# Patient Record
Sex: FEMALE | Race: WHITE | NOT HISPANIC OR LATINO | Employment: OTHER | URBAN - METROPOLITAN AREA
[De-identification: names, ages, dates, MRNs, and addresses within clinical notes are randomized per-mention and may not be internally consistent; named-entity substitution may affect disease eponyms.]

---

## 2017-06-09 ENCOUNTER — OFFICE VISIT (OUTPATIENT)
Dept: FAMILY MEDICINE | Facility: CLINIC | Age: 62
End: 2017-06-09
Payer: COMMERCIAL

## 2017-06-09 VITALS
DIASTOLIC BLOOD PRESSURE: 73 MMHG | HEART RATE: 98 BPM | BODY MASS INDEX: 35.01 KG/M2 | HEIGHT: 61 IN | TEMPERATURE: 98.7 F | WEIGHT: 185.4 LBS | SYSTOLIC BLOOD PRESSURE: 115 MMHG

## 2017-06-09 DIAGNOSIS — I10 BENIGN ESSENTIAL HYPERTENSION: ICD-10-CM

## 2017-06-09 DIAGNOSIS — E78.5 HYPERLIPIDEMIA LDL GOAL <100: ICD-10-CM

## 2017-06-09 DIAGNOSIS — C91.11 CHRONIC LYMPHOID LEUKEMIA IN REMISSION (H): ICD-10-CM

## 2017-06-09 DIAGNOSIS — J98.01 COUGH DUE TO BRONCHOSPASM: ICD-10-CM

## 2017-06-09 DIAGNOSIS — M76.61 ACHILLES TENDINITIS OF RIGHT LOWER EXTREMITY: Primary | ICD-10-CM

## 2017-06-09 DIAGNOSIS — E21.2 OTHER HYPERPARATHYROIDISM (H): ICD-10-CM

## 2017-06-09 PROCEDURE — 99214 OFFICE O/P EST MOD 30 MIN: CPT | Performed by: FAMILY MEDICINE

## 2017-06-09 RX ORDER — PRAVASTATIN SODIUM 40 MG
40 TABLET ORAL DAILY
Qty: 90 TABLET | Refills: 3 | Status: SHIPPED | OUTPATIENT
Start: 2017-06-09 | End: 2018-04-27

## 2017-06-09 RX ORDER — TERAZOSIN 5 MG/1
5 CAPSULE ORAL DAILY
Qty: 90 CAPSULE | Refills: 3 | Status: SHIPPED | OUTPATIENT
Start: 2017-06-09 | End: 2018-04-27

## 2017-06-09 RX ORDER — LISINOPRIL AND HYDROCHLOROTHIAZIDE 20; 25 MG/1; MG/1
1 TABLET ORAL EVERY MORNING
Qty: 90 TABLET | Refills: 3 | Status: SHIPPED | OUTPATIENT
Start: 2017-06-09 | End: 2018-04-27

## 2017-06-09 NOTE — PROGRESS NOTES
"  SUBJECTIVE:                                                    Mirta Cardenas is a 62 year old female who presents to clinic today for the following health issues:      Heel pain      Duration: Jan 2017 but noticed a lump on Easter 2017, worsening in last few weeks. Going on vacation soon    Description  Location: Right heel    Intensity:  5/10 with increased activity, hard pressure, riding as passenger in a car    Accompanying signs and symptoms: swelling and redness.    History  Previous similar problem: no   Previous evaluation:  none    Precipitating or alleviating factors:  Trauma or overuse: no, but has possibly had planters fasciitis in both feet in the past  Aggravating factors include: Increased activity, impact,     Therapies tried and outcome: ice, ibu, and flexion of feet     Stopped Urocit due to cost    Cough:  Ongoing cough when gets URIs so diagnosed with RAD in the past  -uses dulera only when the URI comes on, but did need it a few times this year so needing refills.      Hypertension Follow-up      Outpatient blood pressures are being checked at home.  Results are very good.    Low Salt Diet: no added salt       Problem list and histories reviewed & adjusted, as indicated.  Additional history: as documented      Reviewed and updated as needed this visit by clinical staff       Reviewed and updated as needed this visit by Provider  Allergies  Meds  Problems         ROS:  C: NEGATIVE for fever, chills, change in weight  E/M: NEGATIVE for ear, mouth and throat problems  R: NEGATIVE for only mild cough or SOB  CV: NEGATIVE for chest pain, palpitations or peripheral edema    OBJECTIVE:                                                    /73  Pulse 98  Temp 98.7  F (37.1  C) (Tympanic)  Ht 5' 1.25\" (1.556 m)  Wt 185 lb 6.4 oz (84.1 kg)  BMI 34.75 kg/m2  Body mass index is 34.75 kg/(m^2).  GENERAL: healthy, alert and no distress  MS: right Achilles is thickened and tender just above the " calcaneous, no bursitis.  No heel or plantar fascia pain.    Diagnostic Test Results:  none      ASSESSMENT/PLAN:                                                        Mirta was seen today for right heel pain.    Diagnoses and all orders for this visit:    Achilles tendinitis of right lower extremity: discussed diagnosis and conservative treatment  -ice, nsaids, stretching  -next step Physical therapy referral    Benign essential hypertension: well controlled  -     lisinopril-hydrochlorothiazide (PRINZIDE/ZESTORETIC) 20-25 MG per tablet; Take 1 tablet by mouth every morning  -     terazosin (HYTRIN) 5 MG capsule; Take 1 capsule (5 mg) by mouth daily    Hyperlipidemia LDL goal <100: stable  -     pravastatin (PRAVACHOL) 40 MG tablet; Take 1 tablet (40 mg) by mouth daily    Cough due to bronchospasm: RAD with URI  -so use dulera if needed, (last year did not need it but this school year needed it 4-5 times)  -     mometasone-formoterol (DULERA) 200-5 MCG/ACT oral inhaler; Inhale 1 puff into the lungs 2 times daily Only using at the start of URI.    Chronic lymphoid leukemia in remission (H): labs have been normal    Other hyperparathyroidism (H): elevated PTH: normal calcium  -plan recheck in Dec        Patient Instructions   Achilles Tendinitis  Treatment  Symptoms may persist for several months. The early phase of treatment emphasizes control of inflammation and pain using ice, rest, and non-steroidal anti-inflammatory drugs. Control of biomechanical factors by correcting malalignment with appropriate shoes, using appropriate training techniques, and losing weight is important to reduce further inflammation of the tendon. A slow, gentle warm-up before exercise and icing after exercise will help patients who want to continue athletic training. If conservative measures fail, physical modalities such as ultrasound therapy and flexibility training may be added to the treatment protocol. Stretching may stimulate a  healing response, and one study11 showed that calf muscle training is associated with a faster recovery time. Referral for possible surgical intervention should be made if no improvement occurs after six months of nonoperative treatment        Neal Mosquera MD  [facility]

## 2017-06-09 NOTE — PATIENT INSTRUCTIONS
Achilles Tendinitis  Treatment  Symptoms may persist for several months. The early phase of treatment emphasizes control of inflammation and pain using ice, rest, and non-steroidal anti-inflammatory drugs. Control of biomechanical factors by correcting malalignment with appropriate shoes, using appropriate training techniques, and losing weight is important to reduce further inflammation of the tendon. A slow, gentle warm-up before exercise and icing after exercise will help patients who want to continue athletic training. If conservative measures fail, physical modalities such as ultrasound therapy and flexibility training may be added to the treatment protocol. Stretching may stimulate a healing response, and one study11 showed that calf muscle training is associated with a faster recovery time. Referral for possible surgical intervention should be made if no improvement occurs after six months of nonoperative treatment

## 2017-06-09 NOTE — MR AVS SNAPSHOT
After Visit Summary   6/9/2017    Mirta Cardenas    MRN: [id number]           Patient Information     Date Of Birth          1955        Visit Information        Provider Department      6/9/2017 8:40 AM Neal Mosquera MD [facility]        Today's Diagnoses     Achilles tendinitis of right lower extremity    -  1    Benign essential hypertension        Hyperlipidemia LDL goal <100        Cough due to bronchospasm        Chronic lymphocytic leukemia (H)          Care Instructions    Achilles Tendinitis  Treatment  Symptoms may persist for several months. The early phase of treatment emphasizes control of inflammation and pain using ice, rest, and non-steroidal anti-inflammatory drugs. Control of biomechanical factors by correcting malalignment with appropriate shoes, using appropriate training techniques, and losing weight is important to reduce further inflammation of the tendon. A slow, gentle warm-up before exercise and icing after exercise will help patients who want to continue athletic training. If conservative measures fail, physical modalities such as ultrasound therapy and flexibility training may be added to the treatment protocol. Stretching may stimulate a healing response, and one study11 showed that calf muscle training is associated with a faster recovery time. Referral for possible surgical intervention should be made if no improvement occurs after six months of nonoperative treatment            Follow-ups after your visit        Who to contact     If you have questions or need follow up information about today's clinic visit or your schedule please contact [facility] directly at [phone].  Normal or non-critical lab and imaging results will be communicated to you by MyChart, letter or phone within 4 business days after the clinic has received the results. If you do not hear from us within 7 days, please contact the clinic through [employer] or  "phone. If you have a critical or abnormal lab result, we will notify you by phone as soon as possible.  Submit refill requests through [employer] or call your pharmacy and they will forward the refill request to us. Please allow 3 business days for your refill to be completed.          Additional Information About Your Visit        [employer] Information     [employer] gives you secure access to your electronic health record. If you see a primary care provider, you can also send messages to your care team and make appointments. If you have questions, please call your primary care clinic.  If you do not have a primary care provider, please call [phone] and they will assist you.        Care EveryWhere ID     This is your Care EveryWhere ID. This could be used by other organizations to access your [place] medical records  [id number]        Your Vitals Were     Pulse Temperature Height BMI (Body Mass Index)          98 98.7  F (37.1  C) (Tympanic) 5' 1.25\" (1.556 m) 34.75 kg/m2         Blood Pressure from Last 3 Encounters:   06/09/17 115/73   12/02/16 120/82   09/20/16 108/70    Weight from Last 3 Encounters:   06/09/17 185 lb 6.4 oz (84.1 kg)   12/02/16 182 lb 9.6 oz (82.8 kg)   09/20/16 187 lb (84.8 kg)              Today, you had the following     No orders found for display         Today's Medication Changes          These changes are accurate as of: 6/9/17  9:09 AM.  If you have any questions, ask your nurse or doctor.               These medicines have changed or have updated prescriptions.        Dose/Directions    mometasone-formoterol 200-5 MCG/ACT oral inhaler   Commonly known as:  DULERA   This may have changed:  additional instructions   Used for:  Cough due to bronchospasm, Chronic lymphocytic leukemia (H)   Changed by:  Neal Mosquera MD        Dose:  1 puff   Inhale 1 puff into the lungs 2 times daily Only using at the start of URI.   Quantity:  1 Inhaler   Refills:  2         Stop taking these " medicines if you haven't already. Please contact your care team if you have questions.     neomycin-polymyxin-hydrocortisone 3.5-86426-7 otic suspension   Commonly known as:  CORTISPORIN   Stopped by:  Neal Mosquera MD                Where to get your medicines      These medications were sent to [facility] [place] [id number] [place] [address]  [address] [place] [address]     Phone:  [phone]     lisinopril-hydrochlorothiazide 20-25 MG per tablet    mometasone-formoterol 200-5 MCG/ACT oral inhaler    pravastatin 40 MG tablet    terazosin 5 MG capsule                Primary Care Provider Office Phone # Fax #    Neal Mosquera [phone]       [facility] [address] [facility] [address]        Thank you!     Thank you for choosing [facility]  for your care. Our goal is always to provide you with excellent care. Hearing back from our patients is one way we can continue to improve our services. Please take a few minutes to complete the written survey that you may receive in the mail after your visit with us. Thank you!             Your Updated Medication List - Protect others around you: Learn how to safely use, store and throw away your medicines at www.disposemymeds.org.          This list is accurate as of: 6/9/17  9:09 AM.  Always use your most recent med list.                   Brand Name Dispense Instructions for use    aspirin 81 MG tablet      1 TABLET DAILY       lisinopril-hydrochlorothiazide 20-25 MG per tablet    PRINZIDE/ZESTORETIC    90 tablet    Take 1 tablet by mouth every morning       mometasone-formoterol 200-5 MCG/ACT oral inhaler    DULERA    1 Inhaler    Inhale 1 puff into the lungs 2 times daily Only using at the start of URI.       potassium citrate 10 MEQ (1080 MG) CR tablet    UROCIT-K    300 tablet    Take 1 tablet (10 mEq) by mouth 3 times daily after meals       pravastatin 40 MG tablet     PRAVACHOL    90 tablet    Take 1 tablet (40 mg) by mouth daily       priLOSEC 20 MG CR capsule   Generic drug:  omeprazole      Take 1 capsule by mouth every morning.       terazosin 5 MG capsule    HYTRIN    90 capsule    Take 1 capsule (5 mg) by mouth daily

## 2017-06-09 NOTE — NURSING NOTE
"Initial /73  Pulse 98  Temp 98.7  F (37.1  C) (Tympanic)  Ht 5' 1.25\" (1.556 m)  Wt 185 lb 6.4 oz (84.1 kg)  BMI 34.75 kg/m2 Estimated body mass index is 34.75 kg/(m^2) as calculated from the following:    Height as of this encounter: 5' 1.25\" (1.556 m).    Weight as of this encounter: 185 lb 6.4 oz (84.1 kg). .      "

## 2017-12-15 ENCOUNTER — OFFICE VISIT (OUTPATIENT)
Dept: FAMILY MEDICINE | Facility: CLINIC | Age: 62
End: 2017-12-15
Payer: COMMERCIAL

## 2017-12-15 VITALS
DIASTOLIC BLOOD PRESSURE: 70 MMHG | TEMPERATURE: 96.9 F | HEIGHT: 61 IN | SYSTOLIC BLOOD PRESSURE: 107 MMHG | OXYGEN SATURATION: 99 % | HEART RATE: 72 BPM | WEIGHT: 185.6 LBS | BODY MASS INDEX: 35.04 KG/M2

## 2017-12-15 DIAGNOSIS — Z12.31 ENCOUNTER FOR SCREENING MAMMOGRAM FOR BREAST CANCER: ICD-10-CM

## 2017-12-15 DIAGNOSIS — Z71.89 ADVANCED DIRECTIVES, COUNSELING/DISCUSSION: ICD-10-CM

## 2017-12-15 DIAGNOSIS — L30.0 NUMMULAR DERMATITIS: Primary | ICD-10-CM

## 2017-12-15 PROCEDURE — 99213 OFFICE O/P EST LOW 20 MIN: CPT | Performed by: FAMILY MEDICINE

## 2017-12-15 RX ORDER — BETAMETHASONE DIPROPIONATE 0.5 MG/G
CREAM TOPICAL
Qty: 15 G | Refills: 1 | Status: SHIPPED | OUTPATIENT
Start: 2017-12-15 | End: 2018-08-09

## 2017-12-15 NOTE — PROGRESS NOTES
"  SUBJECTIVE:   Mirta Cardensa is a 62 year old female who presents to clinic today for the following health issues:      Rash  Onset: 2-3 months    Description:   Location: both legs, back of neck  Character: round, raised, flakey, red  Itching (Pruritis): YES    Progression of Symptoms:  worsening    Accompanying Signs & Symptoms:  Fever: no   Body aches or joint pain: no   Sore throat symptoms: no   Recent cold symptoms: no     History:   Previous similar rash: no     Precipitating factors:   Exposure to similar rash: no   New exposures: None   Recent travel: no     Alleviating factors:  none    Therapies Tried and outcome: OTC cortisone cream with some relief of the itching, but new patches keep coming.    Started on the right anterior leg now two on right leg and now two on the left anterior shin and on back of neck.          Problem list and histories reviewed & adjusted, as indicated.  Additional history: as documented    BP Readings from Last 3 Encounters:   12/15/17 107/70   06/09/17 115/73   12/02/16 120/82    Wt Readings from Last 3 Encounters:   12/15/17 185 lb 9.6 oz (84.2 kg)   06/09/17 185 lb 6.4 oz (84.1 kg)   12/02/16 182 lb 9.6 oz (82.8 kg)                      Reviewed and updated as needed this visit by clinical staffTobacco  Allergies  Meds  Med Hx  Surg Hx  Fam Hx  Soc Hx      Reviewed and updated as needed this visit by Provider         ROS:  C: NEGATIVE for fever, chills, change in weight  E/M: NEGATIVE for ear, mouth and throat problems  CV: NEGATIVE for chest pain, palpitations or peripheral edema  ENDOCRINE: NEGATIVE for temperature intolerance, skin/hair changes    OBJECTIVE:     /70  Pulse 72  Temp 96.9  F (36.1  C) (Tympanic)  Ht 5' 1.25\" (1.556 m)  Wt 185 lb 9.6 oz (84.2 kg)  SpO2 99%  BMI 34.78 kg/m2  Body mass index is 34.78 kg/(m^2).  GENERAL: healthy, alert and no distress  SKIN: on back of neck 1.5cm, on left anterior shin 2cm and 1cm and on right anterior shin " too salmon/red macules dry scaly.    Diagnostic Test Results:  none     ASSESSMENT/PLAN:       Mirta was seen today for derm problem and health maintenance.    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  Do not apply to face.    Encounter for screening mammogram for breast cancer  -     *MA Screening Digital Bilateral; Future    Advanced directives, counseling/discussion        Patient Instructions       Nummular dermatitis:   -     augmented betamethasone dipropionate (DIPROLENE-AF) 0.05 % cream; Apply sparingly to affected area  twice daily for 14 days then once daily for the next 14 days then stop.  Do not apply to face.  -If the rash does not improve and go away at the end of 4 weeks then schedule with dermatology    Certainly ok to try melatonin 3mg up to 6mg 30 min before bedtime        Neal Mosquera MD  [facility]

## 2017-12-15 NOTE — NURSING NOTE
"Chief Complaint   Patient presents with     Derm Problem     dry, scaly, itchy, red patches; x 2-3 months, both legs and back of neck       Initial /70  Pulse 72  Temp 96.9  F (36.1  C) (Tympanic)  Ht 5' 1.25\" (1.556 m)  Wt 185 lb 9.6 oz (84.2 kg)  SpO2 99%  BMI 34.78 kg/m2 Estimated body mass index is 34.78 kg/(m^2) as calculated from the following:    Height as of this encounter: 5' 1.25\" (1.556 m).    Weight as of this encounter: 185 lb 9.6 oz (84.2 kg).  Medication Reconciliation: complete  "

## 2017-12-15 NOTE — MR AVS SNAPSHOT
After Visit Summary   12/15/2017    Mirta Cardenas    MRN: [id number]           Patient Information     Date Of Birth          1955        Visit Information        Provider Department      12/15/2017 8:40 AM Neal Mosquera MD [facility]        Today's Diagnoses     Nummular dermatitis    -  1    Encounter for screening mammogram for breast cancer        Advanced directives, counseling/discussion          Care Instructions        Nummular dermatitis:   -     augmented betamethasone dipropionate (DIPROLENE-AF) 0.05 % cream; Apply sparingly to affected area  twice daily for 14 days then once daily for the next 14 days then stop.  Do not apply to face.  -If the rash does not improve and go away at the end of 4 weeks then schedule with dermatology    Certainly ok to try melatonin 3mg up to 6mg 30 min before bedtime            Thank you for choosing [facility].  You may be receiving a survey in the mail from [employer] [facility] regarding your visit today.  Please take a few minutes to complete and return the survey to let us know how we are doing.      If you have questions or concerns, please contact us via [employer] or you can contact your care team at [phone].    Our Clinic hours are:  Monday 6:40 am  to 7:00 pm  Tuesday -Friday 6:40 am to 5:00 pm    The [place] outpatient lab hours are:  Monday - Friday 6:10 am to 4:45 pm  Saturdays 7:00 am to 11:00 am  Appointments are required, call [phone]    If you have clinical questions after hours or would like to schedule an appointment,  call the clinic at [phone].          Follow-ups after your visit        Additional Services     DERMATOLOGY REFERRAL       Your provider has referred you to: [facility]: [facility] [phone]   [email or web address]    Please be aware that coverage of these services is subject to the terms and limitations of your health insurance plan.  Call  member services at your health plan with any benefit or coverage questions.      Please bring the following with you to your appointment:    (1) Any X-Rays, CTs or MRIs which have been performed.  Contact the facility where they were done to arrange for  prior to your scheduled appointment.    (2) List of current medications  (3) This referral request   (4) Any documents/labs given to you for this referral                  Future tests that were ordered for you today     Open Future Orders        Priority Expected Expires Ordered    *MA Screening Digital Bilateral Routine  12/15/2018 12/15/2017            Who to contact     If you have questions or need follow up information about today's clinic visit or your schedule please contact [facility] directly at [phone].  Normal or non-critical lab and imaging results will be communicated to you by [employer], letter or phone within 4 business days after the clinic has received the results. If you do not hear from us within 7 days, please contact the clinic through [employer] or phone. If you have a critical or abnormal lab result, we will notify you by phone as soon as possible.  Submit refill requests through [employer] or call your pharmacy and they will forward the refill request to us. Please allow 3 business days for your refill to be completed.          Additional Information About Your Visit        [employer] Information     [employer] gives you secure access to your electronic health record. If you see a primary care provider, you can also send messages to your care team and make appointments. If you have questions, please call your primary care clinic.  If you do not have a primary care provider, please call [phone] and they will assist you.        Care EveryWhere ID     This is your Care EveryWhere ID. This could be used by other organizations to access your [place] medical records  [id number]        Your Vitals Were     Pulse Temperature  "Height Pulse Oximetry BMI (Body Mass Index)       72 96.9  F (36.1  C) (Tympanic) 5' 1.25\" (1.556 m) 99% 34.78 kg/m2        Blood Pressure from Last 3 Encounters:   12/15/17 107/70   06/09/17 115/73   12/02/16 120/82    Weight from Last 3 Encounters:   12/15/17 185 lb 9.6 oz (84.2 kg)   06/09/17 185 lb 6.4 oz (84.1 kg)   12/02/16 182 lb 9.6 oz (82.8 kg)              We Performed the Following     DERMATOLOGY REFERRAL          Today's Medication Changes          These changes are accurate as of: 12/15/17  9:39 AM.  If you have any questions, ask your nurse or doctor.               Start taking these medicines.        Dose/Directions    augmented betamethasone dipropionate 0.05 % cream   Commonly known as:  DIPROLENE-AF   Used for:  Nummular dermatitis   Started by:  Neal Mosquera MD        Apply sparingly to affected area  twice daily for 14 days then once daily for the next 14 days then stop.  Do not apply to face.   Quantity:  15 g   Refills:  1            Where to get your medicines      These medications were sent to [facility] [place] [address]     Phone:  [phone]     augmented betamethasone dipropionate 0.05 % cream                Primary Care Provider Office Phone # Fax #    Neal Mosquera [phone] [address] [facility] [address]        Equal Access to Services     GEORGINA LIZARRAGA AH: Hadii aad ku hadasho Soomaali, waaxda luqadaha, qaybta kaalmada adeegyaorlando, gerhard idijacqueline ureña. So [facility] [phone].    ATENCIÓN: Si habla español, tiene a cage disposición servicios gratuitos de asistencia lingüística. Llame al [phone].    We comply with applicable federal civil rights laws and [place] laws. We do not discriminate on the basis of race, color, national origin, age, disability, sex, sexual orientation, or gender identity.            Thank you!     Thank you for " choosing [facility]  for your care. Our goal is always to provide you with excellent care. Hearing back from our patients is one way we can continue to improve our services. Please take a few minutes to complete the written survey that you may receive in the mail after your visit with us. Thank you!             Your Updated Medication List - Protect others around you: Learn how to safely use, store and throw away your medicines at www.disposemymeds.org.          This list is accurate as of: 12/15/17  9:39 AM.  Always use your most recent med list.                   Brand Name Dispense Instructions for use Diagnosis    aspirin 81 MG tablet      1 TABLET DAILY        augmented betamethasone dipropionate 0.05 % cream    DIPROLENE-AF    15 g    Apply sparingly to affected area  twice daily for 14 days then once daily for the next 14 days then stop.  Do not apply to face.    Nummular dermatitis       lisinopril-hydrochlorothiazide 20-25 MG per tablet    PRINZIDE/ZESTORETIC    90 tablet    Take 1 tablet by mouth every morning    Benign essential hypertension       mometasone-formoterol 200-5 MCG/ACT oral inhaler    DULERA    1 Inhaler    Inhale 1 puff into the lungs 2 times daily Only using at the start of URI.    Cough due to bronchospasm       pravastatin 40 MG tablet    PRAVACHOL    90 tablet    Take 1 tablet (40 mg) by mouth daily    Hyperlipidemia LDL goal <100       priLOSEC 20 MG CR capsule   Generic drug:  omeprazole      Take 1 capsule by mouth every morning.        terazosin 5 MG capsule    HYTRIN    90 capsule    Take 1 capsule (5 mg) by mouth daily    Benign essential hypertension

## 2017-12-15 NOTE — PATIENT INSTRUCTIONS
Nummular dermatitis:   -     augmented betamethasone dipropionate (DIPROLENE-AF) 0.05 % cream; Apply sparingly to affected area  twice daily for 14 days then once daily for the next 14 days then stop.  Do not apply to face.  -If the rash does not improve and go away at the end of 4 weeks then schedule with dermatology    Certainly ok to try melatonin 3mg up to 6mg 30 min before bedtime            Thank you for choosing [facility].  You may be receiving a survey in the mail from Juaquin Murray regarding your visit today.  Please take a few minutes to complete and return the survey to let us know how we are doing.      If you have questions or concerns, please contact us via [employer] or you can contact your care team at [phone].    Our Clinic hours are:  Monday 6:40 am  to 7:00 pm  Tuesday -Friday 6:40 am to 5:00 pm    The [place] outpatient lab hours are:  Monday - Friday 6:10 am to 4:45 pm  Saturdays 7:00 am to 11:00 am  Appointments are required, call [phone]    If you have clinical questions after hours or would like to schedule an appointment,  call the clinic at [phone].

## 2017-12-15 NOTE — ASSESSMENT & PLAN NOTE
Advance Care Planning 12/15/2017: ACP Review of Chart / Resources Provided:  Reviewed chart for advance care plan.  Mirta Cardenas has been provided information and resources to begin or update their advance care plan.  Added by Melodie Allen

## 2017-12-16 ASSESSMENT — ASTHMA QUESTIONNAIRES: ACT_TOTALSCORE: 25

## 2018-01-30 ENCOUNTER — TELEPHONE (OUTPATIENT)
Dept: DERMATOLOGY | Facility: CLINIC | Age: 63
End: 2018-01-30

## 2018-01-30 NOTE — TELEPHONE ENCOUNTER
Pt was seen by Dr. Miranda in mid-December for spots and flaky, itchy skin and was given an RX cream to try and if after 4 weeks things were not getting better then she was given a referral to dermatology. Pt states that the spots are coming back now and she has a new spot on her head which concerns her. Pt states that she was not able to get an appointment to March and would like to know if she should be seen sooner. Please advise.    Thelma Weiss  Clinic Station

## 2018-01-31 NOTE — TELEPHONE ENCOUNTER
Left message for pt to call back clinic.  Spot held for her on 2/7 at 2:30 pm. Please schedule her if this time works for her.  Lynnette CHOWDHURY RN BSN PHN  Specialty Clinics

## 2018-02-07 ENCOUNTER — OFFICE VISIT (OUTPATIENT)
Dept: DERMATOLOGY | Facility: CLINIC | Age: 63
End: 2018-02-07
Payer: COMMERCIAL

## 2018-02-07 VITALS — DIASTOLIC BLOOD PRESSURE: 65 MMHG | SYSTOLIC BLOOD PRESSURE: 117 MMHG | HEIGHT: 61 IN | HEART RATE: 97 BPM

## 2018-02-07 DIAGNOSIS — L30.0 NUMMULAR DERMATITIS: Primary | ICD-10-CM

## 2018-02-07 PROCEDURE — 99243 OFF/OP CNSLTJ NEW/EST LOW 30: CPT | Performed by: DERMATOLOGY

## 2018-02-07 RX ORDER — FLUOCINONIDE 0.5 MG/G
CREAM TOPICAL
Qty: 120 G | Refills: 1 | Status: SHIPPED | OUTPATIENT
Start: 2018-02-07 | End: 2018-08-09

## 2018-02-07 RX ORDER — FLUOCINONIDE TOPICAL SOLUTION USP, 0.05% 0.5 MG/ML
SOLUTION TOPICAL
Qty: 60 ML | Refills: 0 | Status: SHIPPED | OUTPATIENT
Start: 2018-02-07 | End: 2018-08-09

## 2018-02-07 NOTE — NURSING NOTE
"Initial /65  Pulse 97  Ht 1.549 m (5' 1\") Estimated body mass index is 34.78 kg/(m^2) as calculated from the following:    Height as of 12/15/17: 1.556 m (5' 1.25\").    Weight as of 12/15/17: 84.2 kg (185 lb 9.6 oz). .      "

## 2018-02-07 NOTE — LETTER
2018         RE: Mirta Cardenas  [id number] [facility] [address]        Dear Colleague,    Thank you for referring your patient, Mirta Cardenas, to the [facility]. Please see a copy of my visit note below.    Mirta Cardenas , a 63 year old year old female patient, I was asked to see by Dr. Mosquera for scaly spots on face, back and leg.  Patient states this has been present for months.  Patient reports the following symptoms:  scale .  Patient reports the following previous treatments topical steroid.  Patient reports the following modifying factors none.  Associated symptoms: none.  Patient has no other skin complaints today.  Remainder of the HPI, Meds, PMH, Allergies, FH, and SH was reviewed in chart.      Past Medical History:   Diagnosis Date     Asthma      Blood transfusion      Hypertension      Malignant neoplasm (H)     CLL       Past Surgical History:   Procedure Laterality Date     C/SECTION, CLASSICAL  ,,    , Classical     HYSTERECTOMY, PAP NO LONGER INDICATED       RELEASE CARPAL TUNNEL  2012    Procedure:RELEASE CARPAL TUNNEL; Right Carpal Tunnel Release & Right Ring A1 Pulley Release; Surgeon:SERGEY HEATON; Location:[facility]     RELEASE TRIGGER FINGER  2012    Procedure:RELEASE TRIGGER FINGER; Surgeon:SERGEY HEATON; Location:[facility]     RELEASE TRIGGER FINGER  10/12/2012    Procedure: RELEASE TRIGGER FINGER;  Right Thumb A1 Pulley Release;  Surgeon: Sergey Heaton MD;  Location: [facility] OR     SALPINGO OOPHORECTOMY,R/L/ULICES  2008    right        Family History   Problem Relation Age of Onset     Hypertension Brother      HEART DISEASE Brother 65     bypass     CANCER Sister      multiple myloma     Obesity Son      Obesity Son      Hypertension Son        Social History     Social History     Marital status:      Spouse name: N/A     Number of children: N/A     Years of education: N/A     Occupational History     Not  "on file.     Social History Main Topics     Smoking status: Never Smoker     Smokeless tobacco: Never Used     Alcohol use Yes      Comment: THREE- FOUR TIMES PER YEAR, SOCIALLY     Drug use: No     Sexual activity: Yes     Partners: Male     Birth control/ protection: Surgical     Other Topics Concern     Parent/Sibling W/ Cabg, Mi Or Angioplasty Before 65f 55m? No     Social History Narrative       Outpatient Encounter Prescriptions as of 2/7/2018   Medication Sig Dispense Refill     augmented betamethasone dipropionate (DIPROLENE-AF) 0.05 % cream Apply sparingly to affected area  twice daily for 14 days then once daily for the next 14 days then stop.  Do not apply to face. 15 g 1     lisinopril-hydrochlorothiazide (PRINZIDE/ZESTORETIC) 20-25 MG per tablet Take 1 tablet by mouth every morning 90 tablet 3     pravastatin (PRAVACHOL) 40 MG tablet Take 1 tablet (40 mg) by mouth daily 90 tablet 3     terazosin (HYTRIN) 5 MG capsule Take 1 capsule (5 mg) by mouth daily 90 capsule 3     mometasone-formoterol (DULERA) 200-5 MCG/ACT oral inhaler Inhale 1 puff into the lungs 2 times daily Only using at the start of URI. 1 Inhaler 2     omeprazole (PRILOSEC) 20 MG capsule Take 1 capsule by mouth every morning.       ASPIRIN 81 MG OR TABS 1 TABLET DAILY       No facility-administered encounter medications on file as of 2/7/2018.              Review Of Systems  Skin: As above  Eyes: negative  Ears/Nose/Throat: negative  Respiratory: No shortness of breath, dyspnea on exertion, cough, or hemoptysis  Cardiovascular: negative  Gastrointestinal: negative  Genitourinary: negative  Musculoskeletal: negative  Neurologic: negative  Psychiatric: negative  Hematologic/Lymphatic/Immunologic: negative  Endocrine: negative      O:   NAD, WDWN, Alert & Oriented, Mood & Affect wnl, Vitals stable   Here today alone   /65  Pulse 97  Ht 1.549 m (5' 1\")   General appearance tere ii   Vitals stable   Alert, oriented and in no acute " distress     Nummular eczematous plaques on legs, trunk       The remainder of expanded problem focused exam was unremarkable; the following areas were examined:  scalp/hair, conjunctiva/lids, face, neck, lips/teeth, chest, digits/nails, RUE, LUE.      Eyes: Conjunctivae/lids:Normal     ENT: Lips, buccal mucosa, tongue: normal    MSK:Normal    Cardiovascular: peripheral edema none    Pulm: Breathing Normal    Neuro/Psych: Orientation:Normal; Mood/Affect:Normal      A/P:  1. Nummular dermatitis  Stop dial soap  Lidex daily  Emollient use discussed with patient   Zyrtec daily  Skin care regimen reviewed with patient: Eliminate harsh soaps, i.e. Dial, zest, irs[place] spring; Mild soaps such as Cetaphil or Dove sensitive skin, avoid hot or cold showers, aggressive use of emollients including vanicream, cetaphil or cerave discussed with patient.    Return to clinic 2 months      Again, thank you for allowing me to participate in the care of your patient.        Sincerely,        Andrew Marroquin MD

## 2018-02-07 NOTE — PROGRESS NOTES
Mirta Cardenas , a 63 year old year old female patient, I was asked to see by Dr. Mosquera for scaly spots on face, back and leg.  Patient states this has been present for months.  Patient reports the following symptoms:  scale .  Patient reports the following previous treatments topical steroid.  Patient reports the following modifying factors none.  Associated symptoms: none.  Patient has no other skin complaints today.  Remainder of the HPI, Meds, PMH, Allergies, FH, and SH was reviewed in chart.      Past Medical History:   Diagnosis Date     Asthma      Blood transfusion      Hypertension      Malignant neoplasm (H)     CLL       Past Surgical History:   Procedure Laterality Date     C/SECTION, CLASSICAL  ,,    , Classical     HYSTERECTOMY, PAP NO LONGER INDICATED       RELEASE CARPAL TUNNEL  2012    Procedure:RELEASE CARPAL TUNNEL; Right Carpal Tunnel Release & Right Ring A1 Pulley Release; Surgeon:SERGEY HEATON; Location:[facility]     RELEASE TRIGGER FINGER  2012    Procedure:RELEASE TRIGGER FINGER; Surgeon:SERGEY HEATON; Location:[facility]     RELEASE TRIGGER FINGER  10/12/2012    Procedure: RELEASE TRIGGER FINGER;  Right Thumb A1 Pulley Release;  Surgeon: Sergey Heaton MD;  Location: [facility]     SALPINGO OOPHORECTOMY,R/L/ULICES  2008    right        Family History   Problem Relation Age of Onset     Hypertension Brother      HEART DISEASE Brother 65     bypass     CANCER Sister      multiple myloma     Obesity Son      Obesity Son      Hypertension Son        Social History     Social History     Marital status:      Spouse name: N/A     Number of children: N/A     Years of education: N/A     Occupational History     Not on file.     Social History Main Topics     Smoking status: Never Smoker     Smokeless tobacco: Never Used     Alcohol use Yes      Comment: THREE- FOUR TIMES PER YEAR, SOCIALLY     Drug use: No     Sexual activity: Yes     Partners: Male      "Birth control/ protection: Surgical     Other Topics Concern     Parent/Sibling W/ Cabg, Mi Or Angioplasty Before 65f 55m? No     Social History Narrative       Outpatient Encounter Prescriptions as of 2/7/2018   Medication Sig Dispense Refill     augmented betamethasone dipropionate (DIPROLENE-AF) 0.05 % cream Apply sparingly to affected area  twice daily for 14 days then once daily for the next 14 days then stop.  Do not apply to face. 15 g 1     lisinopril-hydrochlorothiazide (PRINZIDE/ZESTORETIC) 20-25 MG per tablet Take 1 tablet by mouth every morning 90 tablet 3     pravastatin (PRAVACHOL) 40 MG tablet Take 1 tablet (40 mg) by mouth daily 90 tablet 3     terazosin (HYTRIN) 5 MG capsule Take 1 capsule (5 mg) by mouth daily 90 capsule 3     mometasone-formoterol (DULERA) 200-5 MCG/ACT oral inhaler Inhale 1 puff into the lungs 2 times daily Only using at the start of URI. 1 Inhaler 2     omeprazole (PRILOSEC) 20 MG capsule Take 1 capsule by mouth every morning.       ASPIRIN 81 MG OR TABS 1 TABLET DAILY       No facility-administered encounter medications on file as of 2/7/2018.              Review Of Systems  Skin: As above  Eyes: negative  Ears/Nose/Throat: negative  Respiratory: No shortness of breath, dyspnea on exertion, cough, or hemoptysis  Cardiovascular: negative  Gastrointestinal: negative  Genitourinary: negative  Musculoskeletal: negative  Neurologic: negative  Psychiatric: negative  Hematologic/Lymphatic/Immunologic: negative  Endocrine: negative      O:   NAD, WDWN, Alert & Oriented, Mood & Affect wnl, Vitals stable   Here today alone   /65  Pulse 97  Ht 1.549 m (5' 1\")   General appearance tere ii   Vitals stable   Alert, oriented and in no acute distress     Nummular eczematous plaques on legs, trunk       The remainder of expanded problem focused exam was unremarkable; the following areas were examined:  scalp/hair, conjunctiva/lids, face, neck, lips/teeth, chest, digits/nails, RUE, " HAZEL.      Eyes: Conjunctivae/lids:Normal     ENT: Lips, buccal mucosa, tongue: normal    MSK:Normal    Cardiovascular: peripheral edema none    Pulm: Breathing Normal    Neuro/Psych: Orientation:Normal; Mood/Affect:Normal      A/P:  1. Nummular dermatitis  Stop dial soap  Lidex daily  Emollient use discussed with patient   Zyrtec daily  Skin care regimen reviewed with patient: Eliminate harsh soaps, i.e. Dial, zest, irsih spring; Mild soaps such as Cetaphil or Dove sensitive skin, avoid hot or cold showers, aggressive use of emollients including vanicream, cetaphil or cerave discussed with patient.    Return to clinic 2 months

## 2018-02-07 NOTE — PATIENT INSTRUCTIONS
Zyrtec at bedtime    Cream for legs and neck    Solution for the scalp    Return to clinic in 9 months    Proper skin care from Dr. Marroquin- [facility]     Eliminate harsh soaps, i.e. Dial, Zest, [place];   Use mild soaps such as Cetaphil or Dove Sensitive Skin   Avoid hot or cold showers   After showering, lightly dry off.    Aggressive use of a moisturizer (including Vanicream, Cetaphil, Aquaphor or Cerave)   We recommend using a tub that needs to be scooped out, not a pump. This has more of an oil base. It will hold moisture in your skin much better than a water base moisturizer. The ones recommended are non- pore clogging.       If you have any questions call [phone] and follow the prompts to Dr. Marroquin's office.

## 2018-02-07 NOTE — MR AVS SNAPSHOT
After Visit Summary   2/7/2018    Mirta Cardenas    MRN: [id number]           Patient Information     Date Of Birth          1955        Visit Information        Provider Department      2/7/2018 2:30 PM Andrew Marroquin MD [facility]        Today's Diagnoses     Nummular dermatitis    -  1      Care Instructions    Zyrtec at bedtime    Cream for legs and neck    Solution for the scalp    Return to clinic in 9 months    Proper skin care from Dr. Marroquin- [facility]     Eliminate harsh soaps, i.e. Dial, Zest, [place] Spring;   Use mild soaps such as Cetaphil or Dove Sensitive Skin   Avoid hot or cold showers   After showering, lightly dry off.    Aggressive use of a moisturizer (including Vanicream, Cetaphil, Aquaphor or Cerave)   We recommend using a tub that needs to be scooped out, not a pump. This has more of an oil base. It will hold moisture in your skin much better than a water base moisturizer. The ones recommended are non- pore clogging.       If you have any questions call [phone] and follow the prompts to Dr. Marroquin's office.             Follow-ups after your visit        Who to contact     If you have questions or need follow up information about today's clinic visit or your schedule please contact [facility] directly at [phone].  Normal or non-critical lab and imaging results will be communicated to you by MyChart, letter or phone within 4 business days after the clinic has received the results. If you do not hear from us within 7 days, please contact the clinic through [employer] or phone. If you have a critical or abnormal lab result, we will notify you by phone as soon as possible.  Submit refill requests through [employer] or call your pharmacy and they will forward the refill request to us. Please allow 3 business days for your refill to be completed.          Additional Information About Your Visit        MyChart Information      "[employer] gives you secure access to your electronic health record. If you see a primary care provider, you can also send messages to your care team and make appointments. If you have questions, please call your primary care clinic.  If you do not have a primary care provider, please call [phone] and they will assist you.        Care EveryWhere ID     This is your Care EveryWhere ID. This could be used by other organizations to access your [facility] records  [id number]        Your Vitals Were     Pulse Height                97 1.549 m (5' 1\")           Blood Pressure from Last 3 Encounters:   02/07/18 117/65   12/15/17 107/70   06/09/17 115/73    Weight from Last 3 Encounters:   12/15/17 84.2 kg (185 lb 9.6 oz)   06/09/17 84.1 kg (185 lb 6.4 oz)   12/02/16 82.8 kg (182 lb 9.6 oz)              Today, you had the following     No orders found for display         Today's Medication Changes          These changes are accurate as of 2/7/18  2:30 PM.  If you have any questions, ask your nurse or doctor.               Start taking these medicines.        Dose/Directions    * fluocinonide 0.05 % cream   Commonly known as:  LIDEX   Used for:  Nummular dermatitis   Started by:  Andrew Marroquin MD        Apply sparingly to affected area twice daily as needed.  Do not apply to face.   Quantity:  120 g   Refills:  1       * fluocinonide 0.05 % solution   Commonly known as:  LIDEX   Used for:  Nummular dermatitis   Started by:  Andrew Marroquin MD        Apply sparingly to affected area twice daily as needed.  Do not apply to face.   Quantity:  60 mL   Refills:  0       * Notice:  This list has 2 medication(s) that are the same as other medications prescribed for you. Read the directions carefully, and ask your doctor or other care provider to review them with you.         Where to get your medicines      These medications were sent to [facility], [place] - [address]  [address] [place] [address]     Phone:  [phone]     fluocinonide 0.05 % cream    fluocinonide 0.05 % solution                Primary Care Provider Office Phone # Fax #    Neal Mosquera [phone] [address] [facility] [address]        Equal Access to Services     JOSÉ MANUEL LIZARRAGA : Hadii aad ku hadasho Soomaali, waaxda luqadaha, qaybta kaalmada adeegyada, waxay idiin hayaan adeeg kharash la'ilann ah. So [facility] [phone].    ATENCIÓN: Si habla español, tiene a cage disposición servicios gratuitos de asistencia lingüística. Llame al [phone].    We comply with applicable federal civil rights laws and [place] laws. We do not discriminate on the basis of race, color, national origin, age, disability, sex, sexual orientation, or gender identity.            Thank you!     Thank you for choosing [facility]  for your care. Our goal is always to provide you with excellent care. Hearing back from our patients is one way we can continue to improve our services. Please take a few minutes to complete the written survey that you may receive in the mail after your visit with us. Thank you!             Your Updated Medication List - Protect others around you: Learn how to safely use, store and throw away your medicines at www.disposemymeds.org.          This list is accurate as of 2/7/18  2:30 PM.  Always use your most recent med list.                   Brand Name Dispense Instructions for use Diagnosis    aspirin 81 MG tablet      1 TABLET DAILY        augmented betamethasone dipropionate 0.05 % cream    DIPROLENE-AF    15 g    Apply sparingly to affected area  twice daily for 14 days then once daily for the next 14 days then stop.  Do not apply to face.    Nummular dermatitis       * fluocinonide 0.05 % cream    LIDEX    120 g    Apply sparingly to affected area twice daily as needed.  Do not apply to face.    Nummular dermatitis       * fluocinonide 0.05 %  solution    LIDEX    60 mL    Apply sparingly to affected area twice daily as needed.  Do not apply to face.    Nummular dermatitis       lisinopril-hydrochlorothiazide 20-25 MG per tablet    PRINZIDE/ZESTORETIC    90 tablet    Take 1 tablet by mouth every morning    Benign essential hypertension       mometasone-formoterol 200-5 MCG/ACT oral inhaler    DULERA    1 Inhaler    Inhale 1 puff into the lungs 2 times daily Only using at the start of URI.    Cough due to bronchospasm       pravastatin 40 MG tablet    PRAVACHOL    90 tablet    Take 1 tablet (40 mg) by mouth daily    Hyperlipidemia LDL goal <100       priLOSEC 20 MG CR capsule   Generic drug:  omeprazole      Take 1 capsule by mouth every morning.        terazosin 5 MG capsule    HYTRIN    90 capsule    Take 1 capsule (5 mg) by mouth daily    Benign essential hypertension       * Notice:  This list has 2 medication(s) that are the same as other medications prescribed for you. Read the directions carefully, and ask your doctor or other care provider to review them with you.

## 2018-02-22 ENCOUNTER — OFFICE VISIT (OUTPATIENT)
Dept: PODIATRY | Facility: CLINIC | Age: 63
End: 2018-02-22
Payer: COMMERCIAL

## 2018-02-22 VITALS — BODY MASS INDEX: 34.93 KG/M2 | WEIGHT: 185 LBS | HEART RATE: 86 BPM | HEIGHT: 61 IN

## 2018-02-22 DIAGNOSIS — M72.2 PLANTAR FASCIITIS, BILATERAL: Primary | ICD-10-CM

## 2018-02-22 PROCEDURE — 99203 OFFICE O/P NEW LOW 30 MIN: CPT | Performed by: PODIATRIST

## 2018-02-22 NOTE — PROGRESS NOTES
PATIENT HISTORY:  Mirta Cardenas is a 63 year old female who presents to clinic for a painful right and left foot .  The patient describes the pain as sharp stabbing.  The patient relates the pain level is moderate.  The patient relates pain is located on the bottom of both heels.  The patient relates the pain has been present for the past several weeks.  The patient relates pain with ambulation.  The patient has tried different shoes with little relief.       REVIEW OF SYSTEMS:  Constitutional, HEENT, cardiovascular, pulmonary, GI, , musculoskeletal, neuro, skin, endocrine and psych systems are negative, except as otherwise noted.     PAST MEDICAL HISTORY:   Past Medical History:   Diagnosis Date     Asthma      Blood transfusion      Hypertension      Malignant neoplasm (H)     CLL        PAST SURGICAL HISTORY:   Past Surgical History:   Procedure Laterality Date     C/SECTION, CLASSICAL  ,,    , Classical     HYSTERECTOMY, PAP NO LONGER INDICATED       RELEASE CARPAL TUNNEL  2012    Procedure:RELEASE CARPAL TUNNEL; Right Carpal Tunnel Release & Right Ring A1 Pulley Release; Surgeon:SERGEY HEATON; Location:[facility]     RELEASE TRIGGER FINGER  2012    Procedure:RELEASE TRIGGER FINGER; Surgeon:SERGEY HEATON; Location:[facility]     RELEASE TRIGGER FINGER  10/12/2012    Procedure: RELEASE TRIGGER FINGER;  Right Thumb A1 Pulley Release;  Surgeon: Sergey Heaton MD;  Location: [facility]     SALPINGO OOPHORECTOMY,R/L/ULICES  2008    right        MEDICATIONS:   Current Outpatient Prescriptions:      order for DME, Equipment being ordered: Dynaflex insert, Disp: 2 Units, Rfl: 0     fluocinonide (LIDEX) 0.05 % cream, Apply sparingly to affected area twice daily as needed.  Do not apply to face., Disp: 120 g, Rfl: 1     fluocinonide (LIDEX) 0.05 % solution, Apply sparingly to affected area twice daily as needed.  Do not apply to face., Disp: 60 mL, Rfl: 0     augmented betamethasone  "dipropionate (DIPROLENE-AF) 0.05 % cream, Apply sparingly to affected area  twice daily for 14 days then once daily for the next 14 days then stop.  Do not apply to face., Disp: 15 g, Rfl: 1     lisinopril-hydrochlorothiazide (PRINZIDE/ZESTORETIC) 20-25 MG per tablet, Take 1 tablet by mouth every morning, Disp: 90 tablet, Rfl: 3     pravastatin (PRAVACHOL) 40 MG tablet, Take 1 tablet (40 mg) by mouth daily, Disp: 90 tablet, Rfl: 3     terazosin (HYTRIN) 5 MG capsule, Take 1 capsule (5 mg) by mouth daily, Disp: 90 capsule, Rfl: 3     mometasone-formoterol (DULERA) 200-5 MCG/ACT oral inhaler, Inhale 1 puff into the lungs 2 times daily Only using at the start of URI., Disp: 1 Inhaler, Rfl: 2     omeprazole (PRILOSEC) 20 MG capsule, Take 1 capsule by mouth every morning., Disp: , Rfl:      ASPIRIN 81 MG OR TABS, 1 TABLET DAILY, Disp: , Rfl:      ALLERGIES:    Allergies   Allergen Reactions     Allopurinol Itching     Amoxicillin      hives     Codeine Itching     Periactin      Sleepy.     Tenormin [Atenolol] Fatigue        SOCIAL HISTORY:   Social History     Social History     Marital status:      Spouse name: N/A     Number of children: N/A     Years of education: N/A     Occupational History     Not on file.     Social History Main Topics     Smoking status: Never Smoker     Smokeless tobacco: Never Used     Alcohol use Yes      Comment: THREE- FOUR TIMES PER YEAR, SOCIALLY     Drug use: No     Sexual activity: Yes     Partners: Male     Birth control/ protection: Surgical     Other Topics Concern     Parent/Sibling W/ Cabg, Mi Or Angioplasty Before 65f 55m? No     Social History Narrative        FAMILY HISTORY:   Family History   Problem Relation Age of Onset     Hypertension Brother      HEART DISEASE Brother 65     bypass     CANCER Sister      multiple myloma     Obesity Son      Obesity Son      Hypertension Son         EXAM:Vitals: Pulse 86  Ht 5' 1\" (1.549 m)  Wt 185 lb (83.9 kg)  BMI 34.96 " kg/m2  BMI= Body mass index is 34.96 kg/(m^2).    Weight management plan: Patient was referred to their PCP to discuss a diet and exercise plan.    General appearance: Patient is alert and fully cooperative with history & exam.  No sign of distress is noted during the visit.     Psychiatric: Affect is pleasant & appropriate.  Patient appears motivated to improve health.     Respiratory: Breathing is regular & unlabored while sitting.     HEENT: Hearing is intact to spoken word.  Speech is clear.  No gross evidence of visual impairment that would impact ambulation.     Dermatologic: Skin is intact to both lower extremities without significant lesions, rash or abrasion.  No paronychia or evidence of soft tissue infection is noted.     Vascular: DP & PT pulses are intact & regular bilaterally.  No significant edema or varicosities noted.  CFT and skin temperature is normal to both lower extremities.     Neurologic: Lower extremity sensation is intact to light touch.  No evidence of weakness or contracture in the lower extremities.  No evidence of neuropathy.     Musculoskeletal: Patient is ambulatory without assistive device or brace.  No gross ankle deformity noted.  No foot or ankle joint effusion is noted.  Noted pain on palpation under the right and left heel at the insertion point of the plantar fascia.  No surrounding erythema noted. Noted tight gastroc complex bilaterally.       ASSESSMENT / PLAN:     ICD-10-CM    1. Plantar fasciitis, bilateral M72.2 order for DME       I have explained to Mirta  about the conditions.  We discussed the nature of the condition as well as the treatment plan and expected length of recovery.  At this time, the patient was instructed on icing, stretching, tissue massage and support.  The patient was fitted with a pair of Dynaflex inserts that will aid in offloading the tension forces to the soft tissues and prevent further inflammation.  The patient was prescribed physical therapy  for deep fascial release to both plantar fascia.  The patient will return in four weeks for reevaluation if the symptoms do not resolve.          Disclaimer: This note consists of symbols derived from keyboarding, dictation and/or voice recognition software. As a result, there may be errors in the script that have gone undetected. Please consider this when interpreting information found in this chart.       FABI Webster D.P.M., F.EMIL.C.F.A.S.  '

## 2018-02-22 NOTE — LETTER
2018         RE: Mirta Cardenas  [id number] [facility] [address]        Dear Colleague,    Thank you for referring your patient, Mirta Cardenas, to the [facility]. Please see a copy of my visit note below.    PATIENT HISTORY:  Mirta Cardenas is a 63 year old female who presents to clinic for a painful right and left foot .  The patient describes the pain as sharp stabbing.  The patient relates the pain level is moderate.  The patient relates pain is located on the bottom of both heels.  The patient relates the pain has been present for the past several weeks.  The patient relates pain with ambulation.  The patient has tried different shoes with little relief.       REVIEW OF SYSTEMS:  Constitutional, HEENT, cardiovascular, pulmonary, GI, , musculoskeletal, neuro, skin, endocrine and psych systems are negative, except as otherwise noted.     PAST MEDICAL HISTORY:   Past Medical History:   Diagnosis Date     Asthma      Blood transfusion      Hypertension      Malignant neoplasm (H)     CLL        PAST SURGICAL HISTORY:   Past Surgical History:   Procedure Laterality Date     C/SECTION, CLASSICAL  ,,    , Classical     HYSTERECTOMY, PAP NO LONGER INDICATED       RELEASE CARPAL TUNNEL  2012    Procedure:RELEASE CARPAL TUNNEL; Right Carpal Tunnel Release & Right Ring A1 Pulley Release; Surgeon:SERGEY HEATON; Location:[facility]     RELEASE TRIGGER FINGER  2012    Procedure:RELEASE TRIGGER FINGER; Surgeon:SERGEY HEATON; Location:[facility]     RELEASE TRIGGER FINGER  10/12/2012    Procedure: RELEASE TRIGGER FINGER;  Right Thumb A1 Pulley Release;  Surgeon: Sergey Heaton MD;  Location: [facility]     SALPINGO OOPHORECTOMY,R/L/ULICES  2008    right        MEDICATIONS:   Current Outpatient Prescriptions:      order for DME, Equipment being ordered: Dynaflex insert, Disp: 2 Units, Rfl: 0     fluocinonide (LIDEX) 0.05 % cream,  Apply sparingly to affected area twice daily as needed.  Do not apply to face., Disp: 120 g, Rfl: 1     fluocinonide (LIDEX) 0.05 % solution, Apply sparingly to affected area twice daily as needed.  Do not apply to face., Disp: 60 mL, Rfl: 0     augmented betamethasone dipropionate (DIPROLENE-AF) 0.05 % cream, Apply sparingly to affected area  twice daily for 14 days then once daily for the next 14 days then stop.  Do not apply to face., Disp: 15 g, Rfl: 1     lisinopril-hydrochlorothiazide (PRINZIDE/ZESTORETIC) 20-25 MG per tablet, Take 1 tablet by mouth every morning, Disp: 90 tablet, Rfl: 3     pravastatin (PRAVACHOL) 40 MG tablet, Take 1 tablet (40 mg) by mouth daily, Disp: 90 tablet, Rfl: 3     terazosin (HYTRIN) 5 MG capsule, Take 1 capsule (5 mg) by mouth daily, Disp: 90 capsule, Rfl: 3     mometasone-formoterol (DULERA) 200-5 MCG/ACT oral inhaler, Inhale 1 puff into the lungs 2 times daily Only using at the start of URI., Disp: 1 Inhaler, Rfl: 2     omeprazole (PRILOSEC) 20 MG capsule, Take 1 capsule by mouth every morning., Disp: , Rfl:      ASPIRIN 81 MG OR TABS, 1 TABLET DAILY, Disp: , Rfl:      ALLERGIES:    Allergies   Allergen Reactions     Allopurinol Itching     Amoxicillin      hives     Codeine Itching     Periactin      Sleepy.     Tenormin [Atenolol] Fatigue        SOCIAL HISTORY:   Social History     Social History     Marital status:      Spouse name: N/A     Number of children: N/A     Years of education: N/A     Occupational History     Not on file.     Social History Main Topics     Smoking status: Never Smoker     Smokeless tobacco: Never Used     Alcohol use Yes      Comment: THREE- FOUR TIMES PER YEAR, SOCIALLY     Drug use: No     Sexual activity: Yes     Partners: Male     Birth control/ protection: Surgical     Other Topics Concern     Parent/Sibling W/ Cabg, Mi Or Angioplasty Before 65f 55m? No     Social History Narrative        FAMILY HISTORY:   Family History   Problem  "Relation Age of Onset     Hypertension Brother      HEART DISEASE Brother 65     bypass     CANCER Sister      multiple myloma     Obesity Son      Obesity Son      Hypertension Son         EXAM:Vitals: Pulse 86  Ht 5' 1\" (1.549 m)  Wt 185 lb (83.9 kg)  BMI 34.96 kg/m2  BMI= Body mass index is 34.96 kg/(m^2).    Weight management plan: Patient was referred to their PCP to discuss a diet and exercise plan.    General appearance: Patient is alert and fully cooperative with history & exam.  No sign of distress is noted during the visit.     Psychiatric: Affect is pleasant & appropriate.  Patient appears motivated to improve health.     Respiratory: Breathing is regular & unlabored while sitting.     HEENT: Hearing is intact to spoken word.  Speech is clear.  No gross evidence of visual impairment that would impact ambulation.     Dermatologic: Skin is intact to both lower extremities without significant lesions, rash or abrasion.  No paronychia or evidence of soft tissue infection is noted.     Vascular: DP & PT pulses are intact & regular bilaterally.  No significant edema or varicosities noted.  CFT and skin temperature is normal to both lower extremities.     Neurologic: Lower extremity sensation is intact to light touch.  No evidence of weakness or contracture in the lower extremities.  No evidence of neuropathy.     Musculoskeletal: Patient is ambulatory without assistive device or brace.  No gross ankle deformity noted.  No foot or ankle joint effusion is noted.  Noted pain on palpation under the right and left heel at the insertion point of the plantar fascia.  No surrounding erythema noted. Noted tight gastroc complex bilaterally.       ASSESSMENT / PLAN:     ICD-10-CM    1. Plantar fasciitis, bilateral M72.2 order for DME       I have explained to Mirta  about the conditions.  We discussed the nature of the condition as well as the treatment plan and expected length of recovery.  At this time, the patient " was instructed on icing, stretching, tissue massage and support.  The patient was fitted with a pair of Dynaflex inserts that will aid in offloading the tension forces to the soft tissues and prevent further inflammation.  The patient was prescribed physical therapy for deep fascial release to both plantar fascia.  The patient will return in four weeks for reevaluation if the symptoms do not resolve.          Disclaimer: This note consists of symbols derived from keyboarding, dictation and/or voice recognition software. As a result, there may be errors in the script that have gone undetected. Please consider this when interpreting information found in this chart.       FABI Webster D.P.M., F.EMIL.C.F.A.S.  '      Again, thank you for allowing me to participate in the care of your patient.        Sincerely,        Raul Webster DPM

## 2018-02-22 NOTE — MR AVS SNAPSHOT
After Visit Summary   2/22/2018    Mirta Cardenas    MRN: [id number]           Patient Information     Date Of Birth          1955        Visit Information        Provider Department      2/22/2018 4:00 PM Raul Webster DPM [facility]        Today's Diagnoses     Plantar fasciitis, bilateral    -  1      Care Instructions    Initial musculoskeletal treatment recommendation:    1.  Stretch the calf muscles as instructed once an hour.  2.  Ice the injured area in the evening; 20 min on/off.  3.  Take antiinflammatory medication as directed.  4.  Massage the soft tissues around the injured area in the morning to loosen the tissue  5.  Wear supportive foot wear and/or arch supports (rigid not cushion).      If no improvement in symptoms within four to six weeks, return to clinic for reevaluation.            Follow-ups after your visit        Additional Services     PHYSICAL THERAPY REFERRAL       *This order will print in the [facility] Central Scheduling Office*    [facility] provides Physical Therapy evaluation and treatment and many specialty services across the [facility] system.  If requesting a specialty program, please choose from the list below.    Call one number to schedule at any [facility] location   [phone].    Treatment: Evaluation & Treatment  Special Instructions/Modalities: [place] fascial release  Special Programs: None    Please be aware that coverage of these services is subject to the terms and limitations of your health insurance plan.  Call member services at your health plan with any benefit or coverage questions.      **Note to Provider** To refer patients to therapy outside of the location list, change the order class to External Referral in the order composer.                  Your next 10 appointments already scheduled     Apr 11, 2018  1:00 PM CDT   Return  "Visit with [facility]quin MD   [facility]    [address]  [facility] [address] [phone]              Who to contact     If you have questions or need follow up information about today's clinic visit or your schedule please contact [facility] directly at [phone].  Normal or non-critical lab and imaging results will be communicated to you by [employer], letter or phone within 4 business days after the clinic has received the results. If you do not hear from us within 7 days, please contact the clinic through [employer] or phone. If you have a critical or abnormal lab result, we will notify you by phone as soon as possible.  Submit refill requests through [facility] or call your pharmacy and they will forward the refill request to us. Please allow 3 business days for your refill to be completed.          Additional Information About Your Visit        [employer] Information     [facility] gives you secure access to your electronic health record. If you see a primary care provider, you can also send messages to your care team and make appointments. If you have questions, please call your primary care clinic.  If you do not have a primary care provider, please call [phone] and they will assist you.        Care EveryWhere ID     This is your Care EveryWhere ID. This could be used by other organizations to access your [place] medical records  [id number]        Your Vitals Were     Pulse Height BMI (Body Mass Index)             86 5' 1\" (1.549 m) 34.96 kg/m2          Blood Pressure from Last 3 Encounters:   02/07/18 117/65   12/15/17 107/70   06/09/17 115/73    Weight from Last 3 Encounters:   02/22/18 185 lb (83.9 kg)   12/15/17 185 lb 9.6 oz (84.2 kg)   06/09/17 185 lb 6.4 oz (84.1 kg)              We Performed the Following     PHYSICAL THERAPY REFERRAL          Today's Medication Changes          These changes are accurate " as of 2/22/18  4:15 PM.  If you have any questions, ask your nurse or doctor.               Start taking these medicines.        Dose/Directions    order for DME   Used for:  Plantar fasciitis, bilateral   Started by:  Raul Webster DPM        Equipment being ordered: Dynaflex insert   Quantity:  2 Units   Refills:  0            Where to get your medicines      Some of these will need a paper prescription and others can be bought over the counter.  Ask your nurse if you have questions.     Bring a paper prescription for each of these medications     order for DME                Primary Care Provider Office Phone # Fax #    Neal Mosquera [phone] [address] [facility] [address]        Equal Access to Services     JOSÉ MANUEL LIZARRAGA : Hadii aad ku hadasho Sohussein, waaxda luqadaha, qaybta kaalmada adeegyada, gerhard salazar . So [facility] [phone].    ATENCIÓN: Si habla español, tiene a cage disposición servicios gratuitos de asistencia lingüística. Llame al [phone].    We comply with applicable federal civil rights laws and [place] laws. We do not discriminate on the basis of race, color, national origin, age, disability, sex, sexual orientation, or gender identity.            Thank you!     Thank you for choosing [facility]  for your care. Our goal is always to provide you with excellent care. Hearing back from our patients is one way we can continue to improve our services. Please take a few minutes to complete the written survey that you may receive in the mail after your visit with us. Thank you!             Your Updated Medication List - Protect others around you: Learn how to safely use, store and throw away your medicines at www.disposemymeds.org.          This list is accurate as of 2/22/18  4:15 PM.  Always use your most recent med list.                   Brand Name Dispense Instructions for use Diagnosis     aspirin 81 MG tablet      1 TABLET DAILY        augmented betamethasone dipropionate 0.05 % cream    DIPROLENE-AF    15 g    Apply sparingly to affected area  twice daily for 14 days then once daily for the next 14 days then stop.  Do not apply to face.    Nummular dermatitis       * fluocinonide 0.05 % cream    LIDEX    120 g    Apply sparingly to affected area twice daily as needed.  Do not apply to face.    Nummular dermatitis       * fluocinonide 0.05 % solution    LIDEX    60 mL    Apply sparingly to affected area twice daily as needed.  Do not apply to face.    Nummular dermatitis       lisinopril-hydrochlorothiazide 20-25 MG per tablet    PRINZIDE/ZESTORETIC    90 tablet    Take 1 tablet by mouth every morning    Benign essential hypertension       mometasone-formoterol 200-5 MCG/ACT oral inhaler    DULERA    1 Inhaler    Inhale 1 puff into the lungs 2 times daily Only using at the start of URI.    Cough due to bronchospasm       order for DME     2 Units    Equipment being ordered: Dynaflex insert    Plantar fasciitis, bilateral       pravastatin 40 MG tablet    PRAVACHOL    90 tablet    Take 1 tablet (40 mg) by mouth daily    Hyperlipidemia LDL goal <100       priLOSEC 20 MG CR capsule   Generic drug:  omeprazole      Take 1 capsule by mouth every morning.        terazosin 5 MG capsule    HYTRIN    90 capsule    Take 1 capsule (5 mg) by mouth daily    Benign essential hypertension       * Notice:  This list has 2 medication(s) that are the same as other medications prescribed for you. Read the directions carefully, and ask your doctor or other care provider to review them with you.

## 2018-02-22 NOTE — NURSING NOTE
"Chief Complaint   Patient presents with     Consult     Bilateral foot pain, outter edge of feet and bottoms of feet hurtful.       Initial Pulse 86  Ht 5' 1\" (1.549 m)  Wt 185 lb (83.9 kg)  BMI 34.96 kg/m2 Estimated body mass index is 34.96 kg/(m^2) as calculated from the following:    Height as of this encounter: 5' 1\" (1.549 m).    Weight as of this encounter: 185 lb (83.9 kg).  Medication Reconciliation: complete     Marisol Trejo MA        "

## 2018-03-26 ENCOUNTER — TRANSFERRED RECORDS (OUTPATIENT)
Dept: HEALTH INFORMATION MANAGEMENT | Facility: CLINIC | Age: 63
End: 2018-03-26

## 2018-04-09 ENCOUNTER — TRANSFERRED RECORDS (OUTPATIENT)
Dept: HEALTH INFORMATION MANAGEMENT | Facility: CLINIC | Age: 63
End: 2018-04-09

## 2018-04-11 ENCOUNTER — OFFICE VISIT (OUTPATIENT)
Dept: DERMATOLOGY | Facility: CLINIC | Age: 63
End: 2018-04-11
Payer: COMMERCIAL

## 2018-04-11 VITALS — DIASTOLIC BLOOD PRESSURE: 76 MMHG | HEART RATE: 87 BPM | OXYGEN SATURATION: 99 % | SYSTOLIC BLOOD PRESSURE: 113 MMHG

## 2018-04-11 DIAGNOSIS — L30.0 NUMMULAR DERMATITIS: ICD-10-CM

## 2018-04-11 DIAGNOSIS — L30.4 INTERTRIGO: Primary | ICD-10-CM

## 2018-04-11 PROCEDURE — 99213 OFFICE O/P EST LOW 20 MIN: CPT | Performed by: PHYSICIAN ASSISTANT

## 2018-04-11 RX ORDER — NYSTATIN 100000 [USP'U]/G
POWDER TOPICAL
Qty: 60 G | Refills: 1 | Status: SHIPPED | OUTPATIENT
Start: 2018-04-11 | End: 2018-08-09

## 2018-04-11 NOTE — LETTER
2018         RE: Mirta Cardenas  [address] [facility] [address]        Dear Colleague,    Thank you for referring your patient, Mirta Cardenas, to the [facility]. Please see a copy of my visit note below.    Mirta Cardenas is a 63 year old year old female patient here today for nummular dermatitis recheck.  Patient reports that she has been moisturizing once daily after showers.  She reports that she first put steroids on active areas and then puts moisturizer on no active areas. Patient reports that she will also get a rash on folds of skin in groin, she uses antifungal cream which does help. Patient has no other skin complaints today.  Remainder of the HPI, Meds, PMH, Allergies, FH, and SH was reviewed in chart.    Past Medical History:   Diagnosis Date     Asthma      Blood transfusion      Hypertension      Malignant neoplasm (H)     CLL       Past Surgical History:   Procedure Laterality Date     C/SECTION, CLASSICAL  ,,    , Classical     HYSTERECTOMY, PAP NO LONGER INDICATED       RELEASE CARPAL TUNNEL  2012    Procedure:RELEASE CARPAL TUNNEL; Right Carpal Tunnel Release & Right Ring A1 Pulley Release; Surgeon:SERGEY HEATON; Location:[place]     RELEASE TRIGGER FINGER  2012    Procedure:RELEASE TRIGGER FINGER; Surgeon:SERGEY HEATON; Location:[place]     RELEASE TRIGGER FINGER  10/12/2012    Procedure: RELEASE TRIGGER FINGER;  Right Thumb A1 Pulley Release;  Surgeon: Sergey Heaton MD;  Location: [place] OR     SALPINGO OOPHORECTOMY,R/L/ULICES  2008    right        Family History   Problem Relation Age of Onset     Hypertension Brother      HEART DISEASE Brother 65     bypass     CANCER Sister      multiple myloma     Obesity Son      Obesity Son      Hypertension Son        Social History     Social History     Marital status:      Spouse name: N/A     Number of children: N/A     Years of education: N/A     Occupational  History     Not on file.     Social History Main Topics     Smoking status: Never Smoker     Smokeless tobacco: Never Used     Alcohol use Yes      Comment: THREE- FOUR TIMES PER YEAR, SOCIALLY     Drug use: No     Sexual activity: Yes     Partners: Male     Birth control/ protection: Surgical     Other Topics Concern     Parent/Sibling W/ Cabg, Mi Or Angioplasty Before 65f 55m? No     Social History Narrative       Outpatient Encounter Prescriptions as of 4/11/2018   Medication Sig Dispense Refill     diclofenac (VOLTAREN) 1 % GEL topical gel   0     nystatin (MYCOSTATIN) 341073 UNIT/GM POWD Apply daily to groin as needed. 60 g 1     order for DME Equipment being ordered: Dynaflex insert 2 Units 0     fluocinonide (LIDEX) 0.05 % cream Apply sparingly to affected area twice daily as needed.  Do not apply to face. 120 g 1     fluocinonide (LIDEX) 0.05 % solution Apply sparingly to affected area twice daily as needed.  Do not apply to face. 60 mL 0     augmented betamethasone dipropionate (DIPROLENE-AF) 0.05 % cream Apply sparingly to affected area  twice daily for 14 days then once daily for the next 14 days then stop.  Do not apply to face. 15 g 1     lisinopril-hydrochlorothiazide (PRINZIDE/ZESTORETIC) 20-25 MG per tablet Take 1 tablet by mouth every morning 90 tablet 3     pravastatin (PRAVACHOL) 40 MG tablet Take 1 tablet (40 mg) by mouth daily 90 tablet 3     terazosin (HYTRIN) 5 MG capsule Take 1 capsule (5 mg) by mouth daily 90 capsule 3     mometasone-formoterol (DULERA) 200-5 MCG/ACT oral inhaler Inhale 1 puff into the lungs 2 times daily Only using at the start of URI. 1 Inhaler 2     omeprazole (PRILOSEC) 20 MG capsule Take 1 capsule by mouth every morning.       ASPIRIN 81 MG OR TABS 1 TABLET DAILY       No facility-administered encounter medications on file as of 4/11/2018.              Review Of Systems  Skin: As above  Eyes: negative  Ears/Nose/Throat: negative  Respiratory: No shortness of breath,  dyspnea on exertion, cough, or hemoptysis  Cardiovascular: negative  Gastrointestinal: negative  Genitourinary: negative  Musculoskeletal: negative  Neurologic: negative  Psychiatric: negative  Hematologic/Lymphatic/Immunologic: negative  Endocrine: negative      O:   NAD, WDWN, Alert & Oriented, Mood & Affect wnl, Vitals stable   Here today alone   /76  Pulse 87  SpO2 99%   General appearance normal   Vitals stable   Alert, oriented and in no acute distress     Nummular pink eczematous plaques on bilateral legs   No visible rash in groin today    Eyes: Conjunctivae/lids:Normal     ENT: Lips: normal    MSK:Normal    Pulm: Breathing Normal    Neuro/Psych: Orientation:Normal; Mood/Affect:Normal  A/P:  1. Nummular Dermatitis   Moisturize twice daily with cetaphil on entire body.   Use lidex cream in affected areas.   Continue zyrtec for itching.   Follow-up if not fully resolved in next month.   2. Intertrigo  Discussed can was with Hibiclens once or twice a week.   Use nystatin powder in folds of skin.   Recheck in flaring.     Again, thank you for allowing me to participate in the care of your patient.        Sincerely,        Radha Dugan PA-C

## 2018-04-11 NOTE — NURSING NOTE
"Chief Complaint   Patient presents with     Derm Problem     recheck rash       Initial /76  Pulse 87  SpO2 99% Estimated body mass index is 34.96 kg/(m^2) as calculated from the following:    Height as of 2/22/18: 1.549 m (5' 1\").    Weight as of 2/22/18: 83.9 kg (185 lb).  BP completed using cuff size: ariel Coats LPN    "

## 2018-04-11 NOTE — MR AVS SNAPSHOT
After Visit Summary   4/11/2018    Mirta Cardenas    MRN: [id number]           Patient Information     Date Of Birth          1955        Visit Information        Provider Department      4/11/2018 4:00 PM Radha Matias PA-C [facility]        Today's Diagnoses     Intertrigo    -  1    Nummular dermatitis           Follow-ups after your visit        Your next 10 appointments already scheduled     May 09, 2018  3:40 PM CDT   Return Visit with Radha Matias PA-C   [facility] ([facility]    [address] [facility] [address]   [phone]              Who to contact     If you have questions or need follow up information about today's clinic visit or your schedule please contact [facility] directly at [phone].  Normal or non-critical lab and imaging results will be communicated to you by MyChart, letter or phone within 4 business days after the clinic has received the results. If you do not hear from us within 7 days, please contact the clinic through [employer] or phone. If you have a critical or abnormal lab result, we will notify you by phone as soon as possible.  Submit refill requests through Embedded Chat or call your pharmacy and they will forward the refill request to us. Please allow 3 business days for your refill to be completed.          Additional Information About Your Visit        MyChart Information     Embedded Chat gives you secure access to your electronic health record. If you see a primary care provider, you can also send messages to your care team and make appointments. If you have questions, please call your primary care clinic.  If you do not have a primary care provider, please call [phone] and they will assist you.        Care EveryWhere ID     This is your Care EveryWhere ID. This could be used by other organizations to access your [place] medical records  [id number]        Your Vitals  Were     Pulse Pulse Oximetry                87 99%           Blood Pressure from Last 3 Encounters:   04/11/18 113/76   02/07/18 117/65   12/15/17 107/70    Weight from Last 3 Encounters:   02/22/18 83.9 kg (185 lb)   12/15/17 84.2 kg (185 lb 9.6 oz)   06/09/17 84.1 kg (185 lb 6.4 oz)              Today, you had the following     No orders found for display         Today's Medication Changes          These changes are accurate as of 4/11/18 11:59 PM.  If you have any questions, ask your nurse or doctor.               Start taking these medicines.        Dose/Directions    nystatin 745515 UNIT/GM Powd   Commonly known as:  MYCOSTATIN   Used for:  Intertrigo   Started by:  Radha Matias PA-C        Apply daily to groin as needed.   Quantity:  60 g   Refills:  1            Where to get your medicines      These medications were sent to [facility] [place] [id number]     Phone:  [phone]     nystatin 850991 UNIT/GM Powd                Primary Care Provider Office Phone # Fax #    Neal Mosquera [phone] [address] [facility] [address]        Equal Access to Services     JOSÉ MANUEL LIZARRAGA AH: Hadflower riggins hadasho Soomaali, waaxda luqadaha, qaybta kaalmada adeegyada, waxsuzy monahan haysarath ureña. So [facility] [phone].    ATENCIÓN: Si habla español, tiene a cage disposición servicios gratuitos de asistencia lingüística. Llame al [phone].    We comply with applicable federal civil rights laws and [place] laws. We do not discriminate on the basis of race, color, national origin, age, disability, sex, sexual orientation, or gender identity.            Thank you!     Thank you for choosing [facility]  for your care. Our goal is always to provide you with excellent care. Hearing back from our patients is one way we can continue to improve our services. Please take a few  minutes to complete the written survey that you may receive in the mail after your visit with us. Thank you!             Your Updated Medication List - Protect others around you: Learn how to safely use, store and throw away your medicines at www.disposemymeds.org.          This list is accurate as of 4/11/18 11:59 PM.  Always use your most recent med list.                   Brand Name Dispense Instructions for use Diagnosis    aspirin 81 MG tablet      1 TABLET DAILY        augmented betamethasone dipropionate 0.05 % cream    DIPROLENE-AF    15 g    Apply sparingly to affected area  twice daily for 14 days then once daily for the next 14 days then stop.  Do not apply to face.    Nummular dermatitis       diclofenac 1 % Gel topical gel    VOLTAREN          * fluocinonide 0.05 % cream    LIDEX    120 g    Apply sparingly to affected area twice daily as needed.  Do not apply to face.    Nummular dermatitis       * fluocinonide 0.05 % solution    LIDEX    60 mL    Apply sparingly to affected area twice daily as needed.  Do not apply to face.    Nummular dermatitis       lisinopril-hydrochlorothiazide 20-25 MG per tablet    PRINZIDE/ZESTORETIC    90 tablet    Take 1 tablet by mouth every morning    Benign essential hypertension       mometasone-formoterol 200-5 MCG/ACT oral inhaler    DULERA    1 Inhaler    Inhale 1 puff into the lungs 2 times daily Only using at the start of URI.    Cough due to bronchospasm       nystatin 531986 UNIT/GM Powd    MYCOSTATIN    60 g    Apply daily to groin as needed.    Intertrigo       order for DME     2 Units    Equipment being ordered: Dynaflex insert    Plantar fasciitis, bilateral       pravastatin 40 MG tablet    PRAVACHOL    90 tablet    Take 1 tablet (40 mg) by mouth daily    Hyperlipidemia LDL goal <100       priLOSEC 20 MG CR capsule   Generic drug:  omeprazole      Take 1 capsule by mouth every morning.        terazosin 5 MG capsule    HYTRIN    90 capsule    Take 1 capsule  (5 mg) by mouth daily    Benign essential hypertension       * Notice:  This list has 2 medication(s) that are the same as other medications prescribed for you. Read the directions carefully, and ask your doctor or other care provider to review them with you.

## 2018-04-12 NOTE — PROGRESS NOTES
Mirta Cardenas is a 63 year old year old female patient here today for nummular dermatitis recheck.  Patient reports that she has been moisturizing once daily after showers.  She reports that she first put steroids on active areas and then puts moisturizer on no active areas. Patient reports that she will also get a rash on folds of skin in groin, she uses antifungal cream which does help. Patient has no other skin complaints today.  Remainder of the HPI, Meds, PMH, Allergies, FH, and SH was reviewed in chart.    Past Medical History:   Diagnosis Date     Asthma      Blood transfusion      Hypertension      Malignant neoplasm (H)     CLL       Past Surgical History:   Procedure Laterality Date     C/SECTION, CLASSICAL  ,,    , Classical     HYSTERECTOMY, PAP NO LONGER INDICATED       RELEASE CARPAL TUNNEL  2012    Procedure:RELEASE CARPAL TUNNEL; Right Carpal Tunnel Release & Right Ring A1 Pulley Release; Surgeon:SERGEY HEATON; Location:[place]     RELEASE TRIGGER FINGER  2012    Procedure:RELEASE TRIGGER FINGER; Surgeon:SERGEY HEATON; Location:[place]     RELEASE TRIGGER FINGER  10/12/2012    Procedure: RELEASE TRIGGER FINGER;  Right Thumb A1 Pulley Release;  Surgeon: Sergey Heaton MD;  Location: [place]     SALPINGO OOPHORECTOMY,R/L/ULICES  2008    right        Family History   Problem Relation Age of Onset     Hypertension Brother      HEART DISEASE Brother 65     bypass     CANCER Sister      multiple myloma     Obesity Son      Obesity Son      Hypertension Son        Social History     Social History     Marital status:      Spouse name: N/A     Number of children: N/A     Years of education: N/A     Occupational History     Not on file.     Social History Main Topics     Smoking status: Never Smoker     Smokeless tobacco: Never Used     Alcohol use Yes      Comment: THREE- FOUR TIMES PER YEAR, SOCIALLY     Drug use: No     Sexual activity: Yes     Partners:  Male     Birth control/ protection: Surgical     Other Topics Concern     Parent/Sibling W/ Cabg, Mi Or Angioplasty Before 65f 55m? No     Social History Narrative       Outpatient Encounter Prescriptions as of 4/11/2018   Medication Sig Dispense Refill     diclofenac (VOLTAREN) 1 % GEL topical gel   0     nystatin (MYCOSTATIN) 144208 UNIT/GM POWD Apply daily to groin as needed. 60 g 1     order for DME Equipment being ordered: Dynaflex insert 2 Units 0     fluocinonide (LIDEX) 0.05 % cream Apply sparingly to affected area twice daily as needed.  Do not apply to face. 120 g 1     fluocinonide (LIDEX) 0.05 % solution Apply sparingly to affected area twice daily as needed.  Do not apply to face. 60 mL 0     augmented betamethasone dipropionate (DIPROLENE-AF) 0.05 % cream Apply sparingly to affected area  twice daily for 14 days then once daily for the next 14 days then stop.  Do not apply to face. 15 g 1     lisinopril-hydrochlorothiazide (PRINZIDE/ZESTORETIC) 20-25 MG per tablet Take 1 tablet by mouth every morning 90 tablet 3     pravastatin (PRAVACHOL) 40 MG tablet Take 1 tablet (40 mg) by mouth daily 90 tablet 3     terazosin (HYTRIN) 5 MG capsule Take 1 capsule (5 mg) by mouth daily 90 capsule 3     mometasone-formoterol (DULERA) 200-5 MCG/ACT oral inhaler Inhale 1 puff into the lungs 2 times daily Only using at the start of URI. 1 Inhaler 2     omeprazole (PRILOSEC) 20 MG capsule Take 1 capsule by mouth every morning.       ASPIRIN 81 MG OR TABS 1 TABLET DAILY       No facility-administered encounter medications on file as of 4/11/2018.              Review Of Systems  Skin: As above  Eyes: negative  Ears/Nose/Throat: negative  Respiratory: No shortness of breath, dyspnea on exertion, cough, or hemoptysis  Cardiovascular: negative  Gastrointestinal: negative  Genitourinary: negative  Musculoskeletal: negative  Neurologic: negative  Psychiatric: negative  Hematologic/Lymphatic/Immunologic: negative  Endocrine:  negative      O:   NAD, WDWN, Alert & Oriented, Mood & Affect wnl, Vitals stable   Here today alone   /76  Pulse 87  SpO2 99%   General appearance normal   Vitals stable   Alert, oriented and in no acute distress     Nummular pink eczematous plaques on bilateral legs   No visible rash in groin today    Eyes: Conjunctivae/lids:Normal     ENT: Lips: normal    MSK:Normal    Pulm: Breathing Normal    Neuro/Psych: Orientation:Normal; Mood/Affect:Normal  A/P:  1. Nummular Dermatitis   Moisturize twice daily with cetaphil on entire body.   Use lidex cream in affected areas.   Continue zyrtec for itching.   Follow-up if not fully resolved in next month.   2. Intertrigo  Discussed can was with Hibiclens once or twice a week.   Use nystatin powder in folds of skin.   Recheck in flaring.

## 2018-04-17 ENCOUNTER — TELEPHONE (OUTPATIENT)
Dept: FAMILY MEDICINE | Facility: CLINIC | Age: 63
End: 2018-04-17

## 2018-04-17 NOTE — TELEPHONE ENCOUNTER
Spoke with patient's , requested patient to call back to clinic. She is due for mammogram. Melodie Allen CMA

## 2018-04-18 ENCOUNTER — TELEPHONE (OUTPATIENT)
Dept: FAMILY MEDICINE | Facility: CLINIC | Age: 63
End: 2018-04-18

## 2018-04-18 DIAGNOSIS — Z51.81 ENCOUNTER FOR THERAPEUTIC DRUG MONITORING: Primary | ICD-10-CM

## 2018-04-18 NOTE — TELEPHONE ENCOUNTER
Spoke with pt and she states she has been taking 400 mg of Ibupofen once daily since February to treat pain in foot.  With new podiatrist, she has been using Voltaren 1% gel and has taken Ibuprofen intermittently.    Pt would like to have labs drawn due to increased use of Ibuprofen as well as an appointment to discuss alternative means to manage this pain.  Pt scheduled 4/27 and would like labs drawn prior to appointment.  Please advise on these lab orders.    Pt also scheduled with [facility] May 7th with Dr. Rodriguez.  She states she may be scheduled for surgery at that time.  Will keep us updated.    Rocio KOENIG RN

## 2018-04-18 NOTE — TELEPHONE ENCOUNTER
Reason for Call:  Other blood work     Detailed comments: pt calling to have blood work done. She injured her foot and she took some ibuprofen. She said she probably shouldn't have done that but wants to make sure her kidneys are ok. She didn't want to give me the whole story and wanted to talk to the nurse and ask more questions. She does have a history of cancer.     Phone Number Patient can be reached at: Home number on file [phone] (home)    Best Time: any    Can we leave a detailed message on this number? YES    Call taken on 4/18/2018 at 3:21 PM by Lacie Pate

## 2018-04-23 ENCOUNTER — DOCUMENTATION ONLY (OUTPATIENT)
Dept: LAB | Facility: CLINIC | Age: 63
End: 2018-04-23

## 2018-04-23 DIAGNOSIS — N17.9 ACUTE RENAL FAILURE (H): ICD-10-CM

## 2018-04-23 DIAGNOSIS — Z51.81 ENCOUNTER FOR THERAPEUTIC DRUG MONITORING: ICD-10-CM

## 2018-04-23 DIAGNOSIS — D64.9 ANEMIA: Primary | ICD-10-CM

## 2018-04-23 DIAGNOSIS — E78.5 HYPERLIPIDEMIA LDL GOAL <100: ICD-10-CM

## 2018-04-23 NOTE — PROGRESS NOTES
Dr Mosquera/ provider covering, please see note below,   She also has HIV screen and Hep C screen on her snap shot, do you want to add those?   Future CMP is already ordered.   And just Hgb ? Or do CBC?     Maggie Colon RNC

## 2018-04-23 NOTE — PROGRESS NOTES
HIV and hep C screening should be discussed with the patient first, if the patient agreed to have screening done these can be ordered.   I think the patient needs CBC, not just Hemoglobin due to history of CLL.     BEBETO Liu CNP, covering for Dr. Mosquera.

## 2018-04-24 DIAGNOSIS — D64.9 ANEMIA: ICD-10-CM

## 2018-04-24 DIAGNOSIS — Z51.81 ENCOUNTER FOR THERAPEUTIC DRUG MONITORING: Primary | ICD-10-CM

## 2018-04-24 DIAGNOSIS — E78.5 HYPERLIPIDEMIA LDL GOAL <100: ICD-10-CM

## 2018-04-24 PROCEDURE — 80053 COMPREHEN METABOLIC PANEL: CPT | Performed by: FAMILY MEDICINE

## 2018-04-24 PROCEDURE — 36415 COLL VENOUS BLD VENIPUNCTURE: CPT | Performed by: FAMILY MEDICINE

## 2018-04-24 PROCEDURE — 80061 LIPID PANEL: CPT | Performed by: FAMILY MEDICINE

## 2018-04-24 PROCEDURE — 85025 COMPLETE CBC W/AUTO DIFF WBC: CPT | Performed by: FAMILY MEDICINE

## 2018-04-25 LAB
ALBUMIN SERPL-MCNC: 4.1 G/DL (ref 3.4–5)
ALP SERPL-CCNC: 116 U/L (ref 40–150)
ALT SERPL W P-5'-P-CCNC: 23 U/L (ref 0–50)
ANION GAP SERPL CALCULATED.3IONS-SCNC: 8 MMOL/L (ref 3–14)
ANISOCYTOSIS BLD QL SMEAR: SLIGHT
AST SERPL W P-5'-P-CCNC: 15 U/L (ref 0–45)
BASOPHILS # BLD AUTO: 0.1 10E9/L (ref 0–0.2)
BASOPHILS NFR BLD AUTO: 0.3 %
BILIRUB SERPL-MCNC: 0.3 MG/DL (ref 0.2–1.3)
BUN SERPL-MCNC: 26 MG/DL (ref 7–30)
CALCIUM SERPL-MCNC: 9.2 MG/DL (ref 8.5–10.1)
CHLORIDE SERPL-SCNC: 107 MMOL/L (ref 94–109)
CHOLEST SERPL-MCNC: 148 MG/DL
CO2 SERPL-SCNC: 26 MMOL/L (ref 20–32)
CREAT SERPL-MCNC: 1 MG/DL (ref 0.52–1.04)
DIFFERENTIAL METHOD BLD: ABNORMAL
EOSINOPHIL # BLD AUTO: 0.2 10E9/L (ref 0–0.7)
EOSINOPHIL NFR BLD AUTO: 1.4 %
ERYTHROCYTE [DISTWIDTH] IN BLOOD BY AUTOMATED COUNT: 13.2 % (ref 10–15)
GFR SERPL CREATININE-BSD FRML MDRD: 56 ML/MIN/1.7M2
GLUCOSE SERPL-MCNC: 83 MG/DL (ref 70–99)
HCT VFR BLD AUTO: 35.3 % (ref 35–47)
HDLC SERPL-MCNC: 48 MG/DL
HGB BLD-MCNC: 11.6 G/DL (ref 11.7–15.7)
IMM GRANULOCYTES # BLD: 0 10E9/L (ref 0–0.4)
IMM GRANULOCYTES NFR BLD: 0.2 %
LDLC SERPL CALC-MCNC: 74 MG/DL
LYMPHOCYTES # BLD AUTO: 11.3 10E9/L (ref 0.8–5.3)
LYMPHOCYTES NFR BLD AUTO: 64.1 %
MCH RBC QN AUTO: 27.5 PG (ref 26.5–33)
MCHC RBC AUTO-ENTMCNC: 32.9 G/DL (ref 31.5–36.5)
MCV RBC AUTO: 84 FL (ref 78–100)
MONOCYTES # BLD AUTO: 0.7 10E9/L (ref 0–1.3)
MONOCYTES NFR BLD AUTO: 4 %
NEUTROPHILS # BLD AUTO: 5.3 10E9/L (ref 1.6–8.3)
NEUTROPHILS NFR BLD AUTO: 30 %
NONHDLC SERPL-MCNC: 100 MG/DL
PLATELET # BLD AUTO: 238 10E9/L (ref 150–450)
POTASSIUM SERPL-SCNC: 4.4 MMOL/L (ref 3.4–5.3)
PROT SERPL-MCNC: 7.9 G/DL (ref 6.8–8.8)
RBC # BLD AUTO: 4.22 10E12/L (ref 3.8–5.2)
SODIUM SERPL-SCNC: 141 MMOL/L (ref 133–144)
TRIGL SERPL-MCNC: 130 MG/DL
WBC # BLD AUTO: 17.7 10E9/L (ref 4–11)

## 2018-04-27 ENCOUNTER — OFFICE VISIT (OUTPATIENT)
Dept: FAMILY MEDICINE | Facility: CLINIC | Age: 63
End: 2018-04-27
Payer: COMMERCIAL

## 2018-04-27 VITALS — DIASTOLIC BLOOD PRESSURE: 75 MMHG | HEART RATE: 83 BPM | SYSTOLIC BLOOD PRESSURE: 119 MMHG

## 2018-04-27 DIAGNOSIS — C91.11 CHRONIC LYMPHOID LEUKEMIA IN REMISSION (H): ICD-10-CM

## 2018-04-27 DIAGNOSIS — I10 BENIGN ESSENTIAL HYPERTENSION: ICD-10-CM

## 2018-04-27 DIAGNOSIS — E78.5 HYPERLIPIDEMIA LDL GOAL <100: ICD-10-CM

## 2018-04-27 DIAGNOSIS — E21.2 OTHER HYPERPARATHYROIDISM (H): ICD-10-CM

## 2018-04-27 DIAGNOSIS — N18.30 CKD (CHRONIC KIDNEY DISEASE) STAGE 3, GFR 30-59 ML/MIN (H): Primary | ICD-10-CM

## 2018-04-27 PROCEDURE — 99214 OFFICE O/P EST MOD 30 MIN: CPT | Performed by: FAMILY MEDICINE

## 2018-04-27 RX ORDER — PRAVASTATIN SODIUM 40 MG
40 TABLET ORAL DAILY
Qty: 90 TABLET | Refills: 3 | Status: SHIPPED | OUTPATIENT
Start: 2018-04-27 | End: 2019-05-15

## 2018-04-27 RX ORDER — TERAZOSIN 5 MG/1
5 CAPSULE ORAL DAILY
Qty: 90 CAPSULE | Refills: 3 | Status: SHIPPED | OUTPATIENT
Start: 2018-04-27 | End: 2019-04-26

## 2018-04-27 RX ORDER — LISINOPRIL AND HYDROCHLOROTHIAZIDE 20; 25 MG/1; MG/1
1 TABLET ORAL EVERY MORNING
Qty: 90 TABLET | Refills: 3 | Status: SHIPPED | OUTPATIENT
Start: 2018-04-27 | End: 2019-04-26

## 2018-04-27 NOTE — PROGRESS NOTES
SUBJECTIVE:   Mirta Cardenas is a 63 year old female who presents to clinic today for the following health issues:      Discuss lab results from 4/24.    Did injury the tendons in the foot and in a boot and may need surgery.  Has been taking IBP 400mg morning then 2 tylenol later in the day at 4pm then off the feet and ice.  Following with Dr. Roca, prescribed voltaran.    Hyperlipidemia Follow-Up      Rate your low fat/cholesterol diet?: good    Taking statin?  Yes, no muscle aches from statin    Other lipid medications/supplements?:  none    Hypertension Follow-up      Outpatient blood pressures are being checked at store.  Results are in goal 120/80s.    Low Salt Diet: no added salt    Chronic Kidney Disease Follow-up      Current NSAID use?  Yes:   ibuprofen (Motrin)  Frequency: 400mg once a day    Chronic Lymphoid Leukemia: follows with Hematology, currently quiescent.    Problem list and histories reviewed & adjusted, as indicated.  Additional history: as documented    BP Readings from Last 3 Encounters:   04/27/18 119/75   04/11/18 113/76   02/07/18 117/65    Wt Readings from Last 3 Encounters:   02/22/18 185 lb (83.9 kg)   12/15/17 185 lb 9.6 oz (84.2 kg)   06/09/17 185 lb 6.4 oz (84.1 kg)                    Reviewed and updated as needed this visit by clinical staff  Allergies       Reviewed and updated as needed this visit by Provider         ROS:  CONSTITUTIONAL: NEGATIVE for fever, chills, change in weight  ENT/MOUTH: NEGATIVE for ear, mouth and throat problems  RESP: NEGATIVE for significant cough or SOB  CV: NEGATIVE for chest pain, palpitations or peripheral edema    OBJECTIVE:     /75  Pulse 83  There is no height or weight on file to calculate BMI.  GENERAL: healthy, alert and no distress  RESP: lungs clear to auscultation - no rales, rhonchi or wheezes  CV: regular rate and rhythm, normal S1 S2, no S3 or S4, no murmur, click or rub, no peripheral edema and peripheral pulses strong  MS:  Left foot in walking boot    Diagnostic Test Results:  Results for orders placed or performed in visit on 04/24/18   Comprehensive metabolic panel (BMP + Alb, Alk Phos, ALT, AST, Total. Bili, TP)   Result Value Ref Range    Sodium 141 133 - 144 mmol/L    Potassium 4.4 3.4 - 5.3 mmol/L    Chloride 107 94 - 109 mmol/L    Carbon Dioxide 26 20 - 32 mmol/L    Anion Gap 8 3 - 14 mmol/L    Glucose 83 70 - 99 mg/dL    Urea Nitrogen 26 7 - 30 mg/dL    Creatinine 1.00 0.52 - 1.04 mg/dL    GFR Estimate 56 (L) >60 mL/min/1.7m2    GFR Estimate If Black 68 >60 mL/min/1.7m2    Calcium 9.2 8.5 - 10.1 mg/dL    Bilirubin Total 0.3 0.2 - 1.3 mg/dL    Albumin 4.1 3.4 - 5.0 g/dL    Protein Total 7.9 6.8 - 8.8 g/dL    Alkaline Phosphatase 116 40 - 150 U/L    ALT 23 0 - 50 U/L    AST 15 0 - 45 U/L   Lipid panel reflex to direct LDL Fasting   Result Value Ref Range    Cholesterol 148 <200 mg/dL    Triglycerides 130 <150 mg/dL    HDL Cholesterol 48 (L) >49 mg/dL    LDL Cholesterol Calculated 74 <100 mg/dL    Non HDL Cholesterol 100 <130 mg/dL   CBC with platelets and differential   Result Value Ref Range    WBC 17.7 (H) 4.0 - 11.0 10e9/L    RBC Count 4.22 3.8 - 5.2 10e12/L    Hemoglobin 11.6 (L) 11.7 - 15.7 g/dL    Hematocrit 35.3 35.0 - 47.0 %    MCV 84 78 - 100 fl    MCH 27.5 26.5 - 33.0 pg    MCHC 32.9 31.5 - 36.5 g/dL    RDW 13.2 10.0 - 15.0 %    Platelet Count 238 150 - 450 10e9/L    Diff Method Automated Method     % Neutrophils 30.0 %    % Lymphocytes 64.1 %    % Monocytes 4.0 %    % Eosinophils 1.4 %    % Basophils 0.3 %    % Immature Granulocytes 0.2 %    Absolute Neutrophil 5.3 1.6 - 8.3 10e9/L    Absolute Lymphocytes 11.3 (H) 0.8 - 5.3 10e9/L    Absolute Monocytes 0.7 0.0 - 1.3 10e9/L    Absolute Eosinophils 0.2 0.0 - 0.7 10e9/L    Absolute Basophils 0.1 0.0 - 0.2 10e9/L    Abs Immature Granulocytes 0.0 0 - 0.4 10e9/L    Anisocytosis Slight        ASSESSMENT/PLAN:       Mirta was seen today for results.    Diagnoses and all  orders for this visit:    CKD (chronic kidney disease) stage 3, GFR 30-59 ml/min: stable  -ok to continue IBP 400mg up to BID if needed for pain  -continue voltaren    Benign essential hypertension: well controlled, refill  -     lisinopril-hydrochlorothiazide (PRINZIDE/ZESTORETIC) 20-25 MG per tablet; Take 1 tablet by mouth every morning  -     terazosin (HYTRIN) 5 MG capsule; Take 1 capsule (5 mg) by mouth daily    Hyperlipidemia LDL goal <100: well controlled, refill  -     pravastatin (PRAVACHOL) 40 MG tablet; Take 1 tablet (40 mg) by mouth daily    Chronic lymphoid leukemia in remission (H): in remission/quiescent following with Hematology needing monitoring every 6 months.    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 To be seen yearly. Needs PTH yearly.        Patient Instructions   Ok to take Ibuprofen 400mg (2 of the 200mg pills).  or  Aleve/Naproxen 220mg one pill in the morning and one 12 hours later.     Can take tylenol in addition to those if needed.    We'll check kidney function tests for the pre-op.    Thank you for choosing [facility].  You may be receiving a survey in the mail from [employer] regarding your visit today.  Please take a few minutes to complete and return the survey to let us know how we are doing.      If you have questions or concerns, please contact us via [employer] or you can contact your care team at [phone].    Our Clinic hours are:  Monday 6:40 am  to 7:00 pm  Tuesday -Friday 6:40 am to 5:00 pm    The [place] outpatient lab hours are:  Monday - Friday 6:10 am to 4:45 pm  Saturdays 7:00 am to 11:00 am  Appointments are required, call [phone]    If you have clinical questions after hours or would like to schedule an appointment,  call the clinic at [phone].        Neal Mosquera MD  [facility]

## 2018-04-27 NOTE — NURSING NOTE
"Chief Complaint   Patient presents with     Results     results from labs on 4/24       Initial /75  Pulse 83 Estimated body mass index is 34.96 kg/(m^2) as calculated from the following:    Height as of 2/22/18: 5' 1\" (1.549 m).    Weight as of 2/22/18: 185 lb (83.9 kg).  Medication Reconciliation: complete  "

## 2018-04-27 NOTE — MR AVS SNAPSHOT
After Visit Summary   4/27/2018    Mirta Cardenas    MRN: [id number]           Patient Information     Date Of Birth          1955        Visit Information        Provider Department      4/27/2018 6:40 AM Neal Mosquera MD [facility]        Care Instructions    Ok to take Ibuprofen 400mg (2 of the 200mg pills).  or  Aleve/Naproxen 220mg one pill in the morning and one 12 hours later.     Can take tylenol in addition to those if needed.    We'll check kidney function tests for the pre-op.    Thank you for choosing [facility].  You may be receiving a survey in the mail from [employer] regarding your visit today.  Please take a few minutes to complete and return the survey to let us know how we are doing.      If you have questions or concerns, please contact us via [employer] or you can contact your care team at [phone].    Our Clinic hours are:  Monday 6:40 am  to 7:00 pm  Tuesday -Friday 6:40 am to 5:00 pm    The [place] outpatient lab hours are:  Monday - Friday 6:10 am to 4:45 pm  Saturdays 7:00 am to 11:00 am  Appointments are required, call [phone]    If you have clinical questions after hours or would like to schedule an appointment,  call the clinic at [phone].            Follow-ups after your visit        Your next 10 appointments already scheduled     May 09, 2018  3:40 PM CDT   Return Visit with Radha Matias PA-C   [facility]    [address] [address] [phone]              Who to contact     If you have questions or need follow up information about today's clinic visit or your schedule please contact [facility] directly at [phone].  Normal or non-critical lab and imaging results will be communicated to you by MyChart, letter or phone within 4 business days after the clinic has received the results. If you do not hear from us within 7 days, please  contact the clinic through [employer]fgame or phone. If you have a critical or abnormal lab result, we will notify you by phone as soon as possible.  Submit refill requests through [employer] or call your pharmacy and they will forward the refill request to us. Please allow 3 business days for your refill to be completed.          Additional Information About Your Visit        Vintedhart Information     [employer]game gives you secure access to your electronic health record. If you see a primary care provider, you can also send messages to your care team and make appointments. If you have questions, please call your primary care clinic.  If you do not have a primary care provider, please call [phone] and they will assist you.        Care EveryWhere ID     This is your Care EveryWhere ID. This could be used by other organizations to access your [place] medical records  [id number]        Your Vitals Were     Pulse                   83            Blood Pressure from Last 3 Encounters:   04/27/18 119/75   04/11/18 113/76   02/07/18 117/65    Weight from Last 3 Encounters:   02/22/18 185 lb (83.9 kg)   12/15/17 185 lb 9.6 oz (84.2 kg)   06/09/17 185 lb 6.4 oz (84.1 kg)              Today, you had the following     No orders found for display       Primary Care Provider Office Phone # Fax #    Neal Mosquera [phone] [address] [address]        Equal Access to Services     [facility]: Hadii aad ku hadasho Soomaali, waaxda luqadaha, qaybta kaalmada adeegyada, gerhard salazar . So [facility] [phone].    ATENCIÓN: Si habla español, tiene a cage disposición servicios gratuitos de asistencia lingüística. Aislinname al [phone].    We comply with applicable federal civil rights laws and [place] laws. We do not discriminate on the basis of race, color, national origin, age, disability, sex, sexual orientation, or gender identity.            Thank you!     Thank you for  choosing [facility]  for your care. Our goal is always to provide you with excellent care. Hearing back from our patients is one way we can continue to improve our services. Please take a few minutes to complete the written survey that you may receive in the mail after your visit with us. Thank you!             Your Updated Medication List - Protect others around you: Learn how to safely use, store and throw away your medicines at www.disposemymeds.org.          This list is accurate as of 4/27/18  7:12 AM.  Always use your most recent med list.                   Brand Name Dispense Instructions for use Diagnosis    aspirin 81 MG tablet      1 TABLET DAILY        augmented betamethasone dipropionate 0.05 % cream    DIPROLENE-AF    15 g    Apply sparingly to affected area  twice daily for 14 days then once daily for the next 14 days then stop.  Do not apply to face.    Nummular dermatitis       diclofenac 1 % Gel topical gel    VOLTAREN          * fluocinonide 0.05 % cream    LIDEX    120 g    Apply sparingly to affected area twice daily as needed.  Do not apply to face.    Nummular dermatitis       * fluocinonide 0.05 % solution    LIDEX    60 mL    Apply sparingly to affected area twice daily as needed.  Do not apply to face.    Nummular dermatitis       lisinopril-hydrochlorothiazide 20-25 MG per tablet    PRINZIDE/ZESTORETIC    90 tablet    Take 1 tablet by mouth every morning    Benign essential hypertension       mometasone-formoterol 200-5 MCG/ACT oral inhaler    DULERA    1 Inhaler    Inhale 1 puff into the lungs 2 times daily Only using at the start of URI.    Cough due to bronchospasm       nystatin 583814 UNIT/GM Powd    MYCOSTATIN    60 g    Apply daily to groin as needed.    Intertrigo       order for DME     2 Units    Equipment being ordered: Dynaflex insert    Plantar fasciitis, bilateral       pravastatin 40 MG tablet    PRAVACHOL    90 tablet    Take 1 tablet (40 mg) by mouth daily     Hyperlipidemia LDL goal <100       priLOSEC 20 MG CR capsule   Generic drug:  omeprazole      Take 1 capsule by mouth every morning.        terazosin 5 MG capsule    HYTRIN    90 capsule    Take 1 capsule (5 mg) by mouth daily    Benign essential hypertension       * Notice:  This list has 2 medication(s) that are the same as other medications prescribed for you. Read the directions carefully, and ask your doctor or other care provider to review them with you.

## 2018-04-27 NOTE — PATIENT INSTRUCTIONS
Ok to take Ibuprofen 400mg (2 of the 200mg pills).  or  Aleve/Naproxen 220mg one pill in the morning and one 12 hours later.     Can take tylenol in addition to those if needed.    We'll check kidney function tests for the pre-op.    Thank you for choosing [facility].  You may be receiving a survey in the mail from Juaquin Murray regarding your visit today.  Please take a few minutes to complete and return the survey to let us know how we are doing.      If you have questions or concerns, please contact us via [employer] or you can contact your care team at [phone].    Our Clinic hours are:  Monday 6:40 am  to 7:00 pm  Tuesday -Friday 6:40 am to 5:00 pm    The [place] outpatient lab hours are:  Monday - Friday 6:10 am to 4:45 pm  Saturdays 7:00 am to 11:00 am  Appointments are required, call [phone]    If you have clinical questions after hours or would like to schedule an appointment,  call the clinic at [phone].

## 2018-05-07 ENCOUNTER — TRANSFERRED RECORDS (OUTPATIENT)
Dept: HEALTH INFORMATION MANAGEMENT | Facility: CLINIC | Age: 63
End: 2018-05-07

## 2018-05-09 ENCOUNTER — OFFICE VISIT (OUTPATIENT)
Dept: DERMATOLOGY | Facility: CLINIC | Age: 63
End: 2018-05-09
Payer: COMMERCIAL

## 2018-05-09 VITALS — SYSTOLIC BLOOD PRESSURE: 116 MMHG | HEART RATE: 89 BPM | DIASTOLIC BLOOD PRESSURE: 67 MMHG | OXYGEN SATURATION: 98 %

## 2018-05-09 DIAGNOSIS — L30.0 NUMMULAR ECZEMA: Primary | ICD-10-CM

## 2018-05-09 PROCEDURE — 99213 OFFICE O/P EST LOW 20 MIN: CPT | Performed by: PHYSICIAN ASSISTANT

## 2018-05-09 RX ORDER — TACROLIMUS 1 MG/G
OINTMENT TOPICAL
Qty: 30 G | Refills: 3 | Status: SHIPPED | OUTPATIENT
Start: 2018-05-09 | End: 2019-05-15

## 2018-05-09 NOTE — PATIENT INSTRUCTIONS
Use Hibiclens wash to groin.     Apply protopic twice daily as needed.     Continue cetaphil moisturizers.     Recheck in two months.

## 2018-05-09 NOTE — LETTER
2018         RE: Mirta Cardenas  [address] [facility] [address]        Dear Colleague,    Thank you for referring your patient, Mirta Cardenas, to the [facility]. Please see a copy of my visit note below.    Mirta Cardenas is a 63 year old year old female patient here today recheck nummular dermatitis on legs. Patient reports that she has noticed areas lighten but no resolve. She also notes a new area behind ear. She reports that she is using cetaphil once daily and lidex twice daily. She has noticed some lightening of skin around the rash.  Patient has no other skin complaints today.  Remainder of the HPI, Meds, PMH, Allergies, FH, and SH was reviewed in chart.    Past Medical History:   Diagnosis Date     Asthma      Blood transfusion      Hypertension      Malignant neoplasm (H)     CLL       Past Surgical History:   Procedure Laterality Date     C/SECTION, CLASSICAL  ,,    , Classical     HYSTERECTOMY, PAP NO LONGER INDICATED       RELEASE CARPAL TUNNEL  2012    Procedure:RELEASE CARPAL TUNNEL; Right Carpal Tunnel Release & Right Ring A1 Pulley Release; Surgeon:SERGEY HEATON; Location:[facility] OR     RELEASE TRIGGER FINGER  2012    Procedure:RELEASE TRIGGER FINGER; Surgeon:SERGEY HEATON; Location:[facility] OR     RELEASE TRIGGER FINGER  10/12/2012    Procedure: RELEASE TRIGGER FINGER;  Right Thumb A1 Pulley Release;  Surgeon: Seregy Heaton MD;  Location: [facility] OR     SALPINGO OOPHORECTOMY,R/L/ULICES  2008    right        Family History   Problem Relation Age of Onset     Hypertension Brother      HEART DISEASE Brother 65     bypass     CANCER Sister      multiple myloma     Obesity Son      Obesity Son      Hypertension Son        Social History     Social History     Marital status:      Spouse name: N/A     Number of children: N/A     Years of education: N/A     Occupational History     Not on file.     Social History Main Topics      Smoking status: Never Smoker     Smokeless tobacco: Never Used     Alcohol use Yes      Comment: THREE- FOUR TIMES PER YEAR, SOCIALLY     Drug use: No     Sexual activity: Yes     Partners: Male     Birth control/ protection: Surgical     Other Topics Concern     Parent/Sibling W/ Cabg, Mi Or Angioplasty Before 65f 55m? No     Social History Narrative       Outpatient Encounter Prescriptions as of 5/9/2018   Medication Sig Dispense Refill     ASPIRIN 81 MG OR TABS 1 TABLET DAILY       augmented betamethasone dipropionate (DIPROLENE-AF) 0.05 % cream Apply sparingly to affected area  twice daily for 14 days then once daily for the next 14 days then stop.  Do not apply to face. 15 g 1     diclofenac (VOLTAREN) 1 % GEL topical gel   0     fluocinonide (LIDEX) 0.05 % cream Apply sparingly to affected area twice daily as needed.  Do not apply to face. 120 g 1     fluocinonide (LIDEX) 0.05 % solution Apply sparingly to affected area twice daily as needed.  Do not apply to face. 60 mL 0     lisinopril-hydrochlorothiazide (PRINZIDE/ZESTORETIC) 20-25 MG per tablet Take 1 tablet by mouth every morning 90 tablet 3     nystatin (MYCOSTATIN) 030970 UNIT/GM POWD Apply daily to groin as needed. 60 g 1     omeprazole (PRILOSEC) 20 MG capsule Take 1 capsule by mouth every morning.       order for DME Equipment being ordered: Dynaflex insert 2 Units 0     pravastatin (PRAVACHOL) 40 MG tablet Take 1 tablet (40 mg) by mouth daily 90 tablet 3     tacrolimus (PROTOPIC) 0.1 % ointment Apply twice daily affected areas on body. 30 g 3     terazosin (HYTRIN) 5 MG capsule Take 1 capsule (5 mg) by mouth daily 90 capsule 3     mometasone-formoterol (DULERA) 200-5 MCG/ACT oral inhaler Inhale 1 puff into the lungs 2 times daily Only using at the start of URI. (Patient not taking: Reported on 5/9/2018) 1 Inhaler 2     No facility-administered encounter medications on file as of 5/9/2018.              Review Of Systems  Skin: As above  Eyes:  negative  Ears/Nose/Throat: negative  Respiratory: No shortness of breath, dyspnea on exertion, cough, or hemoptysis  Cardiovascular: negative  Gastrointestinal: negative  Genitourinary: negative  Musculoskeletal: negative  Neurologic: negative  Psychiatric: negative  Hematologic/Lymphatic/Immunologic: negative  Endocrine: negative      O:   NAD, WDWN, Alert & Oriented, Mood & Affect wnl, Vitals stable   Here today alone   /67  Pulse 89  SpO2 98%   General appearance normal   Vitals stable   Alert, oriented and in no acute distress      Thin pink scaly papules on legs and right posterior auricular scalp, hypopigmentation around papules     Eyes: Conjunctivae/lids:Normal     ENT: Lips: normal    MSK:Normal    Pulm: Breathing Normal    Neuro/Psych: Orientation:Normal; Mood/Affect:Normal  A/P:  1. Favor Nummular dermatitis   Discussed biopsy, which patient did not want to do at this time.   Avoid topical steroids, hypopigmentation is concerning for steroid atrophy.   Apply protopic twice daily as needed.   Continue moisturizer, twice daily.   Return in two months, if not resolved with biopsy.     Again, thank you for allowing me to participate in the care of your patient.        Sincerely,        Rdaha Dugan PA-C

## 2018-05-09 NOTE — MR AVS SNAPSHOT
After Visit Summary   5/9/2018    Mirta Cardenas    MRN: [id number]           Patient Information     Date Of Birth          1955        Visit Information        Provider Department      5/9/2018 3:40 PM Radha Matias PA-C [facility]        Today's Diagnoses     Nummular eczema    -  1      Care Instructions    Use Hibiclens wash to groin.     Apply protopic twice daily as needed.     Continue cetaphil moisturizers.     Recheck in two months.           Follow-ups after your visit        Your next 10 appointments already scheduled     May 18, 2018  3:30 PM CDT   Ortho Eval with Jodi Phillips, PT   [facility] ([facility])    [address] [place] [address] [phone]              Who to contact     If you have questions or need follow up information about today's clinic visit or your schedule please contact [facility] directly at [phone].  Normal or non-critical lab and imaging results will be communicated to you by FIMBexhart, letter or phone within 4 business days after the clinic has received the results. If you do not hear from us within 7 days, please contact the clinic through [employer] or phone. If you have a critical or abnormal lab result, we will notify you by phone as soon as possible.  Submit refill requests through [employer] or call your pharmacy and they will forward the refill request to us. Please allow 3 business days for your refill to be completed.          Additional Information About Your Visit        MyChart Information     [employer] gives you secure access to your electronic health record. If you see a primary care provider, you can also send messages to your care team and make appointments. If you have questions, please call your primary care clinic.  If you do not have a primary care provider, please call [phone] and they will assist you.        Care EveryWhere ID     This  is your Care EveryWhere ID. This could be used by other organizations to access your [place] medical records  [id number]        Your Vitals Were     Pulse Pulse Oximetry                89 98%           Blood Pressure from Last 3 Encounters:   05/09/18 116/67   04/27/18 119/75   04/11/18 113/76    Weight from Last 3 Encounters:   02/22/18 83.9 kg (185 lb)   12/15/17 84.2 kg (185 lb 9.6 oz)   06/09/17 84.1 kg (185 lb 6.4 oz)              Today, you had the following     No orders found for display         Today's Medication Changes          These changes are accurate as of 5/9/18  3:57 PM.  If you have any questions, ask your nurse or doctor.               Start taking these medicines.        Dose/Directions    tacrolimus 0.1 % ointment   Commonly known as:  PROTOPIC   Used for:  Nummular eczema   Started by:  Radha Matias PA-C        Apply twice daily affected areas on body.   Quantity:  30 g   Refills:  3            Where to get your medicines      These medications were sent to [facility] [place] [address]     Phone:  [phone]     tacrolimus 0.1 % ointment                Primary Care Provider Office Phone # Fax #    Neal Mosquera [phone] [address] [facility] [address]        Equal Access to Services     JOSÉ MANUEL LIZARRAGA AH: Hadii tamiko riggins hadsammo Sohussein, waaxda luqadaha, qaybta kaalmada adealaina, gerhard ureña. So [facility] [phone].    ATENCIÓN: Si habla español, tiene a cage disposición servicios gratuitos de asistencia lingüística. Llame al [phone].    We comply with applicable federal civil rights laws and [place] laws. We do not discriminate on the basis of race, color, national origin, age, disability, sex, sexual orientation, or gender identity.            Thank you!     Thank you for choosing [facility]  for your care. Our goal  is always to provide you with excellent care. Hearing back from our patients is one way we can continue to improve our services. Please take a few minutes to complete the written survey that you may receive in the mail after your visit with us. Thank you!             Your Updated Medication List - Protect others around you: Learn how to safely use, store and throw away your medicines at www.disposemymeds.org.          This list is accurate as of 5/9/18  3:57 PM.  Always use your most recent med list.                   Brand Name Dispense Instructions for use Diagnosis    aspirin 81 MG tablet      1 TABLET DAILY        augmented betamethasone dipropionate 0.05 % cream    DIPROLENE-AF    15 g    Apply sparingly to affected area  twice daily for 14 days then once daily for the next 14 days then stop.  Do not apply to face.    Nummular dermatitis       diclofenac 1 % Gel topical gel    VOLTAREN          * fluocinonide 0.05 % cream    LIDEX    120 g    Apply sparingly to affected area twice daily as needed.  Do not apply to face.    Nummular dermatitis       * fluocinonide 0.05 % solution    LIDEX    60 mL    Apply sparingly to affected area twice daily as needed.  Do not apply to face.    Nummular dermatitis       lisinopril-hydrochlorothiazide 20-25 MG per tablet    PRINZIDE/ZESTORETIC    90 tablet    Take 1 tablet by mouth every morning    Benign essential hypertension       mometasone-formoterol 200-5 MCG/ACT oral inhaler    DULERA    1 Inhaler    Inhale 1 puff into the lungs 2 times daily Only using at the start of URI.    Cough due to bronchospasm       nystatin [id number] UNIT/GM Powd    MYCOSTATIN    60 g    Apply daily to groin as needed.    Intertrigo       order for DME     2 Units    Equipment being ordered: Dynaflex insert    Plantar fasciitis, bilateral       pravastatin 40 MG tablet    PRAVACHOL    90 tablet    Take 1 tablet (40 mg) by mouth daily    Hyperlipidemia LDL goal <100       priLOSEC 20 MG CR capsule    Generic drug:  omeprazole      Take 1 capsule by mouth every morning.        tacrolimus 0.1 % ointment    PROTOPIC    30 g    Apply twice daily affected areas on body.    Nummular eczema       terazosin 5 MG capsule    HYTRIN    90 capsule    Take 1 capsule (5 mg) by mouth daily    Benign essential hypertension       * Notice:  This list has 2 medication(s) that are the same as other medications prescribed for you. Read the directions carefully, and ask your doctor or other care provider to review them with you.

## 2018-05-09 NOTE — PROGRESS NOTES
Mirta Cardenas is a 63 year old year old female patient here today recheck nummular dermatitis on legs. Patient reports that she has noticed areas lighten but no resolve. She also notes a new area behind ear. She reports that she is using cetaphil once daily and lidex twice daily. She has noticed some lightening of skin around the rash.  Patient has no other skin complaints today.  Remainder of the HPI, Meds, PMH, Allergies, FH, and SH was reviewed in chart.    Past Medical History:   Diagnosis Date     Asthma      Blood transfusion      Hypertension      Malignant neoplasm (H)     CLL       Past Surgical History:   Procedure Laterality Date     C/SECTION, CLASSICAL  ,,    , Classical     HYSTERECTOMY, PAP NO LONGER INDICATED       RELEASE CARPAL TUNNEL  2012    Procedure:RELEASE CARPAL TUNNEL; Right Carpal Tunnel Release & Right Ring A1 Pulley Release; Surgeon:SERGEY HEATON; Location:[facility]     RELEASE TRIGGER FINGER  2012    Procedure:RELEASE TRIGGER FINGER; Surgeon:SERGEY HEATON; Location:[facility]     RELEASE TRIGGER FINGER  10/12/2012    Procedure: RELEASE TRIGGER FINGER;  Right Thumb A1 Pulley Release;  Surgeon: Sergey Heaton MD;  Location: [facility]     SALPINGO OOPHORECTOMY,R/L/ULICES  2008    right        Family History   Problem Relation Age of Onset     Hypertension Brother      HEART DISEASE Brother 65     bypass     CANCER Sister      multiple myloma     Obesity Son      Obesity Son      Hypertension Son        Social History     Social History     Marital status:      Spouse name: N/A     Number of children: N/A     Years of education: N/A     Occupational History     Not on file.     Social History Main Topics     Smoking status: Never Smoker     Smokeless tobacco: Never Used     Alcohol use Yes      Comment: THREE- FOUR TIMES PER YEAR, SOCIALLY     Drug use: No     Sexual activity: Yes     Partners: Male     Birth control/ protection: Surgical      Other Topics Concern     Parent/Sibling W/ Cabg, Mi Or Angioplasty Before 65f 55m? No     Social History Narrative       Outpatient Encounter Prescriptions as of 5/9/2018   Medication Sig Dispense Refill     ASPIRIN 81 MG OR TABS 1 TABLET DAILY       augmented betamethasone dipropionate (DIPROLENE-AF) 0.05 % cream Apply sparingly to affected area  twice daily for 14 days then once daily for the next 14 days then stop.  Do not apply to face. 15 g 1     diclofenac (VOLTAREN) 1 % GEL topical gel   0     fluocinonide (LIDEX) 0.05 % cream Apply sparingly to affected area twice daily as needed.  Do not apply to face. 120 g 1     fluocinonide (LIDEX) 0.05 % solution Apply sparingly to affected area twice daily as needed.  Do not apply to face. 60 mL 0     lisinopril-hydrochlorothiazide (PRINZIDE/ZESTORETIC) 20-25 MG per tablet Take 1 tablet by mouth every morning 90 tablet 3     nystatin (MYCOSTATIN) 551784 UNIT/GM POWD Apply daily to groin as needed. 60 g 1     omeprazole (PRILOSEC) 20 MG capsule Take 1 capsule by mouth every morning.       order for DME Equipment being ordered: Dynaflex insert 2 Units 0     pravastatin (PRAVACHOL) 40 MG tablet Take 1 tablet (40 mg) by mouth daily 90 tablet 3     tacrolimus (PROTOPIC) 0.1 % ointment Apply twice daily affected areas on body. 30 g 3     terazosin (HYTRIN) 5 MG capsule Take 1 capsule (5 mg) by mouth daily 90 capsule 3     mometasone-formoterol (DULERA) 200-5 MCG/ACT oral inhaler Inhale 1 puff into the lungs 2 times daily Only using at the start of URI. (Patient not taking: Reported on 5/9/2018) 1 Inhaler 2     No facility-administered encounter medications on file as of 5/9/2018.              Review Of Systems  Skin: As above  Eyes: negative  Ears/Nose/Throat: negative  Respiratory: No shortness of breath, dyspnea on exertion, cough, or hemoptysis  Cardiovascular: negative  Gastrointestinal: negative  Genitourinary: negative  Musculoskeletal: negative  Neurologic:  negative  Psychiatric: negative  Hematologic/Lymphatic/Immunologic: negative  Endocrine: negative      O:   NAD, WDWN, Alert & Oriented, Mood & Affect wnl, Vitals stable   Here today alone   /67  Pulse 89  SpO2 98%   General appearance normal   Vitals stable   Alert, oriented and in no acute distress      Thin pink scaly papules on legs and right posterior auricular scalp, hypopigmentation around papules     Eyes: Conjunctivae/lids:Normal     ENT: Lips: normal    MSK:Normal    Pulm: Breathing Normal    Neuro/Psych: Orientation:Normal; Mood/Affect:Normal  A/P:  1. Favor Nummular dermatitis   Discussed biopsy, which patient did not want to do at this time.   Avoid topical steroids, hypopigmentation is concerning for steroid atrophy.   Apply protopic twice daily as needed.   Continue moisturizer, twice daily.   Return in two months, if not resolved with biopsy.

## 2018-05-18 ENCOUNTER — HOSPITAL ENCOUNTER (OUTPATIENT)
Dept: PHYSICAL THERAPY | Facility: CLINIC | Age: 63
Setting detail: THERAPIES SERIES
End: 2018-05-18
Attending: PODIATRIST
Payer: COMMERCIAL

## 2018-05-18 PROCEDURE — 40000718 ZZHC STATISTIC PT DEPARTMENT ORTHO VISIT: Performed by: PHYSICAL THERAPIST

## 2018-05-18 PROCEDURE — 97161 PT EVAL LOW COMPLEX 20 MIN: CPT | Mod: GP | Performed by: PHYSICAL THERAPIST

## 2018-05-18 PROCEDURE — 97110 THERAPEUTIC EXERCISES: CPT | Mod: GP | Performed by: PHYSICAL THERAPIST

## 2018-05-18 PROCEDURE — 97140 MANUAL THERAPY 1/> REGIONS: CPT | Mod: GP | Performed by: PHYSICAL THERAPIST

## 2018-05-18 NOTE — PROGRESS NOTES
05/18/18 1500   General Information   Type of Visit Initial OP Ortho PT Evaluation   Start of Care Date 05/18/18   Referring Physician Chanelle   Orders Evaluate and Treat   Date of Order 05/07/18   Insurance Type [employer]   Medical Diagnosis Os Vesalinum, muscle group analysis, gait training   Surgical/Medical history reviewed Yes   Precautions/Limitations no known precautions/limitations   Body Part(s)   Body Part(s) Ankle/Foot   Presentation and Etiology   Pertinent history of current problem (include personal factors and/or comorbidities that impact the POC) pt states she had plantar fasciitis after biking without shoes on for extended periods of time in the early 2000s. pt again has a foot injury after doing an eccentrics class, had pain with the feet exercises afterwards in both feet. the pain is localized to the pinky toe. pt states it hurt all the time to do the gastroc stretching that Dr. Webster gave her. pt states her foot continued to get worse with using the metal foot plate. pt states she wore a boot for 4 weeks, it made it better on the left, then the right one increased pain. June 28th f/u with Dr. Roca, pt may have to have surgery if it does not get better.   Impairments A. Pain;H. Impaired gait   Functional Limitations perform activities of daily living;perform desired leisure / sports activities;perform required work activities   Symptom Location B feet   How/Where did it occur During recreation/sports   Onset date of current episode/exacerbation 01/25/18   Chronicity Recurrent   Pain quality C. Aching;E. Shooting   Frequency of pain/symptoms C. With activity   Pain/symptoms exacerbated by B. Walking;G. Certain positions   Pain/symptoms eased by C. Rest;I. OTC medication(s)   Current Level of Function   Patient role/employment history A. Employed   Fall Risk Screen   Fall screen completed by PT   Have you fallen 2 or more times in the past year? No   Have you fallen and had an injury in the past  year? No   Is patient a fall risk? No   Ankle/Foot Objective Findings   Side (if bilateral, select both right and left) Right;Left   Gait/Locomotion wide JOO, increased lateral shifting at the trunk   Ankle/Foot Special Tests Comments decreased inversion ROM L by 50% compared to R talocrural joint.   Accessory Motion/Joint Mobility decreased posterior tib fib mobilization B, increased plantar fascia tightness on the R.   Right DF (Knee Ext) AROM 10*   Right PF AROM 48*    Right DF/Inversion Strength 5/5   Right DF/Eversion Strength 5/5   Right PF/Inversion Strength 5/5   Right PF Strength 3+/5   Left DF (Knee Ext) AROM 10*   Left PF AROM 53*   Left DF/Inversion Strength 5/5   Left DF/Eversion Strength 4-/5   Left PF/Inversion Strength 5/5   Left PF Strength 3/5   Planned Therapy Interventions   Planned Therapy Interventions IADL retraining;balance training;joint mobilization;manual therapy;motor coordination training;neuromuscular re-education;ROM;strengthening;stretching;transfer training;bed mobility training   Clinical Impression   Criteria for Skilled Therapeutic Interventions Met yes, treatment indicated   PT Diagnosis Decreased flexibility and strength secondary to B foot pain   Influenced by the following impairments decreased flexibility and strength   Functional limitations due to impairments decreased participation with gait and mobility   Clinical Presentation Stable/Uncomplicated   Clinical Presentation Rationale pt presents with stable condition and is motivated to participation   Clinical Decision Making (Complexity) Low complexity   Therapy Frequency 1 time/week   Predicted Duration of Therapy Intervention (days/wks) 6 weeks   Risk & Benefits of therapy have been explained Yes   Patient, Family & other staff in agreement with plan of care Yes   Education Assessment   Preferred Learning Style Pictures/video   Barriers to Learning No barriers   ORTHO GOALS   PT Ortho Eval Goals 1;2;3   Ortho Goal 1    Goal Identifier HEP   Goal Description Pt will demonstrate proper form and duration of exercises in order to encourage self management of symptoms.   Target Date 06/29/18   Ortho Goal 2   Goal Identifier Strength   Goal Description pt will demonstrate increased plantarflexion strength of atleast 4/5 B   Target Date 06/29/18   Ortho Goal 3   Goal Identifier Flexibilty   Goal Description pt will demonstrate increase L inversion flexibilty in order to participate with efficiency of gait.   Target Date 06/29/18   Total Evaluation Time   Total Evaluation Time 25

## 2018-05-29 ENCOUNTER — HOSPITAL ENCOUNTER (OUTPATIENT)
Dept: PHYSICAL THERAPY | Facility: CLINIC | Age: 63
Setting detail: THERAPIES SERIES
End: 2018-05-29
Attending: PODIATRIST
Payer: COMMERCIAL

## 2018-05-29 PROCEDURE — 40000718 ZZHC STATISTIC PT DEPARTMENT ORTHO VISIT: Performed by: PHYSICAL THERAPIST

## 2018-05-29 PROCEDURE — 97110 THERAPEUTIC EXERCISES: CPT | Mod: GP | Performed by: PHYSICAL THERAPIST

## 2018-05-29 PROCEDURE — 97140 MANUAL THERAPY 1/> REGIONS: CPT | Mod: GP | Performed by: PHYSICAL THERAPIST

## 2018-06-04 ENCOUNTER — HOSPITAL ENCOUNTER (OUTPATIENT)
Dept: PHYSICAL THERAPY | Facility: CLINIC | Age: 63
Setting detail: THERAPIES SERIES
End: 2018-06-04
Attending: PODIATRIST
Payer: COMMERCIAL

## 2018-06-04 PROCEDURE — 97110 THERAPEUTIC EXERCISES: CPT | Mod: GP | Performed by: PHYSICAL THERAPIST

## 2018-06-04 PROCEDURE — 40000718 ZZHC STATISTIC PT DEPARTMENT ORTHO VISIT: Performed by: PHYSICAL THERAPIST

## 2018-06-04 PROCEDURE — 97140 MANUAL THERAPY 1/> REGIONS: CPT | Mod: GP | Performed by: PHYSICAL THERAPIST

## 2018-06-13 ENCOUNTER — HOSPITAL ENCOUNTER (OUTPATIENT)
Dept: PHYSICAL THERAPY | Facility: CLINIC | Age: 63
Setting detail: THERAPIES SERIES
End: 2018-06-13
Attending: PODIATRIST
Payer: COMMERCIAL

## 2018-06-13 PROCEDURE — 40000718 ZZHC STATISTIC PT DEPARTMENT ORTHO VISIT: Performed by: PHYSICAL THERAPIST

## 2018-06-13 PROCEDURE — 97110 THERAPEUTIC EXERCISES: CPT | Mod: GP | Performed by: PHYSICAL THERAPIST

## 2018-06-20 ENCOUNTER — HOSPITAL ENCOUNTER (OUTPATIENT)
Dept: PHYSICAL THERAPY | Facility: CLINIC | Age: 63
Setting detail: THERAPIES SERIES
End: 2018-06-20
Attending: PODIATRIST
Payer: COMMERCIAL

## 2018-06-20 PROCEDURE — 97110 THERAPEUTIC EXERCISES: CPT | Mod: GP | Performed by: PHYSICAL THERAPIST

## 2018-06-20 PROCEDURE — 40000718 ZZHC STATISTIC PT DEPARTMENT ORTHO VISIT: Performed by: PHYSICAL THERAPIST

## 2018-06-28 ENCOUNTER — TRANSFERRED RECORDS (OUTPATIENT)
Dept: HEALTH INFORMATION MANAGEMENT | Facility: CLINIC | Age: 63
End: 2018-06-28

## 2018-07-03 ENCOUNTER — HOSPITAL ENCOUNTER (OUTPATIENT)
Dept: PHYSICAL THERAPY | Facility: CLINIC | Age: 63
Setting detail: THERAPIES SERIES
End: 2018-07-03
Attending: PODIATRIST
Payer: COMMERCIAL

## 2018-07-03 PROCEDURE — 97110 THERAPEUTIC EXERCISES: CPT | Mod: GP | Performed by: PHYSICAL THERAPIST

## 2018-07-03 PROCEDURE — 40000718 ZZHC STATISTIC PT DEPARTMENT ORTHO VISIT: Performed by: PHYSICAL THERAPIST

## 2018-07-03 NOTE — PROGRESS NOTES
Outpatient Physical Therapy Discharge Note     Patient: Mirta Cardenas  : 1955    Beginning/End Dates of Reporting Period:  2018 to 7/3/2018    Referring Provider: Chanelle Romero Diagnosis: Decreased ROM and strength secondary to B ankle pain     Client Self Report: pt states overall improved, not nearly as much pain as before. small amount remains after strawberry picking.    Goals:  Goal Identifier HEP   Goal Description Pt will demonstrate proper form and duration of exercises in order to encourage self management of symptoms.   Target Date 18   Date Met  18   Progress:     Goal Identifier Strength   Goal Description pt will demonstrate increased plantarflexion strength of atleast 4/5 B   Target Date 18   Date Met  18   Progress:     Goal Identifier Flexibilty   Goal Description pt will demonstrate increase L inversion flexibilty in order to participate with efficiency of gait.   Target Date 18   Date Met  18   Progress:       Progress Toward Goals:   Progress this reporting period: pt states her pain has improved, needs to work on balance. Is motivated to continue HEP independently.           Plan:  Discharge from therapy.    Discharge:    Reason for Discharge: Patient has met all goals.    Equipment Issued: none    Discharge Plan: Patient to continue home program.

## 2018-07-11 ENCOUNTER — TELEPHONE (OUTPATIENT)
Dept: DERMATOLOGY | Facility: CLINIC | Age: 63
End: 2018-07-11

## 2018-07-11 NOTE — TELEPHONE ENCOUNTER
Called patient and updated her on recommendations below.   Has appointment made to be seen in clinic.     Xuan MCFARLAND RN   [facility]

## 2018-07-11 NOTE — TELEPHONE ENCOUNTER
"Reason for call:  Patient reporting a symptom    Symptom or request: pt states she was in about 6 wks ago.  Was supposed to be reseen in 4-6wks - had an appt for today but had to change it.  Rescheduled to 8-10-18.  Wondering if she should continue putting ointment on her ecezema?  Thought there may be some concerns about how long she uses it.  States \"a couple have gotten better but still there and some new spots\"  Using twice a day.    Duration (how long have symptoms been present):     Have you been treated for this before? Yes    Additional comments:     Phone Number patient can be reached at:  Home number on file [phone] (home)    Best Time:  any    Can we leave a detailed message on this number:  YES    Call taken on 7/11/2018 at 3:19 PM by Kim Carpenter    "

## 2018-07-11 NOTE — TELEPHONE ENCOUNTER
If she is using protopic there are no concerns about consistent use if areas are affected.   I would try a thicker moisturizer if possible can use Eucerin twice daily.   We can have her return for biopsy if still having problems just to confirm that this is eczema.

## 2018-08-09 ENCOUNTER — OFFICE VISIT (OUTPATIENT)
Dept: FAMILY MEDICINE | Facility: CLINIC | Age: 63
End: 2018-08-09
Payer: COMMERCIAL

## 2018-08-09 VITALS
HEART RATE: 80 BPM | HEIGHT: 61 IN | WEIGHT: 187 LBS | BODY MASS INDEX: 35.3 KG/M2 | TEMPERATURE: 97.8 F | SYSTOLIC BLOOD PRESSURE: 92 MMHG | DIASTOLIC BLOOD PRESSURE: 58 MMHG | OXYGEN SATURATION: 97 %

## 2018-08-09 DIAGNOSIS — I10 BENIGN ESSENTIAL HYPERTENSION: ICD-10-CM

## 2018-08-09 DIAGNOSIS — N18.30 CKD (CHRONIC KIDNEY DISEASE) STAGE 3, GFR 30-59 ML/MIN (H): ICD-10-CM

## 2018-08-09 DIAGNOSIS — E21.2 OTHER HYPERPARATHYROIDISM (H): ICD-10-CM

## 2018-08-09 DIAGNOSIS — C91.11 CHRONIC LYMPHOID LEUKEMIA IN REMISSION (H): ICD-10-CM

## 2018-08-09 DIAGNOSIS — Z12.31 ENCOUNTER FOR SCREENING MAMMOGRAM FOR BREAST CANCER: ICD-10-CM

## 2018-08-09 DIAGNOSIS — Z00.00 WELL WOMAN EXAM (NO GYNECOLOGICAL EXAM): Primary | ICD-10-CM

## 2018-08-09 DIAGNOSIS — E66.01 MORBID OBESITY (H): ICD-10-CM

## 2018-08-09 PROCEDURE — 99396 PREV VISIT EST AGE 40-64: CPT | Performed by: FAMILY MEDICINE

## 2018-08-09 NOTE — PROGRESS NOTES
SUBJECTIVE:   CC: Mirta Cardenas is an 63 year old woman who presents for preventive health visit.     Healthy Habits:    Do you get at least three servings of calcium containing foods daily (dairy, green leafy vegetables, etc.)? yes    Amount of exercise or daily activities, outside of work: none    Problems taking medications regularly No    Medication side effects: No    Have you had an eye exam in the past two years? yes    Do you see a dentist twice per year? yes    Do you have sleep apnea, excessive snoring or daytime drowsiness?yes, snoring      PROBLEMS TO ADD ON...  none    Today's PHQ-2 Score:   PHQ-2 ( 1999 [employer]) 8/9/2018 12/15/2017   Q1: Little interest or pleasure in doing things 0 0   Q2: Feeling down, depressed or hopeless 0 0   PHQ-2 Score 0 0       Abuse: Current or Past(Physical, Sexual or Emotional)- No  Do you feel safe in your environment - Yes    Social History   Substance Use Topics     Smoking status: Never Smoker     Smokeless tobacco: Never Used     Alcohol use Yes      Comment: THREE- FOUR TIMES PER YEAR, SOCIALLY     If you drink alcohol do you typically have >3 drinks per day or >7 drinks per week? No                     Reviewed orders with patient.  Reviewed health maintenance and updated orders accordingly - Yes  BP Readings from Last 3 Encounters:   08/09/18 92/58   05/09/18 116/67   04/27/18 119/75    Wt Readings from Last 3 Encounters:   08/09/18 187 lb (84.8 kg)   02/22/18 185 lb (83.9 kg)   12/15/17 185 lb 9.6 oz (84.2 kg)              Patient over age 50, mutual decision to screen reflected in health maintenance.    Pertinent mammograms are reviewed under the imaging tab.  History of abnormal Pap smear: Status post benign hysterectomy. Health Maintenance and Surgical History updated.     Reviewed and updated as needed this visit by clinical staff  Tobacco  Allergies  Meds  Med Hx  Surg Hx  Fam Hx  Soc Hx        Reviewed and updated as needed this visit by  "Provider            ROS:  CONSTITUTIONAL: NEGATIVE for fever, chills, change in weight  INTEGUMENTARY/SKIN: NEGATIVE for worrisome rashes, moles or lesions  EYES: NEGATIVE for vision changes or irritation  ENT: NEGATIVE for ear, mouth and throat problems  RESP: NEGATIVE for significant cough or SOB  BREAST: NEGATIVE for masses, tenderness or discharge  CV: NEGATIVE for chest pain, palpitations or peripheral edema  GI: NEGATIVE for nausea, abdominal pain, heartburn, or change in bowel habits  : NEGATIVE for unusual urinary or vaginal symptoms. No vaginal bleeding.  MUSCULOSKELETAL: NEGATIVE for significant arthralgias or myalgia  NEURO: NEGATIVE for weakness, dizziness or paresthesias  PSYCHIATRIC: NEGATIVE for changes in mood or affect     OBJECTIVE:   BP 92/58  Pulse 80  Temp 97.8  F (36.6  C) (Tympanic)  Ht 5' 1\" (1.549 m)  Wt 187 lb (84.8 kg)  SpO2 97%  BMI 35.33 kg/m2  EXAM:  GENERAL APPEARANCE: healthy, alert and no distress  EYES: Eyes grossly normal to inspection, PERRL and conjunctivae and sclerae normal  HENT: ear canals and TM's normal, nose and mouth without ulcers or lesions, oropharynx clear and oral mucous membranes moist  NECK: no adenopathy, no asymmetry, masses, or scars and thyroid normal to palpation  RESP: lungs clear to auscultation - no rales, rhonchi or wheezes  BREAST: normal without masses, tenderness or nipple discharge and no palpable axillary masses or adenopathy  CV: regular rate and rhythm, normal S1 S2, no S3 or S4, no murmur, click or rub, no peripheral edema and peripheral pulses strong  ABDOMEN: soft, nontender, no hepatosplenomegaly, no masses and bowel sounds normal  MS: no musculoskeletal defects are noted and gait is age appropriate without ataxia  SKIN: no suspicious lesions or rashes  NEURO: Normal strength and tone, sensory exam grossly normal, mentation intact and speech normal  PSYCH: mentation appears normal and affect normal/bright    Diagnostic Test " "Results:  none     ASSESSMENT/PLAN:   Mirta was seen today for physical and health maintenance.    Diagnoses and all orders for this visit:    Well woman exam (no gynecological exam)    Encounter for screening mammogram for breast cancer  -     *MA Screening Digital Bilateral    Benign essential hypertension: stable even on the low side today, but no orthostasis.  -     Albumin Random Urine Quantitative with Creat Ratio    Morbid obesity (H) BMI 35+ with comorbidity HTN    Chronic lymphoid leukemia in remission (H): stable followed by cardiology    Other hyperparathyroidism (H): stable    CKD (chronic kidney disease) stage 3, GFR 30-59 ml/min  -     Albumin Random Urine Quantitative with Creat Ratio        COUNSELING:   Reviewed preventive health counseling, as reflected in patient instructions       Regular exercise       Healthy diet/nutrition       Vision screening       Hearing screening       Osteoporosis Prevention/Bone Health       Colon cancer screening       Consider Hep C screening for patients born between 1945 and 1965       HIV screeninx in teen years, 1x in adult years, and at intervals if high risk    BP Readings from Last 1 Encounters:   18 92/58     Estimated body mass index is 35.33 kg/(m^2) as calculated from the following:    Height as of this encounter: 5' 1\" (1.549 m).    Weight as of this encounter: 187 lb (84.8 kg).      Weight management plan: Discussed healthy diet and exercise guidelines and patient will follow up in 12 months in clinic to re-evaluate.     reports that she has never smoked. She has never used smokeless tobacco.      Counseling Resources:  ATP IV Guidelines  Pooled Cohorts Equation Calculator  Breast Cancer Risk Calculator  FRAX Risk Assessment  ICSI Preventive Guidelines  Dietary Guidelines for Americans,   USDA's MyPlate  ASA Prophylaxis  Lung CA Screening    Neal Mosquera MD  [facility]  "

## 2018-08-09 NOTE — PATIENT INSTRUCTIONS
Down to diagnostic imaging for mammo    Next labs in April.    If BP is still lower top number under 110, we could consider stopping the terazosin.        Thank you for choosing [facility].  You may be receiving a survey in the mail from Juaquin Murray regarding your visit today.  Please take a few minutes to complete and return the survey to let us know how we are doing.      If you have questions or concerns, please contact us via [employer] or you can contact your care team at [phone].    Our Clinic hours are:  Monday 6:40 am  to 7:00 pm  Tuesday -Friday 6:40 am to 5:00 pm    The [place] outpatient lab hours are:  Monday - Friday 6:10 am to 4:45 pm  Saturdays 7:00 am to 11:00 am  Appointments are required, call [phone]    If you have clinical questions after hours or would like to schedule an appointment,  call the clinic at [phone].    Preventive Health Recommendations  Female Ages 50 - 64    Yearly exam: See your health care provider every year in order to  o Review health changes.   o Discuss preventive care.    o Review your medicines if your doctor has prescribed any.      Get a Pap test every three years (unless you have an abnormal result and your provider advises testing more often).    If you get Pap tests with HPV test, you only need to test every 5 years, unless you have an abnormal result.     You do not need a Pap test if your uterus was removed (hysterectomy) and you have not had cancer.    You should be tested each year for STDs (sexually transmitted diseases) if you're at risk.     Have a mammogram every 1 to 2 years.    Have a colonoscopy at age 50, or have a yearly FIT test (stool test). These exams screen for colon cancer.      Have a cholesterol test every 5 years, or more often if advised.    Have a diabetes test (fasting glucose) every three years. If you are at risk for diabetes, you should have this test more often.     If you are at risk for osteoporosis (brittle bone  disease), think about having a bone density scan (DEXA).    Shots: Get a flu shot each year. Get a tetanus shot every 10 years.    Nutrition:     Eat at least 5 servings of fruits and vegetables each day.    Eat whole-grain bread, whole-wheat pasta and brown rice instead of white grains and rice.    Get adequate Calcium and Vitamin D.     Lifestyle    Exercise at least 150 minutes a week (30 minutes a day, 5 days a week). This will help you control your weight and prevent disease.    Limit alcohol to one drink per day.    No smoking.     Wear sunscreen to prevent skin cancer.     See your dentist every six months for an exam and cleaning.    See your eye doctor every 1 to 2 years.

## 2018-08-09 NOTE — LETTER
September 14, 2018      Mirta Cardenas  [address] [facility] [address]        Dear ,    We are writing to inform you of your test results.    Normal mammogram.   Continue regular yearly to every two year mammograms.     Resulted Orders   *MA Screening Digital Bilateral    Narrative    MA SCREENING DIGITAL BILATERAL 9/13/2018 3:41 PM    HISTORY:  Screening.  No new breast complaints.    COMPARISON:  2/17/2015, 10/9/2012, 9/9/2010, 6/30/2008    TECHNIQUE:  Digital mammography with CAD is performed.    BREAST DENSITY: Scattered fibroglandular densities.    COMMENTS: No findings of suspicion for malignancy.       Impression    IMPRESSION: BI-RADS CATEGORY: 1 -  NEGATIVE.    RECOMMENDED FOLLOW-UP: Annual Mammography.    VIVIAN MARTINEZ MD       If you have any questions or concerns, please call the clinic at the number listed above.       Sincerely,        Neal Mosquera MD

## 2018-08-09 NOTE — MR AVS SNAPSHOT
After Visit Summary   8/9/2018    Mirta Cardenas    MRN: [id number]           Patient Information     Date Of Birth          1955        Visit Information        Provider Department      8/9/2018 8:20 AM Neal Mosquera MD [facility]        Today's Diagnoses     Well woman exam (no gynecological exam)    -  1    Encounter for screening mammogram for breast cancer        Benign essential hypertension        Morbid obesity (H)        Chronic lymphoid leukemia in remission (H)        Other hyperparathyroidism (H)        CKD (chronic kidney disease) stage 3, GFR 30-59 ml/min          Care Instructions    Down to diagnostic imaging for mammo    Next labs in April.    If BP is still lower top number under 110, we could consider stopping the terazosin.        [facility] you [facility].  You may be receiving a survey in the mail from [employer] regarding your visit today.  Please take a few minutes to complete and return the survey to let us know how we are doing.      If you have questions or concerns, please contact us via [employer] or you can contact your care team at [phone].    Our Clinic hours are:  Monday 6:40 am  to 7:00 pm  Tuesday -Friday 6:40 am to 5:00 pm    The [place] outpatient lab hours are:  Monday - Friday 6:10 am to 4:45 pm  Saturdays 7:00 am to 11:00 am  Appointments are required, call [phone]    If you have clinical questions after hours or would like to schedule an appointment,  call the clinic at [phone].    Preventive Health Recommendations  Female Ages 50 - 64    Yearly exam: See your health care provider every year in order to  o Review health changes.   o Discuss preventive care.    o Review your medicines if your doctor has prescribed any.      Get a Pap test every three years (unless you have an abnormal result and your provider advises testing more often).    If you get Pap tests with HPV test, you only need to test every 5  years, unless you have an abnormal result.     You do not need a Pap test if your uterus was removed (hysterectomy) and you have not had cancer.    You should be tested each year for STDs (sexually transmitted diseases) if you're at risk.     Have a mammogram every 1 to 2 years.    Have a colonoscopy at age 50, or have a yearly FIT test (stool test). These exams screen for colon cancer.      Have a cholesterol test every 5 years, or more often if advised.    Have a diabetes test (fasting glucose) every three years. If you are at risk for diabetes, you should have this test more often.     If you are at risk for osteoporosis (brittle bone disease), think about having a bone density scan (DEXA).    Shots: Get a flu shot each year. Get a tetanus shot every 10 years.    Nutrition:     Eat at least 5 servings of fruits and vegetables each day.    Eat whole-grain bread, whole-wheat pasta and brown rice instead of white grains and rice.    Get adequate Calcium and Vitamin D.     Lifestyle    Exercise at least 150 minutes a week (30 minutes a day, 5 days a week). This will help you control your weight and prevent disease.    Limit alcohol to one drink per day.    No smoking.     Wear sunscreen to prevent skin cancer.     See your dentist every six months for an exam and cleaning.    See your eye doctor every 1 to 2 years.            Follow-ups after your visit        Your next 10 appointments already scheduled     Aug 10, 2018 10:20 AM CDT   Return Visit with Radha Matias PA-C   [facility]    [address] [address] [phone]              Who to contact     If you have questions or need follow up information about today's clinic visit or your schedule please contact [facility] directly at [phone].  Normal or non-critical lab and imaging results will be communicated to you by MyChart, letter or phone within 4 business days after  "the clinic has received the results. If you do not hear from us within 7 days, please contact the clinic through [employer] or phone. If you have a critical or abnormal lab result, we will notify you by phone as soon as possible.  Submit refill requests through [employer] or call your pharmacy and they will forward the refill request to us. Please allow 3 business days for your refill to be completed.          Additional Information About Your Visit        Identification [employer] Information     [employer] gives you secure access to your electronic health record. If you see a primary care provider, you can also send messages to your care team and make appointments. If you have questions, please call your primary care clinic.  If you do not have a primary care provider, please call [phone] and they will assist you.        Care EveryWhere ID     This is your Care EveryWhere ID. This could be used by other organizations to access your [facility] medical records  [id number]        Your Vitals Were     Pulse Temperature Height Pulse Oximetry BMI (Body Mass Index)       80 97.8  F (36.6  C) (Tympanic) 5' 1\" (1.549 m) 97% 35.33 kg/m2        Blood Pressure from Last 3 Encounters:   08/09/18 92/58   05/09/18 116/67   04/27/18 119/75    Weight from Last 3 Encounters:   08/09/18 187 lb (84.8 kg)   02/22/18 185 lb (83.9 kg)   12/15/17 185 lb 9.6 oz (84.2 kg)              We Performed the Following     *MA Screening Digital Bilateral     Albumin Random Urine Quantitative with Creat Ratio        Primary Care Provider Office Phone # Fax #    Neal Mosquera [phone] [address] [facility] [address]        Equal Access to Services     [facility] : Hadii aad ku hadasho Soomaali, waaxda luqadaha, qaybta kaalmada adeegyada, gerhard salazar . So [facility] [phone].    ATENCIÓN: Si habla español, tiene a cage disposición servicios gratuitos de asistencia lingüística. Llame al [phone].    We " comply with applicable federal civil rights laws and [place] laws. We do not discriminate on the basis of race, color, national origin, age, disability, sex, sexual orientation, or gender identity.            Thank you!     Thank you for choosing [facility]  for your care. Our goal is always to provide you with excellent care. Hearing back from our patients is one way we can continue to improve our services. Please take a few minutes to complete the written survey that you may receive in the mail after your visit with us. Thank you!             Your Updated Medication List - Protect others around you: Learn how to safely use, store and throw away your medicines at www.disposemymeds.org.          This list is accurate as of 8/9/18  8:49 AM.  Always use your most recent med list.                   Brand Name Dispense Instructions for use Diagnosis    aspirin 81 MG tablet      1 TABLET DAILY        lisinopril-hydrochlorothiazide 20-25 MG per tablet    PRINZIDE/ZESTORETIC    90 tablet    Take 1 tablet by mouth every morning    Benign essential hypertension       mometasone-formoterol 200-5 MCG/ACT oral inhaler    DULERA    1 Inhaler    Inhale 1 puff into the lungs 2 times daily Only using at the start of URI.    Cough due to bronchospasm       order for DME     2 Units    Equipment being ordered: Dynaflex insert    Plantar fasciitis, bilateral       pravastatin 40 MG tablet    PRAVACHOL    90 tablet    Take 1 tablet (40 mg) by mouth daily    Hyperlipidemia LDL goal <100       priLOSEC 20 MG CR capsule   Generic drug:  omeprazole      Take 1 capsule by mouth every morning.        tacrolimus 0.1 % ointment    PROTOPIC    30 g    Apply twice daily affected areas on body.    Nummular eczema       terazosin 5 MG capsule    HYTRIN    90 capsule    Take 1 capsule (5 mg) by mouth daily    Benign essential hypertension

## 2018-08-10 ENCOUNTER — OFFICE VISIT (OUTPATIENT)
Dept: DERMATOLOGY | Facility: CLINIC | Age: 63
End: 2018-08-10
Payer: COMMERCIAL

## 2018-08-10 VITALS — SYSTOLIC BLOOD PRESSURE: 124 MMHG | DIASTOLIC BLOOD PRESSURE: 70 MMHG | HEART RATE: 79 BPM | OXYGEN SATURATION: 98 %

## 2018-08-10 DIAGNOSIS — L30.9 DERMATITIS: Primary | ICD-10-CM

## 2018-08-10 PROCEDURE — 88312 SPECIAL STAINS GROUP 1: CPT | Mod: TC | Performed by: PHYSICIAN ASSISTANT

## 2018-08-10 PROCEDURE — 88305 TISSUE EXAM BY PATHOLOGIST: CPT | Mod: TC | Performed by: PHYSICIAN ASSISTANT

## 2018-08-10 PROCEDURE — 99213 OFFICE O/P EST LOW 20 MIN: CPT | Mod: 25 | Performed by: PHYSICIAN ASSISTANT

## 2018-08-10 PROCEDURE — 11100 HC BIOPSY SKIN/SUBQ/MUC MEM, SINGLE LESION: CPT | Performed by: PHYSICIAN ASSISTANT

## 2018-08-10 ASSESSMENT — ASTHMA QUESTIONNAIRES: ACT_TOTALSCORE: 25

## 2018-08-10 NOTE — LETTER
8/10/2018         RE: Mirta Cardenas  [address] [facility] [address]        Dear Colleague,    Thank you for referring your patient, Mirta Cardenas, to the [facility]. Please see a copy of my visit note below.    Mirta Cardenas is a 63 year old year old female patient here today for recheck dermatitis. Patient reports some areas resolved but others continue to show up on torso, extremities and face. She reports that it is itchy. She is using protopic. Patient is using cetaphil moisturizer. Patient has no other skin complaints today.  Remainder of the HPI, Meds, PMH, Allergies, FH, and SH was reviewed in chart.  Past Medical History:   Diagnosis Date     Asthma      Blood transfusion      Hypertension      Malignant neoplasm (H)     CLL       Past Surgical History:   Procedure Laterality Date     C/SECTION, CLASSICAL  ,,    , Classical     HYSTERECTOMY, PAP NO LONGER INDICATED       RELEASE CARPAL TUNNEL  2012    Procedure:RELEASE CARPAL TUNNEL; Right Carpal Tunnel Release & Right Ring A1 Pulley Release; Surgeon:SERGEY HEATON; Location:[place] OR     RELEASE TRIGGER FINGER  2012    Procedure:RELEASE TRIGGER FINGER; Surgeon:SERGEY HEATON; Location:[facility] [place]     RELEASE TRIGGER FINGER  10/12/2012    Procedure: RELEASE TRIGGER FINGER;  Right Thumb A1 Pulley Release;  Surgeon: Sergey Heaton MD;  Location: [facility] OR     SALPINGO OOPHORECTOMY,R/L/ULICES  2008    right        Family History   Problem Relation Age of Onset     Hypertension Brother      HEART DISEASE Brother 65     bypass     Cancer Sister      multiple myloma     Obesity Son      Obesity Son      Hypertension Son      Melanoma No family hx of        Social History     Social History     Marital status:      Spouse name: N/A     Number of children: N/A     Years of education: N/A     Occupational History     Not on file.     Social History Main Topics     Smoking status: Never  Smoker     Smokeless tobacco: Never Used     Alcohol use Yes      Comment: THREE- FOUR TIMES PER YEAR, SOCIALLY     Drug use: No     Sexual activity: Yes     Partners: Male     Birth control/ protection: Surgical     Other Topics Concern     Parent/Sibling W/ Cabg, Mi Or Angioplasty Before 65f 55m? No     Social History Narrative       Outpatient Encounter Prescriptions as of 8/10/2018   Medication Sig Dispense Refill     ASPIRIN 81 MG OR TABS 1 TABLET DAILY       lisinopril-hydrochlorothiazide (PRINZIDE/ZESTORETIC) 20-25 MG per tablet Take 1 tablet by mouth every morning 90 tablet 3     omeprazole (PRILOSEC) 20 MG capsule Take 1 capsule by mouth every morning.       order for DME Equipment being ordered: Dynaflex insert 2 Units 0     pravastatin (PRAVACHOL) 40 MG tablet Take 1 tablet (40 mg) by mouth daily 90 tablet 3     tacrolimus (PROTOPIC) 0.1 % ointment Apply twice daily affected areas on body. 30 g 3     terazosin (HYTRIN) 5 MG capsule Take 1 capsule (5 mg) by mouth daily 90 capsule 3     mometasone-formoterol (DULERA) 200-5 MCG/ACT oral inhaler Inhale 1 puff into the lungs 2 times daily Only using at the start of URI. (Patient not taking: Reported on 5/9/2018) 1 Inhaler 2     No facility-administered encounter medications on file as of 8/10/2018.              Review Of Systems  Skin: As above  Eyes: negative  Ears/Nose/Throat: negative  Respiratory: No shortness of breath, dyspnea on exertion, cough, or hemoptysis  Cardiovascular: negative  Gastrointestinal: negative  Genitourinary: negative  Musculoskeletal: negative  Neurologic: negative  Psychiatric: negative  Hematologic/Lymphatic/Immunologic: negative  Endocrine: negative      O:   NAD, WDWN, Alert & Oriented, Mood & Affect wnl, Vitals stable   Here today alone   /70  Pulse 79  SpO2 98%   General appearance normal   Vitals stable   Alert, oriented and in no acute distress      Pink scaly thin nummular plaques on extremities, torso, and right  temporal scalp       Eyes: Conjunctivae/lids:Normal     ENT: Lips: normal    MSK:Normal    Pulm: Breathing Normal    Neuro/Psych: Orientation:Normal; Mood/Affect:Normal  A/P:  1. R/O nummular eczema vs psoriasis vs contact dermatitis other   Biopsied left lateral calf   TANGENTIAL BIOPSY SENT OUT:  After consent, anesthesia with LEC and prep, tangential excision performed and specimen sent out for permanent section histology.  No complications and routine wound care. Patient told to call our office in 1-2 weeks for result.      Take allegra or claritin for itching since zyrtec is too sedating.   Continue protopic as needed.   Skin care regimen reviewed with patient: Eliminate harsh soaps, i.e. Dial, zest, irsih spring; Mild soaps such as Cetaphil or Dove sensitive skin, avoid hot or cold showers, aggressive use of emollients including vanicream, cetaphil or cerave discussed with patient.     Return to clinic pending biopsy results.   Consider NB-UVB therapy.       Again, thank you for allowing me to participate in the care of your patient.        Sincerely,        Radha Dugan PA-C

## 2018-08-10 NOTE — MR AVS SNAPSHOT
After Visit Summary   8/10/2018    Mirta Cardenas    MRN: [id number]           Patient Information     Date Of Birth          1955        Visit Information        Provider Department      8/10/2018 10:20 AM Radha Matias PA-C [facility]        Care Instructions          Wound Care Instructions     FOR SUPERFICIAL WOUNDS     [facility] [phone]    [facility] [phone]                       AFTER 24 HOURS YOU SHOULD REMOVE THE BANDAGE AND BEGIN DAILY DRESSING CHANGES AS FOLLOWS:     1) Remove Dressing.     2) Clean and dry the area with tap water using a Q-tip or sterile gauze pad.     3) Apply Vaseline, Aquaphor, Polysporin ointment or Bacitracin ointment over entire wound.  Do NOT use Neosporin ointment.     4) Cover the wound with a band-aid, or a sterile non-stick gauze pad and micropore paper tape      REPEAT THESE INSTRUCTIONS AT LEAST ONCE A DAY UNTIL THE WOUND HAS COMPLETELY HEALED.    It is an old wives tale that a wound heals better when it is exposed to air and allowed to dry out. The wound will heal faster with a better cosmetic result if it is kept moist with ointment and covered with a bandage.    **Do not let the wound dry out.**      Supplies Needed:      *Cotton tipped applicators (Q-tips)    *Polysporin Ointment or Bacitracin Ointment (NOT NEOSPORIN)    *Band-aids or non-stick gauze pads and micropore paper tape.      PATIENT INFORMATION:    During the healing process you will notice a number of changes. All wounds develop a small halo of redness surrounding the wound.  This means healing is occurring. Severe itching with extensive redness usually indicates sensitivity to the ointment or bandage tape used to dress the wound.  You should call our office if this develops.      Swelling  and/or discoloration around your surgical site is common, particularly when performed around the eye.    All wounds normally drain.  The  larger the wound the more drainage there will be.  After 7-10 days, you will notice the wound beginning to shrink and new skin will begin to grow.  The wound is healed when you can see skin has formed over the entire area.  A healed wound has a healthy, shiny look to the surface and is red to dark pink in color to normalize.  Wounds may take approximately 4-6 weeks to heal.  Larger wounds may take 6-8 weeks.  After the wound is healed you may discontinue dressing changes.    You may experience a sensation of tightness as your wound heals. This is normal and will gradually subside.    Your healed wound may be sensitive to temperature changes. This sensitivity improves with time, but if you re having a lot of discomfort, try to avoid temperature extremes.    Patients frequently experience itching after their wound appears to have healed because of the continue healing under the skin.  Plain Vaseline will help relieve the itching.        POSSIBLE COMPLICATIONS    BLEEDIN. Leave the bandage in place.  2. Use tightly rolled up gauze or a cloth to apply direct pressure over the bandage for 30  minutes.  3. Reapply pressure for an additional 30 minutes if necessary  4. Use additional gauze and tape to maintain pressure once the bleeding has stopped.            Follow-ups after your visit        Your next 10 appointments already scheduled     Aug 22, 2018  9:45 AM CDT   MA SCREENING DIGITAL BILATERAL with NBMA[facility]   [facility]    [address] [place] [id number]   [phone]           Do not use any powder, lotion or deodorant under your arms or on your breast. If you do, we will ask you to remove it before your exam.  Wear comfortable, two-piece clothing.  If you have any allergies, tell your care team.  Bring any previous mammograms from other facilities or have them mailed to the breast center.              Future tests that were ordered for you today      Open Future Orders        Priority Expected Expires Ordered    CBC with platelets differential Routine 11/8/2018 2/7/2019 8/9/2018            Who to contact     If you have questions or need follow up information about today's clinic visit or your schedule please contact [facility] directly at [phone].  Normal or non-critical lab and imaging results will be communicated to you by MyChart, letter or phone within 4 business days after the clinic has received the results. If you do not hear from us within 7 days, please contact the clinic through MyChart or phone. If you have a critical or abnormal lab result, we will notify you by phone as soon as possible.  Submit refill requests through [employer] or call your pharmacy and they will forward the refill request to us. Please allow 3 business days for your refill to be completed.          Additional Information About Your Visit        MyChart Information     [employer] gives you secure access to your electronic health record. If you see a primary care provider, you can also send messages to your care team and make appointments. If you have questions, please call your primary care clinic.  If you do not have a primary care provider, please call [phone] and they will assist you.        Care EveryWhere ID     This is your Care EveryWhere ID. This could be used by other organizations to access your [place] medical records  [id number]        Your Vitals Were     Pulse Pulse Oximetry                79 98%           Blood Pressure from Last 3 Encounters:   08/10/18 124/70   08/09/18 92/58   05/09/18 116/67    Weight from Last 3 Encounters:   08/09/18 84.8 kg (187 lb)   02/22/18 83.9 kg (185 lb)   12/15/17 84.2 kg (185 lb 9.6 oz)              Today, you had the following     No orders found for display       Primary Care Provider Office Phone # Fax #    Neal Mosquera [phone] [address] [address]         Equal Access to Services     [facility] : Hadii tamiko riggins joseroseann Chapin, waaxda luqadaha, qaybta kaalmagerhard jackson. So [facility] [phone].    ATENCIÓN: Si habla español, tiene a cage disposición servicios gratuitos de asistencia lingüística. Aislinname al [phone].    We comply with applicable federal civil rights laws and [place] laws. We do not discriminate on the basis of race, color, national origin, age, disability, sex, sexual orientation, or gender identity.            Thank you!     Thank you for choosing [facility]  for your care. Our goal is always to provide you with excellent care. Hearing back from our patients is one way we can continue to improve our services. Please take a few minutes to complete the written survey that you may receive in the mail after your visit with us. Thank you!             Your Updated Medication List - Protect others around you: Learn how to safely use, store and throw away your medicines at www.disposemymeds.org.          This list is accurate as of 8/10/18 10:59 AM.  Always use your most recent med list.                   Brand Name Dispense Instructions for use Diagnosis    aspirin 81 MG tablet      1 TABLET DAILY        lisinopril-hydrochlorothiazide 20-25 MG per tablet    PRINZIDE/ZESTORETIC    90 tablet    Take 1 tablet by mouth every morning    Benign essential hypertension       mometasone-formoterol 200-5 MCG/ACT oral inhaler    DULERA    1 Inhaler    Inhale 1 puff into the lungs 2 times daily Only using at the start of URI.    Cough due to bronchospasm       order for DME     2 Units    Equipment being ordered: Dynaflex insert    Plantar fasciitis, bilateral       pravastatin 40 MG tablet    PRAVACHOL    90 tablet    Take 1 tablet (40 mg) by mouth daily    Hyperlipidemia LDL goal <100       priLOSEC 20 MG CR capsule   Generic drug:  omeprazole      Take 1 capsule by mouth every morning.        tacrolimus 0.1  % ointment    PROTOPIC    30 g    Apply twice daily affected areas on body.    Nummular eczema       terazosin 5 MG capsule    HYTRIN    90 capsule    Take 1 capsule (5 mg) by mouth daily    Benign essential hypertension

## 2018-08-10 NOTE — PATIENT INSTRUCTIONS
Wound Care Instructions     FOR SUPERFICIAL WOUNDS     [facility] [phone]    [facility] [phone]                       AFTER 24 HOURS YOU SHOULD REMOVE THE BANDAGE AND BEGIN DAILY DRESSING CHANGES AS FOLLOWS:     1) Remove Dressing.     2) Clean and dry the area with tap water using a Q-tip or sterile gauze pad.     3) Apply Vaseline, Aquaphor, Polysporin ointment or Bacitracin ointment over entire wound.  Do NOT use Neosporin ointment.     4) Cover the wound with a band-aid, or a sterile non-stick gauze pad and micropore paper tape      REPEAT THESE INSTRUCTIONS AT LEAST ONCE A DAY UNTIL THE WOUND HAS COMPLETELY HEALED.    It is an old wives tale that a wound heals better when it is exposed to air and allowed to dry out. The wound will heal faster with a better cosmetic result if it is kept moist with ointment and covered with a bandage.    **Do not let the wound dry out.**      Supplies Needed:      *Cotton tipped applicators (Q-tips)    *Polysporin Ointment or Bacitracin Ointment (NOT NEOSPORIN)    *Band-aids or non-stick gauze pads and micropore paper tape.      PATIENT INFORMATION:    During the healing process you will notice a number of changes. All wounds develop a small halo of redness surrounding the wound.  This means healing is occurring. Severe itching with extensive redness usually indicates sensitivity to the ointment or bandage tape used to dress the wound.  You should call our office if this develops.      Swelling  and/or discoloration around your surgical site is common, particularly when performed around the eye.    All wounds normally drain.  The larger the wound the more drainage there will be.  After 7-10 days, you will notice the wound beginning to shrink and new skin will begin to grow.  The wound is healed when you can see skin has formed over the entire area.  A healed wound has a healthy, shiny look to the surface and is red to dark pink in color  to normalize.  Wounds may take approximately 4-6 weeks to heal.  Larger wounds may take 6-8 weeks.  After the wound is healed you may discontinue dressing changes.    You may experience a sensation of tightness as your wound heals. This is normal and will gradually subside.    Your healed wound may be sensitive to temperature changes. This sensitivity improves with time, but if you re having a lot of discomfort, try to avoid temperature extremes.    Patients frequently experience itching after their wound appears to have healed because of the continue healing under the skin.  Plain Vaseline will help relieve the itching.        POSSIBLE COMPLICATIONS    BLEEDIN. Leave the bandage in place.  2. Use tightly rolled up gauze or a cloth to apply direct pressure over the bandage for 30  minutes.  3. Reapply pressure for an additional 30 minutes if necessary  4. Use additional gauze and tape to maintain pressure once the bleeding has stopped.

## 2018-08-10 NOTE — NURSING NOTE
"Initial /70  Pulse 79  SpO2 98% Estimated body mass index is 35.33 kg/(m^2) as calculated from the following:    Height as of 8/9/18: 1.549 m (5' 1\").    Weight as of 8/9/18: 84.8 kg (187 lb). .    Smiley Cortez LPN    "

## 2018-08-13 NOTE — PROGRESS NOTES
Mirta Cardenas is a 63 year old year old female patient here today for recheck dermatitis. Patient reports some areas resolved but others continue to show up on torso, extremities and face. She reports that it is itchy. She is using protopic. Patient is using cetaphil moisturizer. Patient has no other skin complaints today.  Remainder of the HPI, Meds, PMH, Allergies, FH, and SH was reviewed in chart.  Past Medical History:   Diagnosis Date     Asthma      Blood transfusion      Hypertension      Malignant neoplasm (H)     CLL       Past Surgical History:   Procedure Laterality Date     C/SECTION, CLASSICAL  ,,    , Classical     HYSTERECTOMY, PAP NO LONGER INDICATED       RELEASE CARPAL TUNNEL  2012    Procedure:RELEASE CARPAL TUNNEL; Right Carpal Tunnel Release & Right Ring A1 Pulley Release; Surgeon:SERGEY HEATON; Location:[facility]     RELEASE TRIGGER FINGER  2012    Procedure:RELEASE TRIGGER FINGER; Surgeon:SERGEY HEATON; Location:[facility]     RELEASE TRIGGER FINGER  10/12/2012    Procedure: RELEASE TRIGGER FINGER;  Right Thumb A1 Pulley Release;  Surgeon: Sergey Heaton MD;  Location: [facility]     SALPINGO OOPHORECTOMY,R/L/ULICES  2008    right        Family History   Problem Relation Age of Onset     Hypertension Brother      HEART DISEASE Brother 65     bypass     Cancer Sister      multiple myloma     Obesity Son      Obesity Son      Hypertension Son      Melanoma No family hx of        Social History     Social History     Marital status:      Spouse name: N/A     Number of children: N/A     Years of education: N/A     Occupational History     Not on file.     Social History Main Topics     Smoking status: Never Smoker     Smokeless tobacco: Never Used     Alcohol use Yes      Comment: THREE- FOUR TIMES PER YEAR, SOCIALLY     Drug use: No     Sexual activity: Yes     Partners: Male     Birth control/ protection: Surgical     Other Topics Concern      Parent/Sibling W/ Cabg, Mi Or Angioplasty Before 65f 55m? No     Social History Narrative       Outpatient Encounter Prescriptions as of 8/10/2018   Medication Sig Dispense Refill     ASPIRIN 81 MG OR TABS 1 TABLET DAILY       lisinopril-hydrochlorothiazide (PRINZIDE/ZESTORETIC) 20-25 MG per tablet Take 1 tablet by mouth every morning 90 tablet 3     omeprazole (PRILOSEC) 20 MG capsule Take 1 capsule by mouth every morning.       order for DME Equipment being ordered: Dynaflex insert 2 Units 0     pravastatin (PRAVACHOL) 40 MG tablet Take 1 tablet (40 mg) by mouth daily 90 tablet 3     tacrolimus (PROTOPIC) 0.1 % ointment Apply twice daily affected areas on body. 30 g 3     terazosin (HYTRIN) 5 MG capsule Take 1 capsule (5 mg) by mouth daily 90 capsule 3     mometasone-formoterol (DULERA) 200-5 MCG/ACT oral inhaler Inhale 1 puff into the lungs 2 times daily Only using at the start of URI. (Patient not taking: Reported on 5/9/2018) 1 Inhaler 2     No facility-administered encounter medications on file as of 8/10/2018.              Review Of Systems  Skin: As above  Eyes: negative  Ears/Nose/Throat: negative  Respiratory: No shortness of breath, dyspnea on exertion, cough, or hemoptysis  Cardiovascular: negative  Gastrointestinal: negative  Genitourinary: negative  Musculoskeletal: negative  Neurologic: negative  Psychiatric: negative  Hematologic/Lymphatic/Immunologic: negative  Endocrine: negative      O:   NAD, WDWN, Alert & Oriented, Mood & Affect wnl, Vitals stable   Here today alone   /70  Pulse 79  SpO2 98%   General appearance normal   Vitals stable   Alert, oriented and in no acute distress      Pink scaly thin nummular plaques on extremities, torso, and right temporal scalp       Eyes: Conjunctivae/lids:Normal     ENT: Lips: normal    MSK:Normal    Pulm: Breathing Normal    Neuro/Psych: Orientation:Normal; Mood/Affect:Normal  A/P:  1. R/O nummular eczema vs psoriasis vs contact dermatitis other    Biopsied left lateral calf   TANGENTIAL BIOPSY SENT OUT:  After consent, anesthesia with LEC and prep, tangential excision performed and specimen sent out for permanent section histology.  No complications and routine wound care. Patient told to call our office in 1-2 weeks for result.      Take allegra or claritin for itching since zyrtec is too sedating.   Continue protopic as needed.   Skin care regimen reviewed with patient: Eliminate harsh soaps, i.e. Dial, zest, irsih spring; Mild soaps such as Cetaphil or Dove sensitive skin, avoid hot or cold showers, aggressive use of emollients including vanicream, cetaphil or cerave discussed with patient.     Return to clinic pending biopsy results.   Consider NB-UVB therapy.

## 2018-08-17 LAB — COPATH REPORT: NORMAL

## 2018-08-27 ENCOUNTER — ALLIED HEALTH/NURSE VISIT (OUTPATIENT)
Dept: FAMILY MEDICINE | Facility: CLINIC | Age: 63
End: 2018-08-27
Payer: COMMERCIAL

## 2018-08-27 ENCOUNTER — TELEPHONE (OUTPATIENT)
Dept: FAMILY MEDICINE | Facility: CLINIC | Age: 63
End: 2018-08-27

## 2018-08-27 VITALS — HEART RATE: 80 BPM | SYSTOLIC BLOOD PRESSURE: 118 MMHG | DIASTOLIC BLOOD PRESSURE: 60 MMHG

## 2018-08-27 DIAGNOSIS — I10 BENIGN ESSENTIAL HYPERTENSION: Primary | ICD-10-CM

## 2018-08-27 PROCEDURE — 99207 ZZC NO CHARGE NURSE ONLY: CPT

## 2018-08-27 NOTE — NURSING NOTE
Mirta Cardenas is a 63 year old year old patient who comes in today for a Blood Pressure check because of ongoing blood pressure monitoring.  Vital Signs as repeated by RN   Patient is taking medication as prescribed  Patient is tolerating medications well.  Patient is not monitoring Blood Pressure at home.    Current complaints: none    Disposition:  States her BP was low at her last clinic visit with her PCP so she was advised to have it checked a few times to see if she needed to reduce or stop one of her BP meds. She feels fine at her current doses.

## 2018-08-27 NOTE — MR AVS SNAPSHOT
After Visit Summary   8/27/2018    Mirta Cardenas    MRN: [id number]           Patient Information     Date Of Birth          1955        Visit Information        Provider Department      8/27/2018 9:00 AM [place] RN [facility]        Today's Diagnoses     Benign essential hypertension    -  1       Follow-ups after your visit        Your next 10 appointments already scheduled     Aug 28, 2018  9:00 AM CDT   Return Visit with [facility]   [facility] ([facility]    [address] [address] [phone]              Who to contact     If you have questions or need follow up information about today's clinic visit or your schedule [facility] at [phone].  Normal or non-critical lab and imaging results will be communicated to you by MyChart, letter or phone within 4 business days after the clinic has received the results. If you do not hear from us within 7 days, please contact the clinic through MyChart or phone. If you have a critical or abnormal lab result, we will notify you by phone as soon as possible.  Submit refill requests through Casenet or call your pharmacy and they will forward the refill request to us. Please allow 3 business days for your refill to be completed.          Additional Information About Your Visit        MyChart Information     Casenet gives you secure access to your electronic health record. If you see a primary care provider, you can also send messages to your care team and make appointments. If you have questions, please call your primary care clinic.  If you do not have a primary care provider, please call [phone] and they will assist you.        Care EveryWhere ID     This is your Care EveryWhere ID. This could be used by other organizations to access your [place] medical records  [id number]        Your Vitals Were     Pulse                    80            Blood Pressure from Last 3 Encounters:   08/27/18 118/60   08/10/18 124/70   08/09/18 92/58    Weight from Last 3 Encounters:   08/09/18 187 lb (84.8 kg)   02/22/18 185 lb (83.9 kg)   12/15/17 185 lb 9.6 oz (84.2 kg)              Today, you had the following     No orders found for display       Primary Care Provider Office Phone # Fax #    Neal Mosquera [phone] [address] [address]        Equal Access to Services     [facility] : Hadii tamiko riggins hadmeryl Sohussein, waaxda lustarr, qaybta kaalmada elaine, gerhard salazar . So [facility] [phone].    ATENCIÓN: Si habla español, tiene a cage disposición servicios gratuitos de asistencia lingüística. [facility] [phone].    We comply with applicable federal civil rights laws and [place] laws. We do not discriminate on the basis of race, color, national origin, age, disability, sex, sexual orientation, or gender identity.            Thank you!     Thank you for choosing [facility]  for your care. Our goal is always to provide you with excellent care. Hearing back from our patients is one way we can continue to improve our services. Please take a few minutes to complete the written survey that you may receive in the mail after your visit with us. Thank you!             Your Updated Medication List - Protect others around you: Learn how to safely use, store and throw away your medicines at www.disposemymeds.org.          This list is accurate as of 8/27/18  9:14 AM.  Always use your most recent med list.                   Brand Name Dispense Instructions for use Diagnosis    aspirin 81 MG tablet      1 TABLET DAILY        lisinopril-hydrochlorothiazide 20-25 MG per tablet    PRINZIDE/ZESTORETIC    90 tablet    Take 1 tablet by mouth every morning    Benign essential hypertension       mometasone-formoterol 200-5 MCG/ACT oral inhaler    DULERA    1 Inhaler     Inhale 1 puff into the lungs 2 times daily Only using at the start of URI.    Cough due to bronchospasm       order for DME     2 Units    Equipment being ordered: Dynaflex insert    Plantar fasciitis, bilateral       pravastatin 40 MG tablet    PRAVACHOL    90 tablet    Take 1 tablet (40 mg) by mouth daily    Hyperlipidemia LDL goal <100       priLOSEC 20 MG CR capsule   Generic drug:  omeprazole      Take 1 capsule by mouth every morning.        tacrolimus 0.1 % ointment    PROTOPIC    30 g    Apply twice daily affected areas on body.    Nummular eczema       terazosin 5 MG capsule    HYTRIN    90 capsule    Take 1 capsule (5 mg) by mouth daily    Benign essential hypertension

## 2018-08-28 ENCOUNTER — OFFICE VISIT (OUTPATIENT)
Dept: DERMATOLOGY | Facility: CLINIC | Age: 63
End: 2018-08-28
Payer: COMMERCIAL

## 2018-08-28 DIAGNOSIS — L40.9 PSORIASIS: Primary | ICD-10-CM

## 2018-08-28 PROCEDURE — 96910 PHOTCHMTX TAR&UVB/PTRLTM&UVB: CPT

## 2018-08-28 NOTE — MR AVS SNAPSHOT
After Visit Summary   8/28/2018    Mirta Cardenas    MRN: [id number]           Patient Information     Date Of Birth          1955        Visit Information        Provider Department      8/28/2018 9:00 AM [place] [facility]         Follow-ups after your visit        Your next 10 appointments already scheduled     Aug 30, 2018  4:30 PM CDT   Return Visit with [place] [facility]   [facility]    [address] [address]   [phone]            Sep 04, 2018  4:15 PM CDT   [facility] Visit with [place] [facility]   [facility]    [address] [address]   [phone]            Sep 06, 2018  4:15 PM CDT   [facility] Visit with [place] [facility]   [facility]    [address] [address]   [phone]              Who to contact     If you have questions or need follow up information about today's clinic visit or your schedule please contact [facility] directly at [phone].  Normal or non-critical lab and imaging results will be communicated to you by MyChart, letter or phone within 4 business days after the clinic has received the results. If you do not hear from us within 7 days, please contact the clinic through SinoHubhart or phone. If you have a critical or abnormal lab result, we will notify you by phone as soon as possible.  Submit refill requests through [email or web address] or call your pharmacy and they will forward the refill request to us. Please allow 3 business days for your refill to be completed.          Additional Information About Your Visit        SinoHubhart Information     [email or web address] gives you secure access to your electronic health record. If you see a primary care provider, you can also send messages to your care team and make appointments. If you have questions, please call your  primary care clinic.  If you do not have a primary care provider, please call [phone] and they will assist you.        Care EveryWhere ID     This is your Care EveryWhere ID. This could be used by other organizations to access your [place] medical records  [id number]         Blood Pressure from Last 3 Encounters:   08/27/18 118/60   08/10/18 124/70   08/09/18 92/58    Weight from Last 3 Encounters:   08/09/18 84.8 kg (187 lb)   02/22/18 83.9 kg (185 lb)   12/15/17 84.2 kg (185 lb 9.6 oz)              Today, you had the following     No orders found for display       Primary Care Provider Office Phone # Fax #    Neal Mosquera [phone] [address] [address]        Equal Access to Services     JOSÉ MANUEL LIZARRAGA : Hadii tamiko riggins hadasho Sohussein, waaxda luqadaha, qaybta kaalmada adeegyada, gerhard monahan haysarath salazar . So [facility] [phone].    ATENCIÓN: Si habla español, tiene a cage disposición servicios gratuitos de asistencia lingüística. Llame al [phone].    We comply with applicable federal civil rights laws and [place] laws. We do not discriminate on the basis of race, color, national origin, age, disability, sex, sexual orientation, or gender identity.            Thank you!     Thank you for choosing [facility]  for your care. Our goal is always to provide you with excellent care. Hearing back from our patients is one way we can continue to improve our services. Please take a few minutes to complete the written survey that you may receive in the mail after your visit with us. Thank you!             Your Updated Medication List - Protect others around you: Learn how to safely use, store and throw away your medicines at www.disposemymeds.org.          This list is accurate as of 8/28/18  9:43 AM.  Always use your most recent med list.                   Brand Name Dispense Instructions for use Diagnosis    aspirin 81 MG tablet      1  TABLET DAILY        lisinopril-hydrochlorothiazide 20-25 MG per tablet    PRINZIDE/ZESTORETIC    90 tablet    Take 1 tablet by mouth every morning    Benign essential hypertension       mometasone-formoterol 200-5 MCG/ACT oral inhaler    DULERA    1 Inhaler    Inhale 1 puff into the lungs 2 times daily Only using at the start of URI.    Cough due to bronchospasm       order for DME     2 Units    Equipment being ordered: Dynaflex insert    Plantar fasciitis, bilateral       pravastatin 40 MG tablet    PRAVACHOL    90 tablet    Take 1 tablet (40 mg) by mouth daily    Hyperlipidemia LDL goal <100       priLOSEC 20 MG CR capsule   Generic drug:  omeprazole      Take 1 capsule by mouth every morning.        tacrolimus 0.1 % ointment    PROTOPIC    30 g    Apply twice daily affected areas on body.    Nummular eczema       terazosin 5 MG capsule    HYTRIN    90 capsule    Take 1 capsule (5 mg) by mouth daily    Benign essential hypertension

## 2018-08-28 NOTE — PROGRESS NOTES
Patient is here today to start NB-UVB treatment for psoriasis. Patient continue to have flares with topical treatments.   NB-UVB treatment for psoriasis   Treatment # 1  No issues   photoproection on eyes, lips, nipples  250mj 38 seconds today  Mineral oil applied  Goal 3 times weekly      No complications      NARROWBAND ULTRAVIOLET B (NBUVB) CONSENT FORM  Narrowband ultraviolet B (NB UVB) is a type of phototherapy (light treatment) used to treat various skin conditions, including psoriasis, atopic dermatitis (eczema), and itching. This treatment exposes your skin to ultraviolet (UV) light for varying lengths of time. Possible benefits include improvement of existing lesions and reduction of new lesions. NB UVB will not lead to a permanent cure, but can effectively control or improve your condition, sometimes over extended periods of time.   Each patient will vary in the number of treatments required per week and the time it will take to reach significant improvement or clearance. Most patients initially require 3 treatments per week. Typically, treatments start with only a few seconds of UV light exposure and gradually increase as determined by your provider. It may take 15 to 25 treatments or longer to improve your condition. Not all patients will respond quickly or clear completely. However, in many cases, NBUVB has resulted in near-total clearing or remission.   The possible risks and side effects of NB UVB are:       Sunburn or blistering. This may occur at any time during therapy. Certain medications may also cause you to sunburn. Please inform us of any medications you are taking, especially new medications during your treatment.      Theoretical increased risk of skin cancer. However, this has not been demonstrated in many studies with psoriasis patients and UVB treatment.       Dryness and itching.       Skin aging, including an increase in freckling, wrinkles, and dark spots.       Eye damage and  cataracts. This is preventable with required protective goggles worn during treatments.       Increased frequency of cold sores (herpes labialis). If you have a history of cold sores, apply sunscreen on your lips to reduce the risk of an outbreak.       Increase in genital cancer in men with long-term UVB exposure (>300 treatments). This risk is decreased with use of a shield on the genital area.       Worsening of other medical conditions, such as lupus erythematosus or other sun-sensitive conditions.     I have fully read and understand the above information. I have discussed the nature of the proposed treatment, as well as treatment alternatives, with my provider. I understand that no one completely knows the long-term effects of NBUVB. I authorize my provider to prescribe NBUVB. This authorization also extends to my provider s associates to carry out treatment. I understand that I am free to withdraw my consent and stop treatment at any time.

## 2018-08-29 ENCOUNTER — ALLIED HEALTH/NURSE VISIT (OUTPATIENT)
Dept: FAMILY MEDICINE | Facility: CLINIC | Age: 63
End: 2018-08-29
Payer: COMMERCIAL

## 2018-08-29 DIAGNOSIS — I10 BENIGN ESSENTIAL HYPERTENSION: Primary | ICD-10-CM

## 2018-08-29 PROCEDURE — 99207 ZZC NO CHARGE NURSE ONLY: CPT

## 2018-08-29 NOTE — TELEPHONE ENCOUNTER
Patient comes in as walk in and she is given the message from Dr. Mosquera to remain on same medications. Dahiana SOTO RN

## 2018-08-29 NOTE — MR AVS SNAPSHOT
After Visit Summary   8/29/2018    Mirta Cardenas    MRN: [id number]           Patient Information     Date Of Birth          1955        Visit Information        Provider Department      8/29/2018 9:15 AM ALEXANDRO POWELL/MARCELL MUELLER [facility]        Today's Diagnoses     Benign essential hypertension    -  1       Follow-ups after your visit        Your next 10 appointments already scheduled     Aug 30, 2018  4:30 PM CDT   [place] [facility] [place] [facility]   [facility]    [address] [address]   [phone]            Sep 04, 2018  4:15 PM CDT   [place] [facility] [place] [facility]   [facility]    [address] [address]   [phone]            Sep 06, 2018  4:15 PM CDT   [place] Visit [facility] [place] [facility]   [facility]    [address] [address]   [phone]              Who to contact     If you have questions or need follow up information about today's clinic visit or your schedule please contact [facility] directly at [phone].  Normal or non-critical lab and imaging results will be communicated to you by [employer], letter or phone within 4 business days after the clinic has received the results. If you do not hear from us within 7 days, please contact the clinic through [employer] or phone. If you have a critical or abnormal lab result, we will notify you by phone as soon as possible.  Submit refill requests through CatchSquare or call your pharmacy and they will forward the refill request to us. Please allow 3 business days for your refill to be completed.          Additional Information About Your Visit        [employer] Information     [employer] gives you secure access to your electronic health record. If you see a primary care provider, you can also send messages to your care team  and make appointments. If you have questions, please call your primary care clinic.  If you do not have a primary care provider, please call [phone] and they will assist you.        Care EveryWhere ID     This is your Care EveryWhere ID. This could be used by other organizations to access your [place] medical records  [id number]         Blood Pressure from Last 3 Encounters:   08/27/18 118/60   08/10/18 124/70   08/09/18 92/58    Weight from Last 3 Encounters:   08/09/18 187 lb (84.8 kg)   02/22/18 185 lb (83.9 kg)   12/15/17 185 lb 9.6 oz (84.2 kg)              Today, you had the following     No orders found for display       Primary Care Provider Office Phone # Fax #    Neal Mosquera [phone] [address] [address]        Equal Access to Services     JOSÉ MANUEL LIZARRAGA : Hadii aad ku hadasho Soomaali, waaxda luqadaha, qaybta kaalmada adeegyada, waxay rudiin hayilann aida salazar . So [facility] [phone].    ATENCIÓN: Si habla español, tiene a cage disposición servicios gratuitos de asistencia lingüística. [facility]ron al [phone].    We comply with applicable federal civil rights laws and [place] laws. We do not discriminate on the basis of race, color, national origin, age, disability, sex, sexual orientation, or gender identity.            Thank you!     Thank you for choosing [facility]  for your care. Our goal is always to provide you with excellent care. Hearing back from our patients is one way we can continue to improve our services. Please take a few minutes to complete the written survey that you may receive in the mail after your visit with us. Thank you!             Your Updated Medication List - Protect others around you: Learn how to safely use, store and throw away your medicines at www.disposemymeds.org.          This list is accurate as of 8/29/18  9:23 AM.  Always use your most recent med list.                   Brand Name Dispense  Instructions for use Diagnosis    aspirin 81 MG tablet      1 TABLET DAILY        lisinopril-hydrochlorothiazide 20-25 MG per tablet    PRINZIDE/ZESTORETIC    90 tablet    Take 1 tablet by mouth every morning    Benign essential hypertension       mometasone-formoterol 200-5 MCG/ACT oral inhaler    DULERA    1 Inhaler    Inhale 1 puff into the lungs 2 times daily Only using at the start of URI.    Cough due to bronchospasm       order for DME     2 Units    Equipment being ordered: Dynaflex insert    Plantar fasciitis, bilateral       pravastatin 40 MG tablet    PRAVACHOL    90 tablet    Take 1 tablet (40 mg) by mouth daily    Hyperlipidemia LDL goal <100       priLOSEC 20 MG CR capsule   Generic drug:  omeprazole      Take 1 capsule by mouth every morning.        tacrolimus 0.1 % ointment    PROTOPIC    30 g    Apply twice daily affected areas on body.    Nummular eczema       terazosin 5 MG capsule    HYTRIN    90 capsule    Take 1 capsule (5 mg) by mouth daily    Benign essential hypertension

## 2018-08-30 ENCOUNTER — OFFICE VISIT (OUTPATIENT)
Dept: DERMATOLOGY | Facility: CLINIC | Age: 63
End: 2018-08-30
Payer: COMMERCIAL

## 2018-08-30 DIAGNOSIS — L40.9 PSORIASIS: Primary | ICD-10-CM

## 2018-08-30 PROCEDURE — 96910 PHOTCHMTX TAR&UVB/PTRLTM&UVB: CPT | Performed by: PHYSICIAN ASSISTANT

## 2018-08-30 NOTE — PROGRESS NOTES
Here today for NBUVB for psoriasis  Treatment # 2  No issues   photoproection on eyes, lips, nipples  275 mj 43 seconds today  Mineral oil applied  Goal 3 times weekly

## 2018-08-30 NOTE — LETTER
8/30/2018         RE: Mirta Cardenas  [address] [address]        Dear Colleague,    Thank you for referring your patient, Mirta Cardenas, to the [facility]. Please see a copy of my visit note below.    Here today for NBUVB for psoriasis  Treatment # 2  No issues   photoproection on eyes, lips, nipples  275 mj 43 seconds today  Mineral oil applied  Goal 3 times weekly    Again, thank you for allowing me to participate in the care of your patient.        Sincerely,        Hailee Gale PA-C

## 2018-08-30 NOTE — MR AVS SNAPSHOT
After Visit Summary   8/30/2018    Mirta Cardenas    MRN: [id number]           Patient Information     Date Of Birth          1955        Visit Information        Provider Department      8/30/2018 4:30 PM Hailee Jay PA-C [facility]        Today's Diagnoses     Psoriasis    -  1       Follow-ups after your visit        Your next 10 appointments already scheduled     Sep 04, 2018  4:15 PM CDT   [place] with [place] [facility]   [facility] ([facility]    [address]  [facility] [facility] [place] [address]   [phone]            Sep 06, 2018  4:15 PM CDT   [place] Visit with [place]   [facility] ([facility]    [address]  [place] [address]   [phone]            Sep 10, 2018  4:15 PM CDT   [place]   [facility] ([facility]    [address]  [place] [address]   [phone]            Sep 13, 2018  4:15 PM CDT   [place]   [facility] ([facility]    [address]  [place] [address]   [phone]            Sep 17, 2018  4:15 PM CDT   [place]   [facility] ([facility]    [address]  [facility] [place] [address]   [phone]            Sep 20, 2018  4:15 PM CDT   [place] with [place]   [facility]    [address]  [facility] [place] [address]   [phone]            Sep 24, 2018  4:15 PM CDT   [place]   [facility] ([facility]    [address]  [place] [address]   [phone]            Sep 27, 2018  4:15 PM CDT   [place] Visit [place]   [facility] ([facility]    [address]  [place] [address]    [phone]              Who to contact     If you have questions or need follow up information about today's clinic visit or your schedule please contact [facility] directly at [phone].  Normal or non-critical lab and imaging results will be communicated to you by MyChart, letter or phone within 4 business days after the clinic has received the results. If you do not hear from us within 7 days, please contact the clinic through MyChart or phone. If you have a critical or abnormal lab result, we will notify you by phone as soon as possible.  Submit refill requests through OceanT[employer]er or call your pharmacy and they will forward the refill request to us. Please allow 3 business days for your refill to be completed.          Additional Information About Your Visit        ShineonharTakepin Information     OceanT[employer]er gives you secure access to your electronic health record. If you see a primary care provider, you can also send messages to your care team and make appointments. If you have questions, please call your primary care clinic.  If you do not have a primary care provider, please call [phone] and they will assist you.        Care EveryWhere ID     This is your Care EveryWhere ID. This could be used by other organizations to access your [place] medical records  [id number]         Blood Pressure from Last 3 Encounters:   08/27/18 118/60   08/10/18 124/70   08/09/18 92/58    Weight from Last 3 Encounters:   08/09/18 84.8 kg (187 lb)   02/22/18 83.9 kg (185 lb)   12/15/17 84.2 kg (185 lb 9.6 oz)              We Performed the Following     CHARGE - PHOTOCHEMOTHERAPY WITH UV-B        Primary Care Provider Office Phone # Fax #    Neal Mosquera [phone] [address] [facility] [address]        Equal Access to Services     JOSÉ MANUEL LIZARRAGA : hawk Barnard, gerhard gómez. So [facility]  [phone].    ATENCIÓN: Si eamon oliva, tiene a cage disposición servicios gratuitos de asistencia lingüística. Bayron jackson [phone].    We comply with applicable federal civil rights laws and [place] laws. We do not discriminate on the basis of race, color, national origin, age, disability, sex, sexual orientation, or gender identity.            Thank you!     Thank you for choosing [facility]  for your care. Our goal is always to provide you with excellent care. Hearing back from our patients is one way we can continue to improve our services. Please take a few minutes to complete the written survey that you may receive in the mail after your visit with us. Thank you!             Your Updated Medication List - Protect others around you: Learn how to safely use, store and throw away your medicines at www.disposemymeds.org.          This list is accurate as of 8/30/18  5:03 PM.  Always use your most recent med list.                   Brand Name Dispense Instructions for use Diagnosis    aspirin 81 MG tablet      1 TABLET DAILY        lisinopril-hydrochlorothiazide 20-25 MG per tablet    PRINZIDE/ZESTORETIC    90 tablet    Take 1 tablet by mouth every morning    Benign essential hypertension       mometasone-formoterol 200-5 MCG/ACT oral inhaler    DULERA    1 Inhaler    Inhale 1 puff into the lungs 2 times daily Only using at the start of URI.    Cough due to bronchospasm       order for DME     2 Units    Equipment being ordered: Dynaflex insert    Plantar fasciitis, bilateral       pravastatin 40 MG tablet    PRAVACHOL    90 tablet    Take 1 tablet (40 mg) by mouth daily    Hyperlipidemia LDL goal <100       priLOSEC 20 MG CR capsule   Generic drug:  omeprazole      Take 1 capsule by mouth every morning.        tacrolimus 0.1 % ointment    PROTOPIC    30 g    Apply twice daily affected areas on body.    Nummular eczema       terazosin 5 MG capsule    HYTRIN    90 capsule    Take 1 capsule (5  mg) by mouth daily    Benign essential hypertension

## 2018-09-04 ENCOUNTER — OFFICE VISIT (OUTPATIENT)
Dept: DERMATOLOGY | Facility: CLINIC | Age: 63
End: 2018-09-04
Payer: COMMERCIAL

## 2018-09-04 DIAGNOSIS — L40.9 PSORIASIS: Primary | ICD-10-CM

## 2018-09-04 PROCEDURE — 96910 PHOTCHMTX TAR&UVB/PTRLTM&UVB: CPT | Performed by: PHYSICIAN ASSISTANT

## 2018-09-04 NOTE — LETTER
9/4/2018         RE: Mirta Cardenas  [address] [facility] [address]        Dear Colleague,    Thank you for referring your patient, Mirta Cardenas, to the [facility]. Please see a copy of my visit note below.    Here today for NBUVB for psoriasis  Treatment # 3  No issues   photoproection on eyes, lips, nipples  303 mj 45 seconds today  Mineral oil applied  Goal 3 times weekly    Again, thank you for allowing me to participate in the care of your patient.        Sincerely,        Radha Dugan PA-C

## 2018-09-04 NOTE — MR AVS SNAPSHOT
"              After Visit Summary   9/4/2018    Mirta Cardenas    MRN: [id number]           Patient Information     Date Of Birth          1955        Visit Information        Provider Department      9/4/2018 4:15 PM Radha Matias PA-C [facility]        Today's Diagnoses     Psoriasis    -  1       Follow-ups after your visit        Your next 10 appointments already scheduled     Sep 10, 2018  4:15 PM CDT   Return Visit with [facility] PROC ROOM   [facility] ([facility])    [address] [address]   [phone]            Sep 13, 2018  3:30 PM CDT   [place] SCREENING DIGITAL BILATERAL with [facility] [facility] ([facility])    [address] [address]   [phone]           Do not use any powder, lotion or deodorant under your arms or on your breast. If you do, we will ask you to remove it before your exam.  Wear comfortable, two-piece clothing.  If you have any allergies, tell your care team.  Bring any previous mammograms from other facilities or have them mailed to the breast center. Three-dimensional (3D) mammograms are available at [place] locations in [facility], [facility], [facility], [place], [place], and [place]. [facility] locations include [place] and [facility] in [place]. Benefits of 3D mammograms include: - Improved rate of cancer detection - Decreases your chance of having to go back for more tests, which means fewer: - \"False-positive\" results (This means that there is an abnormal area but it isn't cancer.) - Invasive testing procedures, such as a biopsy or surgery - Can provide clearer images of the breast if you have dense breast tissue. 3D mammography is an optional exam that anyone can have with a 2D mammogram. It doesn't replace or take the place of a 2D mammogram. 2D mammograms remain an effective screening test for all women.  Not " all insurance companies cover the cost of a 3D mammogram. Check with your insurance.            Sep 13, 2018  4:15 PM CDT   [place] Visit with [place]   [facility]    [address]  [place] [address]   [phone]            Sep 17, 2018  4:15 PM CDT   [place] Visit with [place]   [facility])    [address]  [place] [address]   [phone]            Sep 20, 2018  4:15 PM CDT   [place] Visit with [place]   [facility] ([facility])    [address]  [place] [address]   [phone]            Sep 24, 2018  4:15 PM CDT   [place] Visit with [place]   [facility] ([facility])    [address] [facility] [address]   [phone]            Sep 27, 2018  4:15 PM CDT   [place] Visit with [place]   [facility] ([facility])    [address] [facility] [address]   [phone]              Who to contact     If you have questions or need follow up information about today's clinic visit or your schedule please contact [facility] directly at [phone].  Normal or non-critical lab and imaging results will be communicated to you by MyChart, letter or phone within 4 business days after the clinic has received the results. If you do not hear from us within 7 days, please contact the clinic through [employer] Services[employer] or phone. If you have a critical or abnormal lab result, we will notify you by phone as soon as possible.  Submit refill requests through PumpUp or call your pharmacy and they will forward the refill request to us. Please allow 3 business days for your refill to be completed.          Additional Information About Your Visit        MyChart Information     PumpUp gives you secure access to your electronic health record. If you see a primary  care provider, you can also send messages to your care team and make appointments. If you have questions, please call your primary care clinic.  If you do not have a primary care provider, please call [phone] and they will assist you.        Care EveryWhere ID     This is your Care EveryWhere ID. This could be used by other organizations to access your [place] medical records  [id number]         Blood Pressure from Last 3 Encounters:   08/27/18 118/60   08/10/18 124/70   08/09/18 92/58    Weight from Last 3 Encounters:   08/09/18 84.8 kg (187 lb)   02/22/18 83.9 kg (185 lb)   12/15/17 84.2 kg (185 lb 9.6 oz)              We Performed the Following     PHOTOCHEMOTHERAPY WITH UV-B        Primary Care Provider Office Phone # Fax #    Neal Mosquera [phone] [address] [facility] [address]        Equal Access to Services     JOSÉ MANUEL LIZARRAGA : Hadii aad ku hadasho Soomaali, waaxda luqadaha, qaybta kaalmada adeegyada, waxay idiin hayilann aida salazar . So [facility] [phone].    ATENCIÓN: Si habla español, tiene a cage disposición servicios gratuitos de asistencia lingüística. Llame al [phone].    We comply with applicable federal civil rights laws and [place] laws. We do not discriminate on the basis of race, color, national origin, age, disability, sex, sexual orientation, or gender identity.            Thank you!     Thank you for choosing [facility]  for your care. Our goal is always to provide you with excellent care. Hearing back from our patients is one way we can continue to improve our services. Please take a few minutes to complete the written survey that you may receive in the mail after your visit with us. Thank you!             Your Updated Medication List - Protect others around you: Learn how to safely use, store and throw away your medicines at www.disposemymeds.org.          This list is accurate as of 9/4/18 11:59 PM.  Always use your most  recent med list.                   Brand Name Dispense Instructions for use Diagnosis    aspirin 81 MG tablet      1 TABLET DAILY        lisinopril-hydrochlorothiazide 20-25 MG per tablet    PRINZIDE/ZESTORETIC    90 tablet    Take 1 tablet by mouth every morning    Benign essential hypertension       mometasone-formoterol 200-5 MCG/ACT oral inhaler    DULERA    1 Inhaler    Inhale 1 puff into the lungs 2 times daily Only using at the start of URI.    Cough due to bronchospasm       order for DME     2 Units    Equipment being ordered: Dynaflex insert    Plantar fasciitis, bilateral       pravastatin 40 MG tablet    PRAVACHOL    90 tablet    Take 1 tablet (40 mg) by mouth daily    Hyperlipidemia LDL goal <100       priLOSEC 20 MG CR capsule   Generic drug:  omeprazole      Take 1 capsule by mouth every morning.        tacrolimus 0.1 % ointment    PROTOPIC    30 g    Apply twice daily affected areas on body.    Nummular eczema       terazosin 5 MG capsule    HYTRIN    90 capsule    Take 1 capsule (5 mg) by mouth daily    Benign essential hypertension

## 2018-09-06 ENCOUNTER — OFFICE VISIT (OUTPATIENT)
Dept: DERMATOLOGY | Facility: CLINIC | Age: 63
End: 2018-09-06
Payer: COMMERCIAL

## 2018-09-06 DIAGNOSIS — L40.9 PSORIASIS: Primary | ICD-10-CM

## 2018-09-06 PROCEDURE — 96910 PHOTCHMTX TAR&UVB/PTRLTM&UVB: CPT | Performed by: PHYSICIAN ASSISTANT

## 2018-09-06 NOTE — LETTER
9/6/2018         RE: Mirta Cardenas  [address] [facility] [address]        Dear Colleague,    Thank you for referring your patient, Mirta Cardenas, to the [facility]. Please see a copy of my visit note below.    Here today for NBUVB for psoriasis  Treatment # 3  No issues   photoproection on eyes, lips, nipples  333 mj 49 seconds today  Mineral oil applied  Goal 3 times weekly    Again, thank you for allowing me to participate in the care of your patient.        Sincerely,        Radha Dugan PA-C

## 2018-09-06 NOTE — PROGRESS NOTES
Here today for NBUVB for psoriasis  Treatment # 3  No issues   photoproection on eyes, lips, nipples  333 mj 49 seconds today  Mineral oil applied  Goal 3 times weekly

## 2018-09-07 NOTE — PROGRESS NOTES
Here today for NBUVB for psoriasis  Treatment # 3  No issues   photoproection on eyes, lips, nipples  303 mj 45 seconds today  Mineral oil applied  Goal 3 times weekly

## 2018-09-10 ENCOUNTER — OFFICE VISIT (OUTPATIENT)
Dept: DERMATOLOGY | Facility: CLINIC | Age: 63
End: 2018-09-10
Payer: COMMERCIAL

## 2018-09-10 DIAGNOSIS — L40.9 PSORIASIS: Primary | ICD-10-CM

## 2018-09-10 PROCEDURE — 96910 PHOTCHMTX TAR&UVB/PTRLTM&UVB: CPT | Performed by: PHYSICIAN ASSISTANT

## 2018-09-10 NOTE — PROGRESS NOTES
Here today for NBUVB for psoriasis  Treatment # 5  No issues   photoproection on eyes, lips, nipples  366 mj 52 seconds today  Mineral oil applied  Goal 3 times weekly

## 2018-09-10 NOTE — LETTER
9/10/2018         RE: Mirta Cardenas  [address] [facility] [address]        Dear Colleague,    Thank you for referring your patient, Mirta Cardenas, to the [facility]. Please see a copy of my visit note below.    Here today for NBUVB for psoriasis  Treatment # 5  No issues   photoproection on eyes, lips, nipples  366 mj 52 seconds today  Mineral oil applied  Goal 3 times weekly    Again, thank you for allowing me to participate in the care of your patient.        Sincerely,        Hailee Gale PA-C

## 2018-09-10 NOTE — MR AVS SNAPSHOT
"              After Visit Summary   9/10/2018    Mirta Cardenas    MRN: [id number]           Patient Information     Date Of Birth          1955        Visit Information        Provider Department      9/10/2018 4:15 PM Hailee Jay PA-C [facility]        Today's Diagnoses     Psoriasis    -  1       Follow-ups after your visit        Your next 10 appointments already scheduled     Sep 13, 2018  3:30 PM CDT   MA SCREENING DIGITAL BILATERAL with [address] [facility] ([facility])    [address] [facility] [address] [phone]           Do not use any powder, lotion or deodorant under your arms or on your breast. If you do, we will ask you to remove it before your exam.  Wear comfortable, two-piece clothing.  If you have any allergies, tell your care team.  Bring any previous mammograms from other facilities or have them mailed to the breast center. Three-dimensional (3D) mammograms are available at [place] locations in [facility], [facility], [facility], and [place]. [facility] locations include [place] and [facility] in [place]. Benefits of 3D mammograms include: - Improved rate of cancer detection - Decreases your chance of having to go back for more tests, which means fewer: - \"False-positive\" results (This means that there is an abnormal area but it isn't cancer.) - Invasive testing procedures, such as a biopsy or surgery - Can provide clearer images of the breast if you have dense breast tissue. 3D mammography is an optional exam that anyone can have with a 2D mammogram. It doesn't replace or take the place of a 2D mammogram. 2D mammograms remain an effective screening test for all women.  Not all insurance companies cover the cost of a 3D mammogram. Check with your insurance.            Sep 13, 2018  4:15 PM CDT   Return Visit with [place] [facility] PROC ROOM   [facility] ([facility]    [address]  [facility] [address]   [phone]            Sep 17, 2018  4:15 PM CDT   [place] Visit with [place]   [facility] ([facility])    [address] [facility] [address]   [phone]            Sep 20, 2018  4:15 PM CDT   [place] Visit with [place]   [facility] ([facility])    [address]  [facility] [address]   [phone]            Sep 24, 2018  4:15 PM CDT   [place] Visit with [place]   [facility] ([facility])    [address]  [facility] [address]   [phone]            Sep 27, 2018  4:15 PM CDT   [place] Visit with [place]   [facility] ([facility])    [address]  [facility] [address]   [phone]              Who to contact     If you have questions or need follow up information about today's clinic visit or your schedule please contact [facility] directly at [phone].  Normal or non-critical lab and imaging results will be communicated to you by Fuel [email or web address])hart, letter or phone within 4 business days after the clinic has received the results. If you do not hear from us within 7 days, please contact the clinic through Fuel ([email or web address])hart or phone. If you have a critical or abnormal lab result, we will notify you by phone as soon as possible.  Submit refill requests through CIHI or call your pharmacy and they will forward the refill request to us. Please allow 3 business days for your refill to be completed.          Additional Information About Your Visit        Fuel [email or web address])harNextly Information     CIHI gives you secure access to your electronic health record. If you see a primary care provider, you can also send messages to your care team and make appointments. If you have questions, please call your primary care clinic.  If you do not have a primary care provider, please  call [phone] and they will assist you.        Care EveryWhere ID     This is your Care EveryWhere ID. This could be used by other organizations to access your [place] medical records  [id number]         Blood Pressure from Last 3 Encounters:   08/27/18 118/60   08/10/18 124/70   08/09/18 92/58    Weight from Last 3 Encounters:   08/09/18 84.8 kg (187 lb)   02/22/18 83.9 kg (185 lb)   12/15/17 84.2 kg (185 lb 9.6 oz)              We Performed the Following     CHARGE - PHOTOCHEMOTHERAPY WITH UV-B        Primary Care Provider Office Phone # Fax #    Neal Mosquera [phone] [address] [facility] [address]        Equal Access to Services     JOSÉ MANUEL LIZARRAGA : Hadii tamiko garciao Sohussein, waaxda luqadaha, qaybta kaalmada elaine, gerhard salazar . So [facility] [phone].    ATENCIÓN: Si habla español, tiene a cage disposición servicios gratuitos de asistencia lingüística. Bayron al [phone].    We comply with applicable federal civil rights laws and [place] laws. We do not discriminate on the basis of race, color, national origin, age, disability, sex, sexual orientation, or gender identity.            Thank you!     Thank you for choosing [facility]  for your care. Our goal is always to provide you with excellent care. Hearing back from our patients is one way we can continue to improve our services. Please take a few minutes to complete the written survey that you may receive in the mail after your visit with us. Thank you!             Your Updated Medication List - Protect others around you: Learn how to safely use, store and throw away your medicines at www.disposemymeds.org.          This list is accurate as of 9/10/18  4:19 PM.  Always use your most recent med list.                   Brand Name Dispense Instructions for use Diagnosis    aspirin 81 MG tablet      1 TABLET DAILY        lisinopril-hydrochlorothiazide 20-25 MG per  tablet    PRINZIDE/ZESTORETIC    90 tablet    Take 1 tablet by mouth every morning    Benign essential hypertension       mometasone-formoterol 200-5 MCG/ACT oral inhaler    DULERA    1 Inhaler    Inhale 1 puff into the lungs 2 times daily Only using at the start of URI.    Cough due to bronchospasm       order for DME     2 Units    Equipment being ordered: Dynaflex insert    Plantar fasciitis, bilateral       pravastatin 40 MG tablet    PRAVACHOL    90 tablet    Take 1 tablet (40 mg) by mouth daily    Hyperlipidemia LDL goal <100       priLOSEC 20 MG CR capsule   Generic drug:  omeprazole      Take 1 capsule by mouth every morning.        tacrolimus 0.1 % ointment    PROTOPIC    30 g    Apply twice daily affected areas on body.    Nummular eczema       terazosin 5 MG capsule    HYTRIN    90 capsule    Take 1 capsule (5 mg) by mouth daily    Benign essential hypertension

## 2018-09-13 ENCOUNTER — HOSPITAL ENCOUNTER (OUTPATIENT)
Dept: MAMMOGRAPHY | Facility: CLINIC | Age: 63
Discharge: HOME | End: 2018-09-13
Attending: FAMILY MEDICINE | Admitting: FAMILY MEDICINE
Payer: COMMERCIAL

## 2018-09-13 ENCOUNTER — OFFICE VISIT (OUTPATIENT)
Dept: DERMATOLOGY | Facility: CLINIC | Age: 63
End: 2018-09-13
Payer: COMMERCIAL

## 2018-09-13 DIAGNOSIS — L40.9 PSORIASIS: Primary | ICD-10-CM

## 2018-09-13 PROCEDURE — 77067 SCR MAMMO BI INCL CAD: CPT

## 2018-09-13 PROCEDURE — 96910 PHOTCHMTX TAR&UVB/PTRLTM&UVB: CPT | Performed by: PHYSICIAN ASSISTANT

## 2018-09-13 NOTE — LETTER
9/13/2018         RE: Mirta Cardenas  [address] [facility] [address]        Dear Colleague,    Thank you for referring your patient, Mirta Cardenas, to the [facility]. Please see a copy of my visit note below.    Here today for NBUVB for psoriasis  Treatment # 6  No issues   photoproection on eyes, lips, nipples  403 mj  1 minute 1 seconds today  Mineral oil applied  Goal 3 times weekly    Again, thank you for allowing me to participate in the care of your patient.        Sincerely,        Radha Dugan PA-C

## 2018-09-13 NOTE — MR AVS SNAPSHOT
After Visit Summary   9/13/2018    Mirta Cardenas    MRN: [id number]           Patient Information     Date Of Birth          1955        Visit Information        Provider Department      9/13/2018 4:15 PM Radha Matias PA-C [facility]        Today's Diagnoses     Psoriasis    -  1       Follow-ups after your visit        Your next 10 appointments already scheduled     Sep 17, 2018  4:15 PM CDT   [place] Visit with [place]   [facility]    [address] [address]   [phone]            Sep 20, 2018  4:15 PM CDT   [place] with [place]   [facility]    [address] [address]   [phone]            Sep 24, 2018  4:15 PM CDT   [place]   [facility] ([facility]    [address] [address]   [phone]            Sep 27, 2018  4:15 PM CDT   [place] Visit with [place]   [facility]    [address] [address]   [phone]              Who to contact     If you have questions or need follow up information about today's clinic visit or your schedule please contact [facility] directly at [phone].  Normal or non-critical lab and imaging results will be communicated to you by MyChart, letter or phone within 4 business days after the clinic has received the results. If you do not hear from us within 7 days, please contact the clinic through MyChart or phone. If you have a critical or abnormal lab result, we will notify you by phone as soon as possible.  Submit refill requests through GamePress or call your pharmacy and they will forward the refill request to us. Please allow 3 business days for your refill to be completed.          Additional Information About  Your Visit        MyChart Information     Minubo gives you secure access to your electronic health record. If you see a primary care provider, you can also send messages to your care team and make appointments. If you have questions, please call your primary care clinic.  If you do not have a primary care provider, please call [phone] and they will assist you.        Care EveryWhere ID     This is your Care EveryWhere ID. This could be used by other organizations to access your [place] medical records  [id number]         Blood Pressure from Last 3 Encounters:   08/27/18 118/60   08/10/18 124/70   08/09/18 92/58    Weight from Last 3 Encounters:   08/09/18 84.8 kg (187 lb)   02/22/18 83.9 kg (185 lb)   12/15/17 84.2 kg (185 lb 9.6 oz)              We Performed the Following     PHOTOCHEMOTHERAPY WITH UV-B        Primary Care Provider Office Phone # Fax #    Neal Elvi Mosquera [phone] [address] [facility] [address]        Equal Access to Services     JOSÉ MANUEL LIZARRAGA : Hadii tamiko ku hadasho Sohussein, waaxda luqadaha, qaybta kaalmada elaine, gerhard salazar . So [facility] [phone].    ATENCIÓN: Si habla español, tiene a cage disposición servicios gratuitos de asistencia lingüística. [facility] [phone].    We comply with applicable federal civil rights laws and [place] laws. We do not discriminate on the basis of race, color, national origin, age, disability, sex, sexual orientation, or gender identity.            Thank you!     Thank you for choosing [facility]  for your care. Our goal is always to provide you with excellent care. Hearing back from our patients is one way we can continue to improve our services. Please take a few minutes to complete the written survey that you may receive in the mail after your visit with us. Thank you!             Your Updated Medication List - Protect others around you: Learn how to safely use, store  and throw away your medicines at www.disposemymeds.org.          This list is accurate as of 9/13/18 11:59 PM.  Always use your most recent med list.                   Brand Name Dispense Instructions for use Diagnosis    aspirin 81 MG tablet      1 TABLET DAILY        lisinopril-hydrochlorothiazide 20-25 MG per tablet    PRINZIDE/ZESTORETIC    90 tablet    Take 1 tablet by mouth every morning    Benign essential hypertension       mometasone-formoterol 200-5 MCG/ACT oral inhaler    DULERA    1 Inhaler    Inhale 1 puff into the lungs 2 times daily Only using at the start of URI.    Cough due to bronchospasm       order for DME     2 Units    Equipment being ordered: Dynaflex insert    Plantar fasciitis, bilateral       pravastatin 40 MG tablet    PRAVACHOL    90 tablet    Take 1 tablet (40 mg) by mouth daily    Hyperlipidemia LDL goal <100       priLOSEC 20 MG CR capsule   Generic drug:  omeprazole      Take 1 capsule by mouth every morning.        tacrolimus 0.1 % ointment    PROTOPIC    30 g    Apply twice daily affected areas on body.    Nummular eczema       terazosin 5 MG capsule    HYTRIN    90 capsule    Take 1 capsule (5 mg) by mouth daily    Benign essential hypertension

## 2018-09-14 NOTE — PROGRESS NOTES
Here today for NBUVB for psoriasis  Treatment # 6  No issues   photoproection on eyes, lips, nipples  403 mj 1 minute 1 seconds today  Mineral oil applied  Goal 3 times weekly

## 2018-09-17 ENCOUNTER — OFFICE VISIT (OUTPATIENT)
Dept: DERMATOLOGY | Facility: CLINIC | Age: 63
End: 2018-09-17
Payer: COMMERCIAL

## 2018-09-17 DIAGNOSIS — L40.9 PSORIASIS: Primary | ICD-10-CM

## 2018-09-17 PROCEDURE — 96910 PHOTCHMTX TAR&UVB/PTRLTM&UVB: CPT | Performed by: PHYSICIAN ASSISTANT

## 2018-09-17 NOTE — PROGRESS NOTES
Here today for NBUVB for psoriasis  Treatment # 7  No issues   photoproection on eyes, lips, nipples  443 mj 1 minute 5 seconds today  Mineral oil applied  Goal 3 times weekly

## 2018-09-17 NOTE — LETTER
9/17/2018         RE: Mirta Cardenas  [address] [facility] [address]        Dear Colleague,    Thank you for referring your patient, Mirta Cardenas, to the [facility]. Please see a copy of my visit note below.    Here today for NBUVB for psoriasis  Treatment # 7  No issues   photoproection on eyes, lips, nipples  443 mj 1 minute 5 seconds today  Mineral oil applied  Goal 3 times weekly    Again, thank you for allowing me to participate in the care of your patient.        Sincerely,        Hailee Gale PA-C

## 2018-09-17 NOTE — MR AVS SNAPSHOT
After Visit Summary   9/17/2018    Mirta Cardenas    MRN: [id number]           Patient Information     Date Of Birth          1955        Visit Information        Provider Department      9/17/2018 4:15 PM Hailee Jay PA-C [facility]        Today's Diagnoses     Psoriasis    -  1       Follow-ups after your visit        Your next 10 appointments already scheduled     Sep 20, 2018  4:15 PM CDT   [place] with [place]   [facility]    [address] [address]   [phone]            Sep 24, 2018  4:15 PM CDT   [place] with [place]   [facility]    [address] [address]   [phone]            Sep 27, 2018  4:15 PM CDT   [place]   [facility]    [address] [address]   [phone]              Who to contact     If you have questions or need follow up information about today's clinic visit or your schedule please contact [facility] directly at [phone].  Normal or non-critical lab and imaging results will be communicated to you by Pivotsharehart, letter or phone within 4 business days after the clinic has received the results. If you do not hear from us within 7 days, please contact the clinic through Pivotsharehart or phone. If you have a critical or abnormal lab result, we will notify you by phone as soon as possible.  Submit refill requests through UTStar[employer] or call your pharmacy and they will forward the refill request to us. Please allow 3 business days for your refill to be completed.          Additional Information About Your Visit        [employer] Information     [employer] gives you secure access to your electronic health record. If you see a primary care provider, you can also send messages to your care team and make  appointments. If you have questions, please call your primary care clinic.  If you do not have a primary care provider, please call [phone] and they will assist you.        Care EveryWhere ID     This is your Care EveryWhere ID. This could be used by other organizations to access your [place] medical records  [id number]         Blood Pressure from Last 3 Encounters:   08/27/18 118/60   08/10/18 124/70   08/09/18 92/58    Weight from Last 3 Encounters:   08/09/18 84.8 kg (187 lb)   02/22/18 83.9 kg (185 lb)   12/15/17 84.2 kg (185 lb 9.6 oz)              We Performed the Following     CHARGE - PHOTOCHEMOTHERAPY WITH UV-B        Primary Care Provider Office Phone # Fax #    Neal Mosquera [phone] [address] [facility] [address]        Equal Access to Services     JOSÉ MANUEL LIZARRAGA : Hadii aad ku hadasho Somadisonali, waaxda luqadaha, qaybta kaalmada adeegyada, waxay taniya salazar . So [facility] [phone].    ATENCIÓN: Si habla español, tiene a cage disposición servicios gratuitos de asistencia lingüística. Llstephan al [phone].    We comply with applicable federal civil rights laws and [place] laws. We do not discriminate on the basis of race, color, national origin, age, disability, sex, sexual orientation, or gender identity.            Thank you!     Thank you for choosing [facility]  for your care. Our goal is always to provide you with excellent care. Hearing back from our patients is one way we can continue to improve our services. Please take a few minutes to complete the written survey that you may receive in the mail after your visit with us. Thank you!             Your Updated Medication List - Protect others around you: Learn how to safely use, store and throw away your medicines at www.disposemymeds.org.          This list is accurate as of 9/17/18  4:39 PM.  Always use your most recent med list.                   Brand Name Dispense  Instructions for use Diagnosis    aspirin 81 MG tablet      1 TABLET DAILY        lisinopril-hydrochlorothiazide 20-25 MG per tablet    PRINZIDE/ZESTORETIC    90 tablet    Take 1 tablet by mouth every morning    Benign essential hypertension       mometasone-formoterol 200-5 MCG/ACT oral inhaler    DULERA    1 Inhaler    Inhale 1 puff into the lungs 2 times daily Only using at the start of URI.    Cough due to bronchospasm       order for DME     2 Units    Equipment being ordered: Dynaflex insert    Plantar fasciitis, bilateral       pravastatin 40 MG tablet    PRAVACHOL    90 tablet    Take 1 tablet (40 mg) by mouth daily    Hyperlipidemia LDL goal <100       priLOSEC 20 MG CR capsule   Generic drug:  omeprazole      Take 1 capsule by mouth every morning.        tacrolimus 0.1 % ointment    PROTOPIC    30 g    Apply twice daily affected areas on body.    Nummular eczema       terazosin 5 MG capsule    HYTRIN    90 capsule    Take 1 capsule (5 mg) by mouth daily    Benign essential hypertension

## 2018-09-20 ENCOUNTER — OFFICE VISIT (OUTPATIENT)
Dept: DERMATOLOGY | Facility: CLINIC | Age: 63
End: 2018-09-20
Payer: COMMERCIAL

## 2018-09-20 DIAGNOSIS — L40.9 PSORIASIS: Primary | ICD-10-CM

## 2018-09-20 PROCEDURE — 96910 PHOTCHMTX TAR&UVB/PTRLTM&UVB: CPT | Performed by: PHYSICIAN ASSISTANT

## 2018-09-20 NOTE — MR AVS SNAPSHOT
After Visit Summary   9/20/2018    Mirta Cardenas    MRN: [id number]           Patient Information     Date Of Birth          1955        Visit Information        Provider Department      9/20/2018 4:15 PM Hailee Jay PA-C [facility]        Today's Diagnoses     Psoriasis    -  1       Follow-ups after your visit        Your next 10 appointments already scheduled     Sep 20, 2018  4:15 PM CDT   Return Visit with Hailee Jay PA-C   [facility] ([facility]    [address]  [place] [address]   [phone]            Sep 24, 2018  4:15 PM CDT   [place] Visit with [place]   [facility] ([facility])    [address]  [place] [address]   [phone]            Sep 27, 2018  4:15 PM CDT   [place] with [place]   [facility] ([facility]    [address]  [place] [address]   [phone]            Oct 02, 2018  3:45 PM CDT   [place] with [place]   [facility] ([facility]    [address]  [place] [address]   [phone]            Oct 03, 2018  3:30 PM CDT   [place] Visit with [place]   [facility] ([facility]    [address]  [place] [address]   [phone]            Oct 09, 2018  3:45 PM CDT   [place] with [place]   [facility] ([facility]    [address]  [place] [address]   [phone]            Oct 16, 2018  3:45 PM CDT   [place] Visit with [place]   [facility] ([facility]    [address]  [place] [address]   [phone]            Oct 17, 2018  3:30 PM CDT   [place] Visit with [place]   [facility] ([facility]    [address] [facility] [place] [address]    [phone]            Oct 23, 2018  3:45 PM CDT   [place] Visit with [place]   [facility] ([facility]    [address]  [place] [facility] [address] [phone]            Oct 24, 2018  3:30 PM CDT   [place] Visit with [place]   [facility] ([facility])    [address]  [place] [facility] [address] [phone]              Who to contact     If you have questions or need follow up information about today's clinic visit or your schedule please contact [facility] directly at [phone].  Normal or non-critical lab and imaging results will be communicated to you by Affinimark Technologieshart, letter or phone within 4 business days after the clinic has received the results. If you do not hear from us within 7 days, please contact the clinic through [employer] or phone. If you have a critical or abnormal lab result, we will notify you by phone as soon as possible.  Submit refill requests through Triporati or call your pharmacy and they will forward the refill request to us. Please allow 3 business days for your refill to be completed.          Additional Information About Your Visit        MyChart Information     [employer] gives you secure access to your electronic health record. If you see a primary care provider, you can also send messages to your care team and make appointments. If you have questions, please call your primary care clinic.  If you do not have a primary care provider, please call [phone] and they will assist you.        Care EveryWhere ID     This is your Care EveryWhere ID. This could be used by other organizations to access your [place] medical records  [id number]         Blood Pressure from Last 3 Encounters:   08/27/18 118/60   08/10/18 124/70   08/09/18 92/58    Weight from Last 3 Encounters:   08/09/18 84.8 kg (187 lb)   02/22/18 83.9 kg (185 lb)   12/15/17 84.2 kg (185 lb 9.6 oz)              We Performed the  Following     CHARGE - PHOTOCHEMOTHERAPY WITH UV-B        Primary Care Provider Office Phone # Fax #    Neal Mosquera [phone] [address] [facility] [address]        Equal Access to Services     JOSÉ MANUEL LIZARRAGA : Hadflower riggins joseo Somadisonali, waaxda luqadaha, qaybta kaalmada adeegyada, gerhard rudiin hayaaemiliano chanelsylvia hughesbrian ureña. So [facility] [phone].    ATENCIÓN: Si habla español, tiene a cage disposición servicios gratuitos de asistencia lingüística. Llame al [phone].    We comply with applicable federal civil rights laws and [place] laws. We do not discriminate on the basis of race, color, national origin, age, disability, sex, sexual orientation, or gender identity.            Thank you!     Thank you for choosing [facility]  for your care. Our goal is always to provide you with excellent care. Hearing back from our patients is one way we can continue to improve our services. Please take a few minutes to complete the written survey that you may receive in the mail after your visit with us. Thank you!             Your Updated Medication List - Protect others around you: Learn how to safely use, store and throw away your medicines at www.disposemymeds.org.          This list is accurate as of 9/20/18 10:18 AM.  Always use your most recent med list.                   Brand Name Dispense Instructions for use Diagnosis    aspirin 81 MG tablet      1 TABLET DAILY        lisinopril-hydrochlorothiazide 20-25 MG per tablet    PRINZIDE/ZESTORETIC    90 tablet    Take 1 tablet by mouth every morning    Benign essential hypertension       mometasone-formoterol 200-5 MCG/ACT oral inhaler    DULERA    1 Inhaler    Inhale 1 puff into the lungs 2 times daily Only using at the start of URI.    Cough due to bronchospasm       order for DME     2 Units    Equipment being ordered: Dynaflex insert    Plantar fasciitis, bilateral       pravastatin 40 MG tablet    PRAVACHOL    90  tablet    Take 1 tablet (40 mg) by mouth daily    Hyperlipidemia LDL goal <100       priLOSEC 20 MG CR capsule   Generic drug:  omeprazole      Take 1 capsule by mouth every morning.        tacrolimus 0.1 % ointment    PROTOPIC    30 g    Apply twice daily affected areas on body.    Nummular eczema       terazosin 5 MG capsule    HYTRIN    90 capsule    Take 1 capsule (5 mg) by mouth daily    Benign essential hypertension

## 2018-09-20 NOTE — LETTER
9/20/2018         RE: Mirta Cardenas  [address] [facility] [address]        Dear Colleague,    Thank you for referring your patient, Mirta Cardenas, to the [facility]. Please see a copy of my visit note below.    Here today for NBUVB for psoriasis  Treatment # 8  No issues   photoproection on eyes, lips, nipples  487 mj 1 minute 10 seconds today  Mineral oil applied  Goal 3 times weekly    Again, thank you for allowing me to participate in the care of your patient.        Sincerely,        Hailee Gale PA-C

## 2018-09-20 NOTE — PROGRESS NOTES
Here today for NBUVB for psoriasis  Treatment # 8  No issues   photoproection on eyes, lips, nipples  487 mj 1 minute 10 seconds today  Mineral oil applied  Goal 3 times weekly

## 2018-09-24 ENCOUNTER — OFFICE VISIT (OUTPATIENT)
Dept: DERMATOLOGY | Facility: CLINIC | Age: 63
End: 2018-09-24
Payer: COMMERCIAL

## 2018-09-24 DIAGNOSIS — L40.9 PSORIASIS: Primary | ICD-10-CM

## 2018-09-24 PROCEDURE — 96910 PHOTCHMTX TAR&UVB/PTRLTM&UVB: CPT | Performed by: PHYSICIAN ASSISTANT

## 2018-09-24 NOTE — MR AVS SNAPSHOT
After Visit Summary   9/24/2018    Mirta Cardenas    MRN: [id number]           Patient Information     Date Of Birth          1955        Visit Information        Provider Department      9/24/2018 4:15 PM Hailee Jay PA-C [facility]        Today's Diagnoses     Psoriasis    -  1       Follow-ups after your visit        Your next 10 appointments already scheduled     Sep 27, 2018  4:15 PM CDT   [place] Visit with [place]   [facility] ([facility])    [address]  [facility] [place] [address]   [phone]            Oct 02, 2018  3:45 PM CDT   [place] Visit with [place]   [facility] ([facility])    [address]  [facility] [place] [address]   [phone]            Oct 03, 2018  3:30 PM CDT   [place] Visit with [place]   [facility] ([facility])    [address]  [place] [address]   [phone]            Oct 09, 2018  3:45 PM CDT   [place]   [facility] ([facility])    [address]  [facility] [place] [address]   [phone]            Oct 16, 2018  3:45 PM CDT   [place] Visit with [place]   [facility] ([facility])    [address]  [facility] [place] [address]   [phone]            Oct 17, 2018  3:30 PM CDT   [place] Visit with [place]   [facility] ([facility])    [address]  [facility] [place] [address]   [phone]            Oct 23, 2018  3:45 PM CDT   [place] Visit [place]   [facility] ([facility])    [address]  [facility] [place] [address]   [phone]            Oct 24, 2018  3:30 PM CDT   [place] Visit [place]   [facility] ([facility])    [address] [facility] [address]    [phone]            Oct 25, 2018  4:15 PM CDT   [place] Visit with [place]   [facility]    [address]  [facility] [address] [phone]            Oct 30, 2018  3:45 PM CDT   [place] Visit with [place]   [facility] ([facility])    [address]  [facility] [address] [phone]              Who to contact     If you have questions or need follow up information about today's clinic visit or your schedule please contact [facility] directly at [phone].  Normal or non-critical lab and imaging results will be communicated to you by Autot[employer], letter or phone within 4 business days after the clinic has received the results. If you do not hear from us within 7 days, please contact the clinic through [employer] or phone. If you have a critical or abnormal lab result, we will notify you by phone as soon as possible.  Submit refill requests through [employer] or call your pharmacy and they will forward the refill request to us. Please allow 3 business days for your refill to be completed.          Additional Information About Your Visit        MyChart Information     [employer] gives you secure access to your electronic health record. If you see a primary care provider, you can also send messages to your care team and make appointments. If you have questions, please call your primary care clinic.  If you do not have a primary care provider, please call [phone] and they will assist you.        Care EveryWhere ID     This is your Care EveryWhere ID. This could be used by other organizations to access your [place] medical records  [id number]         Blood Pressure from Last 3 Encounters:   08/27/18 118/60   08/10/18 124/70   08/09/18 92/58    Weight from Last 3 Encounters:   08/09/18 84.8 kg (187 lb)   02/22/18 83.9 kg (185 lb)   12/15/17 84.2 kg (185 lb 9.6 oz)              We Performed the  Following     CHARGE - PHOTOCHEMOTHERAPY WITH UV-B        Primary Care Provider Office Phone # Fax #    Neal Mosquera [phone] [address] [facility] [address]        Equal Access to Services     JOSÉ MANUEL LIZARRAGA : Hadflower riggins joseo Somadisonali, waaxda luqadaha, qaybta kaalmada adeegyada, gerhard rudiin hayaaemiliano chanelsylvia hughesbrian ureña. So [facility] [phone].    ATENCIÓN: Si habla español, tiene a cage disposición servicios gratuitos de asistencia lingüística. Llame al [phone].    We comply with applicable federal civil rights laws and [place] laws. We do not discriminate on the basis of race, color, national origin, age, disability, sex, sexual orientation, or gender identity.            Thank you!     Thank you for choosing [facility]  for your care. Our goal is always to provide you with excellent care. Hearing back from our patients is one way we can continue to improve our services. Please take a few minutes to complete the written survey that you may receive in the mail after your visit with us. Thank you!             Your Updated Medication List - Protect others around you: Learn how to safely use, store and throw away your medicines at www.disposemymeds.org.          This list is accurate as of 9/24/18  4:22 PM.  Always use your most recent med list.                   Brand Name Dispense Instructions for use Diagnosis    aspirin 81 MG tablet      1 TABLET DAILY        lisinopril-hydrochlorothiazide 20-25 MG per tablet    PRINZIDE/ZESTORETIC    90 tablet    Take 1 tablet by mouth every morning    Benign essential hypertension       mometasone-formoterol 200-5 MCG/ACT oral inhaler    DULERA    1 Inhaler    Inhale 1 puff into the lungs 2 times daily Only using at the start of URI.    Cough due to bronchospasm       order for DME     2 Units    Equipment being ordered: Dynaflex insert    Plantar fasciitis, bilateral       pravastatin 40 MG tablet    PRAVACHOL    90  tablet    Take 1 tablet (40 mg) by mouth daily    Hyperlipidemia LDL goal <100       priLOSEC 20 MG CR capsule   Generic drug:  omeprazole      Take 1 capsule by mouth every morning.        tacrolimus 0.1 % ointment    PROTOPIC    30 g    Apply twice daily affected areas on body.    Nummular eczema       terazosin 5 MG capsule    HYTRIN    90 capsule    Take 1 capsule (5 mg) by mouth daily    Benign essential hypertension

## 2018-09-24 NOTE — PROGRESS NOTES
Here today for NBUVB for psoriasis  Treatment # 9  No issues   photoproection on eyes, lips, nipples  536 mj 1 minute 15 seconds today  Mineral oil applied  Goal 3 times weekly

## 2018-09-24 NOTE — LETTER
9/24/2018         RE: Mirta Cardenas  [address] [facility] [address]        Dear Colleague,    Thank you for referring your patient, Mirta Cardenas, to the [facility]. Please see a copy of my visit note below.    Here today for NBUVB for psoriasis  Treatment # 9  No issues   photoproection on eyes, lips, nipples  536 mj 1 minute 15 seconds today  Mineral oil applied  Goal 3 times weekly    Again, thank you for allowing me to participate in the care of your patient.        Sincerely,        Hailee Gale PA-C

## 2018-09-27 ENCOUNTER — OFFICE VISIT (OUTPATIENT)
Dept: DERMATOLOGY | Facility: CLINIC | Age: 63
End: 2018-09-27
Payer: COMMERCIAL

## 2018-09-27 DIAGNOSIS — L40.9 PSORIASIS: Primary | ICD-10-CM

## 2018-09-27 PROCEDURE — 96910 PHOTCHMTX TAR&UVB/PTRLTM&UVB: CPT

## 2018-09-27 NOTE — MR AVS SNAPSHOT
After Visit Summary   9/27/2018    Mirta Cardenas    MRN: [id number]           Patient Information     Date Of Birth          1955        Visit Information        Provider Department      9/27/2018 4:15 PM [place] [facility]        Today's Diagnoses     Psoriasis    -  1       Follow-ups after your visit        Your next 10 appointments already scheduled     Oct 02, 2018  3:45 PM CDT   [place] Visit with [place]   [facility]    [address]  [place] [address]   [phone]            Oct 03, 2018  3:30 PM CDT   [place] Visit with [place]   [facility]    [address]  [facility] [place] [address]   [phone]            Oct 09, 2018  3:45 PM CDT   [place] Visit with [place]   [facility])    [address]  [place] [address]   [phone]            Oct 16, 2018  3:45 PM CDT   [place] Visit with [place]   [facility]    [address]  [facility] [place] [address]   [phone]            Oct 17, 2018  3:30 PM CDT   [place] Visit with [place]   [facility]    [address]  [facility] [place] [address]   [phone]            Oct 23, 2018  3:45 PM CDT   [place] Visit with [place]   [facility]    [address]  [facility] [place] [address]   [phone]            Oct 24, 2018  3:30 PM CDT   [place] Visit with [place]   [facility]    [address]  [facility] [place] [address]   [phone]            Oct 25, 2018  4:15 PM CDT   [place] Visit with [place]   [facility]    [address]  [place] [address]   [phone]             Oct 30, 2018  3:45 PM CDT   Return Visit with [facility]OM   [facility] ([facility])    [address] [facility] [address] [phone]              Who to contact     If you have questions or need follow up information about today's clinic visit or your schedule please contact [facility] directly at [phone].  Normal or non-critical lab and imaging results will be communicated to you by Histogenhart, letter or phone within 4 business days after the clinic has received the results. If you do not hear from us within 7 days, please contact the clinic through [employer] or phone. If you have a critical or abnormal lab result, we will notify you by phone as soon as possible.  Submit refill requests through [employer] or call your pharmacy and they will forward the refill request to us. Please allow 3 business days for your refill to be completed.          Additional Information About Your Visit        HistogenharLimundo Information     [employer] gives you secure access to your electronic health record. If you see a primary care provider, you can also send messages to your care team and make appointments. If you have questions, please call your primary care clinic.  If you do not have a primary care provider, please call [phone] and they will assist you.        Care EveryWhere ID     This is your Care EveryWhere ID. This could be used by other organizations to access your [facility] medical records  [id number]         Blood Pressure from Last 3 Encounters:   08/27/18 118/60   08/10/18 124/70   08/09/18 92/58    Weight from Last 3 Encounters:   08/09/18 84.8 kg (187 lb)   02/22/18 83.9 kg (185 lb)   12/15/17 84.2 kg (185 lb 9.6 oz)              We Performed the Following     PHOTOCHEMOTHERAPY WITH UV-B        Primary Care Provider Office Phone # Fax #    Neal Mosquera [phone]       [address] [facility] [address]        Equal Access to  Services     [facility]: Hadii tamiko riggins joseroseann Jesúsali, waaxda luqadaha, qaybta kaalmada elaine, gerhard salazar . So [facility] [phone].    ATENCIÓN: Si habla pj, tiene a cage disposición servicios gratuitos de asistencia lingüística. Llame al [phone].    We comply with applicable federal civil rights laws and [place] laws. We do not discriminate on the basis of race, color, national origin, age, disability, sex, sexual orientation, or gender identity.            Thank you!     Thank you for choosing [facility]  for your care. Our goal is always to provide you with excellent care. Hearing back from our patients is one way we can continue to improve our services. Please take a few minutes to complete the written survey that you may receive in the mail after your visit with us. Thank you!             Your Updated Medication List - Protect others around you: Learn how to safely use, store and throw away your medicines at www.disposemymeds.org.          This list is accurate as of 9/27/18 11:59 PM.  Always use your most recent med list.                   Brand Name Dispense Instructions for use Diagnosis    aspirin 81 MG tablet      1 TABLET DAILY        lisinopril-hydrochlorothiazide 20-25 MG per tablet    PRINZIDE/ZESTORETIC    90 tablet    Take 1 tablet by mouth every morning    Benign essential hypertension       mometasone-formoterol 200-5 MCG/ACT oral inhaler    DULERA    1 Inhaler    Inhale 1 puff into the lungs 2 times daily Only using at the start of URI.    Cough due to bronchospasm       order for DME     2 Units    Equipment being ordered: Dynaflex insert    Plantar fasciitis, bilateral       pravastatin 40 MG tablet    PRAVACHOL    90 tablet    Take 1 tablet (40 mg) by mouth daily    Hyperlipidemia LDL goal <100       priLOSEC 20 MG CR capsule   Generic drug:  omeprazole      Take 1 capsule by mouth every morning.        tacrolimus 0.1 % ointment     PROTOPIC    30 g    Apply twice daily affected areas on body.    Nummular eczema       terazosin 5 MG capsule    HYTRIN    90 capsule    Take 1 capsule (5 mg) by mouth daily    Benign essential hypertension

## 2018-10-01 NOTE — PROGRESS NOTES
Here today for NBUVB for psoriasis  Treatment # 10  No issues   photoproection on eyes, lips, nipples  590 mj 1 minute 23 seconds today  Mineral oil applied  Goal 3 times weekly

## 2018-10-02 ENCOUNTER — OFFICE VISIT (OUTPATIENT)
Dept: DERMATOLOGY | Facility: CLINIC | Age: 63
End: 2018-10-02
Payer: COMMERCIAL

## 2018-10-02 DIAGNOSIS — L40.9 PSORIASIS: Primary | ICD-10-CM

## 2018-10-02 PROCEDURE — 96910 PHOTCHMTX TAR&UVB/PTRLTM&UVB: CPT | Performed by: PHYSICIAN ASSISTANT

## 2018-10-02 NOTE — MR AVS SNAPSHOT
After Visit Summary   10/2/2018    Mirta Cardenas    MRN: [id number]           Patient Information     Date Of Birth          1955        Visit Information        Provider Department      10/2/2018 3:45 PM Radha Matias PA-C [facility]        Today's Diagnoses     Psoriasis    -  1       Follow-ups after your visit        Your next 10 appointments already scheduled     Oct 09, 2018  3:45 PM CDT   [place] Visit with [place]   [facility] ([facility])    [address]  [place] [address]   [phone]            Oct 16, 2018  3:45 PM CDT   [place] Visit with [place]   [facility] ([facility])    [address]  [place] [address]   [phone]            Oct 23, 2018  3:45 PM CDT   [place] Visit with [place]   [facility] ([facility])    [address]  [place] [address]   [phone]            Oct 24, 2018  3:30 PM CDT   [place] Visit with [place]   [facility] ([facility])    [address]  [place] [address]   [phone]            Oct 29, 2018  3:45 PM CDT   [place] Visit with [place]   [facility] ([facility])    [address]  [place] [address]   [phone]            Nov 01, 2018  3:45 PM CDT   [place] Visit with [place]   [facility] ([facility])    [address] [facility] [place] [address]   [phone]              Who to contact     If you have questions or need follow up information about today's clinic visit or your schedule please contact [facility] directly at [phone].  Normal or non-critical lab and imaging results will be communicated to you by MyChart, letter or phone within 4 business days after the clinic has received the results. If you do not  hear from us within 7 days, please contact the clinic through [employer] or phone. If you have a critical or abnormal lab result, we will notify you by phone as soon as possible.  Submit refill requests through [employer] or call your pharmacy and they will forward the refill request to us. Please allow 3 business days for your refill to be completed.          Additional Information About Your Visit        [employer] Information     [employer] gives you secure access to your electronic health record. If you see a primary care provider, you can also send messages to your care team and make appointments. If you have questions, please call your primary care clinic.  If you do not have a primary care provider, please call [phone] and they will assist you.        Care EveryWhere ID     This is your Care EveryWhere ID. This could be used by other organizations to access your [place] medical records  [id number]         Blood Pressure from Last 3 Encounters:   08/27/18 118/60   08/10/18 124/70   08/09/18 92/58    Weight from Last 3 Encounters:   08/09/18 84.8 kg (187 lb)   02/22/18 83.9 kg (185 lb)   12/15/17 84.2 kg (185 lb 9.6 oz)              We Performed the Following     PHOTOCHEMOTHERAPY WITH UV-B        Primary Care Provider Office Phone # Fax #    Neal Mosquera [phone] [address] [facility] [address]        Equal Access to Services     [employer]OR[employer]A [employer] : Hadii tamiko riggins hadasho Somadisonali, waaxda luqadaha, qaybta kaalmada adeottoda, gerhard salazar . So [facility] [phone].    ATENCIÓN: Si habla español, tiene a cage disposición servicios gratuitos de asistencia lingüística. Llame al [phone].    We comply with applicable federal civil rights laws and [place] laws. We do not discriminate on the basis of race, color, national origin, age, disability, sex, sexual orientation, or gender identity.            Thank you!     Thank you for choosing [facility]  [place]  for your care. Our goal is always to provide you with excellent care. Hearing back from our patients is one way we can continue to improve our services. Please take a few minutes to complete the written survey that you may receive in the mail after your visit with us. Thank you!             Your Updated Medication List - Protect others around you: Learn how to safely use, store and throw away your medicines at www.disposemymeds.org.          This list is accurate as of 10/2/18 11:59 PM.  Always use your most recent med list.                   Brand Name Dispense Instructions for use Diagnosis    aspirin 81 MG tablet      1 TABLET DAILY        lisinopril-hydrochlorothiazide 20-25 MG per tablet    PRINZIDE/ZESTORETIC    90 tablet    Take 1 tablet by mouth every morning    Benign essential hypertension       mometasone-formoterol 200-5 MCG/ACT oral inhaler    DULERA    1 Inhaler    Inhale 1 puff into the lungs 2 times daily Only using at the start of URI.    Cough due to bronchospasm       order for DME     2 Units    Equipment being ordered: Dynaflex insert    Plantar fasciitis, bilateral       pravastatin 40 MG tablet    PRAVACHOL    90 tablet    Take 1 tablet (40 mg) by mouth daily    Hyperlipidemia LDL goal <100       priLOSEC 20 MG CR capsule   Generic drug:  omeprazole      Take 1 capsule by mouth every morning.        tacrolimus 0.1 % ointment    PROTOPIC    30 g    Apply twice daily affected areas on body.    Nummular eczema       terazosin 5 MG capsule    HYTRIN    90 capsule    Take 1 capsule (5 mg) by mouth daily    Benign essential hypertension

## 2018-10-02 NOTE — LETTER
10/2/2018         RE: Mirta Cardenas  [address] [facility] [address]        Dear Colleague,    Thank you for referring your patient, Mirta Cardenas, to the [facility]. Please see a copy of my visit note below.    Here today for NBUVB for psoriasis  Treatment # 11  No issues   photoproection on eyes, lips, nipples  649 mj 1 minute 33 seconds today  Mineral oil applied  Goal 3 times weekly    Again, thank you for allowing me to participate in the care of your patient.        Sincerely,        Radha Dugan PA-C

## 2018-10-03 ENCOUNTER — OFFICE VISIT (OUTPATIENT)
Dept: DERMATOLOGY | Facility: CLINIC | Age: 63
End: 2018-10-03
Payer: COMMERCIAL

## 2018-10-03 DIAGNOSIS — L40.9 PSORIASIS: Primary | ICD-10-CM

## 2018-10-03 PROCEDURE — 96910 PHOTCHMTX TAR&UVB/PTRLTM&UVB: CPT | Performed by: PHYSICIAN ASSISTANT

## 2018-10-03 NOTE — MR AVS SNAPSHOT
After Visit Summary   10/3/2018    Mirta Cardenas    MRN: [id number]           Patient Information     Date Of Birth          1955        Visit Information        Provider Department      10/3/2018 3:30 PM Radha Matias PA-C [facility]        Today's Diagnoses     Psoriasis    -  1       Follow-ups after your visit        Your next 10 appointments already scheduled     Oct 09, 2018  3:45 PM CDT   [place] Visit with [place]   [facility]    [address]  [place] [address]   [phone]            Oct 16, 2018  3:45 PM CDT   [place] Visit with [place]   [facility]    [address]  [place] [address]   [phone]            Oct 23, 2018  3:45 PM CDT   [place] with [place]   [facility]    [address]  [place] [address]   [phone]            Oct 24, 2018  3:30 PM CDT   [place] Visit with [place]   [facility]    [address]  [place] [address]   [phone]            Oct 29, 2018  3:45 PM CDT   [place] with [place]   [facility]    [address]  [place] [address]   [phone]            Nov 01, 2018  3:45 PM CDT   [place] Visit with [place]   [facility]    [address] [place] [address]   [phone]              Who to contact     If you have questions or need follow up information about today's clinic visit or your schedule please contact [facility] directly at [phone].  Normal or non-critical lab and imaging results will be communicated to you by MyChart, letter or phone within 4 business days after the clinic has received the results. If you do not  hear from us within 7 days, please contact the clinic through [employer] or phone. If you have a critical or abnormal lab result, we will notify you by phone as soon as possible.  Submit refill requests through [employer] or call your pharmacy and they will forward the refill request to us. Please allow 3 business days for your refill to be completed.          Additional Information About Your Visit        [employer] Information     [employer] gives you secure access to your electronic health record. If you see a primary care provider, you can also send messages to your care team and make appointments. If you have questions, please call your primary care clinic.  If you do not have a primary care provider, please call [phone] and they will assist you.        Care EveryWhere ID     This is your Care EveryWhere ID. This could be used by other organizations to access your [place] medical records  [id number]         Blood Pressure from Last 3 Encounters:   08/27/18 118/60   08/10/18 124/70   08/09/18 92/58    Weight from Last 3 Encounters:   08/09/18 84.8 kg (187 lb)   02/22/18 83.9 kg (185 lb)   12/15/17 84.2 kg (185 lb 9.6 oz)              We Performed the Following     PHOTOCHEMOTHERAPY WITH UV-B        Primary Care Provider Office Phone # Fax #    Neal Mosquera [phone] [address] [facility] [address]        Equal Access to Services     [facility]A [facility] : Hadii tamiko riggins hadasho Somadisonali, waaxda luqadaha, qaybta kaalmada adeottoda, gerhard salazar . So [facility] [phone].    ATENCIÓN: Si habla español, tiene a cage disposición servicios gratuitos de asistencia lingüística. Llame al [phone].    We comply with applicable federal civil rights laws and [place] laws. We do not discriminate on the basis of race, color, national origin, age, disability, sex, sexual orientation, or gender identity.            Thank you!     Thank you for choosing [facility]  [place]  for your care. Our goal is always to provide you with excellent care. Hearing back from our patients is one way we can continue to improve our services. Please take a few minutes to complete the written survey that you may receive in the mail after your visit with us. Thank you!             Your Updated Medication List - Protect others around you: Learn how to safely use, store and throw away your medicines at www.disposemymeds.org.          This list is accurate as of 10/3/18 10:01 PM.  Always use your most recent med list.                   Brand Name Dispense Instructions for use Diagnosis    aspirin 81 MG tablet      1 TABLET DAILY        lisinopril-hydrochlorothiazide 20-25 MG per tablet    PRINZIDE/ZESTORETIC    90 tablet    Take 1 tablet by mouth every morning    Benign essential hypertension       mometasone-formoterol 200-5 MCG/ACT oral inhaler    DULERA    1 Inhaler    Inhale 1 puff into the lungs 2 times daily Only using at the start of URI.    Cough due to bronchospasm       order for DME     2 Units    Equipment being ordered: Dynaflex insert    Plantar fasciitis, bilateral       pravastatin 40 MG tablet    PRAVACHOL    90 tablet    Take 1 tablet (40 mg) by mouth daily    Hyperlipidemia LDL goal <100       priLOSEC 20 MG CR capsule   Generic drug:  omeprazole      Take 1 capsule by mouth every morning.        tacrolimus 0.1 % ointment    PROTOPIC    30 g    Apply twice daily affected areas on body.    Nummular eczema       terazosin 5 MG capsule    HYTRIN    90 capsule    Take 1 capsule (5 mg) by mouth daily    Benign essential hypertension

## 2018-10-03 NOTE — LETTER
10/3/2018         RE: Mirta Cardenas  [address] [facility] [address]        Dear Colleague,    Thank you for referring your patient, Mirta Cardenas, to the [facility]. Please see a copy of my visit note below.    Here today for NBUVB for psoriasis  Treatment # 12  No issues   photoproection on eyes, lips, nipples  714 mj 1 minute 55 seconds today  Mineral oil applied  Goal 3 times weekly    Again, thank you for allowing me to participate in the care of your patient.        Sincerely,        Radha Dugan PA-C

## 2018-10-04 NOTE — PROGRESS NOTES
Here today for NBUVB for psoriasis  Treatment # 11  No issues   photoproection on eyes, lips, nipples  649 mj 1 minute 33 seconds today  Mineral oil applied  Goal 3 times weekly

## 2018-10-04 NOTE — PROGRESS NOTES
Here today for NBUVB for psoriasis  Treatment # 12  No issues   photoproection on eyes, lips, nipples  714 mj 1 minute 55 seconds today  Mineral oil applied  Goal 3 times weekly

## 2018-10-09 ENCOUNTER — OFFICE VISIT (OUTPATIENT)
Dept: DERMATOLOGY | Facility: CLINIC | Age: 63
End: 2018-10-09
Payer: COMMERCIAL

## 2018-10-09 DIAGNOSIS — L40.9 PSORIASIS: Primary | ICD-10-CM

## 2018-10-09 DIAGNOSIS — J98.01 COUGH DUE TO BRONCHOSPASM: ICD-10-CM

## 2018-10-09 PROCEDURE — 96910 PHOTCHMTX TAR&UVB/PTRLTM&UVB: CPT

## 2018-10-09 NOTE — PROGRESS NOTES
Here today for NBUVB for psoriasis  Treatment # 13  No issues   photoproection on eyes, lips, nipples  785 mj 1 minute 52 seconds today  Mineral oil applied  Goal 3 times weekly

## 2018-10-09 NOTE — MR AVS SNAPSHOT
After Visit Summary   10/9/2018    Mirta Cardenas    MRN: [id number]           Patient Information     Date Of Birth          1955        Visit Information        Provider Department      10/9/2018 3:45 PM [place] [facility]        Today's Diagnoses     Psoriasis    -  1       Follow-ups after your visit        Your next 10 appointments already scheduled     Oct 15, 2018  3:45 PM CDT   [place] Visit with [place]   [facility] ([facility]    [address]  [place] [address]   [phone]            Oct 16, 2018  3:45 PM CDT   [place] Visit with [place]   [facility] ([facility])    [address]  [place] [address]   [phone]            Oct 23, 2018  3:45 PM CDT   [place] Visit with [place]   [facility] ([facility])    [address] [facility] [place] [address]   [phone]            Oct 24, 2018  3:30 PM CDT   [place] Visit with [place]   [facility] ([facility])    [address]  [place] [address]   [phone]            Oct 29, 2018  3:45 PM CDT   [place] Visit with [place]   [facility] ([facility])    [address]  [place] [address]   [phone]            Nov 01, 2018  3:45 PM CDT   [place] Visit with [place]   [facility] ([facility])    [address] [facility] [place] [address]   [phone]              Who to contact     If you have questions or need follow up information about today's clinic visit or your schedule please contact [facility] directly at [phone].  Normal or non-critical lab and imaging results will be communicated to you by MyChart, letter or phone within 4 business days after the clinic has received the results. If you do not hear from us  within 7 days, please contact the clinic through [employer] or phone. If you have a critical or abnormal lab result, we will notify you by phone as soon as possible.  Submit refill requests through [employer] or call your pharmacy and they will forward the refill request to us. Please allow 3 business days for your refill to be completed.          Additional Information About Your Visit        [employer] Information     [employer] gives you secure access to your electronic health record. If you see a primary care provider, you can also send messages to your care team and make appointments. If you have questions, please call your primary care clinic.  If you do not have a primary care provider, please call [phone] and they will assist you.        Care EveryWhere ID     This is your Care EveryWhere ID. This could be used by other organizations to access your [place] medical records  [id number]         Blood Pressure from Last 3 Encounters:   08/27/18 118/60   08/10/18 124/70   08/09/18 92/58    Weight from Last 3 Encounters:   08/09/18 84.8 kg (187 lb)   02/22/18 83.9 kg (185 lb)   12/15/17 84.2 kg (185 lb 9.6 oz)              We Performed the Following     PHOTOCHEMOTHERAPY WITH UV-B          Where to get your medicines      These medications were sent to [facility] [address] [address]     Phone:  [phone]     mometasone-formoterol 200-5 MCG/ACT oral inhaler          Primary Care Provider Office Phone # Fax #    Neal Mosquera [phone] [address] [facility] [address]        Equal Access to Services     JOSÉ MANUEL LIZARRAGA : Hadii tamiko riggins hadashroseann Sohussein, waaxda luqadaha, qaybta kaalmagerhard jackson. So [facility] [phone].    ATENCIÓN: Si habla español, tiene a cage disposición servicios gratuitos de asistencia lingüística. Llame al [phone].    We comply with  applicable federal civil rights laws and [place] laws. We do not discriminate on the basis of race, color, national origin, age, disability, sex, sexual orientation, or gender identity.            Thank you!     Thank you for choosing [facility]  for your care. Our goal is always to provide you with excellent care. Hearing back from our patients is one way we can continue to improve our services. Please take a few minutes to complete the written survey that you may receive in the mail after your visit with us. Thank you!             Your Updated Medication List - Protect others around you: Learn how to safely use, store and throw away your medicines at www.disposemymeds.org.          This list is accurate as of 10/9/18  3:53 PM.  Always use your most recent med list.                   Brand Name Dispense Instructions for use Diagnosis    aspirin 81 MG tablet      1 TABLET DAILY        lisinopril-hydrochlorothiazide 20-25 MG per tablet    PRINZIDE/ZESTORETIC    90 tablet    Take 1 tablet by mouth every morning    Benign essential hypertension       mometasone-formoterol 200-5 MCG/ACT oral inhaler    DULERA    1 Inhaler    Inhale 1 puff into the lungs 2 times daily Only using at the start of URI.    Cough due to bronchospasm       order for DME     2 Units    Equipment being ordered: Dynaflex insert    Plantar fasciitis, bilateral       pravastatin 40 MG tablet    PRAVACHOL    90 tablet    Take 1 tablet (40 mg) by mouth daily    Hyperlipidemia LDL goal <100       priLOSEC 20 MG CR capsule   Generic drug:  omeprazole      Take 1 capsule by mouth every morning.        tacrolimus 0.1 % ointment    PROTOPIC    30 g    Apply twice daily affected areas on body.    Nummular eczema       terazosin 5 MG capsule    HYTRIN    90 capsule    Take 1 capsule (5 mg) by mouth daily    Benign essential hypertension

## 2018-10-09 NOTE — TELEPHONE ENCOUNTER
"Requested Prescriptions   Pending Prescriptions Disp Refills     mometasone-formoterol (DULERA) 200-5 MCG/ACT oral inhaler  Last Written Prescription Date:  6/9/2017  Last Fill Quantity: 1 inhaler,  # refills: 2   Last office visit: 8/9/2018 with prescribing provider:  Eveline   Future Office Visit:   Next 5 appointments (look out 90 days)     Oct 09, 2018  3:45 PM CDT   [place] Visit with [place]   [facility]    [address]  [place] [address]   [phone]            Oct 16, 2018  3:45 PM CDT   [place] Visit with [place]   [facility] ([facility]    [address]  [place] [address]   [phone]            Oct 23, 2018  3:45 PM CDT   [place] Visit with [place]   [facility] ([facility])    [address] [facility] [address]   [phone]            Oct 24, 2018  3:30 PM CDT   [place] Visit with [place]   [facility] ([facility]    [address] [address]   [phone]            Oct 29, 2018  3:45 PM CDT   [place] Visit with [place]   [facility] ([facility])    [address] [address]   [phone]                  1 Inhaler 2     Sig: Inhale 1 puff into the lungs 2 times daily Only using at the start of URI.    Inhaled Steroids Protocol Passed    10/9/2018  1:37 PM       Passed - Patient is age 12 or older       Passed - Asthma control assessment score within normal limits in last 6 months    Please review ACT score.          Passed - Recent (6 mo) or future (30 days) visit within the authorizing provider's specialty    Patient had office visit in the last 6 months or has a visit in the next 30 days with authorizing provider or within the authorizing provider's specialty.  See \"Patient Info\" tab in inbasket, or \"Choose Columns\" in " Meds & Orders section of the refill encounter.

## 2018-10-15 ENCOUNTER — OFFICE VISIT (OUTPATIENT)
Dept: DERMATOLOGY | Facility: CLINIC | Age: 63
End: 2018-10-15
Payer: COMMERCIAL

## 2018-10-15 DIAGNOSIS — L40.9 PSORIASIS: Primary | ICD-10-CM

## 2018-10-15 PROCEDURE — 96910 PHOTCHMTX TAR&UVB/PTRLTM&UVB: CPT | Performed by: PHYSICIAN ASSISTANT

## 2018-10-15 NOTE — MR AVS SNAPSHOT
After Visit Summary   10/15/2018    Mirta Cardenas    MRN: [id number]           Patient Information     Date Of Birth          1955        Visit Information        Provider Department      10/15/2018 3:45 PM Hailee Jay PA-C [facility]        Today's Diagnoses     Psoriasis    -  1       Follow-ups after your visit        Your next 10 appointments already scheduled     Oct 16, 2018  3:45 PM CDT   [place] Visit with [place]   [facility] ([facility]    [address]  [place] [address]   [phone]            Oct 23, 2018  3:45 PM CDT   [place] Visit with [place]   [facility] ([facility])    [address]  [place] [address]   [phone]            Oct 24, 2018  3:30 PM CDT   [place] Visit with [place]   [facility] ([facility])    [address]  [place] [address]   [phone]            Oct 29, 2018  3:45 PM CDT   [place] Visit with [place]   [facility]    [address]  [place] [address]   [phone]            Nov 01, 2018  3:45 PM CDT   [place] Visit with [place]   [facility])    [address] [place] [address]   [phone]              Who to contact     If you have questions or need follow up information about today's clinic visit or your schedule please contact [facility] directly at [phone].  Normal or non-critical lab and imaging results will be communicated to you by MyChart, letter or phone within 4 business days after the clinic has received the results. If you do not hear from us within 7 days, please contact the clinic through MyChart or phone. If you have a critical or abnormal lab result, we will notify you by phone as soon as possible.  Submit refill  requests through [employer] or call your pharmacy and they will forward the refill request to us. Please allow 3 business days for your refill to be completed.          Additional Information About Your Visit        [employer] Information     [employer] gives you secure access to your electronic health record. If you see a primary care provider, you can also send messages to your care team and make appointments. If you have questions, please call your primary care clinic.  If you do not have a primary care provider, please call [phone] and they will assist you.        Care EveryWhere ID     This is your Care EveryWhere ID. This could be used by other organizations to access your [place] medical records  [id number]         Blood Pressure from Last 3 Encounters:   08/27/18 118/60   08/10/18 124/70   08/09/18 92/58    Weight from Last 3 Encounters:   08/09/18 84.8 kg (187 lb)   02/22/18 83.9 kg (185 lb)   12/15/17 84.2 kg (185 lb 9.6 oz)              We Performed the Following     CHARGE - PHOTOCHEMOTHERAPY WITH UV-B        Primary Care Provider Office Phone # Fax #    Neal Mosquera [phone] [address] [facility] [address]        Equal Access to Services     JOSÉ MANUEL LIZARRAGA : Hadii tamiko riggins hadasho Somadisonali, waaxda luqadaha, qaybta kaalmada adeegyada, gerhard ureña. So [facility] [phone].    ATENCIÓN: Si habla español, tiene a cage disposición servicios gratuitos de asistencia lingüística. Llame al [phone].    We comply with applicable federal civil rights laws and [place] laws. We do not discriminate on the basis of race, color, national origin, age, disability, sex, sexual orientation, or gender identity.            Thank you!     Thank you for choosing [facility]  for your care. Our goal is always to provide you with excellent care. Hearing back from our patients is one way we can continue to improve our services. Please take a few minutes  to complete the written survey that you may receive in the mail after your visit with us. Thank you!             Your Updated Medication List - Protect others around you: Learn how to safely use, store and throw away your medicines at www.disposemymeds.org.          This list is accurate as of 10/15/18  4:57 PM.  Always use your most recent med list.                   Brand Name Dispense Instructions for use Diagnosis    aspirin 81 MG tablet      1 TABLET DAILY        lisinopril-hydrochlorothiazide 20-25 MG per tablet    PRINZIDE/ZESTORETIC    90 tablet    Take 1 tablet by mouth every morning    Benign essential hypertension       mometasone-formoterol 200-5 MCG/ACT oral inhaler    DULERA    1 Inhaler    Inhale 1 puff into the lungs 2 times daily Only using at the start of URI.    Cough due to bronchospasm       order for DME     2 Units    Equipment being ordered: Dynaflex insert    Plantar fasciitis, bilateral       pravastatin 40 MG tablet    PRAVACHOL    90 tablet    Take 1 tablet (40 mg) by mouth daily    Hyperlipidemia LDL goal <100       priLOSEC 20 MG CR capsule   Generic drug:  omeprazole      Take 1 capsule by mouth every morning.        tacrolimus 0.1 % ointment    PROTOPIC    30 g    Apply twice daily affected areas on body.    Nummular eczema       terazosin 5 MG capsule    HYTRIN    90 capsule    Take 1 capsule (5 mg) by mouth daily    Benign essential hypertension

## 2018-10-15 NOTE — LETTER
10/15/2018         RE: Mirta Cardenas  [address] [facility] [address]        Dear Colleague,    Thank you for referring your patient, Mirta Cardenas, to the [facility]. Please see a copy of my visit note below.    Here today for NBUVB for psoriasis  Treatment # 10  No issues   photoproection on eyes, lips, nipples  864 mj 1 minute 57 seconds today  Mineral oil applied  Goal 3 times weekly    Again, thank you for allowing me to participate in the care of your patient.        Sincerely,        Hailee Gale PA-C

## 2018-10-15 NOTE — PROGRESS NOTES
Here today for NBUVB for psoriasis  Treatment # 10  No issues   photoproection on eyes, lips, nipples  864 mj 1 minute 57 seconds today  Mineral oil applied  Goal 3 times weekly

## 2018-10-16 ENCOUNTER — OFFICE VISIT (OUTPATIENT)
Dept: DERMATOLOGY | Facility: CLINIC | Age: 63
End: 2018-10-16
Payer: COMMERCIAL

## 2018-10-16 DIAGNOSIS — L40.9 PSORIASIS: Primary | ICD-10-CM

## 2018-10-16 PROCEDURE — 96910 PHOTCHMTX TAR&UVB/PTRLTM&UVB: CPT | Performed by: PHYSICIAN ASSISTANT

## 2018-10-16 NOTE — MR AVS SNAPSHOT
After Visit Summary   10/16/2018    Mirta Cardenas    MRN: [id number]           Patient Information     Date Of Birth          1955        Visit Information        Provider Department      10/16/2018 3:45 PM Radha Matias PA-C [facility]        Today's Diagnoses     Psoriasis    -  1       Follow-ups after your visit        Your next 10 appointments already scheduled     Oct 23, 2018  3:45 PM CDT   [place] Visit with [place]   [facility] ([facility])    [address] [address]   [phone]            Oct 24, 2018  3:30 PM CDT   [place] Visit with [place]   [facility] ([facility])    [address] [address]   [phone]            Oct 29, 2018  3:45 PM CDT   [place]   [facility] ([facility])    [address] [address]   [phone]            Nov 01, 2018  3:45 PM CDT   [place] Visit with [place]   [facility] ([facility])    [address] [address]   [phone]              Who to contact     If you have questions or need follow up information about today's clinic visit or your schedule please contact [facility] directly at [phone].  Normal or non-critical lab and imaging results will be communicated to you by MyChart, letter or phone within 4 business days after the clinic has received the results. If you do not hear from us within 7 days, please contact the clinic through MyChart or phone. If you have a critical or abnormal lab result, we will notify you by phone as soon as possible.  Submit refill requests through Pins or call your pharmacy and they will forward the refill request to us. Please allow 3 business days for your refill to be completed.          Additional Information About  Your Visit        MyChart Information     Dealised gives you secure access to your electronic health record. If you see a primary care provider, you can also send messages to your care team and make appointments. If you have questions, please call your primary care clinic.  If you do not have a primary care provider, please call [phone] and they will assist you.        Care EveryWhere ID     This is your Care EveryWhere ID. This could be used by other organizations to access your [place] medical records  [id number]         Blood Pressure from Last 3 Encounters:   08/27/18 118/60   08/10/18 124/70   08/09/18 92/58    Weight from Last 3 Encounters:   08/09/18 84.8 kg (187 lb)   02/22/18 83.9 kg (185 lb)   12/15/17 84.2 kg (185 lb 9.6 oz)              We Performed the Following     PHOTOCHEMOTHERAPY WITH UV-B        Primary Care Provider Office Phone # Fax #    Neal Elvi Mosquera [phone] [address] [facility] [address]        Equal Access to Services     JOSÉ MANUEL LIZARRAGA : Hadii tamiko ku hadasho Sohussein, waaxda luqadaha, qaybta kaalmada elaine, gerhard salazar . So [facility] [phone].    ATENCIÓN: Si habla español, tiene a cage disposición servicios gratuitos de asistencia lingüística. [facility] [phone].    We comply with applicable federal civil rights laws and [place] laws. We do not discriminate on the basis of race, color, national origin, age, disability, sex, sexual orientation, or gender identity.            Thank you!     Thank you for choosing [facility]  for your care. Our goal is always to provide you with excellent care. Hearing back from our patients is one way we can continue to improve our services. Please take a few minutes to complete the written survey that you may receive in the mail after your visit with us. Thank you!             Your Updated Medication List - Protect others around you: Learn how to safely use, store  and throw away your medicines at www.disposemymeds.org.          This list is accurate as of 10/16/18 11:59 PM.  Always use your most recent med list.                   Brand Name Dispense Instructions for use Diagnosis    aspirin 81 MG tablet      1 TABLET DAILY        lisinopril-hydrochlorothiazide 20-25 MG per tablet    PRINZIDE/ZESTORETIC    90 tablet    Take 1 tablet by mouth every morning    Benign essential hypertension       mometasone-formoterol 200-5 MCG/ACT oral inhaler    DULERA    1 Inhaler    Inhale 1 puff into the lungs 2 times daily Only using at the start of URI.    Cough due to bronchospasm       order for DME     2 Units    Equipment being ordered: Dynaflex insert    Plantar fasciitis, bilateral       pravastatin 40 MG tablet    PRAVACHOL    90 tablet    Take 1 tablet (40 mg) by mouth daily    Hyperlipidemia LDL goal <100       priLOSEC 20 MG CR capsule   Generic drug:  omeprazole      Take 1 capsule by mouth every morning.        tacrolimus 0.1 % ointment    PROTOPIC    30 g    Apply twice daily affected areas on body.    Nummular eczema       terazosin 5 MG capsule    HYTRIN    90 capsule    Take 1 capsule (5 mg) by mouth daily    Benign essential hypertension

## 2018-10-16 NOTE — LETTER
10/16/2018         RE: Mirta Cardenas  [address] [facility] [address]        Dear Colleague,    Thank you for referring your patient, Mirta Cardenas, to the [facility]. Please see a copy of my visit note below.    Here today for NBUVB for psoriasis  Treatment # 11  No issues   photoproection on eyes, lips, nipples  942 mj 2 minute 15 seconds today  Mineral oil applied  Goal 3 times weekly    Again, thank you for allowing me to participate in the care of your patient.        Sincerely,        Radha Dugan PA-C

## 2018-10-18 NOTE — PROGRESS NOTES
Here today for NBUVB for psoriasis  Treatment # 11  No issues   photoproection on eyes, lips, nipples  942 mj 2 minute 15 seconds today  Mineral oil applied  Goal 3 times weekly

## 2018-10-23 ENCOUNTER — OFFICE VISIT (OUTPATIENT)
Dept: DERMATOLOGY | Facility: CLINIC | Age: 63
End: 2018-10-23
Payer: COMMERCIAL

## 2018-10-23 DIAGNOSIS — L40.9 PSORIASIS: Primary | ICD-10-CM

## 2018-10-23 PROCEDURE — 96910 PHOTCHMTX TAR&UVB/PTRLTM&UVB: CPT

## 2018-10-23 NOTE — MR AVS SNAPSHOT
After Visit Summary   10/23/2018    Mirta Cardenas    MRN: [id number]           Patient Information     Date Of Birth          1955        Visit Information        Provider Department      10/23/2018 3:45 PM [place] [facility]        Today's Diagnoses     Psoriasis    -  1       Follow-ups after your visit        Your next 10 appointments already scheduled     Oct 29, 2018  3:45 PM CDT   [place] Visit with [place]   [facility] ([facility])    [address] [address] [phone]            Nov 01, 2018  3:45 PM CDT   [place] Visit with [place]   [facility] ([facility])    [address] [address] [phone]              Who to contact     If you have questions or need follow up information about today's clinic visit or your schedule please contact [facility] directly at [phone].  Normal or non-critical lab and imaging results will be communicated to you by Q Designhart, letter or phone within 4 business days after the clinic has received the results. If you do not hear from us within 7 days, please contact the clinic through Twitmusict or phone. If you have a critical or abnormal lab result, we will notify you by phone as soon as possible.  Submit refill requests through BoosterMedia or call your pharmacy and they will forward the refill request to us. Please allow 3 business days for your refill to be completed.          Additional Information About Your Visit        Q Designhart Information     BoosterMedia gives you secure access to your electronic health record. If you see a primary care provider, you can also send messages to your care team and make appointments. If you have questions, please call your primary care clinic.  If you do not have a primary care provider, please call [phone] and they will assist you.        Care EveryWhere ID      This is your Care EveryWhere ID. This could be used by other organizations to access your [place] medical records  [id number]         Blood Pressure from Last 3 Encounters:   08/27/18 118/60   08/10/18 124/70   08/09/18 92/58    Weight from Last 3 Encounters:   08/09/18 84.8 kg (187 lb)   02/22/18 83.9 kg (185 lb)   12/15/17 84.2 kg (185 lb 9.6 oz)              We Performed the Following     PHOTOCHEMOTHERAPY WITH UV-B        Primary Care Provider Office Phone # Fax #    Neal Mosquera [phone] [address] [facility] [address]        Equal Access to Services     JOSÉ MANUEL LIZARRAGA : Hadii tamiko garciao Sohussein, waaxda luqadaha, qaybta kaalmada adeegyada, gerhard salazar . So [facility] [phone].    ATENCIÓN: Si habla español, tiene a cage disposición servicios gratuitos de asistencia lingüística. [facility] [phone].    We comply with applicable federal civil rights laws and [place] laws. We do not discriminate on the basis of race, color, national origin, age, disability, sex, sexual orientation, or gender identity.            Thank you!     Thank you for choosing [facility]  for your care. Our goal is always to provide you with excellent care. Hearing back from our patients is one way we can continue to improve our services. Please take a few minutes to complete the written survey that you may receive in the mail after your visit with us. Thank you!             Your Updated Medication List - Protect others around you: Learn how to safely use, store and throw away your medicines at www.disposemymeds.org.          This list is accurate as of 10/23/18 11:59 PM.  Always use your most recent med list.                   Brand Name Dispense Instructions for use Diagnosis    aspirin 81 MG tablet      1 TABLET DAILY        lisinopril-hydrochlorothiazide 20-25 MG per tablet    PRINZIDE/ZESTORETIC    90 tablet    Take 1 tablet by mouth every morning     Benign essential hypertension       mometasone-formoterol 200-5 MCG/ACT oral inhaler    DULERA    1 Inhaler    Inhale 1 puff into the lungs 2 times daily Only using at the start of URI.    Cough due to bronchospasm       order for DME     2 Units    Equipment being ordered: Dynaflex insert    Plantar fasciitis, bilateral       pravastatin 40 MG tablet    PRAVACHOL    90 tablet    Take 1 tablet (40 mg) by mouth daily    Hyperlipidemia LDL goal <100       priLOSEC 20 MG CR capsule   Generic drug:  omeprazole      Take 1 capsule by mouth every morning.        tacrolimus 0.1 % ointment    PROTOPIC    30 g    Apply twice daily affected areas on body.    Nummular eczema       terazosin 5 MG capsule    HYTRIN    90 capsule    Take 1 capsule (5 mg) by mouth daily    Benign essential hypertension

## 2018-10-24 ENCOUNTER — OFFICE VISIT (OUTPATIENT)
Dept: DERMATOLOGY | Facility: CLINIC | Age: 63
End: 2018-10-24
Payer: COMMERCIAL

## 2018-10-24 DIAGNOSIS — L40.9 PSORIASIS: Primary | ICD-10-CM

## 2018-10-24 PROCEDURE — 96910 PHOTCHMTX TAR&UVB/PTRLTM&UVB: CPT | Performed by: PHYSICIAN ASSISTANT

## 2018-10-24 NOTE — LETTER
10/24/2018         RE: Mirta Cardenas  [address] [facility] [address]        Dear Colleague,    Thank you for referring your patient, Mirta Cardenas, to the [facility]. Please see a copy of my visit note below.    Here today for NBUVB for psoriasis  Treatment # 12  No issues   photoproection on eyes, lips, nipples  1088 mj 2 minute  42 seconds today  Mineral oil applied  Goal 3 times weekly    Again, thank you for allowing me to participate in the care of your patient.        Sincerely,        Radha Dugan PA-C

## 2018-10-24 NOTE — MR AVS SNAPSHOT
After Visit Summary   10/24/2018    Mirta Cardenas    MRN: [id number]           Patient Information     Date Of Birth          1955        Visit Information        Provider Department      10/24/2018 3:30 PM Radha Matias PA-C [facility]        Today's Diagnoses     Psoriasis    -  1       Follow-ups after your visit        Your next 10 appointments already scheduled     Oct 29, 2018  3:45 PM CDT   [place] Visit with [place]   [facility] ([facility])    [address] [address] [phone]            Nov 01, 2018  3:45 PM CDT   [place] Visit with [place]   [facility] ([facility])    [address] [address] [phone]              Who to contact     If you have questions or need follow up information about today's clinic visit or your schedule please contact [facility] directly at [phone].  Normal or non-critical lab and imaging results will be communicated to you by Excep Appshart, letter or phone within 4 business days after the clinic has received the results. If you do not hear from us within 7 days, please contact the clinic through IP Fabricst or phone. If you have a critical or abnormal lab result, we will notify you by phone as soon as possible.  Submit refill requests through MicroQuant or call your pharmacy and they will forward the refill request to us. Please allow 3 business days for your refill to be completed.          Additional Information About Your Visit        Excep Appshart Information     MicroQuant gives you secure access to your electronic health record. If you see a primary care provider, you can also send messages to your care team and make appointments. If you have questions, please call your primary care clinic.  If you do not have a primary care provider, please call [phone] and they will assist you.        Care  EveryWhere ID     This is your Care EveryWhere ID. This could be used by other organizations to access your [place] medical records  [id number]         Blood Pressure from Last 3 Encounters:   08/27/18 118/60   08/10/18 124/70   08/09/18 92/58    Weight from Last 3 Encounters:   08/09/18 84.8 kg (187 lb)   02/22/18 83.9 kg (185 lb)   12/15/17 84.2 kg (185 lb 9.6 oz)              We Performed the Following     PHOTOCHEMOTHERAPY WITH UV-B        Primary Care Provider Office Phone # Fax #    Neal Mosquera [phone] [address] [facility] [address]        Equal Access to Services     JOSÉ MANUEL LIZARRAGA : Chris garciao Chapin, waaxda luqadaha, qaybta kaalmada adeegyada, gerhard ureña. So [facility] [phone].    ATENCIÓN: Si habla español, tiene a cage disposición servicios gratuitos de asistencia lingüística. [facility] [phone].    We comply with applicable federal civil rights laws and [place] laws. We do not discriminate on the basis of race, color, national origin, age, disability, sex, sexual orientation, or gender identity.            Thank you!     Thank you for choosing [facility]  for your care. Our goal is always to provide you with excellent care. Hearing back from our patients is one way we can continue to improve our services. Please take a few minutes to complete the written survey that you may receive in the mail after your visit with us. Thank you!             Your Updated Medication List - Protect others around you: Learn how to safely use, store and throw away your medicines at www.disposemymeds.org.          This list is accurate as of 10/24/18 11:59 PM.  Always use your most recent med list.                   Brand Name Dispense Instructions for use Diagnosis    aspirin 81 MG tablet      1 TABLET DAILY        lisinopril-hydrochlorothiazide 20-25 MG per tablet    PRINZIDE/ZESTORETIC    90 tablet    Take 1 tablet by mouth  every morning    Benign essential hypertension       mometasone-formoterol 200-5 MCG/ACT oral inhaler    DULERA    1 Inhaler    Inhale 1 puff into the lungs 2 times daily Only using at the start of URI.    Cough due to bronchospasm       order for DME     2 Units    Equipment being ordered: Dynaflex insert    Plantar fasciitis, bilateral       pravastatin 40 MG tablet    PRAVACHOL    90 tablet    Take 1 tablet (40 mg) by mouth daily    Hyperlipidemia LDL goal <100       priLOSEC 20 MG CR capsule   Generic drug:  omeprazole      Take 1 capsule by mouth every morning.        tacrolimus 0.1 % ointment    PROTOPIC    30 g    Apply twice daily affected areas on body.    Nummular eczema       terazosin 5 MG capsule    HYTRIN    90 capsule    Take 1 capsule (5 mg) by mouth daily    Benign essential hypertension

## 2018-10-25 NOTE — PROGRESS NOTES
Here today for NBUVB for psoriasis  Treatment # 12  No issues   photoproection on eyes, lips, nipples  1088 mj 2 minute 42 seconds today  Mineral oil applied  Goal 3 times weekly

## 2018-10-25 NOTE — PROGRESS NOTES
Here today for NBUVB for psoriasis  Treatment # 12  No issues   photoproection on eyes, lips, nipples  1017 mj 2 minute 30 seconds today  Mineral oil applied  Goal 3 times weekly

## 2018-10-29 ENCOUNTER — OFFICE VISIT (OUTPATIENT)
Dept: DERMATOLOGY | Facility: CLINIC | Age: 63
End: 2018-10-29
Payer: COMMERCIAL

## 2018-10-29 ENCOUNTER — TELEPHONE (OUTPATIENT)
Dept: DERMATOLOGY | Facility: CLINIC | Age: 63
End: 2018-10-29

## 2018-10-29 DIAGNOSIS — B02.9 HERPES ZOSTER WITHOUT COMPLICATION: Primary | ICD-10-CM

## 2018-10-29 DIAGNOSIS — L40.9 PSORIASIS: Primary | ICD-10-CM

## 2018-10-29 PROCEDURE — 96910 PHOTCHMTX TAR&UVB/PTRLTM&UVB: CPT | Performed by: PHYSICIAN ASSISTANT

## 2018-10-29 RX ORDER — VALACYCLOVIR HYDROCHLORIDE 1 G/1
1000 TABLET, FILM COATED ORAL 3 TIMES DAILY
Qty: 21 TABLET | Refills: 0 | Status: SHIPPED | OUTPATIENT
Start: 2018-10-29 | End: 2018-12-14

## 2018-10-29 NOTE — LETTER
10/29/2018         RE: Mirta Cardenas  [address] [facility] [address]        Dear Colleague,    Thank you for referring your patient, Mirta Cardenas, to the [facility]. Please see a copy of my visit note below.    Here today for NBUVB for psoriasis  Treatment # 14  No issues   photoproection on eyes, lips, nipples  1153 mj 2 minute  52 seconds today  Mineral oil applied  Goal 3 times weekly    Again, thank you for allowing me to participate in the care of your patient.        Sincerely,        Hailee Gale PA-C

## 2018-10-29 NOTE — TELEPHONE ENCOUNTER
"Spoke to patient who stated: \"I started to get symptoms last week, but I didn't put 2 and 2 together.. I have been having some pain, itching and little bumps on my left upper thigh, but no blisters yet. I have not had Shingles before, but it seems to be heading toward my left upper thigh -near the crotch, so I am not sure if it is Shingles?..\"    \"Should I keep the Light box appt today at 3:45?..\"  \"My PCP is not in today and if it is Shingles, would Hailee be able to prescribe an antiviral for me?\"    Uses [facility] pharmacy.     Please advise. Gudelia Huizar RN    "

## 2018-10-29 NOTE — MR AVS SNAPSHOT
After Visit Summary   10/29/2018    Mirta Cardenas    MRN: [id number]           Patient Information     Date Of Birth          1955        Visit Information        Provider Department      10/29/2018 3:45 PM Hailee Jay PA-C [facility]        Today's Diagnoses     Psoriasis    -  1       Follow-ups after your visit        Your next 10 appointments already scheduled     Oct 29, 2018  3:45 PM CDT   Return Visit with Hailee Jay PA-C   [facility] ([facility])    [address] [facility] [address] [phone]            Nov 01, 2018  3:45 PM CDT   [facility]   [facility] ([facility])    [address] [facility] [address] [phone]              Who to contact     If you have questions or need follow up information about today's clinic visit or your schedule please contact [facility] directly at [phone].  Normal or non-critical lab and imaging results will be communicated to you by Innovative Med Conceptshart, letter or phone within 4 business days after the clinic has received the results. If you do not hear from us within 7 days, please contact the clinic through Authentiumt or phone. If you have a critical or abnormal lab result, we will notify you by phone as soon as possible.  Submit refill requests through Shenandoah Studios or call your pharmacy and they will forward the refill request to us. Please allow 3 business days for your refill to be completed.          Additional Information About Your Visit        Innovative Med Conceptshart Information     Shenandoah Studios gives you secure access to your electronic health record. If you see a primary care provider, you can also send messages to your care team and make appointments. If you have questions, please call your primary care clinic.  If you do not have a primary care provider, please call [phone] and they will assist you.        Care  EveryWhere ID     This is your Care EveryWhere ID. This could be used by other organizations to access your [place] medical records  [id number]         Blood Pressure from Last 3 Encounters:   08/27/18 118/60   08/10/18 124/70   08/09/18 92/58    Weight from Last 3 Encounters:   08/09/18 84.8 kg (187 lb)   02/22/18 83.9 kg (185 lb)   12/15/17 84.2 kg (185 lb 9.6 oz)              We Performed the Following     CHARGE - PHOTOCHEMOTHERAPY WITH UV-B          Today's Medication Changes          These changes are accurate as of 10/29/18  3:37 PM.  If you have any questions, ask your nurse or doctor.               Start taking these medicines.        Dose/Directions    valACYclovir 1000 mg tablet   Commonly known as:  VALTREX   Used for:  Herpes zoster without complication   Started by:  Radha Matias PA-C        Dose:  1000 mg   Take 1 tablet (1,000 mg) by mouth 3 times daily   Quantity:  21 tablet   Refills:  0            Where to get your medicines      These medications were sent to [facility] [place] [address]     Phone:  [phone]     valACYclovir 1000 mg tablet                Primary Care Provider Office Phone # Fax #    Neal Mosquera [phone] [address] [facility] [address] [facility] [address]        Equal Access to Services     JOSÉ MANUEL LIZARRAGA AH: Hadflower garciao Sohussein, waaxda luqadaha, qaybta kaalmada elaine, gerhard salazar . So [facility] [phone].    ATENCIÓN: Si habla español, tiene a cage disposición servicios gratuitos de asistencia lingüística. Aislinname al [phone].    We comply with applicable federal civil rights laws and [place] laws. We do not discriminate on the basis of race, color, national origin, age, disability, sex, sexual orientation, or gender identity.            Thank you!     Thank you for choosing [facility]  for your care. Our goal  is always to provide you with excellent care. Hearing back from our patients is one way we can continue to improve our services. Please take a few minutes to complete the written survey that you may receive in the mail after your visit with us. Thank you!             Your Updated Medication List - Protect others around you: Learn how to safely use, store and throw away your medicines at www.disposemymeds.org.          This list is accurate as of 10/29/18  3:37 PM.  Always use your most recent med list.                   Brand Name Dispense Instructions for use Diagnosis    aspirin 81 MG tablet      1 TABLET DAILY        lisinopril-hydrochlorothiazide 20-25 MG per tablet    PRINZIDE/ZESTORETIC    90 tablet    Take 1 tablet by mouth every morning    Benign essential hypertension       mometasone-formoterol 200-5 MCG/ACT oral inhaler    DULERA    1 Inhaler    Inhale 1 puff into the lungs 2 times daily Only using at the start of URI.    Cough due to bronchospasm       order for DME     2 Units    Equipment being ordered: Dynaflex insert    Plantar fasciitis, bilateral       pravastatin 40 MG tablet    PRAVACHOL    90 tablet    Take 1 tablet (40 mg) by mouth daily    Hyperlipidemia LDL goal <100       priLOSEC 20 MG CR capsule   Generic drug:  omeprazole      Take 1 capsule by mouth every morning.        tacrolimus 0.1 % ointment    PROTOPIC    30 g    Apply twice daily affected areas on body.    Nummular eczema       terazosin 5 MG capsule    HYTRIN    90 capsule    Take 1 capsule (5 mg) by mouth daily    Benign essential hypertension       valACYclovir 1000 mg tablet    VALTREX    21 tablet    Take 1 tablet (1,000 mg) by mouth 3 times daily    Herpes zoster without complication

## 2018-10-29 NOTE — PROGRESS NOTES
Here today for NBUVB for psoriasis  Treatment # 14  No issues   photoproection on eyes, lips, nipples  1153 mj 2 minute 52 seconds today  Mineral oil applied  Goal 3 times weekly

## 2018-10-29 NOTE — TELEPHONE ENCOUNTER
No issue with her doing light today    I'm happy to call her in an antiviral - have her start taking this right away

## 2018-10-29 NOTE — TELEPHONE ENCOUNTER
Reason for Call:  Other     Detailed comments: Pt is scheduled for light box treatment today (3:45). She is wondering if she should keep this appt as she thinks she may have shingles. She had had burning pain and itching on lt thigh since the beginning of last week - thought it may be from the light box treatment (10/23 and 10/24). Last night started to form little bumps in the area. She did try to get in with her PCP, but not available today - Please advise    Phone Number Patient can be reached at: Cell number on file:    Telephone Information:   Mobile [phone]       Best Time: Any    Can we leave a detailed message on this number? No     Call taken on 10/29/2018 at 9:18 AM by Denise Behrendt

## 2018-11-13 ENCOUNTER — OFFICE VISIT (OUTPATIENT)
Dept: DERMATOLOGY | Facility: CLINIC | Age: 63
End: 2018-11-13
Payer: COMMERCIAL

## 2018-11-13 DIAGNOSIS — L40.9 PSORIASIS: Primary | ICD-10-CM

## 2018-11-13 PROCEDURE — 96910 PHOTCHMTX TAR&UVB/PTRLTM&UVB: CPT | Performed by: PHYSICIAN ASSISTANT

## 2018-11-13 NOTE — LETTER
11/13/2018         RE: Mirta Cardenas  [address] [facility] [address]        Dear Colleague,    Thank you for referring your patient, Mirta Cardenas, to the [facility]. Please see a copy of my visit note below.    Here today for NBUVB for psoriasis  Treatment # 15  No issues   photoproection on eyes, lips, nipples  1159 mj 2 minute 48 seconds today  Mineral oil applied  Goal 3 times weekly    Again, thank you for allowing me to participate in the care of your patient.        Sincerely,        Radha Dugan PA-C

## 2018-11-13 NOTE — MR AVS SNAPSHOT
After Visit Summary   11/13/2018    Mirta Cardenas    MRN: [id number]           Patient Information     Date Of Birth          1955        Visit Information        Provider Department      11/13/2018 3:30 PM Radha Matias PA-C [facility]        Today's Diagnoses     Psoriasis    -  1       Follow-ups after your visit        Your next 10 appointments already scheduled     Nov 19, 2018  3:30 PM CST   [place] [facility] [place] [facility] [facility]    [address] [facility] [address] [address]   [phone]            Nov 20, 2018  3:45 PM CST   [place] [facility] with [place]   [facility]    [address] [address]   [phone]            Nov 26, 2018  4:00 PM CST   [place]   [facility]    [address] [facility] [address] [address]   [phone]            Nov 28, 2018  3:45 PM CST   [place] with [place]   [facility]    [address] [address]   [phone]              Who to contact     If you have questions or need follow up information about today's clinic visit or your schedule please contact [facility] directly at [phone].  Normal or non-critical lab and imaging results will be communicated to you by MyChart, letter or phone within 4 business days after the clinic has received the results. If you do not hear from us within 7 days, please contact the clinic through MyChart or phone. If you have a critical or abnormal lab result, we will notify you by phone as soon as possible.  Submit refill requests through [employer] or call your pharmacy and they will forward the refill request to us. Please allow 3 business days for your refill to be completed.          Additional Information About  Your Visit        MyChart Information     [employer] gives you secure access to your electronic health record. If you see a primary care provider, you can also send messages to your care team and make appointments. If you have questions, please call your primary care clinic.  If you do not have a primary care provider, please call [phone] and they will assist you.        Care EveryWhere ID     This is your Care EveryWhere ID. This could be used by other organizations to access your [place] medical records  [id number]         Blood Pressure from Last 3 Encounters:   08/27/18 118/60   08/10/18 124/70   08/09/18 92/58    Weight from Last 3 Encounters:   08/09/18 84.8 kg (187 lb)   02/22/18 83.9 kg (185 lb)   12/15/17 84.2 kg (185 lb 9.6 oz)              We Performed the Following     PHOTOCHEMOTHERAPY WITH UV-B        Primary Care Provider Office Phone # Fax #    Neal Elvi Mosquera [phone] [address] [facility] [address] [facility] [address]        Equal Access to Services     JOSÉ MANUEL LIZARRAGA : Hadii tamiko ku hadasho Sohussein, waaxda luqadaha, qaybta kaalmada elaine, gerhard salazar . So [facility] [phone].    ATENCIÓN: Si habla español, tiene a cage disposición servicios gratuitos de asistencia lingüística. [facility] [phone].    We comply with applicable federal civil rights laws and [place] laws. We do not discriminate on the basis of race, color, national origin, age, disability, sex, sexual orientation, or gender identity.            Thank you!     Thank you for choosing [facility]  for your care. Our goal is always to provide you with excellent care. Hearing back from our patients is one way we can continue to improve our services. Please take a few minutes to complete the written survey that you may receive in the mail after your visit with us. Thank you!             Your Updated Medication List - Protect others around you: Learn how to safely use, store  and throw away your medicines at www.disposemymeds.org.          This list is accurate as of 11/13/18  3:46 PM.  Always use your most recent med list.                   Brand Name Dispense Instructions for use Diagnosis    aspirin 81 MG tablet      1 TABLET DAILY        lisinopril-hydrochlorothiazide 20-25 MG per tablet    PRINZIDE/ZESTORETIC    90 tablet    Take 1 tablet by mouth every morning    Benign essential hypertension       mometasone-formoterol 200-5 MCG/ACT oral inhaler    DULERA    1 Inhaler    Inhale 1 puff into the lungs 2 times daily Only using at the start of URI.    Cough due to bronchospasm       order for DME     2 Units    Equipment being ordered: Dynaflex insert    Plantar fasciitis, bilateral       pravastatin 40 MG tablet    PRAVACHOL    90 tablet    Take 1 tablet (40 mg) by mouth daily    Hyperlipidemia LDL goal <100       priLOSEC 20 MG CR capsule   Generic drug:  omeprazole      Take 1 capsule by mouth every morning.        tacrolimus 0.1 % ointment    PROTOPIC    30 g    Apply twice daily affected areas on body.    Nummular eczema       terazosin 5 MG capsule    HYTRIN    90 capsule    Take 1 capsule (5 mg) by mouth daily    Benign essential hypertension       valACYclovir 1000 mg tablet    VALTREX    21 tablet    Take 1 tablet (1,000 mg) by mouth 3 times daily    Herpes zoster without complication

## 2018-11-19 ENCOUNTER — OFFICE VISIT (OUTPATIENT)
Dept: DERMATOLOGY | Facility: CLINIC | Age: 63
End: 2018-11-19
Payer: COMMERCIAL

## 2018-11-19 DIAGNOSIS — L40.9 PSORIASIS: Primary | ICD-10-CM

## 2018-11-19 PROCEDURE — 96910 PHOTCHMTX TAR&UVB/PTRLTM&UVB: CPT | Performed by: PHYSICIAN ASSISTANT

## 2018-11-19 NOTE — MR AVS SNAPSHOT
After Visit Summary   11/19/2018    Mirta Cardenas    MRN: [id number]           Patient Information     Date Of Birth          1955        Visit Information        Provider Department      11/19/2018 3:30 PM Hailee Jay PA-C [facility]        Today's Diagnoses     Psoriasis    -  1       Follow-ups after your visit        Your next 10 appointments already scheduled     Nov 20, 2018  3:45 PM CST   [place] with [place]   [facility]    [address] [facility] [address]   [phone]            Nov 26, 2018  4:00 PM CST   [place] with [place]   [facility]    [address] [facility] [address]   [phone]            Nov 28, 2018  3:45 PM CST   [place] with [place]   [facility]    [address] [facility] [address]   [phone]            Dec 19, 2018  3:40 PM CST   Return Visit with Radha Matias PA-C   [facility]    [address] [facility] [address]   [phone]              Who to contact     If you have questions or need follow up information about today's clinic visit or your schedule please contact [facility] directly at [phone].  Normal or non-critical lab and imaging results will be communicated to you by MyChart, letter or phone within 4 business days after the clinic has received the results. If you do not hear from us within 7 days, please contact the clinic through MyChart or phone. If you have a critical or abnormal lab result, we will notify you by phone as soon as possible.  Submit refill requests through [employer] or call your pharmacy and they will forward the refill request to us. Please allow 3 business days for your refill to be completed.          Additional Information  About Your Visit        [employer] Information     [facility] gives you secure access to your electronic health record. If you see a primary care provider, you can also send messages to your care team and make appointments. If you have questions, please call your primary care clinic.  If you do not have a primary care provider, please call [phone] and they will assist you.        Care EveryWhere ID     This is your Care EveryWhere ID. This could be used by other organizations to access your [place] medical records  [id number]         Blood Pressure from Last 3 Encounters:   08/27/18 118/60   08/10/18 124/70   08/09/18 92/58    Weight from Last 3 Encounters:   08/09/18 84.8 kg (187 lb)   02/22/18 83.9 kg (185 lb)   12/15/17 84.2 kg (185 lb 9.6 oz)              We Performed the Following     CHARGE - PHOTOCHEMOTHERAPY WITH UV-B        Primary Care Provider Office Phone # Fax #    Neal Mosquera [phone] [address] [facility] [address]        Equal Access to Services     GEORGINA LIZARRAGA : Hadii tamiko ku hadasho Sohussein, waaxda luqadaha, qaybta kaalmada elaine, gerhard salazar . So [facility] [phone].    ATENCIÓN: Si habla español, tiene a cage disposición servicios gratuitos de asistencia lingüística. Bayron al [phone].    We comply with applicable federal civil rights laws and [place] laws. We do not discriminate on the basis of race, color, national origin, age, disability, sex, sexual orientation, or gender identity.            Thank you!     Thank you for choosing [facility]  for your care. Our goal is always to provide you with excellent care. Hearing back from our patients is one way we can continue to improve our services. Please take a few minutes to complete the written survey that you may receive in the mail after your visit with us. Thank you!             Your Updated Medication List - Protect others around you: Learn how to  safely use, store and throw away your medicines at www.disposemymeds.org.          This list is accurate as of 11/19/18  6:00 PM.  Always use your most recent med list.                   Brand Name Dispense Instructions for use Diagnosis    aspirin 81 MG tablet      1 TABLET DAILY        lisinopril-hydrochlorothiazide 20-25 MG per tablet    PRINZIDE/ZESTORETIC    90 tablet    Take 1 tablet by mouth every morning    Benign essential hypertension       mometasone-formoterol 200-5 MCG/ACT oral inhaler    DULERA    1 Inhaler    Inhale 1 puff into the lungs 2 times daily Only using at the start of URI.    Cough due to bronchospasm       order for DME     2 Units    Equipment being ordered: Dynaflex insert    Plantar fasciitis, bilateral       pravastatin 40 MG tablet    PRAVACHOL    90 tablet    Take 1 tablet (40 mg) by mouth daily    Hyperlipidemia LDL goal <100       priLOSEC 20 MG CR capsule   Generic drug:  omeprazole      Take 1 capsule by mouth every morning.        tacrolimus 0.1 % ointment    PROTOPIC    30 g    Apply twice daily affected areas on body.    Nummular eczema       terazosin 5 MG capsule    HYTRIN    90 capsule    Take 1 capsule (5 mg) by mouth daily    Benign essential hypertension       valACYclovir 1000 mg tablet    VALTREX    21 tablet    Take 1 tablet (1,000 mg) by mouth 3 times daily    Herpes zoster without complication

## 2018-11-20 ENCOUNTER — OFFICE VISIT (OUTPATIENT)
Dept: DERMATOLOGY | Facility: CLINIC | Age: 63
End: 2018-11-20
Payer: COMMERCIAL

## 2018-11-20 DIAGNOSIS — L40.9 PSORIASIS: Primary | ICD-10-CM

## 2018-11-20 PROCEDURE — 96910 PHOTCHMTX TAR&UVB/PTRLTM&UVB: CPT | Performed by: PHYSICIAN ASSISTANT

## 2018-11-20 NOTE — MR AVS SNAPSHOT
After Visit Summary   11/20/2018    Mirta Cardenas    MRN: [id number]           Patient Information     Date Of Birth          1955        Visit Information        Provider Department      11/20/2018 3:45 PM Radha Matias PA-C [facility]        Today's Diagnoses     Psoriasis    -  1       Follow-ups after your visit        Your next 10 appointments already scheduled     Nov 26, 2018  4:00 PM CST   [place] with [place]   [facility]    [address]  [facility] [place] [address]   [phone]            Nov 28, 2018  3:45 PM CST   [place] Visit with [place]   [facility]    [address]  [facility] [place] [address]   [phone]            Dec 03, 2018  3:45 PM CST   [place] Visit with [place]   [facility]    [address] [facility] [place] [address]   [phone]            Dec 06, 2018  3:45 PM CST   [place] Visit with [place]   [facility]    [address]  [facility] [place] [address]   [phone]            Dec 10, 2018  3:45 PM CST   [place] Visit with [place]   [facility]    [address]  [facility] [place] [address]   [phone]            Dec 13, 2018  3:45 PM CST   [place] Visit with [place]   [facility]    [address]  [facility] [place] [address]   [phone]            Dec 17, 2018  3:45 PM CST   [place] Visit with [place]   [facility]    [address] [facility] [place] [address]   [phone]            Dec 19, 2018  3:40 PM CST   Return Visit with Radha Matias PA-C   [facility]    [address] [facility]  [address]   [phone]            Dec 19, 2018  4:00 PM CST   Return Visit with [facility]   [facility]    [address] [facility] [address]   [phone]              Who to contact     If you have questions or need follow up information about today's clinic visit or your schedule please contact [facility] directly at [phone].  Normal or non-critical lab and imaging results will be communicated to you by MyChart, letter or phone within 4 business days after the clinic has received the results. If you do not hear from us within 7 days, please contact the clinic through Mingly[employer] or phone. If you have a critical or abnormal lab result, we will notify you by phone as soon as possible.  Submit refill requests through [employer] or call your pharmacy and they will forward the refill request to us. Please allow 3 business days for your refill to be completed.          Additional Information About Your Visit        Minglyhart Information     [employer] gives you secure access to your electronic health record. If you see a primary care provider, you can also send messages to your care team and make appointments. If you have questions, please call your primary care clinic.  If you do not have a primary care provider, please call [phone] and they will assist you.        Care EveryWhere ID     This is your Care EveryWhere ID. This could be used by other organizations to access your [place] medical records  [id number]         Blood Pressure from Last 3 Encounters:   08/27/18 118/60   08/10/18 124/70   08/09/18 92/58    Weight from Last 3 Encounters:   08/09/18 84.8 kg (187 lb)   02/22/18 83.9 kg (185 lb)   12/15/17 84.2 kg (185 lb 9.6 oz)              We Performed the Following     PHOTOCHEMOTHERAPY WITH UV-B        Primary Care Provider Office Phone # Fax #    Neal Mosquera [phone]       [address] [facility]  [address]        Equal Access to Services     [facility]: Hadii tamiko riggins oriana Samson, wamichaelda luqadaha, qaybta kakennethorlando chanelsylviabandar, gerhard arroyoagustínbrian ureña. So [facility] [phone].    ATENCIÓN: Si habla español, tiene a cage disposición servicios gratuitos de asistencia lingüística. Aislinname al [phone].    We comply with applicable federal civil rights laws and [place] laws. We do not discriminate on the basis of race, color, national origin, age, disability, sex, sexual orientation, or gender identity.            Thank you!     Thank you for choosing [facility]  for your care. Our goal is always to provide you with excellent care. Hearing back from our patients is one way we can continue to improve our services. Please take a few minutes to complete the written survey that you may receive in the mail after your visit with us. Thank you!             Your Updated Medication List - Protect others around you: Learn how to safely use, store and throw away your medicines at www.disposemymeds.org.          This list is accurate as of 11/20/18 11:59 PM.  Always use your most recent med list.                   Brand Name Dispense Instructions for use Diagnosis    aspirin 81 MG tablet      1 TABLET DAILY        lisinopril-hydrochlorothiazide 20-25 MG per tablet    PRINZIDE/ZESTORETIC    90 tablet    Take 1 tablet by mouth every morning    Benign essential hypertension       mometasone-formoterol 200-5 MCG/ACT oral inhaler    DULERA    1 Inhaler    Inhale 1 puff into the lungs 2 times daily Only using at the start of URI.    Cough due to bronchospasm       order for DME     2 Units    Equipment being ordered: Dynaflex insert    Plantar fasciitis, bilateral       pravastatin 40 MG tablet    PRAVACHOL    90 tablet    Take 1 tablet (40 mg) by mouth daily    Hyperlipidemia LDL goal <100       priLOSEC 20 MG CR capsule   Generic drug:  omeprazole      Take 1 capsule by mouth every morning.         tacrolimus 0.1 % ointment    PROTOPIC    30 g    Apply twice daily affected areas on body.    Nummular eczema       terazosin 5 MG capsule    HYTRIN    90 capsule    Take 1 capsule (5 mg) by mouth daily    Benign essential hypertension       valACYclovir 1000 mg tablet    VALTREX    21 tablet    Take 1 tablet (1,000 mg) by mouth 3 times daily    Herpes zoster without complication

## 2018-11-20 NOTE — LETTER
11/20/2018         RE: Mirta Cardenas  [address] [facility] [address]        Dear Colleague,    Thank you for referring your patient, Mirta Cardenas, to the [facility]. Please see a copy of my visit note below.    Here today for NBUVB for psoriasis  Treatment # 17  No issues   photoproection on eyes, lips, nipples  1248 mj 3 minute 4 seconds today  Mineral oil applied  Goal 3 times weekly    Again, thank you for allowing me to participate in the care of your patient.        Sincerely,        Radha Dugan PA-C

## 2018-11-21 NOTE — PROGRESS NOTES
Here today for NBUVB for psoriasis  Treatment # 17  No issues   photoproection on eyes, lips, nipples  1248 mj 3 minute 4 seconds today  Mineral oil applied  Goal 3 times weekly

## 2018-11-26 ENCOUNTER — OFFICE VISIT (OUTPATIENT)
Dept: DERMATOLOGY | Facility: CLINIC | Age: 63
End: 2018-11-26
Payer: COMMERCIAL

## 2018-11-26 DIAGNOSIS — L40.9 PSORIASIS: Primary | ICD-10-CM

## 2018-11-26 PROCEDURE — 96910 PHOTCHMTX TAR&UVB/PTRLTM&UVB: CPT | Performed by: PHYSICIAN ASSISTANT

## 2018-11-26 NOTE — LETTER
11/26/2018         RE: Mirta Cardenas  [address] [facility] [address]        Dear Colleague,    Thank you for referring your patient, Mirta Cardenas, to the [facility]. Please see a copy of my visit note below.    Here today for NBUVB for psoriasis  Treatment # 18  No issues   photoproection on eyes, lips, nipples  1273 mj 2 minute 57 seconds today  Mineral oil applied  Goal 3 times weekly    Again, thank you for allowing me to participate in the care of your patient.        Sincerely,        Hailee Gale PA-C

## 2018-11-26 NOTE — MR AVS SNAPSHOT
After Visit Summary   11/26/2018    Mirta Cardenas    MRN: [id number]           Patient Information     Date Of Birth          1955        Visit Information        Provider Department      11/26/2018 4:00 PM Hailee Jay PA-C [facility]        Today's Diagnoses     Psoriasis    -  1       Follow-ups after your visit        Your next 10 appointments already scheduled     Nov 28, 2018  3:45 PM CST   [place] Visit with [place]   [facility] ([facility])    [address]  [facility] [place] [address]   [phone]            Dec 03, 2018  3:45 PM CST   [place] Visit with [place]   [facility] ([facility])    [address]  [facility] [place] [address]   [phone]            Dec 06, 2018  3:45 PM CST   [place] Visit with [place]   [facility] ([facility])    [address] [facility] [place] [address]   [phone]            Dec 10, 2018  3:45 PM CST   [place] Visit with [place]   [facility] ([facility])    [address]  [facility] [place] [address]   [phone]            Dec 13, 2018  3:45 PM CST   [place] Visit with [place]   [facility] ([facility])    [address]  [facility] [place] [address]   [phone]            Dec 17, 2018  3:45 PM CST   [place] Visit with [place]   [facility] ([facility])    [address]  [facility] [place] [address]   [phone]            Dec 19, 2018  3:40 PM CST   Return Visit with Radha Matias PA-C   [facility] ([facility])    [address]  [facility] [place] [address]   [phone]            Dec 19, 2018  4:00 PM CST   [place] Visit with [place]   [facility] ([facility])    [address] [facility] [address]    [phone]              Who to contact     If you have questions or need follow up information about today's clinic visit or your schedule please contact [facility] directly at [phone].  Normal or non-critical lab and imaging results will be communicated to you by MyChart, letter or phone within 4 business days after the clinic has received the results. If you do not hear from us within 7 days, please contact the clinic through MyChart or phone. If you have a critical or abnormal lab result, we will notify you by phone as soon as possible.  Submit refill requests through [employer] or call your pharmacy and they will forward the refill request to us. Please allow 3 business days for your refill to be completed.          Additional Information About Your Visit        [employer] Information     [employer] gives you secure access to your electronic health record. If you see a primary care provider, you can also send messages to your care team and make appointments. If you have questions, please call your primary care clinic.  If you do not have a primary care provider, please call [phone] and they will assist you.        Care EveryWhere ID     This is your Care EveryWhere ID. This could be used by other organizations to access your [place] medical records  [id number]         Blood Pressure from Last 3 Encounters:   08/27/18 118/60   08/10/18 124/70   08/09/18 92/58    Weight from Last 3 Encounters:   08/09/18 84.8 kg (187 lb)   02/22/18 83.9 kg (185 lb)   12/15/17 84.2 kg (185 lb 9.6 oz)              We Performed the Following     CHARGE - PHOTOCHEMOTHERAPY WITH UV-B        Primary Care Provider Office Phone # Fax #    Neal Mosquera [phone] [address] [facility] [address]        Equal Access to Services     JOSÉ MANUEL LIZARRAGA : hawk Barnard, gerhard gómez. So [facility]  [phone].    ATENCIÓN: Si eamon oliva, tiene a cage disposición servicios gratuitos de asistencia lingüística. Bayron jackson [phone].    We comply with applicable federal civil rights laws and [place] laws. We do not discriminate on the basis of race, color, national origin, age, disability, sex, sexual orientation, or gender identity.            Thank you!     Thank you for choosing [facility]  for your care. Our goal is always to provide you with excellent care. Hearing back from our patients is one way we can continue to improve our services. Please take a few minutes to complete the written survey that you may receive in the mail after your visit with us. Thank you!             Your Updated Medication List - Protect others around you: Learn how to safely use, store and throw away your medicines at www.disposemymeds.org.          This list is accurate as of 11/26/18  4:54 PM.  Always use your most recent med list.                   Brand Name Dispense Instructions for use Diagnosis    aspirin 81 MG tablet    ASA     1 TABLET DAILY        lisinopril-hydrochlorothiazide 20-25 MG per tablet    PRINZIDE/ZESTORETIC    90 tablet    Take 1 tablet by mouth every morning    Benign essential hypertension       mometasone-formoterol 200-5 MCG/ACT inhaler    DULERA    1 Inhaler    Inhale 1 puff into the lungs 2 times daily Only using at the start of URI.    Cough due to bronchospasm       order for DME     2 Units    Equipment being ordered: Dynaflex insert    Plantar fasciitis, bilateral       pravastatin 40 MG tablet    PRAVACHOL    90 tablet    Take 1 tablet (40 mg) by mouth daily    Hyperlipidemia LDL goal <100       priLOSEC 20 MG DR capsule   Generic drug:  omeprazole      Take 1 capsule by mouth every morning.        tacrolimus 0.1 % ointment    PROTOPIC    30 g    Apply twice daily affected areas on body.    Nummular eczema       terazosin 5 MG capsule    HYTRIN    90 capsule    Take 1 capsule (5  mg) by mouth daily    Benign essential hypertension       valACYclovir 1000 mg tablet    VALTREX    21 tablet    Take 1 tablet (1,000 mg) by mouth 3 times daily    Herpes zoster without complication

## 2018-11-26 NOTE — PROGRESS NOTES
Here today for NBUVB for psoriasis  Treatment # 18  No issues   photoproection on eyes, lips, nipples  1273 mj 2 minute 57 seconds today  Mineral oil applied  Goal 3 times weekly

## 2018-11-28 ENCOUNTER — APPOINTMENT (OUTPATIENT)
Dept: DERMATOLOGY | Facility: CLINIC | Age: 63
End: 2018-11-28
Payer: COMMERCIAL

## 2018-12-03 ENCOUNTER — OFFICE VISIT (OUTPATIENT)
Dept: DERMATOLOGY | Facility: CLINIC | Age: 63
End: 2018-12-03
Payer: COMMERCIAL

## 2018-12-03 DIAGNOSIS — L40.9 PSORIASIS: Primary | ICD-10-CM

## 2018-12-03 PROCEDURE — 96910 PHOTCHMTX TAR&UVB/PTRLTM&UVB: CPT | Performed by: PHYSICIAN ASSISTANT

## 2018-12-03 NOTE — PROGRESS NOTES
Here today for NBUVB for psoriasis  Treatment # 19  No issues   photoproection on eyes, lips, nipples  1298 mj 3 minute 11 seconds today  Mineral oil applied  Goal 3 times weekly

## 2018-12-03 NOTE — MR AVS SNAPSHOT
After Visit Summary   12/3/2018    Mirta Cardenas    MRN: [id number]           Patient Information     Date Of Birth          1955        Visit Information        Provider Department      12/3/2018 3:45 PM Hailee Jay PA-C [facility]        Today's Diagnoses     Psoriasis    -  1       Follow-ups after your visit        Your next 10 appointments already scheduled     Dec 06, 2018  3:45 PM CST   [place] Visit with [place]   [facility] ([facility]    [address]  [place] [address]   [phone]            Dec 10, 2018  3:45 PM CST   [place] Visit with [place]   [facility] ([facility])    [address]  [place] [address]   [phone]            Dec 13, 2018  3:45 PM CST   [place] Visit with [place]   [facility] ([facility])    [address] [facility] [facility] [facility] [place] [address]   [phone]            Dec 17, 2018  3:45 PM CST   [place] Visit with [place]   [facility] ([facility])    [address] [facility] [place] [address]   [phone]            Dec 19, 2018  3:40 PM CST   Return Visit with Radha Matias PA-C   [facility] ([facility])    [address] [facility] [address]   [phone]            Dec 19, 2018  4:00 PM CST   [place] Visit with [place]   [facility] ([facility])    [address] [facility] [address] [facility] [address]   [phone]              Who to contact     If you have questions or need follow up information about today's clinic visit or your schedule please contact [facility] directly at [phone].  Normal or non-critical lab and imaging results will be communicated to you by MyChart, letter or phone within 4 business days after the clinic has received the results. If you do  not hear from us within 7 days, please contact the clinic through [employer] or phone. If you have a critical or abnormal lab result, we will notify you by phone as soon as possible.  Submit refill requests through [employer] or call your pharmacy and they will forward the refill request to us. Please allow 3 business days for your refill to be completed.          Additional Information About Your Visit        [employer] Information     [employer] gives you secure access to your electronic health record. If you see a primary care provider, you can also send messages to your care team and make appointments. If you have questions, please call your primary care clinic.  If you do not have a primary care provider, please call [phone] and they will assist you.        Care EveryWhere ID     This is your Care EveryWhere ID. This could be used by other organizations to access your [place] medical records  [id number]         Blood Pressure from Last 3 Encounters:   08/27/18 118/60   08/10/18 124/70   08/09/18 92/58    Weight from Last 3 Encounters:   08/09/18 84.8 kg (187 lb)   02/22/18 83.9 kg (185 lb)   12/15/17 84.2 kg (185 lb 9.6 oz)              We Performed the Following     CHARGE - PHOTOCHEMOTHERAPY WITH UV-B        Primary Care Provider Office Phone # Fax #    Neal Mosquera [phone] [address] [facility] [address]        Equal Access to Services     [facility] : Hadii aad ku hadasho Somadisonali, waaxda luqadaha, qaybta kaalmada adeegyada, gerhard salazar . So [facility] [phone].    ATENCIÓN: Si habla español, tiene a cage disposición servicios gratuitos de asistencia lingüística. Bayron al [phone].    We comply with applicable federal civil rights laws and [place] laws. We do not discriminate on the basis of race, color, national origin, age, disability, sex, sexual orientation, or gender identity.            Thank you!     Thank you for choosing [place]  [facility]  for your care. Our goal is always to provide you with excellent care. Hearing back from our patients is one way we can continue to improve our services. Please take a few minutes to complete the written survey that you may receive in the mail after your visit with us. Thank you!             Your Updated Medication List - Protect others around you: Learn how to safely use, store and throw away your medicines at www.disposemymeds.org.          This list is accurate as of 12/3/18  5:00 PM.  Always use your most recent med list.                   Brand Name Dispense Instructions for use Diagnosis    aspirin 81 MG tablet    ASA     1 TABLET DAILY        lisinopril-hydrochlorothiazide 20-25 MG tablet    PRINZIDE/ZESTORETIC    90 tablet    Take 1 tablet by mouth every morning    Benign essential hypertension       mometasone-formoterol 200-5 MCG/ACT inhaler    DULERA    1 Inhaler    Inhale 1 puff into the lungs 2 times daily Only using at the start of URI.    Cough due to bronchospasm       order for DME     2 Units    Equipment being ordered: Dynaflex insert    Plantar fasciitis, bilateral       pravastatin 40 MG tablet    PRAVACHOL    90 tablet    Take 1 tablet (40 mg) by mouth daily    Hyperlipidemia LDL goal <100       priLOSEC 20 MG DR capsule   Generic drug:  omeprazole      Take 1 capsule by mouth every morning.        tacrolimus 0.1 % external ointment    PROTOPIC    30 g    Apply twice daily affected areas on body.    Nummular eczema       terazosin 5 MG capsule    HYTRIN    90 capsule    Take 1 capsule (5 mg) by mouth daily    Benign essential hypertension       valACYclovir 1000 mg tablet    VALTREX    21 tablet    Take 1 tablet (1,000 mg) by mouth 3 times daily    Herpes zoster without complication

## 2018-12-06 ENCOUNTER — OFFICE VISIT (OUTPATIENT)
Dept: DERMATOLOGY | Facility: CLINIC | Age: 63
End: 2018-12-06
Payer: COMMERCIAL

## 2018-12-06 ENCOUNTER — TELEPHONE (OUTPATIENT)
Dept: FAMILY MEDICINE | Facility: CLINIC | Age: 63
End: 2018-12-06

## 2018-12-06 DIAGNOSIS — L40.9 PSORIASIS: Primary | ICD-10-CM

## 2018-12-06 PROCEDURE — 96910 PHOTCHMTX TAR&UVB/PTRLTM&UVB: CPT | Performed by: PHYSICIAN ASSISTANT

## 2018-12-06 NOTE — TELEPHONE ENCOUNTER
"S- Cough has never went away.     B- works in a school, around sick people all the time    A- Patient states she was taking her inhaler for 2 weeks and stopped last week. \"Coughing up stuff\", pale colored. Denies fever. Patient denies SOB. 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 She states they do have viral infections and are not being treated. She states she would like to wait a week to see if this clears up on her own.     R- helped her to make an appointment. She was advised to restart her inhaler.   Daylin ECHEVARRIA RN        "

## 2018-12-06 NOTE — MR AVS SNAPSHOT
After Visit Summary   12/6/2018    Mirta Cardenas    MRN: [id number]           Patient Information     Date Of Birth          1955        Visit Information        Provider Department      12/6/2018 3:45 PM Hailee Jay PA-C [facility]        Today's Diagnoses     Psoriasis    -  1       Follow-ups after your visit        Your next 10 appointments already scheduled     Dec 10, 2018  3:45 PM CST   [place] Visit with [place]   [facility] ([facility])    [address]  [facility] [address]   [phone]            Dec 13, 2018  3:45 PM CST   [place] Visit with [place]   [facility] ([facility])    [address] [address]   [phone]            Dec 14, 2018  6:40 AM CST   SHORT with Neal Mosquera MD   [facility] ([facility])    [address] [address]   [phone]            Dec 17, 2018  3:45 PM CST   [place] Visit with [place]   [facility] ([facility])    [address] [address]   [phone]            Dec 19, 2018  3:40 PM CST   Return Visit with Radha Matias PA-C   [facility] ([facility])    [address] [address]   [phone]            Dec 19, 2018  4:00 PM CST   [place] Visit with [place]   [facility] ([facility])    [address] [address]   [phone]              Who to contact     If you have questions or need follow up information about today's clinic visit or your schedule please contact [facility] directly at [phone].  Normal or non-critical lab and imaging results will be communicated to you by MyChart, letter or phone within 4 business days after the clinic has received the results. If you do  not hear from us within 7 days, please contact the clinic through [employer] or phone. If you have a critical or abnormal lab result, we will notify you by phone as soon as possible.  Submit refill requests through [employer] or call your pharmacy and they will forward the refill request to us. Please allow 3 business days for your refill to be completed.          Additional Information About Your Visit        [employer] Information     [employer] gives you secure access to your electronic health record. If you see a primary care provider, you can also send messages to your care team and make appointments. If you have questions, please call your primary care clinic.  If you do not have a primary care provider, please call [phone] and they will assist you.        Care EveryWhere ID     This is your Care EveryWhere ID. This could be used by other organizations to access your [place] medical records  [id number]         Blood Pressure from Last 3 Encounters:   08/27/18 118/60   08/10/18 124/70   08/09/18 92/58    Weight from Last 3 Encounters:   08/09/18 84.8 kg (187 lb)   02/22/18 83.9 kg (185 lb)   12/15/17 84.2 kg (185 lb 9.6 oz)              We Performed the Following     CHARGE - PHOTOCHEMOTHERAPY WITH UV-B        Primary Care Provider Office Phone # Fax #    Neal Mosquera [phone] [address] [facility] [address]        Equal Access to Services     [facility] : Hadii aad ku hadasho Somadisonali, waaxda luqadaha, qaybta kaalmada adeegyada, gerhard salazar . So [facility] [phone].    ATENCIÓN: Si habla español, tiene a cage disposición servicios gratuitos de asistencia lingüística. Bayron al [phone].    We comply with applicable federal civil rights laws and [place] laws. We do not discriminate on the basis of race, color, national origin, age, disability, sex, sexual orientation, or gender identity.            Thank you!     Thank you for choosing [place]  [facility]  for your care. Our goal is always to provide you with excellent care. Hearing back from our patients is one way we can continue to improve our services. Please take a few minutes to complete the written survey that you may receive in the mail after your visit with us. Thank you!             Your Updated Medication List - Protect others around you: Learn how to safely use, store and throw away your medicines at www.disposemymeds.org.          This list is accurate as of 12/6/18  5:58 PM.  Always use your most recent med list.                   Brand Name Dispense Instructions for use Diagnosis    aspirin 81 MG tablet    ASA     1 TABLET DAILY        lisinopril-hydrochlorothiazide 20-25 MG tablet    PRINZIDE/ZESTORETIC    90 tablet    Take 1 tablet by mouth every morning    Benign essential hypertension       mometasone-formoterol 200-5 MCG/ACT inhaler    DULERA    1 Inhaler    Inhale 1 puff into the lungs 2 times daily Only using at the start of URI.    Cough due to bronchospasm       order for DME     2 Units    Equipment being ordered: Dynaflex insert    Plantar fasciitis, bilateral       pravastatin 40 MG tablet    PRAVACHOL    90 tablet    Take 1 tablet (40 mg) by mouth daily    Hyperlipidemia LDL goal <100       priLOSEC 20 MG DR capsule   Generic drug:  omeprazole      Take 1 capsule by mouth every morning.        tacrolimus 0.1 % external ointment    PROTOPIC    30 g    Apply twice daily affected areas on body.    Nummular eczema       terazosin 5 MG capsule    HYTRIN    90 capsule    Take 1 capsule (5 mg) by mouth daily    Benign essential hypertension       valACYclovir 1000 mg tablet    VALTREX    21 tablet    Take 1 tablet (1,000 mg) by mouth 3 times daily    Herpes zoster without complication

## 2018-12-06 NOTE — LETTER
12/6/2018         RE: Mirta Cardenas  [address] [facility] [address]        Dear Colleague,    Thank you for referring your patient, Mirta Cardenas, to the [facility]. Please see a copy of my visit note below.    Here today for NBUVB for psoriasis  Treatment # 20  No issues   photoproection on eyes, lips, nipples  1345 mj 3 minute 15 seconds today  Mineral oil applied  Goal 3 times weekly    Again, thank you for allowing me to participate in the care of your patient.        Sincerely,        Hailee Gale PA-C

## 2018-12-06 NOTE — PROGRESS NOTES
Here today for NBUVB for psoriasis  Treatment # 20  No issues   photoproection on eyes, lips, nipples  1345 mj 3 minute 15 seconds today  Mineral oil applied  Goal 3 times weekly

## 2018-12-06 NOTE — TELEPHONE ENCOUNTER
Reason for Call:  Other cough    Detailed comments: Patient states she has had a cough for 4 weeks.  She was using her Dulera inhaler but stopped it on day 14.  Still has a bad cough, can she go back to the Dulera?    Phone Number Patient can be reached at: Cell number on file:    Telephone Information:   Mobile [phone]       Best Time: any    Can we leave a detailed message on this number? YES    Call taken on 12/6/2018 at 4:17 PM by Meil Rogers

## 2018-12-10 ENCOUNTER — OFFICE VISIT (OUTPATIENT)
Dept: DERMATOLOGY | Facility: CLINIC | Age: 63
End: 2018-12-10
Payer: COMMERCIAL

## 2018-12-10 DIAGNOSIS — L40.9 PSORIASIS: Primary | ICD-10-CM

## 2018-12-10 PROCEDURE — 96910 PHOTCHMTX TAR&UVB/PTRLTM&UVB: CPT | Performed by: PHYSICIAN ASSISTANT

## 2018-12-10 NOTE — PROGRESS NOTES
Here today for NBUVB for psoriasis  Treatment # 21  No issues   photoproection on eyes, lips, nipples  1350 mj 3 minute 21 seconds today  Mineral oil applied  Goal 3 times weekly

## 2018-12-10 NOTE — LETTER
12/10/2018         RE: Mirta Cardenas  [id number] [facility] [address]        Dear Colleague,    Thank you for referring your patient, Mirta Cardenas, to the [facility]. Please see a copy of my visit note below.    Here today for NBUVB for psoriasis  Treatment # 21  No issues   photoproection on eyes, lips, nipples  1350 mj 3 minute 21 seconds today  Mineral oil applied  Goal 3 times weekly    Again, thank you for allowing me to participate in the care of your patient.        Sincerely,        Hailee Gale PA-C

## 2018-12-13 ENCOUNTER — OFFICE VISIT (OUTPATIENT)
Dept: DERMATOLOGY | Facility: CLINIC | Age: 63
End: 2018-12-13
Payer: COMMERCIAL

## 2018-12-13 DIAGNOSIS — L40.9 PSORIASIS: Primary | ICD-10-CM

## 2018-12-13 PROCEDURE — 96910 PHOTCHMTX TAR&UVB/PTRLTM&UVB: CPT | Performed by: PHYSICIAN ASSISTANT

## 2018-12-13 NOTE — PROGRESS NOTES
Here today for NBUVB for psoriasis  Treatment # 22  No issues   photoproection on eyes, lips, nipples  1350 mj 3 minute 21 seconds today  Mineral oil applied  Goal 3 times weekly

## 2018-12-13 NOTE — LETTER
12/13/2018         RE: Mirta Cardenas  [address] [facility] [address]        Dear Colleague,    Thank you for referring your patient, Mirta Cardenas, to the [facility]. Please see a copy of my visit note below.    Here today for NBUVB for psoriasis  Treatment # 22  No issues   photoproection on eyes, lips, nipples  1350 mj 3 minute 21 seconds today  Mineral oil applied  Goal 3 times weekly    Again, thank you for allowing me to participate in the care of your patient.        Sincerely,        Hailee Gale PA-C

## 2018-12-14 ENCOUNTER — OFFICE VISIT (OUTPATIENT)
Dept: FAMILY MEDICINE | Facility: CLINIC | Age: 63
End: 2018-12-14
Payer: COMMERCIAL

## 2018-12-14 VITALS
HEART RATE: 92 BPM | DIASTOLIC BLOOD PRESSURE: 76 MMHG | WEIGHT: 183.8 LBS | SYSTOLIC BLOOD PRESSURE: 122 MMHG | BODY MASS INDEX: 34.7 KG/M2 | RESPIRATION RATE: 16 BRPM | TEMPERATURE: 97.8 F | HEIGHT: 61 IN | OXYGEN SATURATION: 97 %

## 2018-12-14 DIAGNOSIS — J20.9 ACUTE BRONCHITIS, UNSPECIFIED ORGANISM: Primary | ICD-10-CM

## 2018-12-14 DIAGNOSIS — J01.20 ACUTE NON-RECURRENT ETHMOIDAL SINUSITIS: ICD-10-CM

## 2018-12-14 PROCEDURE — 99213 OFFICE O/P EST LOW 20 MIN: CPT | Performed by: FAMILY MEDICINE

## 2018-12-14 RX ORDER — DOXYCYCLINE 100 MG/1
100 CAPSULE ORAL 2 TIMES DAILY
Qty: 20 CAPSULE | Refills: 0 | Status: SHIPPED | OUTPATIENT
Start: 2018-12-14 | End: 2019-05-15

## 2018-12-14 ASSESSMENT — MIFFLIN-ST. JEOR: SCORE: 1336.09

## 2018-12-14 NOTE — PATIENT INSTRUCTIONS
Do get the ShingRix shingles vaccine after the shingles has fully improved.    Plan doxycycline to treat sinus infection and bronchitis.  Call if not improving in few days or if wheezing or cough gets worse for the albuterol inhaler.    Common Cold/Viral Upper Respiratory Infection:   Last about 5 days and cough can last 2-3 weeks.  Come back to clinic or go to ER if difficulty breathing, persistent fevers over 101, or not able to stay hydrated.  Treating the Common Cold  Starting zinc lozenges (zinc acetate or zinc gluconate) within the first 24 hours of symptom onset reduces the severity and duration of cold.  Early use of Echinacea purpurea shortens duration and decreases severity of cold symptoms.  Decongestants With or Without Antihistamines: Pseudoephedrine(at the pharmacy counter) or phenylephrine decrease nasal swelling to improve air intake and antihistamine (like Benadryl, doxyllamine, or chloripramine) help decrease sneezing and cough, but can make you sleepy so these are commonly in the nighttime cold/flu medications.  Ibuprofen 200mg-400mg every 4 hours can help with body aches, fevers, cough, and sneezing.  Nasal saline rinse or NediPot for congestion, as often as needed.  Increase fluid intake, hot tea with honey can help with a sore throat or cough.  Cold Prevention:  Frequent hand washing can reduce the spread of respiratory viruses in all ages and can reduce transmission from children to other household members  The prophylactic use of vitamin C does not reduce the amount of colds a person gets, but decreases how long the cold lasts by about a day.          Thank you for choosing [facility].  You may be receiving a survey in the mail from [employer] regarding your visit today.  Please take a few minutes to complete and return the survey to let us know how we are doing.      If you have questions or concerns, please contact us via [employer] or you can contact your care team at  [phone].    Our Clinic hours are:  Monday 6:40 am  to 7:00 pm  Tuesday -Friday 6:40 am to 5:00 pm    The [place] outpatient lab hours are:  Monday - Friday 6:10 am to 4:45 pm  Saturdays 7:00 am to 11:00 am  Appointments are required, call [phone]    If you have clinical questions after hours or would like to schedule an appointment,  call the clinic at [phone].

## 2018-12-14 NOTE — PROGRESS NOTES
"  SUBJECTIVE:   Mirta Cardenas is a 63 year old female who presents to clinic today for the following health issues:      ENT Symptoms             Symptoms: cc Present Absent Comment   Fever/Chills   x    Fatigue  x     Muscle Aches   x    Eye Irritation   x    Sneezing   x    Nasal Lior/Drg  x     Sinus Pressure/Pain   x    Loss of smell   x    Dental pain   x    Sore Throat   x    Swollen Glands   x    Ear Pain/Fullness   x    Cough  x  productive   Wheeze  x  occ   Chest Pain   x    Shortness of breath   x    Rash   x    Other  x  Stomach upset     Symptom duration:  ongoing since November 15   Symptom severity:  moderate   Treatments tried:  OTC multi nighttime cold medication, advil cold and sinus, dulera   Contacts:  work     For three week ongoing sinus congestion and postnasal drip.  Taking daily decongestant during the day and night time cough severe with tylenol, doxylamine.  Did start the Dulera over Thanksgiving for the last 4 weeks. Mucinex did not give relief.      Problem list and histories reviewed & adjusted, as indicated.  Additional history: as documented    BP Readings from Last 3 Encounters:   12/14/18 122/76   08/27/18 118/60   08/10/18 124/70    Wt Readings from Last 3 Encounters:   12/14/18 83.4 kg (183 lb 12.8 oz)   08/09/18 84.8 kg (187 lb)   02/22/18 83.9 kg (185 lb)                    Reviewed and updated as needed this visit by clinical staff  Allergies  Meds       Reviewed and updated as needed this visit by Provider         ROS:  Constitutional, HEENT, cardiovascular, pulmonary, gi and gu systems are negative, except as otherwise noted.    OBJECTIVE:     /76   Pulse 92   Temp 97.8  F (36.6  C) (Tympanic)   Resp 16   Ht 1.549 m (5' 1\")   Wt 83.4 kg (183 lb 12.8 oz)   SpO2 97%   BMI 34.73 kg/m    Body mass index is 34.73 kg/m .  GENERAL: healthy, alert and no distress  HENT: ear canals and TM's normal, nose and mouth without ulcers or lesions  NECK: no adenopathy, no " asymmetry, masses, or scars and thyroid normal to palpation  RESP: lungs clear to auscultation - no rales, rhonchi or wheezes  CV: regular rate and rhythm, normal S1 S2, no S3 or S4, no murmur, click or rub, no peripheral edema and peripheral pulses strong  MS: no gross musculoskeletal defects noted, no edema    Diagnostic Test Results:  none     ASSESSMENT/PLAN:       Mirta was seen today for cough.    Diagnoses and all orders for this visit:    Acute bronchitis, unspecified organism  -     doxycycline hyclate (VIBRAMYCIN) 100 MG capsule; Take 1 capsule (100 mg) by mouth 2 times daily for 10 days    Acute non-recurrent ethmoidal sinusitis: plan to treat this to decrease the post-nasal drip/sinusitis  -discussed risks, benefits, and side effects of this new medication. Patient understands and is in agreement.  -     doxycycline hyclate (VIBRAMYCIN) 100 MG capsule; Take 1 capsule (100 mg) by mouth 2 times daily for 10 days        Patient Instructions   Do get the ShingRix shingles vaccine after the shingles has fully improved.    Plan doxycycline to treat sinus infection and bronchitis.  Call if not improving in few days or if wheezing or cough gets worse for the albuterol inhaler.    Common Cold/Viral Upper Respiratory Infection:   Last about 5 days and cough can last 2-3 weeks.  Come back to clinic or go to ER if difficulty breathing, persistent fevers over 101, or not able to stay hydrated.  Treating the Common Cold  Starting zinc lozenges (zinc acetate or zinc gluconate) within the first 24 hours of symptom onset reduces the severity and duration of cold.  Early use of Echinacea purpurea shortens duration and decreases severity of cold symptoms.  Decongestants With or Without Antihistamines: Pseudoephedrine(at the pharmacy counter) or phenylephrine decrease nasal swelling to improve air intake and antihistamine (like Benadryl, doxyllamine, or chloripramine) help decrease sneezing and cough, but can make you  sleepy so these are commonly in the nighttime cold/flu medications.  Ibuprofen 200mg-400mg every 4 hours can help with body aches, fevers, cough, and sneezing.  Nasal saline rinse or NediPot for congestion, as often as needed.  Increase fluid intake, hot tea with honey can help with a sore throat or cough.  Cold Prevention:  Frequent hand washing can reduce the spread of respiratory viruses in all ages and can reduce transmission from children to other household members  The prophylactic use of vitamin C does not reduce the amount of colds a person gets, but decreases how long the cold lasts by about a day.          Thank you for choosing [facility]  You may be receiving a survey in the mail from [employer] regarding your visit today.  Please take a few minutes to complete and return the survey to let us know how we are doing.      If you have questions or concerns, please contact us via [employer] or you can contact your care team at [phone].    Our Clinic hours are:  Monday 6:40 am  to 7:00 pm  Tuesday -Friday 6:40 am to 5:00 pm    The [place] outpatient lab hours are:  Monday - Friday 6:10 am to 4:45 pm  Saturdays 7:00 am to 11:00 am  Appointments are required, call [phone]    If you have clinical questions after hours or would like to schedule an appointment,  call the clinic at [phone].      Neal Mosquera MD  [facility]

## 2018-12-19 ENCOUNTER — OFFICE VISIT (OUTPATIENT)
Dept: DERMATOLOGY | Facility: CLINIC | Age: 63
End: 2018-12-19
Payer: COMMERCIAL

## 2018-12-19 VITALS — DIASTOLIC BLOOD PRESSURE: 80 MMHG | TEMPERATURE: 97.6 F | HEART RATE: 70 BPM | SYSTOLIC BLOOD PRESSURE: 133 MMHG

## 2018-12-19 DIAGNOSIS — L40.9 PSORIASIS: Primary | ICD-10-CM

## 2018-12-19 PROCEDURE — 96910 PHOTCHMTX TAR&UVB/PTRLTM&UVB: CPT | Performed by: PHYSICIAN ASSISTANT

## 2018-12-19 PROCEDURE — 99212 OFFICE O/P EST SF 10 MIN: CPT | Mod: 25 | Performed by: PHYSICIAN ASSISTANT

## 2018-12-19 NOTE — NURSING NOTE
Chief Complaint   Patient presents with     RECHECK       Vitals:    12/19/18 1544   BP: 133/80   BP Location: Left arm   Patient Position: Sitting   Cuff Size: Adult Regular   Pulse: 70   Temp: 97.6  F (36.4  C)   TempSrc: Tympanic     Wt Readings from Last 1 Encounters:   12/14/18 83.4 kg (183 lb 12.8 oz)       Xuan MCFARLAND RN   Specialty Clinics

## 2018-12-19 NOTE — LETTER
2018         RE: Mirta Cardenas  [id number] [facility] [address]        Dear Colleague,    Thank you for referring your patient, Mirta Cardenas, to the [facility]. Please see a copy of my visit note below.    Mirta Cardenas is a 63 year old year old female patient here today for recheck psoriasis.  Patient reports that skin is clear to NB-UVB treatments. She reports that she will occasionally use protopic.  Patient has no other skin complaints today.  Remainder of the HPI, Meds, PMH, Allergies, FH, and SH was reviewed in chart.    Pertinent Hx:  Psorasis  Past Medical History:   Diagnosis Date     Asthma      Blood transfusion      Hypertension      Malignant neoplasm (H)     CLL       Past Surgical History:   Procedure Laterality Date     C/SECTION, CLASSICAL  ,,    , Classical     HYSTERECTOMY, PAP NO LONGER INDICATED       RELEASE CARPAL TUNNEL  2012    Procedure:RELEASE CARPAL TUNNEL; Right Carpal Tunnel Release & Right Ring A1 Pulley Release; Surgeon:SERGEY HEATON; Location:[place]     RELEASE TRIGGER FINGER  2012    Procedure:RELEASE TRIGGER FINGER; Surgeon:SERGEY HEATON; Location:[place]     RELEASE TRIGGER FINGER  10/12/2012    Procedure: RELEASE TRIGGER FINGER;  Right Thumb A1 Pulley Release;  Surgeon: Sergey Heaton MD;  Location: [place] OR     SALPINGO OOPHORECTOMY,R/L/ULICES  2008    right        Family History   Problem Relation Age of Onset     Hypertension Brother      Heart Disease Brother 65        bypass     Cancer Sister         multiple myloma     Obesity Son      Obesity Son      Hypertension Son      Melanoma No family hx of        Social History     Socioeconomic History     Marital status:      Spouse name: Not on file     Number of children: Not on file     Years of education: Not on file     Highest education level: Not on file   Social Needs     Financial resource strain: Not on file     Food  insecurity - worry: Not on file     Food insecurity - inability: Not on file     Transportation needs - medical: Not on file     Transportation needs - non-medical: Not on file   Occupational History     Not on file   Tobacco Use     Smoking status: Never Smoker     Smokeless tobacco: Never Used   Substance and Sexual Activity     Alcohol use: Yes     Comment: THREE- FOUR TIMES PER YEAR, SOCIALLY     Drug use: No     Sexual activity: Yes     Partners: Male     Birth control/protection: Surgical   Other Topics Concern     Parent/sibling w/ CABG, MI or angioplasty before 65F 55M? No   Social History Narrative     Not on file       Outpatient Encounter Medications as of 12/19/2018   Medication Sig Dispense Refill     ASPIRIN 81 MG OR TABS 1 TABLET DAILY       doxycycline hyclate (VIBRAMYCIN) 100 MG capsule Take 1 capsule (100 mg) by mouth 2 times daily for 10 days 20 capsule 0     lisinopril-hydrochlorothiazide (PRINZIDE/ZESTORETIC) 20-25 MG per tablet Take 1 tablet by mouth every morning 90 tablet 3     mometasone-formoterol (DULERA) 200-5 MCG/ACT oral inhaler Inhale 1 puff into the lungs 2 times daily Only using at the start of URI. 1 Inhaler 2     omeprazole (PRILOSEC) 20 MG capsule Take 1 capsule by mouth every morning.       order for DME Equipment being ordered: Dynaflex insert 2 Units 0     pravastatin (PRAVACHOL) 40 MG tablet Take 1 tablet (40 mg) by mouth daily 90 tablet 3     tacrolimus (PROTOPIC) 0.1 % ointment Apply twice daily affected areas on body. 30 g 3     terazosin (HYTRIN) 5 MG capsule Take 1 capsule (5 mg) by mouth daily 90 capsule 3     No facility-administered encounter medications on file as of 12/19/2018.              Review Of Systems  Skin: As above  Eyes: negative  Ears/Nose/Throat: negative  Respiratory: No shortness of breath, dyspnea on exertion, cough, or hemoptysis  Cardiovascular: negative  Gastrointestinal: negative  Genitourinary: negative  Musculoskeletal: negative  Neurologic:  negative  Psychiatric: negative  Hematologic/Lymphatic/Immunologic: negative  Endocrine: negative      O:   NAD, WDWN, Alert & Oriented, Mood & Affect wnl, Vitals stable   Here today alone   /80 (BP Location: Left arm, Patient Position: Sitting, Cuff Size: Adult Regular)   Pulse 70   Temp 97.6  F (36.4  C) (Tympanic)    General appearance normal   Vitals stable   Alert, oriented and in no acute distress     Skin appears clear today      Eyes: Conjunctivae/lids:Normal     ENT: Lips: normal    MSK:Normal    Pulm: Breathing Normal    Neuro/Psych: Orientation:Normal; Mood/Affect:Normal  A/P:  1. Psoriasis   Patient is clear on NB-UVB, she reports that her skin is doing well. She would like to try to stop since next year she has a deductible to meet before getting covered. Will return if flaring if topicals are not controlling.  Since patient is on doxycycline would drop mJ dosage to 600 mJ today.   Here today for NBUVB for psoriasis  Treatment # 23  No issues   photoproection on eyes, lips, nipples  600 mj 1 minute 21 seconds today  Mineral oil applied  Goal 3 times weekly  Skin care regimen reviewed with patient: Eliminate harsh soaps, i.e. Dial, zest, irsih spring; Mild soaps such as Cetaphil or Dove sensitive skin, avoid hot or cold showers, aggressive use of emollients including vanicream, cetaphil or cerave discussed with patient.  Return to clinic as needed.    Again, thank you for allowing me to participate in the care of your patient.        Sincerely,        Radha Dugan PA-C

## 2018-12-20 NOTE — PROGRESS NOTES
Mirta Cardenas is a 63 year old year old female patient here today for recheck psoriasis.  Patient reports that skin is clear to NB-UVB treatments. She reports that she will occasionally use protopic.  Patient has no other skin complaints today.  Remainder of the HPI, Meds, PMH, Allergies, FH, and SH was reviewed in chart.    Pertinent Hx:  Psorasis  Past Medical History:   Diagnosis Date     Asthma      Blood transfusion      Hypertension      Malignant neoplasm (H)     CLL       Past Surgical History:   Procedure Laterality Date     C/SECTION, CLASSICAL  ,,    , Classical     HYSTERECTOMY, PAP NO LONGER INDICATED       RELEASE CARPAL TUNNEL  2012    Procedure:RELEASE CARPAL TUNNEL; Right Carpal Tunnel Release & Right Ring A1 Pulley Release; Surgeon:SERGEY HEATON; Location:[place]     RELEASE TRIGGER FINGER  2012    Procedure:RELEASE TRIGGER FINGER; Surgeon:SERGEY HEATON; Location:[place]     RELEASE TRIGGER FINGER  10/12/2012    Procedure: RELEASE TRIGGER FINGER;  Right Thumb A1 Pulley Release;  Surgeon: Sergey Heaton MD;  Location: [place]     SALPINGO OOPHORECTOMY,R/L/ULICES  2008    right        Family History   Problem Relation Age of Onset     Hypertension Brother      Heart Disease Brother 65        bypass     Cancer Sister         multiple myloma     Obesity Son      Obesity Son      Hypertension Son      Melanoma No family hx of        Social History     Socioeconomic History     Marital status:      Spouse name: Not on file     Number of children: Not on file     Years of education: Not on file     Highest education level: Not on file   Social Needs     Financial resource strain: Not on file     Food insecurity - worry: Not on file     Food insecurity - inability: Not on file     Transportation needs - medical: Not on file     Transportation needs - non-medical: Not on file   Occupational History     Not on file   Tobacco Use     Smoking status: Never  Smoker     Smokeless tobacco: Never Used   Substance and Sexual Activity     Alcohol use: Yes     Comment: THREE- FOUR TIMES PER YEAR, SOCIALLY     Drug use: No     Sexual activity: Yes     Partners: Male     Birth control/protection: Surgical   Other Topics Concern     Parent/sibling w/ CABG, MI or angioplasty before 65F 55M? No   Social History Narrative     Not on file       Outpatient Encounter Medications as of 12/19/2018   Medication Sig Dispense Refill     ASPIRIN 81 MG OR TABS 1 TABLET DAILY       doxycycline hyclate (VIBRAMYCIN) 100 MG capsule Take 1 capsule (100 mg) by mouth 2 times daily for 10 days 20 capsule 0     lisinopril-hydrochlorothiazide (PRINZIDE/ZESTORETIC) 20-25 MG per tablet Take 1 tablet by mouth every morning 90 tablet 3     mometasone-formoterol (DULERA) 200-5 MCG/ACT oral inhaler Inhale 1 puff into the lungs 2 times daily Only using at the start of URI. 1 Inhaler 2     omeprazole (PRILOSEC) 20 MG capsule Take 1 capsule by mouth every morning.       order for DME Equipment being ordered: Dynaflex insert 2 Units 0     pravastatin (PRAVACHOL) 40 MG tablet Take 1 tablet (40 mg) by mouth daily 90 tablet 3     tacrolimus (PROTOPIC) 0.1 % ointment Apply twice daily affected areas on body. 30 g 3     terazosin (HYTRIN) 5 MG capsule Take 1 capsule (5 mg) by mouth daily 90 capsule 3     No facility-administered encounter medications on file as of 12/19/2018.              Review Of Systems  Skin: As above  Eyes: negative  Ears/Nose/Throat: negative  Respiratory: No shortness of breath, dyspnea on exertion, cough, or hemoptysis  Cardiovascular: negative  Gastrointestinal: negative  Genitourinary: negative  Musculoskeletal: negative  Neurologic: negative  Psychiatric: negative  Hematologic/Lymphatic/Immunologic: negative  Endocrine: negative      O:   NAD, WDWN, Alert & Oriented, Mood & Affect wnl, Vitals stable   Here today alone   /80 (BP Location: Left arm, Patient Position: Sitting, Cuff  Size: Adult Regular)   Pulse 70   Temp 97.6  F (36.4  C) (Tympanic)    General appearance normal   Vitals stable   Alert, oriented and in no acute distress     Skin appears clear today      Eyes: Conjunctivae/lids:Normal     ENT: Lips: normal    MSK:Normal    Pulm: Breathing Normal    Neuro/Psych: Orientation:Normal; Mood/Affect:Normal  A/P:  1. Psoriasis   Patient is clear on NB-UVB, she reports that her skin is doing well. She would like to try to stop since next year she has a deductible to meet before getting covered. Will return if flaring if topicals are not controlling.  Since patient is on doxycycline would drop mJ dosage to 600 mJ today.   Here today for NBUVB for psoriasis  Treatment # 23  No issues   photoproection on eyes, lips, nipples  600 mj 1 minute 21 seconds today  Mineral oil applied  Goal 3 times weekly  Skin care regimen reviewed with patient: Eliminate harsh soaps, i.e. Dial, zest, irsih spring; Mild soaps such as Cetaphil or Dove sensitive skin, avoid hot or cold showers, aggressive use of emollients including vanicream, cetaphil or cerave discussed with patient.  Return to clinic as needed.

## 2019-04-25 DIAGNOSIS — I10 BENIGN ESSENTIAL HYPERTENSION: ICD-10-CM

## 2019-04-25 DIAGNOSIS — D63.8 ANEMIA IN OTHER CHRONIC DISEASES CLASSIFIED ELSEWHERE: Primary | ICD-10-CM

## 2019-04-25 DIAGNOSIS — E78.5 HYPERLIPIDEMIA LDL GOAL <100: ICD-10-CM

## 2019-04-25 NOTE — LETTER
[facility]  [address] [facility] [address]  Phone: [phone]       April 26, 2019         Mirta Cardenas  [address] [facility] [address]            Dear Mirta:    We are concerned about your health care.  We recently provided you with medication refills.  Many medications require routine follow-up with your doctor and routine labs.     At this time, you are due for fasting labs. Please contact the clinic.     Your prescription(s) have been refilled for 30 days so you may have time for the above noted follow-up. Please call to schedule soon so we can assure you have an appointment before your next refills are needed.    Thank you,      Neal Mosquera MD / julio cesar

## 2019-04-26 RX ORDER — TERAZOSIN 5 MG/1
5 CAPSULE ORAL DAILY
Qty: 30 CAPSULE | Refills: 0 | Status: SHIPPED | OUTPATIENT
Start: 2019-04-26 | End: 2019-05-15

## 2019-04-26 RX ORDER — LISINOPRIL AND HYDROCHLOROTHIAZIDE 20; 25 MG/1; MG/1
1 TABLET ORAL EVERY MORNING
Qty: 30 TABLET | Refills: 0 | Status: SHIPPED | OUTPATIENT
Start: 2019-04-26 | End: 2019-05-15

## 2019-04-26 NOTE — TELEPHONE ENCOUNTER
"Requested Prescriptions   Pending Prescriptions Disp Refills     lisinopril-hydrochlorothiazide (PRINZIDE/ZESTORETIC) 20-25 MG tablet 90 tablet 3     Sig: Take 1 tablet by mouth every morning       Diuretics (Including Combos) Protocol Failed - 4/25/2019  6:17 PM        Failed - Normal serum creatinine on file in past 12 months     Recent Labs   Lab Test 04/24/18  1100   CR 1.00              Failed - Normal serum potassium on file in past 12 months     Recent Labs   Lab Test 04/24/18  1100   POTASSIUM 4.4                    Failed - Normal serum sodium on file in past 12 months     Recent Labs   Lab Test 04/24/18  1100                 Passed - Blood pressure under 140/90 in past 12 months     BP Readings from Last 3 Encounters:   12/19/18 133/80   12/14/18 122/76   08/27/18 118/60                 Passed - Recent (12 mo) or future (30 days) visit within the authorizing provider's specialty     Patient had office visit in the last 12 months or has a visit in the next 30 days with authorizing provider or within the authorizing provider's specialty.  See \"Patient Info\" tab in inbasket, or \"Choose Columns\" in Meds & Orders section of the refill encounter.              Passed - Medication is active on med list        Passed - Patient is age 18 or older        Passed - No active pregancy on record        Passed - No positive pregnancy test in past 12 months   Last Written Prescription Date:  4/27/18  Last Fill Quantity: 90,  # refills: 3   Last office visit: 12/14/2018 with prescribing provider:  Eveline   Future Office Visit:         terazosin (HYTRIN) 5 MG capsule 90 capsule 3     Sig: Take 1 capsule (5 mg) by mouth daily       Alpha Blockers Passed - 4/25/2019  6:17 PM        Passed - Blood pressure under 140/90 in past 12 months     BP Readings from Last 3 Encounters:   12/19/18 133/80   12/14/18 122/76   08/27/18 118/60                 Passed - Recent (12 mo) or future (30 days) visit within the authorizing " "provider's specialty     Patient had office visit in the last 12 months or has a visit in the next 30 days with authorizing provider or within the authorizing provider's specialty.  See \"Patient Info\" tab in inbasket, or \"Choose Columns\" in Meds & Orders section of the refill encounter.              Passed - Patient does not have Tadalafil, Vardenafil, or Sildenafil on their medication list        Passed - Medication is active on med list        Passed - Patient is 18 years of age or older        Passed - No active pregnancy on record        Passed - No positive pregnancy test in past 12 months        Last Written Prescription Date:  4/27/18  Last Fill Quantity: 90,  # refills: 3   Last office visit: 12/14/2018 with prescribing provider:  Eveline   Future Office Visit:      "

## 2019-04-26 NOTE — TELEPHONE ENCOUNTER
Due for fasting labs-ordered per protocol. 30 day gavin fill given. Reminder letter sent.      Rachana LECHUGA RN

## 2019-05-11 DIAGNOSIS — D63.8 ANEMIA IN OTHER CHRONIC DISEASES CLASSIFIED ELSEWHERE: ICD-10-CM

## 2019-05-11 DIAGNOSIS — C91.11 CHRONIC LYMPHOID LEUKEMIA IN REMISSION (H): ICD-10-CM

## 2019-05-11 DIAGNOSIS — E78.5 HYPERLIPIDEMIA LDL GOAL <100: ICD-10-CM

## 2019-05-11 DIAGNOSIS — I10 BENIGN ESSENTIAL HYPERTENSION: ICD-10-CM

## 2019-05-11 LAB
ANION GAP SERPL CALCULATED.3IONS-SCNC: 4 MMOL/L (ref 3–14)
BUN SERPL-MCNC: 22 MG/DL (ref 7–30)
CALCIUM SERPL-MCNC: 8.8 MG/DL (ref 8.5–10.1)
CHLORIDE SERPL-SCNC: 108 MMOL/L (ref 94–109)
CHOLEST SERPL-MCNC: 152 MG/DL
CO2 SERPL-SCNC: 26 MMOL/L (ref 20–32)
CREAT SERPL-MCNC: 0.94 MG/DL (ref 0.52–1.04)
CREAT UR-MCNC: 88 MG/DL
GFR SERPL CREATININE-BSD FRML MDRD: 65 ML/MIN/{1.73_M2}
GLUCOSE SERPL-MCNC: 98 MG/DL (ref 70–99)
HDLC SERPL-MCNC: 47 MG/DL
LDLC SERPL CALC-MCNC: 72 MG/DL
MICROALBUMIN UR-MCNC: 7 MG/L
MICROALBUMIN/CREAT UR: 8.16 MG/G CR (ref 0–25)
NONHDLC SERPL-MCNC: 105 MG/DL
POTASSIUM SERPL-SCNC: 4.2 MMOL/L (ref 3.4–5.3)
SODIUM SERPL-SCNC: 138 MMOL/L (ref 133–144)
TRIGL SERPL-MCNC: 167 MG/DL

## 2019-05-11 PROCEDURE — 80048 BASIC METABOLIC PNL TOTAL CA: CPT | Performed by: FAMILY MEDICINE

## 2019-05-11 PROCEDURE — 85025 COMPLETE CBC W/AUTO DIFF WBC: CPT | Performed by: FAMILY MEDICINE

## 2019-05-11 PROCEDURE — 82043 UR ALBUMIN QUANTITATIVE: CPT | Performed by: FAMILY MEDICINE

## 2019-05-11 PROCEDURE — 36415 COLL VENOUS BLD VENIPUNCTURE: CPT | Performed by: FAMILY MEDICINE

## 2019-05-11 PROCEDURE — 80061 LIPID PANEL: CPT | Performed by: FAMILY MEDICINE

## 2019-05-14 LAB
BASOPHILS # BLD AUTO: 0.2 10E9/L (ref 0–0.2)
BASOPHILS NFR BLD AUTO: 1 %
DIFFERENTIAL METHOD BLD: ABNORMAL
EOSINOPHIL # BLD AUTO: 0.5 10E9/L (ref 0–0.7)
EOSINOPHIL NFR BLD AUTO: 3 %
ERYTHROCYTE [DISTWIDTH] IN BLOOD BY AUTOMATED COUNT: 13.2 % (ref 10–15)
HCT VFR BLD AUTO: 36.8 % (ref 35–47)
HGB BLD-MCNC: 11.9 G/DL (ref 11.7–15.7)
LYMPHOCYTES # BLD AUTO: 10.5 10E9/L (ref 0.8–5.3)
LYMPHOCYTES NFR BLD AUTO: 68 %
MCH RBC QN AUTO: 27.3 PG (ref 26.5–33)
MCHC RBC AUTO-ENTMCNC: 32.3 G/DL (ref 31.5–36.5)
MCV RBC AUTO: 84 FL (ref 78–100)
MONOCYTES # BLD AUTO: 0.6 10E9/L (ref 0–1.3)
MONOCYTES NFR BLD AUTO: 4 %
NEUTROPHILS # BLD AUTO: 3.7 10E9/L (ref 1.6–8.3)
NEUTROPHILS NFR BLD AUTO: 24 %
PLATELET # BLD AUTO: 192 10E9/L (ref 150–450)
RBC # BLD AUTO: 4.36 10E12/L (ref 3.8–5.2)
WBC # BLD AUTO: 15.5 10E9/L (ref 4–11)

## 2019-05-15 ENCOUNTER — OFFICE VISIT (OUTPATIENT)
Dept: FAMILY MEDICINE | Facility: CLINIC | Age: 64
End: 2019-05-15
Payer: COMMERCIAL

## 2019-05-15 VITALS
BODY MASS INDEX: 35.61 KG/M2 | SYSTOLIC BLOOD PRESSURE: 126 MMHG | DIASTOLIC BLOOD PRESSURE: 68 MMHG | HEIGHT: 61 IN | HEART RATE: 79 BPM | RESPIRATION RATE: 20 BRPM | OXYGEN SATURATION: 97 % | WEIGHT: 188.6 LBS | TEMPERATURE: 97.9 F

## 2019-05-15 DIAGNOSIS — E66.01 MORBID OBESITY (H): ICD-10-CM

## 2019-05-15 DIAGNOSIS — E78.5 HYPERLIPIDEMIA LDL GOAL <100: ICD-10-CM

## 2019-05-15 DIAGNOSIS — C91.11 CHRONIC LYMPHOID LEUKEMIA IN REMISSION (H): Primary | ICD-10-CM

## 2019-05-15 DIAGNOSIS — J98.01 COUGH DUE TO BRONCHOSPASM: ICD-10-CM

## 2019-05-15 DIAGNOSIS — I10 BENIGN ESSENTIAL HYPERTENSION: ICD-10-CM

## 2019-05-15 PROCEDURE — 99214 OFFICE O/P EST MOD 30 MIN: CPT | Performed by: FAMILY MEDICINE

## 2019-05-15 RX ORDER — TERAZOSIN 5 MG/1
5 CAPSULE ORAL DAILY
Qty: 90 CAPSULE | Refills: 3 | Status: SHIPPED | OUTPATIENT
Start: 2019-05-15 | End: 2020-05-22

## 2019-05-15 RX ORDER — LISINOPRIL AND HYDROCHLOROTHIAZIDE 20; 25 MG/1; MG/1
1 TABLET ORAL EVERY MORNING
Qty: 90 TABLET | Refills: 3 | Status: SHIPPED | OUTPATIENT
Start: 2019-05-15 | End: 2020-05-22

## 2019-05-15 RX ORDER — PRAVASTATIN SODIUM 40 MG
40 TABLET ORAL DAILY
Qty: 90 TABLET | Refills: 3 | Status: SHIPPED | OUTPATIENT
Start: 2019-05-15 | End: 2020-05-22

## 2019-05-15 ASSESSMENT — MIFFLIN-ST. JEOR: SCORE: 1352.86

## 2019-05-15 NOTE — PROGRESS NOTES
SUBJECTIVE:   Mirta Cardenas is a 64 year old female who presents to clinic today for the following   health issues:    Hyperlipidemia Follow-Up      Rate your low fat/cholesterol diet?: fair    Taking statin?  Yes, no muscle aches from statin    Other lipid medications/supplements?:  none    Hypertension Follow-up      Outpatient blood pressures are not being checked.    Low Salt Diet: low salt    Asthma Follow-Up  Much better, had a cold/cough all winter last year.  Was ACT completed today?    Yes    ACT Total Scores 8/9/2018   ACT TOTAL SCORE -   ASTHMA ER VISITS -   ASTHMA HOSPITALIZATIONS -   ACT TOTAL SCORE (Goal Greater than or Equal to 20) 25   In the past 12 months, how many times did you visit the emergency room for your asthma without being admitted to the hospital? 0   In the past 12 months, how many times were you hospitalized overnight because of your asthma? 0       Recent asthma triggers that patient is dealing with: upper respiratory infections and allergies        Amount of exercise or physical activity: None. 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    Problems taking medications regularly: No    Medication side effects: none    Diet: regular (no restrictions)      Additional history: as documented    Reviewed  and updated as needed this visit by clinical staff  Tobacco  Allergies  Meds  Med Hx  Surg Hx  Fam Hx  Soc Hx        Reviewed and updated as needed this visit by Provider         BP Readings from Last 3 Encounters:   05/15/19 126/68   12/19/18 133/80   12/14/18 122/76    Wt Readings from Last 3 Encounters:   05/15/19 85.5 kg (188 lb 9.6 oz)   12/14/18 83.4 kg (183 lb 12.8 oz)   08/09/18 84.8 kg (187 lb)                    ROS:  CONSTITUTIONAL: NEGATIVE for fever, chills, change in weight  ENT/MOUTH: NEGATIVE for ear, mouth and throat problems  RESP: NEGATIVE for significant cough or SOB  CV: NEGATIVE for chest pain, palpitations or peripheral edema  PSYCHIATRIC: NEGATIVE for changes  "in mood or affect    OBJECTIVE:     /68   Pulse 79   Temp 97.9  F (36.6  C) (Tympanic)   Resp 20   Ht 1.549 m (5' 1\")   Wt 85.5 kg (188 lb 9.6 oz)   SpO2 97%   BMI 35.64 kg/m    Body mass index is 35.64 kg/m .  GENERAL: healthy, alert and no distress  NECK: no adenopathy, no asymmetry, masses, or scars and thyroid normal to palpation  RESP: lungs clear to auscultation - no rales, rhonchi or wheezes  CV: regular rate and rhythm, normal S1 S2, no S3 or S4, no murmur, click or rub, no peripheral edema and peripheral pulses strong  MS: no gross musculoskeletal defects noted, no edema    Diagnostic Test Results:  Results for orders placed or performed in visit on 05/11/19   Albumin Random Urine Quantitative with Creat Ratio   Result Value Ref Range    Creatinine Urine 88 mg/dL    Albumin Urine mg/L 7 mg/L    Albumin Urine mg/g Cr 8.16 0 - 25 mg/g Cr   Lipid panel reflex to direct LDL Fasting   Result Value Ref Range    Cholesterol 152 <200 mg/dL    Triglycerides 167 (H) <150 mg/dL    HDL Cholesterol 47 (L) >49 mg/dL    LDL Cholesterol Calculated 72 <100 mg/dL    Non HDL Cholesterol 105 <130 mg/dL   **Basic metabolic panel FUTURE anytime   Result Value Ref Range    Sodium 138 133 - 144 mmol/L    Potassium 4.2 3.4 - 5.3 mmol/L    Chloride 108 94 - 109 mmol/L    Carbon Dioxide 26 20 - 32 mmol/L    Anion Gap 4 3 - 14 mmol/L    Glucose 98 70 - 99 mg/dL    Urea Nitrogen 22 7 - 30 mg/dL    Creatinine 0.94 0.52 - 1.04 mg/dL    GFR Estimate 65 >60 mL/min/[1.73_m2]    GFR Estimate If Black 75 >60 mL/min/[1.73_m2]    Calcium 8.8 8.5 - 10.1 mg/dL   CBC with platelets differential   Result Value Ref Range    WBC 15.5 (H) 4.0 - 11.0 10e9/L    RBC Count 4.36 3.8 - 5.2 10e12/L    Hemoglobin 11.9 11.7 - 15.7 g/dL    Hematocrit 36.8 35.0 - 47.0 %    MCV 84 78 - 100 fl    MCH 27.3 26.5 - 33.0 pg    MCHC 32.3 31.5 - 36.5 g/dL    RDW 13.2 10.0 - 15.0 %    Platelet Count 192 150 - 450 10e9/L    Diff Method C/W history of chronic " lymphocytic leukemia.     % Neutrophils 24.0 %    % Lymphocytes 68.0 %    % Monocytes 4.0 %    % Eosinophils 3.0 %    % Basophils 1.0 %    Absolute Neutrophil 3.7 1.6 - 8.3 10e9/L    Absolute Lymphocytes 10.5 (H) 0.8 - 5.3 10e9/L    Absolute Monocytes 0.6 0.0 - 1.3 10e9/L    Absolute Eosinophils 0.5 0.0 - 0.7 10e9/L    Absolute Basophils 0.2 0.0 - 0.2 10e9/L       ASSESSMENT/PLAN:         Mirta was seen today for recheck medication.    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    Benign essential hypertension: stable, refill  -     lisinopril-hydrochlorothiazide (PRINZIDE/ZESTORETIC) 20-25 MG tablet; Take 1 tablet by mouth every morning  -     terazosin (HYTRIN) 5 MG capsule; Take 1 capsule (5 mg) by mouth daily    Cough due to bronchospasm: well controlled, now, uses this in the winter if URI.  -     mometasone-formoterol (DULERA) 200-5 MCG/ACT inhaler; Inhale 1 puff into the lungs 2 times daily Only using at the start of URI.    Hyperlipidemia LDL goal <100  -     pravastatin (PRAVACHOL) 40 MG tablet; Take 1 tablet (40 mg) by mouth daily    Morbid obesity (H): dicussed.        Patient Instructions   Continue current medications.    [facility] in [place].  24 week healthy lifestyle clinic    Medications:   Qsymia: combines phentermine and topamax  Jani: blocks fat absorption    ShingRix shingles vaccine, check with insurance if pharmacy or clinic is covered best.      Neal Mosquera MD  [facility] [place]

## 2019-05-15 NOTE — PATIENT INSTRUCTIONS
Continue current medications.    [facility] in [place].  24 week healthy lifestyle clinic    Medications:   Qsymia: combines phentermine and topamax  Jani: blocks fat absorption    ShingRix shingles vaccine, check with insurance if pharmacy or clinic is covered best.

## 2019-05-16 ASSESSMENT — ASTHMA QUESTIONNAIRES: ACT_TOTALSCORE: 25

## 2019-11-02 ENCOUNTER — HEALTH MAINTENANCE LETTER (OUTPATIENT)
Age: 64
End: 2019-11-02

## 2020-02-24 ENCOUNTER — OFFICE VISIT (OUTPATIENT)
Dept: FAMILY MEDICINE | Facility: CLINIC | Age: 65
End: 2020-02-24
Payer: COMMERCIAL

## 2020-02-24 VITALS
BODY MASS INDEX: 35.44 KG/M2 | OXYGEN SATURATION: 98 % | DIASTOLIC BLOOD PRESSURE: 70 MMHG | TEMPERATURE: 97.8 F | SYSTOLIC BLOOD PRESSURE: 132 MMHG | HEART RATE: 105 BPM | WEIGHT: 187.7 LBS | HEIGHT: 61 IN

## 2020-02-24 DIAGNOSIS — N30.01 ACUTE CYSTITIS WITH HEMATURIA: Primary | ICD-10-CM

## 2020-02-24 DIAGNOSIS — R82.90 NONSPECIFIC FINDING ON EXAMINATION OF URINE: ICD-10-CM

## 2020-02-24 DIAGNOSIS — R30.0 DYSURIA: ICD-10-CM

## 2020-02-24 LAB
ALBUMIN UR-MCNC: NEGATIVE MG/DL
APPEARANCE UR: CLEAR
BILIRUB UR QL STRIP: NEGATIVE
COLOR UR AUTO: YELLOW
GLUCOSE UR STRIP-MCNC: NEGATIVE MG/DL
HGB UR QL STRIP: ABNORMAL
KETONES UR STRIP-MCNC: NEGATIVE MG/DL
LEUKOCYTE ESTERASE UR QL STRIP: ABNORMAL
NITRATE UR QL: NEGATIVE
PH UR STRIP: 5.5 PH (ref 5–7)
RBC #/AREA URNS AUTO: ABNORMAL /HPF
SOURCE: ABNORMAL
SP GR UR STRIP: 1.01 (ref 1–1.03)
UROBILINOGEN UR STRIP-ACNC: 0.2 EU/DL (ref 0.2–1)
WBC #/AREA URNS AUTO: ABNORMAL /HPF
WBC CLUMPS #/AREA URNS HPF: PRESENT /HPF

## 2020-02-24 PROCEDURE — 87086 URINE CULTURE/COLONY COUNT: CPT | Performed by: INTERNAL MEDICINE

## 2020-02-24 PROCEDURE — 99213 OFFICE O/P EST LOW 20 MIN: CPT | Performed by: INTERNAL MEDICINE

## 2020-02-24 PROCEDURE — 81001 URINALYSIS AUTO W/SCOPE: CPT | Performed by: INTERNAL MEDICINE

## 2020-02-24 RX ORDER — SULFAMETHOXAZOLE/TRIMETHOPRIM 800-160 MG
1 TABLET ORAL 2 TIMES DAILY
Qty: 6 TABLET | Refills: 0 | Status: SHIPPED | OUTPATIENT
Start: 2020-02-24 | End: 2020-02-27

## 2020-02-24 ASSESSMENT — MIFFLIN-ST. JEOR: SCORE: 1343.78

## 2020-02-24 NOTE — PROGRESS NOTES
"Subjective     Mirta Cardenas is a 65 year old female who presents to clinic today for the following health issues:  Chief Complaint   Patient presents with     UTI     x 1 day, urgency, frequeny and a tinge of pink on toilet tissue today.  Did have some slight symptoms last week but resolved.        HPI   URINARY TRACT SYMPTOMS  Onset: x 1 day, did have some symptoms last week but quickly resolved.      Description:   Painful urination (Dysuria): YES- uncomfortable and felt \"not normal\"- feels like a pressure, not burning though like she has with prior UTI           Frequency: YES, went 4 times right away in the a.m. with some urgency and then a couple more times before work, then only went twice during 3 hours of work  Blood in urine (Hematuria): blood tinged toilet tissue this morning, very faintly pink a couple of times  Delay in urine (Hesitency): no    Intensity: mild to moderate    Progression of Symptoms:  waxing and waning    Accompanying Signs & Symptoms:  Fever/chills: no  Flank pain: not currently, but had a kidney stone \"episode\" last month w/ flank pain and lower abdominal pain, though this was not as intense as prior kidney stone episodes   Nausea and vomiting: no   Any vaginal symptoms: none  Abdominal/Pelvic Pain: no     History:   History of frequent UTI's: no   History of kidney stones: YES, many episodes in the 1990s and was on potassium citrate to reduce frequency until 3 years ago (when price increased and insurance changed) - possible episode around 1/18/20  Sexually Active: no   Possibility of pregnancy: No    Precipitating factors:   none    Therapies Tried and outcome: drinking a lot of water.         Patient Active Problem List   Diagnosis     Calculus of kidney     Benign essential hypertension     Esophageal reflux     Chronic lymphoid leukemia in remission (H)     Mild intermittent asthma     Hypercalcuria     Other hyperparathyroidism (H)     Female stress incontinence     Thyroid " nodule     Other specified aftercare following surgery     Anemia     Acute renal failure (H)     Fatty liver     High level of uric acid in blood     HYPERLIPIDEMIA LDL GOAL <100     Health Care Home     CKD (chronic kidney disease) stage 3, GFR 30-59 ml/min (H)     Advanced directives, counseling/discussion     History of colonoscopy     Sebaceous cyst     Morbid obesity (H)     Past Surgical History:   Procedure Laterality Date     C/SECTION, CLASSICAL  ,,    , Classical     HYSTERECTOMY, PAP NO LONGER INDICATED       RELEASE CARPAL TUNNEL  2012    Procedure:RELEASE CARPAL TUNNEL; Right Carpal Tunnel Release & Right Ring A1 Pulley Release; Surgeon:SERGEY HEATON; Location:[facility]     RELEASE TRIGGER FINGER  2012    Procedure:RELEASE TRIGGER FINGER; Surgeon:SERGEY HEATON; Location:[facility]     RELEASE TRIGGER FINGER  10/12/2012    Procedure: RELEASE TRIGGER FINGER;  Right Thumb A1 Pulley Release;  Surgeon: Sergey Heaton MD;  Location: [facility] OR     SALPINGO OOPHORECTOMY,R/L/ULICES  2008    right       Social History     Tobacco Use     Smoking status: Never Smoker     Smokeless tobacco: Never Used   Substance Use Topics     Alcohol use: Yes     Comment: THREE- FOUR TIMES PER YEAR, SOCIALLY     Family History   Problem Relation Age of Onset     Hypertension Brother      Heart Disease Brother 65        bypass     Cancer Sister         multiple myloma     Obesity Son      Obesity Son      Hypertension Son      Melanoma No family hx of          Current Outpatient Medications   Medication Sig Dispense Refill     ASPIRIN 81 MG OR TABS 1 TABLET DAILY       lisinopril-hydrochlorothiazide (PRINZIDE/ZESTORETIC) 20-25 MG tablet Take 1 tablet by mouth every morning 90 tablet 3     omeprazole (PRILOSEC) 20 MG capsule Take 1 capsule by mouth every morning.       pravastatin (PRAVACHOL) 40 MG tablet Take 1 tablet (40 mg) by mouth daily 90 tablet 3     sulfamethoxazole-trimethoprim (BACTRIM  "DS) 800-160 MG tablet Take 1 tablet by mouth 2 times daily for 3 days 6 tablet 0     terazosin (HYTRIN) 5 MG capsule Take 1 capsule (5 mg) by mouth daily 90 capsule 3     mometasone-formoterol (DULERA) 200-5 MCG/ACT inhaler Inhale 1 puff into the lungs 2 times daily Only using at the start of URI. (Patient not taking: Reported on 2/24/2020) 1 Inhaler 2     Allergies   Allergen Reactions     Allopurinol Itching     Amoxicillin      hives     Codeine Itching     Periactin      Sleepy.     Tenormin [Atenolol] Fatigue       Reviewed and updated as needed this visit by Provider         Review of Systems   ROS COMP: Constitutional and gu systems are negative, except as otherwise noted.      Objective    /70 (BP Location: Right arm, Patient Position: Sitting, Cuff Size: Adult Regular)   Pulse 105   Temp 97.8  F (36.6  C) (Tympanic)   Ht 1.549 m (5' 1\")   Wt 85.1 kg (187 lb 11.2 oz)   SpO2 98%   BMI 35.47 kg/m    Body mass index is 35.47 kg/m .  Physical Exam     GENERAL: healthy, alert and no distress  RESP: lungs clear to auscultation - no rales, rhonchi or wheezes  CV: regular rate and rhythm, normal S1 S2, no S3 or S4, no murmur, click or rub  ABDOMEN: soft, non-tender, no hepatosplenomegaly, no masses and bowel sounds normal  BACK: no CVA tenderness     Diagnostic Test Results:  Labs reviewed in Epic  Results for orders placed or performed in visit on 02/24/20 (from the past 24 hour(s))   *UA reflex to Microscopic and Culture ([place] and [facility] [place] (except [place] and [place])   Result Value Ref Range    Color Urine Yellow     Appearance Urine Clear     Glucose Urine Negative NEG^Negative mg/dL    Bilirubin Urine Negative NEG^Negative    Ketones Urine Negative NEG^Negative mg/dL    Specific Gravity Urine 1.010 1.003 - 1.035    Blood Urine Trace (A) NEG^Negative    pH Urine 5.5 5.0 - 7.0 pH    Protein Albumin Urine Negative NEG^Negative mg/dL    Urobilinogen Urine 0.2 0.2 - 1.0 EU/dL    Nitrite " Urine Negative NEG^Negative    Leukocyte Esterase Urine Small (A) NEG^Negative    Source Midstream Urine    Urine Microscopic   Result Value Ref Range    WBC Urine 10-25 (A) OTO5^0 - 5 /HPF    RBC Urine O - 2 OTO2^O - 2 /HPF    WBC Clumps Present (A) NEG^Negative /HPF   Urine Culture Aerobic Bacterial   Result Value Ref Range    Specimen Description Midstream Urine     Special Requests Specimen received in preservative     Culture Micro PENDING            Assessment & Plan     1. Acute cystitis with hematuria    Mirta presents with 1 day of urinary symptoms and UA suggestive of UTI.  Will start Bactrim and f/u on culture.     - sulfamethoxazole-trimethoprim (BACTRIM DS) 800-160 MG tablet; Take 1 tablet by mouth 2 times daily for 3 days  Dispense: 6 tablet; Refill: 0  - *UA reflex to Microscopic and Culture ([place] and [facility] (except [place] and [place])  - Urine Microscopic  - Urine Culture Aerobic Bacterial       Return in about 1 week (around 3/2/2020), or if symptoms worsen or fail to improve.    Chencho Koenig MD  [facility]

## 2020-02-25 LAB
BACTERIA SPEC CULT: NORMAL
Lab: NORMAL
SPECIMEN SOURCE: NORMAL

## 2020-03-24 ENCOUNTER — TELEPHONE (OUTPATIENT)
Dept: FAMILY MEDICINE | Facility: CLINIC | Age: 65
End: 2020-03-24

## 2020-03-24 DIAGNOSIS — J98.01 COUGH DUE TO BRONCHOSPASM: Primary | ICD-10-CM

## 2020-03-24 DIAGNOSIS — J01.20 ACUTE NON-RECURRENT ETHMOIDAL SINUSITIS: ICD-10-CM

## 2020-03-25 RX ORDER — FLUTICASONE PROPIONATE AND SALMETEROL XINAFOATE 45; 21 UG/1; UG/1
2 AEROSOL, METERED RESPIRATORY (INHALATION) 2 TIMES DAILY
Qty: 12 G | Refills: 3 | Status: SHIPPED | OUTPATIENT
Start: 2020-03-25 | End: 2020-08-19

## 2020-05-18 DIAGNOSIS — E78.5 HYPERLIPIDEMIA LDL GOAL <100: ICD-10-CM

## 2020-05-18 DIAGNOSIS — I10 BENIGN ESSENTIAL HYPERTENSION: ICD-10-CM

## 2020-05-19 NOTE — TELEPHONE ENCOUNTER
Routing refill request to provider for review/approval because:  Labs not current:  Creatinine, potassium, sodium, LDL.  Last office visit: 2/24/2020   Future Office Visit:  None.     YON ArchuletaN, RN

## 2020-05-22 RX ORDER — TERAZOSIN 5 MG/1
CAPSULE ORAL
Qty: 90 CAPSULE | Refills: 0 | Status: SHIPPED | OUTPATIENT
Start: 2020-05-22 | End: 2020-08-19

## 2020-05-22 RX ORDER — PRAVASTATIN SODIUM 40 MG
TABLET ORAL
Qty: 90 TABLET | Refills: 0 | Status: SHIPPED | OUTPATIENT
Start: 2020-05-22 | End: 2020-08-19

## 2020-05-22 RX ORDER — LISINOPRIL AND HYDROCHLOROTHIAZIDE 20; 25 MG/1; MG/1
TABLET ORAL
Qty: 90 TABLET | Refills: 0 | Status: SHIPPED | OUTPATIENT
Start: 2020-05-22 | End: 2020-08-19

## 2020-05-22 NOTE — TELEPHONE ENCOUNTER
Covering for PCP (out of clinic today):  Okay to defer labs and visit for now, 90 days refill of meds sent.    Thanks,  Chencho Koenig MD

## 2020-05-22 NOTE — TELEPHONE ENCOUNTER
Patient is calling asking if she needs a OV and Labs or if she can just do labs? She will be out after next wednesday 5/27/2020.    Mirta Lopez on 5/22/2020 at 10:21 AM

## 2020-08-18 ENCOUNTER — TELEPHONE (OUTPATIENT)
Dept: DERMATOLOGY | Facility: CLINIC | Age: 65
End: 2020-08-18

## 2020-08-19 ENCOUNTER — VIRTUAL VISIT (OUTPATIENT)
Dept: FAMILY MEDICINE | Facility: CLINIC | Age: 65
End: 2020-08-19
Payer: COMMERCIAL

## 2020-08-19 DIAGNOSIS — I10 BENIGN ESSENTIAL HYPERTENSION: Primary | ICD-10-CM

## 2020-08-19 DIAGNOSIS — N18.30 CKD (CHRONIC KIDNEY DISEASE) STAGE 3, GFR 30-59 ML/MIN (H): ICD-10-CM

## 2020-08-19 DIAGNOSIS — C91.11 CHRONIC LYMPHOID LEUKEMIA IN REMISSION (H): ICD-10-CM

## 2020-08-19 DIAGNOSIS — J98.01 COUGH DUE TO BRONCHOSPASM: ICD-10-CM

## 2020-08-19 DIAGNOSIS — E78.5 HYPERLIPIDEMIA LDL GOAL <100: ICD-10-CM

## 2020-08-19 PROCEDURE — 99214 OFFICE O/P EST MOD 30 MIN: CPT | Mod: TEL | Performed by: FAMILY MEDICINE

## 2020-08-19 RX ORDER — TERAZOSIN 5 MG/1
5 CAPSULE ORAL DAILY
Qty: 90 CAPSULE | Refills: 4 | Status: SHIPPED | OUTPATIENT
Start: 2020-08-19 | End: 2021-03-05

## 2020-08-19 RX ORDER — PRAVASTATIN SODIUM 40 MG
40 TABLET ORAL DAILY
Qty: 90 TABLET | Refills: 4 | Status: SHIPPED | OUTPATIENT
Start: 2020-08-19 | End: 2021-05-14

## 2020-08-19 RX ORDER — FLUTICASONE PROPIONATE AND SALMETEROL XINAFOATE 45; 21 UG/1; UG/1
2 AEROSOL, METERED RESPIRATORY (INHALATION) 2 TIMES DAILY
Qty: 12 G | Refills: 3 | Status: SHIPPED | OUTPATIENT
Start: 2020-08-19 | End: 2021-05-14

## 2020-08-19 RX ORDER — LISINOPRIL AND HYDROCHLOROTHIAZIDE 20; 25 MG/1; MG/1
1 TABLET ORAL DAILY
Qty: 90 TABLET | Refills: 4 | Status: SHIPPED | OUTPATIENT
Start: 2020-08-19 | End: 2021-03-05 | Stop reason: SINTOL

## 2020-08-19 NOTE — PATIENT INSTRUCTIONS
I sent medication refills for the year.    Please schedule fasting lab only appointment to check blood counts, electrolytes and kidney functions and cholesterol labs.

## 2020-08-19 NOTE — PROGRESS NOTES
"Mirta Cardenas is a 65 year old female who is being evaluated via a billable video visit.    Switched to phone appt.    The patient has been notified of following:     \"This video visit will be conducted via a call between you and your physician/provider. We have found that certain health care needs can be provided without the need for an in-person physical exam.  This service lets us provide the care you need with a video conversation.  If a prescription is necessary we can send it directly to your pharmacy.  If lab work is needed we can place an order for that and you can then stop by our lab to have the test done at a later time.    Video visits are billed at different rates depending on your insurance coverage.  Please reach out to your insurance provider with any questions.    If during the course of the call the physician/provider feels a video visit is not appropriate, you will not be charged for this service.\"    Patient has given verbal consent for Video visit? Yes  How would you like to obtain your AVS? Mail a copy  If you are dropped from the video visit, the video invite should be resent to: Text to cell phone: [phone]  Will anyone else be joining your video visit? No  Subjective     Mirta Cardenas is a 65 year old female who presents today via video visit for the following health issues:    HPI    Hyperlipidemia Follow-Up      Are you regularly taking any medication or supplement to lower your cholesterol?   Yes- pravastatin    Are you having muscle aches or other side effects that you think could be caused by your cholesterol lowering medication?  No    Hypertension Follow-up      Do you check your blood pressure regularly outside of the clinic? No     Are you following a low salt diet? No    Are your blood pressures ever more than 140 on the top number (systolic) OR more   than 90 on the bottom number (diastolic), for example 140/90? No  No headaches, no vision changes, no chest pressure. No " orthostasis    Asthma Follow-Up    Was ACT completed today?  No      Do you have a cough?  YES    Are you experiencing any wheezing in your chest?  No    Do you have any shortness of breath?  No     How often are you using a short-acting (rescue) inhaler or nebulizer, such as Albuterol?  Never    How many days per week do you miss taking your asthma controller medication?  0    Please describe any recent triggers for your asthma: upper respiratory infections, pollens and oak dust.    Have you had any Emergency Room Visits, Urgent Care Visits, or Hospital Admissions since your last office visit?  No      How many servings of fruits and vegetables do you eat daily?  2-3    On average, how many sweetened beverages do you drink each day (Examples: soda, juice, sweet tea, etc.  Do NOT count diet or artificially sweetened beverages)?   0-1    How many days per week do you exercise enough to make your heart beat faster? 3 or less    How many minutes a day do you exercise enough to make your heart beat faster? 10 - 19    How many days per week do you miss taking your medication? 0    - sending an ACT to patient.          Review of Systems   Constitutional, HEENT, cardiovascular, pulmonary, gi and gu systems are negative, except as otherwise noted.      Objective           Vitals:  No vitals were obtained today due to virtual visit.    Physical Exam     GENERAL: Healthy, alert and no distress  EYES: Eyes grossly normal to inspection.  No discharge or erythema, or obvious scleral/conjunctival abnormalities.  RESP: No audible wheeze, cough, or visible cyanosis.    PSYCH: Mentation normal, affect normal/bright, judgement and insight intact, normal speech             Assessment & Plan     Mirta was seen today for recheck medication.    Diagnoses and all orders for this visit:    Benign essential hypertension: stable, refill and check labs  -     lisinopril-hydrochlorothiazide (ZESTORETIC) 20-25 MG tablet; Take 1 tablet by mouth  "daily  -     terazosin (HYTRIN) 5 MG capsule; Take 1 capsule (5 mg) by mouth daily  -     Basic metabolic panel  (Ca, Cl, CO2, Creat, Gluc, K, Na, BUN); Future    Hyperlipidemia LDL goal <100: stable, recheck and refill  -     pravastatin (PRAVACHOL) 40 MG tablet; Take 1 tablet (40 mg) by mouth daily  -     Lipid panel reflex to direct LDL Fasting; Future    CKD (chronic kidney disease) stage 3, GFR 30-59 ml/min (H): recheck labs  -     Albumin Random Urine Quantitative with Creat Ratio; Future    Chronic lymphoid leukemia in remission (H): has been very stable, plan to recheck yearly.  -     CBC with platelets and differential; Future         BMI:   Estimated body mass index is 35.47 kg/m  as calculated from the following:    Height as of 2/24/20: 1.549 m (5' 1\").    Weight as of 2/24/20: 85.1 kg (187 lb 11.2 oz).   Weight management plan: Discussed healthy diet and exercise guidelines        Patient Instructions   I sent medication refills for the year.    Please schedule fasting lab only appointment to check blood counts, electrolytes and kidney functions and cholesterol labs.        Return in about 1 year (around 8/19/2021) for In-Clinic Visit sooner if needed..    Neal Mosquera MD  [facility]        Total of 11 minutes was spent on phone visit with patient. Options for treatment and/or follow-up care were reviewed with the patient. Patient was engaged and actively involved in the decision making process. She verbalized understanding of the options discussed and was satisfied with the final plan.    "

## 2020-08-20 ENCOUNTER — OFFICE VISIT (OUTPATIENT)
Dept: DERMATOLOGY | Facility: CLINIC | Age: 65
End: 2020-08-20
Payer: COMMERCIAL

## 2020-08-20 VITALS
DIASTOLIC BLOOD PRESSURE: 77 MMHG | RESPIRATION RATE: 16 BRPM | HEART RATE: 88 BPM | SYSTOLIC BLOOD PRESSURE: 122 MMHG | OXYGEN SATURATION: 98 %

## 2020-08-20 DIAGNOSIS — L40.8 INVERSE PSORIASIS: Primary | ICD-10-CM

## 2020-08-20 PROCEDURE — 99213 OFFICE O/P EST LOW 20 MIN: CPT | Performed by: PHYSICIAN ASSISTANT

## 2020-08-20 RX ORDER — FLUCONAZOLE 150 MG/1
TABLET ORAL
Qty: 2 TABLET | Refills: 0 | Status: SHIPPED | OUTPATIENT
Start: 2020-08-20 | End: 2020-10-26

## 2020-08-20 RX ORDER — TRIAMCINOLONE ACETONIDE 1 MG/G
OINTMENT TOPICAL
Qty: 30 G | Refills: 2 | Status: SHIPPED | OUTPATIENT
Start: 2020-08-20 | End: 2020-10-26

## 2020-08-20 NOTE — NURSING NOTE
"Initial /77 (BP Location: Right arm, Patient Position: Sitting, Cuff Size: Adult Large)   Pulse 88   Resp 16   SpO2 98%  Estimated body mass index is 35.47 kg/m  as calculated from the following:    Height as of 2/24/20: 1.549 m (5' 1\").    Weight as of 2/24/20: 85.1 kg (187 lb 11.2 oz). .    Jodi Nielsen, [facility]    "

## 2020-08-20 NOTE — LETTER
2020         RE: Mirta Cardenas  [id number] [facility] [id number]        Dear Colleague,    Thank you for referring your patient, Mirta Cardenas, to the [facility]. Please see a copy of my visit note below.    Mirta Cardenas is a 65 year old year old female patient here today for rash on lower abdominal fold and by groin. She notes that it is itchy. Comes and goes but now worse these past 2-3 weeks. She uses cortisone which does help. She denies any odor.  Patient has no other skin complaints today.  Remainder of the HPI, Meds, PMH, Allergies, FH, and SH was reviewed in chart.    Pertinent Hx:  Psoriasis  Past Medical History:   Diagnosis Date     Asthma      Blood transfusion      Hypertension      Malignant neoplasm (H)     CLL       Past Surgical History:   Procedure Laterality Date     C/SECTION, CLASSICAL  ,,    , Classical     HYSTERECTOMY, PAP NO LONGER INDICATED       RELEASE CARPAL TUNNEL  2012    Procedure:RELEASE CARPAL TUNNEL; Right Carpal Tunnel Release & Right Ring A1 Pulley Release; Surgeon:SERGEY HEATON; Location:[place] [facility]     RELEASE TRIGGER FINGER  2012    Procedure:RELEASE TRIGGER FINGER; Surgeon:SERGEY HEATON; Location:[place]     RELEASE TRIGGER FINGER  10/12/2012    Procedure: RELEASE TRIGGER FINGER;  Right Thumb A1 Pulley Release;  Surgeon: Sergey Heaton MD;  Location: [place] OR     SALPINGO OOPHORECTOMY,R/L/ULICES  2008    right        Family History   Problem Relation Age of Onset     Hypertension Brother      Heart Disease Brother 65        bypass     Cancer Sister         multiple myloma     Obesity Son      Obesity Son      Hypertension Son      Melanoma No family hx of        Social History     Socioeconomic History     Marital status:      Spouse name: Not on file     Number of children: Not on file     Years of education: Not on file     Highest education level: Not on file   Occupational History      Not on file   Social Needs     Financial resource strain: Not on file     Food insecurity     Worry: Not on file     Inability: Not on file     Transportation needs     Medical: Not on file     Non-medical: Not on file   Tobacco Use     Smoking status: Never Smoker     Smokeless tobacco: Never Used   Substance and Sexual Activity     Alcohol use: Yes     Comment: THREE- FOUR TIMES PER YEAR, SOCIALLY     Drug use: No     Sexual activity: Not Currently     Partners: Male     Birth control/protection: Surgical   Lifestyle     Physical activity     Days per week: Not on file     Minutes per session: Not on file     Stress: Not on file   Relationships     Social connections     Talks on phone: Not on file     Gets together: Not on file     Attends Religion service: Not on file     Active member of club or organization: Not on file     Attends meetings of clubs or organizations: Not on file     Relationship status: Not on file     Intimate partner violence     Fear of current or ex partner: Not on file     Emotionally abused: Not on file     Physically abused: Not on file     Forced sexual activity: Not on file   Other Topics Concern     Parent/sibling w/ CABG, MI or angioplasty before 65F 55M? No   Social History Narrative     Not on file       Outpatient Encounter Medications as of 8/20/2020   Medication Sig Dispense Refill     ASPIRIN 81 MG OR TABS 1 TABLET DAILY       fluconazole (DIFLUCAN) 150 MG tablet Take one pill repeat in one week. 2 tablet 0     fluticasone-salmeterol (ADVAIR-HFA) 45-21 MCG/ACT inhaler Inhale 2 puffs into the lungs 2 times daily 12 g 3     lisinopril-hydrochlorothiazide (ZESTORETIC) 20-25 MG tablet Take 1 tablet by mouth daily 90 tablet 4     omeprazole (PRILOSEC) 20 MG capsule Take 1 capsule by mouth every morning.       pravastatin (PRAVACHOL) 40 MG tablet Take 1 tablet (40 mg) by mouth daily 90 tablet 4     terazosin (HYTRIN) 5 MG capsule Take 1 capsule (5 mg) by mouth daily 90 capsule 4      triamcinolone (KENALOG) 0.1 % external ointment Apply sparingly twice daily to affected area on fold of skin for next 1-2 weeks. 30 g 2     No facility-administered encounter medications on file as of 8/20/2020.              Review Of Systems  Skin: As above  Eyes: negative  Ears/Nose/Throat: negative  Respiratory: No shortness of breath, dyspnea on exertion, cough, or hemoptysis  Cardiovascular: negative  Gastrointestinal: negative  Genitourinary: negative  Musculoskeletal: negative  Neurologic: negative  Psychiatric: negative  Hematologic/Lymphatic/Immunologic: negative  Endocrine: negative      O:   NAD, WDWN, Alert & Oriented, Mood & Affect wnl, Vitals stable   Here today alone   /77 (BP Location: Right arm, Patient Position: Sitting, Cuff Size: Adult Large)   Pulse 88   Resp 16   SpO2 98%    General appearance normal   Vitals stable   Alert, oriented and in no acute distress     Pink well demarcated plaques on groin and lower abdomen       Eyes: Conjunctivae/lids:Normal     ENT: Lips: normal    MSK:Normal    Cardiovascular: peripheral edema none    Pulm: Breathing Normal    Neuro/Psych: Orientation:Alert and Orientedx3 ; Mood/Affect:normal   A/P:  1. Favor inverse psoriasis   Will cover for any yeast with fluconazole take one tablet repeat in one week.   Apply triamcinolone sparingly twice daily for 1-2 weeks   Use vaseline or aquaphor  Skin care regimen reviewed with patient: Eliminate harsh soaps, i.e. Dial, zest, irsih spring; Mild soaps such as Cetaphil or Dove sensitive skin, avoid hot or cold showers, aggressive use of emollients including vanicream, cetaphil or cerave discussed with patient.    Risks of non-melanoma skin cancer discussed with patient   Return to clinic if not improving.       Again, thank you for allowing me to participate in the care of your patient.        Sincerely,        Radha Dugan PA-C

## 2020-08-20 NOTE — PATIENT INSTRUCTIONS
Use triamcinolone ointment sparingly twice daily for next 1-2 weeks.   Take fluconazole and repeat in one week.  Use aquaphor or vaseline daily to fold.   Please update me on progress in next two weeks.

## 2020-08-24 NOTE — PROGRESS NOTES
Mirta Cardenas is a 65 year old year old female patient here today for rash on lower abdominal fold and by groin. She notes that it is itchy. Comes and goes but now worse these past 2-3 weeks. She uses cortisone which does help. She denies any odor.  Patient has no other skin complaints today.  Remainder of the HPI, Meds, PMH, Allergies, FH, and SH was reviewed in chart.    Pertinent Hx:  Psoriasis  Past Medical History:   Diagnosis Date     Asthma      Blood transfusion      Hypertension      Malignant neoplasm (H)     CLL       Past Surgical History:   Procedure Laterality Date     C/SECTION, CLASSICAL  ,,    , Classical     HYSTERECTOMY, PAP NO LONGER INDICATED       RELEASE CARPAL TUNNEL  2012    Procedure:RELEASE CARPAL TUNNEL; Right Carpal Tunnel Release & Right Ring A1 Pulley Release; Surgeon:SERGEY HEATON; Location:[place]     RELEASE TRIGGER FINGER  2012    Procedure:RELEASE TRIGGER FINGER; Surgeon:SERGEY HEATON; Location[place]     RELEASE TRIGGER FINGER  10/12/2012    Procedure: RELEASE TRIGGER FINGER;  Right Thumb A1 Pulley Release;  Surgeon: Sergey Heaton MD;  Location: [facility]     SALPINGO OOPHORECTOMY,R/L/ULICES  2008    right        Family History   Problem Relation Age of Onset     Hypertension Brother      Heart Disease Brother 65        bypass     Cancer Sister         multiple myloma     Obesity Son      Obesity Son      Hypertension Son      Melanoma No family hx of        Social History     Socioeconomic History     Marital status:      Spouse name: Not on file     Number of children: Not on file     Years of education: Not on file     Highest education level: Not on file   Occupational History     Not on file   Social Needs     Financial resource strain: Not on file     Food insecurity     Worry: Not on file     Inability: Not on file     Transportation needs     Medical: Not on file     Non-medical: Not on file   Tobacco Use     Smoking  status: Never Smoker     Smokeless tobacco: Never Used   Substance and Sexual Activity     Alcohol use: Yes     Comment: THREE- FOUR TIMES PER YEAR, SOCIALLY     Drug use: No     Sexual activity: Not Currently     Partners: Male     Birth control/protection: Surgical   Lifestyle     Physical activity     Days per week: Not on file     Minutes per session: Not on file     Stress: Not on file   Relationships     Social connections     Talks on phone: Not on file     Gets together: Not on file     Attends Synagogue service: Not on file     Active member of club or organization: Not on file     Attends meetings of clubs or organizations: Not on file     Relationship status: Not on file     Intimate partner violence     Fear of current or ex partner: Not on file     Emotionally abused: Not on file     Physically abused: Not on file     Forced sexual activity: Not on file   Other Topics Concern     Parent/sibling w/ CABG, MI or angioplasty before 65F 55M? No   Social History Narrative     Not on file       Outpatient Encounter Medications as of 8/20/2020   Medication Sig Dispense Refill     ASPIRIN 81 MG OR TABS 1 TABLET DAILY       fluconazole (DIFLUCAN) 150 MG tablet Take one pill repeat in one week. 2 tablet 0     fluticasone-salmeterol (ADVAIR-HFA) 45-21 MCG/ACT inhaler Inhale 2 puffs into the lungs 2 times daily 12 g 3     lisinopril-hydrochlorothiazide (ZESTORETIC) 20-25 MG tablet Take 1 tablet by mouth daily 90 tablet 4     omeprazole (PRILOSEC) 20 MG capsule Take 1 capsule by mouth every morning.       pravastatin (PRAVACHOL) 40 MG tablet Take 1 tablet (40 mg) by mouth daily 90 tablet 4     terazosin (HYTRIN) 5 MG capsule Take 1 capsule (5 mg) by mouth daily 90 capsule 4     triamcinolone (KENALOG) 0.1 % external ointment Apply sparingly twice daily to affected area on fold of skin for next 1-2 weeks. 30 g 2     No facility-administered encounter medications on file as of 8/20/2020.              Review Of  Systems  Skin: As above  Eyes: negative  Ears/Nose/Throat: negative  Respiratory: No shortness of breath, dyspnea on exertion, cough, or hemoptysis  Cardiovascular: negative  Gastrointestinal: negative  Genitourinary: negative  Musculoskeletal: negative  Neurologic: negative  Psychiatric: negative  Hematologic/Lymphatic/Immunologic: negative  Endocrine: negative      O:   NAD, WDWN, Alert & Oriented, Mood & Affect wnl, Vitals stable   Here today alone   /77 (BP Location: Right arm, Patient Position: Sitting, Cuff Size: Adult Large)   Pulse 88   Resp 16   SpO2 98%    General appearance normal   Vitals stable   Alert, oriented and in no acute distress     Pink well demarcated plaques on groin and lower abdomen       Eyes: Conjunctivae/lids:Normal     ENT: Lips: normal    MSK:Normal    Cardiovascular: peripheral edema none    Pulm: Breathing Normal    Neuro/Psych: Orientation:Alert and Orientedx3 ; Mood/Affect:normal   A/P:  1. Favor inverse psoriasis   Will cover for any yeast with fluconazole take one tablet repeat in one week.   Apply triamcinolone sparingly twice daily for 1-2 weeks   Use vaseline or aquaphor  Skin care regimen reviewed with patient: Eliminate harsh soaps, i.e. Dial, zest, irsih spring; Mild soaps such as Cetaphil or Dove sensitive skin, avoid hot or cold showers, aggressive use of emollients including vanicream, cetaphil or cerave discussed with patient.    Risks of non-melanoma skin cancer discussed with patient   Return to clinic if not improving.

## 2020-08-27 DIAGNOSIS — C91.11 CHRONIC LYMPHOID LEUKEMIA IN REMISSION (H): ICD-10-CM

## 2020-08-27 DIAGNOSIS — E78.5 HYPERLIPIDEMIA LDL GOAL <100: ICD-10-CM

## 2020-08-27 DIAGNOSIS — I10 BENIGN ESSENTIAL HYPERTENSION: ICD-10-CM

## 2020-08-27 DIAGNOSIS — N18.30 CKD (CHRONIC KIDNEY DISEASE) STAGE 3, GFR 30-59 ML/MIN (H): ICD-10-CM

## 2020-08-27 LAB
ANION GAP SERPL CALCULATED.3IONS-SCNC: 7 MMOL/L (ref 3–14)
BASOPHILS # BLD AUTO: 0 10E9/L (ref 0–0.2)
BASOPHILS NFR BLD AUTO: 0 %
BUN SERPL-MCNC: 21 MG/DL (ref 7–30)
CALCIUM SERPL-MCNC: 9.2 MG/DL (ref 8.5–10.1)
CHLORIDE SERPL-SCNC: 106 MMOL/L (ref 94–109)
CHOLEST SERPL-MCNC: 161 MG/DL
CO2 SERPL-SCNC: 26 MMOL/L (ref 20–32)
CREAT SERPL-MCNC: 0.92 MG/DL (ref 0.52–1.04)
CREAT UR-MCNC: 128 MG/DL
DIFFERENTIAL METHOD BLD: ABNORMAL
EOSINOPHIL # BLD AUTO: 0.5 10E9/L (ref 0–0.7)
EOSINOPHIL NFR BLD AUTO: 4 %
ERYTHROCYTE [DISTWIDTH] IN BLOOD BY AUTOMATED COUNT: 13.2 % (ref 10–15)
GFR SERPL CREATININE-BSD FRML MDRD: 66 ML/MIN/{1.73_M2}
GLUCOSE SERPL-MCNC: 105 MG/DL (ref 70–99)
HCT VFR BLD AUTO: 36.1 % (ref 35–47)
HDLC SERPL-MCNC: 47 MG/DL
HGB BLD-MCNC: 12 G/DL (ref 11.7–15.7)
LDLC SERPL CALC-MCNC: 82 MG/DL
LYMPHOCYTES # BLD AUTO: 9.3 10E9/L (ref 0.8–5.3)
LYMPHOCYTES NFR BLD AUTO: 69 %
MCH RBC QN AUTO: 28 PG (ref 26.5–33)
MCHC RBC AUTO-ENTMCNC: 33.2 G/DL (ref 31.5–36.5)
MCV RBC AUTO: 84 FL (ref 78–100)
MICROALBUMIN UR-MCNC: 12 MG/L
MICROALBUMIN/CREAT UR: 9.45 MG/G CR (ref 0–25)
MONOCYTES # BLD AUTO: 0.8 10E9/L (ref 0–1.3)
MONOCYTES NFR BLD AUTO: 6 %
NEUTROPHILS # BLD AUTO: 2.8 10E9/L (ref 1.6–8.3)
NEUTROPHILS NFR BLD AUTO: 21 %
NONHDLC SERPL-MCNC: 114 MG/DL
PLATELET # BLD AUTO: 189 10E9/L (ref 150–450)
PLATELET # BLD EST: ABNORMAL 10*3/UL
POTASSIUM SERPL-SCNC: 4.2 MMOL/L (ref 3.4–5.3)
RBC # BLD AUTO: 4.29 10E12/L (ref 3.8–5.2)
RBC MORPH BLD: NORMAL
SODIUM SERPL-SCNC: 139 MMOL/L (ref 133–144)
TRIGL SERPL-MCNC: 161 MG/DL
WBC # BLD AUTO: 13.5 10E9/L (ref 4–11)

## 2020-08-27 PROCEDURE — 36415 COLL VENOUS BLD VENIPUNCTURE: CPT | Performed by: FAMILY MEDICINE

## 2020-08-27 PROCEDURE — 80048 BASIC METABOLIC PNL TOTAL CA: CPT | Performed by: FAMILY MEDICINE

## 2020-08-27 PROCEDURE — 85025 COMPLETE CBC W/AUTO DIFF WBC: CPT | Performed by: FAMILY MEDICINE

## 2020-08-27 PROCEDURE — 80061 LIPID PANEL: CPT | Performed by: FAMILY MEDICINE

## 2020-08-27 PROCEDURE — 82043 UR ALBUMIN QUANTITATIVE: CPT | Performed by: FAMILY MEDICINE

## 2020-08-28 PROBLEM — R73.01 IMPAIRED FASTING GLUCOSE: Status: ACTIVE | Noted: 2020-08-28

## 2020-10-06 ENCOUNTER — MYC MEDICAL ADVICE (OUTPATIENT)
Dept: FAMILY MEDICINE | Facility: CLINIC | Age: 65
End: 2020-10-06

## 2020-10-12 ENCOUNTER — APPOINTMENT (OUTPATIENT)
Dept: CT IMAGING | Facility: CLINIC | Age: 65
DRG: 177 | End: 2020-10-12
Attending: EMERGENCY MEDICINE
Payer: COMMERCIAL

## 2020-10-12 ENCOUNTER — HOSPITAL ENCOUNTER (INPATIENT)
Facility: CLINIC | Age: 65
LOS: 1 days | Discharge: ANOTHER HOSPITAL | DRG: 177 | End: 2020-10-13
Attending: EMERGENCY MEDICINE | Admitting: FAMILY MEDICINE
Payer: COMMERCIAL

## 2020-10-12 ENCOUNTER — NURSE TRIAGE (OUTPATIENT)
Dept: NURSING | Facility: CLINIC | Age: 65
End: 2020-10-12

## 2020-10-12 DIAGNOSIS — R93.89 ABNORMAL CT SCAN: ICD-10-CM

## 2020-10-12 DIAGNOSIS — J12.82 PNEUMONIA DUE TO 2019 NOVEL CORONAVIRUS: ICD-10-CM

## 2020-10-12 DIAGNOSIS — U07.1 PNEUMONIA DUE TO 2019 NOVEL CORONAVIRUS: ICD-10-CM

## 2020-10-12 DIAGNOSIS — S06.0X9A CONCUSSION WITH LOSS OF CONSCIOUSNESS, INITIAL ENCOUNTER: ICD-10-CM

## 2020-10-12 DIAGNOSIS — D73.9 SPLEEN DISORDER: ICD-10-CM
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
## 2020-10-12 LAB
ALBUMIN SERPL-MCNC: 3.6 G/DL (ref 3.4–5)
ALP SERPL-CCNC: 124 U/L (ref 40–150)
ALT SERPL W P-5'-P-CCNC: 40 U/L (ref 0–50)
ANION GAP SERPL CALCULATED.3IONS-SCNC: 11 MMOL/L (ref 3–14)
AST SERPL W P-5'-P-CCNC: 35 U/L (ref 0–45)
BASE DEFICIT BLDV-SCNC: 5.9 MMOL/L
BASOPHILS # BLD AUTO: 0 10E9/L (ref 0–0.2)
BASOPHILS NFR BLD AUTO: 0 %
BILIRUB SERPL-MCNC: 0.3 MG/DL (ref 0.2–1.3)
BUN SERPL-MCNC: 55 MG/DL (ref 7–30)
CALCIUM SERPL-MCNC: 9 MG/DL (ref 8.5–10.1)
CHLORIDE SERPL-SCNC: 106 MMOL/L (ref 94–109)
CO2 SERPL-SCNC: 20 MMOL/L (ref 20–32)
CREAT SERPL-MCNC: 1.54 MG/DL (ref 0.52–1.04)
CRP SERPL-MCNC: 57 MG/L (ref 0–8)
D DIMER PPP FEU-MCNC: 0.8 UG/ML FEU (ref 0–0.5)
DIFFERENTIAL METHOD BLD: ABNORMAL
EOSINOPHIL # BLD AUTO: 0 10E9/L (ref 0–0.7)
EOSINOPHIL NFR BLD AUTO: 0 %
ERYTHROCYTE [DISTWIDTH] IN BLOOD BY AUTOMATED COUNT: 13.4 % (ref 10–15)
FERRITIN SERPL-MCNC: 525 NG/ML (ref 8–252)
GFR SERPL CREATININE-BSD FRML MDRD: 35 ML/MIN/{1.73_M2}
GLUCOSE SERPL-MCNC: 118 MG/DL (ref 70–99)
HCO3 BLDV-SCNC: 20 MMOL/L (ref 21–28)
HCT VFR BLD AUTO: 33.3 % (ref 35–47)
HGB BLD-MCNC: 11.3 G/DL (ref 11.7–15.7)
LACTATE BLD-SCNC: 0.8 MMOL/L (ref 0.7–2)
LYMPHOCYTES # BLD AUTO: 3 10E9/L (ref 0.8–5.3)
LYMPHOCYTES NFR BLD AUTO: 42 %
MAGNESIUM SERPL-MCNC: 1.9 MG/DL (ref 1.6–2.3)
MCH RBC QN AUTO: 27.8 PG (ref 26.5–33)
MCHC RBC AUTO-ENTMCNC: 33.9 G/DL (ref 31.5–36.5)
MCV RBC AUTO: 82 FL (ref 78–100)
MONOCYTES # BLD AUTO: 0.5 10E9/L (ref 0–1.3)
MONOCYTES NFR BLD AUTO: 7 %
NEUTROPHILS # BLD AUTO: 3.7 10E9/L (ref 1.6–8.3)
NEUTROPHILS NFR BLD AUTO: 51 %
O2/TOTAL GAS SETTING VFR VENT: ABNORMAL %
PCO2 BLDV: 39 MM HG (ref 40–50)
PH BLDV: 7.31 PH (ref 7.32–7.43)
PLATELET # BLD AUTO: 172 10E9/L (ref 150–450)
PLATELET # BLD EST: ABNORMAL 10*3/UL
PO2 BLDV: 18 MM HG (ref 25–47)
POTASSIUM SERPL-SCNC: 4 MMOL/L (ref 3.4–5.3)
PROT SERPL-MCNC: 7.8 G/DL (ref 6.8–8.8)
RBC # BLD AUTO: 4.07 10E12/L (ref 3.8–5.2)
RBC MORPH BLD: NORMAL
SODIUM SERPL-SCNC: 137 MMOL/L (ref 133–144)
TROPONIN I SERPL-MCNC: <0.015 UG/L (ref 0–0.04)
WBC # BLD AUTO: 7.2 10E9/L (ref 4–11)

## 2020-10-12 PROCEDURE — 93005 ELECTROCARDIOGRAM TRACING: CPT | Performed by: EMERGENCY MEDICINE

## 2020-10-12 PROCEDURE — 250N000009 HC RX 250: Performed by: EMERGENCY MEDICINE

## 2020-10-12 PROCEDURE — 83735 ASSAY OF MAGNESIUM: CPT | Performed by: EMERGENCY MEDICINE

## 2020-10-12 PROCEDURE — 84484 ASSAY OF TROPONIN QUANT: CPT | Performed by: EMERGENCY MEDICINE

## 2020-10-12 PROCEDURE — 99291 CRITICAL CARE FIRST HOUR: CPT | Mod: 25 | Performed by: EMERGENCY MEDICINE

## 2020-10-12 PROCEDURE — 96361 HYDRATE IV INFUSION ADD-ON: CPT | Performed by: EMERGENCY MEDICINE

## 2020-10-12 PROCEDURE — 85025 COMPLETE CBC W/AUTO DIFF WBC: CPT | Performed by: EMERGENCY MEDICINE

## 2020-10-12 PROCEDURE — 82803 BLOOD GASES ANY COMBINATION: CPT | Performed by: EMERGENCY MEDICINE

## 2020-10-12 PROCEDURE — 83605 ASSAY OF LACTIC ACID: CPT | Performed by: EMERGENCY MEDICINE

## 2020-10-12 PROCEDURE — 250N000011 HC RX IP 250 OP 636: Performed by: EMERGENCY MEDICINE

## 2020-10-12 PROCEDURE — 80053 COMPREHEN METABOLIC PANEL: CPT | Performed by: EMERGENCY MEDICINE

## 2020-10-12 PROCEDURE — 71275 CT ANGIOGRAPHY CHEST: CPT

## 2020-10-12 PROCEDURE — 85379 FIBRIN DEGRADATION QUANT: CPT | Performed by: EMERGENCY MEDICINE

## 2020-10-12 PROCEDURE — 93010 ELECTROCARDIOGRAM REPORT: CPT | Performed by: EMERGENCY MEDICINE

## 2020-10-12 PROCEDURE — 86140 C-REACTIVE PROTEIN: CPT | Performed by: EMERGENCY MEDICINE

## 2020-10-12 PROCEDURE — 258N000003 HC RX IP 258 OP 636: Performed by: EMERGENCY MEDICINE

## 2020-10-12 PROCEDURE — 84145 PROCALCITONIN (PCT): CPT | Performed by: EMERGENCY MEDICINE

## 2020-10-12 PROCEDURE — 96360 HYDRATION IV INFUSION INIT: CPT | Mod: 59 | Performed by: EMERGENCY MEDICINE

## 2020-10-12 PROCEDURE — 82728 ASSAY OF FERRITIN: CPT | Performed by: EMERGENCY MEDICINE

## 2020-10-12 RX ORDER — ACETAMINOPHEN 325 MG/1
650 TABLET ORAL EVERY 4 HOURS PRN
Status: DISCONTINUED | OUTPATIENT
Start: 2020-10-12 | End: 2020-10-13 | Stop reason: HOSPADM

## 2020-10-12 RX ORDER — IOPAMIDOL 755 MG/ML
79 INJECTION, SOLUTION INTRAVASCULAR ONCE
Status: COMPLETED | OUTPATIENT
Start: 2020-10-12 | End: 2020-10-12

## 2020-10-12 RX ADMIN — SODIUM CHLORIDE 100 ML: 9 INJECTION, SOLUTION INTRAVENOUS at 23:22

## 2020-10-12 RX ADMIN — SODIUM CHLORIDE 500 ML: 9 INJECTION, SOLUTION INTRAVENOUS at 22:50

## 2020-10-12 RX ADMIN — IOPAMIDOL 79 ML: 755 INJECTION, SOLUTION INTRAVENOUS at 23:22

## 2020-10-12 ASSESSMENT — ENCOUNTER SYMPTOMS
GASTROINTESTINAL NEGATIVE: 1
COUGH: 1
SHORTNESS OF BREATH: 1
CARDIOVASCULAR NEGATIVE: 1
MUSCULOSKELETAL NEGATIVE: 1
FEVER: 1
PSYCHIATRIC NEGATIVE: 1
ENDOCRINE NEGATIVE: 1
EYES NEGATIVE: 1
HEMATOLOGIC/LYMPHATIC NEGATIVE: 1
ALLERGIC/IMMUNOLOGIC NEGATIVE: 1

## 2020-10-12 ASSESSMENT — MIFFLIN-ST. JEOR: SCORE: 1331.53

## 2020-10-12 NOTE — PROGRESS NOTES
"Mirta Cardenas is a 65 year old female who is being evaluated via a billable telephone visit.      The patient has been notified of following:     \"This telephone visit will be conducted via a call between you and your physician/provider. We have found that certain health care needs can be provided without the need for a physical exam.  This service lets us provide the care you need with a short phone conversation.  If a prescription is necessary we can send it directly to your pharmacy.  If lab work is needed we can place an order for that and you can then stop by our lab to have the test done at a later time.    Telephone visits are billed at different rates depending on your insurance coverage. During this emergency period, for some insurers they may be billed the same as an in-person visit.  Please reach out to your insurance provider with any questions.    If during the course of the call the physician/provider feels a telephone visit is not appropriate, you will not be charged for this service.\"    Patient has given verbal consent for Telephone visit?      What phone number would you like to be contacted at?     How would you like to obtain your AVS?   Subjective     Mirta Cardenas is a 65 year old female who presents via phone visit today for the following health issues:    HPI        Dizziness     Symptoms .     States .     Has tried  . States not helpful.      \"Leia Pink RN 10/12/20 8:49 AM Note      calling.  She passed out in bathtub.     Covid x 1 week.  She has been feeling crappy.  Moaning in shower and passed out.     Chipped tooth and lump on head.      He says she is able to ambulate to kitchen on her own.     She is not dizzy.  Not dry mouth.     Will push fluids today.  Protien drinks a few today for strength since she has not had solids in days.  Someone will drop off at house since they are quarantined.     Go to ED if dizzy, confused, sob, vomiting and diarrhea are " "getting the best of her and she cannot recover on her own intake or weak, confused, worsening.  Also can call back to triage.   asks for medication needed but there is none for this covid.     He agrees with plan.     Leia Pink RN  [facility] Nurse Advisor\"                       Review of Systems          Objective          Vitals:  No vitals were obtained today due to virtual visit.      PSYCH: Alert and oriented times 3; coherent speech, normal   rate and volume, able to articulate logical thoughts, able   to abstract reason, no tangential thoughts, no hallucinations   or delusions  Her affect is   RESP: No cough, no audible wheezing, able to talk in full sentences  Remainder of exam unable to be completed due to telephone visits            Assessment/Plan:      Phone call duration:  minutes                This encounter was opened in error. Please disregard.  "

## 2020-10-12 NOTE — TELEPHONE ENCOUNTER
calling.  She passed out in bathtub.    Covid x 1 week.  She has been feeling crappy.  Moaning in shower and passed out.    Chipped tooth and lump on head.     He says she is able to ambulate to kitchen on her own.    She is not dizzy.  Not dry mouth.    Will push fluids today.  Protien drinks a few today for strength since she has not had solids in days.  Someone will drop off at house since they are quarantined.    Go to ED if dizzy, confused, sob, vomiting and diarrhea are getting the best of her and she cannot recover on her own intake or weak, confused, worsening.  Also can call back to triage.   asks for medication needed but there is none for this covid.    He agrees with plan.    Leia Pink RN  [facility] Nurse Advisor        Additional Information    Negative: Severe difficulty breathing (e.g., struggling for each breath, speaks in single words)    Negative: [1] Difficulty breathing or swallowing AND [2] started suddenly after medicine, an allergic food or bee sting    Negative: Shock suspected (e.g., cold/pale/clammy skin, too weak to stand, low BP, rapid pulse)    Negative: Difficult to awaken or acting confused (e.g., disoriented, slurred speech)    Negative: [1] Weakness (i.e., paralysis, loss of muscle strength) of the face, arm or leg on one side of the body AND [2] sudden onset AND [3] present now    Negative: [1] Numbness (i.e., loss of sensation) of the face, arm or leg on one side of the body AND [2] sudden onset AND [3] present now    Negative: [1] Loss of speech or garbled speech AND [2] sudden onset AND [3] present now    Negative: Overdose (accidental or intentional) of medications    Negative: [1] Fainted > 15 minutes ago AND [2] still feels too weak or dizzy to stand    Negative: Heart beating < 50 beats per minute OR > 140 beats per minute    Negative: Sounds like a life-threatening emergency to the triager    Negative: Chest pain    Negative: Rectal bleeding,  "bloody stool, or tarry-black stool    Negative: [1] Vomiting AND [2] contains red blood or black (\"coffee ground\") material    Negative: Vomiting is main symptom    Negative: Diarrhea is main symptom    Negative: Headache is main symptom    Negative: Patient states that he/she is having an anxiety/panic attack    Negative: Dizziness from low blood sugar (i.e., < 60 mg/dl or 3.5 mmol/l)    Negative: Dizziness is described as a spinning sensation (i.e., vertigo)    Negative: Heat exhaustion suspected (i.e., dehydration from heat exposure)    Negative: Difficulty breathing    Negative: SEVERE dizziness (e.g., unable to stand, requires support to walk, feels like passing out now)    Negative: Extra heart beats OR irregular heart beating  (i.e., \"palpitations\")    Negative: [1] Drinking very little AND [2] dehydration suspected (e.g., no urine > 12 hours, very dry mouth, very lightheaded)    Negative: Patient sounds very sick or weak to the triager    Negative: [1] Dizziness caused by heat exposure, sudden standing, or poor fluid intake AND [2] no improvement after 2 hours of rest and fluids    Negative: [1] Fever > 103 F (39.4 C) AND [2] not able to get the fever down using Fever Care Advice    Negative: [1] Fever > 101 F (38.3 C) AND [2] age > 60    Negative: [1] Fever > 100.0 F (37.8 C) AND [2] bedridden (e.g., nursing home patient, CVA, chronic illness, recovering from surgery)    Negative: [1] Fever > 100.0 F (37.8 C) AND [2] diabetes mellitus or weak immune system (e.g., HIV positive, cancer chemo, splenectomy, organ transplant, chronic steroids)    [1] MODERATE dizziness (e.g., interferes with normal activities) AND [2] has NOT been evaluated by physician for this  (Exception: dizziness caused by heat exposure, sudden standing, or poor fluid intake)    Protocols used: DIZZINESS - [id number]      "

## 2020-10-13 ENCOUNTER — VIRTUAL VISIT (OUTPATIENT)
Dept: FAMILY MEDICINE | Facility: CLINIC | Age: 65
End: 2020-10-13
Payer: COMMERCIAL

## 2020-10-13 ENCOUNTER — TRANSFERRED RECORDS (OUTPATIENT)
Dept: HEALTH INFORMATION MANAGEMENT | Facility: CLINIC | Age: 65
End: 2020-10-13

## 2020-10-13 VITALS
WEIGHT: 176.59 LBS | OXYGEN SATURATION: 96 % | HEIGHT: 61 IN | RESPIRATION RATE: 20 BRPM | TEMPERATURE: 99.6 F | BODY MASS INDEX: 33.34 KG/M2 | DIASTOLIC BLOOD PRESSURE: 56 MMHG | SYSTOLIC BLOOD PRESSURE: 105 MMHG | HEART RATE: 84 BPM

## 2020-10-13 DIAGNOSIS — Z53.9 ERRONEOUS ENCOUNTER--DISREGARD: Primary | ICD-10-CM

## 2020-10-13 PROBLEM — R55 SYNCOPE AND COLLAPSE: Status: ACTIVE | Noted: 2020-10-13

## 2020-10-13 PROBLEM — U07.1 COVID-19 VIRUS INFECTION: Status: ACTIVE | Noted: 2020-10-13

## 2020-10-13 LAB — PROCALCITONIN SERPL-MCNC: 0.08 NG/ML

## 2020-10-13 PROCEDURE — 250N000013 HC RX MED GY IP 250 OP 250 PS 637: Performed by: EMERGENCY MEDICINE

## 2020-10-13 PROCEDURE — 99236 HOSP IP/OBS SAME DATE HI 85: CPT | Performed by: FAMILY MEDICINE

## 2020-10-13 PROCEDURE — 250N000011 HC RX IP 250 OP 636: Performed by: PHYSICIAN ASSISTANT

## 2020-10-13 PROCEDURE — 99207 PR CDG-CODE CATEGORY CHANGED: CPT | Performed by: FAMILY MEDICINE

## 2020-10-13 PROCEDURE — 120N000001 HC R&B MED SURG/OB

## 2020-10-13 RX ORDER — ONDANSETRON 2 MG/ML
4 INJECTION INTRAMUSCULAR; INTRAVENOUS EVERY 6 HOURS PRN
Status: DISCONTINUED | OUTPATIENT
Start: 2020-10-13 | End: 2020-10-13 | Stop reason: HOSPADM

## 2020-10-13 RX ORDER — CLOSTRIDIUM TETANI TOXOID ANTIGEN (FORMALDEHYDE INACTIVATED), CORYNEBACTERIUM DIPHTHERIAE TOXOID ANTIGEN (FORMALDEHYDE INACTIVATED), BORDETELLA PERTUSSIS TOXOID ANTIGEN (GLUTARALDEHYDE INACTIVATED), BORDETELLA PERTUSSIS FILAMENTOUS HEMAGGLUTININ ANTIGEN (FORMALDEHYDE INACTIVATED), BORDETELLA PERTUSSIS PERTACTIN ANTIGEN, AND BORDETELLA PERTUSSIS FIMBRIAE 2/3 ANTIGEN 5; 2; 2.5; 5; 3; 5 [LF]/.5ML; [LF]/.5ML; UG/.5ML; UG/.5ML; UG/.5ML; UG/.5ML
INJECTION, SUSPENSION INTRAMUSCULAR
COMMUNITY
Start: 2020-09-21 | End: 2020-10-26

## 2020-10-13 RX ORDER — ONDANSETRON 4 MG/1
4 TABLET, ORALLY DISINTEGRATING ORAL EVERY 6 HOURS PRN
Status: DISCONTINUED | OUTPATIENT
Start: 2020-10-13 | End: 2020-10-13 | Stop reason: HOSPADM

## 2020-10-13 RX ORDER — INFLUENZA A VIRUS A/HAWAII/70/2019 (H1N1) RECOMBINANT HEMAGGLUTININ ANTIGEN, INFLUENZA A VIRUS A/MINNESOTA/41/2019 (H3N2) RECOMBINANT HEMAGGLUTININ ANTIGEN, INFLUENZA B VIRUS B/WASHINGTON/02/2019 RECOMBINANT HEMAGGLUTININ ANTIGEN, AND INFLUENZA B VIRUS B/PHUKET/3073/2013 RECOMBINANT HEMAGGLUTININ ANTIGEN 45; 45; 45; 45 UG/.5ML; UG/.5ML; UG/.5ML; UG/.5ML
INJECTION INTRAMUSCULAR
COMMUNITY
Start: 2020-09-21 | End: 2020-10-26

## 2020-10-13 RX ADMIN — ACETAMINOPHEN 650 MG: 325 TABLET, FILM COATED ORAL at 10:18

## 2020-10-13 RX ADMIN — ACETAMINOPHEN 650 MG: 325 TABLET, FILM COATED ORAL at 02:19

## 2020-10-13 RX ADMIN — ENOXAPARIN SODIUM 40 MG: 40 INJECTION SUBCUTANEOUS at 10:18

## 2020-10-13 ASSESSMENT — ACTIVITIES OF DAILY LIVING (ADL)
ADLS_ACUITY_SCORE: 17

## 2020-10-13 ASSESSMENT — MIFFLIN-ST. JEOR: SCORE: 1293.38

## 2020-10-13 NOTE — ED NOTES
PATIENT UP TO BEDSIDE COMMODE WITH SBA, PATIENT BECAME SOB AND O2 SATS DROPPED TO 85%, PATIENT ASSISTED BACK TO BED AND INCREASED O2 FLOW TO 5 L NC FOR SATS ABOVE 92%. WILL CONTINUE TO TITRATE WHEN ABLE.

## 2020-10-13 NOTE — PLAN OF CARE
Transfer/Discharge Note  Data:   Reason for Transport:  Higher level of care for COVID management    Mirta Cardenas was transferred to [place] via advanced life support (ALS) at 1340.  Patient was accompanied by Emergency Medical Services. Equipment used for transport: Oxygen  Nasal Cannula and Cardiac monitor . Family was aware of reason for transport: yes. All belongings sent with Patient.    Action:  Report: given to Pauly MUELLER who verbalized understanding of transfer.      Response:  Patient's condition when transferred was stable.    Gunjan Martel RN

## 2020-10-13 NOTE — PLAN OF CARE
"Patient alert and orientated. Temp of 100.2 upon arrival from the ED. PRN tylenol was given. Temp recheck was 99.9.    Patient arrived to the floor on 5L of oxygen. Oxygen has been titrated down and is currently at 3L via nasal cannula. Patient reports shortness of breath with minimal activity and has a productive cough. Patient reported just feeling unwell and having back pain rated a 7/10. Patient also stated she has had diarrhea \"since this whole thing started\". Special precautions maintained.    Tele monitor on.     "

## 2020-10-13 NOTE — ED TRIAGE NOTES
"Pt 02 saturation 88% upon arrival--tachypnic. 02 placed and sats increased to 94% on 2LPM. Pt has \"reactive airway\" and has advair and albuterol inhalers. Pt last used them a few days ago because it \"was hard to do\".     Pt reports adequate hydration but minimal food intake.     Pt reports back pain and \"a lot of diarrhea\". Gray/dark stool. Pt reports several episodes daily.     Pt took ibuprofen this morning. No tylenol.  "

## 2020-10-13 NOTE — PLAN OF CARE
"Nursing Assessment:   Patient is alert and oriented.   Requiring more oxygen to remain >90%, started on 2L, now on 5L.  ANN, getting up sba of 1 to bsc.   Transferring to [place] with 1pm ride planned.     Vital signs:  Temp: 99.6  F (37.6  C) Temp src: Oral BP: 105/56 Pulse: 84   Resp: 20 SpO2: 96 % O2 Device: Nasal cannula Oxygen Delivery: 5 LPM Height: 154.9 cm (5' 1\") Weight: 80.1 kg (176 lb 9.4 oz)  Estimated body mass index is 33.37 kg/m  as calculated from the following:    Height as of this encounter: 1.549 m (5' 1\").    Weight as of this encounter: 80.1 kg (176 lb 9.4 oz).     Gunjan Martel RN on 10/13/2020 at 12:22 PM    "

## 2020-10-13 NOTE — PROGRESS NOTES
9:10 AM  This patient is a 65-year-old who had a COVID-19 test done at a [facility] pharmacy of [place] 1 week prior to admission.  She presented with increasing cough and shortness of breath.  She has a history of asthma.  On the day of admission she had a syncopal episode while she was taken a shower.  She woke up on the floor.  Upon admission she required 2 L of oxygen for an O2 sat of 88%.  Currently she is requiring 5 L.    Complete H&P will be done by RAYSHAWN Arango.  Call placed to [place] for potential transfer.    I saw the patient and participated in the H&P.

## 2020-10-13 NOTE — CONSULTS
Care Management Assessment and Discharge Consult    General Information  Assessment completed with:: Patient, Mirta  Type of CM/SW Visit: Initial Assessment  Primary Care Provider verified and updated as needed?: Yes    Reason for Consult: other (see comments)(covid positive, transferring to [facility])    Advance Care Planning: Advance Care Planning Reviewed: verbally verified with patient that she would like her spouse, Juanito, to speak on her behalf if she is unable to do so.    Pt also has 3 adults sons in [place]; [place] & Markus.     Communication Assessment  Patient's communication style: spoken language (English or Bilingual)  Hearing Difficulty or Deaf: no Wear Glasses or Blind: yes    Cognitive  Cognitive/Neuro/Behavioral: WDL  Level of Consciousness: alert  Arousal Level: opens eyes spontaneously  Orientation: oriented x 4  Mood/Behavior: anxious, calm, cooperative  Best Language: 0 - No aphasia  Speech: clear, spontaneous, logical    Living Environment:   People in home: spouse  Name(s) of People in Home: Juanito  Current living Arrangements: house        Family/Social Support:  Care provided by: self  Provides care for: no one  Marital Status:   Who is your support system?: , Children  Spouse's Name: Juanito, 3 sons- Thien Marin & Markus  Description of Support System: Supportive, Involved  Description of Support System: Supportive, Involved  Adequate family and caregiver support, Adequate social supports        Description of Support System: Supportive, Involved  Support Assessment: Adequate family and caregiver support, Adequate social supports    Current Resources:   Skilled Home Care Services:  NA  Community Resources:  NA  Equipment currently used at home: none  Supplies currently used at home: None    Employment:  Employment Status: employed at the [employer].     Financial/Environmental Concerns:  NA      Lifestyle & Psychosocial Needs:  Socioeconomic History     Marital  status:      Spouse name: Not on file     Number of children: Not on file     Years of education: Not on file     Highest education level: Not on file     Tobacco Use     Smoking status: Never Smoker     Smokeless tobacco: Never Used   Substance and Sexual Activity     Alcohol use: Yes     Comment: THREE- FOUR TIMES PER YEAR, SOCIALLY     Drug use: No     Sexual activity: Not Currently     Partners: Male     Birth control/protection: Surgical     Functional Status:  Prior to admission patient needed assistance:      None    Mental Health Status:  WDL                 Values/Beliefs:  Spiritual, Cultural Beliefs, Orthodox Practices, Values that affect care:NA        Discharge Planning:  Expected Discharge Date: 10/13/20 Transferring to [facility]     Concerns to be Addressed: all concerns addressed in this encounter       Anticipated Discharge Disposition:  TBD  Anticipated Discharge Services:  TBD  Anticipated Discharge DME:  TBD    Patient/family educated on Medicare website which has current facility and service quality ratings: no  Referrals Placed by CM/SW:  Warm handoff to [facility] staff  Education Provided on the Discharge Plan:  Yes  Patient/Family in Agreement with the Plan: yes     HARSHA Bo         Unknown if ever smoked

## 2020-10-13 NOTE — H&P
[facility]     History and Physical - Hospitalist Service       Date of Admission:  10/12/2020    Assessment & Plan   Mirta Cardenas is a 65 year old female admitted on 10/12/2020. She presents with a syncopal episode, dyspnea, and known COVID-19 infection.    COVID-19 virus infection  Acute hypoxic respiratory failure  Known exposure via coworker 2 weeks ago.  URI symptoms, cough, and diarrhea began 8 to 9 days ago. Positive COVID-19 test 10/5/2020.  ANN for the last 3 days.  SPO2 88% on presentation to the ED.  CT PE shows bilateral peripheral groundglass opacities consistent with COVID-19.  No PE.  D-dimer 0.8, procalcitonin 0.08, ferritin 525, CRP 57, VBG 7.31 / 39 / 18.  No leukocytosis.  Tachypneic to the high 20s.  Currently requiring 5 LPM via NC to maintain > 90%.  - O2 5LPM  - First dose of lovenox here  - Transfer to [place] - Accepted by Dr. Gomez, waiting for bed.    Syncope and collapse   Was in the shower yesterday, began to feel lightheaded and woke up lying on the floor of the tub.  Notes pain in her forehead and a chipped tooth.  She denies noting chest discomfort, palpitations, headache, or trouble breathing prior to collapse.  No neck or back pain.  No numbness or tingling extremities.  ECG shows SR, without syncope red flags like WPW, long/short QT, Brugada morphology, ARVD, or HOCM.  She acknowledges poor PO intake for the last several days. Suspect this is likely a direct result of mild dehydration + active COVID-19. No concerning CV history.  - Defer further w/u until COVID-19 resolved.    Mild intermittent asthma  Minimal symptoms. Takes Advair.  - Continue Advair    Benign essential hypertension  Normotensive since arrival.  - Continue lisinopril-hydrochlorothiazide 20-25 mg and terazosin 5 mg.    Hyperlipidemia  - Continue pravastatin 40 mg    KOMAL on CKD stage 3  Creatinine 1.54 today, baseline 0.9. Acknowledges poor PO intake. Received  mL in ED.   -  Hold off on additional fluid replacement d/t active COVID-19.    Obesity  BMI 34. Increased risk of poor outcome with COVID-19.    Chronic lymphoid leukemia in remission   Distant, no longer following with hematology.          Diet: NPO for Medical/Clinical Reasons Except for: Meds    DVT Prophylaxis: Enoxaparin (Lovenox) subcutaneous (Per COVID-19 protocol)  Branham Catheter: not present  Code Status: Full Code         Disposition Plan   Expected discharge: Today, recommended to [facility] once transfer available.  Entered: Sanjay Rock PA-C 10/13/2020, 9:42 AM     The patient's care was discussed with the Attending Physician, Dr. Raul Morales.    Sanjay Rock PA-C  [facility]   Contact information available via [facility] Paging/Directory      ______________________________________________________________________    Chief Complaint   Syncope and dyspnea    History is obtained from the patient    History of Present Illness   Mirta Cardenas is a 65 year old female who presents with ANN and a syncopal episode.  She had a known workplace exposure to COVID-19 2 weeks ago.  4 to 5 days later she developed URI symptoms, diarrhea, and a cough.  She tested positive for coronavirus at an outside clinic on 10/5/2020.  Over the last 3 to 4 days she has developed dyspnea with exertion.  She acknowledges minimal intake of food or drink.  Continues to have daily diarrhea, brown and watery.  She does have a history of mild, intermittent asthma for which she takes Advair. Presented to the ED with the room air SPO2 of 88%.  Chest CT consistent with COVID-19.    Yesterday morning (10/12/2020) she was in the shower when she felt lightheaded and woke up on the floor with pain in her forehead and lip.  She denies preceding chest pain, worsening dyspnea, palpitations, headache.  Denies neck/back pain or numbness/tingling in extremities.    She has never smoked.  Acknowledges occasional alcohol  use.    Review of Systems    The 10 point Review of Systems is negative other than noted in the HPI or here.     Past Medical History    I have reviewed this patient's medical history and updated it with pertinent information if needed.   Past Medical History:   Diagnosis Date     Asthma      Blood transfusion      Hypertension      Malignant neoplasm (H)     CLL       Past Surgical History   I have reviewed this patient's surgical history and updated it with pertinent information if needed.  Past Surgical History:   Procedure Laterality Date     C/SECTION, CLASSICAL  ,,    , Classical     HYSTERECTOMY, PAP NO LONGER INDICATED       RELEASE CARPAL TUNNEL  2012    Procedure:RELEASE CARPAL TUNNEL; Right Carpal Tunnel Release & Right Ring A1 Pulley Release; Surgeon:SERGEY HEATON; Location:[facility] OR     RELEASE TRIGGER FINGER  2012    Procedure:RELEASE TRIGGER FINGER; Surgeon:SERGEY HEATON; Location:[facility] OR     RELEASE TRIGGER FINGER  10/12/2012    Procedure: RELEASE TRIGGER FINGER;  Right Thumb A1 Pulley Release;  Surgeon: Sergey Heaton MD;  Location: [facility] OR     SALPINGO OOPHORECTOMY,R/L/ULICES  2008    right       Social History   I have reviewed this patient's social history and updated it with pertinent information if needed.  Social History     Tobacco Use     Smoking status: Never Smoker     Smokeless tobacco: Never Used   Substance Use Topics     Alcohol use: Yes     Comment: THREE- FOUR TIMES PER YEAR, SOCIALLY     Drug use: No       Family History   I have reviewed this patient's family history and updated it with pertinent information if needed.  Family History   Problem Relation Age of Onset     Hypertension Brother      Heart Disease Brother 65        bypass     Cancer Sister         multiple myloma     Obesity Son      Obesity Son      Hypertension Son      Melanoma No family hx of        Prior to Admission Medications   Prior to Admission Medications    Prescriptions Last Dose Informant Patient Reported? Taking?   ASPIRIN 81 MG OR TABS 10/11/2020 at Unknown time  Yes Yes   Si TABLET DAILY   calcipotriene (DOVONOX) 0.005 % external ointment Unknown at Unknown time  No No   Sig: Apply twice daily as needed to affected areas.   fluconazole (DIFLUCAN) 150 MG tablet Unknown at Unknown time  No No   Sig: Take one pill repeat in one week.   fluticasone-salmeterol (ADVAIR-HFA) 45-21 MCG/ACT inhaler Past Month at Unknown time  No Yes   Sig: Inhale 2 puffs into the lungs 2 times daily   lisinopril-hydrochlorothiazide (ZESTORETIC) 20-25 MG tablet 10/11/2020  No No   Sig: Take 1 tablet by mouth daily   omeprazole (PRILOSEC) 20 MG capsule 10/11/2020  Yes No   Sig: Take 1 capsule by mouth every morning.   pravastatin (PRAVACHOL) 40 MG tablet 10/11/2020  No No   Sig: Take 1 tablet (40 mg) by mouth daily   terazosin (HYTRIN) 5 MG capsule 10/11/2020  No No   Sig: Take 1 capsule (5 mg) by mouth daily   triamcinolone (KENALOG) 0.025 % cream Unknown at Unknown time  No No   Sig: Apply sparingly twice daily in affected area.   triamcinolone (KENALOG) 0.1 % external ointment Unknown at Unknown time  No No   Sig: Apply sparingly twice daily to affected area on fold of skin for next 1-2 weeks.      Facility-Administered Medications: None     Allergies   Allergies   Allergen Reactions     Allopurinol Itching     Amoxicillin      hives     Codeine Itching     Periactin      Sleepy.     Tenormin [Atenolol] Fatigue       Physical Exam   Vital Signs: Temp: 99.6  F (37.6  C) Temp src: Oral BP: 98/81 Pulse: 96   Resp: 20 SpO2: 93 % O2 Device: Nasal cannula Oxygen Delivery: 5 LPM  Weight: 176 lbs 9.42 oz    General: Ill-appearing but nontoxic. Answers questions quickly and appropriately with clear speech.   Skin: Pink, warm, dry.  Eyes: PERRL. Sclera are white.  HENT:  Normocephalic, atraumatic.  Oral mucosa is tacky but without lesions or exudate.  Lymph/Hematologic: No occipital,  anterior/posterior cervical, supraclavicular, or axillary lymphadenopathy.  CV: RRR, clear S1/S2 without murmur, rub, or gallop. Radial pulses equal bilaterally. No lower extremity edema.  Respiratory: Bibasilar crackles, equal bilaterally.  Mildly tachypneic but able to speak in full sentences.  GI: Soft, nontender. Normal bowel sounds.  Musculoskeletal: Moving all extremities well. Normal muscle bulk and tone.  Neuro: Memory and cognition appear normal. CN II - XII grossly intact. Symmetrical extremity strength.  Psych: Normal mood and affect.      Data   Data reviewed today: I reviewed all medications, new labs and imaging results over the last 24 hours. I personally reviewed the EKG tracing showing SR without ischemia or syncope red flags and the chest CT image(s) showing peripheral groundglass opacities consistent with COVID-19.    Recent Labs   Lab 10/12/20  2239   WBC 7.2   HGB 11.3*   MCV 82         POTASSIUM 4.0   CHLORIDE 106   CO2 20   BUN 55*   CR 1.54*   ANIONGAP 11   MONIK 9.0   *   ALBUMIN 3.6   PROTTOTAL 7.8   BILITOTAL 0.3   ALKPHOS 124   ALT 40   AST 35   TROPI <0.015     Recent Results (from the past 24 hour(s))   CT Chest Pulmonary Embolism w Contrast    Narrative    EXAM: CT CHEST PULMONARY EMBOLISM W CONTRAST  LOCATION: [facility]  DATE/TIME: 10/12/2020 11:21 PM    INDICATION: Cough and shortness of breath. COVID positive.  COMPARISON: None.  TECHNIQUE: CT chest pulmonary angiogram during arterial phase injection of IV contrast. Multiplanar reformats and MIP reconstructions were performed. Dose reduction techniques were used.   CONTRAST: 80mL Isovue-370    FINDINGS:  ANGIOGRAM CHEST: Pulmonary arteries are normal caliber. No definitive pulmonary embolism. Central low density on a single image series 4 image 74 and the left lower lobe is favored to be at a point of vascular branching, not confirmed on other sequences.   Thoracic aorta is negative for dissection.  No CT evidence of right heart strain.    LUNGS AND PLEURA: Multifocal, bilateral groundglass infiltrates which are predominantly peripheral and greater in the lower lobes. No pleural effusion.    MEDIASTINUM/AXILLAE: Coronary artery calcification. No significant pericardial effusion. Subcentimeter lymph nodes. Small hiatal hernia.    UPPER ABDOMEN: Splenomegaly. Spleen 16 cm.    MUSCULOSKELETAL: Degenerative change osseous structures.      Impression    IMPRESSION:  1. Multifocal, bilateral, peripheral groundglass infiltrates. Commonly reported imaging features of (COVID-19) pneumonia are present. Influenza pneumonia and organizing pneumonia as can be seen in the setting of drug toxicity and connective tissue   disease can cause a similar imaging pattern.  2. No definitive pulmonary embolism.  3. Splenomegaly.

## 2020-10-13 NOTE — PLAN OF CARE
"[facility] ADMISSION NOTE    Patient admitted to room 2315 at approximately 0155 via cart from emergency room. Patient was accompanied by transport tech.     Verbal SBAR report received from AMI Reis prior to patient arrival.     Patient trasferred to bed via self. Patient alert and oriented X 3. Pain is not well controlled. Admission vital signs: Blood pressure 107/62, pulse 99, temperature 100.2  F (37.9  C), temperature source Oral, resp. rate 26, height 1.549 m (5' 1\"), weight 80.1 kg (176 lb 9.4 oz), SpO2 95 %, not currently breastfeeding. Patient was oriented to plan of care, call light, bed controls, tv, telephone, bathroom, and visiting hours.     Risk Assessment    The following safety risks were identified during admission: fall. Yellow risk band applied: YES.     Skin Initial Assessment    Patient declined full skin assessment at this time. Patient reports bruising to forehead, chipped tooth, and cut open lip from syncopal episode that happened earlier today.     Vivek Risk Assessment  Sensory Perception: 4-->no impairment  Moisture: 4-->rarely moist  Activity: 3-->walks occasionally  Mobility: 3-->slightly limited  Nutrition: 2-->probably inadequate  Friction and Shear: 3-->no apparent problem  Vivek Score: 19  Bed Support Surface: Atmos Air mattress  Reassessed using Bed Algorithm: No    Education    Patient has a [place] to Observation order: No  Observation education completed and documented: N/A      Jimena Velazquez RN      "

## 2020-10-22 ENCOUNTER — VIRTUAL VISIT (OUTPATIENT)
Dept: FAMILY MEDICINE | Facility: CLINIC | Age: 65
End: 2020-10-22
Payer: COMMERCIAL

## 2020-10-22 ENCOUNTER — AMBULATORY - HEALTHEAST (OUTPATIENT)
Dept: CARE COORDINATION | Facility: CLINIC | Age: 65
End: 2020-10-22

## 2020-10-22 ENCOUNTER — TELEPHONE (OUTPATIENT)
Dept: FAMILY MEDICINE | Facility: CLINIC | Age: 65
End: 2020-10-22

## 2020-10-22 DIAGNOSIS — K60.2 ANAL FISSURE: Primary | ICD-10-CM

## 2020-10-22 PROCEDURE — 99213 OFFICE O/P EST LOW 20 MIN: CPT | Mod: TEL | Performed by: FAMILY MEDICINE

## 2020-10-22 NOTE — TELEPHONE ENCOUNTER
I spoke with pt.  She is recovering from COVID.  She was admitted on 10/12/20 and discharged from [place] [facility] 2 days ago.   Pt reports that she has a history of a rectal fissure and believes it has recurred.  Pt is asking for cream to relieve pain.    Pt says that due to all the sitting while in the hospital, she could feel it developing again.    Advised will need a virtual visit today to properly address.    Natalie Pascal RN

## 2020-10-22 NOTE — TELEPHONE ENCOUNTER
"Reason for call:  Patient reporting a symptom    Symptom or request: Pt calling for 2 issues:  1.  Pt was discharged from the hospital and needs a follow up appt asap - She wants to see Dr. Mosquera.  2.  Pt is in \"Extreme pain\" in her rectum due to being in bed and having bowel issues.    NB  Pharmacy  Please call patient and advise - She would like a call back ASAP please.      Duration (how long have symptoms been present): ongoing    Have you been treated for this before? Yes    Additional comments:     Phone Number patient can be reached at:  Home number on file [phone] (home)    Best Time:  any    Can we leave a detailed message on this number:  YES    Call taken on 10/22/2020 at 7:40 AM by Sanam Quintanilla    "

## 2020-10-22 NOTE — PROGRESS NOTES
"Mirta Cardenas is a 65 year old female who is being evaluated via a billable telephone visit.      The patient has been notified of following:     \"This telephone visit will be conducted via a call between you and your physician/provider. We have found that certain health care needs can be provided without the need for a physical exam.  This service lets us provide the care you need with a short phone conversation.  If a prescription is necessary we can send it directly to your pharmacy.  If lab work is needed we can place an order for that and you can then stop by our lab to have the test done at a later time.    Telephone visits are billed at different rates depending on your insurance coverage. During this emergency period, for some insurers they may be billed the same as an in-person visit.  Please reach out to your insurance provider with any questions.    If during the course of the call the physician/provider feels a telephone visit is not appropriate, you will not be charged for this service.\"    Patient has given verbal consent for Telephone visit?  Yes    What phone number would you like to be contacted at? [phone]    How would you like to obtain your AVS? Mail a copy    Subjective     Mirta Cardenas is a 65 year old female who presents via phone visit today for the following health issues:    HPI   65 yr old female here for rectal pain.Patient reports that she was diagnosed with anal fissure a few years ago. She has used nitroglycerin on and off since then. She was diagnosed with COVID last week and had a lot of diarrhea and this set off the anal fissure. She reports no bleeding.   Concern - rectal pain, hx anal fissure in the past. She is having rectal pain from diarrhea due to covid (she has her follow up with PCP next week) she wants to address her anal pain before then.  Onset: 2 days  Description: anal pain  Intensity: severe  Progression of Symptoms:  Pain inside rectum  Accompanying Signs " & Symptoms: none other than rectal pain  Previous history of similar problem: none  Precipitating factors:        Worsened by: sitting  Alleviating factors:        Improved by: nothing  Therapies tried and outcome: sitz baths, not helping and Tylenol             Review of Systems   Constitutional, HEENT, cardiovascular, pulmonary, gi and gu systems are negative, except as otherwise noted.       Objective          Vitals:  No vitals were obtained today due to virtual visit.    healthy, alert and no distress  PSYCH: Alert and oriented times 3; coherent speech, normal   rate and volume, able to articulate logical thoughts, able   to abstract reason, no tangential thoughts, no hallucinations   or delusions  Her affect is normal  RESP: No cough, no audible wheezing, able to talk in full sentences  Remainder of exam unable to be completed due to telephone visits    No results found for this or any previous visit (from the past 24 hour(s)).        Assessment/Plan:    Assessment & Plan     Anal fissure  - nitroGLYcerin 0.2% ointment (SPECIAL STRENGTH)  Dispense: 60 g; Refill: 1          FUTURE APPOINTMENTS:       - Follow-up visit in one month or sooner as needed.    Return in about 4 weeks (around 11/19/2020).    Marco Saini MD  [facility]    Phone call duration:  5 minutes

## 2020-10-26 ENCOUNTER — OFFICE VISIT (OUTPATIENT)
Dept: FAMILY MEDICINE | Facility: CLINIC | Age: 65
End: 2020-10-26
Payer: COMMERCIAL

## 2020-10-26 VITALS
HEART RATE: 90 BPM | RESPIRATION RATE: 16 BRPM | BODY MASS INDEX: 33.14 KG/M2 | SYSTOLIC BLOOD PRESSURE: 112 MMHG | OXYGEN SATURATION: 96 % | TEMPERATURE: 98.2 F | WEIGHT: 175.4 LBS | DIASTOLIC BLOOD PRESSURE: 50 MMHG

## 2020-10-26 DIAGNOSIS — N17.9 ACUTE RENAL FAILURE, UNSPECIFIED ACUTE RENAL FAILURE TYPE (H): ICD-10-CM

## 2020-10-26 DIAGNOSIS — R09.02 HYPOXIA: Primary | ICD-10-CM

## 2020-10-26 DIAGNOSIS — U07.1 COVID-19 VIRUS INFECTION: ICD-10-CM

## 2020-10-26 PROCEDURE — 99214 OFFICE O/P EST MOD 30 MIN: CPT | Performed by: FAMILY MEDICINE

## 2020-10-26 NOTE — PROGRESS NOTES
Subjective     Mirta Cardenas is a 65 year old female who presents to clinic today for the following health issues:    HPI           Hospital Follow-up Visit:    Hospital/Nursing Home/IP Rehab Facility: [facility]  Date of Admission: 10/13/20  Date of Discharge: 10/20/20  Reason(s) for Admission: COVID      Was your hospitalization related to COVID-19? YES   How are you feeling today? Better. Not 100% better.   In the past 24 hours have you had shortness of breath when speaking, walking, or climbing stairs? My breathing issues have improved  Do you have a cough? Yes, I have a cough but it's not worse  When is the last time you had a fever greater than 100? 10 days (10/16/20  Are you having any other symptoms? Fatigue   Do you have any other stressors you would like to discuss with your provider? OTHER: Anal fisture and BP medication. Patient is currently using the nitroglycerin ointment for the fissure and it is helping but it is still there.   Patient states when in the hospital they took her off of her bp meds and she is wondering when or if she needs to start taking them again.  Also, patient does have an oxygen machine. Patient wondering when she can return the oxygen.      Was the patient in the ICU or did the patient experience delirium during hospitalization?  Yes     Problems taking medications regularly:  None.   Medication changes since discharge: Not taking blood pressure meds til told.  Problems adhering to non-medication therapy:  None. Possible pulmonary rehab.    Summary of hospitalization:  Discharge summary unavailable  Diagnostic Tests/Treatments reviewed.  Follow up needed: none  Other Healthcare Providers Involved in Patient s Care:         PCP  Update since discharge: improved.  Post Discharge Medication Reconciliation: discharge medications reconciled and changed, per note/orders.  Plan of care communicated with patient        Admitted to [facility], discharge summary not  available.  Treated for COVID with the lovenox, supplemental oxygen and did need BiPap in ICU one dya.  Is taking Elliquis twice daily for prevention    Was discharged home with supplemental oxygen for night.  Nighttime sats normal around 94 off oxygen.  Able to do things around the house.      Anal fissure:   Improving pain with the nitroglycerin  Taking tylenol.      Review of Systems   Constitutional, HEENT, cardiovascular, pulmonary, gi and gu systems are negative, except as otherwise noted.      Objective    /50   Pulse 90   Temp 98.2  F (36.8  C) (Tympanic)   Resp 16   Wt 79.6 kg (175 lb 6.4 oz)   SpO2 96%   BMI 33.14 kg/m    Body mass index is 33.14 kg/m .  Physical Exam   GENERAL: healthy, alert and no distress  NECK: no adenopathy, no asymmetry, masses, or scars and thyroid normal to palpation  RESP: lungs clear to auscultation - no rales, rhonchi or wheezes  CV: regular rate and rhythm, normal S1 S2, no S3 or S4, no murmur, click or rub, no peripheral edema and peripheral pulses strong  MS: no gross musculoskeletal defects noted, no edema          Assessment & Plan     Mirta was seen today for hospital f/u.    Diagnoses and all orders for this visit:    Hypoxia: now resolved with resolving COVID  -     Miscellaneous DME Order    Acute renal failure, unspecified acute renal failure type (H):uncertain if this resolved because I don't have records.    COVID-19 virus infection: resolving, still slight shortness of breath, will work on improving endurance ane exercise and incentive spirometer.            See Patient Instructions    No follow-ups on file.    Neal Mosquera MD  [employer]    DME (Durable Medical Equipment) Orders and Documentation  Orders Placed This Encounter   Procedures     Miscellaneous DME Order      The patient was assessed and it was determined the patient is in need of the following listed DME Supplies/Equipment. Please complete supporting  documentation below to demonstrate medical necessity.      DME All Other Item(s) Documentation    List reason for need and supporting documentation for medical necessity below for each DME item.     1. Hypoxia: resolved  Discontinue home O2

## 2020-10-26 NOTE — PROGRESS NOTES
"Subjective     Mirta Cardenas is a 65 year old female who presents to clinic today following up from a hospitalization for COVID. She has been home for about 6 days and reports good improvement. She was discharged home on home O2 with SpO2 monitoring and was told to use oxygen \"when needed\". First few nights using O2 at bedtime. Used once yesterday after significant activity for symptoms of SOB, sx resolved with O2 and rest. Pt is requesting discontinuation of O2, s/sx improving. No episodes of hypoxia on home monitoring. Patient reports good control of reactive airway with no coughing episodes and states she has only been SOB one time since discharge when she attempted a heavier than usual work load at home. She denies fevers, fatigue, cough, SOB or CP at rest, dizziness, h/a, or myalgias. She is also requesting to discontinue her BP medications as she has not taken them since her hospitalization and her BP is well controlled. She denies h/a, CP, peripheral edema, or palpitations. BP upon this providers exam, 114/62 RUE. The patient has no other complaints or concerns at this time.    HPI           Hospital Follow-up Visit:    Hospital/Nursing Home/IP Rehab Facility: [facility]  Date of Admission: 10/12/20  Date of Discharge: 10/20/20  Reason(s) for Admission: COVID-19 pneumonia      Was your hospitalization related to COVID-19? YES   How are you feeling today? Much better  In the past 24 hours have you had shortness of breath when speaking, walking, or climbing stairs? My breathing issues have improved  Do you have a cough? Yes, I have a cough but it's not worse  When is the last time you had a fever greater than 100? None recorded  Are you having any other symptoms? None   Do you have any other stressors you would like to discuss with your provider? No             PHQ Assesment Total Score(s) 8/19/2020   PHQ-2 Score 0   Some recent data might be hidden       No flowsheet data found.    Was the " patient in the ICU or did the patient experience delirium during hospitalization?  No          Problems taking medications regularly:  Hasn't taken anti-HTN since hospital admission, BP within goal at this visit without s/sx of HTN.  Medication changes since discharge: discontinuation of anti-HTN  Problems adhering to non-medication therapy:  None    Summary of hospitalization:  [facility] discharge summary reviewed  Diagnostic Tests/Treatments reviewed.  Follow up needed: none  Other Healthcare Providers Involved in Patient s Care:         None  Update since discharge: improved.       Post Discharge Medication Reconciliation: discharge medications reconciled and changed, per note/orders.  Plan of care communicated with patient                Review of Systems   Constitutional, HEENT, cardiovascular, pulmonary, GI, , musculoskeletal, neuro, skin, endocrine and psych systems are negative, except as otherwise noted.      Objective    /50   Pulse 90   Temp 98.2  F (36.8  C) (Tympanic)   Resp 16   Wt 79.6 kg (175 lb 6.4 oz)   SpO2 96%   BMI 33.14 kg/m    Body mass index is 33.14 kg/m .  Physical Exam   GENERAL: healthy, alert and no distress  EYES: Eyes grossly normal to inspection, PERRL and conjunctivae and sclerae normal  NECK: no adenopathy, no asymmetry, masses, or scars and thyroid normal to palpation  RESP: good air movement with inspiration; chest expansion symmetric, fine rales present in bilateral lower lobes, no rhonchi or wheezes present on exam, patient is able to tolerate deep inspiration without catching of breath or dyspnea  CV: regular rate and rhythm, normal S1 S2, no S3 or S4, no murmur, click or rub, no peripheral edema and peripheral pulses strong  ABDOMEN: soft, nontender, no hepatosplenomegaly, no masses and bowel sounds normal  MS: no gross musculoskeletal defects noted, no edema  SKIN: no suspicious lesions or rashes  NEURO: Normal strength and tone, mentation intact and  speech normal  PSYCH: mentation appears normal, affect normal/bright    No results found for this or any previous visit (from the past 24 hour(s)).        Assessment & Plan     There are no diagnoses linked to this encounter.        MEDICATIONS:        - Hold ZESTORIC and HYTRIN at this time. We will continue to monitor BP via nurse BP visits monthly and perhaps you can continue to remain off of these medications.       - Continue other medications without change including ADVAIR inhaler until you feel back to baseline for breathing. You should be able to do daily activities without getting short of breath or needing to rest to catch your breath.       - Continue using your incentive spirometry device for breathing exercises.   FUTURE APPOINTMENTS:       - Make appointment with nurse to check blood pressure in 4 weeks    There is limited data to make recommendations for post-COVID-19 infection. Generic recommendations post a viral pneumonia illness include resuming activity as tolerated and limiting activities that cause SOB or significant dyspnea. Try to focus on a health diet and slowing increase physical activity as tolerated. Make the appointments to monitor your BP and we will see if you can remain off the antihypertensives. If you continue to use the home SpO2 monitor, please let us know if you obtain concerning readings with symptoms of shortness of breath or chest discomfort or fever.    There are no Patient Instructions on file for this visit.    No follow-ups on file.    BEBETO Wright, BSN  DNP-FNP student

## 2020-10-26 NOTE — PATIENT INSTRUCTIONS
Stay off the blood pressure medication.  Do check from time to time at home.  Goal BP <140/<90  Check blood pressures about once per week and write them down and bring them with to next clinic visit.     Advair: continue this for a full month.      Do schedule a free nurse blood pressure recheck in 4 weeks.    We'll make sure the labs came back to normal when I get the discharge paperwork.

## 2020-10-26 NOTE — PROGRESS NOTES
"Subjective     Mirta Cardenas is a 65 year old female who presents to clinic today for the following health issues:    HPI           Hospital Follow-up Visit:    Hospital/Nursing Home/IP Rehab Facility: [facility]  Date of Admission: 10/12/20  Date of Discharge: 10/13/20  Reason(s) for Admission: COVID      Was your hospitalization related to COVID-19? YES   How are you feeling today? { :585361}  In the past 24 hours have you had shortness of breath when speaking, walking, or climbing stairs? { :963196}  Do you have a cough? { :998764}  When is the last time you had a fever greater than 100? ***  Are you having any other symptoms? { :231102}   Do you have any other stressors you would like to discuss with your provider? { :654709}     {Rooming staff  Please complete PHQ assessment  :910814}   {Rooming staff  Please complete GAD7 assessment  :860092}   {Rooming staff  Please complete PTSD Screener  :282879}  {Results-PHQ, GAD7, PTSD Screenings Completed Today(Optional):788061}    Was the patient in the ICU or did the patient experience delirium during hospitalization?  {No_YES (delirium):468953}    {Provider  Link to COVID SmartSet :220405}      Problems taking medications regularly:  {NONE DEFAULTED:676334::\"None\"}  Medication changes since discharge: {NONE DEFAULTED:611324::\"None\"}  Problems adhering to non-medication therapy:  {NONE DEFAULTED:665688::\"None\"}    Summary of hospitalization:  {HOSPITAL DISCHARGE SUMMARY INFO:082764::\"[facility] discharge summary reviewed\"}  Diagnostic Tests/Treatments reviewed.  Follow up needed: {NONE DEFAULTED:616078::\"none\"}  Other Healthcare Providers Involved in Patient s Care:         {those currently involved after discharge:011873::\"None\"}  Update since discharge: {IMPROVED DEFAULT:460553::\"improved.\"} {TIP  Include information from family/caregivers, SNF, Care Coordination :153247}      Post Discharge Medication Reconciliation: {ACO Med Rec " "(Provider):638384}.  Plan of care communicated with {Communicate Plan to:852415::\"patient\"}     {Reference  Coding guidelines- Moderate Complexity F2F/Video within 7 - 14 days of discharge 61008, High Complexity F2F/Video within 7 days 16408 or zdssym11 days 67326 :564179}         {additonal problems for provider to add (Optional):528123}    Review of Systems   {ROS COMP (Optional):013207}      Objective    There were no vitals taken for this visit.  There is no height or weight on file to calculate BMI.  Physical Exam   {Exam List (Optional):828222}    {Diagnostic Test Results (Optional):388881}        {PROVIDER CHARTING PREFERENCE:342456}    "

## 2020-10-26 NOTE — LETTER
[facility]  [address] [address]  Phone: [phone]    October 26, 2020        Mirta Cardenas  [address] [facility] [address]          To whom it may concern:    RE: Mirta Cardenas    Patient was seen and treated today at our clinic and missed work due to illness and hospitalization 10/12/2020 -10/20/2020.  She is able to return to work full time without restrictions as of November 2, 2020.    Please contact me for questions or concerns.      Sincerely,        Neal Mosquera MD

## 2020-11-16 ENCOUNTER — HEALTH MAINTENANCE LETTER (OUTPATIENT)
Age: 65
End: 2020-11-16

## 2020-12-09 ENCOUNTER — TELEPHONE (OUTPATIENT)
Dept: FAMILY MEDICINE | Facility: CLINIC | Age: 65
End: 2020-12-09

## 2020-12-09 ENCOUNTER — ALLIED HEALTH/NURSE VISIT (OUTPATIENT)
Dept: FAMILY MEDICINE | Facility: CLINIC | Age: 65
End: 2020-12-09
Payer: COMMERCIAL

## 2020-12-09 VITALS
DIASTOLIC BLOOD PRESSURE: 88 MMHG | OXYGEN SATURATION: 98 % | RESPIRATION RATE: 20 BRPM | SYSTOLIC BLOOD PRESSURE: 144 MMHG | HEART RATE: 76 BPM

## 2020-12-09 DIAGNOSIS — I10 BENIGN ESSENTIAL HYPERTENSION: Primary | ICD-10-CM

## 2020-12-09 PROCEDURE — 99207 PR NO CHARGE NURSE ONLY: CPT

## 2020-12-09 NOTE — TELEPHONE ENCOUNTER
Dr. Mosquera,    Patient comes into the clinic today for a BP CK.  Medications and allergies are gone over with the patient and are up to date.  Patent has been off her bp meds as when she had covid her bp was lower.    Brings home readings averaged at /75, P 91.  This was taken with two different home monitors.     Today-    Vital Signs 12/9/2020 12/9/2020   Systolic 166 144   Diastolic 94 88   Pulse 90 76   Temperature     Respirations 20    Weight (LB)     Height     BMI (Calculated)     Pain     O2 98 98   Dahiana SOTO RN

## 2020-12-09 NOTE — TELEPHONE ENCOUNTER
Plan RN BP recheck again in 2-4 weeks and bring in home cuff to compare accuracy.    Home BPs are in goal, so continue off medication.    Thank you,  Neal Mosquera MD

## 2020-12-30 ENCOUNTER — ALLIED HEALTH/NURSE VISIT (OUTPATIENT)
Dept: FAMILY MEDICINE | Facility: CLINIC | Age: 65
End: 2020-12-30
Payer: COMMERCIAL

## 2020-12-30 ENCOUNTER — TELEPHONE (OUTPATIENT)
Dept: FAMILY MEDICINE | Facility: CLINIC | Age: 65
End: 2020-12-30

## 2020-12-30 VITALS
RESPIRATION RATE: 20 BRPM | OXYGEN SATURATION: 98 % | HEART RATE: 76 BPM | DIASTOLIC BLOOD PRESSURE: 82 MMHG | SYSTOLIC BLOOD PRESSURE: 152 MMHG

## 2020-12-30 DIAGNOSIS — I10 BENIGN ESSENTIAL HYPERTENSION: Primary | ICD-10-CM

## 2020-12-30 PROCEDURE — 99207 PR NO CHARGE NURSE ONLY: CPT

## 2020-12-30 NOTE — NURSING NOTE
Patient comes into the clinic today for a BP CK.  Brings in her home monitor, Bp here is 163/97, P 77  She brings in some home readings too.  Averaged at 126/89, P 76  Medications and allergies are gone over with the patient and are up to date.  Dahiana SOTO RN

## 2020-12-30 NOTE — TELEPHONE ENCOUNTER
Home BPs are in goal, but watch that bottom diastolic number, our goal is for it to always be under 90 and it is just creeping that line.    Continue current medications and recheck in August with clinic visit.    Thank you,  Neal Mosquera MD

## 2020-12-30 NOTE — TELEPHONE ENCOUNTER
Dr. Mosquera,    Patient comes into the clinic today for a BP CK.  She brings in her home monitor, Bp here is 163/97, P 77  She brings in some home readings averaged at 126/89, P 76.  Medications and allergies are gone over with the patient and are up to date.  Vital Signs 12/30/2020 12/30/2020   Systolic 162 152   Diastolic 90 82   Pulse 73 76   Temperature     Respirations 20    Weight (LB)     Height     BMI (Calculated)     Pain     O2 98 98

## 2021-02-02 ENCOUNTER — MYC MEDICAL ADVICE (OUTPATIENT)
Dept: FAMILY MEDICINE | Facility: CLINIC | Age: 66
End: 2021-02-02

## 2021-03-05 ENCOUNTER — OFFICE VISIT (OUTPATIENT)
Dept: FAMILY MEDICINE | Facility: CLINIC | Age: 66
End: 2021-03-05
Payer: COMMERCIAL

## 2021-03-05 VITALS
OXYGEN SATURATION: 99 % | DIASTOLIC BLOOD PRESSURE: 82 MMHG | RESPIRATION RATE: 16 BRPM | BODY MASS INDEX: 33.15 KG/M2 | HEART RATE: 75 BPM | HEIGHT: 61 IN | WEIGHT: 175.6 LBS | TEMPERATURE: 97.2 F | SYSTOLIC BLOOD PRESSURE: 148 MMHG

## 2021-03-05 DIAGNOSIS — I10 BENIGN ESSENTIAL HYPERTENSION: ICD-10-CM

## 2021-03-05 DIAGNOSIS — C91.11 CHRONIC LYMPHOID LEUKEMIA IN REMISSION (H): Primary | ICD-10-CM

## 2021-03-05 DIAGNOSIS — E78.5 HYPERLIPIDEMIA LDL GOAL <100: ICD-10-CM

## 2021-03-05 PROCEDURE — 99214 OFFICE O/P EST MOD 30 MIN: CPT | Performed by: FAMILY MEDICINE

## 2021-03-05 RX ORDER — LOSARTAN POTASSIUM 50 MG/1
50 TABLET ORAL DAILY
Qty: 30 TABLET | Refills: 1 | Status: SHIPPED | OUTPATIENT
Start: 2021-03-05 | End: 2021-04-30

## 2021-03-05 ASSESSMENT — MIFFLIN-ST. JEOR: SCORE: 1287.86

## 2021-03-05 NOTE — PROGRESS NOTES
"    Assessment & Plan     Benign essential hypertension  Not well controlled, lisinopril cough resolved so switch to losartan  - Basic metabolic panel  (Ca, Cl, CO2, Creat, Gluc, K, Na, BUN); Future  - losartan (COZAAR) 50 MG tablet; Take 1 tablet (50 mg) by mouth daily    Chronic lymphoid leukemia in remission (H)  Recheck in 2 months  - CBC with platelets and differential; Future    Hyperlipidemia LDL goal <100  Recheck in 2 months  - Lipid panel reflex to direct LDL Fasting; Future       BMI:   Estimated body mass index is 32.91 kg/m  as calculated from the following:    Height as of this encounter: 1.556 m (5' 1.25\").    Weight as of this encounter: 79.7 kg (175 lb 9.6 oz).   Weight management plan: Discussed healthy diet and exercise guidelines    Patient Instructions   Let me know how BPs are in 2-3 weeks and how headaches are too.      Return in about 3 weeks (around 3/26/2021) for RN BP recheck, mYChart message [facility] BPs.    Neal Mosquera MD  [facility]    Makayla Kirby is a 66 year old who presents for the following health issues     HPI       Hypertension Follow-up      Do you check your blood pressure regularly outside of the clinic? Yes. Patient is still not taking her blood pressure medications. She states they have been creeping back up. Patient states she has also been having headaches. They are located in the frontal lobe. Patient states it feels like a brain freeze like when you eat something cold.    Are you following a low salt diet? Yes- little bit.    Are your blood pressures ever more than 140 on the top number (systolic) OR more   than 90 on the bottom number (diastolic), for example 140/90? Yes  Had taken lisinopril-hydrochlorothiazide but after COVID blood pressure were low stop stopped it.  Had dry cough every night until stopped lisinopril, no longer waking up with dry cough at night.      How many servings of fruits and vegetables do you eat daily?  " "2-3    On average, how many sweetened beverages do you drink each day (Examples: soda, juice, sweet tea, etc.  Do NOT count diet or artificially sweetened beverages)?   0    How many days per week do you exercise enough to make your heart beat faster? 0. Patient is very active at work.    How many minutes a day do you exercise enough to make your heart beat faster? 0    How many days per week do you miss taking your medication? 0      Review of Systems   Constitutional, HEENT, cardiovascular, pulmonary, gi and gu systems are negative, except as otherwise noted.      Objective    BP (!) 148/82   Pulse 75   Temp 97.2  F (36.2  C) (Tympanic)   Resp 16   Ht 1.556 m (5' 1.25\")   Wt 79.7 kg (175 lb 9.6 oz)   SpO2 99%   BMI 32.91 kg/m    Body mass index is 32.91 kg/m .  Physical Exam   GENERAL: healthy, alert and no distress  RESP: lungs clear to auscultation - no rales, rhonchi or wheezes  CV: regular rate and rhythm, normal S1 S2, no S3 or S4, no murmur, click or rub, no peripheral edema and peripheral pulses strong  MS: no gross musculoskeletal defects noted, no edema            "

## 2021-04-26 ENCOUNTER — TELEPHONE (OUTPATIENT)
Dept: FAMILY MEDICINE | Facility: CLINIC | Age: 66
End: 2021-04-26

## 2021-04-26 DIAGNOSIS — I10 BENIGN ESSENTIAL HYPERTENSION: ICD-10-CM

## 2021-04-29 NOTE — TELEPHONE ENCOUNTER
"Requested Prescriptions   Pending Prescriptions Disp Refills     losartan (COZAAR) 50 MG tablet [Pharmacy Med Name: LOSARTAN POTASSIUM 50MG TABS] 30 tablet 1     Sig: TAKE 1 TABLET (50 MG) BY MOUTH DAILY       Angiotensin-II Receptors Failed - 4/26/2021  3:42 PM        Failed - Last blood pressure under 140/90 in past 12 months     BP Readings from Last 3 Encounters:   03/05/21 (!) 148/82   12/30/20 (!) 152/82   12/09/20 (!) 144/88                 Failed - Normal serum creatinine on file in past 12 months     Recent Labs   Lab Test 10/12/20  2239   CR 1.54*       Ok to refill medication if creatinine is low          Passed - Recent (12 mo) or future (30 days) visit within the authorizing provider's specialty     Patient has had an office visit with the authorizing provider or a provider within the authorizing providers department within the previous 12 mos or has a future within next 30 days. See \"Patient Info\" tab in inbasket, or \"Choose Columns\" in Meds & Orders section of the refill encounter.              Passed - Medication is active on med list        Passed - Patient is age 18 or older        Passed - No active pregnancy on record        Passed - Normal serum potassium on file in past 12 months     Recent Labs   Lab Test 10/12/20  2239   POTASSIUM 4.0                    Passed - No positive pregnancy test in past 12 months             "

## 2021-04-29 NOTE — TELEPHONE ENCOUNTER
Dr. Mosquera,    Routing refill request to provider for review/approval because:  BP readings are all elevated. And creatinine is elevated. Dahiana SOTO RN

## 2021-04-30 RX ORDER — LOSARTAN POTASSIUM 50 MG/1
50 TABLET ORAL DAILY
Qty: 30 TABLET | Refills: 0 | Status: SHIPPED | OUTPATIENT
Start: 2021-04-30 | End: 2021-05-14

## 2021-04-30 NOTE — TELEPHONE ENCOUNTER
Patient is calling waiting for her medication refill to get through until her appointment on May 14th. Please review.

## 2021-05-10 DIAGNOSIS — E78.5 HYPERLIPIDEMIA LDL GOAL <100: ICD-10-CM

## 2021-05-10 DIAGNOSIS — C91.11 CHRONIC LYMPHOID LEUKEMIA IN REMISSION (H): ICD-10-CM

## 2021-05-10 DIAGNOSIS — I10 BENIGN ESSENTIAL HYPERTENSION: ICD-10-CM

## 2021-05-10 LAB
ANION GAP SERPL CALCULATED.3IONS-SCNC: 6 MMOL/L (ref 3–14)
BASOPHILS # BLD AUTO: 0 10E9/L (ref 0–0.2)
BASOPHILS NFR BLD AUTO: 0 %
BUN SERPL-MCNC: 19 MG/DL (ref 7–30)
CALCIUM SERPL-MCNC: 8.6 MG/DL (ref 8.5–10.1)
CHLORIDE SERPL-SCNC: 108 MMOL/L (ref 94–109)
CHOLEST SERPL-MCNC: 155 MG/DL
CO2 SERPL-SCNC: 27 MMOL/L (ref 20–32)
CREAT SERPL-MCNC: 0.89 MG/DL (ref 0.52–1.04)
DIFFERENTIAL METHOD BLD: ABNORMAL
EOSINOPHIL # BLD AUTO: 0 10E9/L (ref 0–0.7)
EOSINOPHIL NFR BLD AUTO: 0 %
ERYTHROCYTE [DISTWIDTH] IN BLOOD BY AUTOMATED COUNT: 13.1 % (ref 10–15)
GFR SERPL CREATININE-BSD FRML MDRD: 69 ML/MIN/{1.73_M2}
GLUCOSE SERPL-MCNC: 112 MG/DL (ref 70–99)
HCT VFR BLD AUTO: 41 % (ref 35–47)
HDLC SERPL-MCNC: 51 MG/DL
HGB BLD-MCNC: 13.7 G/DL (ref 11.7–15.7)
LDLC SERPL CALC-MCNC: 68 MG/DL
LYMPHOCYTES # BLD AUTO: 10.1 10E9/L (ref 0.8–5.3)
LYMPHOCYTES NFR BLD AUTO: 65 %
MCH RBC QN AUTO: 28 PG (ref 26.5–33)
MCHC RBC AUTO-ENTMCNC: 33.4 G/DL (ref 31.5–36.5)
MCV RBC AUTO: 84 FL (ref 78–100)
MONOCYTES # BLD AUTO: 0.9 10E9/L (ref 0–1.3)
MONOCYTES NFR BLD AUTO: 6 %
NEUTROPHILS # BLD AUTO: 4.5 10E9/L (ref 1.6–8.3)
NEUTROPHILS NFR BLD AUTO: 29 %
NONHDLC SERPL-MCNC: 104 MG/DL
PLATELET # BLD AUTO: 226 10E9/L (ref 150–450)
PLATELET # BLD EST: ABNORMAL 10*3/UL
POTASSIUM SERPL-SCNC: 3.9 MMOL/L (ref 3.4–5.3)
RBC # BLD AUTO: 4.89 10E12/L (ref 3.8–5.2)
RBC MORPH BLD: NORMAL
SODIUM SERPL-SCNC: 141 MMOL/L (ref 133–144)
TRIGL SERPL-MCNC: 180 MG/DL
WBC # BLD AUTO: 15.6 10E9/L (ref 4–11)

## 2021-05-10 PROCEDURE — 36415 COLL VENOUS BLD VENIPUNCTURE: CPT | Performed by: FAMILY MEDICINE

## 2021-05-10 PROCEDURE — 80061 LIPID PANEL: CPT | Performed by: FAMILY MEDICINE

## 2021-05-10 PROCEDURE — 80048 BASIC METABOLIC PNL TOTAL CA: CPT | Performed by: FAMILY MEDICINE

## 2021-05-10 PROCEDURE — 85025 COMPLETE CBC W/AUTO DIFF WBC: CPT | Performed by: FAMILY MEDICINE

## 2021-05-14 ENCOUNTER — OFFICE VISIT (OUTPATIENT)
Dept: FAMILY MEDICINE | Facility: CLINIC | Age: 66
End: 2021-05-14
Payer: COMMERCIAL

## 2021-05-14 VITALS
HEART RATE: 85 BPM | WEIGHT: 175.8 LBS | OXYGEN SATURATION: 98 % | BODY MASS INDEX: 32.95 KG/M2 | TEMPERATURE: 98 F | SYSTOLIC BLOOD PRESSURE: 138 MMHG | DIASTOLIC BLOOD PRESSURE: 90 MMHG

## 2021-05-14 DIAGNOSIS — I10 BENIGN ESSENTIAL HYPERTENSION: ICD-10-CM

## 2021-05-14 DIAGNOSIS — E78.5 HYPERLIPIDEMIA LDL GOAL <100: ICD-10-CM

## 2021-05-14 PROCEDURE — 99214 OFFICE O/P EST MOD 30 MIN: CPT | Performed by: FAMILY MEDICINE

## 2021-05-14 RX ORDER — LOSARTAN POTASSIUM 100 MG/1
100 TABLET ORAL DAILY
Qty: 90 TABLET | Refills: 4 | Status: SHIPPED | OUTPATIENT
Start: 2021-05-14 | End: 2022-05-24

## 2021-05-14 RX ORDER — PRAVASTATIN SODIUM 40 MG
40 TABLET ORAL DAILY
Qty: 90 TABLET | Refills: 4 | Status: SHIPPED | OUTPATIENT
Start: 2021-05-14 | End: 2022-05-24

## 2021-05-14 NOTE — PROGRESS NOTES
Assessment & Plan     Benign essential hypertension  Not controlled, increase losartan to 100mg up from 50mg dialy  - losartan (COZAAR) 100 MG tablet; Take 1 tablet (100 mg) by mouth daily    Hyperlipidemia LDL goal <100  Stable, refill  - pravastatin (PRAVACHOL) 40 MG tablet; Take 1 tablet (40 mg) by mouth daily       Patient Instructions   Increase losartan to 100mg to control blood pressures.  Schedule a nurse BP recheck in 2 weeks      Return in about 2 weeks (around 5/28/2021).    Neal Mosquera MD  [facility] is a 66 year old who presents for the following health issues       Hyperlipidemia Follow-Up      Are you regularly taking any medication or supplement to lower your cholesterol?   Yes- Pravastatin    Are you having muscle aches or other side effects that you think could be caused by your cholesterol lowering medication?  No    Hypertension Follow-up      Do you check your blood pressure regularly outside of the clinic? No     Are you following a low salt diet? No    Are your blood pressures ever more than 140 on the top number (systolic) OR more   than 90 on the bottom number (diastolic), for example 140/90? Yes   With medication systolic BPs 138-145    BP Readings from Last 2 Encounters:   03/05/21 (!) 148/82   12/30/20 (!) 152/82     LDL Cholesterol Calculated (mg/dL)   Date Value   05/10/2021 68   08/27/2020 82     Intermittent muscle aches in right bicep, left extensor muscles, often once a day, had one time in leg.  Lasts seconds, rubs it out then resolves.  Normal shoulder RANGE OF MOTION.  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      How many servings of fruits and vegetables do you eat daily?  2-3    On average, how many sweetened beverages do you drink each day (Examples: soda, juice, sweet tea, etc.  Do NOT count diet or artificially sweetened beverages)?   0    How many days per week do you exercise enough to make your heart beat faster? 3 or  less    How many minutes a day do you exercise enough to make your heart beat faster? 9 or less    How many days per week do you miss taking your medication? 0      Review of Systems   Constitutional, HEENT, cardiovascular, pulmonary, gi and gu systems are negative, except as otherwise noted.      Objective    BP (!) 138/90 (BP Location: Right arm, Patient Position: Sitting, Cuff Size: Adult Regular)   Pulse 85   Temp 98  F (36.7  C) (Tympanic)   Wt 79.7 kg (175 lb 12.8 oz)   SpO2 98%   BMI 32.95 kg/m    Body mass index is 32.95 kg/m .  Physical Exam   GENERAL: healthy, alert and no distress  NECK: no adenopathy, no asymmetry, masses, or scars and thyroid normal to palpation  RESP: lungs clear to auscultation - no rales, rhonchi or wheezes  CV: regular rate and rhythm, normal S1 S2, no S3 or S4, no murmur, click or rub, no peripheral edema and peripheral pulses strong  MS: no gross musculoskeletal defects noted, no edema

## 2021-05-14 NOTE — PATIENT INSTRUCTIONS
Increase losartan to 100mg to control blood pressures.  Schedule a nurse BP recheck in 2 weeks    If this is well controlled then continue losartan at 100mg    If blood pressure not in goal always <140/ <90    Ideal <130/<80

## 2021-09-12 ENCOUNTER — HEALTH MAINTENANCE LETTER (OUTPATIENT)
Age: 66
End: 2021-09-12

## 2021-12-24 DIAGNOSIS — J98.01 COUGH DUE TO BRONCHOSPASM: ICD-10-CM

## 2021-12-24 NOTE — TELEPHONE ENCOUNTER
Please send a new Rx. If there are any questions or concerns please contact patient.    Thank You!    Luis Dsouza CPhT  [facility]

## 2021-12-27 NOTE — TELEPHONE ENCOUNTER
Pending Prescriptions:                       Disp   Refills    fluticasone-salmeterol (ADVAIR-HFA) 45-21*12 g   3            Sig: Inhale 2 puffs into the lungs 2 times daily    Routing refill request to provider for review/approval because:  Drug not active on patient's medication list  Asthma control assessment  6 mo since last visit    Manny Arthur RN

## 2021-12-28 RX ORDER — FLUTICASONE PROPIONATE AND SALMETEROL XINAFOATE 45; 21 UG/1; UG/1
2 AEROSOL, METERED RESPIRATORY (INHALATION) 2 TIMES DAILY
Qty: 12 G | Refills: 3 | Status: ON HOLD | OUTPATIENT
Start: 2021-12-28 | End: 2023-10-17

## 2022-01-02 ENCOUNTER — HEALTH MAINTENANCE LETTER (OUTPATIENT)
Age: 67
End: 2022-01-02

## 2022-01-14 ENCOUNTER — TRANSFERRED RECORDS (OUTPATIENT)
Dept: HEALTH INFORMATION MANAGEMENT | Facility: CLINIC | Age: 67
End: 2022-01-14
Payer: COMMERCIAL

## 2022-02-27 ENCOUNTER — HEALTH MAINTENANCE LETTER (OUTPATIENT)
Age: 67
End: 2022-02-27

## 2022-05-21 ASSESSMENT — ENCOUNTER SYMPTOMS
ABDOMINAL PAIN: 0
DIZZINESS: 0
HEMATURIA: 0
HEADACHES: 0
HEMATOCHEZIA: 0
EYE PAIN: 0
FEVER: 0
ARTHRALGIAS: 0
PARESTHESIAS: 0
CHILLS: 0
CONSTIPATION: 0
SHORTNESS OF BREATH: 0
BREAST MASS: 0
DIARRHEA: 0
SORE THROAT: 0
PALPITATIONS: 0
MYALGIAS: 0
HEARTBURN: 1
WEAKNESS: 0
JOINT SWELLING: 0
NERVOUS/ANXIOUS: 0
COUGH: 1
NAUSEA: 0
FREQUENCY: 0
DYSURIA: 0

## 2022-05-21 ASSESSMENT — ACTIVITIES OF DAILY LIVING (ADL): CURRENT_FUNCTION: NO ASSISTANCE NEEDED

## 2022-05-24 ENCOUNTER — OFFICE VISIT (OUTPATIENT)
Dept: FAMILY MEDICINE | Facility: CLINIC | Age: 67
End: 2022-05-24
Payer: COMMERCIAL

## 2022-05-24 VITALS
RESPIRATION RATE: 16 BRPM | HEIGHT: 61 IN | WEIGHT: 180.8 LBS | HEART RATE: 90 BPM | SYSTOLIC BLOOD PRESSURE: 140 MMHG | TEMPERATURE: 97.6 F | DIASTOLIC BLOOD PRESSURE: 76 MMHG | OXYGEN SATURATION: 99 % | BODY MASS INDEX: 34.14 KG/M2

## 2022-05-24 DIAGNOSIS — E21.2 OTHER HYPERPARATHYROIDISM (H): ICD-10-CM

## 2022-05-24 DIAGNOSIS — Z00.00 WELLNESS EXAMINATION: Primary | ICD-10-CM

## 2022-05-24 DIAGNOSIS — D22.9 NEVUS: ICD-10-CM

## 2022-05-24 DIAGNOSIS — Z12.31 VISIT FOR SCREENING MAMMOGRAM: ICD-10-CM

## 2022-05-24 DIAGNOSIS — E78.5 HYPERLIPIDEMIA LDL GOAL <100: ICD-10-CM

## 2022-05-24 DIAGNOSIS — Z12.11 COLON CANCER SCREENING: ICD-10-CM

## 2022-05-24 DIAGNOSIS — Z78.0 ASYMPTOMATIC MENOPAUSAL STATE: ICD-10-CM

## 2022-05-24 DIAGNOSIS — Z11.59 NEED FOR HEPATITIS C SCREENING TEST: ICD-10-CM

## 2022-05-24 DIAGNOSIS — N18.31 STAGE 3A CHRONIC KIDNEY DISEASE (H): ICD-10-CM

## 2022-05-24 DIAGNOSIS — I10 BENIGN ESSENTIAL HYPERTENSION: ICD-10-CM

## 2022-05-24 DIAGNOSIS — C91.11 CHRONIC LYMPHOID LEUKEMIA IN REMISSION (H): ICD-10-CM

## 2022-05-24 DIAGNOSIS — Z13.220 SCREENING FOR HYPERLIPIDEMIA: ICD-10-CM

## 2022-05-24 PROBLEM — U07.1 COVID-19 VIRUS INFECTION: Status: RESOLVED | Noted: 2020-10-13 | Resolved: 2022-05-24

## 2022-05-24 LAB
ANION GAP SERPL CALCULATED.3IONS-SCNC: 2 MMOL/L (ref 3–14)
BUN SERPL-MCNC: 14 MG/DL (ref 7–30)
CALCIUM SERPL-MCNC: 9.1 MG/DL (ref 8.5–10.1)
CHLORIDE BLD-SCNC: 108 MMOL/L (ref 94–109)
CHOLEST SERPL-MCNC: 141 MG/DL
CO2 SERPL-SCNC: 29 MMOL/L (ref 20–32)
CREAT SERPL-MCNC: 0.75 MG/DL (ref 0.52–1.04)
CREAT UR-MCNC: 35 MG/DL
ERYTHROCYTE [DISTWIDTH] IN BLOOD BY AUTOMATED COUNT: 13.6 % (ref 10–15)
FASTING STATUS PATIENT QL REPORTED: YES
GFR SERPL CREATININE-BSD FRML MDRD: 88 ML/MIN/1.73M2
GLUCOSE BLD-MCNC: 108 MG/DL (ref 70–99)
HCT VFR BLD AUTO: 40.5 % (ref 35–47)
HCV AB SERPL QL IA: NONREACTIVE
HDLC SERPL-MCNC: 46 MG/DL
HGB BLD-MCNC: 13.2 G/DL (ref 11.7–15.7)
LDLC SERPL CALC-MCNC: 58 MG/DL
MCH RBC QN AUTO: 27.1 PG (ref 26.5–33)
MCHC RBC AUTO-ENTMCNC: 32.6 G/DL (ref 31.5–36.5)
MCV RBC AUTO: 83 FL (ref 78–100)
MICROALBUMIN UR-MCNC: 18 MG/L
MICROALBUMIN/CREAT UR: 51.43 MG/G CR (ref 0–25)
NONHDLC SERPL-MCNC: 95 MG/DL
PLATELET # BLD AUTO: 248 10E3/UL (ref 150–450)
POTASSIUM BLD-SCNC: 4.7 MMOL/L (ref 3.4–5.3)
RBC # BLD AUTO: 4.87 10E6/UL (ref 3.8–5.2)
SODIUM SERPL-SCNC: 139 MMOL/L (ref 133–144)
TRIGL SERPL-MCNC: 187 MG/DL
WBC # BLD AUTO: 18 10E3/UL (ref 4–11)

## 2022-05-24 PROCEDURE — 99214 OFFICE O/P EST MOD 30 MIN: CPT | Mod: 25 | Performed by: FAMILY MEDICINE

## 2022-05-24 PROCEDURE — 86803 HEPATITIS C AB TEST: CPT | Performed by: FAMILY MEDICINE

## 2022-05-24 PROCEDURE — 36415 COLL VENOUS BLD VENIPUNCTURE: CPT | Performed by: FAMILY MEDICINE

## 2022-05-24 PROCEDURE — 82043 UR ALBUMIN QUANTITATIVE: CPT | Performed by: FAMILY MEDICINE

## 2022-05-24 PROCEDURE — 80061 LIPID PANEL: CPT | Performed by: FAMILY MEDICINE

## 2022-05-24 PROCEDURE — 83970 ASSAY OF PARATHORMONE: CPT | Performed by: FAMILY MEDICINE

## 2022-05-24 PROCEDURE — 80048 BASIC METABOLIC PNL TOTAL CA: CPT | Performed by: FAMILY MEDICINE

## 2022-05-24 PROCEDURE — 85007 BL SMEAR W/DIFF WBC COUNT: CPT | Performed by: FAMILY MEDICINE

## 2022-05-24 PROCEDURE — 85027 COMPLETE CBC AUTOMATED: CPT | Performed by: FAMILY MEDICINE

## 2022-05-24 PROCEDURE — 99397 PER PM REEVAL EST PAT 65+ YR: CPT | Performed by: FAMILY MEDICINE

## 2022-05-24 RX ORDER — PRAVASTATIN SODIUM 40 MG
40 TABLET ORAL DAILY
Qty: 90 TABLET | Refills: 4 | Status: SHIPPED | OUTPATIENT
Start: 2022-05-24 | End: 2023-04-24

## 2022-05-24 RX ORDER — LOSARTAN POTASSIUM 100 MG/1
100 TABLET ORAL DAILY
Qty: 90 TABLET | Refills: 4 | Status: SHIPPED | OUTPATIENT
Start: 2022-05-24 | End: 2023-04-24

## 2022-05-24 RX ORDER — CHLORTHALIDONE 25 MG/1
25 TABLET ORAL DAILY
Qty: 90 TABLET | Refills: 3 | Status: SHIPPED | OUTPATIENT
Start: 2022-05-24 | End: 2023-04-24

## 2022-05-24 ASSESSMENT — ENCOUNTER SYMPTOMS
NAUSEA: 0
PALPITATIONS: 0
EYE PAIN: 0
SORE THROAT: 0
CONSTIPATION: 0
NERVOUS/ANXIOUS: 0
CHILLS: 0
WEAKNESS: 0
DYSURIA: 0
MYALGIAS: 0
ARTHRALGIAS: 0
SHORTNESS OF BREATH: 0
FEVER: 0
HEMATOCHEZIA: 0
FREQUENCY: 0
JOINT SWELLING: 0
PARESTHESIAS: 0
HEMATURIA: 0
HEADACHES: 0
ABDOMINAL PAIN: 0
BREAST MASS: 0
DIZZINESS: 0
DIARRHEA: 0
HEARTBURN: 1
COUGH: 1

## 2022-05-24 ASSESSMENT — PAIN SCALES - GENERAL: PAINLEVEL: NO PAIN (0)

## 2022-05-24 ASSESSMENT — ACTIVITIES OF DAILY LIVING (ADL): CURRENT_FUNCTION: NO ASSISTANCE NEEDED

## 2022-05-24 NOTE — LETTER
October 26, 2022      Mirta Cardenas  [address] [facility] [id number]        Dear ,    We are writing to inform you of your test results.      Cologard test was negative.  Please repeat this test in 3 years.     Resulted Orders   COLOGUARD(EXACT SCIENCES)   Result Value Ref Range    COLOGUARD-ABSTRACT Negative Negative      Comment:        NEGATIVE TEST RESULT. A negative Cologuard result indicates a low likelihood that a colorectal cancer (CRC) or advanced adenoma (adenomatous polyps with more advanced pre-malignant features)  is present. The chance that a person with a negative Cologuard test has a colorectal cancer is less than 1 in 1500 (negative predictive value >99.9%) or has an  advanced adenoma is less than  5.3% (negative predictive value 94.7%). These data are based on a prospective cross-sectional study of 10,000 individuals at average risk for colorectal cancer who were screened with both Cologuard and colonoscopy. (Shayy COSTA et al, N Engl J Med 2014;370(14):8711-1570) The normal value (reference range) for this assay is negative.    COLOGUARD RE-SCREENING RECOMMENDATION: Periodic colorectal cancer screening is an important part of preventive healthcare for asymptomatic individuals at average risk for colorectal cancer.  Following a negative Cologuard result, the American Cancer Society and U.S.  Multi-Society Task Force screening guidelines recommend a Cologuard re-screening interval of 3 years.   References: American Cancer Society Guideline for Colorectal Cancer Screening: https://www.cancer.org/cancer/colon-rectal-cancer/xypihoanv-wifnvjhsq-ezpqpxx/acs-recommendations.html.; Manuel MATHEWS, Isael ACOSTA, Yanet IRAHETAK, Colorectal Cancer Screening: Recommendations for Physicians and Patients from the U.S. Multi-Society Task Force on Colorectal Cancer Screening , Am J Gastroenterology 2017; 112:5529-2273.    TEST DESCRIPTION: Composite algorithmic analysis of stool DNA-biomarkers with  hemoglobin immunoassay.   Quantitative values of individual biomarkers are not reportable and are not associated with individual biomarker result reference ranges. Cologuard is intended for colorectal cancer screening of adults of either sex, 45 years or older, who are at average-risk for colorectal cancer (CRC). Cologuard has been approved for use by the U.S. FDA. The performance of Cologuard was  established in a cross sectional study of average-risk adults aged 50-84. Cologuard performance in patients ages 45 to 49 years was estimated by sub-group analysis of near-age groups. Colonoscopies performed for a positive result may find as the most clinically significant lesion: colorectal cancer [4.0%], advanced adenoma (including sessile serrated polyps greater than or equal to 1cm diameter) [20%] or non- advanced adenoma [31%]; or no colorectal neoplasia [45%]. These estimates are derived from a prospective cross-sectional screening study of 10,000 individuals at average risk for colorectal cancer who were screened with both Cologuard and colonoscopy. (Shayy COSTA et al, N Engl J Med 2014;370(14):9703-6988.) Cologuard may produce a false negative or false positive result (no colorectal cancer or precancerous polyp present at colonoscopy follow up). A negative Cologuard test result does not guarantee the absence of CRC or advanced adenoma (pre-cancer). The current Cologuard  screening interval is every 3 years. (American Cancer Society and U.S. Multi-Society Task Force). Cologuard performance data in a 10,000 patient pivotal study using colonoscopy as the reference method can be accessed at the following location: www.[employer]/results. Additional description of the Cologuard test process, warnings and precautions can be found at [email or web address].     Hepatitis C Screen Reflex to HCV RNA Quant and Genotype   Result Value Ref Range    Hepatitis C Antibody Nonreactive Nonreactive    Narrative    Assay performance  characteristics have not been established for newborns, infants, and children.   Albumin Random Urine Quantitative with Creat Ratio   Result Value Ref Range    Creatinine Urine mg/dL 35 mg/dL    Albumin Urine mg/L 18 mg/L    Albumin Urine mg/g Cr 51.43 (H) 0.00 - 25.00 mg/g Cr   BASIC METABOLIC PANEL   Result Value Ref Range    Sodium 139 133 - 144 mmol/L    Potassium 4.7 3.4 - 5.3 mmol/L    Chloride 108 94 - 109 mmol/L    Carbon Dioxide (CO2) 29 20 - 32 mmol/L    Anion Gap 2 (L) 3 - 14 mmol/L    Urea Nitrogen 14 7 - 30 mg/dL    Creatinine 0.75 0.52 - 1.04 mg/dL    Calcium 9.1 8.5 - 10.1 mg/dL    Glucose 108 (H) 70 - 99 mg/dL    GFR Estimate 88 >60 mL/min/1.73m2      Comment:      Effective December 21, 2021 eGFRcr in adults is calculated using the 2021 CKD-EPI creatinine equation which includes age and gender (Nima et al., NE, DOI: 10.1056/NTAGud6185557)   Lipid panel reflex to direct LDL Fasting   Result Value Ref Range    Cholesterol 141 <200 mg/dL    Triglycerides 187 (H) <150 mg/dL    Direct Measure HDL 46 (L) >=50 mg/dL    LDL Cholesterol Calculated 58 <=100 mg/dL    Non HDL Cholesterol 95 <130 mg/dL    Patient Fasting > 8hrs? Yes     Narrative    Cholesterol  Desirable:  <200 mg/dL    Triglycerides  Normal:  Less than 150 mg/dL  Borderline High:  150-199 mg/dL  High:  200-499 mg/dL  Very High:  Greater than or equal to 500 mg/dL    Direct Measure HDL  Female:  Greater than or equal to 50 mg/dL   Male:  Greater than or equal to 40 mg/dL    LDL Cholesterol  Desirable:  <100mg/dL  Above Desirable:  100-129 mg/dL   Borderline High:  130-159 mg/dL   High:  160-189 mg/dL   Very High:  >= 190 mg/dL    Non HDL Cholesterol  Desirable:  130 mg/dL  Above Desirable:  130-159 mg/dL  Borderline High:  160-189 mg/dL  High:  190-219 mg/dL  Very High:  Greater than or equal to 220 mg/dL   CBC with platelets and differential   Result Value Ref Range    WBC Count 18.0 (H) 4.0 - 11.0 10e3/uL    RBC Count 4.87 3.80 - 5.20  10e6/uL    Hemoglobin 13.2 11.7 - 15.7 g/dL    Hematocrit 40.5 35.0 - 47.0 %    MCV 83 78 - 100 fL    MCH 27.1 26.5 - 33.0 pg    MCHC 32.6 31.5 - 36.5 g/dL    RDW 13.6 10.0 - 15.0 %    Platelet Count 248 150 - 450 10e3/uL   Manual Differential   Result Value Ref Range    % Neutrophils 31 %    % Lymphocytes 67 %    % Monocytes 1 %    % Eosinophils 0 %    % Basophils 1 %    Absolute Neutrophils 5.6 1.6 - 8.3 10e3/uL    Absolute Lymphocytes 12.1 (H) 0.8 - 5.3 10e3/uL    Absolute Monocytes 0.2 0.0 - 1.3 10e3/uL    Absolute Eosinophils 0.0 0.0 - 0.7 10e3/uL    Absolute Basophils 0.2 0.0 - 0.2 10e3/uL    RBC Morphology Confirmed RBC Indices     Platelet Assessment  Automated Count Confirmed. Platelet morphology is normal.     Automated Count Confirmed. Platelet morphology is normal.    Pathologist Review Comments      Narrative    Sent for Review by Pathologist. Review comments will be entered. Results will be updated after review as applicable.     Parathyroid Hormone Intact   Result Value Ref Range    Parathyroid Hormone Intact 92 (H) 18 - 80 pg/mL       If you have any questions or concerns, please call the clinic at the number listed above.       Sincerely,      Neal Mosquera MD

## 2022-05-24 NOTE — PROGRESS NOTES
"SUBJECTIVE:   Mirta Cardenas is a 67 year old female who presents for Preventive Visit.    Patient has been advised of split billing requirements and indicates understanding: Yes  Are you in the first 12 months of your Medicare coverage?  Yes,  Visual Acuity:  Right Eye: 10/8/   Left Eye: 10/8  Both Eyes: 10/8    Healthy Habits:     In general, how would you rate your overall health?  Good    Frequency of exercise:  None    Do you usually eat at least 4 servings of fruit and vegetables a day, include whole grains    & fiber and avoid regularly eating high fat or \"junk\" foods?  No    Taking medications regularly:  Yes    Medication side effects:  None    Ability to successfully perform activities of daily living:  No assistance needed    Home Safety:  No safety concerns identified    Hearing Impairment:  Need to ask people to speak up or repeat themselves    In the past 6 months, have you been bothered by leaking of urine?  No    In general, how would you rate your overall mental or emotional health?  Good      PHQ-2 Total Score: 0    Additional concerns today:  No    Do you feel safe in your environment? Yes    Have you ever done Advance Care Planning? (For example, a Health Directive, POLST, or a discussion with a medical provider or your loved ones about your wishes): No, advance care planning information given to patient to review.  Patient declined advance care planning discussion at this time.       Fall risk  Fallen 2 or more times in the past year?: No  Any fall with injury in the past year?: No  click delete button to remove this line now    Cognitive Screening   1) Repeat 3 items (Leader, Season, Table)   2) Clock draw: NORMAL  3) 3 item recall: Recalls 3 objects  Results: 3 items recalled: COGNITIVE IMPAIRMENT LESS LIKELY    Mini-CogTM Copyright TERESA Sanford. Licensed by the author for use in [employer] [facility]; reprinted with permission (liang@.[facility]). All rights reserved.      Do you have sleep apnea, " excessive snoring or daytime drowsiness?: no    Reviewed and updated as needed this visit by clinical staff   Tobacco  Allergies  Meds   Med Hx  Surg Hx  Fam Hx  Soc Hx          Reviewed and updated as needed this visit by Provider                   Social History     Tobacco Use     Smoking status: Never Smoker     Smokeless tobacco: Never Used   Substance Use Topics     Alcohol use: Yes     Comment: THREE- FOUR TIMES PER YEAR, SOCIALLY     If you drink alcohol do you typically have >3 drinks per day or >7 drinks per week? No    Alcohol Use 5/21/2022   Prescreen: >3 drinks/day or >7 drinks/week? No   Prescreen: >3 drinks/day or >7 drinks/week? -   No flowsheet data found.    Hyperlipidemia Follow-Up      Are you regularly taking any medication or supplement to lower your cholesterol?   Yes- Pravastatin    Are you having muscle aches or other side effects that you think could be caused by your cholesterol lowering medication?  No    Hypertension Follow-up      Do you check your blood pressure regularly outside of the clinic? No     Are you following a low salt diet? No    Are your blood pressures ever more than 140 on the top number (systolic) OR more   than 90 on the bottom number (diastolic), for example 140/90? Yes      Current providers sharing in care for this patient include:  Patient Care Team:  Neal Mosquera MD as PCP - General (Family Practice)  Giovany Acosta MD as MD (Hematology & Oncology)  Neal Mosquera MD as Assigned PCP    The following health maintenance items are reviewed in Epic and correct as of today:  Health Maintenance Due   Topic Date Due     ANNUAL REVIEW OF  ORDERS  Never done     HEPATITIS C SCREENING  Never done     ZOSTER IMMUNIZATION (1 of 2) Never done     Pneumococcal Vaccine: Pediatrics (0 to 5 Years) and At-Risk Patients (6 to 64 Years) (2 - PCV) 10/22/2013     Pneumococcal Vaccine: 65+ Years (2 - PCV) 10/22/2013     ASTHMA CONTROL TEST  11/15/2019      ASTHMA ACTION PLAN  05/15/2020     MAMMO SCREENING  09/13/2020     MICROALBUMIN  08/27/2021     MEDICARE ANNUAL WELLNESS VISIT  01/21/2022     BMP  05/10/2022     LIPID  05/10/2022     HEMOGLOBIN  05/10/2022     BP Readings from Last 3 Encounters:   05/24/22 (!) 140/76   05/14/21 (!) 138/90   03/05/21 (!) 148/82    Wt Readings from Last 3 Encounters:   05/24/22 82 kg (180 lb 12.8 oz)   05/14/21 79.7 kg (175 lb 12.8 oz)   03/05/21 79.7 kg (175 lb 9.6 oz)                  Mammogram Screening: Mammogram Screening: Recommended mammography every 1-2 years with patient discussion and risk factor consideration    FHS-7:   Breast CA Risk Assessment (FHS-7) 5/21/2022   Did any of your first-degree relatives have breast or ovarian cancer? No   Did any of your relatives have bilateral breast cancer? No   Did any man in your family have breast cancer? No   Did any woman in your family have breast and ovarian cancer? No   Did any woman in your family have breast cancer before age 50 y? No   Do you have 2 or more relatives with breast and/or ovarian cancer? No   Do you have 2 or more relatives with breast and/or bowel cancer? No     Mammogram Screening: Recommended mammography every 1-2 years with patient discussion and risk factor consideration  Pertinent mammograms are reviewed under the imaging tab.    Review of Systems   Constitutional: Negative for chills and fever.   HENT: Positive for hearing loss. Negative for congestion, ear pain and sore throat.    Eyes: Negative for pain and visual disturbance.   Respiratory: Positive for cough. Negative for shortness of breath.    Cardiovascular: Negative for chest pain, palpitations and peripheral edema.   Gastrointestinal: Positive for heartburn. Negative for abdominal pain, constipation, diarrhea, hematochezia and nausea.   Breasts:  Negative for tenderness, breast mass and discharge.   Genitourinary: Negative for dysuria, frequency, genital sores, hematuria, pelvic pain, urgency,  "vaginal bleeding and vaginal discharge.   Musculoskeletal: Negative for arthralgias, joint swelling and myalgias.   Skin: Negative for rash.   Neurological: Negative for dizziness, weakness, headaches and paresthesias.   Psychiatric/Behavioral: Negative for mood changes. The patient is not nervous/anxious.        OBJECTIVE:   BP (!) 140/76   Pulse 90   Temp 97.6  F (36.4  C) (Tympanic)   Resp 16   Ht 1.556 m (5' 1.25\")   Wt 82 kg (180 lb 12.8 oz)   SpO2 99%   BMI 33.88 kg/m   Estimated body mass index is 32.95 kg/m  as calculated from the following:    Height as of 3/5/21: 1.556 m (5' 1.25\").    Weight as of 5/14/21: 79.7 kg (175 lb 12.8 oz).  Physical Exam  GENERAL APPEARANCE: healthy, alert and no distress  EYES: Eyes grossly normal to inspection, PERRL and conjunctivae and sclerae normal  HENT: ear canals and TM's normal, nose and mouth without ulcers or lesions, oropharynx clear and oral mucous membranes moist  NECK: no adenopathy, no asymmetry, masses, or scars and thyroid normal to palpation  RESP: lungs clear to auscultation - no rales, rhonchi or wheezes  BREAST: normal without masses, tenderness or nipple discharge and no palpable axillary masses or adenopathy  CV: regular rate and rhythm, normal S1 S2, no S3 or S4, no murmur, click or rub, no peripheral edema and peripheral pulses strong  ABDOMEN: soft, nontender, no hepatosplenomegaly, no masses and bowel sounds normal  MS: no musculoskeletal defects are noted and gait is age appropriate without ataxia  SKIN: no suspicious lesions or rashes  NEURO: Normal strength and tone, sensory exam grossly normal, mentation intact and speech normal  PSYCH: mentation appears normal and affect normal/bright    Diagnostic Test Results:  Labs reviewed in Epic    ASSESSMENT / PLAN:   Mirta was seen today for physical.    Diagnoses and all orders for this visit:    Wellness examination    Need for hepatitis C screening test  -     Hepatitis C Screen Reflex to HCV " RNA Quant and Genotype; Future  -     Hepatitis C Screen Reflex to HCV RNA Quant and Genotype    Visit for screening mammogram  -     MA SCREENING DIGITAL BILAT - Future  (s+30); Future    Screening for hyperlipidemia  -     Lipid panel reflex to direct LDL Fasting; Future  -     Lipid panel reflex to direct LDL Fasting    Hyperlipidemia LDL goal <100  -     pravastatin (PRAVACHOL) 40 MG tablet; Take 1 tablet (40 mg) by mouth daily    Benign essential hypertension: not controlled, add chlorthalidone.  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  -     CBC with platelets and differential; Future  -     CBC with platelets and differential      Other hyperparathyroidism (H)  -recheck PTH, fluxes between normal and slight above normal.    Stage 3a chronic kidney disease (H): improved back up to normal.  -     Albumin Random Urine Quantitative with Creat Ratio; Future  -     BASIC METABOLIC PANEL; Future  -     Albumin Random Urine Quantitative with Creat Ratio  -     BASIC METABOLIC PANEL    Colon cancer screening  -     KANDI([employer])    Nevus  -     Adult Dermatology Referral; Future    Asymptomatic menopausal state  -     DX Hip/Pelvis/Spine; Future    Other orders  -     REVIEW OF HEALTH MAINTENANCE PROTOCOL ORDERS  -     ZOSTER VACCINE RECOMBINANT ADJUVANTED (SHINGRIX)        Patient has been advised of split billing requirements and indicates understanding: Yes    COUNSELING:  Reviewed preventive health counseling, as reflected in patient instructions       Regular exercise       Healthy diet/nutrition       Vision screening       Hearing screening       Dental care       Osteoporosis prevention/bone health       Hepatitis C screening       HIV screening  "for high risk patient    Estimated body mass index is 33.88 kg/m  as calculated from the following:    Height as of this encounter: 1.556 m (5' 1.25\").    Weight as of this encounter: 82 kg (180 lb 12.8 oz).    Weight management plan: Discussed healthy diet and exercise guidelines    She reports that she has never smoked. She has never used smokeless tobacco.      Appropriate preventive services were discussed with this patient, including applicable screening as appropriate for cardiovascular disease, diabetes, osteopenia/osteoporosis, and glaucoma.  As appropriate for age/gender, discussed screening for colorectal cancer, prostate cancer, breast cancer, and cervical cancer. Checklist reviewing preventive services available has been given to the patient.    Reviewed patients plan of care and provided an AVS. The Basic Care Plan (routine screening as documented in Health Maintenance) for Mirta meets the Care Plan requirement. This Care Plan has been established and reviewed with the Patient.    Counseling Resources:  ATP IV Guidelines  Pooled Cohorts Equation Calculator  Breast Cancer Risk Calculator  Breast Cancer: Medication to Reduce Risk  FRAX Risk Assessment  ICSI Preventive Guidelines  Dietary Guidelines for Americans, 2010  USDA's MyPlate  ASA Prophylaxis  Lung CA Screening    Neal Mosquera MD  [facility]    Identified Health Risks:    She is at risk for lack of exercise and has been provided with information to increase physical activity for the benefit of her well-being.  The patient was counseled and encouraged to consider modifying their diet and eating habits. She was provided with information on recommended healthy diet options.  The patient was provided with written information regarding signs of hearing loss.  "

## 2022-05-24 NOTE — PATIENT INSTRUCTIONS
To lab  Pneumonia booster with the new one in Fall.  Add the chlorthalidone low dose 25mg daily in addition to the losartan for blood pressure.    Goal BP <140/<90, ideal <130/<80  Check blood pressures about few times per week and write them down and bring them with to next clinic visit.   Low salt diet. DASH diet has sample menu plans.  Labs once per year to check kidneys and electrolytes.  Let me know in 3 weeks on [facility] message about BP numbers.    Patient Education   Personalized Prevention Plan  You are due for the preventive services outlined below.  Your care team is available to assist you in scheduling these services.  If you have already completed any of these items, please share that information with your care team to update in your medical record.  Health Maintenance Due   Topic Date Due    ANNUAL REVIEW OF HM ORDERS  Never done    Hepatitis C Screening  Never done    Zoster (Shingles) Vaccine (1 of 2) Never done    Pneumococcal Vaccine (2 - PCV) 10/22/2013    Pneumococcal Vaccine (2 - PCV) 10/22/2013    Asthma Control Test  11/15/2019    Asthma Action Plan - yearly  05/15/2020    Mammogram  09/13/2020    Kidney Microalbumin Urine Test  08/27/2021    Basic Metabolic Panel  05/10/2022    Cholesterol Lab  05/10/2022    Hemoglobin  05/10/2022       Exercise for a Healthier Heart  You may wonder how you can improve the health of your heart. If you re thinking about exercise, you re on the right track. You don t need to become an athlete. But you do need a certain amount of brisk exercise to help strengthen your heart. If you have been diagnosed with a heart condition, your healthcare provider may advise exercise to help stabilize your condition. To help make exercise a habit, choose safe, fun activities.      Exercise with a friend. When activity is fun, you're more likely to stick with it.   Before you start  Check with your healthcare provider before starting an exercise program. This is especially  important if you have not been active for a while. It's also important if you have a long-term (chronic) health problem such as heart disease, diabetes, or obesity. Or if you are at high risk for having these problems.   Why exercise?  Exercising regularly offers many healthy rewards. It can help you do all of the following:   Improve your blood cholesterol level to help prevent further heart trouble  Lower your blood pressure to help prevent a stroke or heart attack  Control diabetes, or reduce your risk of getting this disease  Improve your heart and lung function  Reach and stay at a healthy weight  Make your muscles stronger so you can stay active  Prevent falls and fractures by slowing the loss of bone mass (osteoporosis)  Manage stress better  Reduce your blood pressure  Improve your sense of self and your body image  Exercise tips    Ease into your routine. Set small goals. Then build on them. If you are not sure what your activity level should be, talk with your healthcare provider first before starting an exercise routine.  Exercise on most days. Aim for a total of 150 minutes (2 hours and 30 minutes) or more of moderate-intensity aerobic activity each week. Or 75 minutes (1 hour and 15 minutes) or more of vigorous-intensity aerobic activity each week. Or try for a combination of both. Moderate activity means that you breathe heavier and your heart rate increases but you can still talk. Think about doing 40 minutes of moderate exercise, 3 to 4 times a week. For best results, activity should last for about 40 minutes to lower blood pressure and cholesterol. It's OK to work up to the 40-minute period over time. Examples of moderate-intensity activity are walking 1 mile in 15 minutes. Or doing 30 to 45 minutes of yard work.  Step up your daily activity level.  Along with your exercise program, try being more active the whole day. Walk instead of drive. Or park further away so that you take more steps each day.  Do more household tasks or yard work. You may not be able to meet the advised mount of physical activity. But doing some moderate- or vigorous-intensity aerobic activity can help reduce your risk for heart disease. Your healthcare provider can help you figure out what is best for you.  Choose 1 or more activities you enjoy.  Walking is one of the easiest things you can do. You can also try swimming, riding a bike, dancing, or taking an exercise class.    When to call your healthcare provider  Call your healthcare provider if you have any of these:   Chest pain or feel dizzy or lightheaded  Burning, tightness, pressure, or heaviness in your chest, neck, shoulders, back, or arms  Abnormal shortness of breath  More joint or muscle pain  A very fast or irregular heartbeat (palpitations)  Bridge last reviewed this educational content on 7/1/2019 2000-2021 [employer]. All rights reserved. This information is not intended as a substitute for professional medical care. Always follow your healthcare professional's instructions.          Understanding USDA MyPlate  The USDA has guidelines to help you make healthy food choices. These are called MyPlate. MyPlate shows the food groups that make up healthy meals using the image of a place setting. Before you eat, think about the healthiest choices for what to put on your plate or in your cup or bowl. To learn more about building a healthy plate, visit www.choosemyplate.gov.    The food groups  Fruits. Any fruit or 100% fruit juice counts as part of the Fruit Group. Fruits may be fresh, canned, frozen, or dried, and may be whole, cut-up, or pureed. Make 1/2 of your plate fruits and vegetables.  Vegetables. Any vegetable or 100% vegetable juice counts as a member of the Vegetable Group. Vegetables may be fresh, frozen, canned, or dried. They can be served raw or cooked and may be whole, cut-up, or mashed. Make 1/2 of your plate fruits and vegetables.  Grains.  All foods made from grains are part of the Grains Group. These include wheat, rice, oats, cornmeal, and barley. Grains are often used to make foods such as bread, pasta, oatmeal, cereal, tortillas, and grits. Grains should be no more than 1/4 of your plate. At least half of your grains should be whole grains.  Protein. This group includes meat, poultry, seafood, beans and peas, eggs, processed soy products (such as tofu), nuts (including nut butters), and seeds. Make protein choices no more than 1/4 of your plate. Meat and poultry choices should be lean or low fat.  Dairy. The Dairy Group includes all fluid milk products and foods made from milk that contain calcium, such as yogurt and cheese. (Foods that have little calcium, such as cream, butter, and cream cheese, are not part of this group.) Most dairy choices should be low-fat or fat-free.  Oils. Oils aren't a food group, but they do contain essential nutrients. However it's important to watch your intake of oils. These are fats that are liquid at room temperature. They include canola, corn, olive, soybean, vegetable, and sunflower oil. Foods that are mainly oil include mayonnaise, certain salad dressings, and soft margarines. You likely already get your daily oil allowance from the foods you eat.  Things to limit  Eating healthy also means limiting these things in your diet:     Salt (sodium). Many processed foods have a lot of sodium. To keep sodium intake down, eat fresh vegetables, meats, poultry, and seafood when possible. Purchase low-sodium, reduced-sodium, or no-salt-added food products at the store. And don't add salt to your meals at home. Instead, season them with herbs and spices such as dill, oregano, cumin, and paprika. Or try adding flavor with lemon or lime zest and juice.  Saturated fat. Saturated fats are most often found in animal products such as beef, pork, and chicken. They are often solid at room temperature, such as butter. To reduce your  saturated fat intake, choose leaner cuts of meat and poultry. And try healthier cooking methods such as grilling, broiling, roasting, or baking. For a simple lower-fat swap, use plain nonfat yogurt instead of mayonnaise when making potato salad or macaroni salad.  Added sugars. These are sugars added to foods. They are in foods such as ice cream, candy, soda, fruit drinks, sports drinks, energy drinks, cookies, pastries, jams, and syrups. Cut down on added sugars by sharing sweet treats with a family member or friend. You can also choose fruit for dessert, and drink water or other unsweetened beverages.     [employer] last reviewed this educational content on 6/1/2020 2000-2021 [employer]. All rights reserved. This information is not intended as a substitute for professional medical care. Always follow your healthcare professional's instructions.          Signs of Hearing Loss      Hearing much better with one ear can be a sign of hearing loss.   Hearing loss is a problem shared by many people. In fact, it is one of the most common health problems, particularly as people age. Most people age 65 and older have some hearing loss. By age 80, almost everyone does. Hearing loss often occurs slowly over the years. So you may not realize your hearing has gotten worse.  Have your hearing checked  Call your healthcare provider if you:  Have to strain to hear normal conversation  Have to watch other people s faces very carefully to follow what they re saying  Need to ask people to repeat what they ve said  Often misunderstand what people are saying  Turn the volume of the television or radio up so high that others complain  Feel that people are mumbling when they re talking to you  Find that the effort to hear leaves you feeling tired and irritated  Notice, when using the phone, that you hear better with one ear than the other  [employer] last reviewed this educational content on 1/1/2020 2000-2021 [employer]  [employer]. All rights reserved. This information is not intended as a substitute for professional medical care. Always follow your healthcare professional's instructions.

## 2022-05-25 LAB — PTH-INTACT SERPL-MCNC: 92 PG/ML (ref 18–80)

## 2022-06-01 LAB
BASOPHILS # BLD MANUAL: 0.2 10E3/UL (ref 0–0.2)
BASOPHILS NFR BLD MANUAL: 1 %
EOSINOPHIL # BLD MANUAL: 0 10E3/UL (ref 0–0.7)
EOSINOPHIL NFR BLD MANUAL: 0 %
LYMPHOCYTES # BLD MANUAL: 12.1 10E3/UL (ref 0.8–5.3)
LYMPHOCYTES NFR BLD MANUAL: 67 %
MONOCYTES # BLD MANUAL: 0.2 10E3/UL (ref 0–1.3)
MONOCYTES NFR BLD MANUAL: 1 %
NEUTROPHILS # BLD MANUAL: 5.6 10E3/UL (ref 1.6–8.3)
NEUTROPHILS NFR BLD MANUAL: 31 %
PATH REV: ABNORMAL
PLAT MORPH BLD: ABNORMAL
RBC MORPH BLD: ABNORMAL

## 2022-07-14 ENCOUNTER — HOSPITAL ENCOUNTER (OUTPATIENT)
Dept: MAMMOGRAPHY | Facility: CLINIC | Age: 67
Discharge: HOME | End: 2022-07-14
Attending: FAMILY MEDICINE | Admitting: FAMILY MEDICINE
Payer: COMMERCIAL

## 2022-07-14 DIAGNOSIS — Z12.31 VISIT FOR SCREENING MAMMOGRAM: ICD-10-CM

## 2022-07-14 PROCEDURE — 77067 SCR MAMMO BI INCL CAD: CPT

## 2022-10-07 ENCOUNTER — TRANSFERRED RECORDS (OUTPATIENT)
Dept: HEALTH INFORMATION MANAGEMENT | Facility: CLINIC | Age: 67
End: 2022-10-07

## 2022-10-14 ENCOUNTER — ALLIED HEALTH/NURSE VISIT (OUTPATIENT)
Dept: FAMILY MEDICINE | Facility: CLINIC | Age: 67
End: 2022-10-14
Payer: COMMERCIAL

## 2022-10-14 VITALS — DIASTOLIC BLOOD PRESSURE: 78 MMHG | SYSTOLIC BLOOD PRESSURE: 124 MMHG

## 2022-10-14 DIAGNOSIS — I10 BENIGN ESSENTIAL HYPERTENSION: Primary | ICD-10-CM

## 2022-10-14 PROCEDURE — 99207 PR NO CHARGE NURSE ONLY: CPT | Performed by: FAMILY MEDICINE

## 2022-10-14 NOTE — PROGRESS NOTES
Mirta Cardenas was evaluated at [facility] on October 14, 2022 at which time her blood pressure was:    BP Readings from Last 3 Encounters:   10/14/22 124/78   05/24/22 (!) 140/76   05/14/21 (!) 138/90     Pulse Readings from Last 3 Encounters:   05/24/22 90   05/14/21 85   03/05/21 75       Reviewed lifestyle modifications for blood pressure control and reduction: including making healthy food choices, managing weight, getting regular exercise, smoking cessation, reducing alcohol consumption, monitoring blood pressure regularly.     Symptoms: None    BP Goal:< 140/90 mmHg    BP Assessment:  BP at goal    Potential Reasons for BP too high: NA - Not applicable    BP Follow-Up Plan: Recheck BP in 6 months at pharmacy    Recommendation to Provider: None at this time.    Note completed by:  Joycelyn Aceves, PharmD  Staff Pharmacist  [facility]  [phone]

## 2022-10-19 ENCOUNTER — TELEPHONE (OUTPATIENT)
Dept: FAMILY MEDICINE | Facility: CLINIC | Age: 67
End: 2022-10-19

## 2022-10-19 NOTE — TELEPHONE ENCOUNTER
Patient Quality Outreach    Patient is due for the following:   Asthma  -  ACT needed, Asthma follow-up visit and AAP  Colon Cancer Screening    Next Steps:   Schedule a office visit for Asthma. Patient needs to complete cologuard screening. Order good til 5/2022.    Type of outreach:    Sent letter.      Questions for provider review:    None     Darby Myers

## 2022-10-19 NOTE — LETTER
October 19, 2022      Mirta Cardenas  [address] [id number]        Dear Mirta,       If you have completed these tests at another facility, please call your clinic with the details about when, where, and the results, or have your records sent to our clinic so that we can best coordinate your care.     You are in particular need of attention regarding the following:     Call or MyChart message your clinic to schedule a colonoscopy, schedule/ a FIT Test, or order a Cologuard test. If you are unsure what type of test you need, please call your clinic and speak to clinic staff.   Colon cancer is now the second leading cause of cancer-related deaths in the [facility] for both men and women and there are over 130,000 new cases and 50,000 deaths per year from colon cancer. Colonoscopies can prevent 90-95% of these deaths. Problem lesions can be removed before they ever become cancer. This test is not only looking for cancer, but also getting rid of precancerous lesions.   If you are under/uninsured, we recommend you contact the TMAT Program.TMAT is a free colorectal cancer screening program that provides colonoscopies for eligible under/uninsured [place] men and women. If you are interested in receiving a free colonoscopy, please call TMAT at t [phone] (mention code [employer]) to see if you're eligible. Please have them send us the results.   OTHER FOLLOW UP: Asthma follow up.    - It looks like you have an order for a cologuard test (at home colon cancer kit). Please complete and mail in.    If you have already completed these items, please contact the clinic via phone or   [facility] so your care team can review and update your records. Thank you for   choosing [facility] for your healthcare needs. For any questions,   concerns, or to schedule an appointment please contact our clinic.    Healthy Regards,      Your [facility]  Team

## 2022-10-26 LAB — NONINV COLON CA DNA+OCC BLD SCRN STL QL: NEGATIVE

## 2023-03-10 ENCOUNTER — TRANSFERRED RECORDS (OUTPATIENT)
Dept: HEALTH INFORMATION MANAGEMENT | Facility: CLINIC | Age: 68
End: 2023-03-10
Payer: COMMERCIAL

## 2023-04-24 ENCOUNTER — OFFICE VISIT (OUTPATIENT)
Dept: FAMILY MEDICINE | Facility: CLINIC | Age: 68
End: 2023-04-24
Payer: COMMERCIAL

## 2023-04-24 VITALS
RESPIRATION RATE: 16 BRPM | OXYGEN SATURATION: 99 % | SYSTOLIC BLOOD PRESSURE: 124 MMHG | TEMPERATURE: 97.9 F | BODY MASS INDEX: 32.66 KG/M2 | HEART RATE: 82 BPM | DIASTOLIC BLOOD PRESSURE: 70 MMHG | HEIGHT: 61 IN | WEIGHT: 173 LBS

## 2023-04-24 DIAGNOSIS — N18.31 STAGE 3A CHRONIC KIDNEY DISEASE (H): ICD-10-CM

## 2023-04-24 DIAGNOSIS — E21.2 OTHER HYPERPARATHYROIDISM (H): ICD-10-CM

## 2023-04-24 DIAGNOSIS — L72.0 EPIDERMOID CYST: ICD-10-CM

## 2023-04-24 DIAGNOSIS — R25.2 MUSCLE CRAMP: Primary | ICD-10-CM

## 2023-04-24 DIAGNOSIS — R73.9 HYPERGLYCEMIA: ICD-10-CM

## 2023-04-24 DIAGNOSIS — E78.5 HYPERLIPIDEMIA LDL GOAL <100: ICD-10-CM

## 2023-04-24 DIAGNOSIS — I10 BENIGN ESSENTIAL HYPERTENSION: ICD-10-CM

## 2023-04-24 DIAGNOSIS — C91.11 CHRONIC LYMPHOID LEUKEMIA IN REMISSION (H): ICD-10-CM

## 2023-04-24 PROBLEM — E66.01 MORBID OBESITY (H): Status: RESOLVED | Noted: 2018-08-09 | Resolved: 2023-04-24

## 2023-04-24 LAB
ANION GAP SERPL CALCULATED.3IONS-SCNC: 11 MMOL/L (ref 7–15)
BASOPHILS # BLD MANUAL: 0.2 10E3/UL (ref 0–0.2)
BASOPHILS NFR BLD MANUAL: 1 %
BUN SERPL-MCNC: 18.8 MG/DL (ref 8–23)
CALCIUM SERPL-MCNC: 9.9 MG/DL (ref 8.8–10.2)
CHLORIDE SERPL-SCNC: 101 MMOL/L (ref 98–107)
CHOLEST SERPL-MCNC: 169 MG/DL
CK SERPL-CCNC: 52 U/L (ref 26–192)
CREAT SERPL-MCNC: 0.87 MG/DL (ref 0.51–0.95)
CREAT UR-MCNC: 54 MG/DL
DEPRECATED HCO3 PLAS-SCNC: 27 MMOL/L (ref 22–29)
EOSINOPHIL # BLD MANUAL: 0.5 10E3/UL (ref 0–0.7)
EOSINOPHIL NFR BLD MANUAL: 3 %
ERYTHROCYTE [DISTWIDTH] IN BLOOD BY AUTOMATED COUNT: 13.2 % (ref 10–15)
GFR SERPL CREATININE-BSD FRML MDRD: 73 ML/MIN/1.73M2
GLUCOSE SERPL-MCNC: 116 MG/DL (ref 70–99)
HBA1C MFR BLD: 5.4 % (ref 0–5.6)
HCT VFR BLD AUTO: 39.7 % (ref 35–47)
HDLC SERPL-MCNC: 46 MG/DL
HGB BLD-MCNC: 13.4 G/DL (ref 11.7–15.7)
LDLC SERPL CALC-MCNC: 88 MG/DL
LYMPHOCYTES # BLD MANUAL: 8.4 10E3/UL (ref 0.8–5.3)
LYMPHOCYTES NFR BLD MANUAL: 52 %
MCH RBC QN AUTO: 27.8 PG (ref 26.5–33)
MCHC RBC AUTO-ENTMCNC: 33.8 G/DL (ref 31.5–36.5)
MCV RBC AUTO: 82 FL (ref 78–100)
MICROALBUMIN UR-MCNC: <12 MG/L
MICROALBUMIN/CREAT UR: NORMAL MG/G{CREAT}
MONOCYTES # BLD MANUAL: 0.5 10E3/UL (ref 0–1.3)
MONOCYTES NFR BLD MANUAL: 3 %
NEUTROPHILS # BLD MANUAL: 6.6 10E3/UL (ref 1.6–8.3)
NEUTROPHILS NFR BLD MANUAL: 41 %
NONHDLC SERPL-MCNC: 123 MG/DL
PLAT MORPH BLD: ABNORMAL
PLATELET # BLD AUTO: 273 10E3/UL (ref 150–450)
POTASSIUM SERPL-SCNC: 4.1 MMOL/L (ref 3.4–5.3)
PTH-INTACT SERPL-MCNC: 58 PG/ML (ref 15–65)
RBC # BLD AUTO: 4.82 10E6/UL (ref 3.8–5.2)
RBC MORPH BLD: ABNORMAL
SODIUM SERPL-SCNC: 139 MMOL/L (ref 136–145)
TRIGL SERPL-MCNC: 173 MG/DL
VARIANT LYMPHS BLD QL SMEAR: PRESENT
WBC # BLD AUTO: 16.2 10E3/UL (ref 4–11)

## 2023-04-24 PROCEDURE — 85007 BL SMEAR W/DIFF WBC COUNT: CPT | Performed by: STUDENT IN AN ORGANIZED HEALTH CARE EDUCATION/TRAINING PROGRAM

## 2023-04-24 PROCEDURE — 83036 HEMOGLOBIN GLYCOSYLATED A1C: CPT | Performed by: STUDENT IN AN ORGANIZED HEALTH CARE EDUCATION/TRAINING PROGRAM

## 2023-04-24 PROCEDURE — 85027 COMPLETE CBC AUTOMATED: CPT | Performed by: STUDENT IN AN ORGANIZED HEALTH CARE EDUCATION/TRAINING PROGRAM

## 2023-04-24 PROCEDURE — 82570 ASSAY OF URINE CREATININE: CPT | Performed by: STUDENT IN AN ORGANIZED HEALTH CARE EDUCATION/TRAINING PROGRAM

## 2023-04-24 PROCEDURE — 36415 COLL VENOUS BLD VENIPUNCTURE: CPT | Performed by: STUDENT IN AN ORGANIZED HEALTH CARE EDUCATION/TRAINING PROGRAM

## 2023-04-24 PROCEDURE — 82550 ASSAY OF CK (CPK): CPT | Performed by: STUDENT IN AN ORGANIZED HEALTH CARE EDUCATION/TRAINING PROGRAM

## 2023-04-24 PROCEDURE — 80061 LIPID PANEL: CPT | Performed by: STUDENT IN AN ORGANIZED HEALTH CARE EDUCATION/TRAINING PROGRAM

## 2023-04-24 PROCEDURE — 99214 OFFICE O/P EST MOD 30 MIN: CPT | Performed by: STUDENT IN AN ORGANIZED HEALTH CARE EDUCATION/TRAINING PROGRAM

## 2023-04-24 PROCEDURE — 80048 BASIC METABOLIC PNL TOTAL CA: CPT | Performed by: STUDENT IN AN ORGANIZED HEALTH CARE EDUCATION/TRAINING PROGRAM

## 2023-04-24 PROCEDURE — 82043 UR ALBUMIN QUANTITATIVE: CPT | Performed by: STUDENT IN AN ORGANIZED HEALTH CARE EDUCATION/TRAINING PROGRAM

## 2023-04-24 PROCEDURE — 83970 ASSAY OF PARATHORMONE: CPT | Performed by: STUDENT IN AN ORGANIZED HEALTH CARE EDUCATION/TRAINING PROGRAM

## 2023-04-24 RX ORDER — PRAVASTATIN SODIUM 40 MG
40 TABLET ORAL DAILY
Qty: 90 TABLET | Refills: 3 | Status: SHIPPED | OUTPATIENT
Start: 2023-04-24 | End: 2024-06-07

## 2023-04-24 RX ORDER — CHLORTHALIDONE 25 MG/1
25 TABLET ORAL DAILY
Qty: 90 TABLET | Refills: 3 | Status: ON HOLD | OUTPATIENT
Start: 2023-04-24 | End: 2023-10-24

## 2023-04-24 RX ORDER — LOSARTAN POTASSIUM 100 MG/1
100 TABLET ORAL DAILY
Qty: 90 TABLET | Refills: 3 | Status: SHIPPED | OUTPATIENT
Start: 2023-04-24 | End: 2024-05-30

## 2023-04-24 ASSESSMENT — PATIENT HEALTH QUESTIONNAIRE - PHQ9
SUM OF ALL RESPONSES TO PHQ QUESTIONS 1-9: 3
10. IF YOU CHECKED OFF ANY PROBLEMS, HOW DIFFICULT HAVE THESE PROBLEMS MADE IT FOR YOU TO DO YOUR WORK, TAKE CARE OF THINGS AT HOME, OR GET ALONG WITH OTHER PEOPLE: NOT DIFFICULT AT ALL
SUM OF ALL RESPONSES TO PHQ QUESTIONS 1-9: 3

## 2023-04-24 ASSESSMENT — PAIN SCALES - GENERAL: PAINLEVEL: MODERATE PAIN (5)

## 2023-04-24 NOTE — PATIENT INSTRUCTIONS
For at least the next month take 1/2 tablet (20 mg) of the pravastatin and we can check on the cramping/sensation change in the leg when you come in next month for your preventative visit.

## 2023-04-24 NOTE — PROGRESS NOTES
Assessment & Plan     Patient is a very pleasant 68 year old who presents today for med refill as well as complaints of muscle cramping and cyst.     Muscle cramp  This past fall, patient starting noticing a sensitive sensation on the lateral right calf. She would notice it at various times, including when it was gently brushed against a surface. It has progressed and involves more of the calf proximally now, and sometimes noticing it with ambulating. On exam today has some sensation change at the distal posterior calf, and some mild increased size of right calf compared to left, otherwise exam normal. Has not had any skin lesions in that area to suggest shingles. Diagnosis is somewhat unclear at this time. We are checking CK today. This could be a side effect of the statin and we are planning to decrease dose to 20 mg daily for at least the next month and will follow up on this during her medicare annual visit. We are also checking for diabetes as below. If it worsens significantly, plan presentation to the ED.  - CK total  - CK total    Hyperlipidemia LDL goal <100  Due for lipid check. She is fasting today. Refill of 40 mg tablets of pravastatin, though, as above, will do 20 mg dose daily for the next month or so.   - Lipid panel reflex to direct LDL Fasting  - pravastatin (PRAVACHOL) 40 MG tablet  Dispense: 90 tablet; Refill: 3  - Lipid panel reflex to direct LDL Fasting    Benign essential hypertension  Refills today. No symptoms of high or low pressures. BP today appears well. Will check BMP as below.   - losartan (COZAAR) 100 MG tablet  Dispense: 90 tablet; Refill: 3  - chlorthalidone (HYGROTON) 25 MG tablet  Dispense: 90 tablet; Refill: 3    Stage 3a chronic kidney disease (H)  History of CKD 3. Most recent GFR in the 80's last ear, though historically with GFR in the upper 50's. Due for below labs.   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   - CBC with platelets and differential  - CBC with platelets and differential    Other hyperparathyroidism (H)  Has previously had mildly elevated PTH. Due for annual check.   - Parathyroid Hormone Intact  - Parathyroid Hormone Intact    Hyperglycemia  Has had mildly elevated glucoses in the past. Will obtain a1c today particularly in the context of the muscle cramping/lower leg sensations.   - Hemoglobin A1c  - Hemoglobin A1c    Epidermoid cyst  Has cyst on mid back at the bra line. Will refer her to dermatology for treatment. Already has dermatologist for psoriasis.   - Adult Dermatology Referral      I spent a total of 38 minutes on the day of the visit.   Time spent by me doing chart review, history and exam, documentation and further activities per the note     BMI:   Estimated body mass index is 32.69 kg/m  as calculated from the following:    Height as of this encounter: 1.549 m (5' 1\").    Weight as of this encounter: 78.5 kg (173 lb).     Joseline Fraga MD  [facility] [place]    Makayla Kirby is a 68 year old, presenting for the following health issues:  Chief Complaint   Patient presents with     Leg Pain     Right leg, below knee      Establish Care     Hypertension     Lipids     HPI   Leg pain:   Started before last fall, started to notice a spot midlateral right calf. Really sensitive in that area. Wasn't all the time. Then started to get more, now it is the whole calf. Worse lately. Trying heating pad, ice (works better than heating), lately bothering her with walking. At night a little more uncomfortable. Has been taking some ibuprofen (once a day, some days take tylenol). For the past 3 weeks taking something daily.   Hurts more steadily as has gone along    Doesn't check BP at home.     Had covid 2020.   Has had laryngitis a few " "times since.   Has baseline cough, carlito in the morning.   Never felt that sick. Every time negative for COVID, no fevers.           4/24/2023     6:48 AM   PHQ   PHQ-9 Total Score 3   Q9: Thoughts of better off dead/self-harm past 2 weeks Not at all       Review of Systems   Constitutional, HEENT, cardiovascular, pulmonary, GI, , musculoskeletal, neuro, skin, endocrine and psych systems are negative, except as otherwise noted.      Objective    /70 (BP Location: Right arm, Patient Position: Sitting, Cuff Size: Adult Regular)   Pulse 82   Temp 97.9  F (36.6  C) (Tympanic)   Resp 16   Ht 1.549 m (5' 1\")   Wt 78.5 kg (173 lb)   SpO2 99%   BMI 32.69 kg/m    Body mass index is 32.69 kg/m .  Physical Exam  Constitutional:       Appearance: Normal appearance.   HENT:      Head: Normocephalic.      Right Ear: Tympanic membrane and ear canal normal.      Left Ear: Tympanic membrane and ear canal normal.      Nose: Nose normal.      Mouth/Throat:      Mouth: Mucous membranes are moist.   Eyes:      General: No scleral icterus.     Extraocular Movements: Extraocular movements intact.      Conjunctiva/sclera: Conjunctivae normal.   Cardiovascular:      Rate and Rhythm: Normal rate and regular rhythm.      Heart sounds: Normal heart sounds.   Pulmonary:      Effort: Pulmonary effort is normal.      Breath sounds: Normal breath sounds. No wheezing, rhonchi or rales.   Abdominal:      General: Abdomen is flat. Bowel sounds are normal.      Palpations: Abdomen is soft.      Tenderness: There is no abdominal tenderness.   Musculoskeletal:         General: Tenderness (mild tenderness over distal right calf) present.      Right lower leg: No edema.      Left lower leg: No edema.      Comments: Mild enlargement of right lower leg compared to left.    Lymphadenopathy:      Cervical: No cervical adenopathy.   Skin:     General: Skin is warm.      Comments: Estimated 1 cmx0.7 cm cystic lesion on the mid back at bra line.  "   Neurological:      General: No focal deficit present.      Mental Status: She is alert and oriented to person, place, and time.         Results from this visit  Results for orders placed or performed in visit on 04/24/23   CBC with platelets and differential     Status: None (In process)    Narrative    The following orders were created for panel order CBC with platelets and differential.  Procedure                               Abnormality         Status                     ---------                               -----------         ------                     CBC with platelets and d...[[id number]]                      In process                   Please view results for these tests on the individual orders.                 Answers for HPI/ROS submitted by the patient on 4/24/2023  If you checked off any problems, how difficult have these problems made it for you to do your work, take care of things at home, or get along with other people?: Not difficult at all  PHQ9 TOTAL SCORE: 3

## 2023-06-03 ASSESSMENT — ENCOUNTER SYMPTOMS
DIZZINESS: 0
JOINT SWELLING: 0
EYE PAIN: 0
ARTHRALGIAS: 0
PALPITATIONS: 0
COUGH: 1
HEADACHES: 0
MYALGIAS: 1
NERVOUS/ANXIOUS: 0
PARESTHESIAS: 0
FEVER: 0
BREAST MASS: 0
SORE THROAT: 0
ABDOMINAL PAIN: 0
CONSTIPATION: 0
WEAKNESS: 0
DIARRHEA: 0
HEMATOCHEZIA: 0
FREQUENCY: 0
HEARTBURN: 0
HEMATURIA: 0
NAUSEA: 0
CHILLS: 0
SHORTNESS OF BREATH: 0
DYSURIA: 0

## 2023-06-03 ASSESSMENT — ASTHMA QUESTIONNAIRES
QUESTION_5 LAST FOUR WEEKS HOW WOULD YOU RATE YOUR ASTHMA CONTROL: COMPLETELY CONTROLLED
QUESTION_2 LAST FOUR WEEKS HOW OFTEN HAVE YOU HAD SHORTNESS OF BREATH: NOT AT ALL
QUESTION_3 LAST FOUR WEEKS HOW OFTEN DID YOUR ASTHMA SYMPTOMS (WHEEZING, COUGHING, SHORTNESS OF BREATH, CHEST TIGHTNESS OR PAIN) WAKE YOU UP AT NIGHT OR EARLIER THAN USUAL IN THE MORNING: NOT AT ALL
QUESTION_4 LAST FOUR WEEKS HOW OFTEN HAVE YOU USED YOUR RESCUE INHALER OR NEBULIZER MEDICATION (SUCH AS ALBUTEROL): NOT AT ALL
ACT_TOTALSCORE: 25
QUESTION_1 LAST FOUR WEEKS HOW MUCH OF THE TIME DID YOUR ASTHMA KEEP YOU FROM GETTING AS MUCH DONE AT WORK, SCHOOL OR AT HOME: NONE OF THE TIME
ACT_TOTALSCORE: 25

## 2023-06-03 ASSESSMENT — ACTIVITIES OF DAILY LIVING (ADL): CURRENT_FUNCTION: NO ASSISTANCE NEEDED

## 2023-06-09 ENCOUNTER — OFFICE VISIT (OUTPATIENT)
Dept: FAMILY MEDICINE | Facility: CLINIC | Age: 68
End: 2023-06-09
Attending: STUDENT IN AN ORGANIZED HEALTH CARE EDUCATION/TRAINING PROGRAM
Payer: COMMERCIAL

## 2023-06-09 VITALS
DIASTOLIC BLOOD PRESSURE: 68 MMHG | SYSTOLIC BLOOD PRESSURE: 128 MMHG | WEIGHT: 173 LBS | HEIGHT: 61 IN | RESPIRATION RATE: 14 BRPM | BODY MASS INDEX: 32.66 KG/M2 | TEMPERATURE: 97.8 F | OXYGEN SATURATION: 97 % | HEART RATE: 98 BPM

## 2023-06-09 DIAGNOSIS — Z00.00 WELCOME TO MEDICARE PREVENTIVE VISIT: Primary | ICD-10-CM

## 2023-06-09 DIAGNOSIS — Z78.0 POSTMENOPAUSAL STATUS: ICD-10-CM

## 2023-06-09 DIAGNOSIS — Z23 NEED FOR VACCINATION: ICD-10-CM

## 2023-06-09 PROCEDURE — G0009 ADMIN PNEUMOCOCCAL VACCINE: HCPCS | Performed by: STUDENT IN AN ORGANIZED HEALTH CARE EDUCATION/TRAINING PROGRAM

## 2023-06-09 PROCEDURE — 99213 OFFICE O/P EST LOW 20 MIN: CPT | Mod: 25 | Performed by: STUDENT IN AN ORGANIZED HEALTH CARE EDUCATION/TRAINING PROGRAM

## 2023-06-09 PROCEDURE — 90677 PCV20 VACCINE IM: CPT | Performed by: STUDENT IN AN ORGANIZED HEALTH CARE EDUCATION/TRAINING PROGRAM

## 2023-06-09 PROCEDURE — G0438 PPPS, INITIAL VISIT: HCPCS | Performed by: STUDENT IN AN ORGANIZED HEALTH CARE EDUCATION/TRAINING PROGRAM

## 2023-06-09 ASSESSMENT — ENCOUNTER SYMPTOMS
BREAST MASS: 0
HEARTBURN: 0
HEMATOCHEZIA: 0
WEAKNESS: 0
PALPITATIONS: 0
ABDOMINAL PAIN: 0
JOINT SWELLING: 0
ARTHRALGIAS: 0
SORE THROAT: 0
CHILLS: 0
NAUSEA: 0
EYE PAIN: 0
NERVOUS/ANXIOUS: 0
FREQUENCY: 0
PARESTHESIAS: 0
SHORTNESS OF BREATH: 0
COUGH: 1
HEADACHES: 0
MYALGIAS: 1
FEVER: 0
HEMATURIA: 0
CONSTIPATION: 0
DIARRHEA: 0
DYSURIA: 0
DIZZINESS: 0

## 2023-06-09 ASSESSMENT — ACTIVITIES OF DAILY LIVING (ADL): CURRENT_FUNCTION: NO ASSISTANCE NEEDED

## 2023-06-09 ASSESSMENT — PAIN SCALES - GENERAL: PAINLEVEL: NO PAIN (0)

## 2023-06-09 NOTE — PATIENT INSTRUCTIONS
Patient Education   Personalized Prevention Plan  You are due for the preventive services outlined below.  Your care team is available to assist you in scheduling these services.  If you have already completed any of these items, please share that information with your care team to update in your medical record.  Health Maintenance Due   Topic Date Due     Pneumococcal Vaccine (2 - PCV) 10/22/2013     Asthma Action Plan - yearly  05/15/2020     COVID-19 Vaccine (6 - Moderna series) 02/14/2023     Osteoporosis Screening  03/26/2023     ANNUAL REVIEW OF HM ORDERS  05/24/2023     Annual Wellness Visit  05/24/2023     Your Health Risk Assessment indicates you feel you are not in good health    A healthy lifestyle helps keep the body fit and the mind alert. It helps protect you from disease, helps you fight disease, and helps prevent chronic disease (disease that doesn't go away) from getting worse. This is important as you get older and begin to notice twinges in muscles and joints and a decline in the strength and stamina you once took for granted. A healthy lifestyle includes good healthcare, good nutrition, weight control, recreation, and regular exercise. Avoid harmful substances and do what you can to keep safe. Another part of a healthy lifestyle is stay mentally active and socially involved.    Good healthcare     Have a wellness visit every year.     If you have new symptoms, let us know right away. Don't wait until the next checkup.     Take medicines exactly as prescribed and keep your medicines in a safe place. Tell us if your medicine causes problems.   Healthy diet and weight control     Eat 3 or 4 small, nutritious, low-fat, high-fiber meals a day. Include a variety of fruits, vegetables, and whole-grain foods.     Make sure you get enough calcium in your diet. Calcium, vitamin D, and exercise help prevent osteoporosis (bone thinning).     If you live alone, try eating with others when you can. That way  you get a good meal and have company while you eat it.     Try to keep a healthy weight. If you eat more calories than your body uses for energy, it will be stored as fat and you will gain weight.     Recreation   Recreation is not limited to sports and team events. It includes any activity that provides relaxation, interest, enjoyment, and exercise. Recreation provides an outlet for physical, mental, and social energy. It can give a sense of worth and achievement. It can help you stay healthy.    Mental Exercise and Social Involvement  Mental and emotional health is as important as physical health. Keep in touch with friends and family. Stay as active as possible. Continue to learn and challenge yourself.   Things you can do to stay mentally active are:    Learn something new, like a foreign language or musical instrument.     Play SCRABBLE or do crossword puzzles. If you cannot find people to play these games with you at home, you can play them with others on your computer through the Internet.     Join a games club--anything from card games to chess or checkers or lawn bowling.     Start a new hobby.     Go back to school.     Volunteer.     Read.   Keep up with world events.    Exercise for a Healthier Heart  You may wonder how you can improve the health of your heart. If you re thinking about exercise, you re on the right track. You don t need to become an athlete. But you do need a certain amount of brisk exercise to help strengthen your heart. If you have been diagnosed with a heart condition, your healthcare provider may advise exercise to help your condition. To help make exercise a habit, choose safe, fun activities.      Exercise with a friend. When activity is fun, you're more likely to stick with it.     Before you start  Check with your healthcare provider before starting an exercise program. This is especially important if you haven't been active for a while. It's also important if you have a  long-term (chronic) health problem such as heart disease, diabetes, or obesity. Also check with your provider if you're at high risk for having these problems.   Why exercise?  Exercising regularly offers many healthy rewards. It can help you do all of these:     Improve your blood cholesterol level to help prevent further heart trouble.    Lower your blood pressure to help prevent a stroke or heart attack.    Control diabetes or reduce your risk of getting this disease.    Improve your heart and lung function.    Reach and stay at a healthy weight.    Make your muscles stronger so you can stay active.    Prevent falls and fractures by slowing the loss of bone mass (osteoporosis).    Manage stress better.    Improve your sense of self and your body image.  Exercise tips      Ease into your routine. Set small goals. Then build on them. Talk with your healthcare provider first before starting an exercise routine if you're not sure what your activity level should be.    Exercise on most days. Aim for a total of at least 150 minutes (2 hours and 30 minutes) or more of moderate-intensity aerobic activity each week. You could also do 75 minutes (1 hour and 15 minutes) or more of vigorous-intensity aerobic activity each week. Or try for a combination of both. Moderate activity means that you breathe heavier and your heart rate increases, but you can still talk. Think about doing at least 30 minutes of moderate exercise, 5 times a week. It's OK to work up to the 30-minute period over time. Examples of moderate-intensity activity are brisk walking, gardening, and water aerobics.    Step up your daily activity level.  Along with your exercise program, try being more active the whole day. Walk instead of drive. Or park further away so that you take more steps each day. Do more household tasks or yard work. You may not be able to meet the advised amount of physical activity. But doing some moderate- or vigorous-intensity  aerobic activity can help reduce your risk for heart disease. Your healthcare provider can help you figure out what is best for you.    Choose 1 or more activities you enjoy.  Walking is one of the easiest things you can do. You can also try swimming, riding a bike, dancing, or taking an exercise class.    Call 911  Call 911 right away if any of these occur:     Chest pain that doesn't go away quickly with rest    New burning, tightness, pressure, or heaviness in your chest, neck, shoulders, back, or arms    Abnormal or severe shortness of breath    A very fast or irregular heartbeat (palpitations)    Fainting  When to call your healthcare provider  Call your healthcare provider if you have any of these:     Dizziness or lightheadedness    Mild shortness of breath or chest pain    Increased or new joint or muscle pain    Brandon last reviewed this educational content on 7/1/2022 2000-2022 [employer]. All rights reserved. This information is not intended as a substitute for professional medical care. Always follow your healthcare professional's instructions.          Signs of Hearing Loss  Hearing loss is a problem shared by many people. In fact, it's one of the most common health problems, particularly as people age. Most people aged 65 and older have some hearing loss. By age 80, almost everyone does. Hearing loss often occurs slowly over the years. So, you may not realize your hearing has gotten worse.   When sudden hearing loss occurs, it's important to contact your healthcare provider right away. Your provider will do a medical exam and a hearing exam as soon as possible. This is to help find the cause and type of your sudden hearing loss. Based on your diagnosis, your healthcare provider will discuss possible treatments.      Hearing much better with one ear can be a sign of hearing loss.     Have your hearing checked  Call your healthcare provider if you:     Have to strain to hear normal  conversation    Have to watch other people s faces very carefully to follow what they re saying    Need to ask people to repeat what they ve said    Often misunderstand what people are saying    Turn the volume of the television or radio up so high that others complain    Feel that people are mumbling when they re talking to you    Find that the effort to hear leaves you feeling tired and irritated    Notice, when using the phone, that you hear better with one ear than the other  [employer] last reviewed this educational content on 6/1/2022 2000-2022 [employer]. All rights reserved. This information is not intended as a substitute for professional medical care. Always follow your healthcare professional's instructions.          Urinary Incontinence, Female (Adult)   Urinary incontinence means loss of bladder control. This problem affects many women, especially as they get older. If you have incontinence, you may be embarrassed to ask for help. But know that this problem can be treated.   Types of Incontinence  There are different types of incontinence. Two of the main types are described here. You can have more than one type.     Stress incontinence. With this type, urine leaks when pressure (stress) is put on the bladder. This may happen when you cough, sneeze, or laugh. Stress incontinence most often occurs because the pelvic floor muscles that support the bladder and urethra are weak. This can happen after pregnancy and vaginal childbirth or a hysterectomy. It can also be due to excess body weight or hormone changes.    Urge incontinence (also called overactive bladder). With this type, a sudden urge to urinate is felt often. This may happen even though there may not be much urine in the bladder. The need to urinate often during the night is common. Urge incontinence most often occurs because of bladder spasms. This may be due to bladder irritation or infection. Damage to bladder nerves or pelvic  muscles, constipation, and certain medicines can also lead to urge incontinence.  Treatment depends on the cause. Further evaluation is needed to find the type you have. This will likely include an exam and certain tests. Based on the results, you and your healthcare provider can then plan treatment. Until a diagnosis is made, the home care tips below can help ease symptoms.   Home care    Do pelvic floor muscle exercises, if they are prescribed. The pelvic floor muscles help support the bladder and urethra. Many women find that their symptoms improve when doing special exercises that strengthen these muscles. To do the exercises, contract the muscles you would use to stop your stream of urine. But do this when you re not urinating. Hold for 10 seconds, then relax. Repeat 10 to 20 times in a row, at least 3 times a day. Your healthcare provider may give you other instructions for how to do the exercises and how often.    Keep a bladder diary. This helps track how often and how much you urinate over a set period of time. Bring this diary with you to your next visit with the provider. The information can help your provider learn more about your bladder problem.    Lose weight, if advised to by your provider. Extra weight puts pressure on the bladder. Your provider can help you create a weight-loss plan that s right for you. This may include exercising more and making certain diet changes.    Don't have foods and drinks that may irritate the bladder. These can include alcohol and caffeinated drinks.    Quit smoking. Smoking and other tobacco use can lead to a long-term (chronic) cough that strains the pelvic floor muscles. Smoking may also damage the bladder and urethra. Talk with your provider about treatments or methods you can use to quit smoking.    If drinking large amounts of fluid makes you have symptoms, you may be advised to limit your fluid intake. You may also be advised to drink most of your fluids during  the day and to limit fluids at night.    If you re worried about urine leakage or accidents, you may wear absorbent pads to catch urine. Change the pads often. This helps reduce discomfort. It may also reduce the risk of skin or bladder infections.    Follow-up care  Follow up with your healthcare provider, or as directed. It may take some to find the right treatment for your problem. But healthy lifestyle changes can be made right away. These include such things as exercising on a regular basis, eating a healthy diet, losing weight (if needed), and quitting smoking. Your treatment plan may include special therapies or medicines. Certain procedures or surgery may also be options. Talk about any questions you have with your provider.   When to seek medical advice  Call the healthcare provider right away if any of these occur:    Fever of 100.4 F (38 C) or higher, or as directed by your provider    Bladder pain or fullness    Belly swelling    Nausea or vomiting    Back pain    Weakness, dizziness, or fainting  Brandon last reviewed this educational content on 1/1/2020 2000-2022 [employer]. All rights reserved. This information is not intended as a substitute for professional medical care. Always follow your healthcare professional's instructions.

## 2023-06-09 NOTE — PROGRESS NOTES
"SUBJECTIVE:   Mirta is a 68 year old who presents for Preventive Visit.  Chief Complaint   Patient presents with     Wellness Visit         6/9/2023    12:51 PM   Additional Questions   Roomed by Leia   Accompanied by Self     Are you in the first 12 months of your Medicare coverage?  No    Healthy Habits:     In general, how would you rate your overall health?  Fair    Frequency of exercise:  None    Do you usually eat at least 4 servings of fruit and vegetables a day, include whole grains    & fiber and avoid regularly eating high fat or \"junk\" foods?  Yes    Taking medications regularly:  Yes    Medication side effects:  Muscle aches    Ability to successfully perform activities of daily living:  No assistance needed    Home Safety:  Lack of handrails on stairs    Hearing Impairment:  Need to ask people to speak up or repeat themselves    In the past 6 months, have you been bothered by leaking of urine? Yes    In general, how would you rate your overall mental or emotional health?  Excellent      PHQ-2 Total Score: 0    Additional concerns today:  No    Thinks she has tinnitus     Hysterectomy    Have you ever done Advance Care Planning? (For example, a Health Directive, POLST, or a discussion with a medical provider or your loved ones about your wishes): No, advance care planning information given to patient to review.  Patient declined advance care planning discussion at this time.       Fall risk  Fallen 2 or more times in the past year?: No  Any fall with injury in the past year?: No    Cognitive Screening   1) Repeat 3 items (Leader, Season, Table)    2) Clock draw: NORMAL  3) 3 item recall: Recalls 2 objects   Results: NORMAL clock, 1-2 items recalled: COGNITIVE IMPAIRMENT LESS LIKELY    Mini-CogTM Copyright TERESA Sanford. Licensed by the author for use in [facility]; reprinted with permission (liang@.[facility]). All rights reserved.      Do you have sleep apnea, excessive snoring or daytime " drowsiness?: yes    Possible Sleep Apnea:        6/9/2023     1:29 PM   STOP-Bang Total Score   Total Score 3   Risk Stratification 3 - 4: Moderate Risk for DANIELLE       Reviewed and updated as needed this visit by clinical staff   Tobacco  Allergies  Meds              Reviewed and updated as needed this visit by Provider                 Social History     Tobacco Use     Smoking status: Never     Smokeless tobacco: Never   Vaping Use     Vaping status: Never Used   Substance Use Topics     Alcohol use: Yes     Comment: Occasionally             6/3/2023     8:42 AM   Alcohol Use   Prescreen: >3 drinks/day or >7 drinks/week? No     Do you have a current opioid prescription? No  Do you use any other controlled substances or medications that are not prescribed by a provider? None    Current providers sharing in care for this patient include:   Patient Care Team:  Neal Mosquera MD as PCP - General (Family Practice)  [facility] On[facility]  Neal Mosquera MD as Assigned PCP  Radha Matias PA-C as Physician Assistant (Dermatology)    The following health maintenance items are reviewed in Epic and correct as of today:  Health Maintenance   Topic Date Due     Pneumococcal Vaccine: 65+ Years (2 - PCV) 10/22/2013     ASTHMA ACTION PLAN  05/15/2020     COVID-19 Vaccine (6 - Moderna series) 02/14/2023     DEXA  03/26/2023     ANNUAL REVIEW OF HM ORDERS  05/24/2023     MEDICARE ANNUAL WELLNESS VISIT  05/24/2023     ASTHMA CONTROL TEST  12/09/2023     BMP  04/24/2024     LIPID  04/24/2024     MICROALBUMIN  04/24/2024     HEMOGLOBIN  04/24/2024     FALL RISK ASSESSMENT  06/09/2024     MAMMO SCREENING  07/14/2024     COLORECTAL CANCER SCREENING  10/20/2025     ADVANCE CARE PLANNING  05/24/2027     DTAP/TDAP/TD IMMUNIZATION (3 - Td or Tdap) 09/21/2030     HEPATITIS C SCREENING  Completed     PHQ-2 (once per calendar year)  Completed     INFLUENZA VACCINE  Completed      URINALYSIS  Completed     ZOSTER IMMUNIZATION  Completed     IPV IMMUNIZATION  Aged Out     MENINGITIS IMMUNIZATION  Aged Out         FHS-7:       5/21/2022     9:46 PM 6/3/2023     8:45 AM   Breast CA Risk Assessment (FHS-7)   Did any of your first-degree relatives have breast or ovarian cancer? No No   Did any of your relatives have bilateral breast cancer? No No   Did any man in your family have breast cancer? No No   Did any woman in your family have breast and ovarian cancer? No No   Did any woman in your family have breast cancer before age 50 y? No No   Do you have 2 or more relatives with breast and/or ovarian cancer? No No   Do you have 2 or more relatives with breast and/or bowel cancer? No No     Mammogram Screening: Recommended mammography every 1-2 years with patient discussion and risk factor consideration  Pertinent mammograms are reviewed under the imaging tab.    Review of Systems   Constitutional: Negative for chills and fever.   HENT: Positive for hearing loss. Negative for congestion, ear pain and sore throat.    Eyes: Negative for pain and visual disturbance.   Respiratory: Positive for cough. Negative for shortness of breath.    Cardiovascular: Negative for chest pain, palpitations and peripheral edema.   Gastrointestinal: Negative for abdominal pain, constipation, diarrhea, heartburn, hematochezia and nausea.   Breasts:  Negative for tenderness, breast mass and discharge.   Genitourinary: Negative for dysuria, frequency, genital sores, hematuria, pelvic pain, urgency, vaginal bleeding and vaginal discharge.   Musculoskeletal: Positive for myalgias. Negative for arthralgias and joint swelling.   Skin: Negative for rash.   Neurological: Negative for dizziness, weakness, headaches and paresthesias.   Psychiatric/Behavioral: Negative for mood changes. The patient is not nervous/anxious.        OBJECTIVE:   /68 (BP Location: Right arm, Patient Position: Sitting, Cuff Size: Adult Regular)    "Pulse 98   Temp 97.8  F (36.6  C) (Tympanic)   Resp 14   Ht 1.549 m (5' 1\")   Wt 78.5 kg (173 lb)   SpO2 97%   BMI 32.69 kg/m   Estimated body mass index is 32.69 kg/m  as calculated from the following:    Height as of this encounter: 1.549 m (5' 1\").    Weight as of this encounter: 78.5 kg (173 lb).  Physical Exam  GENERAL APPEARANCE: healthy, alert and no distress  EYES: Eyes grossly normal to inspection, and conjunctivae and sclerae normal  HENT: ear canals and TM's normal, nose and mouth without ulcers or lesions, oropharynx clear and oral mucous membranes moist  NECK: no adenopathy, no asymmetry, masses, or scars  RESP: lungs clear to auscultation - no rales, rhonchi or wheezes  CV: regular rate and rhythm, normal S1 S2, no S3 or S4, no murmur, click or rub, no peripheral edema and peripheral pulses strong  ABDOMEN: soft, nontender, no hepatosplenomegaly, no masses and bowel sounds normal  MS: no musculoskeletal defects are noted and gait is age appropriate without ataxia  SKIN: mildly raised erythematous 1 cm or less lesions over anterior shins bilaterally. no other suspicious lesions or rashes  NEURO: Normal strength and tone, sensory exam grossly normal, mentation intact and speech normal  PSYCH: mentation appears normal and affect normal/bright     Results from this visitNo results found for any visits on 06/09/23.        ASSESSMENT / PLAN:   Mirta was seen today for wellness visit.    Diagnoses and all orders for this visit:    Welcome to Medicare preventive visit  Welcome to medicare visit today. No concerns for hearing, memory at this time. Remains active. We discussed screening testing. Below orders placed. Will do mammo next year, s/p hysterectomy so no pap needed, and will do colonoscopy in 2025.   -     PRIMARY CARE FOLLOW-UP SCHEDULING; Future    Postmenopausal status  Has a family history of osteoporosis and patient is postmenopausal. Plan for dexa.   -     DEXA HIP/PELVIS/SPINE - Future; " "Future    Need for vaccination  -     Pneumococcal 20 Valent Conjugate (PCV20)    Other orders  -     PRIMARY CARE FOLLOW-UP SCHEDULING  -     REVIEW OF HEALTH MAINTENANCE PROTOCOL ORDERS        Patient has been advised of split billing requirements and indicates understanding: Yes      COUNSELING:  Reviewed preventive health counseling, as reflected in patient instructions      BMI:   Estimated body mass index is 32.69 kg/m  as calculated from the following:    Height as of this encounter: 1.549 m (5' 1\").    Weight as of this encounter: 78.5 kg (173 lb).     She reports that she has never smoked. She has never used smokeless tobacco.      Appropriate preventive services were discussed with this patient, including applicable screening as appropriate for cardiovascular disease, diabetes, osteopenia/osteoporosis, and glaucoma.  As appropriate for age/gender, discussed screening for colorectal cancer, prostate cancer, breast cancer, and cervical cancer. Checklist reviewing preventive services available has been given to the patient.    Reviewed patients plan of care and provided an AVS. The Basic Care Plan (routine screening as documented in Health Maintenance) for Mirta meets the Care Plan requirement. This Care Plan has been established and reviewed with the Patient.          Joseline Fraga MD  [facility]    Identified Health Risks:    I have reviewed Opioid Use Disorder and Substance Use Disorder risk factors and made any needed referrals.       The patient was provided with suggestions to help her develop a healthy physical lifestyle.  She is at risk for lack of exercise and has been provided with information to increase physical activity for the benefit of her well-being.  The patient was provided with written information regarding signs of hearing loss.  Information on urinary incontinence and treatment options given to patient.  "

## 2023-06-27 ENCOUNTER — MYC MEDICAL ADVICE (OUTPATIENT)
Dept: FAMILY MEDICINE | Facility: CLINIC | Age: 68
End: 2023-06-27
Payer: COMMERCIAL

## 2023-06-27 DIAGNOSIS — L40.9 PSORIASIS: Primary | ICD-10-CM

## 2023-06-27 RX ORDER — TRIAMCINOLONE ACETONIDE 5 MG/G
CREAM TOPICAL DAILY PRN
Qty: 30 G | Refills: 3 | Status: ON HOLD | OUTPATIENT
Start: 2023-06-27 | End: 2023-10-17

## 2023-08-04 ENCOUNTER — TELEPHONE (OUTPATIENT)
Dept: FAMILY MEDICINE | Facility: CLINIC | Age: 68
End: 2023-08-04
Payer: COMMERCIAL

## 2023-08-04 NOTE — TELEPHONE ENCOUNTER
Reason for Call:  Appointment Request    Patient requesting this type of appt: Chronic Diease Management/Medication/Follow-Up    Requested provider: Joseline Fraga    Reason patient unable to be scheduled: Not within requested timeframe    When does patient want to be seen/preferred time:  8/14-8/18    Comments: Patient has been experiencing worsening pain in her right leg since the last visit with Dr. Fraga in June 2023. She would like to schedule a follow up appointment but will be out of town until 8/13 and is requesting to schedule an appointment during that week. Patient will not be available on Tuesday afternoon of 8/15/2023.    Could we send this information to you in [employer] or would you prefer to receive a phone call?:   No preference   Okay to leave a detailed message?: No at Cell number on file:    Telephone Information:   Mobile [phone]       Call taken on 8/4/2023 at 2:46 PM by Ana Gonzales

## 2023-08-17 ENCOUNTER — OFFICE VISIT (OUTPATIENT)
Dept: FAMILY MEDICINE | Facility: CLINIC | Age: 68
End: 2023-08-17
Payer: COMMERCIAL

## 2023-08-17 VITALS
HEIGHT: 61 IN | BODY MASS INDEX: 32.85 KG/M2 | WEIGHT: 174 LBS | HEART RATE: 82 BPM | RESPIRATION RATE: 16 BRPM | DIASTOLIC BLOOD PRESSURE: 74 MMHG | SYSTOLIC BLOOD PRESSURE: 124 MMHG | OXYGEN SATURATION: 98 %

## 2023-08-17 DIAGNOSIS — G57.01 PIRIFORMIS SYNDROME, RIGHT: Primary | ICD-10-CM

## 2023-08-17 DIAGNOSIS — M72.2 PLANTAR FASCIITIS: ICD-10-CM

## 2023-08-17 DIAGNOSIS — M79.661 RIGHT CALF PAIN: ICD-10-CM

## 2023-08-17 DIAGNOSIS — E61.1 LOW IRON: ICD-10-CM

## 2023-08-17 DIAGNOSIS — G25.81 RESTLESS LEGS SYNDROME: ICD-10-CM

## 2023-08-17 DIAGNOSIS — C91.10 CHRONIC LYMPHOCYTIC LEUKEMIA (H): ICD-10-CM

## 2023-08-17 LAB
HOLD SPECIMEN: NORMAL
HOLD SPECIMEN: NORMAL
IRON BINDING CAPACITY (ROCHE): 363 UG/DL (ref 240–430)
IRON SATN MFR SERPL: 12 % (ref 15–46)
IRON SERPL-MCNC: 43 UG/DL (ref 37–145)
VIT B12 SERPL-MCNC: 810 PG/ML (ref 232–1245)

## 2023-08-17 PROCEDURE — 99214 OFFICE O/P EST MOD 30 MIN: CPT | Performed by: STUDENT IN AN ORGANIZED HEALTH CARE EDUCATION/TRAINING PROGRAM

## 2023-08-17 PROCEDURE — 82728 ASSAY OF FERRITIN: CPT | Performed by: STUDENT IN AN ORGANIZED HEALTH CARE EDUCATION/TRAINING PROGRAM

## 2023-08-17 PROCEDURE — 82607 VITAMIN B-12: CPT | Performed by: STUDENT IN AN ORGANIZED HEALTH CARE EDUCATION/TRAINING PROGRAM

## 2023-08-17 PROCEDURE — 83550 IRON BINDING TEST: CPT | Performed by: STUDENT IN AN ORGANIZED HEALTH CARE EDUCATION/TRAINING PROGRAM

## 2023-08-17 PROCEDURE — 36415 COLL VENOUS BLD VENIPUNCTURE: CPT | Performed by: STUDENT IN AN ORGANIZED HEALTH CARE EDUCATION/TRAINING PROGRAM

## 2023-08-17 PROCEDURE — 83540 ASSAY OF IRON: CPT | Performed by: STUDENT IN AN ORGANIZED HEALTH CARE EDUCATION/TRAINING PROGRAM

## 2023-08-17 RX ORDER — CYCLOBENZAPRINE HCL 10 MG
10 TABLET ORAL 3 TIMES DAILY PRN
Qty: 30 TABLET | Refills: 0 | Status: ON HOLD | OUTPATIENT
Start: 2023-08-17 | End: 2023-10-17

## 2023-08-17 ASSESSMENT — PAIN SCALES - GENERAL: PAINLEVEL: SEVERE PAIN (7)

## 2023-08-17 NOTE — PROGRESS NOTES
Assessment & Plan     Patient is a very pleasant 68 year old who presents for follow up on worsening right sided leg pain. Patient saw me in April with right lateral calf/shin pain. We tried decreasing statin, which had minimal benefit. At that time, labs not consistent with rhabdo. Pain of the calf is worsening, and she now also has a aching sensation in her posterior leg from buttock down to ankle. Pain is worse with walking down stairs.     Piriformis syndrome, right  Tenderness over right piriformis, likely contributing to sciatic type pain in the leg. Will trial flexeril today both for piriformis and calf pain. Given information on piriformis stretching to do.   - cyclobenzaprine (FLEXERIL) 10 MG tablet  Dispense: 30 tablet; Refill: 0    Plantar fasciitis  In addition to other issues, patient is noting pain on the sole of her feet. She has a history of plantar fasciitis. Given stretch and massage recommendations for this. Consider shoe inserts for her tennis shoes to provide more structure.     Right calf pain  Right lateral calf pain ongoing now since December 2022, progressively worsened. Trial off pravastatin had no difference. Okay to restart. She does have restless leg type symptoms at night with this (see below). She has a palpable nodular type flat area on the anterolateral mid calf that is tender to palpation. Feels more superficial. She has some benefit from massage gun and compression and can continue to use these if desired. Will trial flexeril. Can use ibuprofen and tylenol for pain control (take nsaid with food). Consider CT tib fib with contrast if not improving. Could also switch muscle relaxant if needed. Offered PT today, and she declined for now, but would recommend this in the future if she is open to it. I can place that referral at any time.     Restless legs syndrome  For restless leg type symptoms at night, will check b12 and iron levels. If iron low, add on ferritin.   - Vitamin B12  -  "Iron and iron binding capacity  - Extra Tube  - Extra Tube  - Iron and iron binding capacity  - Vitamin B12    Chronic lymphocytic leukemia (H)  As above, will add on ferritin if iron is low.   - Iron and iron binding capacity  - Iron and iron binding capacity    I spent a total of 38 minutes on the day of the visit.   Time spent by me doing chart review, history and exam, documentation and further activities per the note       BMI:   Estimated body mass index is 32.88 kg/m  as calculated from the following:    Height as of this encounter: 1.549 m (5' 1\").    Weight as of this encounter: 78.9 kg (174 lb).     Joseline Fraga MD  [facility]    Makayla Kirby is a 68 year old, presenting for the following health issues:  Musculoskeletal Problem        8/17/2023    11:07 AM   Additional Questions   Roomed by Alyse KURTZ MA   Accompanied by Self       History of Present Illness       Reason for visit:  Pain in leg    She eats 2-3 servings of fruits and vegetables daily.She consumes 0 sweetened beverage(s) daily.She exercises with enough effort to increase her heart rate 9 or less minutes per day.  She exercises with enough effort to increase her heart rate 3 or less days per week.   She is taking medications regularly.       Pain History:  When did you first notice your pain? April   Have you seen anyone else for your pain? No  How has your pain affected your ability to work? Not applicable  Where in your body do you have pain? Musculoskeletal problem/pain  Onset/Duration: 4/2023  Description  Location: Lower leg - right  Joint Swelling: No  Redness: No  Pain: YES  Warmth: No  Intensity:  moderate, 6-7/10  Progression of Symptoms:  worsening  Accompanying signs and symptoms:   Fevers: No  Numbness/tingling/weakness: No  History  Trauma to the area: No  Recent illness:  No  Previous similar problem: No  Previous evaluation:  No  Precipitating or alleviating factors:  Aggravating factors " "include: Brushing her leg against something.  She can rub her leg and be fine. Going down stairs makes it worse, going upstairs is fine  Therapies tried and outcome: Medication changes, has tried multiple things.  Compression stockings, ace bandage, salon pas, icy hot, for the past month has taken ibuprofen 2 tablets daily.  At night has been taking tylenol    Seen 4/24, told to do 1/2 dose. Did this for at least 8 weeks    6/9 - thinks continued on the 1/2 tab a little longer.   Still hurting some, then quit it completely for another 4 weeks  There were a few times that felt maybe slight better. But then would hurt the next day.    End of July started back up again.     No the last month taking ibuprofen 2 pills soemtimes in the mid ot late morning into the evening then ice it and then before bed 1 tylenol pm and 1 regular. Keeping her wake at night if laying on that side.     Compression stockings felt a little better   Salon pas - tried that at night.   Almost get restless leg with it.     Stretching? Not doing any    Had episode both feet, after exercise classes barefoot and both feet went \"wacko\" ended up goinng to podiatry on the left.   Now also feeling it in the bottom of the right foot.     Hurting to go down the stairs.     Feeling achy right now in the back of the legs.   Not like sciatic pain   Just whole leg feels achy.           Review of Systems   Constitutional, HEENT, cardiovascular, pulmonary, GI, , musculoskeletal, neuro, skin, endocrine and psych systems are negative, except as otherwise noted.      Objective    /74 (BP Location: Right arm, Patient Position: Chair, Cuff Size: Adult Large)   Pulse 82   Resp 16   Ht 1.549 m (5' 1\")   Wt 78.9 kg (174 lb)   SpO2 98%   BMI 32.88 kg/m    Body mass index is 32.88 kg/m .  Physical Exam  Constitutional:       Appearance: Normal appearance.   HENT:      Head: Normocephalic.   Eyes:      General: No scleral icterus.     Extraocular Movements: " Extraocular movements intact.      Conjunctiva/sclera: Conjunctivae normal.   Cardiovascular:      Rate and Rhythm: Normal rate.   Pulmonary:      Effort: Pulmonary effort is normal.   Musculoskeletal:      Cervical back: Normal range of motion.        Legs:       Comments: Right lateral calf appears larger than left. No pitting edema or lower leg edema. Tenderness over right piriformis   Neurological:      General: No focal deficit present.      Mental Status: She is alert and oriented to person, place, and time.           Results from this visit  Results for orders placed or performed in visit on 08/17/23   Extra Tube     Status: None (In process)    Narrative    The following orders were created for panel order Extra Tube.  Procedure                               Abnormality         Status                     ---------                               -----------         ------                     Extra Blue Top Tube[[id number]]                                                         Extra Red Top Tube[[id number]]                                                          Extra Green Top (Lithium...[[id number]]                      In process                 Extra Purple Top Tube[[id number]]                            In process                   Please view results for these tests on the individual orders.

## 2023-08-17 NOTE — PATIENT INSTRUCTIONS
Ibuprofen (Advil; Motrin): take 200 to 400 mg (1 to 2 of the over the counter 200 mg tablets) every 4 to 6 hours as needed for pain or fevers.    Acetaminophen (Tylenol):   Every 4 hour dosing:   - take 1-2 tablets of the 325 mg over the counter tablets (325 to 650 mg) every 4 hours as needed for fevers or pain.  OR  - take 1 tablet of the 500 mg over the counter tablet every 4 hours as needed for fevers or pain.     Every 6 hour dosing:   - take 2-3 tablets of the 325 mg over the counter tablets (650 to 975 mg) every 6 hours as needed for fevers or pain.   OR  - take 2 tablets of the 500 mg over the counter tablets (1000 mg) every 6 hours as needed for fevers or pain.     CAUTION: Do not go over a total of 4000 mg of acetaminophen in a 24 hour time frame. There are many other medications that have acetaminophen as a component, including Excedrin, some over the counter cough medications, and Percocet.         [employer]

## 2023-08-18 LAB — FERRITIN SERPL-MCNC: 95 NG/ML (ref 11–328)

## 2023-09-06 ENCOUNTER — MYC MEDICAL ADVICE (OUTPATIENT)
Dept: FAMILY MEDICINE | Facility: CLINIC | Age: 68
End: 2023-09-06
Payer: COMMERCIAL

## 2023-09-06 DIAGNOSIS — M79.661 RIGHT CALF PAIN: ICD-10-CM

## 2023-09-06 DIAGNOSIS — M72.2 PLANTAR FASCIITIS: Primary | ICD-10-CM

## 2023-09-15 ENCOUNTER — OFFICE VISIT (OUTPATIENT)
Dept: DERMATOLOGY | Facility: CLINIC | Age: 68
End: 2023-09-15
Attending: STUDENT IN AN ORGANIZED HEALTH CARE EDUCATION/TRAINING PROGRAM
Payer: COMMERCIAL

## 2023-09-15 DIAGNOSIS — L72.0 EPIDERMOID CYST: ICD-10-CM

## 2023-09-15 DIAGNOSIS — L40.9 PSORIASIS: Primary | ICD-10-CM

## 2023-09-15 PROCEDURE — 99204 OFFICE O/P NEW MOD 45 MIN: CPT | Performed by: PHYSICIAN ASSISTANT

## 2023-09-15 RX ORDER — CLOBETASOL PROPIONATE 0.5 MG/G
CREAM TOPICAL
Qty: 60 G | Refills: 5 | Status: SHIPPED | OUTPATIENT
Start: 2023-09-15 | End: 2024-04-18

## 2023-09-15 RX ORDER — CALCIPOTRIENE 50 UG/G
CREAM TOPICAL
Qty: 60 G | Refills: 6 | Status: SHIPPED | OUTPATIENT
Start: 2023-09-15 | End: 2024-04-18

## 2023-09-15 ASSESSMENT — PAIN SCALES - GENERAL: PAINLEVEL: NO PAIN (0)

## 2023-09-15 NOTE — PROGRESS NOTES
Mirta Cardenas is an extremely pleasant 68 year old year old female patient here today for recheck psoriasis. She notes she has had a recent flare, currently using triamcinolone only helping with itch. She has done light therapy in the past with success. She notes cyst on back was bothersome, but not currently. Patient has no other skin complaints today.  Remainder of the HPI, Meds, PMH, Allergies, FH, and SH was reviewed in chart.    Pertinent Hx:  Psoriasis   Past Medical History:   Diagnosis Date    Asthma     Asthma     reactive airway disease, per patient    Blood transfusion     Cancer (H)     chronic lymphocytic leukemia    History of transfusion     Hypertension     Hypertension     Leukemia, chronic lymphocytic (H)     Malignant neoplasm (H)     CLL       Past Surgical History:   Procedure Laterality Date    ABDOMEN SURGERY      c section *3    APPENDECTOMY      C/SECTION, CLASSICAL  ,,    , Classical    COLONOSCOPY      HYSTERECTOMY      HYSTERECTOMY, PAP NO LONGER INDICATED  1998    RELEASE CARPAL TUNNEL  2012    Procedure:RELEASE CARPAL TUNNEL; Right Carpal Tunnel Release & Right Ring A1 Pulley Release; Surgeon:SERGEY HEATON; Location:[facility]    RELEASE TRIGGER FINGER  2012    Procedure:RELEASE TRIGGER FINGER; Surgeon:SERGEY HEATON; Location:[facility]    RELEASE TRIGGER FINGER  10/12/2012    Procedure: RELEASE TRIGGER FINGER;  Right Thumb A1 Pulley Release;  Surgeon: Sergey Heaton MD;  Location: [facility]    SALPINGO OOPHORECTOMY,R/L/ULICES  2008    right        Family History   Problem Relation Age of Onset    Hypertension Brother     Heart Disease Brother 65        bypass    Cancer Sister         multiple myloma    Obesity Son     Obesity Son     Hypertension Son     Osteoporosis Mother     Melanoma No family hx of        Social History     Socioeconomic History    Marital status:      Spouse name: Not on file    Number of children: Not on file     Years of education: Not on file    Highest education level: Not on file   Occupational History    Not on file   Tobacco Use    Smoking status: Never    Smokeless tobacco: Never   Vaping Use    Vaping Use: Never used   Substance and Sexual Activity    Alcohol use: Yes     Comment: Occasionally    Drug use: No    Sexual activity: Not Currently     Partners: Male     Birth control/protection: Post-menopausal   Other Topics Concern    Parent/sibling w/ CABG, MI or angioplasty before 65F 55M? No   Social History Narrative    Not on file     Social Determinants of Health     Financial Resource Strain: Not on file   Food Insecurity: Not on file   Transportation Needs: Not on file   Physical Activity: Not on file   Stress: Not on file   Social Connections: Not on file   Intimate Partner Violence: Not on file   Housing Stability: Not on file       Outpatient Encounter Medications as of 9/15/2023   Medication Sig Dispense Refill    calcipotriene (DOVONOX) 0.005 % external cream Apply twice daily to areas of psoriasis on Saturday and Sunday. 60 g 6    clobetasol (TEMOVATE) 0.05 % external cream Apply twice daily to psoriasis on Monday through Friday. 60 g 5    triamcinolone (ARISTOCORT HP) 0.5 % external cream Apply topically daily as needed (To psoriasis rash on legs and torso) 30 g 3    ASPIRIN 81 MG OR TABS Take 81 mg by mouth daily       chlorthalidone (HYGROTON) 25 MG tablet Take 1 tablet (25 mg) by mouth daily 90 tablet 3    cyclobenzaprine (FLEXERIL) 10 MG tablet Take 1 tablet (10 mg) by mouth 3 times daily as needed for muscle spasms 30 tablet 0    fluticasone-salmeterol (ADVAIR-HFA) 45-21 MCG/ACT inhaler Inhale 2 puffs into the lungs 2 times daily 12 g 3    losartan (COZAAR) 100 MG tablet Take 1 tablet (100 mg) by mouth daily 90 tablet 3    METAMUCIL FIBER PO       Multiple Vitamins-Calcium (ONE-A-DAY WOMENS FORMULA PO) Take 1 tablet by mouth daily      omeprazole (PRILOSEC) 20 MG DR capsule Take 1 capsule by mouth  every morning.      pravastatin (PRAVACHOL) 40 MG tablet Take 1 tablet (40 mg) by mouth daily 90 tablet 3     No facility-administered encounter medications on file as of 9/15/2023.             O:   NAD, WDWN, Alert & Oriented, Mood & Affect wnl, Vitals stable   Here today alone   There were no vitals taken for this visit.   General appearance normal   Vitals stable   Alert, oriented and in no acute distress      Psoriasiform papules on legs   3 cm sized subcutaneous nodule with scarring and comedo on right mid back       Eyes: Conjunctivae/lids:Normal     ENT: Lips: normal    MSK:Normal    Cardiovascular: peripheral edema none    Pulm: Breathing Normal    Neuro/Psych: Orientation:Alert and Orientedx3 ; Mood/Affect:normal   A/P:  1. Psoriasis   Alternate dovonex and clobetasol.   Consider restarting light therapy.   Skin care regimen reviewed with patient: Eliminate harsh soaps, i.e. Dial, zest, irsih spring; Mild soaps such as Cetaphil or Dove sensitive skin, avoid hot or cold showers, aggressive use of emollients including vanicream, cetaphil or cerave discussed with patient.    2. Epidermal cyst on right mid back   No longer bothersome, she will monitor

## 2023-09-15 NOTE — LETTER
9/15/2023         RE: Mirta Cardenas  [id number] [facility] [id number]        Dear Colleague,    Thank you for referring your patient, Mirta Cardenas, to the [facility]. Please see a copy of my visit note below.    Mirta Cardenas is an extremely pleasant 68 year old year old female patient here today for recheck psoriasis. She notes she has had a recent flare, currently using triamcinolone only helping with itch. She has done light therapy in the past with success. She notes cyst on back was bothersome, but not currently. Patient has no other skin complaints today.  Remainder of the HPI, Meds, PMH, Allergies, FH, and SH was reviewed in chart.    Pertinent Hx:  Psoriasis   Past Medical History:   Diagnosis Date     Asthma      Asthma     reactive airway disease, per patient     Blood transfusion      Cancer (H)     chronic lymphocytic leukemia     History of transfusion      Hypertension      Hypertension      Leukemia, chronic lymphocytic (H)      Malignant neoplasm (H)     CLL       Past Surgical History:   Procedure Laterality Date     ABDOMEN SURGERY      c section *3     APPENDECTOMY       C/SECTION, CLASSICAL  ,,    , Classical     COLONOSCOPY       HYSTERECTOMY       HYSTERECTOMY, PAP NO LONGER INDICATED  1998     RELEASE CARPAL TUNNEL  2012    Procedure:RELEASE CARPAL TUNNEL; Right Carpal Tunnel Release & Right Ring A1 Pulley Release; Surgeon:SERGEY HEATON; Location:[facility]     RELEASE TRIGGER FINGER  2012    Procedure:RELEASE TRIGGER FINGER; Surgeon:SERGEY HEATON; Location:[facility]     RELEASE TRIGGER FINGER  10/12/2012    Procedure: RELEASE TRIGGER FINGER;  Right Thumb A1 Pulley Release;  Surgeon: Sergey Heaton MD;  Location: [facility]     SALPINGO OOPHORECTOMY,R/L/ULICES  2008    right        Family History   Problem Relation Age of Onset     Hypertension Brother      Heart Disease Brother 65        bypass     Cancer  Sister         multiple myloma     Obesity Son      Obesity Son      Hypertension Son      Osteoporosis Mother      Melanoma No family hx of        Social History     Socioeconomic History     Marital status:      Spouse name: Not on file     Number of children: Not on file     Years of education: Not on file     Highest education level: Not on file   Occupational History     Not on file   Tobacco Use     Smoking status: Never     Smokeless tobacco: Never   Vaping Use     Vaping Use: Never used   Substance and Sexual Activity     Alcohol use: Yes     Comment: Occasionally     Drug use: No     Sexual activity: Not Currently     Partners: Male     Birth control/protection: Post-menopausal   Other Topics Concern     Parent/sibling w/ CABG, MI or angioplasty before 65F 55M? No   Social History Narrative     Not on file     Social Determinants of Health     Financial Resource Strain: Not on file   Food Insecurity: Not on file   Transportation Needs: Not on file   Physical Activity: Not on file   Stress: Not on file   Social Connections: Not on file   Intimate Partner Violence: Not on file   Housing Stability: Not on file       Outpatient Encounter Medications as of 9/15/2023   Medication Sig Dispense Refill     calcipotriene (DOVONOX) 0.005 % external cream Apply twice daily to areas of psoriasis on Saturday and Sunday. 60 g 6     clobetasol (TEMOVATE) 0.05 % external cream Apply twice daily to psoriasis on Monday through Friday. 60 g 5     triamcinolone (ARISTOCORT HP) 0.5 % external cream Apply topically daily as needed (To psoriasis rash on legs and torso) 30 g 3     ASPIRIN 81 MG OR TABS Take 81 mg by mouth daily        chlorthalidone (HYGROTON) 25 MG tablet Take 1 tablet (25 mg) by mouth daily 90 tablet 3     cyclobenzaprine (FLEXERIL) 10 MG tablet Take 1 tablet (10 mg) by mouth 3 times daily as needed for muscle spasms 30 tablet 0     fluticasone-salmeterol (ADVAIR-HFA) 45-21 MCG/ACT inhaler Inhale 2 puffs  into the lungs 2 times daily 12 g 3     losartan (COZAAR) 100 MG tablet Take 1 tablet (100 mg) by mouth daily 90 tablet 3     METAMUCIL FIBER PO        Multiple Vitamins-Calcium (ONE-A-DAY WOMENS FORMULA PO) Take 1 tablet by mouth daily       omeprazole (PRILOSEC) 20 MG DR capsule Take 1 capsule by mouth every morning.       pravastatin (PRAVACHOL) 40 MG tablet Take 1 tablet (40 mg) by mouth daily 90 tablet 3     No facility-administered encounter medications on file as of 9/15/2023.             O:   NAD, WDWN, Alert & Oriented, Mood & Affect wnl, Vitals stable   Here today alone   There were no vitals taken for this visit.   General appearance normal   Vitals stable   Alert, oriented and in no acute distress      Psoriasiform papules on legs   3 cm sized subcutaneous nodule with scarring and comedo on right mid back       Eyes: Conjunctivae/lids:Normal     ENT: Lips: normal    MSK:Normal    Cardiovascular: peripheral edema none    Pulm: Breathing Normal    Neuro/Psych: Orientation:Alert and Orientedx3 ; Mood/Affect:normal   A/P:  1. Psoriasis   Alternate dovonex and clobetasol.   Consider restarting light therapy.   Skin care regimen reviewed with patient: Eliminate harsh soaps, i.e. Dial, zest, irsih spring; Mild soaps such as Cetaphil or Dove sensitive skin, avoid hot or cold showers, aggressive use of emollients including vanicream, cetaphil or cerave discussed with patient.    2. Epidermal cyst on right mid back   No longer bothersome, she will monitor       Again, thank you for allowing me to participate in the care of your patient.        Sincerely,        Radha Dugan PA-C

## 2023-09-19 ENCOUNTER — ANCILLARY PROCEDURE (OUTPATIENT)
Dept: GENERAL RADIOLOGY | Facility: CLINIC | Age: 68
End: 2023-09-19
Attending: PODIATRIST
Payer: COMMERCIAL

## 2023-09-19 ENCOUNTER — OFFICE VISIT (OUTPATIENT)
Dept: PODIATRY | Facility: CLINIC | Age: 68
End: 2023-09-19
Attending: STUDENT IN AN ORGANIZED HEALTH CARE EDUCATION/TRAINING PROGRAM
Payer: COMMERCIAL

## 2023-09-19 VITALS — SYSTOLIC BLOOD PRESSURE: 136 MMHG | DIASTOLIC BLOOD PRESSURE: 86 MMHG

## 2023-09-19 DIAGNOSIS — M72.2 PLANTAR FASCIITIS: ICD-10-CM

## 2023-09-19 DIAGNOSIS — M79.661 RIGHT CALF PAIN: ICD-10-CM

## 2023-09-19 PROBLEM — J12.82 PNEUMONIA DUE TO COVID-19 VIRUS: Status: ACTIVE | Noted: 2020-10-13

## 2023-09-19 PROBLEM — U07.1 PNEUMONIA DUE TO COVID-19 VIRUS: Status: ACTIVE | Noted: 2020-10-13

## 2023-09-19 LAB — RADIOLOGIST FLAGS: ABNORMAL

## 2023-09-19 PROCEDURE — 99203 OFFICE O/P NEW LOW 30 MIN: CPT | Performed by: PODIATRIST

## 2023-09-19 PROCEDURE — 73610 X-RAY EXAM OF ANKLE: CPT | Mod: TC | Performed by: RADIOLOGY

## 2023-09-19 NOTE — LETTER
9/19/2023         RE: Mirta Cardenas  [address] [facility] [id number]        Dear Colleague,    Thank you for referring your patient, Mirta Cardenas, to the [facility] [place]. Please see a copy of my visit note below.    Subjective:    Patient is seen today in consult from Dr. Fraga for right calf pain.  Has had this since December 2022.  Aggravated by activity and relieved by rest.  Points to lateral calf as to where it hurts the most.  She denies any history of trauma.  Denies erythema or ecchymosis.  Has had some edema here.  Also has pain in both heels.  No weakness increased deformity or ecchymosis.  She recently retired.  History of chronic lymphocytic leukemia.  Chronic kidney disease stage III.    ROS:  see above       Allergies   Allergen Reactions     Allopurinol Itching     Amoxicillin Hives     Morphine And Related Itching     Periactin Other (See Comments)     Sleepy.     Tenormin [Atenolol] Fatigue       Current Outpatient Medications   Medication Sig Dispense Refill     ASPIRIN 81 MG OR TABS Take 81 mg by mouth daily        calcipotriene (DOVONOX) 0.005 % external cream Apply twice daily to areas of psoriasis on Saturday and Sunday. 60 g 6     chlorthalidone (HYGROTON) 25 MG tablet Take 1 tablet (25 mg) by mouth daily 90 tablet 3     clobetasol (TEMOVATE) 0.05 % external cream Apply twice daily to psoriasis on Monday through Friday. 60 g 5     cyclobenzaprine (FLEXERIL) 10 MG tablet Take 1 tablet (10 mg) by mouth 3 times daily as needed for muscle spasms 30 tablet 0     fluticasone-salmeterol (ADVAIR-HFA) 45-21 MCG/ACT inhaler Inhale 2 puffs into the lungs 2 times daily 12 g 3     losartan (COZAAR) 100 MG tablet Take 1 tablet (100 mg) by mouth daily 90 tablet 3     METAMUCIL FIBER PO        Multiple Vitamins-Calcium (ONE-A-DAY WOMENS FORMULA PO) Take 1 tablet by mouth daily       omeprazole (PRILOSEC) 20 MG DR capsule Take 1 capsule by mouth every morning.        pravastatin (PRAVACHOL) 40 MG tablet Take 1 tablet (40 mg) by mouth daily 90 tablet 3     triamcinolone (ARISTOCORT HP) 0.5 % external cream Apply topically daily as needed (To psoriasis rash on legs and torso) 30 g 3       Patient Active Problem List   Diagnosis     Calculus of kidney     Benign essential hypertension     Esophageal reflux     Chronic lymphoid leukemia in remission (H)     Mild intermittent asthma     Hypercalcuria     Other hyperparathyroidism (H)     Female stress incontinence     Thyroid nodule     Anemia     Fatty liver     High level of uric acid in blood     Hyperlipidemia LDL goal <100     CKD (chronic kidney disease) stage 3, GFR 30-59 ml/min (H)     Advanced directives, counseling/discussion     Sebaceous cyst     Impaired fasting glucose     Syncope and collapse     Pneumonia due to COVID-19 virus       Past Medical History:   Diagnosis Date     Asthma      Asthma     reactive airway disease, per patient     Blood transfusion      Cancer (H)     chronic lymphocytic leukemia     History of transfusion      Hypertension      Hypertension      Leukemia, chronic lymphocytic (H)      Malignant neoplasm (H)     CLL       Past Surgical History:   Procedure Laterality Date     ABDOMEN SURGERY      c section *3     APPENDECTOMY       C/SECTION, CLASSICAL  ,,    , Classical     COLONOSCOPY       HYSTERECTOMY       HYSTERECTOMY, PAP NO LONGER INDICATED  1998     RELEASE CARPAL TUNNEL  2012    Procedure:RELEASE CARPAL TUNNEL; Right Carpal Tunnel Release & Right Ring A1 Pulley Release; Surgeon:SERGEY HEATON; Location:[facility]     RELEASE TRIGGER FINGER  2012    Procedure:RELEASE TRIGGER FINGER; Surgeon:SERGEY HEATON; Location:[facility]     RELEASE TRIGGER FINGER  10/12/2012    Procedure: RELEASE TRIGGER FINGER;  Right Thumb A1 Pulley Release;  Surgeon: Sergey Heaton MD;  Location: [facility] OR     SALPINGO OOPHORECTOMY,R/L/ULICES  2008    right        Family History   Problem Relation Age of Onset     Hypertension Brother      Heart Disease Brother 65        bypass     Cancer Sister         multiple myloma     Obesity Son      Obesity Son      Hypertension Son      Osteoporosis Mother      Melanoma No family hx of        Social History     Tobacco Use     Smoking status: Never     Smokeless tobacco: Never   Substance Use Topics     Alcohol use: Yes     Comment: Occasionally         Objective:    Vitals: /86   BMI: There is no height or weight on file to calculate BMI.  Height: Data Unavailable    Constitutional/ general:  Pt is in no apparent distress, appears well-nourished.  Cooperative with history and physical exam.     Psych:  The patient answered questions appropriately.  Normal affect.  Seems to have reasonable expectations, in terms of treatment.     Lungs:  Non labored breathing, non labored speech. No cough.  No audible wheezing. Even, quiet breathing.       Vascular:  Pedal pulses are palpable bilaterally for both the DP and PT arteries.  CFT < 3 sec.  No edema.  Pedal hair growth noted.     Neuro:  Alert and oriented x 3. Coordinated gait.  Light touch sensation is intact     Derm: Normal texture and turgor.  No erythema, ecchymosis, or cyanosis.  No open lesions.     Musculoskeletal:    Lower extremity muscle strength is normal.  Patient is ambulatory without an assistive device or brace.  No gross deformities.   Normal arch with weightbearing.   ROM is within normal limits.  Negative tinel's sign.  No side to side compression pain of the calcaneus.  Pain upon palpation to the bilateral plantarmedial  of the calcaneus.  No erythema, edema, ecchymosis, or subcutaneous masses noted.  No pain on palpation or stressing any tendons.  Negative Tinel's sign.  Pain noted proximal fibula lateral and area of calf.  Right calf appears slightly larger than left.  No erythema or ecchymosis.    Radiographic Exam:  X-Ray Findings:  I personally reviewed  the films.   Narrative & Impression   ANKLE RIGHT THREE OR MORE VIEWS  DATE/TIME: 9/19/2023 2:09 PM      INDICATION: Right calf pain.   COMPARISON: None.                                                                      IMPRESSION:  1.  Lytic lesion in the right fibular proximal diaphysis, with  complete resorption of the medial cortex and some periosteal reaction  adjacent to the lesion. This is indeterminate, with differential  considerations including metastasis, myeloma, and lymphoma.  Correlation with the patient's past clinical history and MRI of the  right tibia/fibula without and with contrast recommended in further  evaluation.  2.  No evidence of pathologic fracture.  3.  Normal ankle joint spacing and alignment.         Assessment:    Bone lesion right fibula   Plantar Fasciitis right and left foot     Plan:  X-rays taken today right ankle including proximal fibula.  Pointed out bone lesion on x-ray.  Discussed this is concerning.  We will have patient see Dr. Chakraborty at [facility] within the next week.  We will keep this area protected.  She has a cam walker from use on contralateral foot.  She will use this to protect bone.      Discussed etiology and treatment options with the patient regarding Planter fasciitis.  Icing stretching good shoes at all times.  Thank you for allowing me participate in the care of this patient.        Cheng Vincent DPM, FACFAS        Again, thank you for allowing me to participate in the care of your patient.        Sincerely,        Cheng Vincent DPM

## 2023-09-20 ENCOUNTER — TELEPHONE (OUTPATIENT)
Dept: ORTHOPEDICS | Facility: CLINIC | Age: 68
End: 2023-09-20
Payer: COMMERCIAL

## 2023-09-20 DIAGNOSIS — M89.9 BONE LESION: Primary | ICD-10-CM

## 2023-09-20 NOTE — TELEPHONE ENCOUNTER
[facility] Call Center    Phone Message    May a detailed message be left on voicemail: yes     Reason for Call: Other: No availability within 3 days with Dr. Chakraborty or Dr. Barroso per referral from Dr. Vincent for bone cyst.     Action Taken: Message routed to:  [facility]): [phone]    Travel Screening: Not Applicable

## 2023-09-20 NOTE — PROGRESS NOTES
Subjective:    Patient is seen today in consult from Dr. Fraga for right calf pain.  Has had this since December 2022.  Aggravated by activity and relieved by rest.  Points to lateral calf as to where it hurts the most.  She denies any history of trauma.  Denies erythema or ecchymosis.  Has had some edema here.  Also has pain in both heels.  No weakness increased deformity or ecchymosis.  She recently retired.  History of chronic lymphocytic leukemia.  Chronic kidney disease stage III.    ROS:  see above       Allergies   Allergen Reactions    Allopurinol Itching    Amoxicillin Hives    Morphine And Related Itching    Periactin Other (See Comments)     Sleepy.    Tenormin [Atenolol] Fatigue       Current Outpatient Medications   Medication Sig Dispense Refill    ASPIRIN 81 MG OR TABS Take 81 mg by mouth daily       calcipotriene (DOVONOX) 0.005 % external cream Apply twice daily to areas of psoriasis on Saturday and Sunday. 60 g 6    chlorthalidone (HYGROTON) 25 MG tablet Take 1 tablet (25 mg) by mouth daily 90 tablet 3    clobetasol (TEMOVATE) 0.05 % external cream Apply twice daily to psoriasis on Monday through Friday. 60 g 5    cyclobenzaprine (FLEXERIL) 10 MG tablet Take 1 tablet (10 mg) by mouth 3 times daily as needed for muscle spasms 30 tablet 0    fluticasone-salmeterol (ADVAIR-HFA) 45-21 MCG/ACT inhaler Inhale 2 puffs into the lungs 2 times daily 12 g 3    losartan (COZAAR) 100 MG tablet Take 1 tablet (100 mg) by mouth daily 90 tablet 3    METAMUCIL FIBER PO       Multiple Vitamins-Calcium (ONE-A-DAY WOMENS FORMULA PO) Take 1 tablet by mouth daily      omeprazole (PRILOSEC) 20 MG DR capsule Take 1 capsule by mouth every morning.      pravastatin (PRAVACHOL) 40 MG tablet Take 1 tablet (40 mg) by mouth daily 90 tablet 3    triamcinolone (ARISTOCORT HP) 0.5 % external cream Apply topically daily as needed (To psoriasis rash on legs and torso) 30 g 3       Patient Active Problem List   Diagnosis    Calculus  of kidney    Benign essential hypertension    Esophageal reflux    Chronic lymphoid leukemia in remission (H)    Mild intermittent asthma    Hypercalcuria    Other hyperparathyroidism (H)    Female stress incontinence    Thyroid nodule    Anemia    Fatty liver    High level of uric acid in blood    Hyperlipidemia LDL goal <100    CKD (chronic kidney disease) stage 3, GFR 30-59 ml/min (H)    Advanced directives, counseling/discussion    Sebaceous cyst    Impaired fasting glucose    Syncope and collapse    Pneumonia due to COVID-19 virus       Past Medical History:   Diagnosis Date    Asthma     Asthma     reactive airway disease, per patient    Blood transfusion     Cancer (H)     chronic lymphocytic leukemia    History of transfusion     Hypertension     Hypertension     Leukemia, chronic lymphocytic (H)     Malignant neoplasm (H)     CLL       Past Surgical History:   Procedure Laterality Date    ABDOMEN SURGERY      c section *3    APPENDECTOMY      C/SECTION, CLASSICAL  ,,    , Classical    COLONOSCOPY      HYSTERECTOMY      HYSTERECTOMY, PAP NO LONGER INDICATED  1998    RELEASE CARPAL TUNNEL  2012    Procedure:RELEASE CARPAL TUNNEL; Right Carpal Tunnel Release & Right Ring A1 Pulley Release; Surgeon:SERGEY HEATON; Location:[facility]    RELEASE TRIGGER FINGER  2012    Procedure:RELEASE TRIGGER FINGER; Surgeon:SERGEY HEATON; Location:[facility]    RELEASE TRIGGER FINGER  10/12/2012    Procedure: RELEASE TRIGGER FINGER;  Right Thumb A1 Pulley Release;  Surgeon: Sergey Heaton MD;  Location: [facility] [facility]    SALPINGO OOPHORECTOMY,R/L/ULICES  2008    right       Family History   Problem Relation Age of Onset    Hypertension Brother     Heart Disease Brother 65        bypass    Cancer Sister         multiple myloma    Obesity Son     Obesity Son     Hypertension Son     Osteoporosis Mother     Melanoma No family hx of        Social History     Tobacco Use    Smoking status:  Never    Smokeless tobacco: Never   Substance Use Topics    Alcohol use: Yes     Comment: Occasionally         Objective:    Vitals: /86   BMI: There is no height or weight on file to calculate BMI.  Height: Data Unavailable    Constitutional/ general:  Pt is in no apparent distress, appears well-nourished.  Cooperative with history and physical exam.     Psych:  The patient answered questions appropriately.  Normal affect.  Seems to have reasonable expectations, in terms of treatment.     Lungs:  Non labored breathing, non labored speech. No cough.  No audible wheezing. Even, quiet breathing.       Vascular:  Pedal pulses are palpable bilaterally for both the DP and PT arteries.  CFT < 3 sec.  No edema.  Pedal hair growth noted.     Neuro:  Alert and oriented x 3. Coordinated gait.  Light touch sensation is intact     Derm: Normal texture and turgor.  No erythema, ecchymosis, or cyanosis.  No open lesions.     Musculoskeletal:    Lower extremity muscle strength is normal.  Patient is ambulatory without an assistive device or brace.  No gross deformities.   Normal arch with weightbearing.   ROM is within normal limits.  Negative tinel's sign.  No side to side compression pain of the calcaneus.  Pain upon palpation to the bilateral plantarmedial  of the calcaneus.  No erythema, edema, ecchymosis, or subcutaneous masses noted.  No pain on palpation or stressing any tendons.  Negative Tinel's sign.  Pain noted proximal fibula lateral and area of calf.  Right calf appears slightly larger than left.  No erythema or ecchymosis.    Radiographic Exam:  X-Ray Findings:  I personally reviewed the films.   Narrative & Impression   ANKLE RIGHT THREE OR MORE VIEWS  DATE/TIME: 9/19/2023 2:09 PM      INDICATION: Right calf pain.   COMPARISON: None.                                                                      IMPRESSION:  1.  Lytic lesion in the right fibular proximal diaphysis, with  complete resorption of the medial  cortex and some periosteal reaction  adjacent to the lesion. This is indeterminate, with differential  considerations including metastasis, myeloma, and lymphoma.  Correlation with the patient's past clinical history and MRI of the  right tibia/fibula without and with contrast recommended in further  evaluation.  2.  No evidence of pathologic fracture.  3.  Normal ankle joint spacing and alignment.         Assessment:    Bone lesion right fibula   Plantar Fasciitis right and left foot     Plan:  X-rays taken today right ankle including proximal fibula.  Pointed out bone lesion on x-ray.  Discussed this is concerning.  We will have patient see Dr. Chakraborty at [facility] within the next week.  We will keep this area protected.  She has a cam walker from use on contralateral foot.  She will use this to protect bone.      Discussed etiology and treatment options with the patient regarding Planter fasciitis.  Icing stretching good shoes at all times.  Thank you for allowing me participate in the care of this patient.        Cheng Vincent DPM, FACFAS

## 2023-09-20 NOTE — TELEPHONE ENCOUNTER
DIAGNOSIS: RT Ankle - Bone Cyst   APPOINTMENT DATE: 09/26/2023   NOTES STATUS DETAILS   OFFICE NOTE from referring provider Internal 9/19/23 - Cheng Vincent DPM- [facility]   OFFICE NOTE from other specialist Internal 8/17/23 -  Joseline Fraga MD - [facility]   MEDICATION LIST Internal    XRAYS (IMAGES & REPORTS) Internal

## 2023-09-23 ASSESSMENT — ENCOUNTER SYMPTOMS
PALPITATIONS: 0
SLEEP DISTURBANCES DUE TO BREATHING: 0
MUSCLE WEAKNESS: 0
SKIN CHANGES: 0
NAIL CHANGES: 0
JOINT SWELLING: 0
BACK PAIN: 1
ARTHRALGIAS: 1
LEG PAIN: 1
MYALGIAS: 1
LIGHT-HEADEDNESS: 0
ORTHOPNEA: 0
HYPOTENSION: 0
SYNCOPE: 0
POOR WOUND HEALING: 0
MUSCLE CRAMPS: 1
HYPERTENSION: 1
STIFFNESS: 1
EXERCISE INTOLERANCE: 1
NECK PAIN: 0

## 2023-09-26 ENCOUNTER — VIRTUAL VISIT (OUTPATIENT)
Dept: ORTHOPEDICS | Facility: CLINIC | Age: 68
End: 2023-09-26
Payer: COMMERCIAL

## 2023-09-26 ENCOUNTER — PRE VISIT (OUTPATIENT)
Dept: ORTHOPEDICS | Facility: CLINIC | Age: 68
End: 2023-09-26

## 2023-09-26 DIAGNOSIS — M79.661 RIGHT CALF PAIN: ICD-10-CM

## 2023-09-26 DIAGNOSIS — M89.9 BONE LESION: Primary | ICD-10-CM

## 2023-09-26 PROCEDURE — 99203 OFFICE O/P NEW LOW 30 MIN: CPT | Mod: VID | Performed by: ORTHOPAEDIC SURGERY

## 2023-09-26 RX ORDER — TRAMADOL HYDROCHLORIDE 50 MG/1
50 TABLET ORAL EVERY 6 HOURS PRN
Qty: 10 TABLET | Refills: 0 | Status: SHIPPED | OUTPATIENT
Start: 2023-09-26 | End: 2023-09-29

## 2023-09-26 ASSESSMENT — PAIN SCALES - GENERAL: PAINLEVEL: SEVERE PAIN (6)

## 2023-09-26 NOTE — PROGRESS NOTES
Virtual Visit Details    Type of service:  Video Visit     Impression: Right calf tumor, Hx of CLL    Plan: 1. Check MRI right leg schedule d for thursday.   2. Will call after MRI.    Patient is a 68-year-old female with intermittent right calf pain but then continuous calf pain beginning in March 2023.  She had a recent x-ray which shows a destructive lesion in the midportion of the right fibula tumor in the soft tissues adjacent to the    Her past medical history is complex and outlined in her chart.  From an oncology perspective she does have a history of CLL.    She is currently taking both Advil and Tylenol for the pain.    I reviewed the x-rays suspect she has a soft tissue tumor in her mid calf.  She has an MRI scan scheduled for this Thursday.  We will call her after that study has been completed.    This was a [facility] video visit that began at 1:21 PM and ended at 1:38 PM.  Total length of the visit was 17 minutes.    Answers submitted by the patient for this visit:  Symptoms you have experienced in the last 30 days (Submitted on 9/23/2023)  General Symptoms: No  Skin Symptoms: Yes  HENT Symptoms: No  EYE SYMPTOMS: No  HEART SYMPTOMS: Yes  LUNG SYMPTOMS: No  INTESTINAL SYMPTOMS: No  URINARY SYMPTOMS: No  GYNECOLOGIC SYMPTOMS: No  BREAST SYMPTOMS: No  SKELETAL SYMPTOMS: Yes  BLOOD SYMPTOMS: No  NERVOUS SYSTEM SYMPTOMS: No  MENTAL HEALTH SYMPTOMS: No   (Submitted on 9/23/2023)  Changes in hair: No  Changes in moles/birth marks: No  Itching: Yes  Rashes: Yes  Changes in nails: No  Acne: No  Hair in places you don't want it: No  Change in facial hair: No  Warts: No  Non-healing sores: No  Scarring: No  Flaking of skin: No  Color changes of hands/feet in cold : No  Sun sensitivity: No  Skin thickening: No  Please answer the questions below to tell us what condition you are experiencing: (Submitted on 9/23/2023)  Chest pain or pressure: No  Fast or irregular heartbeat: No  Pain in legs with walking: Yes  Trouble  breathing while lying down: No  Fingers or toes appear blue: No  High blood pressure: Yes  Low blood pressure: No  Fainting: No  Murmurs: No  Pacemaker: No  Varicose veins: No  Edema or swelling: Yes  Wake up at night with shortness of breath: No  Light-headedness: No  Exercise intolerance: Yes  Please answer the questions below to tell us what condition you are experiencing: (Submitted on 9/23/2023)  Back pain: Yes  Muscle aches: Yes  Neck pain: No  Swollen joints: No  Joint pain: Yes  Bone pain: No  Muscle cramps: Yes  Muscle weakness: No  Joint stiffness: Yes  Bone fracture: No

## 2023-09-26 NOTE — LETTER
9/26/2023         RE: Mirta Cardenas  [address] [facility] [address]        Dear Colleague,    Thank you for referring your patient, Mirta Cardenas, to the [facility] [place]. Please see a copy of my visit note below.    Virtual Visit Details    Type of service:  Video Visit     Impression: Right calf tumor, Hx of CLL    Plan: 1. Check MRI right leg schedule d for thursday.   2. Will call after MRI.    Patient is a 68-year-old female with intermittent right calf pain but then continuous calf pain beginning in March 2023.  She had a recent x-ray which shows a destructive lesion in the midportion of the right fibula tumor in the soft tissues adjacent to the    Her past medical history is complex and outlined in her chart.  From an oncology perspective she does have a history of CLL.    She is currently taking both Advil and Tylenol for the pain.    I reviewed the x-rays suspect she has a soft tissue tumor in her mid calf.  She has an MRI scan scheduled for this Thursday.  We will call her after that study has been completed.    This was a [facility] video visit that began at 1:21 PM and ended at 1:38 PM.  Total length of the visit was 17 minutes.    Answers submitted by the patient for this visit:  Symptoms you have experienced in the last 30 days (Submitted on 9/23/2023)  General Symptoms: No  Skin Symptoms: Yes  HENT Symptoms: No  EYE SYMPTOMS: No  HEART SYMPTOMS: Yes  LUNG SYMPTOMS: No  INTESTINAL SYMPTOMS: No  URINARY SYMPTOMS: No  GYNECOLOGIC SYMPTOMS: No  BREAST SYMPTOMS: No  SKELETAL SYMPTOMS: Yes  BLOOD SYMPTOMS: No  NERVOUS SYSTEM SYMPTOMS: No  MENTAL HEALTH SYMPTOMS: No   (Submitted on 9/23/2023)  Changes in hair: No  Changes in moles/birth marks: No  Itching: Yes  Rashes: Yes  Changes in nails: No  Acne: No  Hair in places you don't want it: No  Change in facial hair: No  Warts: No  Non-healing sores: No  Scarring: No  Flaking of skin: No  Color changes of  hands/feet in cold : No  Sun sensitivity: No  Skin thickening: No  Please answer the questions below to tell us what condition you are experiencing: (Submitted on 9/23/2023)  Chest pain or pressure: No  Fast or irregular heartbeat: No  Pain in legs with walking: Yes  Trouble breathing while lying down: No  Fingers or toes appear blue: No  High blood pressure: Yes  Low blood pressure: No  Fainting: No  Murmurs: No  Pacemaker: No  Varicose veins: No  Edema or swelling: Yes  Wake up at night with shortness of breath: No  Light-headedness: No  Exercise intolerance: Yes  Please answer the questions below to tell us what condition you are experiencing: (Submitted on 9/23/2023)  Back pain: Yes  Muscle aches: Yes  Neck pain: No  Swollen joints: No  Joint pain: Yes  Bone pain: No  Muscle cramps: Yes  Muscle weakness: No  Joint stiffness: Yes  Bone fracture: No      Sincerely,        Benny Barroso MD

## 2023-09-26 NOTE — PATIENT INSTRUCTIONS
Impression: Right calf tumor, Hx of CLL    Plan: 1. Check MRI right leg schedule d for thursday.   2. Will call after MRI.

## 2023-09-26 NOTE — NURSING NOTE
Patient denies any changes since echeck-in regarding medication and allergies and states all information entered during echeck-in remains accurate.    Is the patient currently in the state of [place]? YES    Visit mode:VIDEO    If the visit is dropped, the patient can be reconnected by: VIDEO VISIT: Send to e-mail at: [email or web address]    Will anyone else be joining the visit? NO  (If patient encounters technical issues they should call [phone]    How would you like to obtain your AVS? MyChart    Are changes needed to the allergy or medication list? No, Pt stated no changes to allergies, and Pt stated no med changes    Reason for visit: Consult (Consult to discuss right fibula lesion per appt notes, no initial questions/concerns. Medications/allergies reviewed by pt via [facility], no changes per pt. No pt reported vitals today per pt. )    Ileana Snider, Visit Facilitator/MA.

## 2023-09-26 NOTE — ED PROVIDER NOTES
History     Chief Complaint   Patient presents with     Shortness of Breath     10 days covid sx. dx with COVID 7 days. pt reports gradual worsening cough, sob.      Loss of Consciousness     fell in bathroom at 0800. hit head, chipped tooth. no blood thinner.      HPI  Mirta Cardenas is a 65 year old female who presents for evaluation with shortness of breath and a syncopal episode.  Patient arrived by car from home stating that she was diagnosed with COVID-19 a week prior after she tested positive at [facility] in [facility].  She reports over the last few days she has increasing cough and shortness of breath and this morning she was taking a shower in the bathtub and subsequently woke up on the floor.  Her  was home but did not see her actually faint but heard her fall in the bathtub.  Patient lives in [place] with her  who is asymptomatic.  Patient reports she has increasing work of breathing.  She was noted to be hypoxic on arrival in the department-88% on room air. Patient has multiple medical diagnoses including history of hypercalciuria, mild intermittent asthma, chronic lymphoid leukemia in remission, esophageal reflux, stage III chronic kidney disease, morbid obesity.  Patient is currently on Diflucan, Advair, Zestoretic, pravastatin, Hytrin, Kenalog, 81 mg aspirin and omeprazole 20 mg every morning.    Allergies:  Allergies   Allergen Reactions     Allopurinol Itching     Amoxicillin      hives     Codeine Itching     Periactin      Sleepy.     Tenormin [Atenolol] Fatigue       Problem List:    Patient Active Problem List    Diagnosis Date Noted     Health Care Home 02/15/2012     Priority: High     EMERGENCY CARE PLAN  Presenting Problem Signs and Symptoms Treatment Plan    Questions or conerns during clinic hours    I will call the clinic directly     Questions or conerns outside clinic hours    I will call the 24 hour nurse line through the [facility]    Patient  needs to schedule an appointment    I will call the 24 hour scheduling team at [phone] or clinic directly    Same day treatment     I will call the clinic first, nurse line if after hours, urgent care and express care if needed                            DX V65.8 REPLACED WITH [id number] HEALTH CARE HOME (04/08/2013)       COVID-19 virus infection 10/13/2020     Priority: Medium     Impaired fasting glucose 08/28/2020     Priority: Medium     Morbid obesity (H) 08/09/2018     Priority: Medium     Sebaceous cyst 12/03/2013     Priority: Medium     History of colonoscopy 08/08/2012     Priority: Medium     Benign normal tissue in colon - repeat colonoscopy 7-10 years       Advanced directives, counseling/discussion 05/31/2012     Priority: Medium     Discussed advance care planning with patient; information given to patient to review. 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  Had a severe diffuse rash after 3weeks on it.       Fatty liver 03/31/2009     Priority: Medium     Acute renal failure (H) 01/12/2009     Priority: Medium     1/12/2009, had an episode of renal failure with acute rise in BUN to 65, and Cr to 1.9 after a diarrheal illness.  Resolved after about a week.  Was also taking a lot of ibuprofen.   Would recheck kidney function q3 mo x 2 then q6 months.  Has some right renal scaring on US longstanding and likely has some decreased reserve.        Other specified aftercare following surgery 05/12/2008     Priority: Medium     Anemia 05/12/2008     Priority: Medium     Problem list name updated by automated process. Provider to review       Thyroid nodule 04/28/2008     Priority: Medium     0.8cm nodule  seen on US at [facility] while seaching for a parathyroid tumor.  No biopsy done as of 2008.   Was to have a          Female stress incontinence 2006     Priority: Medium     Other hyperparathyroidism (H) 10/13/2005     Priority: Medium     Follows with Dr Borrero at [facility].  Primary normocalcemia primary hyperparpthyroidism.  To be seen yearly.  No adenomas identified   Need to check PTH yearly.         Hypercalcuria 2005     Priority: Medium     Hyperca       Mild intermittent asthma 2005     Priority: Medium     Use advair with any acute exacerbations. Needs this about 2 times/year        Calculus of kidney 2005     Priority: Medium     Kidney stones, sees Dr Paiz q 6 mo, has CT [id number]  Takes calcium citrate to decrease the stones.         Benign essential hypertension 2005     Priority: Medium     Esophageal reflux 2005     Priority: Medium     GERD       Chronic lymphoid leukemia in remission (H) 2005     Priority: Medium     No treatment advised at this time. Following with Dr Wilder at [facility], to have q6mo CBC,  No treatment until one of the following:  Marked change in WBC ( to 30-40k), anemia or decreased Pltl, recurrent infections, or increased liver, spleen or lymph nodes           Past Medical History:    Past Medical History:   Diagnosis Date     Asthma      Blood transfusion      Hypertension      Malignant neoplasm (H)        Past Surgical History:    Past Surgical History:   Procedure Laterality Date     C/SECTION, CLASSICAL  ,,    , Classical     HYSTERECTOMY, PAP NO LONGER INDICATED       RELEASE CARPAL TUNNEL  2012    Procedure:RELEASE CARPAL TUNNEL; Right Carpal Tunnel Release & Right Ring A1 Pulley Release; Surgeon:SERGEY REGALADO; Location:[facility] OR     RELEASE TRIGGER FINGER  2012    Procedure:RELEASE TRIGGER FINGER; Surgeon:SERGEY REGALADO; Location:[facility] OR     RELEASE TRIGGER FINGER  10/12/2012  "   Procedure: RELEASE TRIGGER FINGER;  Right Thumb A1 Pulley Release;  Surgeon: Juanito Heaton MD;  Location: [facility] OR     SALPINGO OOPHORECTOMY,R/L/ULICES  5-2-2008    right       Family History:    Family History   Problem Relation Age of Onset     Hypertension Brother      Heart Disease Brother 65        bypass     Cancer Sister         multiple myloma     Obesity Son      Obesity Son      Hypertension Son      Melanoma No family hx of        Social History:  Marital Status:   [2]  Social History     Tobacco Use     Smoking status: Never Smoker     Smokeless tobacco: Never Used   Substance Use Topics     Alcohol use: Yes     Comment: THREE- FOUR TIMES PER YEAR, SOCIALLY     Drug use: No        Medications:         ASPIRIN 81 MG OR TABS       calcipotriene (DOVONOX) 0.005 % external ointment       fluconazole (DIFLUCAN) 150 MG tablet       fluticasone-salmeterol (ADVAIR-HFA) 45-21 MCG/ACT inhaler       lisinopril-hydrochlorothiazide (ZESTORETIC) 20-25 MG tablet       omeprazole (PRILOSEC) 20 MG capsule       pravastatin (PRAVACHOL) 40 MG tablet       terazosin (HYTRIN) 5 MG capsule       triamcinolone (KENALOG) 0.025 % cream       triamcinolone (KENALOG) 0.1 % external ointment          Review of Systems   Constitutional: Positive for fever.   HENT: Negative.    Eyes: Negative.    Respiratory: Positive for cough and shortness of breath.    Cardiovascular: Negative.    Gastrointestinal: Negative.    Endocrine: Negative.    Genitourinary: Negative.    Musculoskeletal: Negative.    Skin: Negative.    Allergic/Immunologic: Negative.    Neurological: Positive for syncope.   Hematological: Negative.    Psychiatric/Behavioral: Negative.    All other systems reviewed and are negative.      Physical Exam   BP: 128/70  Pulse: 109  Temp: 100.6  F (38.1  C)  Resp: 22  Height: 154.9 cm (5' 1\")  Weight: 83.9 kg (185 lb)  SpO2: (!) 88 %      Physical Exam  Constitutional:       Appearance: She is ill-appearing.   HENT:     "  Head: Normocephalic and atraumatic.   Eyes:      Extraocular Movements: Extraocular movements intact.      Pupils: Pupils are equal, round, and reactive to light.   Neck:      Musculoskeletal: Normal range of motion and neck supple.   Cardiovascular:      Rate and Rhythm: Regular rhythm. Tachycardia present.   Pulmonary:      Effort: Tachypnea present.      Breath sounds: Examination of the left-middle field reveals rhonchi. Examination of the left-lower field reveals rhonchi. Rhonchi present.   Chest:      Chest wall: No mass, deformity, tenderness, crepitus or edema. There is no dullness to percussion.   Abdominal:      Palpations: There is no hepatomegaly, splenomegaly or mass.      Tenderness: There is no abdominal tenderness. There is no guarding or rebound.   Musculoskeletal:      Right lower leg: She exhibits no tenderness. No edema.      Left lower leg: She exhibits no tenderness. No edema.   Skin:     Capillary Refill: Capillary refill takes less than 2 seconds.      Coloration: Skin is not cyanotic or pale.      Findings: No ecchymosis, erythema or rash.      Nails: There is no clubbing.     Neurological:      General: No focal deficit present.      Mental Status: She is alert and oriented to person, place, and time.   Psychiatric:         Mood and Affect: Mood normal.         Behavior: Behavior normal.         ED Course        Procedures               EKG Interpretation:      Interpreted by Rod Lin MD  Time reviewed: 22:50  Symptoms at time of EKG: Cough, shortness of breath   Rhythm: sinus tachycardia  Rate: 100-110  Axis: Normal  Ectopy: none  Conduction: normal  ST Segments/ T Waves: Non-specific ST-T wave changes  Q Waves: nonspecific  Comparison to prior: When compared with EKG dated 05/31/2012-sinus tachycardia is prominent    Clinical Impression: sinus tachycardia      Critical Care time:  was 60 minutes for this patient excluding procedures.               ED  medications:  Medications   acetaminophen (TYLENOL) tablet 650 mg (has no administration in time range)   0.9% sodium chloride BOLUS (500 mLs Intravenous New Bag 10/12/20 2250)   iopamidol (ISOVUE-370) solution 79 mL (79 mLs Intravenous Given 10/12/20 2322)   sodium chloride 0.9 % bag 500mL for CT scan flush use (100 mLs As instructed Given 10/12/20 2322)       ED Vitals:  Vitals:    10/12/20 2330 10/12/20 2345 10/13/20 0000 10/13/20 0015   BP: 114/60 117/72 114/69 117/66   Pulse:  99 96 93   Resp:       Temp:       TempSrc:       SpO2:  97% 97% 96%   Weight:       Height:         ED labs and imaging:  Results for orders placed or performed during the hospital encounter of 10/12/20   CT Chest Pulmonary Embolism w Contrast     Status: None    Narrative    EXAM: CT CHEST PULMONARY EMBOLISM W CONTRAST  LOCATION: [facility]  DATE/TIME: 10/12/2020 11:21 PM    INDICATION: Cough and shortness of breath. COVID positive.  COMPARISON: None.  TECHNIQUE: CT chest pulmonary angiogram during arterial phase injection of IV contrast. Multiplanar reformats and MIP reconstructions were performed. Dose reduction techniques were used.   CONTRAST: 80mL Isovue-370    FINDINGS:  ANGIOGRAM CHEST: Pulmonary arteries are normal caliber. No definitive pulmonary embolism. Central low density on a single image series 4 image 74 and the left lower lobe is favored to be at a point of vascular branching, not confirmed on other sequences.   Thoracic aorta is negative for dissection. No CT evidence of right heart strain.    LUNGS AND PLEURA: Multifocal, bilateral groundglass infiltrates which are predominantly peripheral and greater in the lower lobes. No pleural effusion.    MEDIASTINUM/AXILLAE: Coronary artery calcification. No significant pericardial effusion. Subcentimeter lymph nodes. Small hiatal hernia.    UPPER ABDOMEN: Splenomegaly. Spleen 16 cm.    MUSCULOSKELETAL: Degenerative change osseous structures.      Impression     IMPRESSION:  1. Multifocal, bilateral, peripheral groundglass infiltrates. Commonly reported imaging features of (COVID-19) pneumonia are present. Influenza pneumonia and organizing pneumonia as can be seen in the setting of drug toxicity and connective tissue   disease can cause a similar imaging pattern.  2. No definitive pulmonary embolism.  3. Splenomegaly.   CBC with platelets differential     Status: Abnormal   Result Value Ref Range    WBC 7.2 4.0 - 11.0 10e9/L    RBC Count 4.07 3.8 - 5.2 10e12/L    Hemoglobin 11.3 (L) 11.7 - 15.7 g/dL    Hematocrit 33.3 (L) 35.0 - 47.0 %    MCV 82 78 - 100 fl    MCH 27.8 26.5 - 33.0 pg    MCHC 33.9 31.5 - 36.5 g/dL    RDW 13.4 10.0 - 15.0 %    Platelet Count 172 150 - 450 10e9/L    Diff Method Manual Differential     % Neutrophils 51.0 %    % Lymphocytes 42.0 %    % Monocytes 7.0 %    % Eosinophils 0.0 %    % Basophils 0.0 %    Absolute Neutrophil 3.7 1.6 - 8.3 10e9/L    Absolute Lymphocytes 3.0 0.8 - 5.3 10e9/L    Absolute Monocytes 0.5 0.0 - 1.3 10e9/L    Absolute Eosinophils 0.0 0.0 - 0.7 10e9/L    Absolute Basophils 0.0 0.0 - 0.2 10e9/L    RBC Morphology Normal     Platelet Estimate       Automated count confirmed.  Platelet morphology is normal.   Comprehensive metabolic panel     Status: Abnormal   Result Value Ref Range    Sodium 137 133 - 144 mmol/L    Potassium 4.0 3.4 - 5.3 mmol/L    Chloride 106 94 - 109 mmol/L    Carbon Dioxide 20 20 - 32 mmol/L    Anion Gap 11 3 - 14 mmol/L    Glucose 118 (H) 70 - 99 mg/dL    Urea Nitrogen 55 (H) 7 - 30 mg/dL    Creatinine 1.54 (H) 0.52 - 1.04 mg/dL    GFR Estimate 35 (L) >60 mL/min/[1.73_m2]    GFR Estimate If Black 41 (L) >60 mL/min/[1.73_m2]    Calcium 9.0 8.5 - 10.1 mg/dL    Bilirubin Total 0.3 0.2 - 1.3 mg/dL    Albumin 3.6 3.4 - 5.0 g/dL    Protein Total 7.8 6.8 - 8.8 g/dL    Alkaline Phosphatase 124 40 - 150 U/L    ALT 40 0 - 50 U/L    AST 35 0 - 45 U/L   Troponin I     Status: None   Result Value Ref Range    Troponin I  ES <0.015 0.000 - 0.045 ug/L   Lactic acid whole blood     Status: None   Result Value Ref Range    Lactic Acid 0.8 0.7 - 2.0 mmol/L   Blood gas venous     Status: Abnormal   Result Value Ref Range    Ph Venous 7.31 (L) 7.32 - 7.43 pH    PCO2 Venous 39 (L) 40 - 50 mm Hg    PO2 Venous 18 (L) 25 - 47 mm Hg    Bicarbonate Venous 20 (L) 21 - 28 mmol/L    Base Deficit Venous 5.9 mmol/L    FIO2 2lpm    D dimer quantitative     Status: Abnormal   Result Value Ref Range    D Dimer 0.8 (H) 0.0 - 0.50 ug/ml FEU   Ferritin     Status: Abnormal   Result Value Ref Range    Ferritin 525 (H) 8 - 252 ng/mL   Magnesium     Status: None   Result Value Ref Range    Magnesium 1.9 1.6 - 2.3 mg/dL   CRP inflammation     Status: Abnormal   Result Value Ref Range    CRP Inflammation 57.0 (H) 0.0 - 8.0 mg/L           Assessments & Plan (with Medical Decision Making)   Assessment Summary and Clinical Impression: 65-year-old female who presented to the department after a fainting spell in the bathroom earlier in the day and report of worsening cough and shortness after she tested positive for COViD-19 a week prior. The cause of her fainting spell earlier in the day while in the bathtub is not clear may be neurocardiogenic in nature.  She is admitted with concern for COVID-19 pneumonia due to progressive shortness of breath with cough and CT chest findings and probable closed head injury. Patient arrived stating increasing cough and shortness of breath.  She fell in the bathtub early in the morning- heard her fall.  On arrival she was hypoxic and febrile.  88% on room air.  94 to 96% on 2 L nasal cannula.  Coughing episodes.  She appears dyspneic but able to speak in complete sentences.  She was tachycardic but normotensive.  Left forehead contusion , She repored she chipped her tooth.  She has rhonchi in the left lung base.    ED course and Plan:  Reviewed the medical record.  Reviewed nurse triage phone consult prior to presenting to  show the department for care.  With recent positive COVID-19 testing by report and syncope broad differential was considered including COVID-19 pneumonia, pulmonary embolism, and other potential causes for syncope including arrhythmia.  She was monitored on telemetry with frequent vital signs. EKG on arrival revealed sinus tachycardia, when compared with EKG from May 31, 2012 . CT chest was negative for pulmonary embolism. Multifocal bilateral peripheral ground infiltrates with splenomegaly. Uncertain if splenomegaly is related to history of leukemia noted to be in remission in medical record. See additiaonl details in medical record. Normal lactate, elevated CRP, ferritin and D.dimer- when age adjusted.    I spoke with Dr. ESEQUIEL Morales-admitting hospitalist at 12:40 AM.  We reviewed rationale for admission, presentation, and work-up in the department and working diagnosis.  Dr Morales agreed to assume care upon handoff for admission with plan to manage ongoing symptoms of cough and shortness of breath with suspicion of COVID-19 pneumonia based on CT chest imaging and history with recent positive test.  We discussed empiric antibiotics and agreed to hold on empiric antibiotics at this time. Patient is admitted to medicine telemetry due to syncope to exclude a paroxysmal arrhythmia.        ED to Inpatient Handoff:    Discussed with Dr ESEQUIEL Morales at 12.40am  Patient accepted for Inpatient Stay  Pending studies include Procalcitonin  Code Status: Not Addressed           Disclaimer: This note consists of symbols derived from keyboarding, dictation and/or voice recognition software. As a result, there may be errors in the script that have gone undetected. Please consider this when interpreting information found in this chart.  I have reviewed the nursing notes.    I have reviewed the findings, diagnosis, plan and need for follow up with the patient.       New Prescriptions    No medications on file       Final diagnoses:    Concussion with loss of consciousness, initial encounter   Pneumonia due to 2019 novel coronavirus   Fall, initial encounter - while in the tub after passing out   Abnormal CT scan - ground glass opacities with splenomegaly       10/12/2020   [facility] EMERGENCY DEPT     Rod Lin MD  10/13/20 [id number]

## 2023-09-28 ENCOUNTER — HOSPITAL ENCOUNTER (OUTPATIENT)
Dept: MRI IMAGING | Facility: CLINIC | Age: 68
Discharge: HOME | End: 2023-09-28
Attending: PHYSICIAN ASSISTANT | Admitting: PHYSICIAN ASSISTANT
Payer: COMMERCIAL

## 2023-09-28 DIAGNOSIS — M89.9 BONE LESION: ICD-10-CM

## 2023-09-28 PROCEDURE — 73720 MRI LWR EXTREMITY W/O&W/DYE: CPT | Mod: 26 | Performed by: RADIOLOGY

## 2023-09-28 PROCEDURE — 255N000002 HC RX 255 OP 636: Performed by: PHYSICIAN ASSISTANT

## 2023-09-28 PROCEDURE — A9585 GADOBUTROL INJECTION: HCPCS | Performed by: PHYSICIAN ASSISTANT

## 2023-09-28 PROCEDURE — 73720 MRI LWR EXTREMITY W/O&W/DYE: CPT | Mod: RT

## 2023-09-28 RX ORDER — GADOBUTROL 604.72 MG/ML
8 INJECTION INTRAVENOUS ONCE
Status: COMPLETED | OUTPATIENT
Start: 2023-09-28 | End: 2023-09-28

## 2023-09-28 RX ADMIN — GADOBUTROL 8 ML: 604.72 INJECTION INTRAVENOUS at 14:40

## 2023-09-30 DIAGNOSIS — C49.9 SARCOMA OF SOFT TISSUE (H): Primary | ICD-10-CM

## 2023-09-30 RX ORDER — OXYCODONE HYDROCHLORIDE 5 MG/1
5 TABLET ORAL EVERY 6 HOURS PRN
Qty: 12 TABLET | Refills: 0 | Status: SHIPPED | OUTPATIENT
Start: 2023-09-30 | End: 2023-10-03

## 2023-10-02 ENCOUNTER — TELEPHONE (OUTPATIENT)
Dept: ORTHOPEDICS | Facility: CLINIC | Age: 68
End: 2023-10-02
Payer: COMMERCIAL

## 2023-10-02 ENCOUNTER — OFFICE VISIT (OUTPATIENT)
Dept: ORTHOPEDICS | Facility: CLINIC | Age: 68
End: 2023-10-02
Payer: COMMERCIAL

## 2023-10-02 DIAGNOSIS — M89.9 BONE LESION: ICD-10-CM

## 2023-10-02 DIAGNOSIS — M79.661 RIGHT CALF PAIN: Primary | ICD-10-CM

## 2023-10-02 DIAGNOSIS — R22.9 LOCALIZED SWELLING, MASS AND LUMP, UNSPECIFIED: ICD-10-CM

## 2023-10-02 PROCEDURE — 88307 TISSUE EXAM BY PATHOLOGIST: CPT | Mod: 26 | Performed by: PATHOLOGY

## 2023-10-02 PROCEDURE — 20206 BIOPSY MUSCLE PERQ NEEDLE: CPT | Mod: RT | Performed by: PHYSICIAN ASSISTANT

## 2023-10-02 PROCEDURE — 76942 ECHO GUIDE FOR BIOPSY: CPT | Performed by: PHYSICIAN ASSISTANT

## 2023-10-02 PROCEDURE — 88307 TISSUE EXAM BY PATHOLOGIST: CPT | Mod: TC | Performed by: PHYSICIAN ASSISTANT

## 2023-10-02 PROCEDURE — 99214 OFFICE O/P EST MOD 30 MIN: CPT | Mod: 25 | Performed by: PHYSICIAN ASSISTANT

## 2023-10-02 PROCEDURE — 88341 IMHCHEM/IMCYTCHM EA ADD ANTB: CPT | Mod: 26 | Performed by: PATHOLOGY

## 2023-10-02 PROCEDURE — 88342 IMHCHEM/IMCYTCHM 1ST ANTB: CPT | Mod: 26 | Performed by: PATHOLOGY

## 2023-10-02 NOTE — PROGRESS NOTES
[facility]  Orthopaedic Surgery  by Lakhwinder Juarez PA-C    Mirta Cardenas MRN# [id number]    YOB: 1955               Clinical History:   68 year old female with history of right calf mass who presents to my clinic today per referral from Dr. Barroso for a biopsy.           Data:   All imaging studies reviewed by me    \        Procedure:   After informed consent repeat scanning was performed to identify the mass and adjacent anatomical structures. The skin was prepped and draped in sterile fashion.  1% lidocaine was used for local anesthesia.  Ultrasound was necessary to assure intralesional positioning and to avoid vital adjacent structures. Ultrasound guidance was used to pass a 14-gauge core biopsy needle into the mass for tissue biopsy. 6 passes were made. Images were permanently stored for the patient's record.The specimen was immediately placed in formalin and sent to the lab. The patient tolerated the procedure well and there were no immediate complications following the procedure.    1% lidocaine: Lot# [id number]    expiration 01/01/2023         Assessment and Plan:   Assessment:  68-year-old female with history of right calf mass who presents to my clinic today for biopsy.  Tolerated the procedure well.     Plan:  After consent was given the procedure was performed as above.  The patient tolerated it well.  Steri-Strips and a bandage were placed.  The patient can remove the bandage tomorrow and start showering which should avoid soaking for 1 week.  I explained the Steri-Strips will fall off on their own.  They should watch for signs of infection including erythema, discharge and increased pain.  If they have any concerns I instructed them to call.  When the pathology results have been finalized in 5 to 14 days a member of our team will reach out to them with further instruction.  All questions were answered and the patient was in agreement the plan.     Lakhwinder Juarez  BENJA  Physician Assistant   Oncology and Adult Reconstructive Surgery  Dept Orthopaedic Surgery, [facility]           Under 1% lidocane topical anesthesia, a Core Needle Biopsy of the above mentioned mass was sampled.  There were no complications. A sterile dressing was applied.

## 2023-10-02 NOTE — TELEPHONE ENCOUNTER
- A call was placed to the patient.     - Appointment set up with Lakhwinder for today at 3:00pm. Patient agreered this worked for her and address to clinic was given.     - Patient verbalized understanding of plan and all questions were answered. Call back number to clinic was given and patient was told to call if they had an further questions.

## 2023-10-02 NOTE — LETTER
10/2/2023         RE: Mirta Cardenas  [id number] [facility] [id number]        Dear Colleague,    Thank you for referring your patient, Mirta Cardenas, to the [facility] [place]. Please see a copy of my visit note below.        [facility]  Orthopaedic Surgery  by Lakhwinder Juarez PA-C    Mirta Cardenas MRN# [id number]    YOB: 1955               Clinical History:   68 year old female with history of right calf mass who presents to my clinic today per referral from Dr. Barroso for a biopsy.           Data:   All imaging studies reviewed by me    \        Procedure:   After informed consent repeat scanning was performed to identify the mass and adjacent anatomical structures. The skin was prepped and draped in sterile fashion.  1% lidocaine was used for local anesthesia.  Ultrasound was necessary to assure intralesional positioning and to avoid vital adjacent structures. Ultrasound guidance was used to pass a 14-gauge core biopsy needle into the mass for tissue biopsy. 6 passes were made. Images were permanently stored for the patient's record.The specimen was immediately placed in formalin and sent to the lab. The patient tolerated the procedure well and there were no immediate complications following the procedure.    1% lidocaine: Lot# [id number]    expiration 01/01/2023         Assessment and Plan:   Assessment:  68-year-old female with history of right calf mass who presents to my clinic today for biopsy.  Tolerated the procedure well.     Plan:  After consent was given the procedure was performed as above.  The patient tolerated it well.  Steri-Strips and a bandage were placed.  The patient can remove the bandage tomorrow and start showering which should avoid soaking for 1 week.  I explained the Steri-Strips will fall off on their own.  They should watch for signs of infection including erythema, discharge and increased pain.  If they have any  concerns I instructed them to call.  When the pathology results have been finalized in 5 to 14 days a member of our team will reach out to them with further instruction.  All questions were answered and the patient was in agreement the plan.     Lakhwinder Juarez PA-C  Physician Assistant   Oncology and Adult Reconstructive Surgery  Dept Orthopaedic Surgery, [facility]           Under 1% lidocane topical anesthesia, a Core Needle Biopsy of the above mentioned mass was sampled.  There were no complications. A sterile dressing was applied.

## 2023-10-02 NOTE — PATIENT INSTRUCTIONS
Keep the bandage on the biopsy site for 48 hours.   Reduce activity of affected extremity for 48 hours.  If drainage occurs, place gauze dressing with tape on biopsy site.  After 48 hours, you may use steri-strips  whenever desired.

## 2023-10-04 LAB
PATH REPORT.COMMENTS IMP SPEC: ABNORMAL
PATH REPORT.COMMENTS IMP SPEC: YES
PATH REPORT.FINAL DX SPEC: ABNORMAL
PATH REPORT.GROSS SPEC: ABNORMAL
PATH REPORT.MICROSCOPIC SPEC OTHER STN: ABNORMAL
PATH REPORT.RELEVANT HX SPEC: ABNORMAL
PHOTO IMAGE: ABNORMAL

## 2023-10-06 ENCOUNTER — PATIENT OUTREACH (OUTPATIENT)
Dept: ONCOLOGY | Facility: CLINIC | Age: 68
End: 2023-10-06
Payer: COMMERCIAL

## 2023-10-06 ENCOUNTER — TELEPHONE (OUTPATIENT)
Dept: ORTHOPEDICS | Facility: CLINIC | Age: 68
End: 2023-10-06
Payer: COMMERCIAL

## 2023-10-06 DIAGNOSIS — C49.9 UNDIFFERENTIATED PLEOMORPHIC SARCOMA (H): Primary | ICD-10-CM

## 2023-10-06 DIAGNOSIS — C49.21 SARCOMA OF RIGHT LOWER EXTREMITY (H): Primary | ICD-10-CM

## 2023-10-06 RX ORDER — OXYCODONE HYDROCHLORIDE 5 MG/1
5 TABLET ORAL EVERY 6 HOURS PRN
Qty: 25 TABLET | Refills: 0 | Status: SHIPPED | OUTPATIENT
Start: 2023-10-06 | End: 2023-10-12

## 2023-10-06 NOTE — TELEPHONE ENCOUNTER
[facility] Call Center    Phone Message    May a detailed message be left on voicemail: yes     Reason for Call: Pt is calling for the results of biopsy, please call regarding this.    Action Taken: Other: [facility]    Travel Screening: Not Applicable

## 2023-10-06 NOTE — CONFIDENTIAL NOTE
Call placed to patient. Relayed date, time, and location of PET/CT. Reviewed prep for scan and patient will review this information in her MyChart. Referral for radiation and medical oncology placed, they will reach out to schedule an appointment. Patient will call with any questions.      Tara Holter, RNCC

## 2023-10-06 NOTE — TELEPHONE ENCOUNTER
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  We would recommend she meet with medical oncology to discuss if chemotherapy would be warranted.  Would also like to get a whole-body PET/CT to see if it is anywhere else.  She should also meet with radiation, which she would begin after chemotherapy and before surgery.  We discussed that surgery would be several months down the road.  We discussed some of the minor details related to that.  She is still having issues with pain in the leg.  She is alternating oxycodone, Tylenol and a few tablets of ibuprofen.  She is taking Tylenol PM at night which works well for her.  I did send a refill for oxycodone to her pharmacy.  She will get calls to schedule these appointments.  She can notify us with any questions.  All questions answered today.

## 2023-10-06 NOTE — PROGRESS NOTES
New Patient Hematology / Oncology Nurse Navigator Note     Referral Date: 10/6/23    Referring provider: Dr JERARDO aBrroso    Referring Clinic/Organization: [facility]     Referred to: Medical Oncology and Radiation Oncology    Requested provider (if applicable): First available - did not specify     Evaluation for :      Clinical History (per Nurse review of records provided):    10/2/23 path report -- BOOKMARKED      Clinical Assessment / Barriers to Care (Per Nurse):    N/A      Records Location: [facility]     Records Needed:     PET-CT scheduled for 10/10, see Pre-Visit encounter from CSS team for additional details on records collection. Additional questions on records needed for initial consult can be sent to P ONC ADULT NEW PATIENT RECORDS [[id number]] with a copy to  CANCER CARE NURSE NAVIGATION [[id number]]. Please include diagnosis and urgency in subject line.    Additional testing needed prior to consult:     She is already scheduled for PET-CT 10/10, will have Med Onc and then Rad Onc appt after that    Referral updates and Plan:     Triage instructions updated and sent to NPS for completion      Kim Bentley, YONN, RN, PHN, OCN  Hematology/Oncology Nurse Navigator   [facility]   [phone]   [email or web address]

## 2023-10-09 DIAGNOSIS — C49.21 SARCOMA OF RIGHT LOWER EXTREMITY (H): Primary | ICD-10-CM

## 2023-10-09 RX ORDER — DIAZEPAM 5 MG
5 TABLET ORAL ONCE
Qty: 1 TABLET | Refills: 0 | Status: SHIPPED | OUTPATIENT
Start: 2023-10-09 | End: 2023-10-09

## 2023-10-10 ENCOUNTER — HOSPITAL ENCOUNTER (OUTPATIENT)
Dept: PET IMAGING | Facility: CLINIC | Age: 68
Discharge: HOME | End: 2023-10-10
Attending: ORTHOPAEDIC SURGERY | Admitting: ORTHOPAEDIC SURGERY
Payer: COMMERCIAL

## 2023-10-10 DIAGNOSIS — C49.21 SARCOMA OF RIGHT LOWER EXTREMITY (H): ICD-10-CM

## 2023-10-10 LAB
CREAT BLD-MCNC: 0.8 MG/DL (ref 0.5–1)
EGFRCR SERPLBLD CKD-EPI 2021: >60 ML/MIN/1.73M2

## 2023-10-10 PROCEDURE — 250N000011 HC RX IP 250 OP 636: Mod: JZ | Performed by: ORTHOPAEDIC SURGERY

## 2023-10-10 PROCEDURE — 71260 CT THORAX DX C+: CPT

## 2023-10-10 PROCEDURE — 343N000001 HC RX 343: Performed by: ORTHOPAEDIC SURGERY

## 2023-10-10 PROCEDURE — A9552 F18 FDG: HCPCS | Performed by: ORTHOPAEDIC SURGERY

## 2023-10-10 PROCEDURE — 78816 PET IMAGE W/CT FULL BODY: CPT | Mod: PI

## 2023-10-10 PROCEDURE — 82565 ASSAY OF CREATININE: CPT

## 2023-10-10 RX ORDER — IOPAMIDOL 755 MG/ML
10-135 INJECTION, SOLUTION INTRAVASCULAR ONCE
Status: COMPLETED | OUTPATIENT
Start: 2023-10-10 | End: 2023-10-10

## 2023-10-10 RX ADMIN — FLUDEOXYGLUCOSE F-18 11.7 MILLICURIE: 500 INJECTION, SOLUTION INTRAVENOUS at 10:27

## 2023-10-10 RX ADMIN — IOPAMIDOL 100 ML: 755 INJECTION, SOLUTION INTRAVENOUS at 10:34

## 2023-10-11 ENCOUNTER — ONCOLOGY VISIT (OUTPATIENT)
Dept: ONCOLOGY | Facility: CLINIC | Age: 68
End: 2023-10-11
Attending: ORTHOPAEDIC SURGERY
Payer: COMMERCIAL

## 2023-10-11 ENCOUNTER — NURSE TRIAGE (OUTPATIENT)
Dept: NURSING | Facility: CLINIC | Age: 68
End: 2023-10-11
Payer: COMMERCIAL

## 2023-10-11 ENCOUNTER — PRE VISIT (OUTPATIENT)
Dept: ONCOLOGY | Facility: CLINIC | Age: 68
End: 2023-10-11
Payer: COMMERCIAL

## 2023-10-11 ENCOUNTER — PATIENT OUTREACH (OUTPATIENT)
Dept: ONCOLOGY | Facility: CLINIC | Age: 68
End: 2023-10-11

## 2023-10-11 VITALS
HEART RATE: 92 BPM | DIASTOLIC BLOOD PRESSURE: 94 MMHG | TEMPERATURE: 98.2 F | OXYGEN SATURATION: 99 % | WEIGHT: 170.2 LBS | SYSTOLIC BLOOD PRESSURE: 155 MMHG | BODY MASS INDEX: 32.16 KG/M2

## 2023-10-11 DIAGNOSIS — D49.89 NEOPLASM OF UNSPECIFIED BEHAVIOR OF OTHER SPECIFIED SITES: ICD-10-CM

## 2023-10-11 DIAGNOSIS — C49.21 SARCOMA OF RIGHT LOWER EXTREMITY (H): ICD-10-CM

## 2023-10-11 PROCEDURE — 99205 OFFICE O/P NEW HI 60 MIN: CPT | Performed by: STUDENT IN AN ORGANIZED HEALTH CARE EDUCATION/TRAINING PROGRAM

## 2023-10-11 PROCEDURE — G0463 HOSPITAL OUTPT CLINIC VISIT: HCPCS | Performed by: STUDENT IN AN ORGANIZED HEALTH CARE EDUCATION/TRAINING PROGRAM

## 2023-10-11 RX ORDER — DULOXETIN HYDROCHLORIDE 30 MG/1
30 CAPSULE, DELAYED RELEASE ORAL 2 TIMES DAILY
Qty: 60 CAPSULE | Refills: 3 | Status: ON HOLD | OUTPATIENT
Start: 2023-10-11 | End: 2023-10-24

## 2023-10-11 ASSESSMENT — PAIN SCALES - GENERAL: PAINLEVEL: SEVERE PAIN (7)

## 2023-10-11 ASSESSMENT — ACTIVITIES OF DAILY LIVING (ADL): DEPENDENT_IADLS:: INDEPENDENT

## 2023-10-11 NOTE — NURSING NOTE
"Oncology Rooming Note    October 11, 2023 11:05 AM   Mirta Cardenas is a 68 year old female who presents for:    Chief Complaint   Patient presents with    Oncology Clinic Visit     Sarcoma of right lower extremity     Initial Vitals: BP (!) 155/94 (BP Location: Right arm, Patient Position: Sitting, Cuff Size: Adult Regular)   Pulse 92   Temp 98.2  F (36.8  C) (Oral)   Wt 77.2 kg (170 lb 3.2 oz)   SpO2 99%   BMI 32.16 kg/m   Estimated body mass index is 32.16 kg/m  as calculated from the following:    Height as of 8/17/23: 1.549 m (5' 1\").    Weight as of this encounter: 77.2 kg (170 lb 3.2 oz). Body surface area is 1.82 meters squared.  Severe Pain (7) Comment: Data Unavailable   No LMP recorded. Patient has had a hysterectomy.  Allergies reviewed: Yes  Medications reviewed: Yes    Medications: Medication refills not needed today.  Pharmacy name entered into [facility]:    [facility] [address] [place] [address] [facility] [place], [place] - [address] [place] [address]    Clinical concerns:  none      Jessi Groves, EMT  10/11/2023              "

## 2023-10-11 NOTE — PROGRESS NOTES
[facility]: Cancer Care Initial Note                                    Discussion with Patient:                                                      Antiemetics: advised about constipation and to keep bowels moving.  Steroid use/side effect: used for nausea- advised to take in am  Medication understanding/side effect: yes  Port concerns: how to care for  DVT concerns (see assessment below): symptoms and when to call in  PE concerns (see assessment below): symptoms and when to call in     Goals          General    Other     Notes - Note created  10/11/2023  2:25 PM by Johnathan Bernstein RN    Goal Statement: I will use my clinic and care team resources as directed.  Date Goal set: 10/11/2023  Barriers: disease burden and coping  Strengths: support and involvement with care team  Date to Achieve By: ongoing  Patient expressed understanding of goal: Yes  Action steps to achieve this goal:  I will contact triage with new, worsening or uncontrolled symptoms.   I will contact triage with temperature over 100.4  I will call with difficulties of scheduling and/or transportation.   I will request needed refills when there are 7 days of medication remaining.   I will not send urgent or symptomatic messages through [employer].   I will contact scheduling to arrange or make changes in my appointments.                Assessment:                                                      Initial  Current living arrangement:: I live in a private home with spouse  Type of residence:: Private home - stairs  Informal Support system:: Children;Family;Friends;Spouse  Equipment Currently Used at Home: cane, straight  Bed or wheelchair confined:: No  Mobility Status: Independent w/Device  Transportation means:: Regular car;Family  Medication adherence problem (GOAL):: No  Knowledgeable about how to use meds:: Yes  Medication side effects suspected:: No  Referrals Placed: Home Infusion  Advanced Care Plans/Directives on file:: No  Patient  Reported Pain?: Yes, is currently well-managed    Plan of Care Education Review:   Assessment completed with:: Patient;Spouse or significant other    Plan of Care Education   Yearly learning assessment completed?: Yes (see Education tab)  Diagnosis:: sarcoma  Does patient understand diagnosis?: Yes  Tx plan/regimen:: doxorubicin/ifosfamie-inpatient  Does patient understand treatment plan/regimen?: Yes  Preparing for treatment:: Reviewed treatment preparation information with patient (vascular access, day of chemo, visitor policy, what to bring, etc.)  Vascular access education provided for:: Olmedo catheter  Side effect education:: DVT/PE;Fatigue;Hair loss;Infection;Lab value monitoring (anemia, neutropenia, thrombocytopenia);Mouth sores;Mylosuppression;Nausea/Vomiting;Neuropathy  Supportive services education provided for:: Home infusion  Safety/self care at home reviewed with patient:: Yes  Coping - concerns/fears reviewed with patient:: Yes  Plan of Care:: ISIDORO follow-up appointment;Lab appointment;Imaging;MD follow-up appointment;Treatment schedule  When to call provider:: Bleeding;Increased shortness of breath;New/worsening pain;Shaking chills;Temperature >100.4F;Uncontrolled nausea/vomiting  Reasons for deferring treatment reviewed with patient:: Yes  Additional education provided for: : Injectable therapies;Neutropenic precautions  Procedure education provided for: : Port/PICC placement;Blood product transfusion    Evaluation of Learning  Patient Education Provided: Yes  Readiness:: Acceptance  Method:: Booklet/Handout;Explanation  Response:: Verbalizes understanding           Intervention/Education provided during outreach:                                                       Patient to follow up as scheduled at next appt  Patient to call/[employer] message with updates  Confirmed patient has clinic and triage numbers    Signature:  Johnathan Bernstein RN

## 2023-10-11 NOTE — TELEPHONE ENCOUNTER
1st visit today with Dr Jimenez in [facility] on [address].  Dx with cancer. She gave me some different pain pills. I am in so much pain, my right lower leg. I was looking for instructions she was going to give me but I don't see in My Chart: Duloxetine and Tylenol. She said the Dr wants her to stop Ibuprofen. Talked about taking Acetaminophen ? In between doses of other medication.    Patient would like message sent to A Goodfriend RN --patient would like to find out exactly how she is to take her pain medications including the new medication Duloxetine and Tylenol.  She remembers something was supposed to be taken on a schedule (Tylenol ?). She will take Duloxetine twice daily as ordered in the meantime.   Please call patient back to clarify her medication instructions.     Verito Vieira RN Triage Nurse Advisor 6:12 PM 10/11/2023  Reason for Disposition   [1] Caller has NON-URGENT medicine question about med that PCP prescribed AND [2] triager unable to answer question    Additional Information   Negative: [1] Intentional drug overdose AND [2] suicidal thoughts or ideas   Negative: Drug overdose and triager unable to answer question   Negative: Caller requesting a renewal or refill of a medicine patient is currently taking   Negative: Caller requesting information unrelated to medicine   Negative: Caller requesting information about COVID-19 Vaccine   Negative: Caller requesting information about Emergency Contraception   Negative: Caller requesting information about Combined Birth Control Pills   Negative: Caller requesting information about Progestin Birth Control Pills   Negative: Caller requesting information about Post-Op pain or medicines   Negative: Caller requesting a prescription antibiotic (such as Penicillin) for Strep throat and has a positive culture result   Negative: Caller requesting a prescription anti-viral med (such as Tamiflu) and has influenza (flu) symptoms   Negative: Immunization  reaction suspected   Negative: Rash while taking a medicine or within 3 days of stopping it   Negative: [1] Asthma and [2] having symptoms of asthma (cough, wheezing, etc.)   Negative: [1] Symptom of illness (e.g., headache, abdominal pain, earache, vomiting) AND [2] more than mild   Negative: Breastfeeding questions about mother's medicines and diet   Negative: MORE THAN A DOUBLE DOSE of a prescription or over-the-counter (OTC) drug   Negative: [1] DOUBLE DOSE (an extra dose or lesser amount) of prescription drug AND [2] any symptoms (e.g., dizziness, nausea, pain, sleepiness)   Negative: [1] DOUBLE DOSE (an extra dose or lesser amount) of over-the-counter (OTC) drug AND [2] any symptoms (e.g., dizziness, nausea, pain, sleepiness)   Negative: Took another person's prescription drug   Negative: [1] DOUBLE DOSE (an extra dose or lesser amount) of prescription drug AND [2] NO symptoms  (Exception: A double dose of antibiotics.)   Negative: Diabetes drug error or overdose (e.g., took wrong type of insulin or took extra dose)   Negative: [1] Prescription not at pharmacy AND [2] was prescribed by PCP recently (Exception: Triager has access to EMR and prescription is recorded there. Go to Home Care and confirm for pharmacy.)   Negative: [1] Pharmacy calling with prescription question AND [2] triager unable to answer question   Negative: [1] Caller has URGENT medicine question about med that PCP or specialist prescribed AND [2] triager unable to answer question   Negative: Medicine patch causing local rash or itching   Negative: [1] Caller has medicine question about med NOT prescribed by PCP AND [2] triager unable to answer question (e.g., compatibility with other med, storage)   Negative: Prescription request for new medicine (not a refill)    Protocols used: Medication Question Call-A-

## 2023-10-11 NOTE — PROGRESS NOTES
[facility] CANCER CLINIC    NEW PATIENT VISIT NOTE    PATIENT NAME: Mirta Cardenas MRN # [id number]  DATE OF VISIT: October 11, 2023 YOB: 1955    REFERRING PROVIDER: Benny Barroso MD  [address]  [place] [address]    CANCER TYPE:  High grade pleomorphic sarcoma       CHIEF COMPLAIN   LE pain     HISTORY OF PRESENT ILLNESS   68-year-old female with PMH of CLL and recent Dx of High Grade Pleomorphic sarcoma, with intermittent right calf pain but then continuous calf pain beginning in March 2023.  She had a recent x-ray which shows a destructive lesion in the midportion R LE in between the fibula and the tibia. She is experiencing significant pain, she is restless during the visit due to pain. She is currently taking both Advil and Tylenol and oxycodone for pain, with minimal control, feels ibuprofen is what helps the most. She has been alternating all pain medications without optimizing doses of them.      PAST ONCOLOGIC HISTORY   CLL - on monitoring     PAST MEDICAL HISTORY   Anal fisure - controlled with nitrobid  CLL - now being monitored - [place]  HTN  Cholesterol  Hx of severe COVID  Psoriasis    PAST SURGICAL HISTORY   3 c- sections  Hysterectomy  Ooforectomy     FAMILY HISTORY   Sister with MM     ALLERGIES      Allergies   Allergen Reactions    Allopurinol Itching    Amoxicillin Hives    Morphine And Related Itching    Periactin Other (See Comments)     Sleepy.    Tenormin [Atenolol] Fatigue          SOCIAL HISTORY     Social History     Social History Narrative    Not on file     Lives with , Giancarlo, she retired last spring, denies alcohol, tobacco or other drugs.      CURRENT OUTPATIENT MEDICATIONS     Current Outpatient Medications   Medication Sig Dispense Refill    ASPIRIN 81 MG OR TABS Take 81 mg by mouth daily       calcipotriene (DOVONOX) 0.005 % external cream Apply twice daily to areas of psoriasis on Saturday and Sunday. 60 g 6     chlorthalidone (HYGROTON) 25 MG tablet Take 1 tablet (25 mg) by mouth daily 90 tablet 3    clobetasol (TEMOVATE) 0.05 % external cream Apply twice daily to psoriasis on Monday through Friday. 60 g 5    cyclobenzaprine (FLEXERIL) 10 MG tablet Take 1 tablet (10 mg) by mouth 3 times daily as needed for muscle spasms 30 tablet 0    fluticasone-salmeterol (ADVAIR-HFA) 45-21 MCG/ACT inhaler Inhale 2 puffs into the lungs 2 times daily 12 g 3    losartan (COZAAR) 100 MG tablet Take 1 tablet (100 mg) by mouth daily 90 tablet 3    METAMUCIL FIBER PO       Multiple Vitamins-Calcium (ONE-A-DAY WOMENS FORMULA PO) Take 1 tablet by mouth daily      omeprazole (PRILOSEC) 20 MG DR capsule Take 1 capsule by mouth every morning.      oxyCODONE (ROXICODONE) 5 MG tablet Take 1 tablet (5 mg) by mouth every 6 hours as needed for pain 25 tablet 0    pravastatin (PRAVACHOL) 40 MG tablet Take 1 tablet (40 mg) by mouth daily 90 tablet 3    triamcinolone (ARISTOCORT HP) 0.5 % external cream Apply topically daily as needed (To psoriasis rash on legs and torso) 30 g 3          REVIEW OF SYSTEMS   As above in the HPI, o/w complete 12-point ROS was negative.     PHYSICAL EXAM   BP (!) 155/94 (BP Location: Right arm, Patient Position: Sitting, Cuff Size: Adult Regular)   Pulse 92   Temp 98.2  F (36.8  C) (Oral)   Wt 77.2 kg (170 lb 3.2 oz)   SpO2 99%   BMI 32.16 kg/m      EGO (due to pain related to the mass)  GEN: NAD  HEENT: PERRL, EOMI, no icterus, injection or pallor. Oropharynx is clear.  NECK: no cervical or supraclavicular lymphadenopathy  LUNGS: clear bilaterally  CV: regular, no murmurs, rubs, or gallops  ABDOMEN: soft, non-tender, non-distended, normal bowel sounds, no hepatosplenomegaly by percussion or palpation  EXT: warm, well perfused, no edema. R calf is more prominent and a deep mass appreciated on physical exam in between the tibia and fibular bone.   NEURO: alert  SKIN: no rashes     LABORATORY AND IMAGING STUDIES      Recent Labs   Lab Test 10/10/23  1033 04/24/23  0747 05/24/22  0904 10/14/20  0858 10/12/20  2239   NA  --  139 139   < > 137   POTASSIUM  --  4.1 4.7   < > 4.0   CHLORIDE  --  101 108   < > 106   CO2  --  27 29   < > 20   ANIONGAP  --  11 2*   < > 11   BUN  --  18.8 14   < > 55*   CR 0.8 0.87 0.75   < > 1.54*   GLC  --  116* 108*   < > 118*   MONIK  --  9.9 9.1   < > 9.0   MAG  --   --   --   --  1.9    < > = values in this interval not displayed.     Recent Labs   Lab Test 04/24/23  0747 05/24/22  0904 05/10/21  0650   WBC 16.2* 18.0* 15.6*   HGB 13.4 13.2 13.7    248 226   MCV 82 83 84   NEUTROPHIL 41 31 29.0     Recent Labs   Lab Test 10/17/20  0836 10/16/20  1031 10/15/20  0709 10/13/20  1620 10/12/20  2239 04/24/18  1100 12/02/16  0933   BILITOTAL  --   --   --   --  0.3 0.3 0.3   ALKPHOS  --   --   --   --  124 116 93   ALT  --   --   --   --  40 23 49   AST  --   --   --   --  35 15 42   ALBUMIN  --   --   --   --  3.6 4.1 4.1   * 305* 306*   < >  --   --   --     < > = values in this interval not displayed.     TSH   Date Value Ref Range Status   11/14/2013 1.27 0.4 - 5.0 mU/L Final       Results for orders placed or performed during the hospital encounter of 10/10/23   CT Chest/Abdomen/Pelvis w Contrast    Narrative    EXAM: PET ONCOLOGY WHOLE BODY, CT CHEST/ABDOMEN/PELVIS W CONTRAST  LOCATION: [facility]  DATE: 10/10/2023    INDICATION: Initial treatment planning and staging for malignant neoplasm of connective and soft tissue of right lower limb, including hip. 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  CONTRAST: 100 mL [id number].  TECHNIQUE: Serum glucose level 103 mg/dL. One hour post intravenous administration of 11.7 mCi F-18 FDG, PET imaging was performed from the skull vertex to feet, utilizing attenuation correction with concurrent axial CT and PET/CT image fusion. Separate   diagnostic CT of the chest, abdomen, and pelvis  was performed. Dose reduction techniques were used.    PET/CT FINDINGS: Hypermetabolic soft tissue mass within the right calf measuring 6.0 x 3.8 x 7.5 cm with associated cortical destruction of the adjacent fibular shaft (SUV max of 26.4). No evidence of FDG avid mina or distant metastatic disease.    CT FINDINGS: Heart size within normal limits without significant pericardial effusion. Moderate coronary artery calcifications. Mild areas of scattered linear atelectasis without suspicious pulmonary nodule. No pleural effusion. The liver is enlarged   measuring up to 23 cm in craniocaudal dimension. The spleen is mildly enlarged measuring up to 14 cm in length. Extensive multifocal scarring of the right kidney with associated parenchymal atrophy. Nonobstructing right nephrolithiasis with largest stone   measuring up to 6 mm at the right lower pole. Small left lower pole renal cysts. Colonic diverticula. Large fat-containing lower ventral abdominal wall hernia. There are mildly enlarged right iliac chain lymph nodes which do not demonstrate FDG uptake   above that of blood pool, favored reactive.      Impression    IMPRESSION:    1.  Hypermetabolic right calf mass with osseous destruction of the adjacent fibular shaft consistent with biopsy-proven sarcoma.  2.  No evidence of FDG avid metastatic disease. There are mildly enlarged right iliac chain lymph nodes that do not demonstrate hypermetabolic uptake, favored reactive.  3.  Additional CT findings as above including hepatosplenomegaly, extensive parenchymal scarring of the right kidney, and large fat-containing lower ventral abdominal wall hernia.        PATHOLOGY   10/2/23  Final Diagnosis   A.  Soft tissue, right calf mass, excision:  - High-grade pleomorphic sarcoma  - See comment    Electronically signed by Jonny Zhou MD on 10/4/2023 at  9:09 PM   Comment  [facility]   Immunohistochemical stains show the tumor is negative for CK AE1/AE3, S100, desmin and CD34  "while SATB2 demonstrates patchy reactivity.  While the primary diagnostic consideration is an undifferentiated pleomorphic sarcoma, the patchy SATB2 reactivity raises the possibility of a poorly differentiated osteosarcoma.   Clinical Information  [facility]   The patient is a 68 year old female with a history of CLL and several months of sensitivity of the lateral right calf, and recent imaging showing: \"Lytic lesion in the right fibular proximal diaphysis, with complete resorption of the medial cortex and some periosteal reaction adjacent to the lesion\".        I reviewed the medical records including orthopaedic notes, laboratory studies which are notable for leukocytosis from CLL, normal kidney and liver function, and have personally reviewed imaging including MRI on the R leg and PET which demonstrates a 9.3 x 5.2 x 3.3 cm mass which appears to arise in the posterior tibialis muscle. No evidence of distant metastatic disease.       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 No evidence of metastatic disease.     I discussed with the patient that sarcomas are rare tumors only representing 1% of all cancers and that they are very heterogeneous with over 100 different subtypes. When they present with localized disease they main approach to therapy is surgical resection. However, there are factors that influence the chances of recurrence and development of distant metastasis. In general tumors that are big in size (over 5 cm) and high grade (rapid cell division observed under the microscope) have higher probability of recurrence.       In her case, the probability of recurrence with surgery alone is about 40% in the next 10 years. We discussed, that recurrence in this scenario is usually with distant  metastasis which then becomes uncurable disease. We discussed, that for this " reason, I recommend treatment with neoadjuvant chemotherapy with the goal of preventing metastasis in the future. We discussed the potential side effects of chemotherapy and need for emergent treatment in order to improve her symptoms. The regimen will be doxil 40mg/m2 and ifosfamide 8g/m2 over 5 days. Slightly lower dose due to age and other medical conditions.   We discussed the plan of chemo for 2 cycles, followed by repeat imaging.     Plan  -STAT Echo in 1 week STAT PICC placement   -Plan for admission for chemotherapy on 10/16   -Follow up after chemo ISIDORO 10/30   -Neulasta after chemo 10/23   -She needs 10 days of prophylactic levaquin 500 mg daily starting on day of discharge at completion of chemotherapy.  -Labs and infusion appointment on 10/25, 10/27, 10/30, 11/1, 11/3, 11/6, 11/8, 11/10   -Follow up Dr. Jimenez 11/7/23       60 minutes spent on the date of the encounter doing chart review, review of outside records, review of test results, interpretation of tests, patient visit, documentation, and discussion with other provider(s)     Roney Nam MD   of Medicine  Hematology, Oncology and Transplantation

## 2023-10-11 NOTE — LETTER
10/11/2023         RE: Mirta Cardenas  [id number] [facility] [address]        Dear Colleague,    Thank you for referring your patient, Mirta Cardenas, to the [facility]. Please see a copy of my visit note below.    [facility]    NEW PATIENT VISIT NOTE    PATIENT NAME: Mirta Cardenas MRN # [id number]  DATE OF VISIT: October 11, 2023 YOB: 1955    REFERRING PROVIDER: Benny Barroso MD  [address]  [place] [address]    CANCER TYPE:  High grade pleomorphic sarcoma       CHIEF COMPLAIN   LE pain     HISTORY OF PRESENT ILLNESS   68-year-old female with PMH of CLL and recent Dx of High Grade Pleomorphic sarcoma, with intermittent right calf pain but then continuous calf pain beginning in March 2023.  She had a recent x-ray which shows a destructive lesion in the midportion R LE in between the fibula and the tibia. She is experiencing significant pain, she is restless during the visit due to pain. She is currently taking both Advil and Tylenol and oxycodone for pain, with minimal control, feels ibuprofen is what helps the most. She has been alternating all pain medications without optimizing doses of them.      PAST ONCOLOGIC HISTORY   CLL - on monitoring     PAST MEDICAL HISTORY   Anal fisure - controlled with nitrobid  CLL - now being monitored - [place]  HTN  Cholesterol  Hx of severe COVID  Psoriasis    PAST SURGICAL HISTORY   3 c- sections  Hysterectomy  Ooforectomy     FAMILY HISTORY   Sister with MM     ALLERGIES      Allergies   Allergen Reactions    Allopurinol Itching    Amoxicillin Hives    Morphine And Related Itching    Periactin Other (See Comments)     Sleepy.    Tenormin [Atenolol] Fatigue          SOCIAL HISTORY     Social History     Social History Narrative    Not on file     Lives with , Giancarlo, she retired last spring, denies alcohol, tobacco or other drugs.      CURRENT  OUTPATIENT MEDICATIONS     Current Outpatient Medications   Medication Sig Dispense Refill    ASPIRIN 81 MG OR TABS Take 81 mg by mouth daily       calcipotriene (DOVONOX) 0.005 % external cream Apply twice daily to areas of psoriasis on Saturday and . 60 g 6    chlorthalidone (HYGROTON) 25 MG tablet Take 1 tablet (25 mg) by mouth daily 90 tablet 3    clobetasol (TEMOVATE) 0.05 % external cream Apply twice daily to psoriasis on Monday through Friday. 60 g 5    cyclobenzaprine (FLEXERIL) 10 MG tablet Take 1 tablet (10 mg) by mouth 3 times daily as needed for muscle spasms 30 tablet 0    fluticasone-salmeterol (ADVAIR-HFA) 45-21 MCG/ACT inhaler Inhale 2 puffs into the lungs 2 times daily 12 g 3    losartan (COZAAR) 100 MG tablet Take 1 tablet (100 mg) by mouth daily 90 tablet 3    METAMUCIL FIBER PO       Multiple Vitamins-Calcium (ONE-A-DAY WOMENS FORMULA PO) Take 1 tablet by mouth daily      omeprazole (PRILOSEC) 20 MG DR capsule Take 1 capsule by mouth every morning.      oxyCODONE (ROXICODONE) 5 MG tablet Take 1 tablet (5 mg) by mouth every 6 hours as needed for pain 25 tablet 0    pravastatin (PRAVACHOL) 40 MG tablet Take 1 tablet (40 mg) by mouth daily 90 tablet 3    triamcinolone (ARISTOCORT HP) 0.5 % external cream Apply topically daily as needed (To psoriasis rash on legs and torso) 30 g 3          REVIEW OF SYSTEMS   As above in the HPI, o/w complete 12-point ROS was negative.     PHYSICAL EXAM   BP (!) 155/94 (BP Location: Right arm, Patient Position: Sitting, Cuff Size: Adult Regular)   Pulse 92   Temp 98.2  F (36.8  C) (Oral)   Wt 77.2 kg (170 lb 3.2 oz)   SpO2 99%   BMI 32.16 kg/m      EGO (due to pain related to the mass)  GEN: NAD  HEENT: PERRL, EOMI, no icterus, injection or pallor. Oropharynx is clear.  NECK: no cervical or supraclavicular lymphadenopathy  LUNGS: clear bilaterally  CV: regular, no murmurs, rubs, or gallops  ABDOMEN: soft, non-tender, non-distended, normal bowel sounds,  no hepatosplenomegaly by percussion or palpation  EXT: warm, well perfused, no edema. R calf is more prominent and a deep mass appreciated on physical exam in between the tibia and fibular bone.   NEURO: alert  SKIN: no rashes     LABORATORY AND IMAGING STUDIES     Recent Labs   Lab Test 10/10/23  1033 04/24/23  0747 05/24/22  0904 10/14/20  0858 10/12/20  2239   NA  --  139 139   < > 137   POTASSIUM  --  4.1 4.7   < > 4.0   CHLORIDE  --  101 108   < > 106   CO2  --  27 29   < > 20   ANIONGAP  --  11 2*   < > 11   BUN  --  18.8 14   < > 55*   CR 0.8 0.87 0.75   < > 1.54*   GLC  --  116* 108*   < > 118*   MONIK  --  9.9 9.1   < > 9.0   MAG  --   --   --   --  1.9    < > = values in this interval not displayed.     Recent Labs   Lab Test 04/24/23  0747 05/24/22  0904 05/10/21  0650   WBC 16.2* 18.0* 15.6*   HGB 13.4 13.2 13.7    248 226   MCV 82 83 84   NEUTROPHIL 41 31 29.0     Recent Labs   Lab Test 10/17/20  0836 10/16/20  1031 10/15/20  0709 10/13/20  1620 10/12/20  2239 04/24/18  1100 12/02/16  0933   BILITOTAL  --   --   --   --  0.3 0.3 0.3   ALKPHOS  --   --   --   --  124 116 93   ALT  --   --   --   --  40 23 49   AST  --   --   --   --  35 15 42   ALBUMIN  --   --   --   --  3.6 4.1 4.1   * 305* 306*   < >  --   --   --     < > = values in this interval not displayed.     TSH   Date Value Ref Range Status   11/14/2013 1.27 0.4 - 5.0 mU/L Final       Results for orders placed or performed during the hospital encounter of 10/10/23   CT Chest/Abdomen/Pelvis w Contrast    Narrative    EXAM: PET ONCOLOGY WHOLE BODY, CT CHEST/ABDOMEN/PELVIS W CONTRAST  LOCATION: [facility]  DATE: 10/10/2023    INDICATION: Initial treatment planning and staging for malignant neoplasm of connective and soft tissue of right lower limb, including hip. 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  CONTRAST: 100 mL [id number].  TECHNIQUE: Serum glucose level 103 mg/dL.  One hour post intravenous administration of 11.7 mCi F-18 FDG, PET imaging was performed from the skull vertex to feet, utilizing attenuation correction with concurrent axial CT and PET/CT image fusion. Separate   diagnostic CT of the chest, abdomen, and pelvis was performed. Dose reduction techniques were used.    PET/CT FINDINGS: Hypermetabolic soft tissue mass within the right calf measuring 6.0 x 3.8 x 7.5 cm with associated cortical destruction of the adjacent fibular shaft (SUV max of 26.4). No evidence of FDG avid mina or distant metastatic disease.    CT FINDINGS: Heart size within normal limits without significant pericardial effusion. Moderate coronary artery calcifications. Mild areas of scattered linear atelectasis without suspicious pulmonary nodule. No pleural effusion. The liver is enlarged   measuring up to 23 cm in craniocaudal dimension. The spleen is mildly enlarged measuring up to 14 cm in length. Extensive multifocal scarring of the right kidney with associated parenchymal atrophy. Nonobstructing right nephrolithiasis with largest stone   measuring up to 6 mm at the right lower pole. Small left lower pole renal cysts. Colonic diverticula. Large fat-containing lower ventral abdominal wall hernia. There are mildly enlarged right iliac chain lymph nodes which do not demonstrate FDG uptake   above that of blood pool, favored reactive.      Impression    IMPRESSION:    1.  Hypermetabolic right calf mass with osseous destruction of the adjacent fibular shaft consistent with biopsy-proven sarcoma.  2.  No evidence of FDG avid metastatic disease. There are mildly enlarged right iliac chain lymph nodes that do not demonstrate hypermetabolic uptake, favored reactive.  3.  Additional CT findings as above including hepatosplenomegaly, extensive parenchymal scarring of the right kidney, and large fat-containing lower ventral abdominal wall hernia.        PATHOLOGY   10/2/23  Final Diagnosis   A.  Soft  "tissue, right calf mass, excision:  - High-grade pleomorphic sarcoma  - See comment    Electronically signed by Jonny Zhou MD on 10/4/2023 at  9:09 PM   Comment  [facility]   Immunohistochemical stains show the tumor is negative for CK AE1/AE3, S100, desmin and CD34 while SATB2 demonstrates patchy reactivity.  While the primary diagnostic consideration is an undifferentiated pleomorphic sarcoma, the patchy SATB2 reactivity raises the possibility of a poorly differentiated osteosarcoma.   Clinical Information  [facility]   The patient is a 68 year old female with a history of CLL and several months of sensitivity of the lateral right calf, and recent imaging showing: \"Lytic lesion in the right fibular proximal diaphysis, with complete resorption of the medial cortex and some periosteal reaction adjacent to the lesion\".        I reviewed the medical records including orthopaedic notes, laboratory studies which are notable for leukocytosis from CLL, normal kidney and liver function, and have personally reviewed imaging including MRI on the R leg and PET which demonstrates a 9.3 x 5.2 x 3.3 cm mass which appears to arise in the posterior tibialis muscle. No evidence of distant metastatic disease.       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 No evidence of metastatic disease.     I discussed with the patient that sarcomas are rare tumors only representing 1% of all cancers and that they are very heterogeneous with over 100 different subtypes. When they present with localized disease they main approach to therapy is surgical resection. However, there are factors that influence the chances of recurrence and development of distant metastasis. In general tumors that are big in size (over 5 cm) and high grade (rapid cell division observed under the microscope) have higher probability of " recurrence.       In her case, the probability of recurrence with surgery alone is about 40% in the next 10 years. We discussed, that recurrence in this scenario is usually with distant  metastasis which then becomes uncurable disease. We discussed, that for this reason, I recommend treatment with neoadjuvant chemotherapy with the goal of preventing metastasis in the future. We discussed the potential side effects of chemotherapy and need for emergent treatment in order to improve her symptoms. The regimen will be doxil 40mg/m2 and ifosfamide 8g/m2 over 5 days. Slightly lower dose due to age and other medical conditions.   We discussed the plan of chemo for 2 cycles, followed by repeat imaging.     Plan  -STAT Echo in 1 week STAT PICC placement   -Plan for admission for chemotherapy on 10/16   -Follow up after chemo ISIDORO 10/30   -Neulasta after chemo 10/23   -She needs 10 days of prophylactic levaquin 500 mg daily starting on day of discharge at completion of chemotherapy.  -Labs and infusion appointment on 10/25, 10/27, 10/30, 11/1, 11/3, 11/6, 11/8, 11/10   -Follow up Dr. Jimenez 11/7/23       60 minutes spent on the date of the encounter doing chart review, review of outside records, review of test results, interpretation of tests, patient visit, documentation, and discussion with other provider(s)     Roney Nam MD   of Medicine  Hematology, Oncology and Transplantation

## 2023-10-12 ENCOUNTER — PATIENT OUTREACH (OUTPATIENT)
Dept: ONCOLOGY | Facility: CLINIC | Age: 68
End: 2023-10-12
Payer: COMMERCIAL

## 2023-10-12 DIAGNOSIS — D64.81 ANEMIA DUE TO ANTINEOPLASTIC CHEMOTHERAPY: ICD-10-CM

## 2023-10-12 DIAGNOSIS — T45.1X5A ANEMIA DUE TO ANTINEOPLASTIC CHEMOTHERAPY: ICD-10-CM

## 2023-10-12 DIAGNOSIS — R11.2 CINV (CHEMOTHERAPY-INDUCED NAUSEA AND VOMITING): Primary | ICD-10-CM

## 2023-10-12 DIAGNOSIS — T45.1X5A CINV (CHEMOTHERAPY-INDUCED NAUSEA AND VOMITING): Primary | ICD-10-CM

## 2023-10-12 RX ORDER — HEPARIN SODIUM,PORCINE 10 UNIT/ML
5-20 VIAL (ML) INTRAVENOUS DAILY PRN
Status: CANCELLED | OUTPATIENT
Start: 2023-10-12

## 2023-10-12 RX ORDER — HEPARIN SODIUM (PORCINE) LOCK FLUSH IV SOLN 100 UNIT/ML 100 UNIT/ML
5 SOLUTION INTRAVENOUS
Status: CANCELLED | OUTPATIENT
Start: 2023-10-12

## 2023-10-13 ENCOUNTER — ANCILLARY PROCEDURE (OUTPATIENT)
Dept: CARDIOLOGY | Facility: CLINIC | Age: 68
End: 2023-10-13
Attending: STUDENT IN AN ORGANIZED HEALTH CARE EDUCATION/TRAINING PROGRAM
Payer: COMMERCIAL

## 2023-10-13 DIAGNOSIS — D49.89 NEOPLASM OF UNSPECIFIED BEHAVIOR OF OTHER SPECIFIED SITES: ICD-10-CM

## 2023-10-13 DIAGNOSIS — C49.21 SARCOMA OF RIGHT LOWER EXTREMITY (H): ICD-10-CM

## 2023-10-13 LAB — BI-PLANE LVEF ECHO: NORMAL

## 2023-10-13 PROCEDURE — 93306 TTE W/DOPPLER COMPLETE: CPT | Mod: 26 | Performed by: INTERNAL MEDICINE

## 2023-10-13 PROCEDURE — 93356 MYOCRD STRAIN IMG SPCKL TRCK: CPT | Performed by: INTERNAL MEDICINE

## 2023-10-13 NOTE — PROGRESS NOTES
[facility]: Cancer Care Initial Note                                    Discussion with Patient:                                                           Medication understanding/side effect: Mirta wants to know if she can get the flu and covid vaccine.  Let her know that she can, and it is best if done prior to the initiation of therapy.     Goals          General    Other     Notes - Note created  10/11/2023  2:25 PM by Johnathan Bernstein RN    Goal Statement: I will use my clinic and care team resources as directed.  Date Goal set: 10/11/2023  Barriers: disease burden and coping  Strengths: support and involvement with care team  Date to Achieve By: ongoing  Patient expressed understanding of goal: Yes  Action steps to achieve this goal:  I will contact triage with new, worsening or uncontrolled symptoms.   I will contact triage with temperature over 100.4  I will call with difficulties of scheduling and/or transportation.   I will request needed refills when there are 7 days of medication remaining.   I will not send urgent or symptomatic messages through mychart.   I will contact scheduling to arrange or make changes in my appointments.        Other     Notes - Note created  10/13/2023  8:55 AM by Johnathan Bernstein RN    Goal Statement: I will use my clinic and care team resources as directed.  Date Goal set: 10/13/2023  Barriers: coping  Strengths: support and involvement with care team  Date to Achieve By: ongoing  Patient expressed understanding of goal: Yes  Action steps to achieve this goal:  I will contact triage with new, worsening or uncontrolled symptoms.   I will contact triage with temperature over 100.4  I will call with difficulties of scheduling and/or transportation.   I will request needed refills when there are 7 days of medication remaining.   I will not send urgent or symptomatic messages through mychart.   I will contact scheduling to arrange or make changes in my appointments.                   Intervention/Education provided during outreach:                                                           Patient to follow up as scheduled at next appt  Patient to call/MyChart message with updates  Confirmed patient has clinic and triage numbers      Johnathan Bernstein RN

## 2023-10-16 ENCOUNTER — OFFICE VISIT (OUTPATIENT)
Dept: RADIATION THERAPY | Facility: OUTPATIENT CENTER | Age: 68
End: 2023-10-16
Payer: COMMERCIAL

## 2023-10-16 VITALS
OXYGEN SATURATION: 100 % | DIASTOLIC BLOOD PRESSURE: 40 MMHG | SYSTOLIC BLOOD PRESSURE: 80 MMHG | WEIGHT: 166 LBS | HEART RATE: 105 BPM | BODY MASS INDEX: 31.37 KG/M2 | RESPIRATION RATE: 16 BRPM

## 2023-10-16 DIAGNOSIS — C49.21 SARCOMA OF RIGHT LOWER EXTREMITY (H): ICD-10-CM

## 2023-10-16 ASSESSMENT — PAIN SCALES - GENERAL: PAINLEVEL: SEVERE PAIN (6)

## 2023-10-16 NOTE — PROGRESS NOTES
Department of Radiation Oncology  Radiation Therapy Center  [facility]  [address], [address]  [place] [address]  [phone]       Consultation Note    Name: Mirta Cardenas MRN: [id number]   : 1955   Date of Service: 10/16/2023  Referring: Dr. Barroso and Dr. Nam     Reason for consultation: High-grade sarcoma of the right lower extremity.  Evaluate potential role for radiation therapy.    History of Present Illness   Ms. Cardenas is a 68 year old female with a diagnosis of sarcoma of the right lower extremity.    Patient initially presented with symptoms of right calf pain that began in 2023.  X-ray obtained demonstrated a destructive lesion in the right lower extremity.  MRI obtained on 2023 of the right lower extremity demonstrated 9.3 cm soft tissue mass in the proximal/mid calf, centered in the posterior tibialis muscle.  There was noted bony invasion into the fibular shaft, thinning of the cortex of the tibial shaft without definite invasion.  There was also noted concern for abutment of the vasculature and nerves.  On 10/2/2023 the patient went biopsy of the right calf mass.  Pathology demonstrated high-grade pleomorphic sarcoma.  PET scan on 10/10/2023 did not demonstrate any clear evidence of distant disease.  Patient met with medical oncology team and surgery team who discussed consideration of starting with systemic treatment followed by preoperative radiation therapy.  Patient presents today to discuss next steps in management.     Patient is overall doing okay.  She does note pain in the right lower extremity region.  Currently taking narcotic as well as Cymbalta.  Currently planning to start systemic treatment under the care of Viv.  No prior radiation.  No pacemaker.    Past Medical History:   Past Medical History:   Diagnosis Date    Asthma     Asthma     reactive airway disease, per patient    Blood transfusion      Cancer (H)     chronic lymphocytic leukemia    History of transfusion     Hypertension     Hypertension     Leukemia, chronic lymphocytic (H)     Malignant neoplasm (H)     CLL       Past Surgical History:   Past Surgical History:   Procedure Laterality Date    ABDOMEN SURGERY      c section *3    APPENDECTOMY      C/SECTION, CLASSICAL  ,,    , Classical    COLONOSCOPY      HYSTERECTOMY      HYSTERECTOMY, PAP NO LONGER INDICATED  1998    RELEASE CARPAL TUNNEL  2012    Procedure:RELEASE CARPAL TUNNEL; Right Carpal Tunnel Release & Right Ring A1 Pulley Release; Surgeon:SERGEY HEATON; Location:[facility] OR    RELEASE TRIGGER FINGER  2012    Procedure:RELEASE TRIGGER FINGER; Surgeon:SERGEY HEATON; Location:[facility]    RELEASE TRIGGER FINGER  10/12/2012    Procedure: RELEASE TRIGGER FINGER;  Right Thumb A1 Pulley Release;  Surgeon: Sergey Heaton MD;  Location: [facility] OR    SALPINGO OOPHORECTOMY,R/L/ULICES  2008    right       Chemotherapy History:  None    Radiation History:  None    Pregnant: Not Applicable  Implanted Cardiac Devices: No    Medications:  Current Outpatient Medications   Medication    ASPIRIN 81 MG OR TABS    calcipotriene (DOVONOX) 0.005 % external cream    chlorthalidone (HYGROTON) 25 MG tablet    clobetasol (TEMOVATE) 0.05 % external cream    cyclobenzaprine (FLEXERIL) 10 MG tablet    DULoxetine (CYMBALTA) 30 MG capsule    fluticasone-salmeterol (ADVAIR-HFA) 45-21 MCG/ACT inhaler    losartan (COZAAR) 100 MG tablet    METAMUCIL FIBER PO    Multiple Vitamins-Calcium (ONE-A-DAY WOMENS FORMULA PO)    omeprazole (PRILOSEC) 20 MG DR capsule    pravastatin (PRAVACHOL) 40 MG tablet    triamcinolone (ARISTOCORT HP) 0.5 % external cream     No current facility-administered medications for this visit.         Allergies:     Allergies   Allergen Reactions    Allopurinol Itching    Amoxicillin Hives    Morphine And Related Itching    Periactin Other (See Comments)      Sleepy.    Tenormin [Atenolol] Fatigue         Family History:  Family History   Problem Relation Age of Onset    Hypertension Brother     Heart Disease Brother 65        bypass    Cancer Sister         multiple myloma    Obesity Son     Obesity Son     Hypertension Son     Osteoporosis Mother     Melanoma No family hx of        Review of Systems   A 10-point review of systems was performed. Pertinent findings are noted in the HPI.    Physical Exam   ECOG Status: 1    Vitals:  BP (!) 80/40 (BP Location: Left arm, Cuff Size: Adult Large)   Pulse 105   Resp 16   Wt 75.3 kg (166 lb)   SpO2 100%   BMI 31.37 kg/m      Gen: Alert, in NAD  Head: NC/AT  Eyes: PERRL, EOMI, sclera anicteric  Ears: No external auricular lesions  Nose/sinus: No rhinorrhea or epistaxis  Oral cavity/oropharynx: MMM, no visible oral cavity lesions, FOM and BOT are soft to palpation  Neck: Full ROM, supple, no palpable adenopathy  Pulm: No wheezing, stridor or respiratory distress  CV: Extremities are warm and well-perfused, no cyanosis, no pedal edema  Abdominal: Normal bowel sounds, soft, nontender, no masses  Musculoskeletal: Normal bulk and tone  Skin: Normal color and turgor  Neuro: A/Ox3, CN II-XII intact, normal gait    Imaging/Path/Labs   Imagin23  MRI Right lower extremity  Impression:   1. 3.3 x 5.2 x 9.3 cm soft tissue mass in the proximal/mid calf,  centered in posterior tibialis muscle/syndesmotic membrane. Primary  imaging differential consideration include primary soft tissue  sarcoma.    a. Bony invasion into the fibular shaft with cortical disruption as  seen on the radiograph.    b. Additionally cortical thinning without definitive disruption of  the tibial shaft. Underlying tibial marrow edema/enhancement maybe due  to stress response though tumor invasion cannot be entirely excluded.   c. Extensive regional soft tissue edema, presumably reactive though  exact margin of tumor difficult to delineate.    d. Posterior  tibial artery/vein, and tibial nerve are displaced  posteriorly with surrounding edema and enhancement. Also, anterior  tibial artery/vein and deep fibular nerve bundles appear to abut the  mass.      10/10/23  PET  IMPRESSION:     1.  Hypermetabolic right calf mass with osseous destruction of the adjacent fibular shaft consistent with biopsy-proven sarcoma.  2.  No evidence of FDG avid metastatic disease. There are mildly enlarged right iliac chain lymph nodes that do not demonstrate hypermetabolic uptake, favored reactive.  3.  Additional CT findings as above including hepatosplenomegaly, extensive parenchymal scarring of the right kidney, and large fat-containing lower ventral abdominal wall hernia.      Path:   10/2/23  A.  Soft tissue, right calf mass, excision:  - High-grade pleomorphic sarcoma  - See comment                 Assessment    Ms. Cardenas is a 68 year old female with a diagnosis of high-grade sarcoma of the right lower extremity.  Evaluate potential role for radiation therapy.    Plan   1.  I discussed the natural history of high-grade sarcoma.     2.  I discussed the potential role of preoperative radiation therapy prior to surgical resection, with primary goal of improving local control.      3. The patient has also met with medical oncology team (Dr. Nam) with plan to start systemic therapy first with plan to re-evaluate thereafter. I feel this is a reasonable approach given local extent of disease with likely bony invasion and proximity in vascular-nerve structures.     4.  I discussed side effects in great detail including but not limited to fibrosis, extremity lymphedema, and wound issues.     5.  The patient will continue with systemic therapy at this time.  We will tentatively have her return in about 8 -10 weeks to continue to follow, potentially after next set of restaging scans.  Patient agrees with the current plan in place.    Patrice Armstrong MD  Department of Radiation  Oncology  [facility]

## 2023-10-17 ENCOUNTER — HOSPITAL ENCOUNTER (INPATIENT)
Facility: CLINIC | Age: 68
LOS: 7 days | Discharge: HOME | DRG: 847 | End: 2023-10-24
Attending: STUDENT IN AN ORGANIZED HEALTH CARE EDUCATION/TRAINING PROGRAM | Admitting: STUDENT IN AN ORGANIZED HEALTH CARE EDUCATION/TRAINING PROGRAM
Payer: COMMERCIAL

## 2023-10-17 ENCOUNTER — HOSPITAL ENCOUNTER (OUTPATIENT)
Dept: VASCULAR ULTRASOUND | Facility: CLINIC | Age: 68
Discharge: HOME | DRG: 847 | End: 2023-10-17
Attending: STUDENT IN AN ORGANIZED HEALTH CARE EDUCATION/TRAINING PROGRAM
Payer: COMMERCIAL

## 2023-10-17 DIAGNOSIS — J12.82 PNEUMONIA DUE TO COVID-19 VIRUS: ICD-10-CM

## 2023-10-17 DIAGNOSIS — C49.21 SARCOMA OF RIGHT LOWER EXTREMITY (H): ICD-10-CM

## 2023-10-17 DIAGNOSIS — C49.9 SARCOMA (H): ICD-10-CM

## 2023-10-17 DIAGNOSIS — R55 SYNCOPE AND COLLAPSE: ICD-10-CM

## 2023-10-17 DIAGNOSIS — N39.3 FEMALE STRESS INCONTINENCE: ICD-10-CM

## 2023-10-17 DIAGNOSIS — E78.5 HYPERLIPIDEMIA LDL GOAL <100: ICD-10-CM

## 2023-10-17 DIAGNOSIS — E21.2 OTHER HYPERPARATHYROIDISM (H): ICD-10-CM

## 2023-10-17 DIAGNOSIS — E04.1 THYROID NODULE: ICD-10-CM

## 2023-10-17 DIAGNOSIS — N18.30 STAGE 3 CHRONIC KIDNEY DISEASE, UNSPECIFIED WHETHER STAGE 3A OR 3B CKD (H): ICD-10-CM

## 2023-10-17 DIAGNOSIS — C49.21 SARCOMA OF RIGHT LOWER EXTREMITY (H): Primary | ICD-10-CM

## 2023-10-17 DIAGNOSIS — D50.0 IRON DEFICIENCY ANEMIA DUE TO CHRONIC BLOOD LOSS: ICD-10-CM

## 2023-10-17 DIAGNOSIS — I10 BENIGN ESSENTIAL HYPERTENSION: ICD-10-CM

## 2023-10-17 DIAGNOSIS — K76.0 FATTY LIVER: ICD-10-CM

## 2023-10-17 DIAGNOSIS — R11.2 CINV (CHEMOTHERAPY-INDUCED NAUSEA AND VOMITING): ICD-10-CM

## 2023-10-17 DIAGNOSIS — L72.3 SEBACEOUS CYST: ICD-10-CM

## 2023-10-17 DIAGNOSIS — Z71.89 ADVANCED DIRECTIVES, COUNSELING/DISCUSSION: ICD-10-CM

## 2023-10-17 DIAGNOSIS — R73.01 IMPAIRED FASTING GLUCOSE: ICD-10-CM

## 2023-10-17 DIAGNOSIS — E79.0 HYPERURICEMIA: ICD-10-CM

## 2023-10-17 DIAGNOSIS — N20.0 CALCULUS OF KIDNEY: ICD-10-CM

## 2023-10-17 DIAGNOSIS — T45.1X5A CINV (CHEMOTHERAPY-INDUCED NAUSEA AND VOMITING): ICD-10-CM

## 2023-10-17 DIAGNOSIS — C91.11 CHRONIC LYMPHOID LEUKEMIA IN REMISSION (H): ICD-10-CM

## 2023-10-17 DIAGNOSIS — U07.1 PNEUMONIA DUE TO COVID-19 VIRUS: ICD-10-CM

## 2023-10-17 LAB
ACANTHOCYTES BLD QL SMEAR: NORMAL
ALBUMIN SERPL BCG-MCNC: 4.2 G/DL (ref 3.5–5.2)
ALBUMIN SERPL BCG-MCNC: 4.2 G/DL (ref 3.5–5.2)
ALP SERPL-CCNC: 126 U/L (ref 35–104)
ALP SERPL-CCNC: 126 U/L (ref 35–104)
ALT SERPL W P-5'-P-CCNC: 16 U/L (ref 0–50)
ALT SERPL W P-5'-P-CCNC: 18 U/L (ref 0–50)
ANION GAP SERPL CALCULATED.3IONS-SCNC: 11 MMOL/L (ref 7–15)
ANION GAP SERPL CALCULATED.3IONS-SCNC: 13 MMOL/L (ref 7–15)
ANION GAP SERPL CALCULATED.3IONS-SCNC: 14 MMOL/L (ref 7–15)
AST SERPL W P-5'-P-CCNC: 18 U/L (ref 0–45)
AST SERPL W P-5'-P-CCNC: 19 U/L (ref 0–45)
AUER BODIES BLD QL SMEAR: NORMAL
BASO STIPL BLD QL SMEAR: NORMAL
BASO+EOS+MONOS # BLD AUTO: ABNORMAL 10*3/UL
BASO+EOS+MONOS NFR BLD AUTO: ABNORMAL %
BASOPHILS # BLD AUTO: 0 10E3/UL (ref 0–0.2)
BASOPHILS NFR BLD AUTO: 0 %
BILIRUB SERPL-MCNC: 0.4 MG/DL
BILIRUB SERPL-MCNC: 0.4 MG/DL
BITE CELLS BLD QL SMEAR: NORMAL
BLISTER CELLS BLD QL SMEAR: NORMAL
BUN SERPL-MCNC: 15.2 MG/DL (ref 8–23)
BUN SERPL-MCNC: 17.4 MG/DL (ref 8–23)
BUN SERPL-MCNC: 18.1 MG/DL (ref 8–23)
BURR CELLS BLD QL SMEAR: NORMAL
CALCIUM SERPL-MCNC: 8.9 MG/DL (ref 8.8–10.2)
CALCIUM SERPL-MCNC: 9.5 MG/DL (ref 8.8–10.2)
CALCIUM SERPL-MCNC: 9.6 MG/DL (ref 8.8–10.2)
CHLORIDE SERPL-SCNC: 80 MMOL/L (ref 98–107)
CHLORIDE SERPL-SCNC: 81 MMOL/L (ref 98–107)
CHLORIDE SERPL-SCNC: 83 MMOL/L (ref 98–107)
CREAT SERPL-MCNC: 0.71 MG/DL (ref 0.51–0.95)
CREAT SERPL-MCNC: 0.8 MG/DL (ref 0.51–0.95)
CREAT SERPL-MCNC: 0.87 MG/DL (ref 0.51–0.95)
DACRYOCYTES BLD QL SMEAR: NORMAL
DEPRECATED HCO3 PLAS-SCNC: 24 MMOL/L (ref 22–29)
DEPRECATED HCO3 PLAS-SCNC: 25 MMOL/L (ref 22–29)
DEPRECATED HCO3 PLAS-SCNC: 26 MMOL/L (ref 22–29)
EGFRCR SERPLBLD CKD-EPI 2021: 73 ML/MIN/1.73M2
EGFRCR SERPLBLD CKD-EPI 2021: 81 ML/MIN/1.73M2
EGFRCR SERPLBLD CKD-EPI 2021: >90 ML/MIN/1.73M2
ELLIPTOCYTES BLD QL SMEAR: NORMAL
EOSINOPHIL # BLD AUTO: 0.2 10E3/UL (ref 0–0.7)
EOSINOPHIL NFR BLD AUTO: 1 %
ERYTHROCYTE [DISTWIDTH] IN BLOOD BY AUTOMATED COUNT: 13.2 % (ref 10–15)
FRAGMENTS BLD QL SMEAR: NORMAL
GLUCOSE SERPL-MCNC: 245 MG/DL (ref 70–99)
GLUCOSE SERPL-MCNC: 94 MG/DL (ref 70–99)
GLUCOSE SERPL-MCNC: 98 MG/DL (ref 70–99)
HCT VFR BLD AUTO: 29.5 % (ref 35–47)
HGB BLD-MCNC: 10.9 G/DL (ref 11.7–15.7)
HGB C CRYSTALS: NORMAL
HOLD SPECIMEN: NORMAL
HOLD SPECIMEN: NORMAL
HOWELL-JOLLY BOD BLD QL SMEAR: NORMAL
IMM GRANULOCYTES # BLD: 0.1 10E3/UL
IMM GRANULOCYTES NFR BLD: 0 %
LYMPHOCYTES # BLD AUTO: 7 10E3/UL (ref 0.8–5.3)
LYMPHOCYTES NFR BLD AUTO: 50 %
MAGNESIUM SERPL-MCNC: 1.4 MG/DL (ref 1.7–2.3)
MAGNESIUM SERPL-MCNC: 1.5 MG/DL (ref 1.7–2.3)
MAGNESIUM SERPL-MCNC: 2.6 MG/DL (ref 1.7–2.3)
MCH RBC QN AUTO: 26.9 PG (ref 26.5–33)
MCHC RBC AUTO-ENTMCNC: 36.9 G/DL (ref 31.5–36.5)
MCV RBC AUTO: 73 FL (ref 78–100)
MONOCYTES # BLD AUTO: 1 10E3/UL (ref 0–1.3)
MONOCYTES NFR BLD AUTO: 7 %
NEUTROPHILS # BLD AUTO: 6 10E3/UL (ref 1.6–8.3)
NEUTROPHILS NFR BLD AUTO: 42 %
NEUTS HYPERSEG BLD QL SMEAR: NORMAL
NRBC # BLD AUTO: 0 10E3/UL
NRBC BLD AUTO-RTO: 0 /100
OSMOLALITY SERPL: 264 MMOL/KG (ref 280–301)
OSMOLALITY UR: 229 MMOL/KG (ref 100–1200)
PHOSPHATE SERPL-MCNC: 2.1 MG/DL (ref 2.5–4.5)
PHOSPHATE SERPL-MCNC: 3 MG/DL (ref 2.5–4.5)
PHOSPHATE SERPL-MCNC: 3.2 MG/DL (ref 2.5–4.5)
PLAT MORPH BLD: NORMAL
PLATELET # BLD AUTO: 307 10E3/UL (ref 150–450)
POLYCHROMASIA BLD QL SMEAR: NORMAL
POTASSIUM SERPL-SCNC: 2.6 MMOL/L (ref 3.4–5.3)
POTASSIUM SERPL-SCNC: 2.7 MMOL/L (ref 3.4–5.3)
POTASSIUM SERPL-SCNC: 3.5 MMOL/L (ref 3.4–5.3)
POTASSIUM SERPL-SCNC: 3.5 MMOL/L (ref 3.4–5.3)
PROT SERPL-MCNC: 7.2 G/DL (ref 6.4–8.3)
PROT SERPL-MCNC: 7.4 G/DL (ref 6.4–8.3)
RBC # BLD AUTO: 4.05 10E6/UL (ref 3.8–5.2)
RBC AGGLUT BLD QL: NORMAL
RBC MORPH BLD: NORMAL
ROULEAUX BLD QL SMEAR: NORMAL
SICKLE CELLS BLD QL SMEAR: NORMAL
SMUDGE CELLS BLD QL SMEAR: NORMAL
SODIUM SERPL-SCNC: 118 MMOL/L (ref 135–145)
SODIUM SERPL-SCNC: 119 MMOL/L (ref 135–145)
SODIUM SERPL-SCNC: 120 MMOL/L (ref 135–145)
SPHEROCYTES BLD QL SMEAR: NORMAL
STOMATOCYTES BLD QL SMEAR: NORMAL
TARGETS BLD QL SMEAR: NORMAL
TOXIC GRANULES BLD QL SMEAR: NORMAL
VARIANT LYMPHS BLD QL SMEAR: NORMAL
WBC # BLD AUTO: 14.3 10E3/UL (ref 4–11)

## 2023-10-17 PROCEDURE — 250N000009 HC RX 250: Performed by: STUDENT IN AN ORGANIZED HEALTH CARE EDUCATION/TRAINING PROGRAM

## 2023-10-17 PROCEDURE — 84300 ASSAY OF URINE SODIUM: CPT | Performed by: PHYSICIAN ASSISTANT

## 2023-10-17 PROCEDURE — 80053 COMPREHEN METABOLIC PANEL: CPT | Performed by: PHYSICIAN ASSISTANT

## 2023-10-17 PROCEDURE — 84100 ASSAY OF PHOSPHORUS: CPT | Performed by: PHYSICIAN ASSISTANT

## 2023-10-17 PROCEDURE — 80053 COMPREHEN METABOLIC PANEL: CPT | Performed by: STUDENT IN AN ORGANIZED HEALTH CARE EDUCATION/TRAINING PROGRAM

## 2023-10-17 PROCEDURE — 99223 1ST HOSP IP/OBS HIGH 75: CPT | Performed by: STUDENT IN AN ORGANIZED HEALTH CARE EDUCATION/TRAINING PROGRAM

## 2023-10-17 PROCEDURE — 84155 ASSAY OF PROTEIN SERUM: CPT | Performed by: PHYSICIAN ASSISTANT

## 2023-10-17 PROCEDURE — 83735 ASSAY OF MAGNESIUM: CPT | Performed by: STUDENT IN AN ORGANIZED HEALTH CARE EDUCATION/TRAINING PROGRAM

## 2023-10-17 PROCEDURE — 250N000013 HC RX MED GY IP 250 OP 250 PS 637: Performed by: PHYSICIAN ASSISTANT

## 2023-10-17 PROCEDURE — 85025 COMPLETE CBC W/AUTO DIFF WBC: CPT | Performed by: STUDENT IN AN ORGANIZED HEALTH CARE EDUCATION/TRAINING PROGRAM

## 2023-10-17 PROCEDURE — 258N000003 HC RX IP 258 OP 636: Performed by: STUDENT IN AN ORGANIZED HEALTH CARE EDUCATION/TRAINING PROGRAM

## 2023-10-17 PROCEDURE — 99223 1ST HOSP IP/OBS HIGH 75: CPT | Mod: FS | Performed by: STUDENT IN AN ORGANIZED HEALTH CARE EDUCATION/TRAINING PROGRAM

## 2023-10-17 PROCEDURE — 250N000011 HC RX IP 250 OP 636: Performed by: PHYSICIAN ASSISTANT

## 2023-10-17 PROCEDURE — 250N000011 HC RX IP 250 OP 636: Mod: JZ | Performed by: STUDENT IN AN ORGANIZED HEALTH CARE EDUCATION/TRAINING PROGRAM

## 2023-10-17 PROCEDURE — 84100 ASSAY OF PHOSPHORUS: CPT | Performed by: STUDENT IN AN ORGANIZED HEALTH CARE EDUCATION/TRAINING PROGRAM

## 2023-10-17 PROCEDURE — 83930 ASSAY OF BLOOD OSMOLALITY: CPT | Performed by: PHYSICIAN ASSISTANT

## 2023-10-17 PROCEDURE — 3E03305 INTRODUCTION OF OTHER ANTINEOPLASTIC INTO PERIPHERAL VEIN, PERCUTANEOUS APPROACH: ICD-10-PCS | Performed by: INTERNAL MEDICINE

## 2023-10-17 PROCEDURE — 120N000002 HC R&B MED SURG/OB UMMC

## 2023-10-17 PROCEDURE — 99207 PR APP CREDIT; MD BILLING SHARED VISIT: CPT | Mod: FS | Performed by: PHYSICIAN ASSISTANT

## 2023-10-17 PROCEDURE — 83935 ASSAY OF URINE OSMOLALITY: CPT | Performed by: PHYSICIAN ASSISTANT

## 2023-10-17 PROCEDURE — 83735 ASSAY OF MAGNESIUM: CPT | Performed by: PHYSICIAN ASSISTANT

## 2023-10-17 PROCEDURE — 250N000013 HC RX MED GY IP 250 OP 250 PS 637: Performed by: STUDENT IN AN ORGANIZED HEALTH CARE EDUCATION/TRAINING PROGRAM

## 2023-10-17 RX ORDER — ENOXAPARIN SODIUM 100 MG/ML
40 INJECTION SUBCUTANEOUS EVERY 24 HOURS
Status: DISCONTINUED | OUTPATIENT
Start: 2023-10-17 | End: 2023-10-24 | Stop reason: HOSPADM

## 2023-10-17 RX ORDER — EPINEPHRINE 1 MG/ML
0.3 INJECTION, SOLUTION, CONCENTRATE INTRAVENOUS EVERY 5 MIN PRN
Status: CANCELLED | OUTPATIENT
Start: 2023-10-17

## 2023-10-17 RX ORDER — OXYCODONE HYDROCHLORIDE 5 MG/1
5 TABLET ORAL EVERY 6 HOURS PRN
Status: DISCONTINUED | OUTPATIENT
Start: 2023-10-17 | End: 2023-10-20

## 2023-10-17 RX ORDER — AMOXICILLIN 250 MG
2 CAPSULE ORAL 2 TIMES DAILY PRN
Status: DISCONTINUED | OUTPATIENT
Start: 2023-10-17 | End: 2023-10-24 | Stop reason: HOSPADM

## 2023-10-17 RX ORDER — MEPERIDINE HYDROCHLORIDE 25 MG/ML
25 INJECTION INTRAMUSCULAR; INTRAVENOUS; SUBCUTANEOUS EVERY 30 MIN PRN
Status: DISCONTINUED | OUTPATIENT
Start: 2023-10-17 | End: 2023-10-24 | Stop reason: HOSPADM

## 2023-10-17 RX ORDER — DEXAMETHASONE SODIUM PHOSPHATE 10 MG/ML
10 INJECTION, SOLUTION INTRAMUSCULAR; INTRAVENOUS ONCE
Status: CANCELLED
Start: 2023-10-17

## 2023-10-17 RX ORDER — ALBUTEROL SULFATE 90 UG/1
1-2 AEROSOL, METERED RESPIRATORY (INHALATION)
Status: CANCELLED
Start: 2023-10-17

## 2023-10-17 RX ORDER — DEXTROSE MONOHYDRATE 50 MG/ML
10-20 INJECTION, SOLUTION INTRAVENOUS
Status: CANCELLED | OUTPATIENT
Start: 2023-10-24

## 2023-10-17 RX ORDER — MAGNESIUM SULFATE HEPTAHYDRATE 40 MG/ML
4 INJECTION, SOLUTION INTRAVENOUS ONCE
Status: COMPLETED | OUTPATIENT
Start: 2023-10-17 | End: 2023-10-17

## 2023-10-17 RX ORDER — MEPERIDINE HYDROCHLORIDE 25 MG/ML
25 INJECTION INTRAMUSCULAR; INTRAVENOUS; SUBCUTANEOUS EVERY 30 MIN PRN
Status: CANCELLED | OUTPATIENT
Start: 2023-10-17

## 2023-10-17 RX ORDER — SODIUM CHLORIDE 9 MG/ML
INJECTION, SOLUTION INTRAVENOUS CONTINUOUS
Status: DISCONTINUED | OUTPATIENT
Start: 2023-10-17 | End: 2023-10-18

## 2023-10-17 RX ORDER — LOSARTAN POTASSIUM 100 MG/1
100 TABLET ORAL EVERY EVENING
Status: DISCONTINUED | OUTPATIENT
Start: 2023-10-17 | End: 2023-10-24 | Stop reason: HOSPADM

## 2023-10-17 RX ORDER — AMOXICILLIN 250 MG
1 CAPSULE ORAL 2 TIMES DAILY PRN
Status: DISCONTINUED | OUTPATIENT
Start: 2023-10-17 | End: 2023-10-24 | Stop reason: HOSPADM

## 2023-10-17 RX ORDER — EPINEPHRINE 1 MG/ML
0.3 INJECTION, SOLUTION, CONCENTRATE INTRAVENOUS EVERY 5 MIN PRN
Status: DISCONTINUED | OUTPATIENT
Start: 2023-10-17 | End: 2023-10-24 | Stop reason: HOSPADM

## 2023-10-17 RX ORDER — CHLORTHALIDONE 25 MG/1
25 TABLET ORAL DAILY
Status: DISCONTINUED | OUTPATIENT
Start: 2023-10-18 | End: 2023-10-22

## 2023-10-17 RX ORDER — NALOXONE HYDROCHLORIDE 0.4 MG/ML
0.4 INJECTION, SOLUTION INTRAMUSCULAR; INTRAVENOUS; SUBCUTANEOUS
Status: DISCONTINUED | OUTPATIENT
Start: 2023-10-17 | End: 2023-10-24 | Stop reason: HOSPADM

## 2023-10-17 RX ORDER — DIPHENHYDRAMINE HYDROCHLORIDE 50 MG/ML
50 INJECTION INTRAMUSCULAR; INTRAVENOUS
Status: DISCONTINUED | OUTPATIENT
Start: 2023-10-17 | End: 2023-10-24 | Stop reason: HOSPADM

## 2023-10-17 RX ORDER — ONDANSETRON 4 MG/1
8 TABLET, FILM COATED ORAL EVERY 8 HOURS
Status: CANCELLED
Start: 2023-10-17

## 2023-10-17 RX ORDER — DULOXETIN HYDROCHLORIDE 30 MG/1
30 CAPSULE, DELAYED RELEASE ORAL 2 TIMES DAILY
Status: DISCONTINUED | OUTPATIENT
Start: 2023-10-17 | End: 2023-10-20

## 2023-10-17 RX ORDER — NALOXONE HYDROCHLORIDE 0.4 MG/ML
0.2 INJECTION, SOLUTION INTRAMUSCULAR; INTRAVENOUS; SUBCUTANEOUS
Status: DISCONTINUED | OUTPATIENT
Start: 2023-10-17 | End: 2023-10-24 | Stop reason: HOSPADM

## 2023-10-17 RX ORDER — PROCHLORPERAZINE MALEATE 5 MG
5 TABLET ORAL EVERY 6 HOURS PRN
Status: CANCELLED
Start: 2023-10-17

## 2023-10-17 RX ORDER — LORAZEPAM 2 MG/ML
.5-1 INJECTION INTRAMUSCULAR EVERY 6 HOURS PRN
Status: CANCELLED | OUTPATIENT
Start: 2023-10-17

## 2023-10-17 RX ORDER — LORAZEPAM 2 MG/ML
.5-1 INJECTION INTRAMUSCULAR EVERY 6 HOURS PRN
Status: DISCONTINUED | OUTPATIENT
Start: 2023-10-17 | End: 2023-10-24 | Stop reason: HOSPADM

## 2023-10-17 RX ORDER — LORAZEPAM 0.5 MG/1
.5-1 TABLET ORAL EVERY 6 HOURS PRN
Status: CANCELLED
Start: 2023-10-17

## 2023-10-17 RX ORDER — PRAVASTATIN SODIUM 20 MG
40 TABLET ORAL EVERY EVENING
Status: DISCONTINUED | OUTPATIENT
Start: 2023-10-17 | End: 2023-10-24 | Stop reason: HOSPADM

## 2023-10-17 RX ORDER — METHYLPREDNISOLONE SODIUM SUCCINATE 125 MG/2ML
125 INJECTION, POWDER, LYOPHILIZED, FOR SOLUTION INTRAMUSCULAR; INTRAVENOUS
Status: DISCONTINUED | OUTPATIENT
Start: 2023-10-17 | End: 2023-10-24 | Stop reason: HOSPADM

## 2023-10-17 RX ORDER — DEXAMETHASONE SODIUM PHOSPHATE 10 MG/ML
10 INJECTION, SOLUTION INTRAMUSCULAR; INTRAVENOUS ONCE
Qty: 1 ML | Refills: 0 | Status: COMPLETED | OUTPATIENT
Start: 2023-10-17 | End: 2023-10-17

## 2023-10-17 RX ORDER — ACETAMINOPHEN 500 MG
1000 TABLET ORAL 3 TIMES DAILY
Status: DISCONTINUED | OUTPATIENT
Start: 2023-10-17 | End: 2023-10-21

## 2023-10-17 RX ORDER — ALBUTEROL SULFATE 0.83 MG/ML
2.5 SOLUTION RESPIRATORY (INHALATION)
Status: CANCELLED | OUTPATIENT
Start: 2023-10-17

## 2023-10-17 RX ORDER — OXYCODONE HYDROCHLORIDE 5 MG/1
5 TABLET ORAL PRN
COMMUNITY
End: 2023-11-08

## 2023-10-17 RX ORDER — DEXTROSE MONOHYDRATE 50 MG/ML
10-20 INJECTION, SOLUTION INTRAVENOUS
Status: DISCONTINUED | OUTPATIENT
Start: 2023-10-25 | End: 2023-10-24 | Stop reason: HOSPADM

## 2023-10-17 RX ORDER — DIPHENHYDRAMINE HYDROCHLORIDE 50 MG/ML
50 INJECTION INTRAMUSCULAR; INTRAVENOUS
Status: CANCELLED
Start: 2023-10-17

## 2023-10-17 RX ORDER — PROCHLORPERAZINE MALEATE 5 MG
5 TABLET ORAL EVERY 6 HOURS PRN
Status: DISCONTINUED | OUTPATIENT
Start: 2023-10-17 | End: 2023-10-24 | Stop reason: HOSPADM

## 2023-10-17 RX ORDER — LORAZEPAM 0.5 MG/1
.5-1 TABLET ORAL EVERY 6 HOURS PRN
Status: DISCONTINUED | OUTPATIENT
Start: 2023-10-17 | End: 2023-10-24 | Stop reason: HOSPADM

## 2023-10-17 RX ORDER — POTASSIUM CHLORIDE 750 MG/1
20 TABLET, EXTENDED RELEASE ORAL ONCE
Status: COMPLETED | OUTPATIENT
Start: 2023-10-17 | End: 2023-10-17

## 2023-10-17 RX ORDER — BISACODYL 10 MG
10 SUPPOSITORY, RECTAL RECTAL DAILY PRN
Status: DISCONTINUED | OUTPATIENT
Start: 2023-10-17 | End: 2023-10-24 | Stop reason: HOSPADM

## 2023-10-17 RX ORDER — CALCIPOTRIENE 50 UG/G
CREAM TOPICAL
Status: DISCONTINUED | OUTPATIENT
Start: 2023-10-21 | End: 2023-10-18

## 2023-10-17 RX ORDER — POTASSIUM CHLORIDE 750 MG/1
40 TABLET, EXTENDED RELEASE ORAL ONCE
Status: COMPLETED | OUTPATIENT
Start: 2023-10-17 | End: 2023-10-17

## 2023-10-17 RX ORDER — POLYETHYLENE GLYCOL 3350 17 G/17G
17 POWDER, FOR SOLUTION ORAL DAILY
Status: DISCONTINUED | OUTPATIENT
Start: 2023-10-17 | End: 2023-10-24 | Stop reason: HOSPADM

## 2023-10-17 RX ORDER — ONDANSETRON 8 MG/1
8 TABLET, FILM COATED ORAL EVERY 8 HOURS
Qty: 18 TABLET | Refills: 0 | Status: DISCONTINUED | OUTPATIENT
Start: 2023-10-17 | End: 2023-10-18

## 2023-10-17 RX ORDER — ALBUTEROL SULFATE 0.83 MG/ML
2.5 SOLUTION RESPIRATORY (INHALATION)
Status: DISCONTINUED | OUTPATIENT
Start: 2023-10-17 | End: 2023-10-24 | Stop reason: HOSPADM

## 2023-10-17 RX ORDER — CLOBETASOL PROPIONATE 0.5 MG/G
CREAM TOPICAL
Status: DISCONTINUED | OUTPATIENT
Start: 2023-10-17 | End: 2023-10-18

## 2023-10-17 RX ORDER — ALBUTEROL SULFATE 90 UG/1
1-2 AEROSOL, METERED RESPIRATORY (INHALATION)
Status: DISCONTINUED | OUTPATIENT
Start: 2023-10-17 | End: 2023-10-24 | Stop reason: HOSPADM

## 2023-10-17 RX ORDER — ACETAMINOPHEN 500 MG
1000 TABLET ORAL 3 TIMES DAILY
COMMUNITY
End: 2023-10-30

## 2023-10-17 RX ADMIN — PRAVASTATIN SODIUM 40 MG: 20 TABLET ORAL at 20:06

## 2023-10-17 RX ADMIN — ONDANSETRON 8 MG: 8 TABLET, FILM COATED ORAL at 14:36

## 2023-10-17 RX ADMIN — FOSAPREPITANT 150 MG: 150 INJECTION, POWDER, LYOPHILIZED, FOR SOLUTION INTRAVENOUS at 14:42

## 2023-10-17 RX ADMIN — THIAMINE HCL TAB 100 MG 100 MG: 100 TAB at 13:39

## 2023-10-17 RX ADMIN — POTASSIUM CHLORIDE 40 MEQ: 750 TABLET, EXTENDED RELEASE ORAL at 15:07

## 2023-10-17 RX ADMIN — POTASSIUM CHLORIDE 20 MEQ: 750 TABLET, EXTENDED RELEASE ORAL at 18:13

## 2023-10-17 RX ADMIN — ENOXAPARIN SODIUM 40 MG: 40 INJECTION SUBCUTANEOUS at 13:41

## 2023-10-17 RX ADMIN — SODIUM PHOSPHATE, MONOBASIC, MONOHYDRATE AND SODIUM PHOSPHATE, DIBASIC, ANHYDROUS 9 MMOL: 142; 276 INJECTION, SOLUTION INTRAVENOUS at 22:55

## 2023-10-17 RX ADMIN — DEXAMETHASONE SODIUM PHOSPHATE 10 MG: 10 INJECTION INTRAMUSCULAR; INTRAVENOUS at 14:37

## 2023-10-17 RX ADMIN — MAGNESIUM SULFATE IN WATER 4 G: 40 INJECTION, SOLUTION INTRAVENOUS at 15:12

## 2023-10-17 RX ADMIN — POLYETHYLENE GLYCOL 3350 17 G: 17 POWDER, FOR SOLUTION ORAL at 13:39

## 2023-10-17 RX ADMIN — ACETAMINOPHEN 1000 MG: 500 TABLET ORAL at 20:06

## 2023-10-17 RX ADMIN — LOSARTAN POTASSIUM 100 MG: 100 TABLET, FILM COATED ORAL at 20:06

## 2023-10-17 RX ADMIN — DULOXETINE HYDROCHLORIDE 30 MG: 30 CAPSULE, DELAYED RELEASE ORAL at 20:06

## 2023-10-17 RX ADMIN — SODIUM CHLORIDE: 9 INJECTION, SOLUTION INTRAVENOUS at 22:55

## 2023-10-17 RX ADMIN — SODIUM BICARBONATE: 84 INJECTION, SOLUTION INTRAVENOUS at 17:31

## 2023-10-17 RX ADMIN — DOXORUBICIN HYDROCHLORIDE 73 MG: 2 INJECTION, SUSPENSION, LIPOSOMAL INTRAVENOUS at 16:15

## 2023-10-17 RX ADMIN — ONDANSETRON 8 MG: 8 TABLET, FILM COATED ORAL at 21:15

## 2023-10-17 ASSESSMENT — ACTIVITIES OF DAILY LIVING (ADL)
ADLS_ACUITY_SCORE: 20

## 2023-10-17 NOTE — PROGRESS NOTES
Admitted/transferred from:   2 RN full   skin assessment completed by Laura Morrow, RN and Bhupendra Phipps RN .  Skin assessment finding: issues found PICC LUE    Interventions/actions: skin interventions Monitor PICC      Will continue to monitor.

## 2023-10-17 NOTE — PROGRESS NOTES
TRIAGE NOTE:   Mirta Cardenas is a 68 year old female with PMHx of RLE sarcoma who presents for planned admission for inpatient chemotherapy.     Spoke with oncology, Dr. Gomez, who states that all chemotherapy orders should be in the therapy plan and he will see the patient later today.     Patient assigned to [facility] service, providers updated.     Dee Cameron MD  Internal Medicine/Pediatrics Hospitalist   of Internal Medicine and Pediatrics  [facility]  Pager: [phone]

## 2023-10-17 NOTE — CONSULTS
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 Nam and Dr. Armstrong's excellent med onc and rad onc notes from 10/11 and 10/16.    I personally reviewed the scans and pathologythat demonstrated seemingly localized sarcoma, high grade tumor on pathology.  TTE on 10/13 with with EF 60%, some strain.      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      Recommendations:    Given very low lytes today, will recommend holding off on chemo today.  Unsure of chronicity/ cause.  Electrolyte workup with urine osms and other investigations per primary team.  Chemo IVF can be further hypotonic so don't want to make things worse, or overcorrect too quickly.    Hold chemo tonight, further decisions on when to start per repeat labs tomorrow.  We will follow and advise.        Plan for doxil-ifos- this will be her cycle 1  Doxil 40mg/m2 and ifosfamide 8g/m2 over 5 days    Per Dr. Jimenez-- have not requested appts yet  -Follow up after chemo ISIDORO 10/30   -Neulasta after chemo 10/23   -She needs 10 days of prophylactic levaquin 500 mg daily starting on day of discharge at completion of chemotherapy.  -Labs and infusion appointment on 10/25, 10/27, 10/30, , 11/3, " 11/6, 11/8, 11/10   -Follow up Dr. Jimenez 11/7/23     Consider non-urgent PT consult with R leg sarcoma-- she has 6-7 steps in house and has been feeling unsteady      I spent a total of 90 minutes on the date of service including preparation time (e.g., review of records and interpretation of tests), visit time with the patient and care partners, requesting interventions, communicating with other health care professionals, and documentation.    Bob Gomez M.D.   of Medicine  Division of Hematology, Oncology and Transplantation  [facility]

## 2023-10-17 NOTE — PHARMACY-ADMISSION MEDICATION HISTORY
Pharmacist Admission Medication History    Admission medication history is complete. The information provided in this note is only as accurate as the sources available at the time of the update.    Information Source(s): Patient, Clinic records, and CareEverywhere/SureScripts via in-person    Pertinent Information: None    Changes made to PTA medication list:  Added: Tylenol, Tylenol PM, Oxycodone  Deleted: Cyclobenzaprine, triamcinolone cream  Changed: Advair changed to PRN    Medication Affordability:       Allergies reviewed with patient and updates made in EHR: yes    Medication History Completed By: Feliz Snider [facility] 10/17/2023 11:32 AM    PTA Med List   Medication Sig Last Dose    acetaminophen (TYLENOL) 500 MG tablet Take 1,000 mg by mouth 3 times daily 10/17/2023 at AM    ASPIRIN 81 MG OR TABS Take 81 mg by mouth daily  10/16/2023 at AM    calcipotriene (DOVONOX) 0.005 % external cream Apply twice daily to areas of psoriasis on Saturday and Sunday. Past Week    chlorthalidone (HYGROTON) 25 MG tablet Take 1 tablet (25 mg) by mouth daily 10/17/2023 at AM    clobetasol (TEMOVATE) 0.05 % external cream Apply twice daily to psoriasis on Monday through Friday. 10/17/2023    diphenhydrAMINE-acetaminophen (TYLENOL PM)  MG tablet Take 1 tablet by mouth nightly as needed for sleep 10/16/2023 at PM    DULoxetine (CYMBALTA) 30 MG capsule Take 1 capsule (30 mg) by mouth 2 times daily 10/17/2023 at AM    fluticasone-salmeterol (ADVAIR-HFA) 45-21 MCG/ACT inhaler Inhale 2 puffs into the lungs 2 times daily (Patient taking differently: Inhale 2 puffs into the lungs 2 times daily as needed) More than a month at PRN    losartan (COZAAR) 100 MG tablet Take 1 tablet (100 mg) by mouth daily 10/16/2023 at PM    METAMUCIL FIBER PO  Past Week at PRN    Multiple Vitamins-Calcium (ONE-A-DAY WOMENS FORMULA PO) Take 1 tablet by mouth daily 10/17/2023 at AM    omeprazole (PRILOSEC) 20 MG DR capsule Take 1 capsule by mouth every  morning. 10/17/2023 at AM    oxyCODONE (ROXICODONE) 5 MG tablet Take 5 mg by mouth every 6 hours as needed for severe pain 10/16/2023 at PRN    pravastatin (PRAVACHOL) 40 MG tablet Take 1 tablet (40 mg) by mouth daily 10/16/2023 at PM

## 2023-10-17 NOTE — PLAN OF CARE
Goal Outcome Evaluation:      Plan of Care Reviewed With: patient, spouse    Overall Patient Progress: no changeOverall Patient Progress: no change  Nursing Focus: Admission  D: Arrived at 1030 from home. Patient accompanied by . Admitted for chemotherapy.   I: Admission process began.  Patient oriented to room, enviroment, call light.  Md. notified of patients arrival on unit.   A: Vital signs stable, afebrile.  Patient stable at this time.  Complaining of leg (tumor site) pain.   P: Implement plan of care when available. Continue to monitor patient. Nursing interventions as appropriate. Notify md with changes in pt status.    Potassium, sodium, Magnesium all low. PA notified. Labs redrawn. Continue low. Replacement ordered for Potassium and Magnesium.  Labs will also reordered again to be drawn peripherally. Premeds for chemo given. Chemo to begin next shift

## 2023-10-17 NOTE — H&P
[facility]    History and Physical - Hospitalist Service, GOLD TEAM 4       Date of Admission:  10/17/2023    Assessment & Plan     Ms. Mirta Cardenas is a pleasant 69 yo female with hx of HTN, psoriasis, HLD, GERD, remote hx of CLL, and RLE sarcoma diagnosed earlier this year admitted to [facility] as direct admit to proceed with chemotherapy as directed by Oncology team.     # RLE high grade, pleomorphic sarcoma  - Chemo and other management per Oncology.   - Continue PTA prn oxycodone and Tylenol for pain    # Hypokalemia  # Hyponatremia  # Hypomagnesemia  Admission and repeat labs today reveal an initial Na of 118 with repeat 119. Repeat K 2.7 and Mg 1.5. Question due to pt's admitting to drinking quite a bit of water combined with po po food intake PTA.   - Start FR of 1000 ml  - Continue MIVFs as ordered  - Start K and Mg RN replacement protocols.   - Repeat levels tonight at 8 pm along with serum Os, and again tomorrow am  - Obtain urine Na and Os  - If repeat Na level tonight >/=25, discontinue IVFs, FR and encourage increased free water intake  - Place PTA chlorthalidone on hold.     # Leukocytosis: Likely 2/2 stress response as pt afebrile and denies sxs of underlying infection.   - Repeat CBC tomorrow am.    # HTN: PTA on losartan 100 mg daily and chlorthalidone 25 mg daily. Current BP at goal.   - Continue PTA losartan and holding chlorthalidone    # Psoriasis: Continue PTA topicals (pt would prefer to use her home supply if possible)    # GERD: No current concerns  - Continue PTA daily PPI    # HLD: Continue PTA statin    # Hx of reactive airway disease: Pt denies hx of asthma and has not used any inhalers for over a year. Denies dyspnea and no e/o bronchospasm on exam.  - Noted.        Diet: Combination Diet Regular Diet Adult    DVT Prophylaxis: Enoxaparin (Lovenox) SQ  Branham Catheter: Not present  Lines: PRESENT      PICC 10/17/23 Double Lumen Left Basilic-Site  Assessment: WDL  Cardiac Monitoring: None  Code Status: Full Code      Disposition Plan TBD     Expected Discharge Date: 10/19/2023                The patient's care was discussed with the Attending Physician, Dr. Mclaughlin, Bedside Nurse, Patient, and Patient's Family.    Solomon Melton PA-C  Hospitalist Service, GOLD TEAM [address] [place] [address]  Securely message with [employer] (more info)  Text page via [facility] Paging/Directory   See signed in provider for up to date coverage information  ______________________________________________________________________    Chief Complaint   RLE sarcoma    History is obtained from the patient and electronic health record    History of Present Illness   Please refer to Oncology clinic note by Dr. Nam dated 10/11/23 for full details. In brief, Ms. Mirta Cardenas is a pleasant 67 yo female with hx of HTN, psoriasis, HLD, GERD, remote hx of CLL, and RLE sarcoma diagnosed earlier this year admitted to [facility] as direct admit to proceed with chemotherapy as directed by Oncology team.     Pt states RLE pain from sarcoma improved since starting Cymbalta ~ 1 week ago. Infrequent need for PTA prn oxycodone. Feels developing constipation. Admits to drinking quite a bit of water and relatively poor po food take PTA. Denies fevers, chills, chest pain, SOB, cough, abd pain, nausea, and bladder concerns.       Past Medical History    Past Medical History:   Diagnosis Date    Asthma     Asthma     reactive airway disease, per patient    Blood transfusion     Cancer (H)     chronic lymphocytic leukemia    History of transfusion     Hypertension     Hypertension     Leukemia, chronic lymphocytic (H)     Malignant neoplasm (H)     CLL       Past Surgical History   Past Surgical History:   Procedure Laterality Date    ABDOMEN SURGERY      c section *3    APPENDECTOMY      C/SECTION, CLASSICAL  ,,    , Classical    COLONOSCOPY       HYSTERECTOMY      HYSTERECTOMY, PAP NO LONGER INDICATED  01/01/1998    PICC DOUBLE LUMEN PLACEMENT Left 10/17/2023    left basilic 4 fr dl power picc 41 cm    RELEASE CARPAL TUNNEL  06/05/2012    Procedure:RELEASE CARPAL TUNNEL; Right Carpal Tunnel Release & Right Ring A1 Pulley Release; Surgeon:SERGEY HEATON; Location:[facility] OR    RELEASE TRIGGER FINGER  06/05/2012    Procedure:RELEASE TRIGGER FINGER; Surgeon:SERGEY HEATON; Location:[facility]    RELEASE TRIGGER FINGER  10/12/2012    Procedure: RELEASE TRIGGER FINGER;  Right Thumb A1 Pulley Release;  Surgeon: Sergey Heaton MD;  Location: [facility] OR    SALPINGO OOPHORECTOMY,R/L/ULICES  05/02/2008    right       Prior to Admission Medications   Prior to Admission Medications   Prescriptions Last Dose Informant Patient Reported? Taking?   ASPIRIN 81 MG OR TABS 10/16/2023 at AM Self Yes Yes   Sig: Take 81 mg by mouth daily    DULoxetine (CYMBALTA) 30 MG capsule 10/17/2023 at AM  No Yes   Sig: Take 1 capsule (30 mg) by mouth 2 times daily   METAMUCIL FIBER PO Past Week at PRN  Yes Yes   Multiple Vitamins-Calcium (ONE-A-DAY WOMENS FORMULA PO) 10/17/2023 at AM Self Yes Yes   Sig: Take 1 tablet by mouth daily   acetaminophen (TYLENOL) 500 MG tablet 10/17/2023 at AM  Yes Yes   Sig: Take 1,000 mg by mouth 3 times daily   calcipotriene (DOVONOX) 0.005 % external cream Past Week  No Yes   Sig: Apply twice daily to areas of psoriasis on Saturday and Sunday.   chlorthalidone (HYGROTON) 25 MG tablet 10/17/2023 at AM  No Yes   Sig: Take 1 tablet (25 mg) by mouth daily   clobetasol (TEMOVATE) 0.05 % external cream 10/17/2023  No Yes   Sig: Apply twice daily to psoriasis on Monday through Friday.   diphenhydrAMINE-acetaminophen (TYLENOL PM)  MG tablet 10/16/2023 at PM  Yes Yes   Sig: Take 1 tablet by mouth nightly as needed for sleep   losartan (COZAAR) 100 MG tablet 10/16/2023 at PM  No Yes   Sig: Take 1 tablet (100 mg) by mouth daily   omeprazole (PRILOSEC) 20 MG DR capsule  10/17/2023 at AM Self Yes Yes   Sig: Take 1 capsule by mouth every morning.   oxyCODONE (ROXICODONE) 5 MG tablet 10/16/2023 at PRN  Yes Yes   Sig: Take 5 mg by mouth every 6 hours as needed for severe pain   pravastatin (PRAVACHOL) 40 MG tablet 10/16/2023 at PM  No Yes   Sig: Take 1 tablet (40 mg) by mouth daily      Facility-Administered Medications: None        Review of Systems    The 10 point Review of Systems is negative other than noted in the HPI or here.     Social History   I have reviewed this patient's social history and updated it with pertinent information if needed.  Social History     Tobacco Use    Smoking status: Never    Smokeless tobacco: Never   Vaping Use    Vaping Use: Never used   Substance Use Topics    Alcohol use: Yes     Comment: Occasionally    Drug use: No         Family History   I have reviewed this patient's family history and updated it with pertinent information if needed.  Family History   Problem Relation Age of Onset    Hypertension Brother     Heart Disease Brother 65        bypass    Cancer Sister         multiple myloma    Obesity Son     Obesity Son     Hypertension Son     Osteoporosis Mother     Melanoma No family hx of          Allergies   Allergies   Allergen Reactions    Allopurinol Itching    Amoxicillin Hives    Codeine Itching    Periactin Other (See Comments)     Sleepy.    Tenormin [Atenolol] Fatigue        Physical Exam   Vital Signs: Temp: 97.9  F (36.6  C) Temp src: Oral BP: 134/73 Pulse: 88   Resp: 16 SpO2: 100 % O2 Device: None (Room air)    Weight: 166 lbs 12.8 oz  GEN: In NAD  HEENT: NCAT; PERRL; sclerae non-icteric  LUNGS: CTAB  CV: RRR  ABD: +BSs; SNTND  EXT: No BLE edema  SKIN: No acute rashes noted on exposed areas.  NEURO: AAOx3; CNs grossly intact; No acute focal deficits noted.      Medical Decision Making       60 MINUTES SPENT BY ME on the date of service doing chart review, history, exam, documentation & further activities per the note.      Data    CMP  Recent Labs   Lab 10/17/23  1330 10/17/23  1157   * 118*   POTASSIUM 2.7* 2.6*   CHLORIDE 81* 80*   CO2 25 24   ANIONGAP 13 14   GLC 94 98   BUN 17.4 18.1   CR 0.80 0.87   GFRESTIMATED 81 73   MONIK 9.6 9.5   MAG 1.5* 1.4*   PHOS 3.2 3.0   PROTTOTAL 7.4 7.2   ALBUMIN 4.2 4.2   BILITOTAL 0.4 0.4   ALKPHOS 126* 126*   AST 19 18   ALT 18 16     CBC  Recent Labs   Lab 10/17/23  1158   WBC 14.3*   RBC 4.05   HGB 10.9*   HCT 29.5*   MCV 73*   MCH 26.9   MCHC 36.9*   RDW 13.2

## 2023-10-17 NOTE — PROCEDURES
[facility]    Double Lumen PICC Placement    Date/Time: 10/17/2023 11:03 AM    Performed by: Zita Bhakta RN  Authorized by: Roney Nam MD  Indications: vascular access      UNIVERSAL PROTOCOL   Site Marked: Yes  Prior Images Obtained and Reviewed:  Yes  Required items: Required blood products, implants, devices and special equipment available    Patient identity confirmed:  Verbally with patient and arm band  NA - No sedation, light sedation, or local anesthesia  Confirmation Checklist:  Patient's identity using two indicators and relevant allergies  Time out: Immediately prior to the procedure a time out was called    Universal Protocol: the Joint Commission Universal Protocol was followed    Preparation: Patient was prepped and draped in usual sterile fashion       ANESTHESIA    Anesthesia:  Local infiltration  Local Anesthetic:  Lidocaine 1% without epinephrine  Anesthetic Total (mL):  5      SEDATION    Patient Sedated: No        Preparation: skin prepped with ChloraPrep  Skin prep agent: skin prep agent completely dried prior to procedure  Sterile barriers: maximum sterile barriers were used: cap, mask, sterile gown, sterile gloves, and large sterile sheet  Hand hygiene: hand hygiene performed prior to central venous catheter insertion  Type of line used: PICC  Catheter type: double lumen  Lumen Identification: Purple and Red  Catheter size: 4 Fr  Brand: Bard  Lot number: [id number]  Placement method: venipuncture, MST, ultrasound and tip navigation system  Number of attempts: 1  Difficulty threading catheter: no  Successful placement: yes  Orientation: left  Catheter to Vein (%): 21  Location: basilic vein (vd 0.68 cm)  Tip Location: SVC  Arm circumference: adults 10 cm  Extremity circumference: 28  Visible catheter length: 1  Total catheter length: 41  Dressing and securement: adhesive securement device, alcohol impregnated caps,  chlorhexidine disc applied, glue, securement device, site cleansed, statlock and transparent securement dressing  Post procedure assessment: blood return through all ports, placement verified by 3CG technology and free fluid flow  PROCEDURE   Patient Tolerance:  Patient tolerated the procedure well with no immediate complicationsDescribe Procedure: PICC ok to use  Disposal: sharps and needle count correct at the end of procedure, needles and guidewire disposed in sharps container

## 2023-10-17 NOTE — PLAN OF CARE
"Goal Outcome Evaluation:  [id number]    /65 (BP Location: Right arm)   Pulse 84   Temp 98.2  F (36.8  C) (Oral)   Resp 16   Ht 1.549 m (5' 1\")   Wt 75.7 kg (166 lb 12.8 oz)   SpO2 95%   BMI 31.52 kg/m      Reason for admission: Scheduled admission for initiation of chemotherapy  Activity: UAL  Pain: denies  Neuro: WNL  Cardiac: WNL  Respiratory: WNL  GI/: WNL, strict I&Os  Diet: Regular, 1L fluid restriction  Lines: dbl lumen L PICC  Wounds: n/a  Labs/imaging: electrolytes replaced.  Repeat BMP at 2000.      New changes this shift:  Mirta is doing well today.  Doxil ramp up was started, she tolerated it very well until about 30 minutes in when Dr Melton called and instructed me to stop the chemo until tomorrow with electrolyte recovery.      Plan: Continue to manage electrolytes and hope to restart chemo in the morning.      Continue to monitor and follow POC    "

## 2023-10-18 LAB
ALBUMIN SERPL BCG-MCNC: 4 G/DL (ref 3.5–5.2)
ALP SERPL-CCNC: 114 U/L (ref 35–104)
ALT SERPL W P-5'-P-CCNC: 17 U/L (ref 0–50)
ANION GAP SERPL CALCULATED.3IONS-SCNC: 10 MMOL/L (ref 7–15)
ANION GAP SERPL CALCULATED.3IONS-SCNC: 10 MMOL/L (ref 7–15)
ANION GAP SERPL CALCULATED.3IONS-SCNC: 11 MMOL/L (ref 7–15)
ANION GAP SERPL CALCULATED.3IONS-SCNC: 13 MMOL/L (ref 7–15)
AST SERPL W P-5'-P-CCNC: 18 U/L (ref 0–45)
BILIRUB SERPL-MCNC: 0.2 MG/DL
BUN SERPL-MCNC: 15.4 MG/DL (ref 8–23)
BUN SERPL-MCNC: 16.2 MG/DL (ref 8–23)
BUN SERPL-MCNC: 16.2 MG/DL (ref 8–23)
BUN SERPL-MCNC: 16.5 MG/DL (ref 8–23)
CALCIUM SERPL-MCNC: 8.1 MG/DL (ref 8.8–10.2)
CALCIUM SERPL-MCNC: 8.7 MG/DL (ref 8.8–10.2)
CALCIUM SERPL-MCNC: 8.8 MG/DL (ref 8.8–10.2)
CALCIUM SERPL-MCNC: 8.8 MG/DL (ref 8.8–10.2)
CHLORIDE SERPL-SCNC: 87 MMOL/L (ref 98–107)
CHLORIDE SERPL-SCNC: 87 MMOL/L (ref 98–107)
CHLORIDE SERPL-SCNC: 88 MMOL/L (ref 98–107)
CHLORIDE SERPL-SCNC: 94 MMOL/L (ref 98–107)
CREAT SERPL-MCNC: 0.65 MG/DL (ref 0.51–0.95)
CREAT SERPL-MCNC: 0.66 MG/DL (ref 0.51–0.95)
CREAT SERPL-MCNC: 0.69 MG/DL (ref 0.51–0.95)
CREAT SERPL-MCNC: 0.69 MG/DL (ref 0.51–0.95)
DEPRECATED HCO3 PLAS-SCNC: 22 MMOL/L (ref 22–29)
DEPRECATED HCO3 PLAS-SCNC: 25 MMOL/L (ref 22–29)
DEPRECATED HCO3 PLAS-SCNC: 26 MMOL/L (ref 22–29)
DEPRECATED HCO3 PLAS-SCNC: 26 MMOL/L (ref 22–29)
EGFRCR SERPLBLD CKD-EPI 2021: >90 ML/MIN/1.73M2
GLUCOSE SERPL-MCNC: 141 MG/DL (ref 70–99)
GLUCOSE SERPL-MCNC: 141 MG/DL (ref 70–99)
GLUCOSE SERPL-MCNC: 146 MG/DL (ref 70–99)
GLUCOSE SERPL-MCNC: 151 MG/DL (ref 70–99)
MAGNESIUM SERPL-MCNC: 1.7 MG/DL (ref 1.7–2.3)
MAGNESIUM SERPL-MCNC: 1.9 MG/DL (ref 1.7–2.3)
MAGNESIUM SERPL-MCNC: 2.1 MG/DL (ref 1.7–2.3)
PHOSPHATE SERPL-MCNC: 2.1 MG/DL (ref 2.5–4.5)
PHOSPHATE SERPL-MCNC: 2.1 MG/DL (ref 2.5–4.5)
PHOSPHATE SERPL-MCNC: 2.3 MG/DL (ref 2.5–4.5)
POTASSIUM SERPL-SCNC: 3.5 MMOL/L (ref 3.4–5.3)
POTASSIUM SERPL-SCNC: 3.7 MMOL/L (ref 3.4–5.3)
PROT SERPL-MCNC: 6.7 G/DL (ref 6.4–8.3)
SODIUM SERPL-SCNC: 123 MMOL/L (ref 135–145)
SODIUM SERPL-SCNC: 123 MMOL/L (ref 135–145)
SODIUM SERPL-SCNC: 124 MMOL/L (ref 135–145)
SODIUM SERPL-SCNC: 129 MMOL/L (ref 135–145)
SODIUM UR-SCNC: 20 MMOL/L

## 2023-10-18 PROCEDURE — 80048 BASIC METABOLIC PNL TOTAL CA: CPT | Performed by: PHYSICIAN ASSISTANT

## 2023-10-18 PROCEDURE — 250N000011 HC RX IP 250 OP 636: Performed by: STUDENT IN AN ORGANIZED HEALTH CARE EDUCATION/TRAINING PROGRAM

## 2023-10-18 PROCEDURE — 84100 ASSAY OF PHOSPHORUS: CPT | Performed by: STUDENT IN AN ORGANIZED HEALTH CARE EDUCATION/TRAINING PROGRAM

## 2023-10-18 PROCEDURE — 250N000011 HC RX IP 250 OP 636: Mod: JZ | Performed by: STUDENT IN AN ORGANIZED HEALTH CARE EDUCATION/TRAINING PROGRAM

## 2023-10-18 PROCEDURE — 250N000013 HC RX MED GY IP 250 OP 250 PS 637: Performed by: STUDENT IN AN ORGANIZED HEALTH CARE EDUCATION/TRAINING PROGRAM

## 2023-10-18 PROCEDURE — 83735 ASSAY OF MAGNESIUM: CPT | Performed by: PHYSICIAN ASSISTANT

## 2023-10-18 PROCEDURE — 258N000003 HC RX IP 258 OP 636: Performed by: STUDENT IN AN ORGANIZED HEALTH CARE EDUCATION/TRAINING PROGRAM

## 2023-10-18 PROCEDURE — 80048 BASIC METABOLIC PNL TOTAL CA: CPT | Performed by: STUDENT IN AN ORGANIZED HEALTH CARE EDUCATION/TRAINING PROGRAM

## 2023-10-18 PROCEDURE — 250N000011 HC RX IP 250 OP 636: Mod: JZ | Performed by: INTERNAL MEDICINE

## 2023-10-18 PROCEDURE — 250N000009 HC RX 250: Performed by: STUDENT IN AN ORGANIZED HEALTH CARE EDUCATION/TRAINING PROGRAM

## 2023-10-18 PROCEDURE — 120N000002 HC R&B MED SURG/OB UMMC

## 2023-10-18 PROCEDURE — 84100 ASSAY OF PHOSPHORUS: CPT | Performed by: PHYSICIAN ASSISTANT

## 2023-10-18 PROCEDURE — 250N000011 HC RX IP 250 OP 636: Mod: JZ | Performed by: PHYSICIAN ASSISTANT

## 2023-10-18 PROCEDURE — 80053 COMPREHEN METABOLIC PANEL: CPT | Performed by: STUDENT IN AN ORGANIZED HEALTH CARE EDUCATION/TRAINING PROGRAM

## 2023-10-18 PROCEDURE — 250N000013 HC RX MED GY IP 250 OP 250 PS 637: Performed by: PHYSICIAN ASSISTANT

## 2023-10-18 PROCEDURE — 99233 SBSQ HOSP IP/OBS HIGH 50: CPT | Mod: FS | Performed by: PHYSICIAN ASSISTANT

## 2023-10-18 PROCEDURE — 99207 PR APP CREDIT; MD BILLING SHARED VISIT: CPT | Mod: FS | Performed by: STUDENT IN AN ORGANIZED HEALTH CARE EDUCATION/TRAINING PROGRAM

## 2023-10-18 PROCEDURE — 83735 ASSAY OF MAGNESIUM: CPT | Performed by: STUDENT IN AN ORGANIZED HEALTH CARE EDUCATION/TRAINING PROGRAM

## 2023-10-18 PROCEDURE — 99232 SBSQ HOSP IP/OBS MODERATE 35: CPT | Performed by: INTERNAL MEDICINE

## 2023-10-18 RX ORDER — DEXAMETHASONE SODIUM PHOSPHATE 10 MG/ML
10 INJECTION, SOLUTION INTRAMUSCULAR; INTRAVENOUS ONCE
Status: COMPLETED | OUTPATIENT
Start: 2023-10-18 | End: 2023-10-18

## 2023-10-18 RX ORDER — CALCIPOTRIENE 50 UG/G
CREAM TOPICAL 2 TIMES DAILY PRN
Status: DISCONTINUED | OUTPATIENT
Start: 2023-10-18 | End: 2023-10-24 | Stop reason: HOSPADM

## 2023-10-18 RX ORDER — HEPARIN SODIUM,PORCINE 10 UNIT/ML
5-20 VIAL (ML) INTRAVENOUS EVERY 24 HOURS
Status: DISCONTINUED | OUTPATIENT
Start: 2023-10-18 | End: 2023-10-24 | Stop reason: HOSPADM

## 2023-10-18 RX ORDER — HEPARIN SODIUM,PORCINE 10 UNIT/ML
5-20 VIAL (ML) INTRAVENOUS
Status: DISCONTINUED | OUTPATIENT
Start: 2023-10-18 | End: 2023-10-24 | Stop reason: HOSPADM

## 2023-10-18 RX ORDER — CLOBETASOL PROPIONATE 0.5 MG/G
CREAM TOPICAL 2 TIMES DAILY PRN
Status: DISCONTINUED | OUTPATIENT
Start: 2023-10-18 | End: 2023-10-24 | Stop reason: HOSPADM

## 2023-10-18 RX ORDER — ONDANSETRON 8 MG/1
8 TABLET, FILM COATED ORAL EVERY 8 HOURS
Status: COMPLETED | OUTPATIENT
Start: 2023-10-18 | End: 2023-10-24

## 2023-10-18 RX ADMIN — ONDANSETRON HYDROCHLORIDE 8 MG: 8 TABLET, FILM COATED ORAL at 22:19

## 2023-10-18 RX ADMIN — PRAVASTATIN SODIUM 40 MG: 20 TABLET ORAL at 20:32

## 2023-10-18 RX ADMIN — ENOXAPARIN SODIUM 40 MG: 40 INJECTION SUBCUTANEOUS at 08:46

## 2023-10-18 RX ADMIN — POLYETHYLENE GLYCOL 3350 17 G: 17 POWDER, FOR SOLUTION ORAL at 08:44

## 2023-10-18 RX ADMIN — ACETAMINOPHEN 1000 MG: 500 TABLET ORAL at 08:42

## 2023-10-18 RX ADMIN — PROCHLORPERAZINE MALEATE 5 MG: 5 TABLET ORAL at 17:00

## 2023-10-18 RX ADMIN — Medication 5 ML: at 17:35

## 2023-10-18 RX ADMIN — MESNA 2910 MG: 100 INJECTION, SOLUTION INTRAVENOUS at 17:40

## 2023-10-18 RX ADMIN — ONDANSETRON 8 MG: 8 TABLET, FILM COATED ORAL at 14:32

## 2023-10-18 RX ADMIN — OMEPRAZOLE 20 MG: 20 CAPSULE, DELAYED RELEASE ORAL at 08:43

## 2023-10-18 RX ADMIN — ACETAMINOPHEN 1000 MG: 500 TABLET ORAL at 20:32

## 2023-10-18 RX ADMIN — ACETAMINOPHEN 1000 MG: 500 TABLET ORAL at 14:32

## 2023-10-18 RX ADMIN — SODIUM PHOSPHATE, MONOBASIC, MONOHYDRATE AND SODIUM PHOSPHATE, DIBASIC, ANHYDROUS 9 MMOL: 142; 276 INJECTION, SOLUTION INTRAVENOUS at 08:51

## 2023-10-18 RX ADMIN — THIAMINE HCL TAB 100 MG 100 MG: 100 TAB at 08:43

## 2023-10-18 RX ADMIN — DEXAMETHASONE SODIUM PHOSPHATE 10 MG: 10 INJECTION, SOLUTION INTRAMUSCULAR; INTRAVENOUS at 11:11

## 2023-10-18 RX ADMIN — ONDANSETRON 8 MG: 8 TABLET, FILM COATED ORAL at 06:15

## 2023-10-18 ASSESSMENT — ACTIVITIES OF DAILY LIVING (ADL)
ADLS_ACUITY_SCORE: 20

## 2023-10-18 NOTE — PLAN OF CARE
Goal Outcome Evaluation:      Plan of Care Reviewed With: patient    Overall Patient Progress: no changeOverall Patient Progress: no change       Time: 5689-2036    Reason for admission: right calf sarcoma, plan for C1 doxil-ifos starting 10/19  Activity: ind  Pain: denies  Neuro: WDL  Cardiac: WDL  Respiratory: WDL  GI/: strict I's and O's, 1L fluid restriction, voiding spontaneously, saving urine, LBM 10/15, pt states she took miralax this AM  Diet: combo reg diet  Lines: PICC placed today  Labs/imaging: Phos replaced overnight IV, recheck at 0600.   Vitals: VSS      This Shift: Cont fluids changed to NS 75 ml/hr. Phos replaced overnight IV, recheck at 0600. No PRNs requested/given. Pt is very sweet. No acute events this shift.       Continue POC..

## 2023-10-18 NOTE — PLAN OF CARE
[id number]    A&Ox4. VSS on RA. Denies pain, nausea, and SOB. Prn compazine given x1 prior to chemo. CIVI Ifosfamide started this shift and infusing, tolerating well. No s/s of Ifos toxicity present. Chemo education provided to patient and family at bedside. 1000mL fluid restriction maintained. Pt very complaint with fluid restriction. Writer gave remainder of fluid amount this shift per pt request. Good appetite. Voiding with adequate urine output. Up independently. No acute events this shift. Continue with plan of care.

## 2023-10-18 NOTE — PROGRESS NOTES
[facility]    Medicine Progress Note - Hospitalist Service, GOLD TEAM 4    Date of Admission:  10/17/2023    Assessment & Plan   Ms. Mirta Cardenas is a pleasant 69 yo female with hx of HTN, psoriasis, HLD, GERD, remote hx of CLL, and RLE sarcoma diagnosed earlier this year admitted to [facility] as direct admit to proceed with chemotherapy as directed by Oncology team.     # RLE high grade, pleomorphic sarcoma  - Chemo and other management per Oncology.   - Continue PTA prn oxycodone and Tylenol for pain     # Hypokalemia  # Hyponatremia  # Hypomagnesemia  Admission and repeat labs 10/17 reveal an initial Na of 118 with repeat 119. Repeat K 2.7 and Mg 1.5. Urina Na, os and serum os not conclusive. Likely multifactorial in etiology. Degree of low levels most likely not fully explained by pt admitting to drinking quite a bit of water combined with poor po food intake PTA. Likely being on chlorthalidone PTA contributing, as well as possible SIADH that developed from pt's recently prescribed duloxetine. Serial Na yesterday into today with modest improvement currently 1224.  - Repeat Na and lytes this evening, 0200 and again tomorrow am.   - Continue FR of 1000 ml  - Continue MIVFs as ordered  - If repeat Na level tonight >/=32, discontinue IVFs, FR and encourage increased free water intake  - Holding PTA chlorthalidone     # Leukocytosis: Likely 2/2 stress response as pt afebrile and denies sxs of underlying infection and or her hx of CLL  - Repeat CBC tomorrow am.     # HTN: PTA on losartan 100 mg daily and chlorthalidone 25 mg daily, the latter on hold. Current BP at goal.   - Continue PTA losartan and holding chlorthalidone     # Psoriasis: Continue PTA topicals prn (pt would prefer to use her home supply if possible)     # GERD: No current concerns  - Continue PTA daily PPI     # HLD: Continue PTA statin     # Hx of reactive airway disease: Pt denies hx of asthma and has not  used any inhalers for over a year. Denies dyspnea and no e/o bronchospasm on exam.  - Noted.        Diet: Combination Diet Regular Diet Adult  Fluid restriction 1800 ML FLUID    DVT Prophylaxis: Enoxaparin (Lovenox) SQ  Branham Catheter: Not present  Lines: PRESENT      PICC 10/17/23 Double Lumen Left Basilic-Site Assessment: WDL  Cardiac Monitoring: None  Code Status: Full Code        Disposition Plan Home after chemo finishes per Oncology     Expected Discharge Date: 10/23/2023        Discharge Comments: chemo          The patient's care was discussed with the Attending Physician, Dr. Mclaughlin, Bedside Nurse, and Patient.    Solomon Melton PA-C  Hospitalist Service, GOLD TEAM [address] [place] [address]  Securely message with [employer] (more info)  Text page via [facility] Paging/Directory   See signed in provider for up to date coverage information  ______________________________________________________________________    Interval History   NAEO. Cancer pain stable. Denies other acute physical concerns at this time.     Physical Exam   Vital Signs: Temp: 98.2  F (36.8  C) Temp src: Oral BP: 123/52 Pulse: 102   Resp: 16 SpO2: 96 % O2 Device: None (Room air)    Weight: 169 lbs 0 oz  GEN: In NAD  HEENT: NCAT; PERRL; sclerae non-icteric  LUNGS: CTAB  CV: RRR  ABD: +BSs; SNTND  EXT: No BLE edema  SKIN: No acute rashes noted on exposed areas.  NEURO: AAOx3; CNs grossly intact; No acute focal deficits noted.      Medical Decision Making       60 MINUTES SPENT BY ME on the date of service doing chart review, history, exam, documentation & further activities per the note.      Data   CMP  Recent Labs   Lab 10/18/23  0614 10/18/23  0223 10/17/23  2010 10/17/23  1330 10/17/23  1157   * 123*  123* 120* 119* 118*   POTASSIUM 3.7 3.7  3.7 3.5  3.5 2.7* 2.6*   CHLORIDE 88* 87*  87* 83* 81* 80*   CO2 25 26  26 26 25 24   ANIONGAP 11 10  10 11 13 14   * 141*  141* 245* 94 98   BUN  16.5 16.2  16.2 15.2 17.4 18.1   CR 0.66 0.69  0.69 0.71 0.80 0.87   GFRESTIMATED >90 >90  >90 >90 81 73   MONIK 8.7* 8.8  8.8 8.9 9.6 9.5   MAG 1.9 2.1 2.6* 1.5* 1.4*   PHOS 2.1* 2.3* 2.1* 3.2 3.0   PROTTOTAL  --  6.7  --  7.4 7.2   ALBUMIN  --  4.0  --  4.2 4.2   BILITOTAL  --  0.2  --  0.4 0.4   ALKPHOS  --  114*  --  126* 126*   AST  --  18  --  19 18   ALT  --  17  --  18 16     CBC  Recent Labs   Lab 10/17/23  1158   WBC 14.3*   RBC 4.05   HGB 10.9*   HCT 29.5*   MCV 73*   MCH 26.9   MCHC 36.9*   RDW 13.2

## 2023-10-18 NOTE — PROGRESS NOTES
CLINICAL NUTRITION SERVICES - ASSESSMENT NOTE     Nutrition Prescription    Malnutrition Status:    Patient does not meet two of the established criteria necessary for diagnosing malnutrition but is at risk for malnutrition due to impending chemotherapy.    Recommendations already ordered by Registered Dietitian (RD):  None at this time.    Future/Additional Recommendations:  None at this time.     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  HTN, GERD, hyperlipidemia, chronic lymphocytic leukemia.    NUTRITION HISTORY   Per chart, diminished appetite and slightly increased thirst over a week prior to admission and presented with hyponatremia, hypokalemia, and hypomagnesia. PICC placement 10/18.  Per pt encouter, on Thursday 10/12 of last week they were administered a medication which completely wiped out her appetite, but knew she needed to eat and could tolerate small amounts of cottage cheese and yogurt, otherwise indicated eating regularly has never been a problem for her. After medication ceased this week her appetite has been back to normal and feels she is eating just as she had prior to admission. Pt inquired about potential affects of appetite during chemotherapy, and this writer relayed small, frequent calorie dense meals and snacks were beneficial, as tolerated. Pt understood nutrition is around should she have questions or concerns about intake.    CURRENT NUTRITION ORDERS  Diet: Regular with 2000 mL Fluid Restriction  Intake/Tolerance: Per chart, pt feels as though they are developing constipation and have poor PO intake upon admission, but flowsheets showing 100% of a few meals taken with PO intake improving per chart. This was confirmed upon pt encounter.   Chemotherapy initiated 10/18. Pt inquired  "about potential affects of appetite during chemotherapy, and this writer relayed small, frequent calorie dense meals and snacks were beneficial if nausea should occur, as tolerated. Pt understood nutrition is available should she have questions or concerns about intake.    LABS  Labs reviewed  Hyponatremia seen with improvement (131)  Chloride seen with improvement (96)  Phos fluctuations (2.4)  BG relatively steady (111)  Hg/hematocrit low (8.7/25.2)  MCV low (76)    MEDICATIONS  Medications reviewed  NaCl 0.9%, Zofran (8 mg), Miralax (17 g packet), Dulcolax PRN, thiamine tablet (100 mg)    ANTHROPOMETRICS  Height: 154.9 cm (5' 1\")  Most Recent Weight: 76.7 kg (169 lb)    IBW: 47.7 kg  BMI: Obesity Grade I BMI 30-34.9 (31.93)  Weight History:   Wt Readings from Last 10 Encounters:   10/18/23 76.7 kg (169 lb)   10/16/23 75.3 kg (166 lb)   10/11/23 77.2 kg (170 lb 3.2 oz)   08/17/23 78.9 kg (174 lb)   06/09/23 78.5 kg (173 lb)   04/24/23 78.5 kg (173 lb)   05/24/22 82 kg (180 lb 12.8 oz)   05/14/21 79.7 kg (175 lb 12.8 oz)   03/05/21 79.7 kg (175 lb 9.6 oz)   10/26/20 79.6 kg (175 lb 6.4 oz)      Noted ~2 lb weight loss in around 4 months, but weight has stayed relatively stable over the past few months. Pt confirmed upon encounter that weight loss was not a concern, only the brief stint of decreased intake due to nausea and decreased appetite.  Per chart, no edema noted.    Dosing Weight: 55 kg    ASSESSED NUTRITION NEEDS  Estimated Energy Needs: 8086-5930 kcals/day (30 - 35 kcals/kg )  Justification: Increased needs due to initiation of chemotherapy  Estimated Protein Needs: 66-83 grams protein/day (1.2 - 1.5 grams of pro/kg)  Justification: Increased needs due to initiation of chemotherapy  Estimated Fluid Needs: </=2000 mL, recommendations per provider fluid status orders.    PHYSICAL FINDINGS  See malnutrition section below.  No abnormal nutrition-related physical findings observed.     MALNUTRITION  % Intake: < " 75% for > 7 days (moderate)  % Weight Loss: Weight loss does not meet criteria  Subcutaneous Fat Loss: None observed  Muscle Loss: None observed  Fluid Accumulation/Edema: None noted  Malnutrition Diagnosis: Patient does not meet two of the established criteria necessary for diagnosing malnutrition but is at risk for malnutrition due to impending chemotherapy.    NUTRITION DIAGNOSIS  Predicted inadequate nutrient intake (protein/kcals) related to symptoms of chemotherapy (N/V/diarrhea/early satiety) as evidenced by initiation of chemotherapy 10/18 and adverse reaction to medications last week.        INTERVENTIONS  Implementation  Nutrition Education: Will be provided if education needs arise     Goals  Patient to consume % of nutritionally adequate meal trays TID, or the equivalent with supplements/snacks.     Monitoring/Evaluation  Progress toward goals will be monitored and evaluated per protocol.   Lyndsey Bianchi  Dietetic Intern

## 2023-10-18 NOTE — PROGRESS NOTES
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 The plan after this is to go to neoadjuvant radiotherapy, followed by definitve surgical resection.  Incidentally discovered hyponatremia, hypokalemia, hypomagnesemia, not present 6 months ago. Etiology not clear at this point.  Excessive free water intake? Chlorthalidone? S/P replacement with improvement. This should not be a contraindication to proceeding with her chemotherapy (Doxil/Ifosfamide)        Plan:     Cycle 1 doxil/ifosfamide, given over 5 days. Okay to start with day 1 today.  Per Dr. Jimenez-- have not requested appts yet  -Follow up after chemo ISIDORO 10/30   -Neulasta after chemo 10/23   -She needs 10 days of prophylactic levaquin 500 mg daily starting on day of discharge at completion of chemotherapy.  -Labs and infusion appointment on 10/25, 10/27, 10/30, 11/1, 11/3, 11/6, 11/8, 11/10   -Follow up Dr. Jimenez 11/7/23           Griffin Duckworth MD, MSc  Associate Professor of Medicine  [employer]  [facility]  [address] [place] [address]  [phone]    __________________________________________________________________    Diagnosis     High grade pleomorphihc sarcoma of the right leg.  Low grade CLL, managed with observation.  Hepatosplenomegaly seen on CT/PET.      History of Present Illness/Therapy to Date:     68 year old previously healthy woman who noted intermittent, then progressive,  right calf pain. Plain films showed a destructive lesion in the mid right fibula. Biopsy 10/2 showed a high grade pleomorphic sarcoma. CT/PET 10/10/23 showed a  hypermetabolic right calf mass with osseous destruction of the adjacent fibular shaft consistent with biopsy-proven sarcoma.  There was no evidence of distant metastatic  "disease.        Interval history:     Feels well. Does feel the electrolyte abnormalities are due to taking duloxetine, which can cause an SIADH syndrome.  Feels much better now that her electrolytes are more normal.  No pain  No cough  No shortness of breath      On ROS in addition to the above      Denies  fevers, chills, NS, HA, dysphagia/odynophagia, change in weight, change in appetite, cough, SOB, CP, n/v, abd pain, constipation/diarrhea, hematochezia, dysuria, hematuria, swelling, rashes, lymphadenopathy      Past Medical History:   I have reviewed this patient's past medical history   Past Medical History:   Diagnosis Date    Asthma     Asthma     reactive airway disease, per patient    Blood transfusion     Cancer (H)     chronic lymphocytic leukemia    History of transfusion     Hypertension     Hypertension     Leukemia, chronic lymphocytic (H)     Malignant neoplasm (H)     CLL          Past Surgical History:    I have reviewed this patient's past surgical history       Social History:   Tobacco, ETOH, and rec drugs reviewed and as noted below with the following exceptions:   to [place]car[place]. Lives in [place]          Family History:     Family History   Problem Relation Age of Onset    Hypertension Brother     Heart Disease Brother 65        bypass    Cancer Sister         multiple myloma    Obesity Son     Obesity Son     Hypertension Son     Osteoporosis Mother     Melanoma No family hx of             Medications:     No current outpatient medications on file.              Physical Exam:   Blood pressure 128/74, pulse 90, temperature 98  F (36.7  C), temperature source Oral, resp. rate 16, height 1.549 m (5' 1\"), weight 76.7 kg (169 lb), SpO2 97%, not currently breastfeeding.    ECOG PS: 0  Constitutional: WDWN female in NAD, pleasant and appropriate  HEENT:  NC/AT, no icterus, OP clear, MMM  Skin: No jaundice nor ecchymoses  Lungs: CTAB, no w/r/r, nonlabored breathing  Cardiovascular: RRR, S1, " S2, no m/r/g  Abdomen: +BS, soft, nontender, nondistended, no organomegaly nor masses  MSK/Extremities: Warm, well perfused. No edema  LN: no cervical, supraclavicular, axillary, nor inguinal lymphadenopathy  Neurologic: alert, answering questions appropriately, moving all extremities spontaneously. CN 2-12 grossly intact.  Psych: appropriate affect  Data:     Recent Labs   Lab Test 10/17/23  1158 04/24/23  0747 05/24/22  0904 05/10/21  0650 10/18/20  0710 10/17/20  0836   WBC 14.3* 16.2* 18.0* 15.6*  --  8.0   NEUTROPHIL 42 41 31 29.0  --  52   HGB 10.9* 13.4 13.2 13.7  --  10.2*    273 248 226 258 231     Recent Labs   Lab Test 10/18/23  0614 10/18/23  0223 10/17/23  2010 10/17/23  1330 10/17/23  1157   * 123*  123* 120* 119* 118*   POTASSIUM 3.7 3.7  3.7 3.5  3.5 2.7* 2.6*   CHLORIDE 88* 87*  87* 83* 81* 80*   CO2 25 26  26 26 25 24   ANIONGAP 11 10  10 11 13 14   BUN 16.5 16.2  16.2 15.2 17.4 18.1   CR 0.66 0.69  0.69 0.71 0.80 0.87   MONIK 8.7* 8.8  8.8 8.9 9.6 9.5     Recent Labs   Lab Test 10/18/23  0614 10/18/23  0223 10/17/23  2010 10/17/23  1157 10/17/20  0836 10/16/20  1031 10/15/20  0709   MAG 1.9 2.1 2.6*   < >  --   --   --    PHOS 2.1* 2.3* 2.1*   < >  --   --   --    LDH  --   --   --   --  347* 305* 306*    < > = values in this interval not displayed.     Recent Labs   Lab Test 10/18/23  0223 10/17/23  1330 10/17/23  1157 10/17/20  0836 10/16/20  1031 10/15/20  0709   BILITOTAL 0.2 0.4 0.4  --   --   --    ALKPHOS 114* 126* 126*  --   --   --    ALT 17 18 16  --   --   --    AST 18 19 18  --   --   --    ALBUMIN 4.0 4.2 4.2  --   --   --    LDH  --   --   --  347* 305* 306*       No results found for this or any previous visit (from the past 24 hour(s)).    Other data           Labs, imaging and treatment plan reviewed with patient. All questions answered.        45 minutes spent on the date of the encounter doing chart review, review of outside records, review of test results,  interpretation of tests, patient visit, documentation, discussion with other provider(s), and discussion with family

## 2023-10-18 NOTE — PROGRESS NOTES
Nursing Focus: Chemotherapy    D: Positive blood return via PICC, red lumen. Insertion site is clean/dry/intact, dressing intact with no complaints of pain. Urine output is recorded in intake in Doc Flowsheet.    I: Premedications given per order (see electronic medical administration record). Dose #1 of Ifosfamide started to infuse over 24 hours. Reviewed pt teaching on chemotherapy side effects. Pt denies need for further teaching. Chemotherapy double checked per protocol by two chemotherapy competent RN's.   A: Tolerating procedure well. Denies nausea and or pain.   P: Continue to monitor urine output and symptoms of nausea. Screen for symptoms of toxicity.            Ifosfamide Toxicity Assessment      (FIRST BASELINE ASSESSMENT)    Neuro asssessment completed: Yes, significant findings were: none      Patient is Alert & Oriented x4. There are signs of lethargy, confusion or hallucinations: No    Patient is having new incontinence of bowel or bladder: No    Pincer Grasp intact: Yes    Tremors: No     Provider was notified: No. First dose of Ifosfamide started. Education provided to patient on s/s of Ifosfamide toxicity. Pt is aware of when to notify nursing staff.     -- Monitor for s/sx of ifosfamide toxicity: hallucinations, AMS, emotional lability, somnolence, myoclonic jerks, tremors, coordination difficulties, etc. If these occur, please call the fellow on call for recommendations

## 2023-10-18 NOTE — PROGRESS NOTES
Chemo note:  D/I: pt received decadron 10mg IV as premed. Brisk blood return from PICC . Doxil  infusion initiated at 25cc/hr and ramped up to 100cc/hr slowly per ramp up instructions.   A/P: Pt tolerated well. Good urine output. Denies nausea. Continue to monitor for side effects.

## 2023-10-18 NOTE — PLAN OF CARE
Goal Outcome Evaluation:      Plan of Care Reviewed With: patient, spouse    Overall Patient Progress: improvingOverall Patient Progress: improving  Pt afebrile with stable vs. Na improving at 124. K and Mg WNL, phos level 2.1 replaced per protocol. Redraw tomorrow. Received orders to restart chemo. Received Doxil with ramp up. Tolerated well (see chemo note).  Ifos/mesna to begin at 1730. Remains on fluid restriction. Good urine output. LBM today.

## 2023-10-19 LAB
ALBUMIN SERPL BCG-MCNC: 3.7 G/DL (ref 3.5–5.2)
ALP SERPL-CCNC: 99 U/L (ref 35–104)
ALT SERPL W P-5'-P-CCNC: 24 U/L (ref 0–50)
ANION GAP SERPL CALCULATED.3IONS-SCNC: 10 MMOL/L (ref 7–15)
ANION GAP SERPL CALCULATED.3IONS-SCNC: 10 MMOL/L (ref 7–15)
ANION GAP SERPL CALCULATED.3IONS-SCNC: 9 MMOL/L (ref 7–15)
AST SERPL W P-5'-P-CCNC: 19 U/L (ref 0–45)
BASO+EOS+MONOS # BLD AUTO: ABNORMAL 10*3/UL
BASO+EOS+MONOS NFR BLD AUTO: ABNORMAL %
BASOPHILS # BLD AUTO: ABNORMAL 10*3/UL
BASOPHILS # BLD MANUAL: 0 10E3/UL (ref 0–0.2)
BASOPHILS NFR BLD AUTO: ABNORMAL %
BASOPHILS NFR BLD MANUAL: 0 %
BILIRUB SERPL-MCNC: <0.2 MG/DL
BUN SERPL-MCNC: 15.6 MG/DL (ref 8–23)
BUN SERPL-MCNC: 17.2 MG/DL (ref 8–23)
BUN SERPL-MCNC: 18.4 MG/DL (ref 8–23)
CALCIUM SERPL-MCNC: 8.2 MG/DL (ref 8.8–10.2)
CALCIUM SERPL-MCNC: 8.6 MG/DL (ref 8.8–10.2)
CALCIUM SERPL-MCNC: 8.8 MG/DL (ref 8.8–10.2)
CHLORIDE SERPL-SCNC: 96 MMOL/L (ref 98–107)
CHLORIDE SERPL-SCNC: 96 MMOL/L (ref 98–107)
CHLORIDE SERPL-SCNC: 98 MMOL/L (ref 98–107)
CREAT SERPL-MCNC: 0.62 MG/DL (ref 0.51–0.95)
CREAT SERPL-MCNC: 0.63 MG/DL (ref 0.51–0.95)
CREAT SERPL-MCNC: 0.77 MG/DL (ref 0.51–0.95)
DEPRECATED HCO3 PLAS-SCNC: 24 MMOL/L (ref 22–29)
DEPRECATED HCO3 PLAS-SCNC: 25 MMOL/L (ref 22–29)
DEPRECATED HCO3 PLAS-SCNC: 25 MMOL/L (ref 22–29)
EGFRCR SERPLBLD CKD-EPI 2021: 85 ML/MIN/1.73M2
EGFRCR SERPLBLD CKD-EPI 2021: >90 ML/MIN/1.73M2
EGFRCR SERPLBLD CKD-EPI 2021: >90 ML/MIN/1.73M2
EOSINOPHIL # BLD AUTO: ABNORMAL 10*3/UL
EOSINOPHIL # BLD MANUAL: 0 10E3/UL (ref 0–0.7)
EOSINOPHIL NFR BLD AUTO: ABNORMAL %
EOSINOPHIL NFR BLD MANUAL: 0 %
ERYTHROCYTE [DISTWIDTH] IN BLOOD BY AUTOMATED COUNT: 13.7 % (ref 10–15)
FERRITIN SERPL-MCNC: 213 NG/ML (ref 11–328)
GLUCOSE SERPL-MCNC: 111 MG/DL (ref 70–99)
GLUCOSE SERPL-MCNC: 128 MG/DL (ref 70–99)
GLUCOSE SERPL-MCNC: 84 MG/DL (ref 70–99)
HCT VFR BLD AUTO: 25.2 % (ref 35–47)
HGB BLD-MCNC: 8.7 G/DL (ref 11.7–15.7)
IMM GRANULOCYTES # BLD: ABNORMAL 10*3/UL
IMM GRANULOCYTES NFR BLD: ABNORMAL %
LYMPHOCYTES # BLD AUTO: ABNORMAL 10*3/UL
LYMPHOCYTES # BLD MANUAL: 4.7 10E3/UL (ref 0.8–5.3)
LYMPHOCYTES NFR BLD AUTO: ABNORMAL %
LYMPHOCYTES NFR BLD MANUAL: 21 %
MAGNESIUM SERPL-MCNC: 1.7 MG/DL (ref 1.7–2.3)
MAGNESIUM SERPL-MCNC: 1.7 MG/DL (ref 1.7–2.3)
MCH RBC QN AUTO: 26.4 PG (ref 26.5–33)
MCHC RBC AUTO-ENTMCNC: 34.5 G/DL (ref 31.5–36.5)
MCV RBC AUTO: 76 FL (ref 78–100)
MONOCYTES # BLD AUTO: ABNORMAL 10*3/UL
MONOCYTES # BLD MANUAL: 0.2 10E3/UL (ref 0–1.3)
MONOCYTES NFR BLD AUTO: ABNORMAL %
MONOCYTES NFR BLD MANUAL: 1 %
NEUTROPHILS # BLD AUTO: ABNORMAL 10*3/UL
NEUTROPHILS # BLD MANUAL: 17.6 10E3/UL (ref 1.6–8.3)
NEUTROPHILS NFR BLD AUTO: ABNORMAL %
NEUTROPHILS NFR BLD MANUAL: 78 %
NRBC # BLD AUTO: 0 10E3/UL
NRBC BLD AUTO-RTO: 0 /100
PHOSPHATE SERPL-MCNC: 2.4 MG/DL (ref 2.5–4.5)
PHOSPHATE SERPL-MCNC: 2.5 MG/DL (ref 2.5–4.5)
PLAT MORPH BLD: ABNORMAL
PLATELET # BLD AUTO: 275 10E3/UL (ref 150–450)
POTASSIUM SERPL-SCNC: 3.5 MMOL/L (ref 3.4–5.3)
POTASSIUM SERPL-SCNC: 3.6 MMOL/L (ref 3.4–5.3)
POTASSIUM SERPL-SCNC: 3.8 MMOL/L (ref 3.4–5.3)
PROT SERPL-MCNC: 6.4 G/DL (ref 6.4–8.3)
RBC # BLD AUTO: 3.3 10E6/UL (ref 3.8–5.2)
RBC MORPH BLD: ABNORMAL
SODIUM SERPL-SCNC: 129 MMOL/L (ref 135–145)
SODIUM SERPL-SCNC: 131 MMOL/L (ref 135–145)
SODIUM SERPL-SCNC: 132 MMOL/L (ref 135–145)
SODIUM SERPL-SCNC: 133 MMOL/L (ref 135–145)
WBC # BLD AUTO: 22.6 10E3/UL (ref 4–11)

## 2023-10-19 PROCEDURE — 120N000002 HC R&B MED SURG/OB UMMC

## 2023-10-19 PROCEDURE — 83735 ASSAY OF MAGNESIUM: CPT | Performed by: PHYSICIAN ASSISTANT

## 2023-10-19 PROCEDURE — 85027 COMPLETE CBC AUTOMATED: CPT | Performed by: STUDENT IN AN ORGANIZED HEALTH CARE EDUCATION/TRAINING PROGRAM

## 2023-10-19 PROCEDURE — 250N000011 HC RX IP 250 OP 636: Mod: JZ | Performed by: PHYSICIAN ASSISTANT

## 2023-10-19 PROCEDURE — 250N000009 HC RX 250: Performed by: STUDENT IN AN ORGANIZED HEALTH CARE EDUCATION/TRAINING PROGRAM

## 2023-10-19 PROCEDURE — 84450 TRANSFERASE (AST) (SGOT): CPT | Performed by: STUDENT IN AN ORGANIZED HEALTH CARE EDUCATION/TRAINING PROGRAM

## 2023-10-19 PROCEDURE — 99233 SBSQ HOSP IP/OBS HIGH 50: CPT | Mod: FS | Performed by: PHYSICIAN ASSISTANT

## 2023-10-19 PROCEDURE — 99207 PR APP CREDIT; MD BILLING SHARED VISIT: CPT | Mod: FS | Performed by: STUDENT IN AN ORGANIZED HEALTH CARE EDUCATION/TRAINING PROGRAM

## 2023-10-19 PROCEDURE — 84295 ASSAY OF SERUM SODIUM: CPT

## 2023-10-19 PROCEDURE — 83735 ASSAY OF MAGNESIUM: CPT | Performed by: STUDENT IN AN ORGANIZED HEALTH CARE EDUCATION/TRAINING PROGRAM

## 2023-10-19 PROCEDURE — 80053 COMPREHEN METABOLIC PANEL: CPT | Performed by: PHYSICIAN ASSISTANT

## 2023-10-19 PROCEDURE — 250N000013 HC RX MED GY IP 250 OP 250 PS 637: Performed by: STUDENT IN AN ORGANIZED HEALTH CARE EDUCATION/TRAINING PROGRAM

## 2023-10-19 PROCEDURE — 250N000013 HC RX MED GY IP 250 OP 250 PS 637: Performed by: PHYSICIAN ASSISTANT

## 2023-10-19 PROCEDURE — 84100 ASSAY OF PHOSPHORUS: CPT | Performed by: PHYSICIAN ASSISTANT

## 2023-10-19 PROCEDURE — 258N000003 HC RX IP 258 OP 636

## 2023-10-19 PROCEDURE — 258N000003 HC RX IP 258 OP 636: Performed by: STUDENT IN AN ORGANIZED HEALTH CARE EDUCATION/TRAINING PROGRAM

## 2023-10-19 PROCEDURE — 82728 ASSAY OF FERRITIN: CPT | Performed by: INTERNAL MEDICINE

## 2023-10-19 PROCEDURE — 99232 SBSQ HOSP IP/OBS MODERATE 35: CPT | Performed by: INTERNAL MEDICINE

## 2023-10-19 PROCEDURE — 80048 BASIC METABOLIC PNL TOTAL CA: CPT | Performed by: PHYSICIAN ASSISTANT

## 2023-10-19 PROCEDURE — 85007 BL SMEAR W/DIFF WBC COUNT: CPT | Performed by: STUDENT IN AN ORGANIZED HEALTH CARE EDUCATION/TRAINING PROGRAM

## 2023-10-19 PROCEDURE — 250N000011 HC RX IP 250 OP 636: Performed by: STUDENT IN AN ORGANIZED HEALTH CARE EDUCATION/TRAINING PROGRAM

## 2023-10-19 PROCEDURE — 84100 ASSAY OF PHOSPHORUS: CPT | Performed by: STUDENT IN AN ORGANIZED HEALTH CARE EDUCATION/TRAINING PROGRAM

## 2023-10-19 RX ORDER — IBUPROFEN 200 MG
600 TABLET ORAL EVERY 8 HOURS PRN
Status: DISCONTINUED | OUTPATIENT
Start: 2023-10-19 | End: 2023-10-24 | Stop reason: HOSPADM

## 2023-10-19 RX ORDER — DEXTROSE MONOHYDRATE 50 MG/ML
INJECTION, SOLUTION INTRAVENOUS CONTINUOUS
Status: DISCONTINUED | OUTPATIENT
Start: 2023-10-19 | End: 2023-10-20

## 2023-10-19 RX ADMIN — LOSARTAN POTASSIUM 100 MG: 100 TABLET, FILM COATED ORAL at 21:02

## 2023-10-19 RX ADMIN — THIAMINE HCL TAB 100 MG 100 MG: 100 TAB at 08:17

## 2023-10-19 RX ADMIN — SODIUM PHOSPHATE, MONOBASIC, MONOHYDRATE AND SODIUM PHOSPHATE, DIBASIC, ANHYDROUS 9 MMOL: 142; 276 INJECTION, SOLUTION INTRAVENOUS at 10:03

## 2023-10-19 RX ADMIN — ONDANSETRON HYDROCHLORIDE 8 MG: 8 TABLET, FILM COATED ORAL at 13:28

## 2023-10-19 RX ADMIN — ACETAMINOPHEN 1000 MG: 500 TABLET ORAL at 21:02

## 2023-10-19 RX ADMIN — PRAVASTATIN SODIUM 40 MG: 20 TABLET ORAL at 21:02

## 2023-10-19 RX ADMIN — ONDANSETRON HYDROCHLORIDE 8 MG: 8 TABLET, FILM COATED ORAL at 21:02

## 2023-10-19 RX ADMIN — DEXTROSE MONOHYDRATE: 50 INJECTION, SOLUTION INTRAVENOUS at 18:35

## 2023-10-19 RX ADMIN — MESNA 2910 MG: 100 INJECTION, SOLUTION INTRAVENOUS at 16:39

## 2023-10-19 RX ADMIN — ONDANSETRON HYDROCHLORIDE 8 MG: 8 TABLET, FILM COATED ORAL at 05:38

## 2023-10-19 RX ADMIN — OMEPRAZOLE 20 MG: 20 CAPSULE, DELAYED RELEASE ORAL at 08:17

## 2023-10-19 RX ADMIN — ACETAMINOPHEN 1000 MG: 500 TABLET ORAL at 13:28

## 2023-10-19 RX ADMIN — ENOXAPARIN SODIUM 40 MG: 40 INJECTION SUBCUTANEOUS at 08:20

## 2023-10-19 RX ADMIN — Medication 5 ML: at 17:10

## 2023-10-19 RX ADMIN — ACETAMINOPHEN 1000 MG: 500 TABLET ORAL at 08:18

## 2023-10-19 ASSESSMENT — ACTIVITIES OF DAILY LIVING (ADL)
ADLS_ACUITY_SCORE: 20
ADLS_ACUITY_SCORE: 21
ADLS_ACUITY_SCORE: 20
ADLS_ACUITY_SCORE: 21
ADLS_ACUITY_SCORE: 20
ADLS_ACUITY_SCORE: 21
ADLS_ACUITY_SCORE: 20
ADLS_ACUITY_SCORE: 21
ADLS_ACUITY_SCORE: 21
ADLS_ACUITY_SCORE: 20
ADLS_ACUITY_SCORE: 21
ADLS_ACUITY_SCORE: 20

## 2023-10-19 NOTE — PLAN OF CARE
[id number]    A&Ox4. VSS on RA. C/o some soreness in right calf, utilizing heat packs. Denies nausea and SOB. CIVI Ifosfamide infusing, pt tolerating well. Bag #2 started this shift. No signs of Ifos toxicity present. 1700 Na 133, per team Na is correcting too fast. Started on continuous D5 100 mL/hr. Q4 Na checks ordered, next at 2200. Good appetite. 2000 mL fluid restriction maintained. Up independently. No acute events this shift. Continue with plan of care.    For information on Fall & Injury Prevention, visit: [email or web address] OR  [email or web address] OR  https://www.cdc.gov/steadi/patient.html

## 2023-10-19 NOTE — PROGRESS NOTES
[facility]    Medicine Progress Note - Hospitalist Service, GOLD TEAM 4    Date of Admission:  10/17/2023    Assessment & Plan   Ms. Mirta Cardenas is a pleasant 67 yo female with hx of HTN, psoriasis, HLD, GERD, remote hx of CLL, and RLE sarcoma diagnosed earlier this year admitted to [facility] as direct admit to proceed with chemotherapy as directed by Oncology team.     Plan for today:  - Repeat Na level at 1700  - Liberalize FR from 1 to 2L  - Chemo per Oncology; currently tolerating without significant side effects.   - Add-on differential to this am's CBC  - Obtain routine iron studies    # RLE high grade, pleomorphic sarcoma  - Chemo and other management per Oncology.   - Continue PTA prn oxycodone and Tylenol for pain     # Hypokalemia  # Hyponatremia  # Hypomagnesemia  Admission and repeat labs 10/17 reveal an initial Na of 118 with repeat 119. Repeat K 2.7 and Mg 1.5. Urina Na, os and serum os not conclusive. Likely multifactorial in etiology. Degree of low levels most likely not fully explained by pt admitting to drinking quite a bit of water combined with poor po food intake PTA. Likely being on chlorthalidone PTA contributing, as well as possible SIADH that developed from pt's recently prescribed duloxetine. Serial Na thereafter modestly improved, this am 131 (129).  - Repeat Na level at 1700  - Liberalize FR from 1 to 2L  - Holding PTA chlorthalidone  - BMP tomorrow am.    # Mild, borderline microcytic hypochromic anemia:  Admission BL Hgb WNL. Current Hgb 8.7 (10.9) with mildly low MCV and MCH. Lower level likely due to chemo and hemodilutional effect from IVFs since admission. No e/o active bleeding.   - Obtain routine iron studies  - Repeat CBC tomorrow am.      # Leukocytosis: Admission level likely 2/2 stress response and/or hx of CLL and repeat elevated WBC likely due to steroids received with chemo. Pt afebrile and denies sxs of underlying infection.  -  Repeat CBC w/ diff tomorrow am.     # HTN: PTA on losartan 100 mg daily and chlorthalidone 25 mg daily, the latter on hold. Current BP at goal.   - Continue PTA losartan and holding chlorthalidone     # Psoriasis: Continue PTA topicals prn (pt would prefer to use her home supply if possible)     # GERD: No current concerns  - Continue PTA daily PPI     # HLD: Continue PTA statin     # Hx of reactive airway disease: Pt denies hx of asthma and has not used any inhalers for over a year. Denies dyspnea and no e/o bronchospasm on exam.  - Noted.        Diet: Combination Diet Regular Diet Adult  Fluid restriction 2000 ML FLUID    DVT Prophylaxis: Enoxaparin (Lovenox) SQ  Branham Catheter: Not present  Lines: PRESENT      PICC 10/17/23 Double Lumen Left Basilic-Site Assessment: WDL  Cardiac Monitoring: None  Code Status: Full Code        Disposition Plan Home after chemo finishes per Oncology     Expected Discharge Date: 10/24/2023        Discharge Comments: chemo          The patient's care was discussed with the Attending Physician, Dr. Mclaughlin, Bedside Nurse, and Patient.    Solomon Melton PA-C  Hospitalist Service, GOLD TEAM [address] [place] [address]  Securely message with [employer] (more info)  Text page via [facility] Paging/Directory   See signed in provider for up to date coverage information  ______________________________________________________________________    Interval History   NAEO. Cancer pain stable. Denies other acute physical concerns at this time.     Physical Exam   Vital Signs: Temp: 98.5  F (36.9  C) Temp src: Oral BP: 126/74 Pulse: 99   Resp: 17 SpO2: 97 % O2 Device: None (Room air)    Weight: 171 lbs 9.6 oz  GEN: In NAD  HEENT: NCAT; PERRL; sclerae non-icteric  LUNGS: CTAB  CV: RRR  ABD: +BSs; SNTND  EXT: No BLE edema  SKIN: No acute rashes noted on exposed areas.  NEURO: AAOx3; CNs grossly intact; No acute focal deficits noted.      Medical Decision Making       45  MINUTES SPENT BY ME on the date of service doing chart review, history, exam, documentation & further activities per the note.      Data   CMP  Recent Labs   Lab 10/19/23  0539 10/19/23  0031 10/18/23  2037 10/18/23  0614 10/18/23  0223 10/17/23  2010 10/17/23  1330 10/17/23  1157   * 129* 129* 124* 123*  123*   < > 119* 118*   POTASSIUM 3.6 3.8 3.5 3.7 3.7  3.7   < > 2.7* 2.6*   CHLORIDE 96* 96* 94* 88* 87*  87*   < > 81* 80*   CO2 25 24 22 25 26  26   < > 25 24   ANIONGAP 10 9 13 11 10  10   < > 13 14   * 128* 151* 146* 141*  141*   < > 94 98   BUN 15.6 17.2 15.4 16.5 16.2  16.2   < > 17.4 18.1   CR 0.62 0.63 0.65 0.66 0.69  0.69   < > 0.80 0.87   GFRESTIMATED >90 >90 >90 >90 >90  >90   < > 81 73   MONIK 8.6* 8.2* 8.1* 8.7* 8.8  8.8   < > 9.6 9.5   MAG 1.7 1.7 1.7 1.9 2.1   < > 1.5* 1.4*   PHOS 2.4* 2.5 2.1* 2.1* 2.3*   < > 3.2 3.0   PROTTOTAL 6.4  --   --   --  6.7  --  7.4 7.2   ALBUMIN 3.7  --   --   --  4.0  --  4.2 4.2   BILITOTAL <0.2  --   --   --  0.2  --  0.4 0.4   ALKPHOS 99  --   --   --  114*  --  126* 126*   AST 19  --   --   --  18  --  19 18   ALT 24  --   --   --  17  --  18 16    < > = values in this interval not displayed.     CBC  Recent Labs   Lab 10/19/23  0539 10/17/23  1158   WBC 22.6* 14.3*   RBC 3.30* 4.05   HGB 8.7* 10.9*   HCT 25.2* 29.5*   MCV 76* 73*   MCH 26.4* 26.9   MCHC 34.5 36.9*   RDW 13.7 13.2    307

## 2023-10-19 NOTE — PROGRESS NOTES
Brief cross cover note:     At 8:30 pm, Na 129, patient feeling well.  Na increase from 120 -> 129 over 24h represents slightly faster correction than targeted (aim for increase ~6 every 24h due to unknown chronicity).  Increase likely related to ongoing carrier fluids (0.9% NS) used throughout the day for chemotherapy.     Patient given additional 500mL free water PO.  Very excited, likes water.  Recheck Na at 2am.  If Na continues to rise, would recommend additional free water via IV.

## 2023-10-19 NOTE — PROGRESS NOTES
Nursing Focus: Chemotherapy     D: Positive blood return via PICC, red lumen. Insertion site is clean/dry/intact, dressing intact with no complaints of pain. Urine output is recorded in intake in Doc Flowsheet.    I: Premedications given per order (see electronic medical administration record). Dose #2 of Ifosfamide started to infuse over 24 hours. Reviewed pt teaching on chemotherapy side effects. Pt denies need for further teaching. Chemotherapy double checked per protocol by two chemotherapy competent RN's.   A: Tolerating procedure well. Denies nausea and or pain.   P: Continue to monitor urine output and symptoms of nausea. Screen for symptoms of toxicity.                    Ifosfamide Toxicity Assessment    Neuro asssessment completed: Yes, significant findings were: N/A    Patient is Alert & Oriented x4. There are signs of lethargy, confusion or hallucinations: No    Patient is having new incontinence of bowel or bladder: No      Pincer Grasp intact: Yes    Tremors: No     Provider was notified of changes: N/A. Interventions include: N/A    -- Monitor for s/sx of ifosfamide toxicity: hallucinations, AMS, emotional lability, somnolence, myoclonic jerks, tremors, coordination difficulties, etc. If these occur, please call the fellow on call for recommendations

## 2023-10-19 NOTE — PROGRESS NOTES
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 The plan after this is to go to neoadjuvant radiotherapy, followed by definitve surgical resection.  Incidentally discovered hyponatremia, hypokalemia, hypomagnesemia, not present 6 months ago. Etiology not clear at this point. Excessive free water intake? Chlorthalidone? S/P replacement with improvement. This should not be a contraindication to proceeding with her chemotherapy (Doxil/Ifosfamide)  Pre-existent microcytic anemia, unclear etiology. Adding ferritin today.        Plan:     Cycle 1 doxil/ifosfamide, given over 5 days. Okay to start with day 1 today.  Per Dr. Jimenez-- have not requested appts yet  -Follow up after chemo ISIDORO 10/30   -Neulasta after chemo 10/23   -She needs 10 days of prophylactic levaquin 500 mg daily starting on day of discharge at completion of chemotherapy.  -Labs and infusion appointment on 10/25, 10/27, 10/30, 11/1, 11/3, 11/6, 11/8, 11/10   -Follow up Dr. 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  Low grade CLL, managed with observation.  Hepatosplenomegaly seen on CT/PET.      History of Present Illness/Therapy to Date:     68 year old previously healthy woman who noted intermittent, then progressive,  right calf pain. Plain films showed a destructive lesion in the mid right fibula. Biopsy 10/2 showed a high grade pleomorphic sarcoma. CT/PET 10/10/23 showed a  hypermetabolic right calf mass with osseous destruction of the adjacent fibular shaft consistent with biopsy-proven  "sarcoma.  There was no evidence of distant metastatic disease.        Interval history:     Feels well.   Had a good stool this AM.  No pain  No cough  No shortness of breath  Was on po iron for anemia but it made her stomach upset.      On ROS in addition to the above      Denies  fevers, chills, NS, HA, dysphagia/odynophagia, change in weight, change in appetite, cough, SOB, CP, n/v, abd pain, constipation/diarrhea, hematochezia, dysuria, hematuria, swelling, rashes, lymphadenopathy      Past Medical History:   I have reviewed this patient's past medical history   Past Medical History:   Diagnosis Date    Asthma     Asthma     reactive airway disease, per patient    Blood transfusion     Cancer (H)     chronic lymphocytic leukemia    History of transfusion     Hypertension     Hypertension     Leukemia, chronic lymphocytic (H)     Malignant neoplasm (H)     CLL          Past Surgical History:    I have reviewed this patient's past surgical history       Social History:   Tobacco, ETOH, and rec drugs reviewed and as noted below with the following exceptions:   to Giancarlo. Lives in [place]          Family History:     Family History   Problem Relation Age of Onset    Hypertension Brother     Heart Disease Brother 65        bypass    Cancer Sister         multiple myloma    Obesity Son     Obesity Son     Hypertension Son     Osteoporosis Mother     Melanoma No family hx of             Medications:     No current outpatient medications on file.              Physical Exam:   Blood pressure 137/73, pulse 83, temperature 98.5  F (36.9  C), temperature source Oral, resp. rate 17, height 1.549 m (5' 1\"), weight 76.7 kg (169 lb), SpO2 97%, not currently breastfeeding.    ECOG PS: 0  Constitutional: WDWN female in NAD, pleasant and appropriate  HEENT:  NC/AT, no icterus, OP clear, MMM  Skin: No jaundice nor ecchymoses  Lungs: CTAB, no w/r/r, nonlabored breathing  Cardiovascular: RRR, S1, S2, no m/r/g  Abdomen: " +BS, soft, nontender, nondistended, no organomegaly nor masses  MSK/Extremities: Warm, well perfused. No edema  LN: no cervical, supraclavicular, axillary, nor inguinal lymphadenopathy  Neurologic: alert, answering questions appropriately, moving all extremities spontaneously. CN 2-12 grossly intact.  Psych: appropriate affect  Data:      Latest Reference Range & Units 10/17/23 13:30 10/17/23 20:10 10/18/23 02:23 10/18/23 06:14 10/18/23 20:37 10/19/23 00:31 10/19/23 05:39   Sodium 135 - 145 mmol/L 119 (LL) 120 (L) 123 (L)  123 (L) 124 (L) 129 (L) 129 (L) 131 (L)   (LL): Data is critically low  (L): Data is abnormally low      Recent Labs   Lab Test 10/17/23  1158 04/24/23  0747 05/24/22  0904 05/10/21  0650 10/18/20  0710 10/17/20  0836   WBC 14.3* 16.2* 18.0* 15.6*  --  8.0   NEUTROPHIL 42 41 31 29.0  --  52   HGB 10.9* 13.4 13.2 13.7  --  10.2*    273 248 226 258 231     Recent Labs   Lab Test 10/18/23  0614 10/18/23  0223 10/17/23  2010 10/17/23  1330 10/17/23  1157   * 123*  123* 120* 119* 118*   POTASSIUM 3.7 3.7  3.7 3.5  3.5 2.7* 2.6*   CHLORIDE 88* 87*  87* 83* 81* 80*   CO2 25 26  26 26 25 24   ANIONGAP 11 10  10 11 13 14   BUN 16.5 16.2  16.2 15.2 17.4 18.1   CR 0.66 0.69  0.69 0.71 0.80 0.87   MONIK 8.7* 8.8  8.8 8.9 9.6 9.5     Recent Labs   Lab Test 10/18/23  0614 10/18/23  0223 10/17/23  2010 10/17/23  1157 10/17/20  0836 10/16/20  1031 10/15/20  0709   MAG 1.9 2.1 2.6*   < >  --   --   --    PHOS 2.1* 2.3* 2.1*   < >  --   --   --    LDH  --   --   --   --  347* 305* 306*    < > = values in this interval not displayed.     Recent Labs   Lab Test 10/18/23  0223 10/17/23  1330 10/17/23  1157 10/17/20  0836 10/16/20  1031 10/15/20  0709   BILITOTAL 0.2 0.4 0.4  --   --   --    ALKPHOS 114* 126* 126*  --   --   --    ALT 17 18 16  --   --   --    AST 18 19 18  --   --   --    ALBUMIN 4.0 4.2 4.2  --   --   --    LDH  --   --   --  347* 305* 306*       No results found for this or any  previous visit (from the past 24 hour(s)).    Other data           Labs, imaging and treatment plan reviewed with patient. All questions answered.        40 minutes spent on the date of the encounter doing chart review, review of outside records, review of test results, interpretation of tests, patient visit, documentation, discussion with other provider(s), and discussion with family

## 2023-10-19 NOTE — PROGRESS NOTES
Brief Medicine Progress Note  October 19, 2023     Sodium recheck @ 133. Free water already liberalized from 1-2L today. Overcorrecting too quickly.     Plan:  - start D5 @ 100ml/hr  - recheck in 4H      Jaleel Solo PA-C  [facility] Hospitalist Service  Page via [employer] or [place]

## 2023-10-19 NOTE — PLAN OF CARE
Goal Outcome Evaluation:      Plan of Care Reviewed With: patient, spouse    Overall Patient Progress: no changeOverall Patient Progress: no change                                               Ifosfamide Toxicity Assessment      Neuro asssessment completed   Yes , significant findings were none  Patient is Alert & Oriented x4. There are No signs of lethargy, confusion or hallucinations.   Patient is having new incontinence of bowel or bladder no   Pincer Grasp intact  Yes   Tremors no   Provider was notified  No  . Interventions include none.   -- Monitor for s/sx of ifosfamide toxicity: hallucinations, AMS, emotional lability, somnolence, myoclonic jerks, tremors, coordination difficulties, etc. If these occur, please call the fellow on call for recommendations    Pt afebrile with stable vs. Na improving at 131. Fluid restriction volume increased to 2000cc. K and Mg WNL, phos level 2.4 replaced per protocol. Redraw tomorrow. Continues to receive Ifos/mesna CIVI without difficulty. Good urine output. LBM today.

## 2023-10-19 NOTE — PLAN OF CARE
"/67 (BP Location: Right arm)   Pulse 83   Temp 98  F (36.7  C) (Oral)   Resp 16   Ht 1.549 m (5' 1\")   Wt 76.7 kg (169 lb)   SpO2 99%   BMI 31.93 kg/m      Neuro: A&Ox4.   Cardiac: VSS.   Respiratory: Sating >98% on RA.  GI/: Adequate urine output. No BM this shift.   Diet/appetite: Regular diet. 1L FR. Ok for additional 500mL last night per provider, d/t fast increase of Na.   Activity:  Independent.  Pain: At acceptable level on current regimen.   Skin: No new deficits noted.  LDA's: R DL PICC, red lumen with cont Ifosfamide @ 47.4 ml/h. No s/s of ifos toxicity.     Plan: Continue with POC. Monitor electrolytes,     "

## 2023-10-19 NOTE — PROGRESS NOTES
Ifosfamide Toxicity Assessment      Patient is Alert & Oriented x4. There are signs of lethargy, confusion or hallucinations No.     Patient is having new incontinence of bowel or bladder No.       Pincer Grasp intact Yes    Tremors present No     Provider was notified of changes N/A. Interventions include N/A.    Will continue to monitor for s/sx of ifosfamide toxicity: hallucinations, AMS, emotional lability, somnolence, myoclonic jerks, tremors, coordination difficulties. If these occur, please call the APPs/Attending on days and fellow on-call for evening and nights for recommendations and further instruction.

## 2023-10-20 LAB
ACANTHOCYTES BLD QL SMEAR: ABNORMAL
ALBUMIN SERPL BCG-MCNC: 3.7 G/DL (ref 3.5–5.2)
ALP SERPL-CCNC: 96 U/L (ref 35–104)
ALT SERPL W P-5'-P-CCNC: 24 U/L (ref 0–50)
ANION GAP SERPL CALCULATED.3IONS-SCNC: 10 MMOL/L (ref 7–15)
AST SERPL W P-5'-P-CCNC: 16 U/L (ref 0–45)
AUER BODIES BLD QL SMEAR: ABNORMAL
BASO STIPL BLD QL SMEAR: ABNORMAL
BASO+EOS+MONOS # BLD AUTO: ABNORMAL 10*3/UL
BASO+EOS+MONOS NFR BLD AUTO: ABNORMAL %
BASOPHILS # BLD AUTO: 0 10E3/UL (ref 0–0.2)
BASOPHILS NFR BLD AUTO: 0 %
BILIRUB SERPL-MCNC: <0.2 MG/DL
BITE CELLS BLD QL SMEAR: ABNORMAL
BLISTER CELLS BLD QL SMEAR: ABNORMAL
BUN SERPL-MCNC: 14.1 MG/DL (ref 8–23)
BURR CELLS BLD QL SMEAR: ABNORMAL
CALCIUM SERPL-MCNC: 9.1 MG/DL (ref 8.8–10.2)
CHLORIDE SERPL-SCNC: 100 MMOL/L (ref 98–107)
CREAT SERPL-MCNC: 0.77 MG/DL (ref 0.51–0.95)
DACRYOCYTES BLD QL SMEAR: ABNORMAL
DEPRECATED HCO3 PLAS-SCNC: 27 MMOL/L (ref 22–29)
EGFRCR SERPLBLD CKD-EPI 2021: 85 ML/MIN/1.73M2
ELLIPTOCYTES BLD QL SMEAR: ABNORMAL
EOSINOPHIL # BLD AUTO: 0 10E3/UL (ref 0–0.7)
EOSINOPHIL NFR BLD AUTO: 0 %
ERYTHROCYTE [DISTWIDTH] IN BLOOD BY AUTOMATED COUNT: 14.3 % (ref 10–15)
FRAGMENTS BLD QL SMEAR: ABNORMAL
GLUCOSE SERPL-MCNC: 86 MG/DL (ref 70–99)
HCT VFR BLD AUTO: 27.3 % (ref 35–47)
HGB BLD-MCNC: 9.4 G/DL (ref 11.7–15.7)
HGB C CRYSTALS: ABNORMAL
HOWELL-JOLLY BOD BLD QL SMEAR: ABNORMAL
IMM GRANULOCYTES # BLD: 0.1 10E3/UL
IMM GRANULOCYTES NFR BLD: 0 %
LYMPHOCYTES # BLD AUTO: 6.1 10E3/UL (ref 0.8–5.3)
LYMPHOCYTES NFR BLD AUTO: 52 %
MAGNESIUM SERPL-MCNC: 1.8 MG/DL (ref 1.7–2.3)
MCH RBC QN AUTO: 26.6 PG (ref 26.5–33)
MCHC RBC AUTO-ENTMCNC: 34.4 G/DL (ref 31.5–36.5)
MCV RBC AUTO: 77 FL (ref 78–100)
MONOCYTES # BLD AUTO: 0.6 10E3/UL (ref 0–1.3)
MONOCYTES NFR BLD AUTO: 5 %
NEUTROPHILS # BLD AUTO: 5.2 10E3/UL (ref 1.6–8.3)
NEUTROPHILS NFR BLD AUTO: 43 %
NEUTS HYPERSEG BLD QL SMEAR: ABNORMAL
NRBC # BLD AUTO: 0 10E3/UL
NRBC BLD AUTO-RTO: 0 /100
PHOSPHATE SERPL-MCNC: 2.5 MG/DL (ref 2.5–4.5)
PLAT MORPH BLD: ABNORMAL
PLATELET # BLD AUTO: 241 10E3/UL (ref 150–450)
POLYCHROMASIA BLD QL SMEAR: SLIGHT
POTASSIUM SERPL-SCNC: 3.6 MMOL/L (ref 3.4–5.3)
PROT SERPL-MCNC: 6.3 G/DL (ref 6.4–8.3)
RBC # BLD AUTO: 3.54 10E6/UL (ref 3.8–5.2)
RBC AGGLUT BLD QL: ABNORMAL
RBC MORPH BLD: ABNORMAL
ROULEAUX BLD QL SMEAR: ABNORMAL
SICKLE CELLS BLD QL SMEAR: ABNORMAL
SMUDGE CELLS BLD QL SMEAR: ABNORMAL
SODIUM SERPL-SCNC: 135 MMOL/L (ref 135–145)
SODIUM SERPL-SCNC: 137 MMOL/L (ref 135–145)
SODIUM SERPL-SCNC: 137 MMOL/L (ref 135–145)
SPHEROCYTES BLD QL SMEAR: ABNORMAL
STOMATOCYTES BLD QL SMEAR: ABNORMAL
TARGETS BLD QL SMEAR: ABNORMAL
TOXIC GRANULES BLD QL SMEAR: ABNORMAL
VARIANT LYMPHS BLD QL SMEAR: ABNORMAL
WBC # BLD AUTO: 12 10E3/UL (ref 4–11)

## 2023-10-20 PROCEDURE — 84295 ASSAY OF SERUM SODIUM: CPT

## 2023-10-20 PROCEDURE — 85025 COMPLETE CBC W/AUTO DIFF WBC: CPT | Performed by: PHYSICIAN ASSISTANT

## 2023-10-20 PROCEDURE — 84100 ASSAY OF PHOSPHORUS: CPT | Performed by: STUDENT IN AN ORGANIZED HEALTH CARE EDUCATION/TRAINING PROGRAM

## 2023-10-20 PROCEDURE — 120N000002 HC R&B MED SURG/OB UMMC

## 2023-10-20 PROCEDURE — 83735 ASSAY OF MAGNESIUM: CPT | Performed by: STUDENT IN AN ORGANIZED HEALTH CARE EDUCATION/TRAINING PROGRAM

## 2023-10-20 PROCEDURE — 250N000011 HC RX IP 250 OP 636: Mod: JZ | Performed by: PHYSICIAN ASSISTANT

## 2023-10-20 PROCEDURE — 250N000013 HC RX MED GY IP 250 OP 250 PS 637: Performed by: STUDENT IN AN ORGANIZED HEALTH CARE EDUCATION/TRAINING PROGRAM

## 2023-10-20 PROCEDURE — 250N000013 HC RX MED GY IP 250 OP 250 PS 637: Performed by: PHYSICIAN ASSISTANT

## 2023-10-20 PROCEDURE — 99207 PR APP CREDIT; MD BILLING SHARED VISIT: CPT | Mod: FS | Performed by: PHYSICIAN ASSISTANT

## 2023-10-20 PROCEDURE — 250N000011 HC RX IP 250 OP 636: Mod: JZ | Performed by: STUDENT IN AN ORGANIZED HEALTH CARE EDUCATION/TRAINING PROGRAM

## 2023-10-20 PROCEDURE — 99232 SBSQ HOSP IP/OBS MODERATE 35: CPT | Performed by: INTERNAL MEDICINE

## 2023-10-20 PROCEDURE — 99233 SBSQ HOSP IP/OBS HIGH 50: CPT | Mod: FS | Performed by: STUDENT IN AN ORGANIZED HEALTH CARE EDUCATION/TRAINING PROGRAM

## 2023-10-20 PROCEDURE — 80053 COMPREHEN METABOLIC PANEL: CPT | Performed by: STUDENT IN AN ORGANIZED HEALTH CARE EDUCATION/TRAINING PROGRAM

## 2023-10-20 PROCEDURE — 250N000011 HC RX IP 250 OP 636: Performed by: HOSPITALIST

## 2023-10-20 PROCEDURE — 258N000003 HC RX IP 258 OP 636

## 2023-10-20 PROCEDURE — 258N000003 HC RX IP 258 OP 636: Performed by: STUDENT IN AN ORGANIZED HEALTH CARE EDUCATION/TRAINING PROGRAM

## 2023-10-20 RX ORDER — PREGABALIN 25 MG/1
25 CAPSULE ORAL 3 TIMES DAILY
Status: DISCONTINUED | OUTPATIENT
Start: 2023-10-20 | End: 2023-10-23

## 2023-10-20 RX ORDER — DESMOPRESSIN ACETATE 4 UG/ML
1 INJECTION, SOLUTION INTRAVENOUS; SUBCUTANEOUS 2 TIMES DAILY
Status: DISCONTINUED | OUTPATIENT
Start: 2023-10-20 | End: 2023-10-20

## 2023-10-20 RX ORDER — OXYCODONE HYDROCHLORIDE 5 MG/1
5-10 TABLET ORAL EVERY 6 HOURS PRN
Status: DISCONTINUED | OUTPATIENT
Start: 2023-10-20 | End: 2023-10-24 | Stop reason: HOSPADM

## 2023-10-20 RX ADMIN — SODIUM CHLORIDE, PRESERVATIVE FREE 5 ML: 5 INJECTION INTRAVENOUS at 11:04

## 2023-10-20 RX ADMIN — DEXTROSE MONOHYDRATE 100 ML/HR: 50 INJECTION, SOLUTION INTRAVENOUS at 04:58

## 2023-10-20 RX ADMIN — ONDANSETRON HYDROCHLORIDE 8 MG: 8 TABLET, FILM COATED ORAL at 13:28

## 2023-10-20 RX ADMIN — DESMOPRESSIN ACETATE 1 MCG: 4 SOLUTION INTRAVENOUS at 10:08

## 2023-10-20 RX ADMIN — IBUPROFEN 600 MG: 200 TABLET, FILM COATED ORAL at 19:54

## 2023-10-20 RX ADMIN — LOSARTAN POTASSIUM 100 MG: 100 TABLET, FILM COATED ORAL at 19:56

## 2023-10-20 RX ADMIN — ENOXAPARIN SODIUM 40 MG: 40 INJECTION SUBCUTANEOUS at 08:30

## 2023-10-20 RX ADMIN — ONDANSETRON HYDROCHLORIDE 8 MG: 8 TABLET, FILM COATED ORAL at 22:49

## 2023-10-20 RX ADMIN — PRAVASTATIN SODIUM 40 MG: 20 TABLET ORAL at 19:57

## 2023-10-20 RX ADMIN — PREGABALIN 25 MG: 25 CAPSULE ORAL at 19:55

## 2023-10-20 RX ADMIN — OMEPRAZOLE 20 MG: 20 CAPSULE, DELAYED RELEASE ORAL at 08:29

## 2023-10-20 RX ADMIN — MESNA 2910 MG: 100 INJECTION, SOLUTION INTRAVENOUS at 16:34

## 2023-10-20 RX ADMIN — IBUPROFEN 600 MG: 200 TABLET, FILM COATED ORAL at 11:11

## 2023-10-20 RX ADMIN — SODIUM CHLORIDE, PRESERVATIVE FREE 5 ML: 5 INJECTION INTRAVENOUS at 10:00

## 2023-10-20 RX ADMIN — ACETAMINOPHEN 1000 MG: 500 TABLET ORAL at 08:30

## 2023-10-20 RX ADMIN — PREGABALIN 25 MG: 25 CAPSULE ORAL at 13:28

## 2023-10-20 RX ADMIN — THIAMINE HCL TAB 100 MG 100 MG: 100 TAB at 08:29

## 2023-10-20 RX ADMIN — ONDANSETRON HYDROCHLORIDE 8 MG: 8 TABLET, FILM COATED ORAL at 06:02

## 2023-10-20 ASSESSMENT — ACTIVITIES OF DAILY LIVING (ADL)
ADLS_ACUITY_SCORE: 21
ADLS_ACUITY_SCORE: 21
ADLS_ACUITY_SCORE: 23
ADLS_ACUITY_SCORE: 22
ADLS_ACUITY_SCORE: 21
ADLS_ACUITY_SCORE: 22
ADLS_ACUITY_SCORE: 21
ADLS_ACUITY_SCORE: 23
ADLS_ACUITY_SCORE: 21
ADLS_ACUITY_SCORE: 22

## 2023-10-20 NOTE — PROGRESS NOTES
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 The plan after this is to go to neoadjuvant radiotherapy, followed by definitve surgical resection.  Incidentally discovered hyponatremia, hypokalemia, hypomagnesemia, not present 6 months ago. Etiology not clear at this point. Excessive free water intake? Chlorthalidone? S/P lyte replacement, and discontinuation of  selective serotonin reuptake inhibitor. With complete resolution  Pre-existent microcytic anemia,unclear etiology. Ferritin more than adequate. Will observe without intervention.  Receiving Cycle 1 doxil/ifosfamide, given over 5 days. Currently on day 3 and tolerating well.  Pain at the site of her tumor. NSAIDs make sense, given normal platelets and creatinine. Will discuss with Dr. Mclaughlin.        Plan:     Continue chemo per protocol.  Will discuss lower dose ibuprofen with Dr. Mclaughlin  Following daily with you while in house.  Per Dr. Jimenez-- have not requested appts yet  -Follow up after chemo ISIDORO 10/30   -Neulasta after chemo 10/23   -She needs 10 days of prophylactic levaquin 500 mg daily starting on day of discharge at completion of chemotherapy.  -Labs and infusion appointment on 10/25, 10/27, 10/30, 11/1, 11/3, 11/6, 11/8, 11/10   -Follow up Dr. 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  Low grade CLL, managed with observation.  Hepatosplenomegaly seen on CT/PET.      History of Present Illness/Therapy to Date:     68 year old previously healthy woman who noted intermittent, then  progressive,  right calf pain. Plain films showed a destructive lesion in the mid right fibula. Biopsy 10/2 showed a high grade pleomorphic sarcoma. CT/PET 10/10/23 showed a  hypermetabolic right calf mass with osseous destruction of the adjacent fibular shaft consistent with biopsy-proven sarcoma.  There was no evidence of distant metastatic disease.        Interval history:     Right calf painful, despite oxycodone and tylenol  Otherwise, doing okay.    No cough  No shortness of breath  Was on po iron for anemia but it made her stomach upset.      On ROS in addition to the above      Denies  fevers, chills, NS, HA, dysphagia/odynophagia, change in weight, change in appetite, cough, SOB, CP, n/v, abd pain, constipation/diarrhea, hematochezia, dysuria, hematuria, swelling, rashes, lymphadenopathy      Past Medical History:   I have reviewed this patient's past medical history   Past Medical History:   Diagnosis Date    Asthma     Asthma     reactive airway disease, per patient    Blood transfusion     Cancer (H)     chronic lymphocytic leukemia    History of transfusion     Hypertension     Hypertension     Leukemia, chronic lymphocytic (H)     Malignant neoplasm (H)     CLL          Past Surgical History:    I have reviewed this patient's past surgical history       Social History:   Tobacco, ETOH, and rec drugs reviewed and as noted below with the following exceptions:   to Giancarlo. Lives in [place]          Family History:     Family History   Problem Relation Age of Onset    Hypertension Brother     Heart Disease Brother 65        bypass    Cancer Sister         multiple myloma    Obesity Son     Obesity Son     Hypertension Son     Osteoporosis Mother     Melanoma No family hx of             Medications:     No current outpatient medications on file.              Physical Exam:   Blood pressure (!) 145/76, pulse 82, temperature 98.1  F (36.7  C), temperature source Oral, resp. rate 14, height 1.549 m  "(5' 1\"), weight 76.7 kg (169 lb 1.6 oz), SpO2 98%, not currently breastfeeding.    ECOG PS: 0  Constitutional: WDWN female in NAD, pleasant and appropriate  HEENT:  NC/AT, no icterus, OP clear, MMM  Skin: No jaundice nor ecchymoses  Lungs: CTAB, no w/r/r, nonlabored breathing  Cardiovascular: RRR, S1, S2, no m/r/g  Abdomen: +BS, soft, nontender, nondistended, no organomegaly nor masses  MSK/Extremities: Warm, well perfused. No edema  LN: no cervical, supraclavicular, axillary, nor inguinal lymphadenopathy  Neurologic: alert, answering questions appropriately, moving all extremities spontaneously. CN 2-12 grossly intact.  Psych: appropriate affect  Data:     Today's CBC and CMP reviewed. No new issues. Still anemic with an MCV of 77. NA+ back in the normal range at 137. Platelets and creatinine normal.    No results found for this or any previous visit (from the past 24 hour(s)).    Other data           Labs, imaging and treatment plan reviewed with patient. All questions answered.        35 minutes spent on the date of the encounter doing chart review, review of outside records, review of test results, interpretation of tests, patient visit, documentation, discussion with other provider(s), and discussion with family           "

## 2023-10-20 NOTE — PLAN OF CARE
Goal Outcome Evaluation: HD3 admitted for chemotherapy for a newly diagnosed sarcoma.     A&Ox4, VSS on room air. Has pain in her R calf that she rates 4-5 and she is taking ibuprofen and pregabalin. Up independent. Showered, Ifosfamide infusing, Icans negative x2.     Plan: Continue ifosfamide/mesma infusion until D5

## 2023-10-20 NOTE — PLAN OF CARE
Goal Outcome Evaluation:      Plan of Care Reviewed With: patient    Overall Patient Progress: no changeOverall Patient Progress: no change       Time: 2310-7631     Reason for admission: right calf sarcoma  Activity: ind  Pain: denies  Neuro: WDL  Cardiac: WDL  Respiratory: WDL  GI/: strict I's and O's, 2L fluid restriction, voiding spontaneously, saving urine, LBM 10/19  Diet: combo reg diet  Lines: PICC  Labs/imaging: Waiting on AM lab results  Vitals: VSS      This Shift: Cont dextrose maintained at 100 ml/hr. No PRNs requested/given. Pt is very sweet. No acute events this shift.       Continue POC..

## 2023-10-20 NOTE — PROGRESS NOTES
[facility]    Medicine Progress Note - Hospitalist Service, GOLD TEAM 4    Date of Admission:  10/17/2023    Assessment & Plan   Ms. Mirta Cardenas is a pleasant 69 yo female with hx of HTN, psoriasis, HLD, CKDIIIa, GERD, remote hx of CLL, and RLE sarcoma diagnosed earlier this year admitted to [facility] as direct admit to proceed with chemotherapy as directed by Oncology team.     Plan for today:  - Chemo per Oncology; currently tolerating without significant side effects.   - Discontinue fluid restriction and q4hr Na checks  - Slight increase in prn oxycodone from 5 to 5-10 mg q6hrs  - Discontinue PTA duloxetine and start Lyrica 25 mg TID    # RLE high grade, pleomorphic sarcoma  # Cancer related pain  - Chemo and other management per Oncology.   - Continue PTA prn Tylenol and oxycodone and increase oxy from 5 to 5-10 mg q6hrs  - Discontinue PTA duloxetine and start Lyrica 25 mg TID     # Hypokalemia  # Hyponatremia  # Hypomagnesemia  Admission and repeat labs 10/17 reveal an initial Na of 118 with repeat 119. Repeat K 2.7 and Mg 1.5. Urina Na, os and serum os not conclusive. Likely multifactorial in etiology. Degree of low levels most likely not fully explained by pt admitting to drinking quite a bit of water combined with poor po food intake PTA. Likely being on chlorthalidone PTA contributing, as well as possible SIADH that developed from pt's recently prescribed duloxetine. Serial Na thereafter modestly improved, and into this am now WNL.  - Discontinue fluid restriction and q4hr Na checks  - Holding PTA chlorthalidone  - BMP tomorrow am.    # CKDIIIa: BL GFRs in 80s, and recent levels at or above BL.   - Daily BMP.     # Mild, borderline microcytic hypochromic anemia:  Admission BL Hgb WNL. Current Hgb 8.7 (10.9) with mildly low MCV and MCH. Lower level likely due to chemo and hemodilutional effect from IVFs since admission. No e/o active bleeding.   - Obtain routine  iron studies  - Repeat CBC tomorrow am.      # Leukocytosis: Admission level likely 2/2 stress response and/or hx of CLL and repeat elevated WBC likely due to steroids received with chemo. Pt afebrile and denies sxs of underlying infection.  - Repeat CBC w/ diff tomorrow am.     # HTN: PTA on losartan 100 mg daily and chlorthalidone 25 mg daily, the latter on hold. Current BP at goal.   - Continue PTA losartan and holding chlorthalidone     # Psoriasis: Continue PTA topicals prn (pt would prefer to use her home supply if possible)     # GERD: No current concerns  - Continue PTA daily PPI     # HLD: Continue PTA statin     # Hx of reactive airway disease: Pt denies hx of asthma and has not used any inhalers for over a year. Denies dyspnea and no e/o bronchospasm on exam.  - Noted.        Diet: Combination Diet Regular Diet Adult  Fluid restriction 2000 ML FLUID    DVT Prophylaxis: Enoxaparin (Lovenox) SQ  Branham Catheter: Not present  Lines: PRESENT      PICC 10/17/23 Double Lumen Left Basilic-Site Assessment: WDL  Cardiac Monitoring: None  Code Status: Full Code        Disposition Plan Home after chemo finishes per Oncology    Expected Discharge Date: 10/24/2023        Discharge Comments: chemo          The patient's care was discussed with the Attending Physician, Dr. Mclaughlin, Bedside Nurse, and Patient.    Solomon Melton PA-C  Hospitalist Service, GOLD TEAM [address] [place] [address]  Securely message with [employer] (more info)  Text page via [facility] Paging/Directory   See signed in provider for up to date coverage information  ______________________________________________________________________    Interval History   NAEO. Cancer pain stable but slightly worse into today. BMs regular. Denies fever, chills, chest pain, SOB, abd pain, nausea, and bladder concerns.    Physical Exam   Vital Signs: Temp: 98.1  F (36.7  C) Temp src: Oral BP: (!) 145/76 Pulse: 82   Resp: 14 SpO2: 98 %  O2 Device: None (Room air)    Weight: 169 lbs 1.6 oz  GEN: In NAD  HEENT: NCAT; PERRL; sclerae non-icteric  LUNGS: CTAB  CV: RRR  ABD: +BSs; SNTND  EXT: No BLE edema  SKIN: No acute rashes noted on exposed areas.  NEURO: AAOx3; CNs grossly intact; No acute focal deficits noted.      Medical Decision Making       45 MINUTES SPENT BY ME on the date of service doing chart review, history, exam, documentation & further activities per the note.      Data   CMP  Recent Labs   Lab 10/20/23  0602 10/20/23  0226 10/19/23  2209 10/19/23  1709 10/19/23  0539 10/19/23  0031 10/18/23  2037 10/18/23  0614 10/18/23  0223 10/17/23  2010 10/17/23  1330     137 135 132* 133* 131* 129* 129*   < > 123*  123*   < > 119*   POTASSIUM 3.6  --   --  3.5 3.6 3.8 3.5   < > 3.7  3.7   < > 2.7*   CHLORIDE 100  --   --  98 96* 96* 94*   < > 87*  87*   < > 81*   CO2 27  --   --  25 25 24 22   < > 26  26   < > 25   ANIONGAP 10  --   --  10 10 9 13   < > 10  10   < > 13   GLC 86  --   --  84 111* 128* 151*   < > 141*  141*   < > 94   BUN 14.1  --   --  18.4 15.6 17.2 15.4   < > 16.2  16.2   < > 17.4   CR 0.77  --   --  0.77 0.62 0.63 0.65   < > 0.69  0.69   < > 0.80   GFRESTIMATED 85  --   --  85 >90 >90 >90   < > >90  >90   < > 81   MONIK 9.1  --   --  8.8 8.6* 8.2* 8.1*   < > 8.8  8.8   < > 9.6   MAG 1.8  --   --   --  1.7 1.7 1.7   < > 2.1   < > 1.5*   PHOS 2.5  --   --   --  2.4* 2.5 2.1*   < > 2.3*   < > 3.2   PROTTOTAL 6.3*  --   --   --  6.4  --   --   --  6.7  --  7.4   ALBUMIN 3.7  --   --   --  3.7  --   --   --  4.0  --  4.2   BILITOTAL <0.2  --   --   --  <0.2  --   --   --  0.2  --  0.4   ALKPHOS 96  --   --   --  99  --   --   --  114*  --  126*   AST 16  --   --   --  19  --   --   --  18  --  19   ALT 24  --   --   --  24  --   --   --  17  --  18    < > = values in this interval not displayed.     CBC  Recent Labs   Lab 10/20/23  0602 10/19/23  0539 10/17/23  115   WBC 12.0* 22.6* 14.3*   RBC 3.54* 3.30* 4.05   HGB  9.4* 8.7* 10.9*   HCT 27.3* 25.2* 29.5*   MCV 77* 76* 73*   MCH 26.6 26.4* 26.9   MCHC 34.4 34.5 36.9*   RDW 14.3 13.7 13.2    275 307

## 2023-10-20 NOTE — PROGRESS NOTES
Ifosfamide Toxicity Assessment      Patient is Alert & Oriented x4. There are signs of lethargy, confusion or hallucinations No.     Patient is having new incontinence of bowel or bladder No.       Pincer Grasp intact Yes    Tremors present No     Provider was notified No of changes 0 . Interventions include 0.     Will continue to monitor for s/sx of ifosfamide toxicity: hallucinations, AMS, emotional lability, somnolence, myoclonic jerks, tremors, coordination difficulties. If these occur, please call the APPs/Attending on days and fellow on-call for evening and nights for recommendations and further instruction.

## 2023-10-20 NOTE — PROGRESS NOTES
Ifosfamide Toxicity Assessment      Neuro asssessment completed YES, significant findings were NONE     Patient is Alert & Oriented x4. There are NO  signs of lethargy, confusion or hallucinations.     Patient is having new incontinence of bowel or bladder NO .       Pincer Grasp intact YES     Tremors None    Provider was notified no    -- Monitor for s/sx of ifosfamide toxicity: hallucinations, AMS, emotional lability, somnolence, myoclonic jerks, tremors, coordination difficulties, etc. If these occur, please call the fellow on call for recommendations

## 2023-10-20 NOTE — PROGRESS NOTES
Nursing Focus: Chemotherapy  D: Positive blood return via PICC. Insertion site is clean/dry/intact, dressing intact with no complaints of pain.  Urine output is recorded in intake in Doc Flowsheet.    I: Premedications given per order (see electronic medical administration record). Dose #3 of Ifosfamide started to infuse over 24 hours. Reviewed pt teaching on chemotherapy side effects.  Pt denies need for further teaching. Chemotherapy double checked per protocol by two chemotherapy competent RN's.   A: Tolerating procedure well. Denies nausea and or pain.   P: Continue to monitor urine output and symptoms of nausea. Screen for symptoms of toxicity.                                                   Ifosfamide Toxicity Assessment      Patient is Alert & Oriented x4. There are signs of lethargy, confusion or hallucinations No.     Patient is having new incontinence of bowel or bladder No.       Pincer Grasp intact Yes    Tremors present No     Will continue to monitor for s/sx of ifosfamide toxicity: hallucinations, AMS, emotional lability, somnolence, myoclonic jerks, tremors, coordination difficulties. If these occur, please call the APPs/Attending on days and fellow on-call for evening and nights for recommendations and further instruction.

## 2023-10-21 ENCOUNTER — APPOINTMENT (OUTPATIENT)
Dept: ULTRASOUND IMAGING | Facility: CLINIC | Age: 68
DRG: 847 | End: 2023-10-21
Attending: PHYSICIAN ASSISTANT
Payer: COMMERCIAL

## 2023-10-21 LAB
ACANTHOCYTES BLD QL SMEAR: NORMAL
ALBUMIN SERPL BCG-MCNC: 3.4 G/DL (ref 3.5–5.2)
ALP SERPL-CCNC: 98 U/L (ref 35–104)
ALT SERPL W P-5'-P-CCNC: 14 U/L (ref 0–50)
ANION GAP SERPL CALCULATED.3IONS-SCNC: 13 MMOL/L (ref 7–15)
AST SERPL W P-5'-P-CCNC: 16 U/L (ref 0–45)
AUER BODIES BLD QL SMEAR: NORMAL
BASO STIPL BLD QL SMEAR: NORMAL
BASO+EOS+MONOS # BLD AUTO: ABNORMAL 10*3/UL
BASO+EOS+MONOS NFR BLD AUTO: ABNORMAL %
BASOPHILS # BLD AUTO: ABNORMAL 10*3/UL
BASOPHILS NFR BLD AUTO: ABNORMAL %
BILIRUB SERPL-MCNC: 0.2 MG/DL
BITE CELLS BLD QL SMEAR: NORMAL
BLISTER CELLS BLD QL SMEAR: NORMAL
BUN SERPL-MCNC: 12.2 MG/DL (ref 8–23)
BURR CELLS BLD QL SMEAR: NORMAL
CALCIUM SERPL-MCNC: 8.8 MG/DL (ref 8.8–10.2)
CHLORIDE SERPL-SCNC: 97 MMOL/L (ref 98–107)
CREAT SERPL-MCNC: 0.72 MG/DL (ref 0.51–0.95)
DACRYOCYTES BLD QL SMEAR: NORMAL
DEPRECATED HCO3 PLAS-SCNC: 24 MMOL/L (ref 22–29)
EGFRCR SERPLBLD CKD-EPI 2021: >90 ML/MIN/1.73M2
ELLIPTOCYTES BLD QL SMEAR: NORMAL
EOSINOPHIL # BLD AUTO: ABNORMAL 10*3/UL
EOSINOPHIL NFR BLD AUTO: ABNORMAL %
ERYTHROCYTE [DISTWIDTH] IN BLOOD BY AUTOMATED COUNT: 14.2 % (ref 10–15)
FRAGMENTS BLD QL SMEAR: NORMAL
GLUCOSE SERPL-MCNC: 77 MG/DL (ref 70–99)
HCT VFR BLD AUTO: 29 % (ref 35–47)
HGB BLD-MCNC: 9.6 G/DL (ref 11.7–15.7)
HGB C CRYSTALS: NORMAL
HOWELL-JOLLY BOD BLD QL SMEAR: NORMAL
IMM GRANULOCYTES # BLD: ABNORMAL 10*3/UL
IMM GRANULOCYTES NFR BLD: ABNORMAL %
LYMPHOCYTES # BLD AUTO: ABNORMAL 10*3/UL
LYMPHOCYTES NFR BLD AUTO: ABNORMAL %
MAGNESIUM SERPL-MCNC: 1.5 MG/DL (ref 1.7–2.3)
MAGNESIUM SERPL-MCNC: 2.5 MG/DL (ref 1.7–2.3)
MCH RBC QN AUTO: 26.9 PG (ref 26.5–33)
MCHC RBC AUTO-ENTMCNC: 33.1 G/DL (ref 31.5–36.5)
MCV RBC AUTO: 81 FL (ref 78–100)
MONOCYTES # BLD AUTO: ABNORMAL 10*3/UL
MONOCYTES NFR BLD AUTO: ABNORMAL %
NEUTROPHILS # BLD AUTO: ABNORMAL 10*3/UL
NEUTROPHILS NFR BLD AUTO: ABNORMAL %
NEUTS HYPERSEG BLD QL SMEAR: NORMAL
NRBC # BLD AUTO: 0 10E3/UL
NRBC BLD AUTO-RTO: 0 /100
PHOSPHATE SERPL-MCNC: 2.5 MG/DL (ref 2.5–4.5)
PLAT MORPH BLD: NORMAL
PLATELET # BLD AUTO: 244 10E3/UL (ref 150–450)
POLYCHROMASIA BLD QL SMEAR: NORMAL
POTASSIUM SERPL-SCNC: 3.9 MMOL/L (ref 3.4–5.3)
PROT SERPL-MCNC: 6 G/DL (ref 6.4–8.3)
RBC # BLD AUTO: 3.57 10E6/UL (ref 3.8–5.2)
RBC AGGLUT BLD QL: NORMAL
RBC MORPH BLD: NORMAL
ROULEAUX BLD QL SMEAR: NORMAL
SICKLE CELLS BLD QL SMEAR: NORMAL
SMUDGE CELLS BLD QL SMEAR: NORMAL
SODIUM SERPL-SCNC: 134 MMOL/L (ref 135–145)
SPHEROCYTES BLD QL SMEAR: NORMAL
STOMATOCYTES BLD QL SMEAR: NORMAL
TARGETS BLD QL SMEAR: NORMAL
TOXIC GRANULES BLD QL SMEAR: NORMAL
VARIANT LYMPHS BLD QL SMEAR: NORMAL
WBC # BLD AUTO: 12.2 10E3/UL (ref 4–11)

## 2023-10-21 PROCEDURE — 250N000013 HC RX MED GY IP 250 OP 250 PS 637: Performed by: STUDENT IN AN ORGANIZED HEALTH CARE EDUCATION/TRAINING PROGRAM

## 2023-10-21 PROCEDURE — 93971 EXTREMITY STUDY: CPT | Mod: 26 | Performed by: RADIOLOGY

## 2023-10-21 PROCEDURE — 83735 ASSAY OF MAGNESIUM: CPT | Performed by: STUDENT IN AN ORGANIZED HEALTH CARE EDUCATION/TRAINING PROGRAM

## 2023-10-21 PROCEDURE — 84100 ASSAY OF PHOSPHORUS: CPT | Performed by: STUDENT IN AN ORGANIZED HEALTH CARE EDUCATION/TRAINING PROGRAM

## 2023-10-21 PROCEDURE — 99233 SBSQ HOSP IP/OBS HIGH 50: CPT | Mod: FS | Performed by: PHYSICIAN ASSISTANT

## 2023-10-21 PROCEDURE — 258N000003 HC RX IP 258 OP 636: Performed by: STUDENT IN AN ORGANIZED HEALTH CARE EDUCATION/TRAINING PROGRAM

## 2023-10-21 PROCEDURE — 80053 COMPREHEN METABOLIC PANEL: CPT | Performed by: STUDENT IN AN ORGANIZED HEALTH CARE EDUCATION/TRAINING PROGRAM

## 2023-10-21 PROCEDURE — 93971 EXTREMITY STUDY: CPT | Mod: RT

## 2023-10-21 PROCEDURE — 85027 COMPLETE CBC AUTOMATED: CPT | Performed by: PHYSICIAN ASSISTANT

## 2023-10-21 PROCEDURE — 99233 SBSQ HOSP IP/OBS HIGH 50: CPT | Mod: FS | Performed by: STUDENT IN AN ORGANIZED HEALTH CARE EDUCATION/TRAINING PROGRAM

## 2023-10-21 PROCEDURE — 99232 SBSQ HOSP IP/OBS MODERATE 35: CPT | Performed by: INTERNAL MEDICINE

## 2023-10-21 PROCEDURE — 250N000011 HC RX IP 250 OP 636: Mod: JZ | Performed by: STUDENT IN AN ORGANIZED HEALTH CARE EDUCATION/TRAINING PROGRAM

## 2023-10-21 PROCEDURE — 250N000013 HC RX MED GY IP 250 OP 250 PS 637: Performed by: PHYSICIAN ASSISTANT

## 2023-10-21 PROCEDURE — 250N000011 HC RX IP 250 OP 636: Mod: JZ | Performed by: PHYSICIAN ASSISTANT

## 2023-10-21 PROCEDURE — 120N000002 HC R&B MED SURG/OB UMMC

## 2023-10-21 RX ORDER — ACETAMINOPHEN 500 MG
1000 TABLET ORAL 3 TIMES DAILY PRN
Status: DISCONTINUED | OUTPATIENT
Start: 2023-10-21 | End: 2023-10-24 | Stop reason: HOSPADM

## 2023-10-21 RX ORDER — MAGNESIUM SULFATE HEPTAHYDRATE 40 MG/ML
4 INJECTION, SOLUTION INTRAVENOUS ONCE
Status: COMPLETED | OUTPATIENT
Start: 2023-10-21 | End: 2023-10-21

## 2023-10-21 RX ADMIN — IBUPROFEN 600 MG: 200 TABLET, FILM COATED ORAL at 14:27

## 2023-10-21 RX ADMIN — OXYCODONE HYDROCHLORIDE 10 MG: 5 TABLET ORAL at 21:03

## 2023-10-21 RX ADMIN — IBUPROFEN 600 MG: 200 TABLET, FILM COATED ORAL at 22:39

## 2023-10-21 RX ADMIN — SODIUM CHLORIDE, PRESERVATIVE FREE 5 ML: 5 INJECTION INTRAVENOUS at 05:15

## 2023-10-21 RX ADMIN — IBUPROFEN 600 MG: 200 TABLET, FILM COATED ORAL at 05:14

## 2023-10-21 RX ADMIN — ONDANSETRON HYDROCHLORIDE 8 MG: 8 TABLET, FILM COATED ORAL at 13:23

## 2023-10-21 RX ADMIN — ONDANSETRON HYDROCHLORIDE 8 MG: 8 TABLET, FILM COATED ORAL at 21:03

## 2023-10-21 RX ADMIN — MESNA 2910 MG: 100 INJECTION, SOLUTION INTRAVENOUS at 16:04

## 2023-10-21 RX ADMIN — ONDANSETRON HYDROCHLORIDE 8 MG: 8 TABLET, FILM COATED ORAL at 05:15

## 2023-10-21 RX ADMIN — OMEPRAZOLE 20 MG: 20 CAPSULE, DELAYED RELEASE ORAL at 08:30

## 2023-10-21 RX ADMIN — PREGABALIN 25 MG: 25 CAPSULE ORAL at 13:23

## 2023-10-21 RX ADMIN — PRAVASTATIN SODIUM 40 MG: 20 TABLET ORAL at 19:28

## 2023-10-21 RX ADMIN — LOSARTAN POTASSIUM 100 MG: 100 TABLET, FILM COATED ORAL at 19:28

## 2023-10-21 RX ADMIN — ENOXAPARIN SODIUM 40 MG: 40 INJECTION SUBCUTANEOUS at 08:28

## 2023-10-21 RX ADMIN — PREGABALIN 25 MG: 25 CAPSULE ORAL at 08:31

## 2023-10-21 RX ADMIN — PREGABALIN 25 MG: 25 CAPSULE ORAL at 21:03

## 2023-10-21 RX ADMIN — THIAMINE HCL TAB 100 MG 100 MG: 100 TAB at 08:31

## 2023-10-21 RX ADMIN — MAGNESIUM SULFATE HEPTAHYDRATE 4 G: 40 INJECTION, SOLUTION INTRAVENOUS at 08:26

## 2023-10-21 ASSESSMENT — ACTIVITIES OF DAILY LIVING (ADL)
ADLS_ACUITY_SCORE: 20

## 2023-10-21 NOTE — PROGRESS NOTES
Ifosfamide Toxicity Assessment      Patient is Alert & Oriented x4. There are signs of lethargy, confusion or hallucinations NO.     Patient is having new incontinence of bowel or bladder No.       Pincer Grasp intact Yes    Tremors present No     Provider was Not notified, no changes from baseline.      Will continue to monitor for s/sx of ifosfamide toxicity: hallucinations, AMS, emotional lability, somnolence, myoclonic jerks, tremors, coordination difficulties. If these occur, please call the APPs/Attending on days and fellow on-call for evening and nights for recommendations and further instruction.

## 2023-10-21 NOTE — PLAN OF CARE
Goal Outcome Evaluation:      Plan of Care Reviewed With: patient, spouse    Overall Patient Progress: no changeOverall Patient Progress: no change                                               Ifosfamide Toxicity Assessment      Neuro asssessment completed Yes , significant findings were none     Patient is Alert & Oriented x4. There are  No signs of lethargy, confusion or hallucinations.     Patient is having new incontinence of bowel or bladder  No.       Pincer Grasp intact Yes     Tremors none    Provider was not notified  No of changes .   -- Monitor for s/sx of ifosfamide toxicity: hallucinations, AMS, emotional lability, somnolence, myoclonic jerks, tremors, coordination difficulties, etc. If these occur, please call the fellow on call for recommendations     Pt afebrile with stable vs. Receiving lyrica TID without no significant relief. Received ibuprofen x1 which has is more effective. Tylenol changed from scheduled to prn, as pt report that it is not effective. RLE US done to evaluate RLE swelling. DVT ruled out. Mg level 1.5 replaced per protocol. Redraw 2.5. Continues to receive day 3 Ifos/mesna CIVI without difficulty. Good urine output. LBM today.

## 2023-10-21 NOTE — PROGRESS NOTES
[facility]    Medicine Progress Note - Hospitalist Service, GOLD TEAM 4    Date of Admission:  10/17/2023    Assessment & Plan   Ms. Mirta Cardenas is a pleasant 67 yo female with hx of HTN, psoriasis, HLD, CKDIIIa, GERD, remote hx of CLL, and RLE sarcoma diagnosed earlier this year admitted to [facility] as direct admit to proceed with chemotherapy as directed by Oncology team.     Plan for today:  - Obtain RLE venous US due to r/o DVT given unilateral edema  - Change scheduled Tylenol to prn per pt request.   - Replace Mg per RN replacement protocol    # RLE high grade, pleomorphic sarcoma  # Cancer related pain  - Chemo and other management per Oncology.   - Change scheduled ES Tylenol to prn per pt request  - Continue prn ibuprofen  - Continue prn oxy 5-10 mg q6hrs  - Discontinue PTA duloxetine and started Lyrica 25 mg TID on 10/20     # Hypokalemia  # Hyponatremia  # Hypomagnesemia  Admission and repeat labs 10/17 reveal an initial Na of 118 with repeat 119. Repeat K 2.7 and Mg 1.5. Urina Na, os and serum os not conclusive. Likely multifactorial in etiology. Degree of low levels most likely not fully explained by pt admitting to drinking quite a bit of water combined with poor po food intake PTA. Likely being on chlorthalidone PTA contributing, as well as possible SIADH that developed from pt's recently prescribed duloxetine. Serial Na thereafter modestly improved, and into yesterday am WNL. This am slightly decreased again to 134, question 2/2 receiving recent dose of DDAVP. Mg again low at 1.5.  - Continue Mg RN replacement protocol  - Repeat BMP and Mg tomorrow am.   - Holding PTA chlorthalidone    # CKDIIIa: BL GFRs in 80s, and recent levels at or above BL.   - Daily BMP.     # Mild, borderline microcytic hypochromic anemia:  Admission BL Hgb WNL. Current Hgb 9.6 (9.4) with mildly low MCV and MCH. Ferritin WNL. No e/o active bleeding.   - Obtain routine iron  studies  - Repeat CBC tomorrow am.      # Leukocytosis: Admission level likely 2/2 stress response and/or hx of CLL and repeat elevation likely due to steroids received with chemo. Pt afebrile and denies sxs of underlying infection.  - CTM     # HTN: PTA on losartan 100 mg daily and chlorthalidone 25 mg daily, the latter on hold. BPs slightly elevated likely due to pain.   - Continue PTA losartan and holding chlorthalidone     # Psoriasis: Continue PTA topicals prn (pt would prefer to use her home supply if possible)     # GERD: No current concerns  - Continue PTA daily PPI     # HLD: Continue PTA statin     # Hx of reactive airway disease: Pt denies hx of asthma and has not used any inhalers for over a year. Denies dyspnea and no e/o bronchospasm on exam.  - Noted.        Diet: Combination Diet Regular Diet Adult    DVT Prophylaxis: Enoxaparin (Lovenox) SQ  Branham Catheter: Not present  Lines: PRESENT      PICC 10/17/23 Double Lumen Left Basilic-Site Assessment: WDL  Cardiac Monitoring: None  Code Status: Full Code        Disposition Plan Home after chemo finishes per Oncology    Expected Discharge Date: 10/24/2023        Discharge Comments: chemo          The patient's care was discussed with the Attending Physician, Dr. Mclaughlin, Bedside Nurse, and Patient.    Solomon Melton PA-C  Hospitalist Service, GOLD TEAM [address] [place] [address]  Securely message with [employer] (more info)  Text page via [facility] Paging/Directory   See signed in provider for up to date coverage information  ______________________________________________________________________    Interval History   NAEO. Cancer pain stable on prn ibuprofen. Feels BLEs edematous. Denies fevers, chills, chest pain and other acute physical concerns at this time.    Physical Exam   Vital Signs: Temp: 97.9  F (36.6  C) Temp src: Oral BP: (!) 147/82 Pulse: 91   Resp: 18 SpO2: 98 % O2 Device: None (Room air)    Weight: 172 lbs  .01 oz  GEN: In NAD  HEENT: NCAT; PERRL; sclerae non-icteric  LUNGS: CTAB  CV: RRR  ABD: +BSs; SNTND  EXT: 1+ RLE edema BLE  SKIN: No acute rashes noted on exposed areas.  NEURO: AAOx3; CNs grossly intact; No acute focal deficits noted.      Medical Decision Making       45 MINUTES SPENT BY ME on the date of service doing chart review, history, exam, documentation & further activities per the note.      Data   CMP  Recent Labs   Lab 10/21/23  0503 10/20/23  0602 10/20/23  0226 10/19/23  2209 10/19/23  1709 10/19/23  0539 10/19/23  0031 10/18/23  0614 10/18/23  0223   * 137  137 135 132* 133* 131* 129*   < > 123*  123*   POTASSIUM 3.9 3.6  --   --  3.5 3.6 3.8   < > 3.7  3.7   CHLORIDE 97* 100  --   --  98 96* 96*   < > 87*  87*   CO2 24 27  --   --  25 25 24   < > 26  26   ANIONGAP 13 10  --   --  10 10 9   < > 10  10   GLC 77 86  --   --  84 111* 128*   < > 141*  141*   BUN 12.2 14.1  --   --  18.4 15.6 17.2   < > 16.2  16.2   CR 0.72 0.77  --   --  0.77 0.62 0.63   < > 0.69  0.69   GFRESTIMATED >90 85  --   --  85 >90 >90   < > >90  >90   MONIK 8.8 9.1  --   --  8.8 8.6* 8.2*   < > 8.8  8.8   MAG 1.5* 1.8  --   --   --  1.7 1.7   < > 2.1   PHOS 2.5 2.5  --   --   --  2.4* 2.5   < > 2.3*   PROTTOTAL 6.0* 6.3*  --   --   --  6.4  --   --  6.7   ALBUMIN 3.4* 3.7  --   --   --  3.7  --   --  4.0   BILITOTAL 0.2 <0.2  --   --   --  <0.2  --   --  0.2   ALKPHOS 98 96  --   --   --  99  --   --  114*   AST 16 16  --   --   --  19  --   --  18   ALT 14 24  --   --   --  24  --   --  17    < > = values in this interval not displayed.     CBC  Recent Labs   Lab 10/21/23  0503 10/20/23  0602 10/19/23  0539 10/17/23  1158   WBC 12.2* 12.0* 22.6* 14.3*   RBC 3.57* 3.54* 3.30* 4.05   HGB 9.6* 9.4* 8.7* 10.9*   HCT 29.0* 27.3* 25.2* 29.5*   MCV 81 77* 76* 73*   MCH 26.9 26.6 26.4* 26.9   MCHC 33.1 34.4 34.5 36.9*   RDW 14.2 14.3 13.7 13.2    241 275 307

## 2023-10-21 NOTE — PROGRESS NOTES
Ifosfamide Toxicity Assessment      Patient is Alert & Oriented x4. There are signs of lethargy, confusion or hallucinations No.     Patient is having new incontinence of bowel or bladder No.       Pincer Grasp intact Yes    Tremors present No     Provider was notified No of changes.    Will continue to monitor for s/sx of ifosfamide toxicity: hallucinations, AMS, emotional lability, somnolence, myoclonic jerks, tremors, coordination difficulties. If these occur, please call the APPs/Attending on days and fellow on-call for evening and nights for recommendations and further instruction.       Nursing Focus: Chemotherapy  D: Positive brisk bright red blood return via PICC. Insertion site is clean/dry/intact, dressing intact with no complaints of pain.  Urine output is recorded in intake Doc Flowsheet.    I: On scheduled premedications given per order (see electronic medical administration record). Dose #4 of Ifosfamide/Mesna  started to infuse over 24 hours. Reviewed pt teaching on chemotherapy side effects.  Pt denies need for further teaching. Chemotherapy double checked per protocol by two chemotherapy competent RN's.   A: Tolerating chemo well. Denies nausea.   P: Continue to monitor urine output and symptoms of nausea. Screen for symptoms of toxicity.

## 2023-10-21 NOTE — PLAN OF CARE
Goal Outcome Evaluation: 1900-0700      Plan of Care Reviewed With: patient    Overall Patient Progress: no change    Outcome Evaluation: Intermittently hypertensive w/in parameters, OVSS on RA. A&Ox4. Reports pain in R leg, ibuprofen given x2 with effectiveness, hot packs applied. Denies N/V, SOB. UAL. Ifos/mesna infusing. Voiding spontaneously. Able to make needs known. Continue POC.

## 2023-10-21 NOTE — PROGRESS NOTES
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 The plan after this is to go to neoadjuvant radiotherapy, followed by definitve surgical resection.  Incidentally discovered hyponatremia, hypokalemia, hypomagnesemia, not present 6 months ago. Etiology not clear at this point. Excessive free water intake? Chlorthalidone? S/P lyte replacement, and discontinuation of  selective serotonin reuptake inhibitor, with complete resolution  Pre-existent microcytic anemia,unclear etiology. Ferritin more than adequate. Will observe without intervention.  Absolute lymphocytosis from underlying CLL. No need for intervention.  Receiving Cycle 1 doxil/ifosfamide, given over 5 days. Currently on day 4 and tolerating well.  Pain at the site of her tumor. NSAIDs make sense, given normal platelets and creatinine. Now on ibuprofen with a nice improvement in her pain.  RLE edema, from underlying calf tumor.        Plan:     Continue chemo per protocol.  Continue prn ibuprofen   Following daily with you while in house.  Follow up with Michaela 10/30 at 10:45 (labs 9:45)  Neulasta after chemo 10/24 at 8:30  Will need 10 days of prophylactic levaquin 500 mg daily starting on day of discharge at completion of chemotherapy.  -Labs and infusion appointment on 10/25, 10/27, 10/30, 11/1, 11/3, 11/6, 11/8, 11/10   Follow up Dr. Jimenez 11/8/23 at 12:45          Griffin Duckworth MD, MSc  Associate Professor of Medicine  [employer]  [facility]  [address] [place] [id number]  [phone]    __________________________________________________________________    Diagnosis     High grade pleomorphihc sarcoma of the right leg.  Low grade CLL, managed with observation.  Hepatosplenomegaly seen on CT/PET.      History of Present  Illness/Therapy to Date:     68 year old previously healthy woman who noted intermittent, then progressive,  right calf pain. Plain films showed a destructive lesion in the mid right fibula. Biopsy 10/2 showed a high grade pleomorphic sarcoma. CT/PET 10/10/23 showed a  hypermetabolic right calf mass with osseous destruction of the adjacent fibular shaft consistent with biopsy-proven sarcoma.  There was no evidence of distant metastatic disease.        Interval history:     Right calf pain better with ibuprofen  Wants the rounding oncologist to be her CLL doctor.  About to take a shower  Otherwise, doing okay.    No cough  No shortness of breath  Was on po iron for anemia but it made her stomach upset.      On ROS in addition to the above      Denies  fevers, chills, NS, HA, dysphagia/odynophagia, change in weight, change in appetite, cough, SOB, CP, n/v, abd pain, constipation/diarrhea, hematochezia, dysuria, hematuria, swelling, rashes, lymphadenopathy      Past Medical History:   I have reviewed this patient's past medical history   Past Medical History:   Diagnosis Date    Asthma     Asthma     reactive airway disease, per patient    Blood transfusion     Cancer (H)     chronic lymphocytic leukemia    History of transfusion     Hypertension     Hypertension     Leukemia, chronic lymphocytic (H)     Malignant neoplasm (H)     CLL          Past Surgical History:    I have reviewed this patient's past surgical history       Social History:   Tobacco, ETOH, and rec drugs reviewed and as noted below with the following exceptions:   to [place]. Lives in [place]          Family History:     Family History   Problem Relation Age of Onset    Hypertension Brother     Heart Disease Brother 65        bypass    Cancer Sister         multiple myloma    Obesity Son     Obesity Son     Hypertension Son     Osteoporosis Mother     Melanoma No family hx of             Medications:     No current outpatient medications on  "file.              Physical Exam:   Blood pressure (!) 144/77, pulse 80, temperature 97.2  F (36.2  C), temperature source Oral, resp. rate 19, height 1.549 m (5' 1\"), weight 78 kg (172 lb), SpO2 99%, not currently breastfeeding.    ECOG PS: 0  Constitutional: WDWN female in NAD, pleasant and appropriate  HEENT:  NC/AT, no icterus, OP clear, MMM  Skin: No jaundice nor ecchymoses  Lungs: CTAB, no w/r/r, nonlabored breathing  Cardiovascular: RRR, S1, S2, no m/r/g  Abdomen: +BS, soft, nontender, nondistended, no organomegaly nor masses  MSK/Extremities: Warm, well perfused. No edema  LN: no cervical, supraclavicular, axillary, nor inguinal lymphadenopathy  Neurologic: alert, answering questions appropriately, moving all extremities spontaneously. CN 2-12 grossly intact.  Psych: appropriate affect  Data:     Today's CBC and CMP reviewed. No new issues. Still anemic (hgb 9.6) with an MCV of 81. NA+ still in the normal range at 134. Platelets and creatinine normal.    Recent Results (from the past 24 hour(s))   US Lower Extremity Venous Duplex Right    Narrative    EXAMINATION: DOPPLER VENOUS ULTRASOUND OF THE RIGHT LOWER EXTREMITY,  10/21/2023 11:16 AM     COMPARISON: None.    HISTORY: Unilateral edema; please eval for DVT    TECHNIQUE:  Gray-scale evaluation with compression, spectral flow, and  color Doppler assessment of the deep venous system of the right leg  from groin to knee, and then at the ankle.    FINDINGS:  In the right lower extremity, the common femoral, femoral, popliteal  and posterior tibial veins demonstrate normal compressibility and  blood flow.    Heterogeneous hypoechoic lesion measuring 7.9 x 5.5 x 4.3 cm in the  right calf with associated blood flow on color Doppler evaluation.      Impression    IMPRESSION:  1.  No evidence of right lower extremity deep venous thrombosis.  2.  7.9 cm complex hypoechoic right calf lesion, better evaluated on  prior PET CT and MRI.    I have personally reviewed " the examination and initial interpretation  and I agree with the findings.    FLO VICKERS MD         SYSTEM ID:  [id number]       Other data           Labs, imaging and treatment plan reviewed with patient. All questions answered.        35 minutes spent on the date of the encounter doing chart review, review of outside records, review of test results, interpretation of tests, patient visit, documentation, discussion with other provider(s), and discussion with family

## 2023-10-22 LAB
ACANTHOCYTES BLD QL SMEAR: NORMAL
ALBUMIN SERPL BCG-MCNC: 3.4 G/DL (ref 3.5–5.2)
ALP SERPL-CCNC: 96 U/L (ref 35–104)
ALT SERPL W P-5'-P-CCNC: 16 U/L (ref 0–50)
ANION GAP SERPL CALCULATED.3IONS-SCNC: 9 MMOL/L (ref 7–15)
AST SERPL W P-5'-P-CCNC: 14 U/L (ref 0–45)
AUER BODIES BLD QL SMEAR: NORMAL
BASO STIPL BLD QL SMEAR: NORMAL
BASO+EOS+MONOS # BLD AUTO: ABNORMAL 10*3/UL
BASO+EOS+MONOS NFR BLD AUTO: ABNORMAL %
BASOPHILS # BLD AUTO: 0 10E3/UL (ref 0–0.2)
BASOPHILS NFR BLD AUTO: 0 %
BILIRUB SERPL-MCNC: 0.2 MG/DL
BITE CELLS BLD QL SMEAR: NORMAL
BLISTER CELLS BLD QL SMEAR: NORMAL
BUN SERPL-MCNC: 11 MG/DL (ref 8–23)
BURR CELLS BLD QL SMEAR: NORMAL
CALCIUM SERPL-MCNC: 8.5 MG/DL (ref 8.8–10.2)
CHLORIDE SERPL-SCNC: 102 MMOL/L (ref 98–107)
CREAT SERPL-MCNC: 0.68 MG/DL (ref 0.51–0.95)
DACRYOCYTES BLD QL SMEAR: NORMAL
DEPRECATED HCO3 PLAS-SCNC: 25 MMOL/L (ref 22–29)
EGFRCR SERPLBLD CKD-EPI 2021: >90 ML/MIN/1.73M2
ELLIPTOCYTES BLD QL SMEAR: NORMAL
EOSINOPHIL # BLD AUTO: 0.2 10E3/UL (ref 0–0.7)
EOSINOPHIL NFR BLD AUTO: 2 %
ERYTHROCYTE [DISTWIDTH] IN BLOOD BY AUTOMATED COUNT: 14.4 % (ref 10–15)
FRAGMENTS BLD QL SMEAR: NORMAL
GLUCOSE SERPL-MCNC: 95 MG/DL (ref 70–99)
HCT VFR BLD AUTO: 28.1 % (ref 35–47)
HGB BLD-MCNC: 9.4 G/DL (ref 11.7–15.7)
HGB C CRYSTALS: NORMAL
HOWELL-JOLLY BOD BLD QL SMEAR: NORMAL
IMM GRANULOCYTES # BLD: 0 10E3/UL
IMM GRANULOCYTES NFR BLD: 0 %
LYMPHOCYTES # BLD AUTO: 5.1 10E3/UL (ref 0.8–5.3)
LYMPHOCYTES NFR BLD AUTO: 53 %
MAGNESIUM SERPL-MCNC: 2.1 MG/DL (ref 1.7–2.3)
MCH RBC QN AUTO: 26.9 PG (ref 26.5–33)
MCHC RBC AUTO-ENTMCNC: 33.5 G/DL (ref 31.5–36.5)
MCV RBC AUTO: 80 FL (ref 78–100)
MONOCYTES # BLD AUTO: 0.4 10E3/UL (ref 0–1.3)
MONOCYTES NFR BLD AUTO: 4 %
NEUTROPHILS # BLD AUTO: 3.9 10E3/UL (ref 1.6–8.3)
NEUTROPHILS NFR BLD AUTO: 41 %
NEUTS HYPERSEG BLD QL SMEAR: NORMAL
NRBC # BLD AUTO: 0 10E3/UL
NRBC BLD AUTO-RTO: 0 /100
PHOSPHATE SERPL-MCNC: 2.4 MG/DL (ref 2.5–4.5)
PLAT MORPH BLD: NORMAL
PLATELET # BLD AUTO: 234 10E3/UL (ref 150–450)
POLYCHROMASIA BLD QL SMEAR: NORMAL
POTASSIUM SERPL-SCNC: 3.9 MMOL/L (ref 3.4–5.3)
PROT SERPL-MCNC: 5.9 G/DL (ref 6.4–8.3)
RBC # BLD AUTO: 3.5 10E6/UL (ref 3.8–5.2)
RBC AGGLUT BLD QL: NORMAL
RBC MORPH BLD: NORMAL
ROULEAUX BLD QL SMEAR: NORMAL
SICKLE CELLS BLD QL SMEAR: NORMAL
SMUDGE CELLS BLD QL SMEAR: NORMAL
SODIUM SERPL-SCNC: 136 MMOL/L (ref 135–145)
SPHEROCYTES BLD QL SMEAR: NORMAL
STOMATOCYTES BLD QL SMEAR: NORMAL
TARGETS BLD QL SMEAR: NORMAL
TOXIC GRANULES BLD QL SMEAR: NORMAL
VARIANT LYMPHS BLD QL SMEAR: NORMAL
WBC # BLD AUTO: 9.5 10E3/UL (ref 4–11)

## 2023-10-22 PROCEDURE — 80053 COMPREHEN METABOLIC PANEL: CPT | Performed by: STUDENT IN AN ORGANIZED HEALTH CARE EDUCATION/TRAINING PROGRAM

## 2023-10-22 PROCEDURE — 250N000013 HC RX MED GY IP 250 OP 250 PS 637: Performed by: STUDENT IN AN ORGANIZED HEALTH CARE EDUCATION/TRAINING PROGRAM

## 2023-10-22 PROCEDURE — 99231 SBSQ HOSP IP/OBS SF/LOW 25: CPT | Mod: FS | Performed by: STUDENT IN AN ORGANIZED HEALTH CARE EDUCATION/TRAINING PROGRAM

## 2023-10-22 PROCEDURE — 258N000003 HC RX IP 258 OP 636: Performed by: STUDENT IN AN ORGANIZED HEALTH CARE EDUCATION/TRAINING PROGRAM

## 2023-10-22 PROCEDURE — 250N000011 HC RX IP 250 OP 636: Mod: JZ | Performed by: PHYSICIAN ASSISTANT

## 2023-10-22 PROCEDURE — 85025 COMPLETE CBC W/AUTO DIFF WBC: CPT | Performed by: PHYSICIAN ASSISTANT

## 2023-10-22 PROCEDURE — 84100 ASSAY OF PHOSPHORUS: CPT | Performed by: STUDENT IN AN ORGANIZED HEALTH CARE EDUCATION/TRAINING PROGRAM

## 2023-10-22 PROCEDURE — 250N000009 HC RX 250: Performed by: PHYSICIAN ASSISTANT

## 2023-10-22 PROCEDURE — 250N000011 HC RX IP 250 OP 636: Performed by: STUDENT IN AN ORGANIZED HEALTH CARE EDUCATION/TRAINING PROGRAM

## 2023-10-22 PROCEDURE — 250N000013 HC RX MED GY IP 250 OP 250 PS 637: Performed by: PHYSICIAN ASSISTANT

## 2023-10-22 PROCEDURE — 258N000003 HC RX IP 258 OP 636: Performed by: PHYSICIAN ASSISTANT

## 2023-10-22 PROCEDURE — 120N000002 HC R&B MED SURG/OB UMMC

## 2023-10-22 PROCEDURE — 99232 SBSQ HOSP IP/OBS MODERATE 35: CPT | Performed by: INTERNAL MEDICINE

## 2023-10-22 PROCEDURE — 83735 ASSAY OF MAGNESIUM: CPT | Performed by: STUDENT IN AN ORGANIZED HEALTH CARE EDUCATION/TRAINING PROGRAM

## 2023-10-22 RX ORDER — HEPARIN SODIUM (PORCINE) LOCK FLUSH IV SOLN 100 UNIT/ML 100 UNIT/ML
5-10 SOLUTION INTRAVENOUS
Status: DISCONTINUED | OUTPATIENT
Start: 2023-10-22 | End: 2023-10-22

## 2023-10-22 RX ORDER — HEPARIN SODIUM,PORCINE 10 UNIT/ML
5-10 VIAL (ML) INTRAVENOUS EVERY 24 HOURS
Status: DISCONTINUED | OUTPATIENT
Start: 2023-10-22 | End: 2023-10-22

## 2023-10-22 RX ORDER — HEPARIN SODIUM,PORCINE 10 UNIT/ML
5-10 VIAL (ML) INTRAVENOUS
Status: DISCONTINUED | OUTPATIENT
Start: 2023-10-22 | End: 2023-10-22

## 2023-10-22 RX ADMIN — MESNA 2910 MG: 100 INJECTION, SOLUTION INTRAVENOUS at 16:06

## 2023-10-22 RX ADMIN — PREGABALIN 25 MG: 25 CAPSULE ORAL at 22:05

## 2023-10-22 RX ADMIN — PREGABALIN 25 MG: 25 CAPSULE ORAL at 05:13

## 2023-10-22 RX ADMIN — ENOXAPARIN SODIUM 40 MG: 40 INJECTION SUBCUTANEOUS at 08:08

## 2023-10-22 RX ADMIN — ONDANSETRON HYDROCHLORIDE 8 MG: 8 TABLET, FILM COATED ORAL at 14:14

## 2023-10-22 RX ADMIN — IBUPROFEN 600 MG: 200 TABLET, FILM COATED ORAL at 14:14

## 2023-10-22 RX ADMIN — LOSARTAN POTASSIUM 100 MG: 100 TABLET, FILM COATED ORAL at 20:08

## 2023-10-22 RX ADMIN — IBUPROFEN 600 MG: 200 TABLET, FILM COATED ORAL at 06:25

## 2023-10-22 RX ADMIN — ONDANSETRON HYDROCHLORIDE 8 MG: 8 TABLET, FILM COATED ORAL at 05:13

## 2023-10-22 RX ADMIN — PRAVASTATIN SODIUM 40 MG: 20 TABLET ORAL at 20:07

## 2023-10-22 RX ADMIN — PREGABALIN 25 MG: 25 CAPSULE ORAL at 14:14

## 2023-10-22 RX ADMIN — THIAMINE HCL TAB 100 MG 100 MG: 100 TAB at 08:08

## 2023-10-22 RX ADMIN — IBUPROFEN 600 MG: 200 TABLET, FILM COATED ORAL at 22:05

## 2023-10-22 RX ADMIN — OMEPRAZOLE 20 MG: 20 CAPSULE, DELAYED RELEASE ORAL at 08:08

## 2023-10-22 RX ADMIN — ONDANSETRON HYDROCHLORIDE 8 MG: 8 TABLET, FILM COATED ORAL at 22:05

## 2023-10-22 RX ADMIN — POTASSIUM PHOSPHATE, MONOBASIC POTASSIUM PHOSPHATE, DIBASIC 9 MMOL: 224; 236 INJECTION, SOLUTION, CONCENTRATE INTRAVENOUS at 09:32

## 2023-10-22 ASSESSMENT — ACTIVITIES OF DAILY LIVING (ADL)
ADLS_ACUITY_SCORE: 20
ADLS_ACUITY_SCORE: 22

## 2023-10-22 NOTE — PLAN OF CARE
Goal Outcome Evaluation:      Plan of Care Reviewed With: patient    Overall Patient Progress: no changeOverall Patient Progress: no change    Outcome Evaluation: [id number]:    AVSS. Pain to right lower leg. PRN PO oxycodone and ibuprofen each given x1. No nausea/vomiting or SOB. Up independently. Good oral intake. LBM 10/21. Voiding spontaneously with good urine output. Continue to monitor as Pt receives day 4 of Ifos/mesna CIVI.

## 2023-10-22 NOTE — PLAN OF CARE
Goal Outcome Evaluation:      Plan of Care Reviewed With: patient, spouse    Overall Patient Progress: no changeOverall Patient Progress: no change                                               Ifosfamide Toxicity Assessment      Neuro asssessment completed Yes  significant findings were baseline     Patient is Alert & Oriented x4. There are  No signs of lethargy, confusion or hallucinations.     Patient is having new incontinence of bowel or bladder  No.       Pincer Grasp intact  Yes     Tremors No    Provider was notified  No of changes no . Interventions include none.     -- Monitor for s/sx of ifosfamide toxicity: hallucinations, AMS, emotional lability, somnolence, myoclonic jerks, tremors, coordination difficulties, etc. If these occur, please call the fellow on call for recommendations    Pt afebrile with stable vs. Receiving lyrica TID without no significant relief. Received ibuprofen which has is much more effective. Phos level 2.4 replaced per protocol. Redraw tomorrow. Continues to receive day 4 Ifos/mesna CIVI without difficulty. Good urine output.

## 2023-10-22 NOTE — PROGRESS NOTES
Ifosfamide Toxicity Assessment      Patient is Alert & Oriented x4. There are signs of lethargy, confusion or hallucinations NO.     Patient is having new incontinence of bowel or bladder No.       Pincer Grasp intact Yes    Tremors present No     Provider was notified No, no current changes from baseline. No intervention needed.     Will continue to monitor for s/sx of ifosfamide toxicity: hallucinations, AMS, emotional lability, somnolence, myoclonic jerks, tremors, coordination difficulties. If these occur, please call the APPs/Attending on days and fellow on-call for evening and nights for recommendations and further instruction.

## 2023-10-22 NOTE — PROGRESS NOTES
Nursing Focus: Chemotherapy  D: Positive blood return via PICC. Insertion site is clean/dry/intact, dressing intact with no complaints of pain.  Urine output is recorded in intake in Doc Flowsheet.    I: Premedications given per order (see electronic medical administration record). Dose #5 of Ifosfamide started to infuse over 24 hours. Reviewed pt teaching on chemotherapy side effects.  Pt denies need for further teaching. Chemotherapy double checked per protocol by two chemotherapy competent RN's.   A: Tolerating procedure well. Denies nausea and or pain.   P: Continue to monitor urine output and symptoms of nausea. Screen for symptoms of toxicity.                                                   Ifosfamide Toxicity Assessment      Patient is Alert & Oriented x4. There are signs of lethargy, confusion or hallucinations No.     Patient is having new incontinence of bowel or bladder No.       Pincer Grasp intact Yes    Tremors present No     Will continue to monitor for s/sx of ifosfamide toxicity: hallucinations, AMS, emotional lability, somnolence, myoclonic jerks, tremors, coordination difficulties. If these occur, please call the APPs/Attending on days and fellow on-call for evening and nights for recommendations and further instruction.

## 2023-10-22 NOTE — PROGRESS NOTES
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 The plan after this is to go to neoadjuvant radiotherapy, followed by definitve surgical resection.  Incidentally discovered hyponatremia, hypokalemia, hypomagnesemia, not present 6 months ago. Etiology not clear at this point. Excessive free water intake? Chlorthalidone? S/P lyte replacement, and discontinuation of  selective serotonin reuptake inhibitor, with complete resolution  Pre-existent microcytic anemia,unclear etiology. Ferritin more than adequate. Will observe without intervention.  Absolute lymphocytosis from underlying CLL. No need for intervention.  Receiving Cycle 1 doxil/ifosfamide, given over 5 days. Currently on day 5 and tolerating well.  Pain at the site of her tumor. NSAIDs make sense, given normal platelets and creatinine. Now on ibuprofen with a nice improvement in her pain.  RLE edema, from underlying calf tumor.        Plan:     Continue chemo per protocol.  Continue prn ibuprofen   Following daily with you while in house. Probable discharge Tuesday?  Follow up with Michaela 10/30 at 10:45 (labs 9:45)  Neulasta after chemo 10/24 at 8:30  Will need 10 days of prophylactic levaquin 500 mg daily starting on day of discharge at completion of chemotherapy.  -Labs and infusion appointment on 10/25, 10/27, 10/30, 11/1, 11/3, 11/6, 11/8, 11/10   Follow up Dr. 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 Current CT/PET shows a fe  inguianl and axillary sub cm nodes that are not hypermetabolic on recent Ct/petmanaged with observation.  Minimal hepatosplenomegaly seen on CT/PET.      History of Present Illness/Therapy to Date:     68 year old previously healthy woman who noted intermittent, then progressive,  right calf pain. Plain films showed a destructive lesion in the mid right fibula. Biopsy 10/2 showed a high grade pleomorphic sarcoma. CT/PET 10/10/23 showed a  hypermetabolic right calf mass with osseous destruction of the adjacent fibular shaft consistent with biopsy-proven sarcoma.  There was no evidence of distant metastatic disease.        Interval history:     Right calf pain better with ibuprofen, though did take an oxy last night.  Was crying this morning after seeing a TV report on the song [employer]. It reminded her of her upbringing.  Otherwise, doing okay.    No cough  No shortness of breath  Was on po iron for anemia but it made her stomach upset.      On ROS in addition to the above      Denies  fevers, chills, NS, HA, dysphagia/odynophagia, change in weight, change in appetite, cough, SOB, CP, n/v, abd pain, constipation/diarrhea, hematochezia, dysuria, hematuria, swelling, rashes, lymphadenopathy      Past Medical History:   I have reviewed this patient's past medical history   Past Medical History:   Diagnosis Date    Asthma     Asthma     reactive airway disease, per patient    Blood transfusion     Cancer (H)     chronic lymphocytic leukemia    History of transfusion     Hypertension     Hypertension     Leukemia, chronic lymphocytic (H)     Malignant neoplasm (H)     CLL          Past Surgical History:    I have reviewed this patient's past surgical history       Social History:   Tobacco, ETOH, and rec drugs reviewed and as noted below with the following exceptions:   to Giancarlo. Lives in [place]          Family History:     Family History   Problem Relation Age of Onset    Hypertension Brother     Heart  "Disease Brother 65        bypass    Cancer Sister         multiple myloma    Obesity Son     Obesity Son     Hypertension Son     Osteoporosis Mother     Melanoma No family hx of             Medications:     No current outpatient medications on file.              Physical Exam:   Blood pressure (!) 160/74, pulse 91, temperature 98.3  F (36.8  C), temperature source Oral, resp. rate 20, height 1.549 m (5' 1\"), weight 78.9 kg (173 lb 14.4 oz), SpO2 97%, not currently breastfeeding.    ECOG PS: 0  Constitutional: WDWN female in NAD, pleasant and appropriate  HEENT:  NC/AT, no icterus, OP clear, MMM  Skin: No jaundice nor ecchymoses  Lungs: CTAB, no w/r/r, nonlabored breathing  Cardiovascular: RRR, S1, S2, no m/r/g  Abdomen: +BS, soft, nontender, nondistended, no organomegaly nor masses  MSK/Extremities: Warm, well perfused. No edema  LN: no concerning palpable cervical, supraclavicular, axillary, nor inguinal lymphadenopathy (rechecked again today)  Neurologic: alert, answering questions appropriately, moving all extremities spontaneously. CN 2-12 grossly intact.  Psych: appropriate affect  Data:     Today's CBC and CMP reviewed. No new issues. Still anemic (hgb 9.4) with an MCV of 80. Na+ still in the normal range at 136. Platelets and creatinine normal.    Recent Results (from the past 24 hour(s))   US Lower Extremity Venous Duplex Right    Narrative    EXAMINATION: DOPPLER VENOUS ULTRASOUND OF THE RIGHT LOWER EXTREMITY,  10/21/2023 11:16 AM     COMPARISON: None.    HISTORY: Unilateral edema; please eval for DVT    TECHNIQUE:  Gray-scale evaluation with compression, spectral flow, and  color Doppler assessment of the deep venous system of the right leg  from groin to knee, and then at the ankle.    FINDINGS:  In the right lower extremity, the common femoral, femoral, popliteal  and posterior tibial veins demonstrate normal compressibility and  blood flow.    Heterogeneous hypoechoic lesion measuring 7.9 x 5.5 x 4.3 " cm in the  right calf with associated blood flow on color Doppler evaluation.      Impression    IMPRESSION:  1.  No evidence of right lower extremity deep venous thrombosis.  2.  7.9 cm complex hypoechoic right calf lesion, better evaluated on  prior PET CT and MRI.    I have personally reviewed the examination and initial interpretation  and I agree with the findings.    FLO VICKERS MD         SYSTEM ID:  [id number]       Other data           Labs, imaging and treatment plan reviewed with patient. All questions answered.        35 minutes spent on the date of the encounter doing chart review, review of outside records, review of test results, interpretation of tests, patient visit, documentation, discussion with other provider(s), and discussion with family

## 2023-10-22 NOTE — PROGRESS NOTES
[facility]    Medicine Progress Note - Hospitalist Service, GOLD TEAM 4    Date of Admission:  10/17/2023    Assessment & Plan   Ms. Mirta Cardenas is a pleasant 69 yo female with hx of HTN, psoriasis, HLD, CKDIIIa, GERD, remote hx of CLL, and RLE sarcoma diagnosed earlier this year admitted to [facility] as direct admit to proceed with chemotherapy as directed by Oncology team.     Plan for today:  - Chemo per Oncology; thus far tolerating without any significant SEs, creatinine stable, and lytes WNL.   - Continue current pain med regimen as ordered    # RLE high grade, pleomorphic sarcoma  # Cancer related pain  - Chemo and other management per Oncology.   - Change scheduled ES Tylenol to prn per pt request  - Continue prn ibuprofen  - Continue prn oxy 5-10 mg q6hrs  - Discontinued PTA duloxetine and started Lyrica 25 mg TID on 10/20     # Hypokalemia  # Hyponatremia  # Hypomagnesemia  Admission and repeat labs 10/17 reveal an initial Na of 118 with repeat 119. Repeat K 2.7 and Mg 1.5. Urina Na, os and serum os not conclusive. Likely multifactorial in etiology. Degree of low levels most likely not fully explained by pt admitting to drinking quite a bit of water combined with poor po food intake PTA. Likely being on chlorthalidone PTA contributing, as well as possible SIADH that developed from pt's recently prescribed duloxetine. Serial Na thereafter modestly improved, and into yesterday am WNL. Yesterday am slightly decreased again to 134, question 2/2 receiving recent dose of DDAVP. Mg again was also low at 1.5. All lytes normalized into this am.   - Continue Mg RN replacement protocol  - Repeat BMP and Mg tomorrow am.   - Discontinued PTA chlorthalidone    # CKDIIIa: BL GFRs in 80s, and recent levels at or above BL.   - Daily BMP.     # Mild, borderline microcytic hypochromic anemia:  Admission BL Hgb WNL. Current Hgb 9.4 (9.6) with mildly low MCV and MCH. Ferritin WNL. No  e/o active bleeding.   - Repeat CBC tomorrow am.      # Leukocytosis, resolved: Admission level likely 2/2 stress response and/or hx of CLL and repeat elevation likely due to steroids received with chemo. Pt afebrile and denies sxs of underlying infection. Normalized into this am.   - CTM     # HTN: PTA on losartan 100 mg daily and chlorthalidone 25 mg daily, the latter on hold. BPs stable.  - Continue PTA losartan   - Discontinued chlorthalidone     # Psoriasis: Continue PTA topicals prn (pt would prefer to use her home supply if possible)     # GERD: No current concerns  - Continue PTA daily PPI     # HLD: Continue PTA statin     # Hx of reactive airway disease: Pt denies hx of asthma and has not used any inhalers for over a year. Denies dyspnea and no e/o bronchospasm on exam.  - Noted.        Diet: Combination Diet Regular Diet Adult    DVT Prophylaxis: Enoxaparin (Lovenox) SQ  Branham Catheter: Not present  Lines: PRESENT      PICC 10/17/23 Double Lumen Left Basilic-Site Assessment: WDL  Cardiac Monitoring: None  Code Status: Full Code        Disposition Plan Home after chemo finishes per Oncology    Expected Discharge Date: 10/24/2023        Discharge Comments: chemo          The patient's care was discussed with the Attending Physician, Dr. Mclaughlin, Bedside Nurse, and Patient.    Solomon Melton PA-C  Hospitalist Service, GOLD TEAM [address] [place] [address]  Securely message with [employer] (more info)  Text page via [facility] Paging/Directory   See signed in provider for up to date coverage information  ______________________________________________________________________    Interval History   NAEO. Cancer pain stable on prn ibuprofen. Denies other acute physical concerns at this time.    Physical Exam   Vital Signs: Temp: 98.3  F (36.8  C) Temp src: Oral BP: (!) 145/76 Pulse: 91   Resp: 20 SpO2: 97 % O2 Device: None (Room air)    Weight: 173 lbs 14.4 oz  GEN: In NAD  HEENT:  NCAT; PERRL; sclerae non-icteric  LUNGS: CTAB  CV: RRR  ABD: +BSs; SNTND  EXT: 1+ RLE edema BLE  SKIN: No acute rashes noted on exposed areas.  NEURO: AAOx3; CNs grossly intact; No acute focal deficits noted.      Medical Decision Making       30 MINUTES SPENT BY ME on the date of service doing chart review, history, exam, documentation & further activities per the note.      Data   CMP  Recent Labs   Lab 10/22/23  0514 10/21/23  1333 10/21/23  0503 10/20/23  0602 10/20/23  0226 10/19/23  2209 10/19/23  1709 10/19/23  0539     --  134* 137  137 135   < > 133* 131*   POTASSIUM 3.9  --  3.9 3.6  --   --  3.5 3.6   CHLORIDE 102  --  97* 100  --   --  98 96*   CO2 25  --  24 27  --   --  25 25   ANIONGAP 9  --  13 10  --   --  10 10   GLC 95  --  77 86  --   --  84 111*   BUN 11.0  --  12.2 14.1  --   --  18.4 15.6   CR 0.68  --  0.72 0.77  --   --  0.77 0.62   GFRESTIMATED >90  --  >90 85  --   --  85 >90   MONIK 8.5*  --  8.8 9.1  --   --  8.8 8.6*   MAG 2.1 2.5* 1.5* 1.8  --   --   --  1.7   PHOS 2.4*  --  2.5 2.5  --   --   --  2.4*   PROTTOTAL 5.9*  --  6.0* 6.3*  --   --   --  6.4   ALBUMIN 3.4*  --  3.4* 3.7  --   --   --  3.7   BILITOTAL 0.2  --  0.2 <0.2  --   --   --  <0.2   ALKPHOS 96  --  98 96  --   --   --  99   AST 14  --  16 16  --   --   --  19   ALT 16  --  14 24  --   --   --  24    < > = values in this interval not displayed.     CBC  Recent Labs   Lab 10/22/23  0514 10/21/23  0503 10/20/23  0602 10/19/23  0539   WBC 9.5 12.2* 12.0* 22.6*   RBC 3.50* 3.57* 3.54* 3.30*   HGB 9.4* 9.6* 9.4* 8.7*   HCT 28.1* 29.0* 27.3* 25.2*   MCV 80 81 77* 76*   MCH 26.9 26.9 26.6 26.4*   MCHC 33.5 33.1 34.4 34.5   RDW 14.4 14.2 14.3 13.7    244 241 275

## 2023-10-22 NOTE — PLAN OF CARE
Goal Outcome Evaluation: [id number]      Plan of Care Reviewed With: patient    Overall Patient Progress: no change    Outcome Evaluation: VSS on RA. A&Ox4. Ifos/mesna infusing via PICC. Pain in R leg continues, ibuprofen x1. Denied N/V, SOB. UAL. Able to make needs known. Continue POC.

## 2023-10-23 DIAGNOSIS — Z76.89 PREVENTION OF CHEMOTHERAPY-INDUCED NEUTROPENIA: Primary | ICD-10-CM

## 2023-10-23 LAB
ALBUMIN SERPL BCG-MCNC: 3.5 G/DL (ref 3.5–5.2)
ALP SERPL-CCNC: 98 U/L (ref 35–104)
ALT SERPL W P-5'-P-CCNC: 16 U/L (ref 0–50)
ANION GAP SERPL CALCULATED.3IONS-SCNC: 9 MMOL/L (ref 7–15)
AST SERPL W P-5'-P-CCNC: 15 U/L (ref 0–45)
BASO+EOS+MONOS # BLD AUTO: ABNORMAL 10*3/UL
BASO+EOS+MONOS NFR BLD AUTO: ABNORMAL %
BASOPHILS # BLD AUTO: ABNORMAL 10*3/UL
BASOPHILS # BLD MANUAL: 0 10E3/UL (ref 0–0.2)
BASOPHILS NFR BLD AUTO: ABNORMAL %
BASOPHILS NFR BLD MANUAL: 0 %
BILIRUB SERPL-MCNC: 0.3 MG/DL
BUN SERPL-MCNC: 11.3 MG/DL (ref 8–23)
CALCIUM SERPL-MCNC: 8.5 MG/DL (ref 8.8–10.2)
CHLORIDE SERPL-SCNC: 105 MMOL/L (ref 98–107)
CREAT SERPL-MCNC: 0.73 MG/DL (ref 0.51–0.95)
DEPRECATED HCO3 PLAS-SCNC: 24 MMOL/L (ref 22–29)
EGFRCR SERPLBLD CKD-EPI 2021: 90 ML/MIN/1.73M2
EOSINOPHIL # BLD AUTO: ABNORMAL 10*3/UL
EOSINOPHIL # BLD MANUAL: 0.1 10E3/UL (ref 0–0.7)
EOSINOPHIL NFR BLD AUTO: ABNORMAL %
EOSINOPHIL NFR BLD MANUAL: 1 %
ERYTHROCYTE [DISTWIDTH] IN BLOOD BY AUTOMATED COUNT: 14.1 % (ref 10–15)
GLUCOSE SERPL-MCNC: 86 MG/DL (ref 70–99)
HCT VFR BLD AUTO: 27 % (ref 35–47)
HGB BLD-MCNC: 8.8 G/DL (ref 11.7–15.7)
IMM GRANULOCYTES # BLD: ABNORMAL 10*3/UL
IMM GRANULOCYTES NFR BLD: ABNORMAL %
LYMPHOCYTES # BLD AUTO: ABNORMAL 10*3/UL
LYMPHOCYTES # BLD MANUAL: 4.1 10E3/UL (ref 0.8–5.3)
LYMPHOCYTES NFR BLD AUTO: ABNORMAL %
LYMPHOCYTES NFR BLD MANUAL: 46 %
MAGNESIUM SERPL-MCNC: 1.9 MG/DL (ref 1.7–2.3)
MCH RBC QN AUTO: 26.4 PG (ref 26.5–33)
MCHC RBC AUTO-ENTMCNC: 32.6 G/DL (ref 31.5–36.5)
MCV RBC AUTO: 81 FL (ref 78–100)
MONOCYTES # BLD AUTO: ABNORMAL 10*3/UL
MONOCYTES # BLD MANUAL: 0.2 10E3/UL (ref 0–1.3)
MONOCYTES NFR BLD AUTO: ABNORMAL %
MONOCYTES NFR BLD MANUAL: 2 %
NEUTROPHILS # BLD AUTO: ABNORMAL 10*3/UL
NEUTROPHILS # BLD MANUAL: 4.5 10E3/UL (ref 1.6–8.3)
NEUTROPHILS NFR BLD AUTO: ABNORMAL %
NEUTROPHILS NFR BLD MANUAL: 50 %
NRBC # BLD AUTO: 0 10E3/UL
NRBC BLD AUTO-RTO: 0 /100
OTHER CELLS # BLD MANUAL: 0.1 10E3/UL
OTHER CELLS NFR BLD MANUAL: 1 %
PATH REV: ABNORMAL
PHOSPHATE SERPL-MCNC: 2.2 MG/DL (ref 2.5–4.5)
PLAT MORPH BLD: ABNORMAL
PLATELET # BLD AUTO: 222 10E3/UL (ref 150–450)
POTASSIUM SERPL-SCNC: 3.9 MMOL/L (ref 3.4–5.3)
PROT SERPL-MCNC: 6.1 G/DL (ref 6.4–8.3)
RBC # BLD AUTO: 3.33 10E6/UL (ref 3.8–5.2)
RBC MORPH BLD: ABNORMAL
SODIUM SERPL-SCNC: 138 MMOL/L (ref 135–145)
WBC # BLD AUTO: 9 10E3/UL (ref 4–11)

## 2023-10-23 PROCEDURE — 250N000013 HC RX MED GY IP 250 OP 250 PS 637: Performed by: PHYSICIAN ASSISTANT

## 2023-10-23 PROCEDURE — 99207 PR APP CREDIT; MD BILLING SHARED VISIT: CPT | Mod: FS | Performed by: PHYSICIAN ASSISTANT

## 2023-10-23 PROCEDURE — 99231 SBSQ HOSP IP/OBS SF/LOW 25: CPT | Mod: FS | Performed by: STUDENT IN AN ORGANIZED HEALTH CARE EDUCATION/TRAINING PROGRAM

## 2023-10-23 PROCEDURE — 80053 COMPREHEN METABOLIC PANEL: CPT | Performed by: STUDENT IN AN ORGANIZED HEALTH CARE EDUCATION/TRAINING PROGRAM

## 2023-10-23 PROCEDURE — 258N000003 HC RX IP 258 OP 636: Performed by: STUDENT IN AN ORGANIZED HEALTH CARE EDUCATION/TRAINING PROGRAM

## 2023-10-23 PROCEDURE — 83735 ASSAY OF MAGNESIUM: CPT | Performed by: STUDENT IN AN ORGANIZED HEALTH CARE EDUCATION/TRAINING PROGRAM

## 2023-10-23 PROCEDURE — 250N000013 HC RX MED GY IP 250 OP 250 PS 637: Performed by: STUDENT IN AN ORGANIZED HEALTH CARE EDUCATION/TRAINING PROGRAM

## 2023-10-23 PROCEDURE — 250N000009 HC RX 250: Performed by: STUDENT IN AN ORGANIZED HEALTH CARE EDUCATION/TRAINING PROGRAM

## 2023-10-23 PROCEDURE — 99231 SBSQ HOSP IP/OBS SF/LOW 25: CPT | Performed by: INTERNAL MEDICINE

## 2023-10-23 PROCEDURE — 84100 ASSAY OF PHOSPHORUS: CPT | Performed by: STUDENT IN AN ORGANIZED HEALTH CARE EDUCATION/TRAINING PROGRAM

## 2023-10-23 PROCEDURE — 85027 COMPLETE CBC AUTOMATED: CPT | Performed by: PHYSICIAN ASSISTANT

## 2023-10-23 PROCEDURE — 250N000011 HC RX IP 250 OP 636: Performed by: STUDENT IN AN ORGANIZED HEALTH CARE EDUCATION/TRAINING PROGRAM

## 2023-10-23 PROCEDURE — 120N000002 HC R&B MED SURG/OB UMMC

## 2023-10-23 PROCEDURE — 85007 BL SMEAR W/DIFF WBC COUNT: CPT | Performed by: PHYSICIAN ASSISTANT

## 2023-10-23 PROCEDURE — 250N000011 HC RX IP 250 OP 636: Mod: JZ | Performed by: PHYSICIAN ASSISTANT

## 2023-10-23 RX ORDER — PREGABALIN 50 MG/1
50 CAPSULE ORAL 3 TIMES DAILY
Status: DISCONTINUED | OUTPATIENT
Start: 2023-10-24 | End: 2023-10-24 | Stop reason: HOSPADM

## 2023-10-23 RX ADMIN — IBUPROFEN 600 MG: 200 TABLET, FILM COATED ORAL at 14:31

## 2023-10-23 RX ADMIN — MESNA 2910 MG: 100 INJECTION, SOLUTION INTRAVENOUS at 15:50

## 2023-10-23 RX ADMIN — OXYCODONE HYDROCHLORIDE 10 MG: 5 TABLET ORAL at 15:33

## 2023-10-23 RX ADMIN — PREGABALIN 25 MG: 25 CAPSULE ORAL at 06:20

## 2023-10-23 RX ADMIN — ONDANSETRON HYDROCHLORIDE 8 MG: 8 TABLET, FILM COATED ORAL at 14:12

## 2023-10-23 RX ADMIN — PRAVASTATIN SODIUM 40 MG: 20 TABLET ORAL at 20:47

## 2023-10-23 RX ADMIN — IBUPROFEN 600 MG: 200 TABLET, FILM COATED ORAL at 22:07

## 2023-10-23 RX ADMIN — OMEPRAZOLE 20 MG: 20 CAPSULE, DELAYED RELEASE ORAL at 08:00

## 2023-10-23 RX ADMIN — POTASSIUM PHOSPHATE, MONOBASIC POTASSIUM PHOSPHATE, DIBASIC 9 MMOL: 224; 236 INJECTION, SOLUTION, CONCENTRATE INTRAVENOUS at 09:54

## 2023-10-23 RX ADMIN — ONDANSETRON HYDROCHLORIDE 8 MG: 8 TABLET, FILM COATED ORAL at 22:07

## 2023-10-23 RX ADMIN — ENOXAPARIN SODIUM 40 MG: 40 INJECTION SUBCUTANEOUS at 08:06

## 2023-10-23 RX ADMIN — THIAMINE HCL TAB 100 MG 100 MG: 100 TAB at 08:00

## 2023-10-23 RX ADMIN — PREGABALIN 25 MG: 25 CAPSULE ORAL at 22:07

## 2023-10-23 RX ADMIN — SODIUM CHLORIDE, PRESERVATIVE FREE 5 ML: 5 INJECTION INTRAVENOUS at 20:53

## 2023-10-23 RX ADMIN — PREGABALIN 25 MG: 25 CAPSULE ORAL at 14:12

## 2023-10-23 RX ADMIN — ONDANSETRON HYDROCHLORIDE 8 MG: 8 TABLET, FILM COATED ORAL at 06:19

## 2023-10-23 RX ADMIN — IBUPROFEN 600 MG: 200 TABLET, FILM COATED ORAL at 06:28

## 2023-10-23 RX ADMIN — POLYETHYLENE GLYCOL 3350 17 G: 17 POWDER, FOR SOLUTION ORAL at 08:06

## 2023-10-23 RX ADMIN — LOSARTAN POTASSIUM 100 MG: 100 TABLET, FILM COATED ORAL at 20:47

## 2023-10-23 ASSESSMENT — ACTIVITIES OF DAILY LIVING (ADL)
ADLS_ACUITY_SCORE: 22
ADLS_ACUITY_SCORE: 23
ADLS_ACUITY_SCORE: 22
ADLS_ACUITY_SCORE: 23
ADLS_ACUITY_SCORE: 23
ADLS_ACUITY_SCORE: 22

## 2023-10-23 NOTE — PROGRESS NOTES
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 The plan after this is to go to neoadjuvant radiotherapy, followed by definitve surgical resection.  Incidentally discovered hyponatremia, hypokalemia, hypomagnesemia, not present 6 months ago. Etiology not clear at this point. Excessive free water intake? Chlorthalidone? S/P lyte replacement, and discontinuation of  selective serotonin reuptake inhibitor, with complete resolution  Pre-existent microcytic anemia,unclear etiology. Ferritin more than adequate. Will observe without intervention.  Absolute lymphocytosis from underlying CLL. No need for intervention.  Receiving Cycle 1 doxil/ifosfamide, given over 5 days. Currently on day 6 and tolerating well. If all contionues to go well, she will be discharged tomorrow.  Pain at the site of her tumor. NSAIDs make sense, given normal platelets and creatinine. Now on ibuprofen with a nice improvement in her pain.  RLE edema, from underlying calf tumor.        Plan:     Complete chemo per protocol.  Continue prn ibuprofen   Following daily with you while in house. Probable discharge Tuesday?  Follow up with Michaela 10/30 at 10:45 (labs 9:45)  Neulasta after chemo 10/25 at 8:30 at [facility]  Will need 10 days of prophylactic levaquin 500 mg daily starting on day of discharge at completion of chemotherapy.  -Labs and infusion appointment on 10/25, 10/27, 10/30, 11/1, 11/3, 11/6, 11/8, 11/10   Follow up Dr. 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  Vel  Stage 0/1 CLL, diagnosed at submandibular biopsy 12/2004. Current CT/PET shows a fe inguianl and axillary sub cm nodes that are not hypermetabolic on recent Ct/petmanaged with observation.  Minimal hepatosplenomegaly seen on CT/PET.      History of Present Illness/Therapy to Date:     68 year old previously healthy woman who noted intermittent, then progressive,  right calf pain. Plain films showed a destructive lesion in the mid right fibula. Biopsy 10/2 showed a high grade pleomorphic sarcoma. CT/PET 10/10/23 showed a  hypermetabolic right calf mass with osseous destruction of the adjacent fibular shaft consistent with biopsy-proven sarcoma.  There was no evidence of distant metastatic disease.        Interval history:     Walking around the floor, with Giancarlo at her side  Right calf pain better with ibuprofen, though did take an oxy last night.  Otherwise, doing okay.    No cough  No shortness of breath      On ROS in addition to the above      Denies  fevers, chills, NS, HA, dysphagia/odynophagia, change in weight, change in appetite, cough, SOB, CP, n/v, abd pain, constipation/diarrhea, hematochezia, dysuria, hematuria, swelling, rashes, lymphadenopathy      Past Medical History:   I have reviewed this patient's past medical history   Past Medical History:   Diagnosis Date    Asthma     Asthma     reactive airway disease, per patient    Blood transfusion     Cancer (H)     chronic lymphocytic leukemia    History of transfusion     Hypertension     Hypertension     Leukemia, chronic lymphocytic (H)     Malignant neoplasm (H)     CLL          Past Surgical History:    I have reviewed this patient's past surgical history       Social History:   Tobacco, ETOH, and rec drugs reviewed and as noted below with the following exceptions:   to Giancarlo. Lives in [place]          Family History:     Family History   Problem Relation Age of Onset    Hypertension Brother     Heart Disease Brother 65        bypass     "Cancer Sister         multiple myloma    Obesity Son     Obesity Son     Hypertension Son     Osteoporosis Mother     Melanoma No family hx of             Medications:     No current outpatient medications on file.              Physical Exam:   Blood pressure (!) 153/81, pulse 90, temperature 98  F (36.7  C), temperature source Oral, resp. rate 16, height 1.549 m (5' 1\"), weight 78.9 kg (173 lb 14.4 oz), SpO2 100%, not currently breastfeeding.    ECOG PS: 0  Constitutional: WDWN female in NAD, pleasant and appropriate  HEENT:  NC/AT, no icterus, OP clear, MMM  Skin: No jaundice nor ecchymoses  Lungs: CTAB, no w/r/r, nonlabored breathing  Cardiovascular: RRR, S1, S2, no m/r/g  Abdomen: +BS, soft, nontender, nondistended, no organomegaly nor masses  MSK/Extremities: Warm, well perfused. No edema  LN: no concerning palpable cervical, supraclavicular, axillary, nor inguinal lymphadenopathy (rechecked again today)  Neurologic: alert, answering questions appropriately, moving all extremities spontaneously. CN 2-12 grossly intact.  Psych: appropriate affect  Data:     Today's CBC and CMP reviewed. No new issues. Still anemic (hgb 9.4) with an MCV of 80. Na+ still in the normal range at 136. Platelets and creatinine normal.    No results found for this or any previous visit (from the past 24 hour(s)).      Other data           Labs, imaging and treatment plan reviewed with patient. All questions answered.        25 minutes spent on the date of the encounter doing chart review, review of outside records, review of test results, interpretation of tests, patient visit, documentation, discussion with other provider(s), and discussion with family           "

## 2023-10-23 NOTE — PLAN OF CARE
Goal Outcome Evaluation:      Plan of Care Reviewed With: patient    Overall Patient Progress: no change    Outcome Evaluation: Intermittently hypertensive w/in parameters, OVSS on RA. A&Ox4. Ifos/mesna infusing via PICC. Pain in R leg continues, ibuprofen given x1. Denies N/V, SOB. Continue w/ POC.

## 2023-10-23 NOTE — PROGRESS NOTES
Ifosfamide Toxicity Assessment      Patient is Alert & Oriented x4. There are signs of lethargy, confusion or hallucinations: No      Patient is having new incontinence of bowel or bladder: No     Pincer Grasp intact: Yes    Tremors: No    Provider was notified: No, no changes or s/s of Ifos toxicity. No interventions needed     Will continue to monitor for s/sx of ifosfamide toxicity: hallucinations, AMS, emotional lability, somnolence, myoclonic jerks, tremors, coordination difficulties, etc. If these occur, please call the fellow on call for recommendations

## 2023-10-23 NOTE — PLAN OF CARE
Assumed nursing care from [id number]. Patient alert, oriented, and up independently. Vital signs stable on room air. R calf/leg pain managed with scheduled Lyrica and PRN ibuprofen. Tolerating regular diet; denies nausea. Voiding spontaneously with adequate output. +bowel sounds in all quadrants and passing flatus; no BM today. Cycle 1 Day 5 doxorubicin/ifosfamide/mesna; ifosfamide/mesna currently infusing in L PICC at 47.4 mL/hr.

## 2023-10-23 NOTE — PROGRESS NOTES
[facility]    Medicine Progress Note - Hospitalist Service, GOLD TEAM 4  Date of Admission: 10/17/2023    Assessment & Plan   Mirta Cardenas is a 68 year old female with history of HTN, psoriasis, HLD, CKDIII, GERD, remote hx of CLL, and RLE sarcoma (dx 2023) who directly admitted to [facility] 10/17 for chemotherapy as directed by Oncology team.      RLE high grade, pleomorphic sarcoma  Cancer related pain: Direct admission 10/17 for chemotherapy. Tolerating well  - Oncology following, per notes will likely discharge tomorrow  - Continue Tylenol, ibuprofen, oxycodone, Duloxetine, Lyrica     HTN: PTA on losartan and chlorthalidone BP stable  - Continue PTA losartan   - Discontinued chlorthalidone     Other Medical Issues:  Anal fissure: With increased straining for BM over the past 24 hours. Continue bowel regimen, avoid constipation  Hypokalemia, hyponatremia, hypomagnesemia: Resolved. See notes 10/22 and prior for details. Daily BMP, Mg. Holding PTA chlorthalidone, no plans to resume on discharge   CKDIII: BL Cr 0.6-0.8 and stable. Monitor   Anemia of chronic disease: Recent BL hgb 8.5-10 and stable. Monitor   Leukocytosis: Resolved. See notes 10/22 and prior  Psoriasis: Continue PTA topicals prn (pt would prefer to use her home supply if possible)  GERD: Continue PPI  HLD: Continue statin  H/o reactive airway disease: No current concerns     Diet: Combination Diet Regular Diet Adult    DVT Prophylaxis: Enoxaparin (Lovenox) SQ  Branham Catheter: Not present  Lines: PRESENT      PICC 10/17/23 Double Lumen Left Basilic-Site Assessment: WDL      Cardiac Monitoring: None  Code Status: Full Code      Clinically Significant Risk Factors              # Hypoalbuminemia: Lowest albumin = 3.4 g/dL at 10/22/2023  5:14 AM, will monitor as appropriate     # Hypertension: Noted on problem list        # Obesity: Estimated body mass index is 32.86 kg/m  as calculated from the following:     "Height as of this encounter: 1.549 m (5' 1\").    Weight as of this encounter: 78.9 kg (173 lb 14.4 oz).      # Asthma: noted on problem list        Disposition Plan      Expected Discharge Date: 10/24/2023, 10:00 AM      Discharge Comments: chemo          The patient's care was discussed with the patient and attending physician, Dr. Lissette Galvan, PALupeC  Hospitalist Service, GOLD TEAM 4  [facility]  Securely message with [email or web address] (more info)  Text page via [facility] Paging/Directory   See signed in provider for up to date coverage information  ______________________________________________________________________    Interval History   Doing well. Tolerating chemo without issues. Appetite is good. Mood somewhat labile in the setting of cancer, active chemo. Reports very good family support from her spouse and adult children. No fever or chills. No dyspnea or chest pain    Physical Exam   Vital Signs: Temp: 98  F (36.7  C) Temp src: Oral BP: 129/83 Pulse: 94   Resp: 16 SpO2: 99 % O2 Device: None (Room air)    Weight: 173 lbs 14.4 oz  General Appearance: Alert and oriented x3, very pleasant  HEENT: Anicteric sclera, MMM   Respiratory: Breathing comfortably on room air, lungs CTAB without wheezing or crackles   Cardiovascular: RRR, S1, S2. No murmur noted  GI: Abdomen soft, non-tender with active bowel sounds. No guarding or rebound  Lymph/Hematologic: 1+ BLE peripheral edema, distal pulses palpable   Skin: No rash or jaundice   Musculoskeletal: Moves all extremities   Neurologic: No focal deficits, CN II-XII grossly intact      Medical Decision Making     Data     I have personally reviewed the following data over the past 24 hrs:    9.0  \   8.8 (L)   / 222     138 105 11.3 /  86   3.9 24 0.73 \     ALT: 16 AST: 15 AP: 98 TBILI: 0.3   ALB: 3.5 TOT PROTEIN: 6.1 (L) LIPASE: N/A       "

## 2023-10-23 NOTE — PLAN OF CARE
[id number]    A&Ox4. VSS on RA. Pain managed with prn ibuprofen x1 and heat packs. Denies nausea and SOB. Phos 2.2, replaced IV, recheck with AM labs. CIVI Ifosfamide infusing, pt tolerating well. No s/s Ifos toxicity. Good appetite. Voids spontaneously with good urine output. Anal fissure per pt report, denies any discomfort. Up independently. Continue with plan of care.

## 2023-10-24 VITALS
RESPIRATION RATE: 18 BRPM | HEART RATE: 90 BPM | TEMPERATURE: 98.6 F | BODY MASS INDEX: 31.93 KG/M2 | SYSTOLIC BLOOD PRESSURE: 149 MMHG | WEIGHT: 169.1 LBS | DIASTOLIC BLOOD PRESSURE: 83 MMHG | HEIGHT: 61 IN | OXYGEN SATURATION: 100 %

## 2023-10-24 LAB
ANION GAP SERPL CALCULATED.3IONS-SCNC: 8 MMOL/L (ref 7–15)
BASOPHILS # BLD AUTO: 0 10E3/UL (ref 0–0.2)
BASOPHILS NFR BLD AUTO: 0 %
BUN SERPL-MCNC: 14.1 MG/DL (ref 8–23)
CALCIUM SERPL-MCNC: 8.8 MG/DL (ref 8.8–10.2)
CHLORIDE SERPL-SCNC: 107 MMOL/L (ref 98–107)
CREAT SERPL-MCNC: 0.85 MG/DL (ref 0.51–0.95)
DEPRECATED HCO3 PLAS-SCNC: 22 MMOL/L (ref 22–29)
EGFRCR SERPLBLD CKD-EPI 2021: 75 ML/MIN/1.73M2
EOSINOPHIL # BLD AUTO: 0.2 10E3/UL (ref 0–0.7)
EOSINOPHIL NFR BLD AUTO: 2 %
ERYTHROCYTE [DISTWIDTH] IN BLOOD BY AUTOMATED COUNT: 14 % (ref 10–15)
GLUCOSE SERPL-MCNC: 91 MG/DL (ref 70–99)
HCT VFR BLD AUTO: 26.1 % (ref 35–47)
HGB BLD-MCNC: 8.5 G/DL (ref 11.7–15.7)
IMM GRANULOCYTES # BLD: 0.1 10E3/UL
IMM GRANULOCYTES NFR BLD: 1 %
LYMPHOCYTES # BLD AUTO: 3.5 10E3/UL (ref 0.8–5.3)
LYMPHOCYTES NFR BLD AUTO: 49 %
MAGNESIUM SERPL-MCNC: 1.9 MG/DL (ref 1.7–2.3)
MCH RBC QN AUTO: 26.6 PG (ref 26.5–33)
MCHC RBC AUTO-ENTMCNC: 32.6 G/DL (ref 31.5–36.5)
MCV RBC AUTO: 82 FL (ref 78–100)
MONOCYTES # BLD AUTO: 0.2 10E3/UL (ref 0–1.3)
MONOCYTES NFR BLD AUTO: 2 %
NEUTROPHILS # BLD AUTO: 3.3 10E3/UL (ref 1.6–8.3)
NEUTROPHILS NFR BLD AUTO: 46 %
NRBC # BLD AUTO: 0 10E3/UL
NRBC BLD AUTO-RTO: 0 /100
PHOSPHATE SERPL-MCNC: 2.4 MG/DL (ref 2.5–4.5)
PLATELET # BLD AUTO: 200 10E3/UL (ref 150–450)
POTASSIUM SERPL-SCNC: 4.3 MMOL/L (ref 3.4–5.3)
RBC # BLD AUTO: 3.2 10E6/UL (ref 3.8–5.2)
SODIUM SERPL-SCNC: 137 MMOL/L (ref 135–145)
WBC # BLD AUTO: 7.2 10E3/UL (ref 4–11)

## 2023-10-24 PROCEDURE — 99239 HOSP IP/OBS DSCHRG MGMT >30: CPT | Mod: FS | Performed by: PHYSICIAN ASSISTANT

## 2023-10-24 PROCEDURE — 250N000013 HC RX MED GY IP 250 OP 250 PS 637: Performed by: HOSPITALIST

## 2023-10-24 PROCEDURE — 250N000013 HC RX MED GY IP 250 OP 250 PS 637: Performed by: PHYSICIAN ASSISTANT

## 2023-10-24 PROCEDURE — 80048 BASIC METABOLIC PNL TOTAL CA: CPT | Performed by: PHYSICIAN ASSISTANT

## 2023-10-24 PROCEDURE — 250N000011 HC RX IP 250 OP 636: Performed by: STUDENT IN AN ORGANIZED HEALTH CARE EDUCATION/TRAINING PROGRAM

## 2023-10-24 PROCEDURE — 83735 ASSAY OF MAGNESIUM: CPT | Performed by: HOSPITALIST

## 2023-10-24 PROCEDURE — 84100 ASSAY OF PHOSPHORUS: CPT | Performed by: STUDENT IN AN ORGANIZED HEALTH CARE EDUCATION/TRAINING PROGRAM

## 2023-10-24 PROCEDURE — 85025 COMPLETE CBC W/AUTO DIFF WBC: CPT | Performed by: PHYSICIAN ASSISTANT

## 2023-10-24 PROCEDURE — 250N000013 HC RX MED GY IP 250 OP 250 PS 637: Performed by: STUDENT IN AN ORGANIZED HEALTH CARE EDUCATION/TRAINING PROGRAM

## 2023-10-24 PROCEDURE — 250N000011 HC RX IP 250 OP 636: Mod: JZ | Performed by: PHYSICIAN ASSISTANT

## 2023-10-24 PROCEDURE — 99207 PR APP CREDIT; MD BILLING SHARED VISIT: CPT | Mod: FS | Performed by: STUDENT IN AN ORGANIZED HEALTH CARE EDUCATION/TRAINING PROGRAM

## 2023-10-24 RX ORDER — POLYETHYLENE GLYCOL 3350 17 G/17G
17 POWDER, FOR SOLUTION ORAL DAILY PRN
Qty: 510 G | Refills: 0 | Status: ON HOLD | OUTPATIENT
Start: 2023-10-24 | End: 2023-11-15

## 2023-10-24 RX ORDER — PREGABALIN 50 MG/1
50 CAPSULE ORAL DAILY PRN
Qty: 30 CAPSULE | Refills: 0 | Status: SHIPPED | OUTPATIENT
Start: 2023-10-24 | End: 2023-11-08

## 2023-10-24 RX ORDER — LEVOFLOXACIN 500 MG/1
500 TABLET, FILM COATED ORAL DAILY
Qty: 10 TABLET | Refills: 0 | Status: ON HOLD | OUTPATIENT
Start: 2023-10-24 | End: 2023-11-15

## 2023-10-24 RX ORDER — PREGABALIN 50 MG/1
50 CAPSULE ORAL 2 TIMES DAILY
Qty: 60 CAPSULE | Refills: 0 | Status: SHIPPED | OUTPATIENT
Start: 2023-10-24 | End: 2023-11-08

## 2023-10-24 RX ORDER — PROCHLORPERAZINE MALEATE 5 MG
5 TABLET ORAL EVERY 6 HOURS PRN
Qty: 30 TABLET | Refills: 0 | Status: SHIPPED | OUTPATIENT
Start: 2023-10-24 | End: 2024-04-18

## 2023-10-24 RX ORDER — AMOXICILLIN 250 MG
1 CAPSULE ORAL 2 TIMES DAILY PRN
Qty: 30 TABLET | Refills: 0 | Status: ON HOLD | OUTPATIENT
Start: 2023-10-24 | End: 2023-11-14

## 2023-10-24 RX ADMIN — OMEPRAZOLE 20 MG: 20 CAPSULE, DELAYED RELEASE ORAL at 08:54

## 2023-10-24 RX ADMIN — IBUPROFEN 600 MG: 200 TABLET, FILM COATED ORAL at 10:08

## 2023-10-24 RX ADMIN — POTASSIUM & SODIUM PHOSPHATES POWDER PACK 280-160-250 MG 1 PACKET: 280-160-250 PACK at 08:54

## 2023-10-24 RX ADMIN — ENOXAPARIN SODIUM 40 MG: 40 INJECTION SUBCUTANEOUS at 08:54

## 2023-10-24 RX ADMIN — ONDANSETRON HYDROCHLORIDE 8 MG: 8 TABLET, FILM COATED ORAL at 08:57

## 2023-10-24 RX ADMIN — PREGABALIN 50 MG: 50 CAPSULE ORAL at 05:10

## 2023-10-24 ASSESSMENT — ACTIVITIES OF DAILY LIVING (ADL)
ADLS_ACUITY_SCORE: 23

## 2023-10-24 NOTE — DISCHARGE SUMMARY
"[facility]  Hospitalist Discharge Summary      Date of Admission: 10/17/2023  Date of Discharge: 10/24/2023  Discharging Provider: Bree Galvan PA-C/Dr. Monaco  Discharge Service: Hospitalist Service, GOLD TEAM 4    Discharge Diagnoses   RLE high grade, pleomorphic sarcoma  Cancer related pain  HTN  Anal fissure  CKD III  Anemia of chronic disease  Psoriasis  GERD  HLD    Clinically Significant Risk Factors     # Obesity: Estimated body mass index is 31.95 kg/m  as calculated from the following:    Height as of this encounter: 1.549 m (5' 1\").    Weight as of this encounter: 76.7 kg (169 lb 1.6 oz).       Follow-ups Needed After Discharge   Follow-up Appointments     Adult [facility] Follow-up and recommended labs and tests      Follow up with primary care provider, Joseline Fraga, within 7 days for   hospital follow- up.  The following labs/tests are recommended: CBC, CMP.    Follow up with oncology 10/30 as planned    Follow up with dermatology 12/14 as planned        Appointments on [place] and/or [facility] (with [facility] or [facility]   provider or service). Call [phone] if you haven't heard regarding   these appointments within 7 days of discharge.            Unresulted Labs Ordered in the Past 30 Days of this Admission       No orders found from 9/17/2023 to 10/18/2023.            Discharge Disposition   Discharged to home  Condition at discharge: Stable    Hospital Course   Mirta Cardenas is a 68 year old female with history of HTN, psoriasis, HLD, CKDIII, GERD, remote hx of CLL, and RLE sarcoma (dx 2023) who directly admitted to [facility] 10/17 for chemotherapy as directed by Oncology team.      RLE high grade, pleomorphic sarcoma  Cancer related pain: Direct admission 10/17 for chemotherapy. Tolerated well this admission  - Patient will discharge home and follow up with oncology outpatient 10/30/2023  - Continue Tylenol, ibuprofen, oxycodone, Lyrica on " discharge. Duloxetine stopped and will not be resumed on discharge (caused hyponatremia)     HTN: PTA on losartan and chlorthalidone BP stable  - Continue PTA losartan   - Discontinued chlorthalidone     Other Medical Issues:  Anal fissure: With increased straining for BM over the past 24 hours. Continue bowel regimen, avoid constipation  - Patient denies concerns at this time but would like to have ability to fill NitroBid prn if fissure flares. Given no flare at discharge, will defer to PCP  Hypokalemia, hyponatremia, hypomagnesemia: Resolved. See notes 10/22 and prior for details. Trend BMP with PCP. Holding PTA chlorthalidone indefinitely   CKDIII: BL Cr 0.6-0.8 and stable this admission  Anemia of chronic disease: Recent BL hgb 8.5-10 and stable this admission  Leukocytosis: Resolved. See notes 10/22 and prior  Psoriasis: Continue PTA topicals prn (pt would prefer to use her home supply if possible). Follow up OP with dermatology 12/2023 as planned  GERD: Continue PPI   HLD: Continue statin  H/o reactive airway disease: No concerns this admission    Consultations This Hospital Stay   ONCOLOGY ADULT IP CONSULT    Code Status   Full Code    Time Spent on this Encounter   IBree PA-C, personally saw the patient today and spent greater than 30 minutes discharging this patient.       Bree Galvan PA-C  formerly [facility]A ONCOLOGY  [address] [facility] [address] [facility] [address]  Phone: [phone]  ______________________________________________________________________    Physical Exam   Vital Signs: Temp: 98.6  F (37  C) Temp src: Oral BP: (!) 149/83 (RN notified) Pulse: 90   Resp: 18 SpO2: 100 % O2 Device: None (Room air)    Weight: 169 lbs 1.6 oz  General Appearance: Alert and oriented x3, quite pleasant. Spouse bedside  HEENT: Anicteric sclera, MMM   Respiratory: Breathing comfortably on room air, lungs CTAB without wheezing or crackles   Cardiovascular: RRR, S1, S2. No murmur noted  GI: Abdomen  soft, non-tender with active bowel sounds. No guarding or rebound  Lymph/Hematologic: No peripheral edema, distal pulses palpable   Skin: No rash or jaundice   Musculoskeletal: Moves all extremities   Neurologic: No focal deficits, CN II-XII grossly intact         Primary Care Physician   Joseline Fraga    Discharge Orders      Reason for your hospital stay    You were admitted with electrolyte abnormalities and need for chemotherapy. You completed your chemotherapy regimen and discharged home when you were medically stable.     Activity    Your activity upon discharge: activity as tolerated     Adult [facility] Follow-up and recommended labs and tests    Follow up with primary care provider, Joseline Fraga, within 7 days for hospital follow- up.  The following labs/tests are recommended: CBC, CMP.    Follow up with oncology 10/30 as planned    Follow up with dermatology 12/14 as planned        Appointments on [place] and/or [facility] (with [facility] or [facility] provider or service). Call [phone] if you haven't heard regarding these appointments within 7 days of discharge.     When to contact your care team    Call or return if you develop fever >100.4, confusion, altered mental status, uncontrolled abdominal pain/nausea/vomiting, chest pain, shortness of breath, difficult breathing, uncontrolled pain, or other symptoms of concern to you     Discharge Instructions    Stop taking chlorthalidone and Cymbalta (Duloxetine)     Full Code     Diet    Follow this diet upon discharge: Orders Placed This Encounter      Combination Diet Regular Diet Adult       Significant Results and Procedures   Most Recent 3 CBC's:  Recent Labs   Lab Test 10/24/23  0513 10/23/23  0620 10/22/23  0514   WBC 7.2 9.0 9.5   HGB 8.5* 8.8* 9.4*   MCV 82 81 80    222 234     Most Recent 3 BMP's:  Recent Labs   Lab Test 10/24/23  0513 10/23/23  0620 10/22/23  0514    138 136   POTASSIUM 4.3 3.9 3.9   CHLORIDE 107 105 102   CO2  22 24 25   BUN 14.1 11.3 11.0   CR 0.85 0.73 0.68   ANIONGAP 8 9 9   MONIK 8.8 8.5* 8.5*   GLC 91 86 95     Most Recent 2 LFT's:  Recent Labs   Lab Test 10/23/23  0620 10/22/23  0514   AST 15 14   ALT 16 16   ALKPHOS 98 96   BILITOTAL 0.3 0.2     Most Recent 3 INR's:  Recent Labs   Lab Test 10/17/20  0836 10/16/20  1031 10/15/20  0709   INR 1.19* 1.18* 1.06       Discharge Medications   Current Discharge Medication List        START taking these medications    Details   polyethylene glycol (MIRALAX) 17 GM/Dose powder Take 17 g by mouth daily as needed for constipation  Qty: 510 g, Refills: 0    Associated Diagnoses: Sarcoma of right lower extremity (H)      !! pregabalin (LYRICA) 50 MG capsule Take 1 capsule (50 mg) by mouth 2 times daily  Qty: 60 capsule, Refills: 0    Associated Diagnoses: Sarcoma of right lower extremity (H)      !! pregabalin (LYRICA) 50 MG capsule Take 1 capsule (50 mg) by mouth daily as needed (breakthrough pain)  Qty: 30 capsule, Refills: 0    Associated Diagnoses: Sarcoma of right lower extremity (H)      prochlorperazine (COMPAZINE) 5 MG tablet Take 1 tablet (5 mg) by mouth every 6 hours as needed (Breakthrough Nausea / Vomiting)  Qty: 30 tablet, Refills: 0    Associated Diagnoses: Sarcoma of right lower extremity (H)      senna-docusate (SENOKOT-S/PERICOLACE) 8.6-50 MG tablet Take 1 tablet by mouth 2 times daily as needed for constipation  Qty: 30 tablet, Refills: 0    Associated Diagnoses: Sarcoma of right lower extremity (H)       !! - Potential duplicate medications found. Please discuss with provider.        CONTINUE these medications which have NOT CHANGED    Details   acetaminophen (TYLENOL) 500 MG tablet Take 1,000 mg by mouth 3 times daily      ASPIRIN 81 MG OR TABS Take 81 mg by mouth daily       calcipotriene (DOVONOX) 0.005 % external cream Apply twice daily to areas of psoriasis on Saturday and Sunday.  Qty: 60 g, Refills: 6    Associated Diagnoses: Psoriasis      clobetasol  (TEMOVATE) 0.05 % external cream Apply twice daily to psoriasis on Monday through Friday.  Qty: 60 g, Refills: 5    Associated Diagnoses: Psoriasis      diphenhydrAMINE-acetaminophen (TYLENOL PM)  MG tablet Take 1 tablet by mouth nightly as needed for sleep      losartan (COZAAR) 100 MG tablet Take 1 tablet (100 mg) by mouth daily  Qty: 90 tablet, Refills: 3    Associated Diagnoses: Benign essential hypertension      METAMUCIL FIBER PO       Multiple Vitamins-Calcium (ONE-A-DAY WOMENS FORMULA PO) Take 1 tablet by mouth daily      omeprazole (PRILOSEC) 20 MG DR capsule Take 1 capsule by mouth every morning.      oxyCODONE (ROXICODONE) 5 MG tablet Take 5 mg by mouth every 6 hours as needed for severe pain      pravastatin (PRAVACHOL) 40 MG tablet Take 1 tablet (40 mg) by mouth daily  Qty: 90 tablet, Refills: 3    Associated Diagnoses: Hyperlipidemia LDL goal <100           STOP taking these medications       chlorthalidone (HYGROTON) 25 MG tablet Comments:   Reason for Stopping:         DULoxetine (CYMBALTA) 30 MG capsule Comments:   Reason for Stopping:             Allergies   Allergies   Allergen Reactions    Allopurinol Itching    Amoxicillin Hives    Codeine Itching    Periactin Other (See Comments)     Sleepy.    Tenormin [Atenolol] Fatigue

## 2023-10-24 NOTE — PROVIDER NOTIFICATION
"Provider Notification     Bree Galvan PA-C paged via [employer] at 0950:   \"Hi, pt needs discharge orders signed. Mesna should finish a little after 1000 and pt's ride is here. Also need orders to pull the PICC line.     Thanks,   Lidia\"     "

## 2023-10-24 NOTE — PROGRESS NOTES
Nursing Focus: Discharge    D: Patient discharged to home  at 1120. Patient  and accompanied by no other person.    I: Discharge prescriptions sent to discharge pharmacy to be filled. All discharge medications and instructions reviewed with patient. Patient instructed to call clinic triage nurse if patient experiences a fever >100.4, uncontrolled nausea, vomiting, diarrhea, or pain; or experiences any signs or symptoms of bleeding. Other phone numbers to call with questions or concerns after discharge reviewed. Patient removed. Education completed.    A: patient verbalized understanding of discharge medications and instructions. Patient will  medications at discharge pharmacy. Down stair .     P: Patient to follow-up in clinic on 10/25/23.

## 2023-10-24 NOTE — PROGRESS NOTES
Temp: 97.9  F (36.6  C) Temp src: Oral BP: 138/74 Pulse: 93   Resp: 18 SpO2: 99 % O2 Device: None (Room air)      Shift: 0812-0964       Neuro: A&O x4. Able to make needs known. Able to sleep for some of the shift.   Cardiac: No tele in place, VSS. Afebrile. Denies chest pain.  Resp: VSS on RA overnight. Denies SOB.   GI/: Voiding in toilet, pt self records output. No BM this shift.   Skin: No new deficits noted.   Activity: up ad ghassan independently  LDA's: 2L PICC in place running mesna, other lumen SL.  BG: none  Pain: Denies     Plan: Continue to monitor and follow POC. Notify with changes. Possible to discharge tomorrow when mesna completes and when medically ready.

## 2023-10-24 NOTE — PLAN OF CARE
[id number].A&Ox4.Slightly hypertensive at times, OVSS on RA. Pain managed with PRN oxycodone x1 and ibuprofen. Denies nausea. Double lumen PICC- red infusing continuous mesna @ 60ml/hr. Purple lumen hep locked. Voiding spontaneously. On a regular diet. Up independently in room. Continue POC.

## 2023-10-25 ENCOUNTER — TELEPHONE (OUTPATIENT)
Dept: DERMATOLOGY | Facility: CLINIC | Age: 68
End: 2023-10-25

## 2023-10-25 ENCOUNTER — INFUSION THERAPY VISIT (OUTPATIENT)
Dept: INFUSION THERAPY | Facility: CLINIC | Age: 68
End: 2023-10-25
Attending: STUDENT IN AN ORGANIZED HEALTH CARE EDUCATION/TRAINING PROGRAM
Payer: COMMERCIAL

## 2023-10-25 VITALS
RESPIRATION RATE: 16 BRPM | TEMPERATURE: 98.2 F | DIASTOLIC BLOOD PRESSURE: 63 MMHG | HEART RATE: 72 BPM | SYSTOLIC BLOOD PRESSURE: 101 MMHG

## 2023-10-25 DIAGNOSIS — Z76.89 PREVENTION OF CHEMOTHERAPY-INDUCED NEUTROPENIA: ICD-10-CM

## 2023-10-25 DIAGNOSIS — C49.21 SARCOMA OF RIGHT LOWER EXTREMITY (H): Primary | ICD-10-CM

## 2023-10-25 PROCEDURE — 250N000011 HC RX IP 250 OP 636: Mod: JZ | Performed by: STUDENT IN AN ORGANIZED HEALTH CARE EDUCATION/TRAINING PROGRAM

## 2023-10-25 PROCEDURE — 96372 THER/PROPH/DIAG INJ SC/IM: CPT | Performed by: STUDENT IN AN ORGANIZED HEALTH CARE EDUCATION/TRAINING PROGRAM

## 2023-10-25 RX ADMIN — PEGFILGRASTIM 6 MG: 6 INJECTION SUBCUTANEOUS at 08:44

## 2023-10-25 NOTE — PROGRESS NOTES
Infusion Nursing Note:  Mirtakati Cardenas presents today for Neulasta.    Patient seen by provider today: No   present during visit today: Not Applicable.    Note: N/A.      Intravenous Access:  No Intravenous access/labs at this visit.    Treatment Conditions:  Not Applicable.      Post Infusion Assessment:  Patient tolerated injection without incident.       Discharge Plan:   Patient discharged in stable condition accompanied by: self.  Departure Mode: Ambulatory.      Faviola Padilla RN

## 2023-10-25 NOTE — TELEPHONE ENCOUNTER
[facility] Call Center    Phone Message    May a detailed message be left on voicemail: yes     Reason for Call: Other: Pt is wanting to call and update team on her new cancer diagnosis and has some questions re her 2 creams and where she can all apply those. Please call pt back to discuss. Pt does not like to use [employer] to communicate      Action Taken: Other: Derm    Travel Screening: Not Applicable

## 2023-10-25 NOTE — TELEPHONE ENCOUNTER
Thank you for the update.   You can use the clobetasol occasionally on the chest, but I would not use it long term.   Does it more look like your psoriasis on the chest?  If so I would try to use the vit D cream Monday through Friday and the clobetasol more just on the weekend to minimize thinning of the skin.

## 2023-10-25 NOTE — TELEPHONE ENCOUNTER
"Spoke to patient who called to update Provider.     \"I had a big Psoriasis outbreak this summer and saw Radha. I did just find out I have cancer in my leg where the outbreak of Psoriasis was. It is a Sarcoma... This all came about after I saw her and have had my first round of chemo.. I just want her to know what happened..\"    \"I have little itchy bumps all over my chest... I had a PICC line in and now I want to check and see what I can use on them? I did put the Clobetasol on my chest, for the last couple days. but is that okay to use there? I also am getting a spot under my breasts that is red and itchy..\"     \"The Clobetasol helped the itching, but I just want to know if it is ok to use it on any new spots I get?...\"     Please advise. Gudelia Huizar RN        "

## 2023-10-26 ENCOUNTER — OFFICE VISIT (OUTPATIENT)
Dept: FAMILY MEDICINE | Facility: CLINIC | Age: 68
End: 2023-10-26
Payer: COMMERCIAL

## 2023-10-26 ENCOUNTER — PATIENT OUTREACH (OUTPATIENT)
Dept: CARE COORDINATION | Facility: CLINIC | Age: 68
End: 2023-10-26

## 2023-10-26 ENCOUNTER — LAB (OUTPATIENT)
Dept: LAB | Facility: CLINIC | Age: 68
End: 2023-10-26
Payer: COMMERCIAL

## 2023-10-26 VITALS
OXYGEN SATURATION: 100 % | WEIGHT: 170 LBS | HEIGHT: 61 IN | TEMPERATURE: 97.8 F | DIASTOLIC BLOOD PRESSURE: 72 MMHG | HEART RATE: 90 BPM | BODY MASS INDEX: 32.1 KG/M2 | RESPIRATION RATE: 16 BRPM | SYSTOLIC BLOOD PRESSURE: 124 MMHG

## 2023-10-26 DIAGNOSIS — Z09 HOSPITAL DISCHARGE FOLLOW-UP: Primary | ICD-10-CM

## 2023-10-26 DIAGNOSIS — E83.42 HYPOMAGNESEMIA: ICD-10-CM

## 2023-10-26 DIAGNOSIS — E83.39 HYPOPHOSPHATEMIA: ICD-10-CM

## 2023-10-26 DIAGNOSIS — C49.9 SARCOMA (H): ICD-10-CM

## 2023-10-26 DIAGNOSIS — C49.21 SARCOMA OF RIGHT LOWER EXTREMITY (H): ICD-10-CM

## 2023-10-26 DIAGNOSIS — M79.89 SWELLING OF RIGHT LOWER EXTREMITY: ICD-10-CM

## 2023-10-26 LAB
ALBUMIN SERPL BCG-MCNC: 4.2 G/DL (ref 3.5–5.2)
ALP SERPL-CCNC: 110 U/L (ref 35–104)
ALT SERPL W P-5'-P-CCNC: 15 U/L (ref 0–50)
ANION GAP SERPL CALCULATED.3IONS-SCNC: 9 MMOL/L (ref 7–15)
AST SERPL W P-5'-P-CCNC: 12 U/L (ref 0–45)
BASOPHILS # BLD AUTO: ABNORMAL 10*3/UL
BASOPHILS # BLD MANUAL: 0 10E3/UL (ref 0–0.2)
BASOPHILS NFR BLD AUTO: ABNORMAL %
BASOPHILS NFR BLD MANUAL: 0 %
BILIRUB SERPL-MCNC: 0.2 MG/DL
BUN SERPL-MCNC: 20.2 MG/DL (ref 8–23)
CALCIUM SERPL-MCNC: 9.3 MG/DL (ref 8.8–10.2)
CHLORIDE SERPL-SCNC: 104 MMOL/L (ref 98–107)
CREAT SERPL-MCNC: 0.85 MG/DL (ref 0.51–0.95)
DEPRECATED HCO3 PLAS-SCNC: 24 MMOL/L (ref 22–29)
EGFRCR SERPLBLD CKD-EPI 2021: 75 ML/MIN/1.73M2
EOSINOPHIL # BLD AUTO: ABNORMAL 10*3/UL
EOSINOPHIL # BLD MANUAL: 0.1 10E3/UL (ref 0–0.7)
EOSINOPHIL NFR BLD AUTO: ABNORMAL %
EOSINOPHIL NFR BLD MANUAL: 1 %
ERYTHROCYTE [DISTWIDTH] IN BLOOD BY AUTOMATED COUNT: 13.2 % (ref 10–15)
GLUCOSE SERPL-MCNC: 79 MG/DL (ref 70–99)
HCT VFR BLD AUTO: 27.2 % (ref 35–47)
HGB BLD-MCNC: 8.8 G/DL (ref 11.7–15.7)
IMM GRANULOCYTES # BLD: ABNORMAL 10*3/UL
IMM GRANULOCYTES NFR BLD: ABNORMAL %
LYMPHOCYTES # BLD AUTO: ABNORMAL 10*3/UL
LYMPHOCYTES # BLD MANUAL: 1.7 10E3/UL (ref 0.8–5.3)
LYMPHOCYTES NFR BLD AUTO: ABNORMAL %
LYMPHOCYTES NFR BLD MANUAL: 29 %
MAGNESIUM SERPL-MCNC: 1.8 MG/DL (ref 1.7–2.3)
MCH RBC QN AUTO: 26.7 PG (ref 26.5–33)
MCHC RBC AUTO-ENTMCNC: 32.4 G/DL (ref 31.5–36.5)
MCV RBC AUTO: 82 FL (ref 78–100)
MONOCYTES # BLD AUTO: ABNORMAL 10*3/UL
MONOCYTES # BLD MANUAL: 0.1 10E3/UL (ref 0–1.3)
MONOCYTES NFR BLD AUTO: ABNORMAL %
MONOCYTES NFR BLD MANUAL: 1 %
NEUTROPHILS # BLD AUTO: ABNORMAL 10*3/UL
NEUTROPHILS # BLD MANUAL: 4.1 10E3/UL (ref 1.6–8.3)
NEUTROPHILS NFR BLD AUTO: ABNORMAL %
NEUTROPHILS NFR BLD MANUAL: 69 %
NRBC # BLD AUTO: 0 10E3/UL
NRBC BLD AUTO-RTO: 0 /100
PHOSPHATE SERPL-MCNC: 2.7 MG/DL (ref 2.5–4.5)
PLAT MORPH BLD: NORMAL
PLATELET # BLD AUTO: 198 10E3/UL (ref 150–450)
POTASSIUM SERPL-SCNC: 4.1 MMOL/L (ref 3.4–5.3)
PROT SERPL-MCNC: 7 G/DL (ref 6.4–8.3)
RBC # BLD AUTO: 3.3 10E6/UL (ref 3.8–5.2)
RBC MORPH BLD: NORMAL
SODIUM SERPL-SCNC: 137 MMOL/L (ref 135–145)
WBC # BLD AUTO: 6 10E3/UL (ref 4–11)

## 2023-10-26 PROCEDURE — 85027 COMPLETE CBC AUTOMATED: CPT

## 2023-10-26 PROCEDURE — 99495 TRANSJ CARE MGMT MOD F2F 14D: CPT | Performed by: STUDENT IN AN ORGANIZED HEALTH CARE EDUCATION/TRAINING PROGRAM

## 2023-10-26 PROCEDURE — 85007 BL SMEAR W/DIFF WBC COUNT: CPT

## 2023-10-26 PROCEDURE — 80053 COMPREHEN METABOLIC PANEL: CPT

## 2023-10-26 PROCEDURE — 84100 ASSAY OF PHOSPHORUS: CPT

## 2023-10-26 PROCEDURE — 83735 ASSAY OF MAGNESIUM: CPT

## 2023-10-26 PROCEDURE — 36415 COLL VENOUS BLD VENIPUNCTURE: CPT

## 2023-10-26 RX ORDER — OMEGA-3 FATTY ACIDS/FISH OIL 300-1000MG
400 CAPSULE ORAL EVERY 4 HOURS PRN
Status: ON HOLD | COMMUNITY
End: 2024-01-10

## 2023-10-26 ASSESSMENT — PAIN SCALES - GENERAL: PAINLEVEL: MODERATE PAIN (4)

## 2023-10-26 NOTE — PATIENT INSTRUCTIONS
Pediatric Dermatology  [place] [id number] [place] [id number]  [phone]    Gentle Skin Care    Below is a list of products our providers recommend for gentle skin care.  Moisturizers:  Lighter; Exederm Intensive Moisture Cream, Cetaphil Cream, CeraVe, Aveeno Positively radiant and Vanicream Light   Thicker; Aquaphor Ointment, Vaseline, Petroleum Jelly, Eucerin Original Healing Cream and Vanicream, CeraVe Healing Ointment, Aquaphor Body Spray  Avoid Lotions (too thin)  Mild Cleansers:  Dove- Fragrance Free bar or wash  CeraVe   Vanicream Cleansing bar  Cetaphil Cleanser   Aquaphor 2 in1 Gentle Wash and Shampoo  Dove Baby wash  Exederm Body wash       Laundry Products:    All Free and Clear  Cheer Free  Generic Brands are okay as long as they are  Fragrance Free    Avoid fabric softeners  and dryer sheets   Sunscreens: SPF 30 or greater     Sunscreens that contain Zinc Oxide and/or Titanium Dioxide should be applied, these are physical blockers. One or both of these should be listed in the  Active Ingredients   Any other listed ingredients under the active ingredients would be a chemically based sunscreen which might be irritating.  Spray sunscreens should be avoided because these are typically chemical sunscreens.      Shampoo and Conditioners:  Free and Clear by Vanicream  Aquaphor 2 in 1 Gentle Wash and Shampoo   Oils:  Mineral Oil   Emu Oil   For some patients: Coconut (raw, unrefined, organic) and Sunflower seed oil              Generic Products are an okay substitute, but make sure they are fragrance free.  *Reading the product ingredients list is very important  *Avoid product that have fragrance added to them.   *Organic does not mean  fragrance free.  In fact patients with sensitive skin can become quite irritated by some organic products.     Daily bathing is recommended. Make sure you are applying a good moisturizer after bathing every time.  Use Moisturizing creams at  least twice daily to the whole body. Your provider may recommend a lighter or heavier moisturizer based on your child s severity and that time of year it is.  Creams are more moisturizing than lotions.       Care Plan:  Keep bathing and showering short, less than 15 minutes   Always use lukewarm warm when possible. AVOID HOT or COLD water  DO NOT use bubble bath  Limit the use of soaps. Focus on the skin folds, face, armpits, groin and feet towards the end of the bath  Do NOT vigorously scrub when you cleanse the skin  After bathing, PAT your skin lightly with a towel. DO NOT rub or scrub when drying  ALWAYS apply a moisturizer immediately after bathing. This helps to  lock in  the moisture. * IF YOU WERE PRESCRIBED A TOPICAL MEDICATION, APPLY YOUR MEDICATION FIRST THEN COVER WITH YOUR DAILY MOISTURIZER  Reapply moisturizing agents at least twice daily to your whole body    Other helpful tips:  Do not use products such as powders, perfumes, or colognes on your skin  Diffusers can be harsh on sensitive skin, use with caution if you or your child has sensitive skin   Avoid saunas and steam baths. This temperature is too HOT  Avoid tight or  scratchy  clothing such as wool  Always wash new clothing before wearing them for the first time  Sometimes a humidifier or vaporizer can be used at night can help the dry skin. Remember to keep these items clean to avoid mold growth.

## 2023-10-26 NOTE — PROGRESS NOTES
Assessment & Plan     Hospital discharge follow-up  Sarcoma (H)  Patient is a very pleasant 68-year-old female who was diagnosed with sarcoma of the leg  about a month ago.  She presents for hospital follow-up after hospitalization for her first series of chemotherapy.  Patient has had ongoing right lower leg pain in the area of the lytic lesion.  Ibuprofen does help, as does 10 mg of occasional oxycodone.  She is on Lyrica currently, 50 mg twice daily.  We discussed increasing to 50 mg 3 times a day, and can go up further on the dosage if need be for better control.  No negative side effects of Lyrica at this point.  She did try duloxetine in the past, which did help quite a bit with her pain, however she ended up having significant fatigue, abdominal cramping and significant decreased appetite.  She was given a prescription for the Voltaren gel as well has that would likely be less detrimental to her kidneys.  - diclofenac (VOLTAREN) 1 % topical gel  Dispense: 350 g; Refill: 1    Swelling of right lower extremity  Patient has been having ongoing right lower extremity swelling since prior to hospitalization.  During her hospital stay, they did obtain an ultrasound of the right lower extremity that was negative for DVT.  We did discuss her risk for DVT including cancer history as well as recent hospitalization.  She will continue to monitor for worsening swelling and pain.    Hypomagnesemia  During her hospital stay, patient was noted to have significant hypomagnesemia.  Magnesium was improved on discharge, recheck today.  - Magnesium    Hypophosphatemia  In addition to magnesium, similar issue with phosphorus in the hospital.  Did also have hypokalemia, and CMP ordered on hospital discharge is obtained today as well.  - Phosphorus    I spent a total of 52 minutes on the day of the visit.   Time spent by me doing chart review, history and exam, documentation and further activities per the note    MED REC  "REQUIRED  Post Medication Reconciliation Status:  Discharge medications reconciled and changed, see notes/orders  BMI:   Estimated body mass index is 32.12 kg/m  as calculated from the following:    Height as of this encounter: 1.549 m (5' 1\").    Weight as of this encounter: 77.1 kg (170 lb).     Joseline Fraga MD  [facility]    Makayla Kirby is a 68 year old, presenting for the following health issues:  Hospital F/U        10/26/2023    11:15 AM   Additional Questions   Roomed by Leia KURTZ   Accompanied by Self       HPI       Hospital Follow-up Visit:    Hospital/Nursing Home/IP Rehab Facility: [facility]  Date of Admission: 10/17/2023  Date of Discharge: 10/24/2023  Reason(s) for Admission: RLE high grade, pleomorphic sarcoma  Cancer related pain  HTN  Anal fissure  CKD III  Anemia of chronic disease  Psoriasis  GERD  HLD    Was your hospitalization related to COVID-19? No   Problems taking medications regularly:  None  Medication changes since discharge: None  Problems adhering to non-medication therapy:  None    Summary of hospitalization:  [facility] discharge summary reviewed  Diagnostic Tests/Treatments reviewed.  Follow up needed: CBC/CMP (ordered by hospital)  Other Healthcare Providers Involved in Patient s Care:         None  Update since discharge: stable.         Plan of care communicated with patient             Review of Systems   Constitutional, HEENT, cardiovascular, pulmonary, GI, , musculoskeletal, neuro, skin, endocrine and psych systems are negative, except as otherwise noted.      Objective    /72 (BP Location: Right arm, Patient Position: Sitting, Cuff Size: Adult Regular)   Pulse 90   Temp 97.8  F (36.6  C) (Tympanic)   Resp 16   Ht 1.549 m (5' 1\")   Wt 77.1 kg (170 lb)   SpO2 100%   BMI 32.12 kg/m    Body mass index is 32.12 kg/m .  Physical Exam  Constitutional:       " Appearance: Normal appearance.   HENT:      Head: Normocephalic.   Eyes:      General: No scleral icterus.     Extraocular Movements: Extraocular movements intact.      Conjunctiva/sclera: Conjunctivae normal.   Cardiovascular:      Rate and Rhythm: Normal rate.      Pulses: Normal pulses.   Pulmonary:      Effort: Pulmonary effort is normal.   Musculoskeletal:         General: Normal range of motion.      Cervical back: Normal range of motion.      Right lower leg: Edema present.   Neurological:      General: No focal deficit present.      Mental Status: She is alert and oriented to person, place, and time.           Results from this visit  Results for orders placed or performed in visit on 10/26/23   CBC with platelets differential     Status: None (In process)    Narrative    The following orders were created for panel order CBC with platelets differential.  Procedure                               Abnormality         Status                     ---------                               -----------         ------                     CBC with platelets and d...[[id number]]                      In process                   Please view results for these tests on the individual orders.

## 2023-10-26 NOTE — TELEPHONE ENCOUNTER
"Patient notified. Patient verbalized understanding.     \"If she wants me to send her a photo, I can do that, can you call me if she wants one?..\"    \"I will try the Vit D cream to see if that stops the itching..\"     Gudelia Huizar RN    "

## 2023-10-26 NOTE — PROGRESS NOTES
[facility]    Background: Transitional Care Management program identified per system criteria and reviewed by [facility] team for possible outreach.    Assessment: Upon chart review, [facility] Team member will not proceed with patient outreach related to this episode of Transitional Care Management program due to reason below:    Patient has a follow up appointment with an appropriate provider today for hospital discharge    Plan: Transitional Care Management episode addressed appropriately per reason noted above.      BRADEN Traore  [facility]    *[facility]cted Care Resource Team does NOT follow patient ongoing. Referrals are identified based on internal discharge reports and the outreach is to ensure patient has an understanding of their discharge instructions.

## 2023-10-27 LAB
ABO/RH(D): NORMAL
ANTIBODY SCREEN: NEGATIVE
SPECIMEN EXPIRATION DATE: NORMAL

## 2023-10-27 RX ORDER — HEPARIN SODIUM,PORCINE 10 UNIT/ML
5-20 VIAL (ML) INTRAVENOUS DAILY PRN
Status: CANCELLED | OUTPATIENT
Start: 2023-10-27

## 2023-10-27 RX ORDER — HEPARIN SODIUM (PORCINE) LOCK FLUSH IV SOLN 100 UNIT/ML 100 UNIT/ML
5 SOLUTION INTRAVENOUS
Status: CANCELLED | OUTPATIENT
Start: 2023-10-27

## 2023-10-27 NOTE — TELEPHONE ENCOUNTER
Called patient and advised how to upload photos. She will reply to providers [employer] message with photos.  Thank you,    Mita ROCKWELL RN BSN  [facility]- [phone]

## 2023-10-30 ENCOUNTER — ONCOLOGY VISIT (OUTPATIENT)
Dept: ONCOLOGY | Facility: CLINIC | Age: 68
End: 2023-10-30
Attending: STUDENT IN AN ORGANIZED HEALTH CARE EDUCATION/TRAINING PROGRAM
Payer: COMMERCIAL

## 2023-10-30 ENCOUNTER — APPOINTMENT (OUTPATIENT)
Dept: LAB | Facility: CLINIC | Age: 68
End: 2023-10-30
Attending: STUDENT IN AN ORGANIZED HEALTH CARE EDUCATION/TRAINING PROGRAM
Payer: COMMERCIAL

## 2023-10-30 VITALS
RESPIRATION RATE: 16 BRPM | SYSTOLIC BLOOD PRESSURE: 149 MMHG | HEART RATE: 88 BPM | DIASTOLIC BLOOD PRESSURE: 82 MMHG | OXYGEN SATURATION: 100 % | WEIGHT: 174.4 LBS | TEMPERATURE: 97.8 F | BODY MASS INDEX: 32.95 KG/M2

## 2023-10-30 DIAGNOSIS — C49.21 SARCOMA OF RIGHT LOWER EXTREMITY (H): ICD-10-CM

## 2023-10-30 DIAGNOSIS — T45.1X5A ANEMIA DUE TO ANTINEOPLASTIC CHEMOTHERAPY: Primary | ICD-10-CM

## 2023-10-30 DIAGNOSIS — D64.81 ANEMIA DUE TO ANTINEOPLASTIC CHEMOTHERAPY: Primary | ICD-10-CM

## 2023-10-30 DIAGNOSIS — C49.21 SARCOMA OF RIGHT LOWER EXTREMITY (H): Primary | ICD-10-CM

## 2023-10-30 LAB
ALBUMIN SERPL BCG-MCNC: 3.8 G/DL (ref 3.5–5.2)
ALP SERPL-CCNC: 123 U/L (ref 35–104)
ALT SERPL W P-5'-P-CCNC: 8 U/L (ref 0–50)
ANION GAP SERPL CALCULATED.3IONS-SCNC: 10 MMOL/L (ref 7–15)
AST SERPL W P-5'-P-CCNC: 18 U/L (ref 0–45)
BASOPHILS # BLD AUTO: ABNORMAL 10*3/UL
BASOPHILS # BLD MANUAL: 0 10E3/UL (ref 0–0.2)
BASOPHILS NFR BLD AUTO: ABNORMAL %
BASOPHILS NFR BLD MANUAL: 0 %
BILIRUB SERPL-MCNC: 0.2 MG/DL
BUN SERPL-MCNC: 11.8 MG/DL (ref 8–23)
CALCIUM SERPL-MCNC: 9.4 MG/DL (ref 8.8–10.2)
CHLORIDE SERPL-SCNC: 106 MMOL/L (ref 98–107)
CREAT SERPL-MCNC: 0.74 MG/DL (ref 0.51–0.95)
DEPRECATED HCO3 PLAS-SCNC: 24 MMOL/L (ref 22–29)
EGFRCR SERPLBLD CKD-EPI 2021: 89 ML/MIN/1.73M2
EOSINOPHIL # BLD AUTO: ABNORMAL 10*3/UL
EOSINOPHIL # BLD MANUAL: 0.1 10E3/UL (ref 0–0.7)
EOSINOPHIL NFR BLD AUTO: ABNORMAL %
EOSINOPHIL NFR BLD MANUAL: 1 %
ERYTHROCYTE [DISTWIDTH] IN BLOOD BY AUTOMATED COUNT: 13.4 % (ref 10–15)
GLUCOSE SERPL-MCNC: 85 MG/DL (ref 70–99)
HCT VFR BLD AUTO: 25 % (ref 35–47)
HGB BLD-MCNC: 8.3 G/DL (ref 11.7–15.7)
IMM GRANULOCYTES # BLD: ABNORMAL 10*3/UL
IMM GRANULOCYTES NFR BLD: ABNORMAL %
LYMPHOCYTES # BLD AUTO: ABNORMAL 10*3/UL
LYMPHOCYTES # BLD MANUAL: 1.8 10E3/UL (ref 0.8–5.3)
LYMPHOCYTES NFR BLD AUTO: ABNORMAL %
LYMPHOCYTES NFR BLD MANUAL: 32 %
MAGNESIUM SERPL-MCNC: 1.7 MG/DL (ref 1.7–2.3)
MCH RBC QN AUTO: 26.9 PG (ref 26.5–33)
MCHC RBC AUTO-ENTMCNC: 33.2 G/DL (ref 31.5–36.5)
MCV RBC AUTO: 81 FL (ref 78–100)
MONOCYTES # BLD AUTO: ABNORMAL 10*3/UL
MONOCYTES # BLD MANUAL: 0.2 10E3/UL (ref 0–1.3)
MONOCYTES NFR BLD AUTO: ABNORMAL %
MONOCYTES NFR BLD MANUAL: 3 %
MYELOCYTES # BLD MANUAL: 0.1 10E3/UL
MYELOCYTES NFR BLD MANUAL: 1 %
NEUTROPHILS # BLD AUTO: ABNORMAL 10*3/UL
NEUTROPHILS # BLD MANUAL: 3.5 10E3/UL (ref 1.6–8.3)
NEUTROPHILS NFR BLD AUTO: ABNORMAL %
NEUTROPHILS NFR BLD MANUAL: 63 %
NRBC # BLD AUTO: 0 10E3/UL
NRBC # BLD AUTO: 0.1 10E3/UL
NRBC BLD AUTO-RTO: 1 /100
NRBC BLD MANUAL-RTO: 1 %
PHOSPHATE SERPL-MCNC: 2.9 MG/DL (ref 2.5–4.5)
PLAT MORPH BLD: ABNORMAL
PLATELET # BLD AUTO: 131 10E3/UL (ref 150–450)
POTASSIUM SERPL-SCNC: 4.1 MMOL/L (ref 3.4–5.3)
PROT SERPL-MCNC: 6.6 G/DL (ref 6.4–8.3)
RBC # BLD AUTO: 3.09 10E6/UL (ref 3.8–5.2)
RBC MORPH BLD: ABNORMAL
SODIUM SERPL-SCNC: 140 MMOL/L (ref 135–145)
WBC # BLD AUTO: 5.6 10E3/UL (ref 4–11)

## 2023-10-30 PROCEDURE — 36415 COLL VENOUS BLD VENIPUNCTURE: CPT | Performed by: STUDENT IN AN ORGANIZED HEALTH CARE EDUCATION/TRAINING PROGRAM

## 2023-10-30 PROCEDURE — 85007 BL SMEAR W/DIFF WBC COUNT: CPT | Performed by: STUDENT IN AN ORGANIZED HEALTH CARE EDUCATION/TRAINING PROGRAM

## 2023-10-30 PROCEDURE — 84100 ASSAY OF PHOSPHORUS: CPT | Performed by: STUDENT IN AN ORGANIZED HEALTH CARE EDUCATION/TRAINING PROGRAM

## 2023-10-30 PROCEDURE — G0463 HOSPITAL OUTPT CLINIC VISIT: HCPCS | Performed by: STUDENT IN AN ORGANIZED HEALTH CARE EDUCATION/TRAINING PROGRAM

## 2023-10-30 PROCEDURE — 99215 OFFICE O/P EST HI 40 MIN: CPT | Performed by: STUDENT IN AN ORGANIZED HEALTH CARE EDUCATION/TRAINING PROGRAM

## 2023-10-30 PROCEDURE — 83735 ASSAY OF MAGNESIUM: CPT | Performed by: STUDENT IN AN ORGANIZED HEALTH CARE EDUCATION/TRAINING PROGRAM

## 2023-10-30 PROCEDURE — 85041 AUTOMATED RBC COUNT: CPT | Performed by: STUDENT IN AN ORGANIZED HEALTH CARE EDUCATION/TRAINING PROGRAM

## 2023-10-30 PROCEDURE — 80053 COMPREHEN METABOLIC PANEL: CPT | Performed by: STUDENT IN AN ORGANIZED HEALTH CARE EDUCATION/TRAINING PROGRAM

## 2023-10-30 PROCEDURE — 86901 BLOOD TYPING SEROLOGIC RH(D): CPT | Performed by: STUDENT IN AN ORGANIZED HEALTH CARE EDUCATION/TRAINING PROGRAM

## 2023-10-30 ASSESSMENT — PAIN SCALES - GENERAL: PAINLEVEL: NO PAIN (0)

## 2023-10-30 NOTE — PROGRESS NOTES
[facility] CANCER CLINIC    FOLLOW UP VISIT NOTE    PATIENT NAME: Mirta Cardenas MRN # [id number]  DATE OF VISIT: Oct 30, 2023  YOB: 1955    CANCER TYPE:  High grade pleomorphic sarcoma, right calf       HISTORY OF PRESENT ILLNESS/ONCOLOGY HISTORY   Mirta is a 68 year old female with PMH of CLL and recent Dx of High Grade Pleomorphic sarcoma, with intermittent right calf pain but then continuous calf pain beginning in March 2023.  She had a recent x-ray which shows a destructive lesion in the midportion R LE in between the fibula and the tibia.     -10/2/23, Biopsy  Final Diagnosis   A.  Soft tissue, right calf mass, excision:  - High-grade pleomorphic sarcoma  - See comment      -10/10/23, PET/CT with no evidence of mets (mildly enlarged righ iliac chain LN with no hypermetabolic uptake; thought to be reactive).     -10/17/23-10/24/23, Inpatient Cycle 1 of Doxil/ifosfamide     PAST ONCOLOGIC HISTORY   CLL - on monitoring      INTERVAL HISTORY   -Mirta presents for a toxicity check following cycle 1. She is accompanied by her , Juanito. She reports feeling quite well. She's actually somewhat surprised at how well she's felt.  -She notes feeling quite chatty and a bit nervous. Not bothersome but more than normal. She's noticed this since starting Lyrica and questions if it's related to that. She and Giancarlo report no irrational or impulsive behavior or decision making. They have not noticed any major mood swings or agitation. No chest pain, SOB or feeling of irregular heartbeat.   -Her right calf has been more swollen since hospitalization and the foot as well. She reports it just feels tight. No coolness to foot. She occasionally gets stabbing pain in the calf but this pre-dates chemo and has actually improved some. She is now walking more and gait is no longer affected.  -She elevates her legs at night and notices improvement in leg swelling in the mornings.  -Her right  calf pain is better controlled. She takes ibuprofen TID, lyrica BID and has occasionally taken one additional dose. She takes oxycodone once daily prn but does not use this daily. Not on tylenol as it doesn't help  -Constipation is controlled with metamucil daily and miralax and senna prn.   -She notes some itchiness and little red bumps on her chest.   -No mouth sores.  -Urinating without difficulty      REVIEW OF SYSTEMS   As noted in HPI.     PAST MEDICAL HISTORY   Anal fisure - controlled with nitrobid  CLL - now being monitored - [place]  HTN  Cholesterol  Hx of severe COVID  Psoriasis       PHYSICAL EXAM   BP (!) 149/82   Pulse 88   Temp 97.8  F (36.6  C) (Oral)   Resp 16   Wt 79.1 kg (174 lb 6.4 oz)   SpO2 100%   BMI 32.95 kg/m    General: Well-appearing female in no acute distress.  Eyes: EOMI, PERRL. No scleral icterus or conjunctival injection.  ENT: Oral mucosa is moist without lesions or thrush.   Lymphatic: Neck is supple without cervical or supraclavicular lymphadenopathy.   Cardiovascular: Tachycardia but rhythm regular. No murmurs. 1+ edema of RLE (lower leg, ankle and foot)  Respiratory: CTA bilaterally. No wheezes or crackles.  Gastrointestinal: BS +. Abdomen soft, non-tender.   Neurologic: Cranial nerves II through XII are grossly intact.  Skin: Scant, sparsely populated, red raised acneiform appearing spots on the chest. No other rashes, petechiae, or bruising noted on exposed skin.  Psych: Affect bright. Talkative. Comprehension intact. Not tangential. Logical thought pattern. Very pleasant.      LABORATORY AND IMAGING STUDIES     Most Recent 3 CBC's:  Recent Labs   Lab Test 10/30/23  0949 10/26/23  1232 10/24/23  0513 10/23/23  0620 10/22/23  0514 10/21/23  0503 10/20/23  0602   WBC 5.6 6.0 7.2   < > 9.5   < > 12.0*   HGB 8.3* 8.8* 8.5*   < > 9.4*   < > 9.4*   MCV 81 82 82   < > 80   < > 77*   * 198 200   < > 234   < > 241   ANEUTAUTO  --   --  3.3  --  3.9  --  5.2    < > =  values in this interval not displayed.     Most Recent 3 BMP's:  Recent Labs   Lab Test 10/30/23  0949 10/26/23  1232 10/24/23  0513 10/23/23  0620    137 137 138   POTASSIUM 4.1 4.1 4.3 3.9   CHLORIDE 106 104 107 105   CO2 24 24 22 24   BUN 11.8 20.2 14.1 11.3   CR 0.74 0.85 0.85 0.73   ANIONGAP 10 9 8 9   MONIK 9.4 9.3 8.8 8.5*   GLC 85 79 91 86   PROTTOTAL 6.6 7.0  --  6.1*   ALBUMIN 3.8 4.2  --  3.5    Most Recent 3 LFT's:  Recent Labs   Lab Test 10/30/23  0949 10/26/23  1232 10/23/23  0620   AST 18 12 15   ALT 8 15 16   ALKPHOS 123* 110* 98   BILITOTAL 0.2 0.2 0.3    Most Recent 2 TSH and T4:No lab results found.    10/30/23: Phos: 2.9 M.7    IMAGING  Echocardiogram Complete  Order: [id number]  Status: Edited Result - FINAL       Visible to patient: Yes (seen)       Next appt: Today at 09:45 AM in Lab ([facility] Lab Draw)       Dx: Sarcoma of right lower extremity (H);...    0 Result Notes  Details    Reading Physician Reading Date Result Priority   WALE Ambrose MD  [phone] 10/13/2023      Narrative & Impression  [id number]  [id number]  [id number]  [id number]^ALAN^MAYCO     [facility]  Diagnostic and Treatment-3rd Floor  [address] [place] [id number]     Name: RUSSELL YADAV  MRN: [id number]  : 1955  Study Date: 10/13/2023 09:20 AM  Age: 68 yrs  Gender: Female  Patient Location: [facility]  Reason For Study: Sarcoma of right lower extremity (H), Neoplasm of  unspecified be  Ordering Physician: MAYCO PHILLIPS  Referring Physician: MAYCO PHILLIPS  Performed By: Luis M Russo     BSA: 1.8 m2  Height: 61 in  Weight: 170 lb  HR: 107  BP: 150/93 mmHg  ______________________________________________________________________________  Procedure  Echocardiogram with two-dimensional, color and spectral Doppler performed.  ______________________________________________________________________________  Interpretation  Summary  Biplane LVEF is 60%.  Global peak LV longitudinal strain is averaged at -14.2%. This suggests  abnormal strain (normal <-18%).  Right ventricular function, chamber size, wall motion, and thickness are  normal.  The inferior vena cava is normal.  No pericardial effusion is present.  There is no prior study for direct comparison.  ______________________________________________________________________________  Left Ventricle  Left ventricular size is normal. Biplane LVEF is 60%. Mild concentric wall  thickening consistent with left ventricular hypertrophy is present. Left  ventricular diastolic function is normal. Global peak LV longitudinal strain  is averaged at -14.2%. This suggests abnormal strain (normal <-18%). No  regional wall motion abnormalities are seen.     Right Ventricle  Right ventricular function, chamber size, wall motion, and thickness are  normal.     Atria  Both atria appear normal. The atrial septum is intact as assessed by color  Doppler .     Mitral Valve  The mitral valve is normal.     Aortic Valve  Aortic valve is normal in structure and function.     Tricuspid Valve  The tricuspid valve is normal. Trace tricuspid insufficiency is present. The  right ventricular systolic pressure is approximated at 19.5 mmHg plus the  right atrial pressure. Pulmonary artery systolic pressure is normal.     Pulmonic Valve  The pulmonic valve is normal.     Vessels  The pulmonary artery is normal. The inferior vena cava is normal. Ascending  aorta 3.6 cm. Mild dilatation of the aorta is present.     Pericardium  No pericardial effusion is present.     Compared to Previous Study  There is no prior study for direct comparison.  ______________________________________________________________________________  MMode/2D Measurements & Calculations  IVSd: 1.2 cm     LVIDd: 2.9 cm  LVIDs: 1.6 cm  LVPWd: 1.2 cm  FS: 45.2 %  LV mass(C)d: 106.6 grams  LV mass(C)dI: 60.4 grams/m2  Ao root diam: 2.9 cm  LA dimension:  2.8 cm  asc Aorta Diam: 3.6 cm  LA/Ao: 0.97  LVOT diam: 2.0 cm  LVOT area: 3.0 cm2  Ao root diam index Ht(cm/m): 1.9  Ao root diam index BSA (cm/m2): 1.6  Asc Ao diam index BSA (cm/m2): 2.0  Asc Ao diam index Ht(cm/m): 2.3  LA Volume (BP): 28.7 ml     LA Volume Index (BP): 16.3 ml/m2  RV Base: 2.8 cm  RWT: 0.85  TAPSE: 1.4 cm     Doppler Measurements & Calculations  MV E max indra: 62.1 cm/sec  MV A max indra: 125.1 cm/sec  MV E/A: 0.50  MV max P.8 mmHg  MV mean P.0 mmHg  MV V2 VTI: 19.4 cm  MVA(VTI): 2.6 cm2  MV dec time: 0.29 sec  Ao V2 max: 152.0 cm/sec  Ao max P.2 mmHg  Ao V2 mean: 112.6 cm/sec  Ao mean P.6 mmHg  Ao V2 VTI: 21.1 cm  KEZIA(I,D): 2.4 cm2  KEZIA(V,D): 2.2 cm2  LV V1 max P.0 mmHg  LV V1 max: 112.0 cm/sec  LV V1 VTI: 16.6 cm  SV(LVOT): 50.5 ml  SI(LVOT): 28.6 ml/m2  PA V2 max: 102.5 cm/sec  PA max P.2 mmHg  PA acc time: 0.07 sec  TR max indra: 221.1 cm/sec  TR max P.5 mmHg  AV Indra Ratio (DI): 0.74     KEZIA Index (cm2/m2): 1.4  E/E' av.1  Lateral E/e': 5.0  Medial E/e': 7.2  RV S Indra: 9.1 cm/sec          I reviewed the above labs today.       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 No evidence of metastatic disease.     She began neoadjuvant chemotherapy with Doxil/ifosfmaide on 10/17/23. She received these at a slightly lower dose due to age and other medical conditions (doxil 40mg/m2 and ifosfamide 8g/m2 over 5 days). She tolerated this well overall. She did have some electrolyte abnormalities as described below which resolved with stopping of Cymbalta and chlorthalidone. She is noting talkativeness and some nervousness which is not bothersome. We discussed the need to monitor this closely as detailed below.     The plan is repeat imaging after cycle 2.        Plan  -Continue 3x/week labs (CBC/p/d, CMP, PO4, Mg) and possible transfusion at [place]  -Follow up Dr. Jimenez 11/7/23   -Will clarify with team whether plan is for OP vs IP for cycle 2. Will tentatively request admission for 11/14 and labs on 11/13. These can be cancelled if they plan is for OP.     #Talkative   Notes it while inpatient. She questions if it's a side effect of Lyrica. If so, it seems uncommon per my review of UTD. It could also be s/t ifosfamide?  -Will monitor for now. Advised that they need to notify us right away if any significant mood swings, agitation, irrational decision making, feeling of irregular heartbeat or chest pain.   -Recommended only taking Lyrica BID (not adding 3rd prn dose) for now as its unclear if this is contributory  -I also reviewed with pharmacy; as long as talkativeness is not bothersome and no worrisome symptoms and Lyrica is helping pain--then ok to continue for now.     #Cancer-related pain, right calf  -Improving as compared to pre-treatment  - Continue ibuprofen prn  -Continue Lyrica 50mg BID (but with monitoring as above)  -Continue oxycodone 5-10mg once daily prn  -Declines palliative consult at this time  -Of note, did not tolerate Cymbalta    #Electrolyte disturbances  -Noted to have hyponatremia, hypokalemia and hypomagnesemia during admission. Cymbalta stopped and chlorthalidone on hold, with improvement. Inpatient advised follow up with PCP given hold on chlorthalidone. She did recently see her PCP and thinks they were ok with staying off this.     CV  #Hypertension  - /82 today.  -As above; Continue PTA losartan   - Discontinued chlorthalidone while admitted--She will remain off this for now. She will have her BP checked in clinic. If systolic consistently >150, then likely will need some adjustment to anti-hypertensive regimen  #Right Leg Swelling  US RLE on 10/21/23 was negative  CMS intact  Could be s/t IVF while inpatient as well as chemo.  Continue elevate as able    #Pruritus, chest  -Consistent with rash s/t Doxil.  -Keep skin well moisturized  -Can trial thin later of OTC hydrocortisone prn        65 minutes spent on the date of the encounter doing chart review, review of test results, interpretation of tests, patient visit, and documentation      Amber Scheierl, CNP  [facility]  [address] [place] [id number]  [phone]

## 2023-10-30 NOTE — NURSING NOTE
Chief Complaint   Patient presents with    Blood Draw     Labs drawn via  by RN in lab. VS taken.      Labs drawn via venipuncture. Vital signs taken. Checked into next appointment.   Kendal Dodson RN

## 2023-10-30 NOTE — LETTER
10/30/2023         RE: Mirta Cardenas  [id number] [facility] [address]        Dear Colleague,    Thank you for referring your patient, Mirta Cardenas, to the [facility]. Please see a copy of my visit note below.    [facility]    FOLLOW UP VISIT NOTE    PATIENT NAME: Mirta Cardenas MRN # [id number]  DATE OF VISIT: Oct 30, 2023  YOB: 1955    CANCER TYPE:  High grade pleomorphic sarcoma, right calf       HISTORY OF PRESENT ILLNESS/ONCOLOGY HISTORY   Mirta is a 68 year old female with PMH of CLL and recent Dx of High Grade Pleomorphic sarcoma, with intermittent right calf pain but then continuous calf pain beginning in March 2023.  She had a recent x-ray which shows a destructive lesion in the midportion R LE in between the fibula and the tibia.     -10/2/23, Biopsy  Final Diagnosis   A.  Soft tissue, right calf mass, excision:  - High-grade pleomorphic sarcoma  - See comment      -10/10/23, PET/CT with no evidence of mets (mildly enlarged righ iliac chain LN with no hypermetabolic uptake; thought to be reactive).     -10/17/23-10/24/23, Inpatient Cycle 1 of Doxil/ifosfamide     PAST ONCOLOGIC HISTORY   CLL - on monitoring      INTERVAL HISTORY   -Mirta presents for a toxicity check following cycle 1. She is accompanied by her , Juanito. She reports feeling quite well. She's actually somewhat surprised at how well she's felt.  -She notes feeling quite chatty and a bit nervous. Not bothersome but more than normal. She's noticed this since starting Lyrica and questions if it's related to that. She and Giancarlo report no irrational or impulsive behavior or decision making. They have not noticed any major mood swings or agitation. No chest pain, SOB or feeling of irregular heartbeat.   -Her right calf has been more swollen since hospitalization and the foot as well. She reports it just feels tight. No coolness to foot.  She occasionally gets stabbing pain in the calf but this pre-dates chemo and has actually improved some. She is now walking more and gait is no longer affected.  -She elevates her legs at night and notices improvement in leg swelling in the mornings.  -Her right calf pain is better controlled. She takes ibuprofen TID, lyrica BID and has occasionally taken one additional dose. She takes oxycodone once daily prn but does not use this daily. Not on tylenol as it doesn't help  -Constipation is controlled with metamucil daily and miralax and senna prn.   -She notes some itchiness and little red bumps on her chest.   -No mouth sores.  -Urinating without difficulty      REVIEW OF SYSTEMS   As noted in HPI.     PAST MEDICAL HISTORY   Anal fisure - controlled with nitrobid  CLL - now being monitored - Wiser[facility]  HTN  Cholesterol  Hx of severe COVID  Psoriasis       PHYSICAL EXAM   BP (!) 149/82   Pulse 88   Temp 97.8  F (36.6  C) (Oral)   Resp 16   Wt 79.1 kg (174 lb 6.4 oz)   SpO2 100%   BMI 32.95 kg/m    General: Well-appearing female in no acute distress.  Eyes: EOMI, PERRL. No scleral icterus or conjunctival injection.  ENT: Oral mucosa is moist without lesions or thrush.   Lymphatic: Neck is supple without cervical or supraclavicular lymphadenopathy.   Cardiovascular: Tachycardia but rhythm regular. No murmurs. 1+ edema of RLE (lower leg, ankle and foot)  Respiratory: CTA bilaterally. No wheezes or crackles.  Gastrointestinal: BS +. Abdomen soft, non-tender.   Neurologic: Cranial nerves II through XII are grossly intact.  Skin: Scant, sparsely populated, red raised acneiform appearing spots on the chest. No other rashes, petechiae, or bruising noted on exposed skin.  Psych: Affect bright. Talkative. Comprehension intact. Not tangential. Logical thought pattern. Very pleasant.      LABORATORY AND IMAGING STUDIES     Most Recent 3 CBC's:  Recent Labs   Lab Test 10/30/23  0949 10/26/23  1232 10/24/23  2504  10/23/23  0620 10/22/23  0514 10/21/23  0503 10/20/23  0602   WBC 5.6 6.0 7.2   < > 9.5   < > 12.0*   HGB 8.3* 8.8* 8.5*   < > 9.4*   < > 9.4*   MCV 81 82 82   < > 80   < > 77*   * 198 200   < > 234   < > 241   ANEUTAUTO  --   --  3.3  --  3.9  --  5.2    < > = values in this interval not displayed.     Most Recent 3 BMP's:  Recent Labs   Lab Test 10/30/23  0949 10/26/23  1232 10/24/23  0513 10/23/23  0620    137 137 138   POTASSIUM 4.1 4.1 4.3 3.9   CHLORIDE 106 104 107 105   CO2 24 24 22 24   BUN 11.8 20.2 14.1 11.3   CR 0.74 0.85 0.85 0.73   ANIONGAP 10 9 8 9   MONIK 9.4 9.3 8.8 8.5*   GLC 85 79 91 86   PROTTOTAL 6.6 7.0  --  6.1*   ALBUMIN 3.8 4.2  --  3.5    Most Recent 3 LFT's:  Recent Labs   Lab Test 10/30/23  0949 10/26/23  1232 10/23/23  0620   AST 18 12 15   ALT 8 15 16   ALKPHOS 123* 110* 98   BILITOTAL 0.2 0.2 0.3    Most Recent 2 TSH and T4:No lab results found.    10/30/23: Phos: 2.9 M.7    IMAGING  Echocardiogram Complete  Order: [id number]  Status: Edited Result - FINAL       Visible to patient: Yes (seen)       Next appt: Today at 09:45 AM in Lab ([facility])       Dx: Sarcoma of right lower extremity (H);...    0 Result Notes  Details    Reading Physician Reading Date Result Priority   WALE Ambrose MD  [phone] 10/13/2023      Narrative & Impression  [id number]  [id number]  [id number]  [id number]^MAYCO     [facility]  Diagnostic and Treatment-3rd Floor  [address] [place] [address]     Name: RUSSELL YADAV  MRN: [id number]  : 1955  Study Date: 10/13/2023 09:20 AM  Age: 68 yrs  Gender: Female  Patient Location: [facility]  Reason For Study: Sarcoma of right lower extremity (H), Neoplasm of  unspecified be  Ordering Physician: MAYCO PHILLIPS  Referring Physician: MAYCO PHILLIPS  Performed By: Luis M Russo     BSA: 1.8 m2  Height: 61 in  Weight: 170 lb  HR: 107  BP: 150/93  mmHg  ______________________________________________________________________________  Procedure  Echocardiogram with two-dimensional, color and spectral Doppler performed.  ______________________________________________________________________________  Interpretation Summary  Biplane LVEF is 60%.  Global peak LV longitudinal strain is averaged at -14.2%. This suggests  abnormal strain (normal <-18%).  Right ventricular function, chamber size, wall motion, and thickness are  normal.  The inferior vena cava is normal.  No pericardial effusion is present.  There is no prior study for direct comparison.  ______________________________________________________________________________  Left Ventricle  Left ventricular size is normal. Biplane LVEF is 60%. Mild concentric wall  thickening consistent with left ventricular hypertrophy is present. Left  ventricular diastolic function is normal. Global peak LV longitudinal strain  is averaged at -14.2%. This suggests abnormal strain (normal <-18%). No  regional wall motion abnormalities are seen.     Right Ventricle  Right ventricular function, chamber size, wall motion, and thickness are  normal.     Atria  Both atria appear normal. The atrial septum is intact as assessed by color  Doppler .     Mitral Valve  The mitral valve is normal.     Aortic Valve  Aortic valve is normal in structure and function.     Tricuspid Valve  The tricuspid valve is normal. Trace tricuspid insufficiency is present. The  right ventricular systolic pressure is approximated at 19.5 mmHg plus the  right atrial pressure. Pulmonary artery systolic pressure is normal.     Pulmonic Valve  The pulmonic valve is normal.     Vessels  The pulmonary artery is normal. The inferior vena cava is normal. Ascending  aorta 3.6 cm. Mild dilatation of the aorta is present.     Pericardium  No pericardial effusion is present.     Compared to Previous Study  There is no prior study for direct  comparison.  ______________________________________________________________________________  MMode/2D Measurements & Calculations  IVSd: 1.2 cm     LVIDd: 2.9 cm  LVIDs: 1.6 cm  LVPWd: 1.2 cm  FS: 45.2 %  LV mass(C)d: 106.6 grams  LV mass(C)dI: 60.4 grams/m2  Ao root diam: 2.9 cm  LA dimension: 2.8 cm  asc Aorta Diam: 3.6 cm  LA/Ao: 0.97  LVOT diam: 2.0 cm  LVOT area: 3.0 cm2  Ao root diam index Ht(cm/m): 1.9  Ao root diam index BSA (cm/m2): 1.6  Asc Ao diam index BSA (cm/m2): 2.0  Asc Ao diam index Ht(cm/m): 2.3  LA Volume (BP): 28.7 ml     LA Volume Index (BP): 16.3 ml/m2  RV Base: 2.8 cm  RWT: 0.85  TAPSE: 1.4 cm     Doppler Measurements & Calculations  MV E max indra: 62.1 cm/sec  MV A max indra: 125.1 cm/sec  MV E/A: 0.50  MV max P.8 mmHg  MV mean P.0 mmHg  MV V2 VTI: 19.4 cm  MVA(VTI): 2.6 cm2  MV dec time: 0.29 sec  Ao V2 max: 152.0 cm/sec  Ao max P.2 mmHg  Ao V2 mean: 112.6 cm/sec  Ao mean P.6 mmHg  Ao V2 VTI: 21.1 cm  KEZIA(I,D): 2.4 cm2  KEIZA(V,D): 2.2 cm2  LV V1 max P.0 mmHg  LV V1 max: 112.0 cm/sec  LV V1 VTI: 16.6 cm  SV(LVOT): 50.5 ml  SI(LVOT): 28.6 ml/m2  PA V2 max: 102.5 cm/sec  PA max P.2 mmHg  PA acc time: 0.07 sec  TR max indra: 221.1 cm/sec  TR max P.5 mmHg  AV Indra Ratio (DI): 0.74     KEZIA Index (cm2/m2): 1.4  E/E' av.1  Lateral E/e': 5.0  Medial E/e': 7.2  RV S Indra: 9.1 cm/sec          I reviewed the above labs today.       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 No evidence of metastatic disease.     She began neoadjuvant chemotherapy with Doxil/ifosfmaide on 10/17/23. She received these at a slightly lower dose due to age and other medical conditions (doxil 40mg/m2 and ifosfamide 8g/m2 over 5 days). She tolerated this well overall. She did have some electrolyte  abnormalities as described below which resolved with stopping of Cymbalta and chlorthalidone. She is noting talkativeness and some nervousness which is not bothersome. We discussed the need to monitor this closely as detailed below.     The plan is repeat imaging after cycle 2.       Plan  -Continue 3x/week labs (CBC/p/d, CMP, PO4, Mg) and possible transfusion at [place]  -Follow up Dr. Jimenez 11/7/23   -Will clarify with team whether plan is for OP vs IP for cycle 2. Will tentatively request admission for 11/14 and labs on 11/13. These can be cancelled if they plan is for OP.     #Talkative   Notes it while inpatient. She questions if it's a side effect of Lyrica. If so, it seems uncommon per my review of UTD. It could also be s/t ifosfamide?  -Will monitor for now. Advised that they need to notify us right away if any significant mood swings, agitation, irrational decision making, feeling of irregular heartbeat or chest pain.   -Recommended only taking Lyrica BID (not adding 3rd prn dose) for now as its unclear if this is contributory  -I also reviewed with pharmacy; as long as talkativeness is not bothersome and no worrisome symptoms and Lyrica is helping pain--then ok to continue for now.     #Cancer-related pain, right calf  -Improving as compared to pre-treatment  - Continue ibuprofen prn  -Continue Lyrica 50mg BID (but with monitoring as above)  -Continue oxycodone 5-10mg once daily prn  -Declines palliative consult at this time  -Of note, did not tolerate Cymbalta    #Electrolyte disturbances  -Noted to have hyponatremia, hypokalemia and hypomagnesemia during admission. Cymbalta stopped and chlorthalidone on hold, with improvement. Inpatient advised follow up with PCP given hold on chlorthalidone. She did recently see her PCP and thinks they were ok with staying off this.     CV  #Hypertension  - /82 today.  -As above; Continue PTA losartan   - Discontinued chlorthalidone while admitted--She will  remain off this for now. She will have her BP checked in clinic. If systolic consistently >150, then likely will need some adjustment to anti-hypertensive regimen  #Right Leg Swelling  US RLE on 10/21/23 was negative  CMS intact  Could be s/t IVF while inpatient as well as chemo. Continue elevate as able    #Pruritus, chest  -Consistent with rash s/t Doxil.  -Keep skin well moisturized  -Can trial thin later of OTC hydrocortisone prn        65 minutes spent on the date of the encounter doing chart review, review of test results, interpretation of tests, patient visit, and documentation      Amber Scheierl, CNP  [facility]  [address] [place] [id number] [phone]

## 2023-10-30 NOTE — NURSING NOTE
"Oncology Rooming Note    October 30, 2023 10:37 AM   Mirta Cardenas is a 68 year old female who presents for:    Chief Complaint   Patient presents with    Blood Draw     Labs drawn via  by RN in lab. VS taken.     Oncology Clinic Visit     Sarcoma. Hospital f/u     Initial Vitals: BP (!) 149/82   Pulse 88   Temp 97.8  F (36.6  C) (Oral)   Resp 16   Wt 79.1 kg (174 lb 6.4 oz)   SpO2 100%   BMI 32.95 kg/m   Estimated body mass index is 32.95 kg/m  as calculated from the following:    Height as of 10/26/23: 1.549 m (5' 1\").    Weight as of this encounter: 79.1 kg (174 lb 6.4 oz). Body surface area is 1.85 meters squared.  No Pain (0) Comment: Data Unavailable   No LMP recorded. Patient has had a hysterectomy.  Allergies reviewed: Yes  Medications reviewed: Yes    Medications: Medication refills not needed today.  Pharmacy name entered into [facility]:    [facility] [address] [place] [address] [id number] [facility] [place], [place] - [address] [place] [address]    Clinical concerns: questions regarding ibuprofen. Some skin issues on chest.     PETRA  was notified.      Leia Villa              " intact

## 2023-10-30 NOTE — PROGRESS NOTES
Infusion Nursing Note:  Mirta Cardenas presents today for possible blood products/IVF.    Patient seen by provider today: Yes: RAYSHAWN Vazquez    Note: Patient presents to clinic today feeling well with no questions.  Pt did not request or require any intervention for pain today.  Pt denies s/s anemia or bleeding.  Declines IVF prior to assessment with ISIDORO.    Intravenous Access:  No Intravenous access at this visit.    Treatment Conditions:  Lab Results   Component Value Date    HGB 8.3 (L) 10/30/2023    WBC 6.0 10/26/2023    ANEU 4.1 10/26/2023    ANEUTAUTO 3.3 10/24/2023     (L) 10/30/2023        Lab Results   Component Value Date     10/30/2023    POTASSIUM 4.1 10/30/2023    MAG 1.7 10/30/2023    CR 0.74 10/30/2023    MONIK 9.4 10/30/2023    BILITOTAL 0.2 10/30/2023    ALBUMIN 3.8 10/30/2023    ALT 8 10/30/2023    AST 18 10/30/2023       Results reviewed, labs did NOT meet treatment parameters: not needed.    Discharge Plan:   Patient declined prescription refills.  Discharge instructions reviewed with: Patient.  Patient and/or family verbalized understanding of discharge instructions and all questions answered.  AVS to patient via [email or web address].  Patient will return 11/1/2023 for next appointment.   Patient discharged in stable condition accompanied by: .  Departure Mode: Ambulatory.    Etta Saleem RN

## 2023-11-01 ENCOUNTER — INFUSION THERAPY VISIT (OUTPATIENT)
Dept: INFUSION THERAPY | Facility: CLINIC | Age: 68
End: 2023-11-01
Attending: ORTHOPAEDIC SURGERY
Payer: COMMERCIAL

## 2023-11-01 ENCOUNTER — APPOINTMENT (OUTPATIENT)
Dept: LAB | Facility: CLINIC | Age: 68
End: 2023-11-01
Payer: COMMERCIAL

## 2023-11-01 DIAGNOSIS — E83.42 HYPOMAGNESEMIA: Primary | ICD-10-CM

## 2023-11-01 DIAGNOSIS — C49.21 SARCOMA OF RIGHT LOWER EXTREMITY (H): ICD-10-CM

## 2023-11-01 LAB
ALBUMIN SERPL BCG-MCNC: 3.8 G/DL (ref 3.5–5.2)
ALP SERPL-CCNC: 137 U/L (ref 35–104)
ALT SERPL W P-5'-P-CCNC: 18 U/L (ref 0–50)
ANION GAP SERPL CALCULATED.3IONS-SCNC: 11 MMOL/L (ref 7–15)
AST SERPL W P-5'-P-CCNC: 16 U/L (ref 0–45)
BASOPHILS # BLD AUTO: ABNORMAL 10*3/UL
BASOPHILS # BLD MANUAL: 0 10E3/UL (ref 0–0.2)
BASOPHILS NFR BLD AUTO: ABNORMAL %
BASOPHILS NFR BLD MANUAL: 0 %
BILIRUB SERPL-MCNC: <0.2 MG/DL
BUN SERPL-MCNC: 13.4 MG/DL (ref 8–23)
CALCIUM SERPL-MCNC: 9.1 MG/DL (ref 8.8–10.2)
CHLORIDE SERPL-SCNC: 104 MMOL/L (ref 98–107)
CREAT SERPL-MCNC: 0.82 MG/DL (ref 0.51–0.95)
DEPRECATED HCO3 PLAS-SCNC: 24 MMOL/L (ref 22–29)
EGFRCR SERPLBLD CKD-EPI 2021: 78 ML/MIN/1.73M2
EOSINOPHIL # BLD AUTO: ABNORMAL 10*3/UL
EOSINOPHIL # BLD MANUAL: 0 10E3/UL (ref 0–0.7)
EOSINOPHIL NFR BLD AUTO: ABNORMAL %
EOSINOPHIL NFR BLD MANUAL: 0 %
ERYTHROCYTE [DISTWIDTH] IN BLOOD BY AUTOMATED COUNT: 14.4 % (ref 10–15)
GLUCOSE SERPL-MCNC: 116 MG/DL (ref 70–99)
HCT VFR BLD AUTO: 24.4 % (ref 35–47)
HGB BLD-MCNC: 8.2 G/DL (ref 11.7–15.7)
IMM GRANULOCYTES # BLD: ABNORMAL 10*3/UL
IMM GRANULOCYTES NFR BLD: ABNORMAL %
LYMPHOCYTES # BLD AUTO: ABNORMAL 10*3/UL
LYMPHOCYTES # BLD MANUAL: 0.9 10E3/UL (ref 0.8–5.3)
LYMPHOCYTES NFR BLD AUTO: ABNORMAL %
LYMPHOCYTES NFR BLD MANUAL: 13 %
MAGNESIUM SERPL-MCNC: 1.5 MG/DL (ref 1.7–2.3)
MCH RBC QN AUTO: 27.8 PG (ref 26.5–33)
MCHC RBC AUTO-ENTMCNC: 33.6 G/DL (ref 31.5–36.5)
MCV RBC AUTO: 83 FL (ref 78–100)
MONOCYTES # BLD AUTO: ABNORMAL 10*3/UL
MONOCYTES # BLD MANUAL: 0.4 10E3/UL (ref 0–1.3)
MONOCYTES NFR BLD AUTO: ABNORMAL %
MONOCYTES NFR BLD MANUAL: 5 %
NEUTROPHILS # BLD AUTO: ABNORMAL 10*3/UL
NEUTROPHILS # BLD MANUAL: 6 10E3/UL (ref 1.6–8.3)
NEUTROPHILS NFR BLD AUTO: ABNORMAL %
NEUTROPHILS NFR BLD MANUAL: 82 %
NRBC # BLD AUTO: 0 10E3/UL
NRBC # BLD AUTO: 0.1 10E3/UL
NRBC BLD AUTO-RTO: 0 /100
NRBC BLD MANUAL-RTO: 1 %
PHOSPHATE SERPL-MCNC: 3.1 MG/DL (ref 2.5–4.5)
PLAT MORPH BLD: ABNORMAL
PLATELET # BLD AUTO: 115 10E3/UL (ref 150–450)
POTASSIUM SERPL-SCNC: 3.8 MMOL/L (ref 3.4–5.3)
PROT SERPL-MCNC: 6.5 G/DL (ref 6.4–8.3)
RBC # BLD AUTO: 2.95 10E6/UL (ref 3.8–5.2)
RBC MORPH BLD: ABNORMAL
SODIUM SERPL-SCNC: 139 MMOL/L (ref 135–145)
WBC # BLD AUTO: 7.3 10E3/UL (ref 4–11)

## 2023-11-01 PROCEDURE — 84100 ASSAY OF PHOSPHORUS: CPT | Performed by: STUDENT IN AN ORGANIZED HEALTH CARE EDUCATION/TRAINING PROGRAM

## 2023-11-01 PROCEDURE — 80053 COMPREHEN METABOLIC PANEL: CPT | Performed by: STUDENT IN AN ORGANIZED HEALTH CARE EDUCATION/TRAINING PROGRAM

## 2023-11-01 PROCEDURE — 83735 ASSAY OF MAGNESIUM: CPT | Performed by: STUDENT IN AN ORGANIZED HEALTH CARE EDUCATION/TRAINING PROGRAM

## 2023-11-01 PROCEDURE — 85014 HEMATOCRIT: CPT | Performed by: STUDENT IN AN ORGANIZED HEALTH CARE EDUCATION/TRAINING PROGRAM

## 2023-11-01 PROCEDURE — 85007 BL SMEAR W/DIFF WBC COUNT: CPT | Performed by: STUDENT IN AN ORGANIZED HEALTH CARE EDUCATION/TRAINING PROGRAM

## 2023-11-01 PROCEDURE — 36415 COLL VENOUS BLD VENIPUNCTURE: CPT

## 2023-11-01 RX ORDER — MAGNESIUM OXIDE 400 MG/1
400 TABLET ORAL 2 TIMES DAILY
Qty: 4 TABLET | Refills: 0 | Status: SHIPPED | OUTPATIENT
Start: 2023-11-01 | End: 2023-11-03

## 2023-11-01 NOTE — PROGRESS NOTES
Infusion Nursing Note:  Mirta Cardenas presents today for labs and possible blood transfusion.    Patient seen by provider today: No   present during visit today: Not Applicable.    Note: Pt not seen in infusion center today.       Intravenous Access:  Labs drawn without difficulty.    Treatment Conditions:  Lab Results   Component Value Date    HGB 8.2 (L) 11/01/2023    WBC 7.3 11/01/2023    ANEU 6.0 11/01/2023    ANEUTAUTO 3.3 10/24/2023     (L) 11/01/2023        Lab Results   Component Value Date     11/01/2023    POTASSIUM 3.8 11/01/2023    MAG 1.5 (L) 11/01/2023    CR 0.82 11/01/2023    MONIK 9.1 11/01/2023    BILITOTAL <0.2 11/01/2023    ALBUMIN 3.8 11/01/2023    ALT 18 11/01/2023    AST 16 11/01/2023       Results reviewed, labs did NOT meet treatment parameters: Blood transfusion not indicated today.    Magnesium came back at 1.5 today.  Ordered oral magnesium oxide per protocol and prescription sent to [facility] per pt request.  Went over how to take medication.  Pt also aware to increase the magnesium in her diet.  Pt verbalized understanding of information provided and has no questions or concerns at this time.         Discharge Plan:   Pt discharged to home accompanied by spouse, Juanito.  Printed AVS with instructions for pt.  Pt to return on 11/3/23 at 12:30 pm for labs followed by possible blood transfusion.       Shayy Gordon RN

## 2023-11-03 ENCOUNTER — INFUSION THERAPY VISIT (OUTPATIENT)
Dept: INFUSION THERAPY | Facility: CLINIC | Age: 68
End: 2023-11-03
Attending: ORTHOPAEDIC SURGERY
Payer: COMMERCIAL

## 2023-11-03 ENCOUNTER — APPOINTMENT (OUTPATIENT)
Dept: LAB | Facility: CLINIC | Age: 68
End: 2023-11-03
Payer: COMMERCIAL

## 2023-11-03 DIAGNOSIS — C49.21 SARCOMA OF RIGHT LOWER EXTREMITY (H): ICD-10-CM

## 2023-11-03 DIAGNOSIS — D64.81 ANEMIA DUE TO ANTINEOPLASTIC CHEMOTHERAPY: Primary | ICD-10-CM

## 2023-11-03 DIAGNOSIS — T45.1X5A ANEMIA DUE TO ANTINEOPLASTIC CHEMOTHERAPY: Primary | ICD-10-CM

## 2023-11-03 LAB
ALBUMIN SERPL BCG-MCNC: 4 G/DL (ref 3.5–5.2)
ALP SERPL-CCNC: 160 U/L (ref 35–104)
ALT SERPL W P-5'-P-CCNC: 16 U/L (ref 0–50)
ANION GAP SERPL CALCULATED.3IONS-SCNC: 11 MMOL/L (ref 7–15)
AST SERPL W P-5'-P-CCNC: 17 U/L (ref 0–45)
BASOPHILS # BLD AUTO: ABNORMAL 10*3/UL
BASOPHILS # BLD MANUAL: 0 10E3/UL (ref 0–0.2)
BASOPHILS NFR BLD AUTO: ABNORMAL %
BASOPHILS NFR BLD MANUAL: 0 %
BILIRUB SERPL-MCNC: 0.2 MG/DL
BUN SERPL-MCNC: 13.5 MG/DL (ref 8–23)
CALCIUM SERPL-MCNC: 9.6 MG/DL (ref 8.8–10.2)
CHLORIDE SERPL-SCNC: 103 MMOL/L (ref 98–107)
CREAT SERPL-MCNC: 0.91 MG/DL (ref 0.51–0.95)
DEPRECATED HCO3 PLAS-SCNC: 25 MMOL/L (ref 22–29)
EGFRCR SERPLBLD CKD-EPI 2021: 69 ML/MIN/1.73M2
EOSINOPHIL # BLD AUTO: ABNORMAL 10*3/UL
EOSINOPHIL # BLD MANUAL: 0 10E3/UL (ref 0–0.7)
EOSINOPHIL NFR BLD AUTO: ABNORMAL %
EOSINOPHIL NFR BLD MANUAL: 0 %
ERYTHROCYTE [DISTWIDTH] IN BLOOD BY AUTOMATED COUNT: 15.2 % (ref 10–15)
GLUCOSE SERPL-MCNC: 105 MG/DL (ref 70–99)
HCT VFR BLD AUTO: 26.3 % (ref 35–47)
HGB BLD-MCNC: 8.6 G/DL (ref 11.7–15.7)
IMM GRANULOCYTES # BLD: ABNORMAL 10*3/UL
IMM GRANULOCYTES NFR BLD: ABNORMAL %
LYMPHOCYTES # BLD AUTO: ABNORMAL 10*3/UL
LYMPHOCYTES # BLD MANUAL: 1.5 10E3/UL (ref 0.8–5.3)
LYMPHOCYTES NFR BLD AUTO: ABNORMAL %
LYMPHOCYTES NFR BLD MANUAL: 15 %
MAGNESIUM SERPL-MCNC: 1.7 MG/DL (ref 1.7–2.3)
MCH RBC QN AUTO: 27 PG (ref 26.5–33)
MCHC RBC AUTO-ENTMCNC: 32.7 G/DL (ref 31.5–36.5)
MCV RBC AUTO: 83 FL (ref 78–100)
MONOCYTES # BLD AUTO: ABNORMAL 10*3/UL
MONOCYTES # BLD MANUAL: 0.6 10E3/UL (ref 0–1.3)
MONOCYTES NFR BLD AUTO: ABNORMAL %
MONOCYTES NFR BLD MANUAL: 6 %
MYELOCYTES # BLD MANUAL: 0.3 10E3/UL
MYELOCYTES NFR BLD MANUAL: 3 %
NEUTROPHILS # BLD AUTO: ABNORMAL 10*3/UL
NEUTROPHILS # BLD MANUAL: 7.6 10E3/UL (ref 1.6–8.3)
NEUTROPHILS NFR BLD AUTO: ABNORMAL %
NEUTROPHILS NFR BLD MANUAL: 76 %
NRBC # BLD AUTO: 0 10E3/UL
NRBC # BLD AUTO: 0.1 10E3/UL
NRBC BLD AUTO-RTO: 0 /100
NRBC BLD MANUAL-RTO: 1 %
PHOSPHATE SERPL-MCNC: 3.2 MG/DL (ref 2.5–4.5)
PLAT MORPH BLD: ABNORMAL
PLATELET # BLD AUTO: 151 10E3/UL (ref 150–450)
POTASSIUM SERPL-SCNC: 4.1 MMOL/L (ref 3.4–5.3)
PROT SERPL-MCNC: 6.8 G/DL (ref 6.4–8.3)
RBC # BLD AUTO: 3.18 10E6/UL (ref 3.8–5.2)
RBC MORPH BLD: ABNORMAL
SODIUM SERPL-SCNC: 139 MMOL/L (ref 135–145)
WBC # BLD AUTO: 10 10E3/UL (ref 4–11)

## 2023-11-03 PROCEDURE — 85027 COMPLETE CBC AUTOMATED: CPT | Performed by: STUDENT IN AN ORGANIZED HEALTH CARE EDUCATION/TRAINING PROGRAM

## 2023-11-03 PROCEDURE — 36415 COLL VENOUS BLD VENIPUNCTURE: CPT

## 2023-11-03 PROCEDURE — 84100 ASSAY OF PHOSPHORUS: CPT | Performed by: STUDENT IN AN ORGANIZED HEALTH CARE EDUCATION/TRAINING PROGRAM

## 2023-11-03 PROCEDURE — 83735 ASSAY OF MAGNESIUM: CPT | Performed by: STUDENT IN AN ORGANIZED HEALTH CARE EDUCATION/TRAINING PROGRAM

## 2023-11-03 PROCEDURE — 80053 COMPREHEN METABOLIC PANEL: CPT | Performed by: STUDENT IN AN ORGANIZED HEALTH CARE EDUCATION/TRAINING PROGRAM

## 2023-11-03 PROCEDURE — 85007 BL SMEAR W/DIFF WBC COUNT: CPT | Performed by: STUDENT IN AN ORGANIZED HEALTH CARE EDUCATION/TRAINING PROGRAM

## 2023-11-03 RX ORDER — HEPARIN SODIUM,PORCINE 10 UNIT/ML
5-20 VIAL (ML) INTRAVENOUS DAILY PRN
Status: CANCELLED | OUTPATIENT
Start: 2023-11-03

## 2023-11-03 RX ORDER — HEPARIN SODIUM (PORCINE) LOCK FLUSH IV SOLN 100 UNIT/ML 100 UNIT/ML
5 SOLUTION INTRAVENOUS
Status: CANCELLED | OUTPATIENT
Start: 2023-11-03

## 2023-11-03 NOTE — PROGRESS NOTES
Infusion Nursing Note:  Mirta Cardenas presents today for PRBC.    Patient seen by provider today: No   present during visit today: Not Applicable.    Note: N/A.      Intravenous Access:  No Intravenous access/labs at this visit.    Treatment Conditions:  Lab Results   Component Value Date    HGB 8.6 (L) 11/03/2023    WBC 10.0 11/03/2023    ANEU 6.0 11/01/2023    ANEUTAUTO 3.3 10/24/2023     11/03/2023        Results reviewed, labs did NOT meet treatment parameters: For PRBCs. Patient was given the information and was elated to find she didn't need to get blood today.       Post Infusion Assessment:  N/A      Discharge Plan:   Departure Mode: Ambulatory.      Yaneli Rodríguez RN

## 2023-11-06 ENCOUNTER — APPOINTMENT (OUTPATIENT)
Dept: LAB | Facility: CLINIC | Age: 68
End: 2023-11-06
Payer: COMMERCIAL

## 2023-11-06 ENCOUNTER — INFUSION THERAPY VISIT (OUTPATIENT)
Dept: INFUSION THERAPY | Facility: CLINIC | Age: 68
End: 2023-11-06
Attending: ORTHOPAEDIC SURGERY
Payer: COMMERCIAL

## 2023-11-06 DIAGNOSIS — C49.21 SARCOMA OF RIGHT LOWER EXTREMITY (H): ICD-10-CM

## 2023-11-06 LAB
ABO/RH(D): NORMAL
ALBUMIN SERPL BCG-MCNC: 3.8 G/DL (ref 3.5–5.2)
ALP SERPL-CCNC: 144 U/L (ref 35–104)
ALT SERPL W P-5'-P-CCNC: 13 U/L (ref 0–50)
ANION GAP SERPL CALCULATED.3IONS-SCNC: 10 MMOL/L (ref 7–15)
ANTIBODY SCREEN: NEGATIVE
AST SERPL W P-5'-P-CCNC: 16 U/L (ref 0–45)
BASOPHILS # BLD AUTO: 0.1 10E3/UL (ref 0–0.2)
BASOPHILS NFR BLD AUTO: 1 %
BILIRUB SERPL-MCNC: 0.3 MG/DL
BUN SERPL-MCNC: 13.6 MG/DL (ref 8–23)
CALCIUM SERPL-MCNC: 8.9 MG/DL (ref 8.8–10.2)
CHLORIDE SERPL-SCNC: 103 MMOL/L (ref 98–107)
CREAT SERPL-MCNC: 0.88 MG/DL (ref 0.51–0.95)
DEPRECATED HCO3 PLAS-SCNC: 24 MMOL/L (ref 22–29)
EGFRCR SERPLBLD CKD-EPI 2021: 72 ML/MIN/1.73M2
EOSINOPHIL # BLD AUTO: 0 10E3/UL (ref 0–0.7)
EOSINOPHIL NFR BLD AUTO: 0 %
ERYTHROCYTE [DISTWIDTH] IN BLOOD BY AUTOMATED COUNT: 16.5 % (ref 10–15)
GLUCOSE SERPL-MCNC: 111 MG/DL (ref 70–99)
HCT VFR BLD AUTO: 25.2 % (ref 35–47)
HGB BLD-MCNC: 8.2 G/DL (ref 11.7–15.7)
IMM GRANULOCYTES # BLD: 0.3 10E3/UL
IMM GRANULOCYTES NFR BLD: 3 %
LYMPHOCYTES # BLD AUTO: 1.7 10E3/UL (ref 0.8–5.3)
LYMPHOCYTES NFR BLD AUTO: 17 %
MAGNESIUM SERPL-MCNC: 1.7 MG/DL (ref 1.7–2.3)
MCH RBC QN AUTO: 27.4 PG (ref 26.5–33)
MCHC RBC AUTO-ENTMCNC: 32.5 G/DL (ref 31.5–36.5)
MCV RBC AUTO: 84 FL (ref 78–100)
MONOCYTES # BLD AUTO: 0.6 10E3/UL (ref 0–1.3)
MONOCYTES NFR BLD AUTO: 6 %
NEUTROPHILS # BLD AUTO: 7.8 10E3/UL (ref 1.6–8.3)
NEUTROPHILS NFR BLD AUTO: 73 %
NRBC # BLD AUTO: 0 10E3/UL
NRBC BLD AUTO-RTO: 0 /100
PHOSPHATE SERPL-MCNC: 3.7 MG/DL (ref 2.5–4.5)
PLATELET # BLD AUTO: 196 10E3/UL (ref 150–450)
POTASSIUM SERPL-SCNC: 3.8 MMOL/L (ref 3.4–5.3)
PROT SERPL-MCNC: 6.9 G/DL (ref 6.4–8.3)
RBC # BLD AUTO: 2.99 10E6/UL (ref 3.8–5.2)
SODIUM SERPL-SCNC: 137 MMOL/L (ref 135–145)
SPECIMEN EXPIRATION DATE: NORMAL
WBC # BLD AUTO: 10.5 10E3/UL (ref 4–11)

## 2023-11-06 PROCEDURE — 86850 RBC ANTIBODY SCREEN: CPT | Performed by: STUDENT IN AN ORGANIZED HEALTH CARE EDUCATION/TRAINING PROGRAM

## 2023-11-06 PROCEDURE — 86901 BLOOD TYPING SEROLOGIC RH(D): CPT | Performed by: STUDENT IN AN ORGANIZED HEALTH CARE EDUCATION/TRAINING PROGRAM

## 2023-11-06 PROCEDURE — 36415 COLL VENOUS BLD VENIPUNCTURE: CPT

## 2023-11-06 PROCEDURE — 86923 COMPATIBILITY TEST ELECTRIC: CPT | Performed by: STUDENT IN AN ORGANIZED HEALTH CARE EDUCATION/TRAINING PROGRAM

## 2023-11-06 PROCEDURE — 83735 ASSAY OF MAGNESIUM: CPT | Performed by: STUDENT IN AN ORGANIZED HEALTH CARE EDUCATION/TRAINING PROGRAM

## 2023-11-06 PROCEDURE — 85025 COMPLETE CBC W/AUTO DIFF WBC: CPT | Performed by: STUDENT IN AN ORGANIZED HEALTH CARE EDUCATION/TRAINING PROGRAM

## 2023-11-06 PROCEDURE — 80053 COMPREHEN METABOLIC PANEL: CPT | Performed by: STUDENT IN AN ORGANIZED HEALTH CARE EDUCATION/TRAINING PROGRAM

## 2023-11-06 PROCEDURE — 84100 ASSAY OF PHOSPHORUS: CPT | Performed by: STUDENT IN AN ORGANIZED HEALTH CARE EDUCATION/TRAINING PROGRAM

## 2023-11-08 ENCOUNTER — APPOINTMENT (OUTPATIENT)
Dept: LAB | Facility: CLINIC | Age: 68
End: 2023-11-08
Attending: STUDENT IN AN ORGANIZED HEALTH CARE EDUCATION/TRAINING PROGRAM
Payer: COMMERCIAL

## 2023-11-08 ENCOUNTER — ONCOLOGY VISIT (OUTPATIENT)
Dept: ONCOLOGY | Facility: CLINIC | Age: 68
End: 2023-11-08
Attending: STUDENT IN AN ORGANIZED HEALTH CARE EDUCATION/TRAINING PROGRAM
Payer: COMMERCIAL

## 2023-11-08 VITALS
RESPIRATION RATE: 16 BRPM | OXYGEN SATURATION: 99 % | TEMPERATURE: 98.6 F | HEART RATE: 86 BPM | WEIGHT: 173 LBS | SYSTOLIC BLOOD PRESSURE: 150 MMHG | BODY MASS INDEX: 32.69 KG/M2 | DIASTOLIC BLOOD PRESSURE: 81 MMHG

## 2023-11-08 DIAGNOSIS — Z76.89 PREVENTION OF CHEMOTHERAPY-INDUCED NEUTROPENIA: Primary | ICD-10-CM

## 2023-11-08 DIAGNOSIS — C49.21 SARCOMA OF RIGHT LOWER EXTREMITY (H): ICD-10-CM

## 2023-11-08 PROCEDURE — G0463 HOSPITAL OUTPT CLINIC VISIT: HCPCS | Performed by: STUDENT IN AN ORGANIZED HEALTH CARE EDUCATION/TRAINING PROGRAM

## 2023-11-08 PROCEDURE — 99214 OFFICE O/P EST MOD 30 MIN: CPT | Performed by: STUDENT IN AN ORGANIZED HEALTH CARE EDUCATION/TRAINING PROGRAM

## 2023-11-08 RX ORDER — PREGABALIN 50 MG/1
50 CAPSULE ORAL 3 TIMES DAILY
Qty: 90 CAPSULE | Refills: 0 | Status: SHIPPED | OUTPATIENT
Start: 2023-11-08 | End: 2023-12-29

## 2023-11-08 RX ORDER — DEXTROSE MONOHYDRATE 50 MG/ML
10-20 INJECTION, SOLUTION INTRAVENOUS
Status: CANCELLED | OUTPATIENT
Start: 2023-11-21

## 2023-11-08 RX ORDER — PROCHLORPERAZINE MALEATE 5 MG
5 TABLET ORAL EVERY 6 HOURS PRN
Status: CANCELLED
Start: 2023-11-14

## 2023-11-08 RX ORDER — MEPERIDINE HYDROCHLORIDE 25 MG/ML
25 INJECTION INTRAMUSCULAR; INTRAVENOUS; SUBCUTANEOUS EVERY 30 MIN PRN
Status: CANCELLED | OUTPATIENT
Start: 2023-11-14

## 2023-11-08 RX ORDER — METHYLPREDNISOLONE SODIUM SUCCINATE 125 MG/2ML
125 INJECTION, POWDER, LYOPHILIZED, FOR SOLUTION INTRAMUSCULAR; INTRAVENOUS
Status: CANCELLED
Start: 2023-11-14

## 2023-11-08 RX ORDER — LORAZEPAM 2 MG/ML
.5-1 INJECTION INTRAMUSCULAR EVERY 6 HOURS PRN
Status: CANCELLED | OUTPATIENT
Start: 2023-11-14

## 2023-11-08 RX ORDER — ALBUTEROL SULFATE 90 UG/1
1-2 AEROSOL, METERED RESPIRATORY (INHALATION)
Status: CANCELLED
Start: 2023-11-14

## 2023-11-08 RX ORDER — OXYCODONE HYDROCHLORIDE 5 MG/1
5-10 TABLET ORAL PRN
Qty: 60 TABLET | Refills: 0 | Status: SHIPPED | OUTPATIENT
Start: 2023-11-08 | End: 2023-12-06

## 2023-11-08 RX ORDER — EPINEPHRINE 1 MG/ML
0.3 INJECTION, SOLUTION INTRAMUSCULAR; SUBCUTANEOUS EVERY 5 MIN PRN
Status: CANCELLED | OUTPATIENT
Start: 2023-11-14

## 2023-11-08 RX ORDER — DIPHENHYDRAMINE HYDROCHLORIDE 50 MG/ML
50 INJECTION INTRAMUSCULAR; INTRAVENOUS
Status: CANCELLED
Start: 2023-11-14

## 2023-11-08 RX ORDER — LORAZEPAM 0.5 MG/1
.5-1 TABLET ORAL EVERY 6 HOURS PRN
Status: CANCELLED
Start: 2023-11-14

## 2023-11-08 RX ORDER — DEXAMETHASONE SODIUM PHOSPHATE 10 MG/ML
10 INJECTION, SOLUTION INTRAMUSCULAR; INTRAVENOUS ONCE
Status: CANCELLED
Start: 2023-11-14

## 2023-11-08 RX ORDER — ONDANSETRON 4 MG/1
8 TABLET, FILM COATED ORAL EVERY 8 HOURS
Status: CANCELLED
Start: 2023-11-14

## 2023-11-08 RX ORDER — ALBUTEROL SULFATE 0.83 MG/ML
2.5 SOLUTION RESPIRATORY (INHALATION)
Status: CANCELLED | OUTPATIENT
Start: 2023-11-14

## 2023-11-08 ASSESSMENT — PAIN SCALES - GENERAL: PAINLEVEL: MILD PAIN (3)

## 2023-11-08 NOTE — NURSING NOTE
"Oncology Rooming Note    November 8, 2023 12:47 PM   Mirta Cardenas is a 68 year old female who presents for:    Chief Complaint   Patient presents with    Blood Draw     Labs drawn via  by RN in lab. VS taken.     Oncology Clinic Visit     Sarcoma      Initial Vitals: BP (!) 150/81   Pulse 86   Temp 98.6  F (37  C) (Oral)   Resp 16   Wt 78.5 kg (173 lb)   SpO2 99%   BMI 32.69 kg/m   Estimated body mass index is 32.69 kg/m  as calculated from the following:    Height as of 10/26/23: 1.549 m (5' 1\").    Weight as of this encounter: 78.5 kg (173 lb). Body surface area is 1.84 meters squared.  Mild Pain (3) Comment: Data Unavailable   No LMP recorded. Patient has had a hysterectomy.  Allergies reviewed: Yes  Medications reviewed: Yes    Medications: Medication refills not needed today.  Pharmacy name entered into [facility]:    [facility] [address] [place] [address] [address] [facility] [address] [place] [address]    Clinical concerns: Oxycodone refills. Pt wants to know if she should be taking Asprin 81 mg. Dr. Nam  was notified.      Leia Villa              "

## 2023-11-08 NOTE — LETTER
11/8/2023         RE: Mirta Cardenas  [id number] [facility] [id number]        Dear Colleague,    Thank you for referring your patient, Mirta Cardenas, to the [facility]. Please see a copy of my visit note below.    [facility]    FOLLOW UP VISIT NOTE    PATIENT NAME: Mirta Cardenas MRN # [id number]  DATE OF VISIT: October 11, 2023 YOB: 1955    REFERRING PROVIDER: Benny Barroso MD  [id number]  [place] [address]    CANCER TYPE:  High grade pleomorphic sarcoma       CHIEF COMPLAIN   LE pain     HISTORY OF PRESENT ILLNESS   68-year-old female with PMH of CLL and recent Dx of High Grade Pleomorphic sarcoma, with intermittent right calf pain but then continuous calf pain beginning in March 2023.  She had a recent x-ray which shows a destructive lesion in the midportion R LE in between the fibula and the tibia. She is experiencing significant pain, she is restless during the visit due to pain. She is currently taking both Advil and Tylenol and oxycodone for pain, with minimal control, feels ibuprofen is what helps the most. She has been alternating all pain medications without optimizing doses of them.     C1D1 10/17/23  C2 11/14 - planned    Interval Hx  She reports that she tolerated chemotherapy very well, only mild fatigue, no significant nausea.        PAST ONCOLOGIC HISTORY   CLL - on monitoring     PAST MEDICAL HISTORY   Anal fisure - controlled with nitrobid  CLL - now being monitored - [place]  HTN  Cholesterol  Hx of severe COVID  Psoriasis    PAST SURGICAL HISTORY   3 c- sections  Hysterectomy  Ooforectomy     FAMILY HISTORY   Sister with MM     ALLERGIES      Allergies   Allergen Reactions    Allopurinol Itching    Amoxicillin Hives    Codeine Itching    Cymbalta [Duloxetine Hcl] Fatigue    Periactin Other (See Comments)     Sleepy.    Tenormin [Atenolol] Fatigue          SOCIAL HISTORY      Social History     Social History Narrative    Not on file     Lives with , Giancarlo, she retired last spring, denies alcohol, tobacco or other drugs.      CURRENT OUTPATIENT MEDICATIONS     Current Outpatient Medications   Medication Sig Dispense Refill    losartan (COZAAR) 100 MG tablet Take 1 tablet (100 mg) by mouth daily 90 tablet 3    METAMUCIL FIBER PO       Multiple Vitamins-Calcium (ONE-A-DAY WOMENS FORMULA PO) Take 1 tablet by mouth daily      omeprazole (PRILOSEC) 20 MG DR capsule Take 1 capsule by mouth every morning.      oxyCODONE (ROXICODONE) 5 MG tablet Take 5 mg by mouth as needed for severe pain      pravastatin (PRAVACHOL) 40 MG tablet Take 1 tablet (40 mg) by mouth daily 90 tablet 3    pregabalin (LYRICA) 50 MG capsule Take 1 capsule (50 mg) by mouth 2 times daily 60 capsule 0    pregabalin (LYRICA) 50 MG capsule Take 1 capsule (50 mg) by mouth daily as needed (breakthrough pain) 30 capsule 0    ASPIRIN 81 MG OR TABS Take 81 mg by mouth daily       calcipotriene (DOVONOX) 0.005 % external cream Apply twice daily to areas of psoriasis on Saturday and Sunday. 60 g 6    clobetasol (TEMOVATE) 0.05 % external cream Apply twice daily to psoriasis on Monday through Friday. 60 g 5    diclofenac (VOLTAREN) 1 % topical gel Apply 4 g topically 4 times daily To right lower leg (Patient not taking: Reported on 10/30/2023) 350 g 1    diphenhydrAMINE-acetaminophen (TYLENOL PM)  MG tablet Take 1 tablet by mouth nightly as needed for sleep      ibuprofen (ADVIL/MOTRIN) 200 MG capsule Take 400 mg by mouth every 4 hours as needed for fever      levofloxacin (LEVAQUIN) 500 MG tablet Take 1 tablet (500 mg) by mouth daily (Patient not taking: Reported on 11/8/2023) 10 tablet 0    polyethylene glycol (MIRALAX) 17 GM/Dose powder Take 17 g by mouth daily as needed for constipation 510 g 0    prochlorperazine (COMPAZINE) 5 MG tablet Take 1 tablet (5 mg) by mouth every 6 hours as needed (Breakthrough Nausea /  Vomiting) (Patient not taking: Reported on 2023) 30 tablet 0    senna-docusate (SENOKOT-S/PERICOLACE) 8.6-50 MG tablet Take 1 tablet by mouth 2 times daily as needed for constipation 30 tablet 0          REVIEW OF SYSTEMS   As above in the HPI, o/w complete 12-point ROS was negative.     PHYSICAL EXAM   BP (!) 150/81   Pulse 86   Temp 98.6  F (37  C) (Oral)   Resp 16   Wt 78.5 kg (173 lb)   SpO2 99%   BMI 32.69 kg/m      EGO (due to pain related to the mass)  GEN: NAD  HEENT: PERRL, EOMI, no icterus, injection or pallor. Oropharynx is clear.  NECK: no cervical or supraclavicular lymphadenopathy  LUNGS: clear bilaterally  CV: regular, no murmurs, rubs, or gallops  ABDOMEN: soft, non-tender, non-distended, normal bowel sounds, no hepatosplenomegaly by percussion or palpation  EXT: warm, well perfused, no edema. R calf is more prominent and a deep mass appreciated on physical exam in between the tibia and fibular bone.   NEURO: alert  SKIN: no rashes     LABORATORY AND IMAGING STUDIES     Lab Results   Component Value Date    WBC 8.5 11/10/2023    WBC 15.6 05/10/2021     Lab Results   Component Value Date    RBC 3.22 11/10/2023    RBC 4.89 05/10/2021     Lab Results   Component Value Date    HGB 8.7 11/10/2023    HGB 13.7 05/10/2021     Lab Results   Component Value Date    HCT 26.4 11/10/2023    HCT 41.0 05/10/2021     Lab Results   Component Value Date    MCV 82 11/10/2023    MCV 84 05/10/2021     Lab Results   Component Value Date    MCH 27.0 11/10/2023    MCH 28.0 05/10/2021     Lab Results   Component Value Date    MCHC 33.0 11/10/2023    MCHC 33.4 05/10/2021     Lab Results   Component Value Date    RDW 16.8 11/10/2023    RDW 13.1 05/10/2021     Lab Results   Component Value Date     11/10/2023     05/10/2021     Last Comprehensive Metabolic Panel:  Sodium   Date Value Ref Range Status   11/10/2023 139 135 - 145 mmol/L Final     Comment:     Reference intervals for this test were  updated on 09/26/2023 to more accurately reflect our healthy population. There may be differences in the flagging of prior results with similar values performed with this method. Interpretation of those prior results can be made in the context of the updated reference intervals.    05/10/2021 141 133 - 144 mmol/L Final     Potassium   Date Value Ref Range Status   11/10/2023 4.0 3.4 - 5.3 mmol/L Final   05/24/2022 4.7 3.4 - 5.3 mmol/L Final   05/10/2021 3.9 3.4 - 5.3 mmol/L Final     Chloride   Date Value Ref Range Status   11/10/2023 102 98 - 107 mmol/L Final   05/24/2022 108 94 - 109 mmol/L Final   05/10/2021 108 94 - 109 mmol/L Final     Carbon Dioxide   Date Value Ref Range Status   05/10/2021 27 20 - 32 mmol/L Final     Carbon Dioxide (CO2)   Date Value Ref Range Status   11/10/2023 26 22 - 29 mmol/L Final   05/24/2022 29 20 - 32 mmol/L Final     Anion Gap   Date Value Ref Range Status   11/10/2023 11 7 - 15 mmol/L Final   05/24/2022 2 (L) 3 - 14 mmol/L Final   05/10/2021 6 3 - 14 mmol/L Final     Glucose   Date Value Ref Range Status   11/10/2023 81 70 - 99 mg/dL Final   05/24/2022 108 (H) 70 - 99 mg/dL Final   05/10/2021 112 (H) 70 - 99 mg/dL Final     Comment:     Fasting specimen     Urea Nitrogen   Date Value Ref Range Status   11/10/2023 14.8 8.0 - 23.0 mg/dL Final   05/24/2022 14 7 - 30 mg/dL Final   05/10/2021 19 7 - 30 mg/dL Final     Creatinine   Date Value Ref Range Status   11/10/2023 0.79 0.51 - 0.95 mg/dL Final   05/10/2021 0.89 0.52 - 1.04 mg/dL Final     GFR Estimate   Date Value Ref Range Status   11/10/2023 82 >60 mL/min/1.73m2 Final   05/10/2021 69 >60 mL/min/[1.73_m2] Final     Comment:     Non  GFR Calc  Starting 12/18/2018, serum creatinine based estimated GFR (eGFR) will be   calculated using the Chronic Kidney Disease Epidemiology Collaboration   (CKD-EPI) equation.       GFR, ESTIMATED POCT   Date Value Ref Range Status   10/10/2023 >60 >60 mL/min/1.73m2 Final      Calcium   Date Value Ref Range Status   11/10/2023 9.3 8.8 - 10.2 mg/dL Final   05/10/2021 8.6 8.5 - 10.1 mg/dL Final     Bilirubin Total   Date Value Ref Range Status   11/10/2023 0.4 <=1.2 mg/dL Final   10/12/2020 0.3 0.2 - 1.3 mg/dL Final     Alkaline Phosphatase   Date Value Ref Range Status   11/10/2023 154 (H) 35 - 104 U/L Final   10/12/2020 124 40 - 150 U/L Final     ALT   Date Value Ref Range Status   11/10/2023 16 0 - 50 U/L Final     Comment:     Reference intervals for this test were updated on 6/12/2023 to more accurately reflect our healthy population. There may be differences in the flagging of prior results with similar values performed with this method. Interpretation of those prior results can be made in the context of the updated reference intervals.     10/12/2020 40 0 - 50 U/L Final     AST   Date Value Ref Range Status   11/10/2023 17 0 - 45 U/L Final     Comment:     Reference intervals for this test were updated on 6/12/2023 to more accurately reflect our healthy population. There may be differences in the flagging of prior results with similar values performed with this method. Interpretation of those prior results can be made in the context of the updated reference intervals.   10/12/2020 35 0 - 45 U/L Final       Results for orders placed or performed during the hospital encounter of 10/10/23   CT Chest/Abdomen/Pelvis w Contrast    Narrative    EXAM: PET ONCOLOGY WHOLE BODY, CT CHEST/ABDOMEN/PELVIS W CONTRAST  LOCATION: [facility]  DATE: 10/10/2023    INDICATION: Initial treatment planning and staging for malignant neoplasm of connective and soft tissue of right lower limb, including hip. 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  CONTRAST: 100 mL [id number].  TECHNIQUE: Serum glucose level 103 mg/dL. One hour post intravenous administration of 11.7 mCi F-18 FDG, PET imaging was performed from the skull vertex to feet, utilizing  attenuation correction with concurrent axial CT and PET/CT image fusion. Separate   diagnostic CT of the chest, abdomen, and pelvis was performed. Dose reduction techniques were used.    PET/CT FINDINGS: Hypermetabolic soft tissue mass within the right calf measuring 6.0 x 3.8 x 7.5 cm with associated cortical destruction of the adjacent fibular shaft (SUV max of 26.4). No evidence of FDG avid mina or distant metastatic disease.    CT FINDINGS: Heart size within normal limits without significant pericardial effusion. Moderate coronary artery calcifications. Mild areas of scattered linear atelectasis without suspicious pulmonary nodule. No pleural effusion. The liver is enlarged   measuring up to 23 cm in craniocaudal dimension. The spleen is mildly enlarged measuring up to 14 cm in length. Extensive multifocal scarring of the right kidney with associated parenchymal atrophy. Nonobstructing right nephrolithiasis with largest stone   measuring up to 6 mm at the right lower pole. Small left lower pole renal cysts. Colonic diverticula. Large fat-containing lower ventral abdominal wall hernia. There are mildly enlarged right iliac chain lymph nodes which do not demonstrate FDG uptake   above that of blood pool, favored reactive.      Impression    IMPRESSION:    1.  Hypermetabolic right calf mass with osseous destruction of the adjacent fibular shaft consistent with biopsy-proven sarcoma.  2.  No evidence of FDG avid metastatic disease. There are mildly enlarged right iliac chain lymph nodes that do not demonstrate hypermetabolic uptake, favored reactive.  3.  Additional CT findings as above including hepatosplenomegaly, extensive parenchymal scarring of the right kidney, and large fat-containing lower ventral abdominal wall hernia.        PATHOLOGY   10/2/23  Final Diagnosis   A.  Soft tissue, right calf mass, excision:  - High-grade pleomorphic sarcoma  - See comment    Electronically signed by Jonny Zhou MD on  "10/4/2023 at  9:09 PM   Comment  [facility]   Immunohistochemical stains show the tumor is negative for CK AE1/AE3, S100, desmin and CD34 while SATB2 demonstrates patchy reactivity.  While the primary diagnostic consideration is an undifferentiated pleomorphic sarcoma, the patchy SATB2 reactivity raises the possibility of a poorly differentiated osteosarcoma.   Clinical Information  [facility]   The patient is a 68 year old female with a history of CLL and several months of sensitivity of the lateral right calf, and recent imaging showing: \"Lytic lesion in the right fibular proximal diaphysis, with complete resorption of the medial cortex and some periosteal reaction adjacent to the lesion\".            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 No evidence of metastatic disease.     I discussed with the patient that sarcomas are rare tumors only representing 1% of all cancers and that they are very heterogeneous with over 100 different subtypes. When they present with localized disease they main approach to therapy is surgical resection. However, there are factors that influence the chances of recurrence and development of distant metastasis. In general tumors that are big in size (over 5 cm) and high grade (rapid cell division observed under the microscope) have higher probability of recurrence.     In her case, the probability of recurrence with surgery alone is about 40% in the next 10 years. We discussed, that recurrence in this scenario is usually with distant  metastasis which then becomes uncurable disease. We discussed, that for this reason, I recommend treatment with neoadjuvant chemotherapy with the goal of preventing metastasis in the future. We discussed the potential side effects of chemotherapy and need for emergent treatment in order to improve her symptoms. The regimen " will be doxil 40mg/m2 and ifosfamide 8g/m2 over 5 days. Slightly lower dose due to age and other medical conditions.   She tolerated chemotherapy well, her pain is also better controlled now. We will plan to continue with C2 and repeat images after this cycle to assess response.     Plan  -Admission for 11/14   -She will need neulasta after admission on 11/21   -Labs after chemo and infusion appointments on 11/22, 11/24, 11/7, 11/29, 12/1, 12/4, 12/6, 12/8   -PET 12/4/23   -Follow up 12/5   -Plan for admission for C3 on 12/12   -She needs 10 days of prophylactic levaquin 500 mg daily starting at completion of chemotherapy.    60 minutes spent on the date of the encounter doing chart review, review of outside records, review of test results, interpretation of tests, patient visit, documentation, and discussion with other provider(s)     Roney Nam MD   of Medicine  Hematology, Oncology and Transplantation

## 2023-11-08 NOTE — PROGRESS NOTES
[facility] CANCER CLINIC    FOLLOW UP VISIT NOTE    PATIENT NAME: Mirta Cardenas MRN # [id number]  DATE OF VISIT: October 11, 2023 YOB: 1955    REFERRING PROVIDER: Benny Barroso MD  [address]  [place] [address]    CANCER TYPE:  High grade pleomorphic sarcoma       CHIEF COMPLAIN   LE pain     HISTORY OF PRESENT ILLNESS   68-year-old female with PMH of CLL and recent Dx of High Grade Pleomorphic sarcoma, with intermittent right calf pain but then continuous calf pain beginning in March 2023.  She had a recent x-ray which shows a destructive lesion in the midportion R LE in between the fibula and the tibia. She is experiencing significant pain, she is restless during the visit due to pain. She is currently taking both Advil and Tylenol and oxycodone for pain, with minimal control, feels ibuprofen is what helps the most. She has been alternating all pain medications without optimizing doses of them.     C1D1 10/17/23  C2 11/14 - planned    Interval Hx  She reports that she tolerated chemotherapy very well, only mild fatigue, no significant nausea.        PAST ONCOLOGIC HISTORY   CLL - on monitoring     PAST MEDICAL HISTORY   Anal fisure - controlled with nitrobid  CLL - now being monitored - [place]  HTN  Cholesterol  Hx of severe COVID  Psoriasis    PAST SURGICAL HISTORY   3 c- sections  Hysterectomy  Ooforectomy     FAMILY HISTORY   Sister with MM     ALLERGIES      Allergies   Allergen Reactions    Allopurinol Itching    Amoxicillin Hives    Codeine Itching    Cymbalta [Duloxetine Hcl] Fatigue    Periactin Other (See Comments)     Sleepy.    Tenormin [Atenolol] Fatigue          SOCIAL HISTORY     Social History     Social History Narrative    Not on file     Lives with , Giancarlo, she retired last spring, denies alcohol, tobacco or other drugs.      CURRENT OUTPATIENT MEDICATIONS     Current Outpatient Medications   Medication Sig Dispense Refill     losartan (COZAAR) 100 MG tablet Take 1 tablet (100 mg) by mouth daily 90 tablet 3    METAMUCIL FIBER PO       Multiple Vitamins-Calcium (ONE-A-DAY WOMENS FORMULA PO) Take 1 tablet by mouth daily      omeprazole (PRILOSEC) 20 MG DR capsule Take 1 capsule by mouth every morning.      oxyCODONE (ROXICODONE) 5 MG tablet Take 5 mg by mouth as needed for severe pain      pravastatin (PRAVACHOL) 40 MG tablet Take 1 tablet (40 mg) by mouth daily 90 tablet 3    pregabalin (LYRICA) 50 MG capsule Take 1 capsule (50 mg) by mouth 2 times daily 60 capsule 0    pregabalin (LYRICA) 50 MG capsule Take 1 capsule (50 mg) by mouth daily as needed (breakthrough pain) 30 capsule 0    ASPIRIN 81 MG OR TABS Take 81 mg by mouth daily       calcipotriene (DOVONOX) 0.005 % external cream Apply twice daily to areas of psoriasis on Saturday and Sunday. 60 g 6    clobetasol (TEMOVATE) 0.05 % external cream Apply twice daily to psoriasis on Monday through Friday. 60 g 5    diclofenac (VOLTAREN) 1 % topical gel Apply 4 g topically 4 times daily To right lower leg (Patient not taking: Reported on 10/30/2023) 350 g 1    diphenhydrAMINE-acetaminophen (TYLENOL PM)  MG tablet Take 1 tablet by mouth nightly as needed for sleep      ibuprofen (ADVIL/MOTRIN) 200 MG capsule Take 400 mg by mouth every 4 hours as needed for fever      levofloxacin (LEVAQUIN) 500 MG tablet Take 1 tablet (500 mg) by mouth daily (Patient not taking: Reported on 11/8/2023) 10 tablet 0    polyethylene glycol (MIRALAX) 17 GM/Dose powder Take 17 g by mouth daily as needed for constipation 510 g 0    prochlorperazine (COMPAZINE) 5 MG tablet Take 1 tablet (5 mg) by mouth every 6 hours as needed (Breakthrough Nausea / Vomiting) (Patient not taking: Reported on 11/8/2023) 30 tablet 0    senna-docusate (SENOKOT-S/PERICOLACE) 8.6-50 MG tablet Take 1 tablet by mouth 2 times daily as needed for constipation 30 tablet 0          REVIEW OF SYSTEMS   As above in the HPI, o/w complete  12-point ROS was negative.     PHYSICAL EXAM   BP (!) 150/81   Pulse 86   Temp 98.6  F (37  C) (Oral)   Resp 16   Wt 78.5 kg (173 lb)   SpO2 99%   BMI 32.69 kg/m      EGO (due to pain related to the mass)  GEN: NAD  HEENT: PERRL, EOMI, no icterus, injection or pallor. Oropharynx is clear.  NECK: no cervical or supraclavicular lymphadenopathy  LUNGS: clear bilaterally  CV: regular, no murmurs, rubs, or gallops  ABDOMEN: soft, non-tender, non-distended, normal bowel sounds, no hepatosplenomegaly by percussion or palpation  EXT: warm, well perfused, no edema. R calf is more prominent and a deep mass appreciated on physical exam in between the tibia and fibular bone.   NEURO: alert  SKIN: no rashes     LABORATORY AND IMAGING STUDIES     Lab Results   Component Value Date    WBC 8.5 11/10/2023    WBC 15.6 05/10/2021     Lab Results   Component Value Date    RBC 3.22 11/10/2023    RBC 4.89 05/10/2021     Lab Results   Component Value Date    HGB 8.7 11/10/2023    HGB 13.7 05/10/2021     Lab Results   Component Value Date    HCT 26.4 11/10/2023    HCT 41.0 05/10/2021     Lab Results   Component Value Date    MCV 82 11/10/2023    MCV 84 05/10/2021     Lab Results   Component Value Date    MCH 27.0 11/10/2023    MCH 28.0 05/10/2021     Lab Results   Component Value Date    MCHC 33.0 11/10/2023    MCHC 33.4 05/10/2021     Lab Results   Component Value Date    RDW 16.8 11/10/2023    RDW 13.1 05/10/2021     Lab Results   Component Value Date     11/10/2023     05/10/2021     Last Comprehensive Metabolic Panel:  Sodium   Date Value Ref Range Status   11/10/2023 139 135 - 145 mmol/L Final     Comment:     Reference intervals for this test were updated on 2023 to more accurately reflect our healthy population. There may be differences in the flagging of prior results with similar values performed with this method. Interpretation of those prior results can be made in the context of the updated  reference intervals.    05/10/2021 141 133 - 144 mmol/L Final     Potassium   Date Value Ref Range Status   11/10/2023 4.0 3.4 - 5.3 mmol/L Final   05/24/2022 4.7 3.4 - 5.3 mmol/L Final   05/10/2021 3.9 3.4 - 5.3 mmol/L Final     Chloride   Date Value Ref Range Status   11/10/2023 102 98 - 107 mmol/L Final   05/24/2022 108 94 - 109 mmol/L Final   05/10/2021 108 94 - 109 mmol/L Final     Carbon Dioxide   Date Value Ref Range Status   05/10/2021 27 20 - 32 mmol/L Final     Carbon Dioxide (CO2)   Date Value Ref Range Status   11/10/2023 26 22 - 29 mmol/L Final   05/24/2022 29 20 - 32 mmol/L Final     Anion Gap   Date Value Ref Range Status   11/10/2023 11 7 - 15 mmol/L Final   05/24/2022 2 (L) 3 - 14 mmol/L Final   05/10/2021 6 3 - 14 mmol/L Final     Glucose   Date Value Ref Range Status   11/10/2023 81 70 - 99 mg/dL Final   05/24/2022 108 (H) 70 - 99 mg/dL Final   05/10/2021 112 (H) 70 - 99 mg/dL Final     Comment:     Fasting specimen     Urea Nitrogen   Date Value Ref Range Status   11/10/2023 14.8 8.0 - 23.0 mg/dL Final   05/24/2022 14 7 - 30 mg/dL Final   05/10/2021 19 7 - 30 mg/dL Final     Creatinine   Date Value Ref Range Status   11/10/2023 0.79 0.51 - 0.95 mg/dL Final   05/10/2021 0.89 0.52 - 1.04 mg/dL Final     GFR Estimate   Date Value Ref Range Status   11/10/2023 82 >60 mL/min/1.73m2 Final   05/10/2021 69 >60 mL/min/[1.73_m2] Final     Comment:     Non  GFR Calc  Starting 12/18/2018, serum creatinine based estimated GFR (eGFR) will be   calculated using the Chronic Kidney Disease Epidemiology Collaboration   (CKD-EPI) equation.       GFR, ESTIMATED POCT   Date Value Ref Range Status   10/10/2023 >60 >60 mL/min/1.73m2 Final     Calcium   Date Value Ref Range Status   11/10/2023 9.3 8.8 - 10.2 mg/dL Final   05/10/2021 8.6 8.5 - 10.1 mg/dL Final     Bilirubin Total   Date Value Ref Range Status   11/10/2023 0.4 <=1.2 mg/dL Final   10/12/2020 0.3 0.2 - 1.3 mg/dL Final     Alkaline  Phosphatase   Date Value Ref Range Status   11/10/2023 154 (H) 35 - 104 U/L Final   10/12/2020 124 40 - 150 U/L Final     ALT   Date Value Ref Range Status   11/10/2023 16 0 - 50 U/L Final     Comment:     Reference intervals for this test were updated on 6/12/2023 to more accurately reflect our healthy population. There may be differences in the flagging of prior results with similar values performed with this method. Interpretation of those prior results can be made in the context of the updated reference intervals.     10/12/2020 40 0 - 50 U/L Final     AST   Date Value Ref Range Status   11/10/2023 17 0 - 45 U/L Final     Comment:     Reference intervals for this test were updated on 6/12/2023 to more accurately reflect our healthy population. There may be differences in the flagging of prior results with similar values performed with this method. Interpretation of those prior results can be made in the context of the updated reference intervals.   10/12/2020 35 0 - 45 U/L Final       Results for orders placed or performed during the hospital encounter of 10/10/23   CT Chest/Abdomen/Pelvis w Contrast    Narrative    EXAM: PET ONCOLOGY WHOLE BODY, CT CHEST/ABDOMEN/PELVIS W CONTRAST  LOCATION: [facility]  DATE: 10/10/2023    INDICATION: Initial treatment planning and staging for malignant neoplasm of connective and soft tissue of right lower limb, including hip. 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  CONTRAST: 100 mL [id number].  TECHNIQUE: Serum glucose level 103 mg/dL. One hour post intravenous administration of 11.7 mCi F-18 FDG, PET imaging was performed from the skull vertex to feet, utilizing attenuation correction with concurrent axial CT and PET/CT image fusion. Separate   diagnostic CT of the chest, abdomen, and pelvis was performed. Dose reduction techniques were used.    PET/CT FINDINGS: Hypermetabolic soft tissue mass within the right calf  measuring 6.0 x 3.8 x 7.5 cm with associated cortical destruction of the adjacent fibular shaft (SUV max of 26.4). No evidence of FDG avid mina or distant metastatic disease.    CT FINDINGS: Heart size within normal limits without significant pericardial effusion. Moderate coronary artery calcifications. Mild areas of scattered linear atelectasis without suspicious pulmonary nodule. No pleural effusion. The liver is enlarged   measuring up to 23 cm in craniocaudal dimension. The spleen is mildly enlarged measuring up to 14 cm in length. Extensive multifocal scarring of the right kidney with associated parenchymal atrophy. Nonobstructing right nephrolithiasis with largest stone   measuring up to 6 mm at the right lower pole. Small left lower pole renal cysts. Colonic diverticula. Large fat-containing lower ventral abdominal wall hernia. There are mildly enlarged right iliac chain lymph nodes which do not demonstrate FDG uptake   above that of blood pool, favored reactive.      Impression    IMPRESSION:    1.  Hypermetabolic right calf mass with osseous destruction of the adjacent fibular shaft consistent with biopsy-proven sarcoma.  2.  No evidence of FDG avid metastatic disease. There are mildly enlarged right iliac chain lymph nodes that do not demonstrate hypermetabolic uptake, favored reactive.  3.  Additional CT findings as above including hepatosplenomegaly, extensive parenchymal scarring of the right kidney, and large fat-containing lower ventral abdominal wall hernia.        PATHOLOGY   10/2/23  Final Diagnosis   A.  Soft tissue, right calf mass, excision:  - High-grade pleomorphic sarcoma  - See comment    Electronically signed by Jonny Zhou MD on 10/4/2023 at  9:09 PM   Comment  [facility]   Immunohistochemical stains show the tumor is negative for CK AE1/AE3, S100, desmin and CD34 while SATB2 demonstrates patchy reactivity.  While the primary diagnostic consideration is an undifferentiated pleomorphic  "sarcoma, the patchy SATB2 reactivity raises the possibility of a poorly differentiated osteosarcoma.   Clinical Information  [facility]   The patient is a 68 year old female with a history of CLL and several months of sensitivity of the lateral right calf, and recent imaging showing: \"Lytic lesion in the right fibular proximal diaphysis, with complete resorption of the medial cortex and some periosteal reaction adjacent to the lesion\".            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 No evidence of metastatic disease.     I discussed with the patient that sarcomas are rare tumors only representing 1% of all cancers and that they are very heterogeneous with over 100 different subtypes. When they present with localized disease they main approach to therapy is surgical resection. However, there are factors that influence the chances of recurrence and development of distant metastasis. In general tumors that are big in size (over 5 cm) and high grade (rapid cell division observed under the microscope) have higher probability of recurrence.     In her case, the probability of recurrence with surgery alone is about 40% in the next 10 years. We discussed, that recurrence in this scenario is usually with distant  metastasis which then becomes uncurable disease. We discussed, that for this reason, I recommend treatment with neoadjuvant chemotherapy with the goal of preventing metastasis in the future. We discussed the potential side effects of chemotherapy and need for emergent treatment in order to improve her symptoms. The regimen will be doxil 40mg/m2 and ifosfamide 8g/m2 over 5 days. Slightly lower dose due to age and other medical conditions.   She tolerated chemotherapy well, her pain is also better controlled now. We will plan to continue with C2 and repeat images after this cycle " to assess response.     Plan  -Admission for 11/14   -She will need neulasta after admission on 11/21   -Labs after chemo and infusion appointments on 11/22, 11/24, 11/7, 11/29, 12/1, 12/4, 12/6, 12/8   -PET 12/4/23   -Follow up 12/5   -Plan for admission for C3 on 12/12   -She needs 10 days of prophylactic levaquin 500 mg daily starting at completion of chemotherapy.    60 minutes spent on the date of the encounter doing chart review, review of outside records, review of test results, interpretation of tests, patient visit, documentation, and discussion with other provider(s)     Roney Nam MD   of Medicine  Hematology, Oncology and Transplantation

## 2023-11-10 ENCOUNTER — LAB (OUTPATIENT)
Dept: LAB | Facility: CLINIC | Age: 68
End: 2023-11-10
Payer: COMMERCIAL

## 2023-11-10 ENCOUNTER — INFUSION THERAPY VISIT (OUTPATIENT)
Dept: INFUSION THERAPY | Facility: CLINIC | Age: 68
End: 2023-11-10
Attending: ORTHOPAEDIC SURGERY
Payer: COMMERCIAL

## 2023-11-10 DIAGNOSIS — C49.21 SARCOMA OF RIGHT LOWER EXTREMITY (H): ICD-10-CM

## 2023-11-10 DIAGNOSIS — T45.1X5A ANEMIA DUE TO ANTINEOPLASTIC CHEMOTHERAPY: Primary | ICD-10-CM

## 2023-11-10 DIAGNOSIS — D64.81 ANEMIA DUE TO ANTINEOPLASTIC CHEMOTHERAPY: Primary | ICD-10-CM

## 2023-11-10 LAB
ABO/RH(D): NORMAL
ALBUMIN SERPL BCG-MCNC: 4.3 G/DL (ref 3.5–5.2)
ALP SERPL-CCNC: 154 U/L (ref 35–104)
ALT SERPL W P-5'-P-CCNC: 16 U/L (ref 0–50)
ANION GAP SERPL CALCULATED.3IONS-SCNC: 11 MMOL/L (ref 7–15)
ANTIBODY SCREEN: NEGATIVE
AST SERPL W P-5'-P-CCNC: 17 U/L (ref 0–45)
BASOPHILS # BLD AUTO: 0.1 10E3/UL (ref 0–0.2)
BASOPHILS NFR BLD AUTO: 1 %
BILIRUB SERPL-MCNC: 0.4 MG/DL
BUN SERPL-MCNC: 14.8 MG/DL (ref 8–23)
CALCIUM SERPL-MCNC: 9.3 MG/DL (ref 8.8–10.2)
CHLORIDE SERPL-SCNC: 102 MMOL/L (ref 98–107)
CREAT SERPL-MCNC: 0.79 MG/DL (ref 0.51–0.95)
DEPRECATED HCO3 PLAS-SCNC: 26 MMOL/L (ref 22–29)
EGFRCR SERPLBLD CKD-EPI 2021: 82 ML/MIN/1.73M2
EOSINOPHIL # BLD AUTO: 0 10E3/UL (ref 0–0.7)
EOSINOPHIL NFR BLD AUTO: 0 %
ERYTHROCYTE [DISTWIDTH] IN BLOOD BY AUTOMATED COUNT: 16.8 % (ref 10–15)
GLUCOSE SERPL-MCNC: 81 MG/DL (ref 70–99)
HCT VFR BLD AUTO: 26.4 % (ref 35–47)
HGB BLD-MCNC: 8.7 G/DL (ref 11.7–15.7)
IMM GRANULOCYTES # BLD: 0.1 10E3/UL
IMM GRANULOCYTES NFR BLD: 1 %
LYMPHOCYTES # BLD AUTO: 1.4 10E3/UL (ref 0.8–5.3)
LYMPHOCYTES NFR BLD AUTO: 17 %
MAGNESIUM SERPL-MCNC: 1.8 MG/DL (ref 1.7–2.3)
MCH RBC QN AUTO: 27 PG (ref 26.5–33)
MCHC RBC AUTO-ENTMCNC: 33 G/DL (ref 31.5–36.5)
MCV RBC AUTO: 82 FL (ref 78–100)
MONOCYTES # BLD AUTO: 0.8 10E3/UL (ref 0–1.3)
MONOCYTES NFR BLD AUTO: 9 %
NEUTROPHILS # BLD AUTO: 6.2 10E3/UL (ref 1.6–8.3)
NEUTROPHILS NFR BLD AUTO: 72 %
NRBC # BLD AUTO: 0 10E3/UL
NRBC BLD AUTO-RTO: 0 /100
PHOSPHATE SERPL-MCNC: 3.8 MG/DL (ref 2.5–4.5)
PLATELET # BLD AUTO: 222 10E3/UL (ref 150–450)
POTASSIUM SERPL-SCNC: 4 MMOL/L (ref 3.4–5.3)
PROT SERPL-MCNC: 7.3 G/DL (ref 6.4–8.3)
RBC # BLD AUTO: 3.22 10E6/UL (ref 3.8–5.2)
SODIUM SERPL-SCNC: 139 MMOL/L (ref 135–145)
SPECIMEN EXPIRATION DATE: NORMAL
WBC # BLD AUTO: 8.5 10E3/UL (ref 4–11)

## 2023-11-10 PROCEDURE — 85025 COMPLETE CBC W/AUTO DIFF WBC: CPT | Performed by: STUDENT IN AN ORGANIZED HEALTH CARE EDUCATION/TRAINING PROGRAM

## 2023-11-10 PROCEDURE — 86901 BLOOD TYPING SEROLOGIC RH(D): CPT | Performed by: STUDENT IN AN ORGANIZED HEALTH CARE EDUCATION/TRAINING PROGRAM

## 2023-11-10 PROCEDURE — 83735 ASSAY OF MAGNESIUM: CPT | Performed by: STUDENT IN AN ORGANIZED HEALTH CARE EDUCATION/TRAINING PROGRAM

## 2023-11-10 PROCEDURE — 36415 COLL VENOUS BLD VENIPUNCTURE: CPT | Performed by: STUDENT IN AN ORGANIZED HEALTH CARE EDUCATION/TRAINING PROGRAM

## 2023-11-10 PROCEDURE — 80053 COMPREHEN METABOLIC PANEL: CPT | Performed by: STUDENT IN AN ORGANIZED HEALTH CARE EDUCATION/TRAINING PROGRAM

## 2023-11-10 PROCEDURE — 84100 ASSAY OF PHOSPHORUS: CPT | Performed by: STUDENT IN AN ORGANIZED HEALTH CARE EDUCATION/TRAINING PROGRAM

## 2023-11-10 RX ORDER — HEPARIN SODIUM,PORCINE 10 UNIT/ML
5-20 VIAL (ML) INTRAVENOUS DAILY PRN
Status: CANCELLED | OUTPATIENT
Start: 2023-11-10

## 2023-11-10 RX ORDER — HEPARIN SODIUM (PORCINE) LOCK FLUSH IV SOLN 100 UNIT/ML 100 UNIT/ML
5 SOLUTION INTRAVENOUS
Status: CANCELLED | OUTPATIENT
Start: 2023-11-10

## 2023-11-10 NOTE — PROGRESS NOTES
Infusion Nursing Note:  Mirta Cardenas presents today for possible transfusion.    Patient seen by provider today: No   present during visit today: Not Applicable.    Note: Patient not seen in infusion.    Intravenous Access:  Labs drawn peripherally by .    Treatment Conditions:  Lab Results   Component Value Date    HGB 8.7 (L) 11/10/2023    WBC 8.5 11/10/2023    ANEU 7.6 11/03/2023    ANEUTAUTO 6.2 11/10/2023     11/10/2023   Results reviewed, labs did NOT meet treatment parameters: Plt 222 and Hgb 8.7    Post Infusion Assessment:  N/A.     Discharge Plan:   Discharge instructions reviewed with: Patient.  Patient and/or family verbalized understanding of discharge instructions and all questions answered.  AVS to patient via [email or web address].  Patient will return as directed by MD for next appointment.   Patient discharged in stable condition accompanied by: self and .  Departure Mode: Ambulatory.    Elvi Gallagher RN

## 2023-11-14 ENCOUNTER — HOSPITAL ENCOUNTER (INPATIENT)
Facility: CLINIC | Age: 68
LOS: 6 days | Discharge: HOME | DRG: 847 | End: 2023-11-20
Attending: INTERNAL MEDICINE | Admitting: INTERNAL MEDICINE
Payer: COMMERCIAL

## 2023-11-14 ENCOUNTER — LAB (OUTPATIENT)
Dept: LAB | Facility: CLINIC | Age: 68
DRG: 847 | End: 2023-11-14
Attending: STUDENT IN AN ORGANIZED HEALTH CARE EDUCATION/TRAINING PROGRAM
Payer: COMMERCIAL

## 2023-11-14 ENCOUNTER — HOSPITAL ENCOUNTER (OUTPATIENT)
Dept: VASCULAR ULTRASOUND | Facility: CLINIC | Age: 68
Discharge: HOME | DRG: 847 | End: 2023-11-14
Attending: STUDENT IN AN ORGANIZED HEALTH CARE EDUCATION/TRAINING PROGRAM
Payer: COMMERCIAL

## 2023-11-14 DIAGNOSIS — C49.21 SARCOMA OF RIGHT LOWER EXTREMITY (H): ICD-10-CM

## 2023-11-14 DIAGNOSIS — C49.21 SARCOMA OF RIGHT LOWER EXTREMITY (H): Primary | ICD-10-CM

## 2023-11-14 DIAGNOSIS — Z76.89 PREVENTION OF CHEMOTHERAPY-INDUCED NEUTROPENIA: ICD-10-CM

## 2023-11-14 PROBLEM — Z51.11 CHEMOTHERAPY MANAGEMENT, ENCOUNTER FOR: Status: ACTIVE | Noted: 2023-11-14

## 2023-11-14 LAB
ALBUMIN SERPL BCG-MCNC: 4.2 G/DL (ref 3.5–5.2)
ALP SERPL-CCNC: 154 U/L (ref 35–104)
ALT SERPL W P-5'-P-CCNC: 14 U/L (ref 0–50)
ANION GAP SERPL CALCULATED.3IONS-SCNC: 10 MMOL/L (ref 7–15)
AST SERPL W P-5'-P-CCNC: 20 U/L (ref 0–45)
BASOPHILS # BLD AUTO: 0.1 10E3/UL (ref 0–0.2)
BASOPHILS NFR BLD AUTO: 1 %
BILIRUB SERPL-MCNC: 0.4 MG/DL
BUN SERPL-MCNC: 9.5 MG/DL (ref 8–23)
CALCIUM SERPL-MCNC: 9.6 MG/DL (ref 8.8–10.2)
CHLORIDE SERPL-SCNC: 104 MMOL/L (ref 98–107)
CREAT SERPL-MCNC: 0.73 MG/DL (ref 0.51–0.95)
DEPRECATED HCO3 PLAS-SCNC: 25 MMOL/L (ref 22–29)
EGFRCR SERPLBLD CKD-EPI 2021: 90 ML/MIN/1.73M2
EOSINOPHIL # BLD AUTO: 0 10E3/UL (ref 0–0.7)
EOSINOPHIL NFR BLD AUTO: 1 %
ERYTHROCYTE [DISTWIDTH] IN BLOOD BY AUTOMATED COUNT: 17.7 % (ref 10–15)
GLUCOSE SERPL-MCNC: 111 MG/DL (ref 70–99)
HCT VFR BLD AUTO: 28.1 % (ref 35–47)
HGB BLD-MCNC: 8.8 G/DL (ref 11.7–15.7)
IMM GRANULOCYTES # BLD: 0.1 10E3/UL
IMM GRANULOCYTES NFR BLD: 1 %
LYMPHOCYTES # BLD AUTO: 1.2 10E3/UL (ref 0.8–5.3)
LYMPHOCYTES NFR BLD AUTO: 16 %
MCH RBC QN AUTO: 26.3 PG (ref 26.5–33)
MCHC RBC AUTO-ENTMCNC: 31.3 G/DL (ref 31.5–36.5)
MCV RBC AUTO: 84 FL (ref 78–100)
MONOCYTES # BLD AUTO: 0.5 10E3/UL (ref 0–1.3)
MONOCYTES NFR BLD AUTO: 7 %
NEUTROPHILS # BLD AUTO: 5.6 10E3/UL (ref 1.6–8.3)
NEUTROPHILS NFR BLD AUTO: 74 %
NRBC # BLD AUTO: 0 10E3/UL
NRBC BLD AUTO-RTO: 0 /100
PLATELET # BLD AUTO: 229 10E3/UL (ref 150–450)
POTASSIUM SERPL-SCNC: 4.8 MMOL/L (ref 3.4–5.3)
PROT SERPL-MCNC: 7.2 G/DL (ref 6.4–8.3)
RBC # BLD AUTO: 3.34 10E6/UL (ref 3.8–5.2)
SODIUM SERPL-SCNC: 139 MMOL/L (ref 135–145)
WBC # BLD AUTO: 7.5 10E3/UL (ref 4–11)

## 2023-11-14 PROCEDURE — 3E04305 INTRODUCTION OF OTHER ANTINEOPLASTIC INTO CENTRAL VEIN, PERCUTANEOUS APPROACH: ICD-10-PCS | Performed by: INTERNAL MEDICINE

## 2023-11-14 PROCEDURE — 36569 INSJ PICC 5 YR+ W/O IMAGING: CPT

## 2023-11-14 PROCEDURE — 85025 COMPLETE CBC W/AUTO DIFF WBC: CPT

## 2023-11-14 PROCEDURE — 36415 COLL VENOUS BLD VENIPUNCTURE: CPT

## 2023-11-14 PROCEDURE — 250N000013 HC RX MED GY IP 250 OP 250 PS 637

## 2023-11-14 PROCEDURE — 250N000009 HC RX 250: Performed by: STUDENT IN AN ORGANIZED HEALTH CARE EDUCATION/TRAINING PROGRAM

## 2023-11-14 PROCEDURE — 80053 COMPREHEN METABOLIC PANEL: CPT

## 2023-11-14 PROCEDURE — 250N000011 HC RX IP 250 OP 636: Performed by: STUDENT IN AN ORGANIZED HEALTH CARE EDUCATION/TRAINING PROGRAM

## 2023-11-14 PROCEDURE — 120N000002 HC R&B MED SURG/OB UMMC

## 2023-11-14 PROCEDURE — 250N000011 HC RX IP 250 OP 636: Mod: JZ

## 2023-11-14 PROCEDURE — 99223 1ST HOSP IP/OBS HIGH 75: CPT | Performed by: INTERNAL MEDICINE

## 2023-11-14 PROCEDURE — 99222 1ST HOSP IP/OBS MODERATE 55: CPT | Mod: GC | Performed by: INTERNAL MEDICINE

## 2023-11-14 PROCEDURE — 272N000451 HC KIT SHRLOCK 5FR POWER PICC DOUBLE LUMEN

## 2023-11-14 PROCEDURE — 258N000003 HC RX IP 258 OP 636: Performed by: STUDENT IN AN ORGANIZED HEALTH CARE EDUCATION/TRAINING PROGRAM

## 2023-11-14 PROCEDURE — 250N000013 HC RX MED GY IP 250 OP 250 PS 637: Performed by: STUDENT IN AN ORGANIZED HEALTH CARE EDUCATION/TRAINING PROGRAM

## 2023-11-14 RX ORDER — SENNOSIDES 8.6 MG
8.6 TABLET ORAL 2 TIMES DAILY PRN
Status: DISCONTINUED | OUTPATIENT
Start: 2023-11-14 | End: 2023-11-14

## 2023-11-14 RX ORDER — MEPERIDINE HYDROCHLORIDE 25 MG/ML
25 INJECTION INTRAMUSCULAR; INTRAVENOUS; SUBCUTANEOUS EVERY 30 MIN PRN
Status: DISCONTINUED | OUTPATIENT
Start: 2023-11-14 | End: 2023-11-20 | Stop reason: HOSPADM

## 2023-11-14 RX ORDER — OXYCODONE HYDROCHLORIDE 5 MG/1
5-10 TABLET ORAL EVERY 4 HOURS PRN
Status: DISCONTINUED | OUTPATIENT
Start: 2023-11-14 | End: 2023-11-20 | Stop reason: HOSPADM

## 2023-11-14 RX ORDER — DEXAMETHASONE SODIUM PHOSPHATE 10 MG/ML
10 INJECTION INTRAMUSCULAR; INTRAVENOUS ONCE
Qty: 1 ML | Refills: 0 | Status: COMPLETED | OUTPATIENT
Start: 2023-11-14 | End: 2023-11-14

## 2023-11-14 RX ORDER — DIPHENHYDRAMINE HYDROCHLORIDE 50 MG/ML
50 INJECTION INTRAMUSCULAR; INTRAVENOUS
Status: DISCONTINUED | OUTPATIENT
Start: 2023-11-14 | End: 2023-11-20 | Stop reason: HOSPADM

## 2023-11-14 RX ORDER — AMOXICILLIN 250 MG
1 CAPSULE ORAL 2 TIMES DAILY PRN
Status: DISCONTINUED | OUTPATIENT
Start: 2023-11-14 | End: 2023-11-20 | Stop reason: HOSPADM

## 2023-11-14 RX ORDER — HEPARIN SODIUM,PORCINE 10 UNIT/ML
5-20 VIAL (ML) INTRAVENOUS EVERY 24 HOURS
Status: DISCONTINUED | OUTPATIENT
Start: 2023-11-14 | End: 2023-11-15 | Stop reason: HOSPADM

## 2023-11-14 RX ORDER — AMOXICILLIN 250 MG
2 CAPSULE ORAL 2 TIMES DAILY
Status: DISCONTINUED | OUTPATIENT
Start: 2023-11-14 | End: 2023-11-14

## 2023-11-14 RX ORDER — EPINEPHRINE 1 MG/ML
0.3 INJECTION, SOLUTION, CONCENTRATE INTRAVENOUS EVERY 5 MIN PRN
Status: DISCONTINUED | OUTPATIENT
Start: 2023-11-14 | End: 2023-11-20 | Stop reason: HOSPADM

## 2023-11-14 RX ORDER — NALOXONE HYDROCHLORIDE 0.4 MG/ML
0.2 INJECTION, SOLUTION INTRAMUSCULAR; INTRAVENOUS; SUBCUTANEOUS
Status: DISCONTINUED | OUTPATIENT
Start: 2023-11-14 | End: 2023-11-20 | Stop reason: HOSPADM

## 2023-11-14 RX ORDER — DEXTROSE MONOHYDRATE 50 MG/ML
10-20 INJECTION, SOLUTION INTRAVENOUS
Status: DISCONTINUED | OUTPATIENT
Start: 2023-11-21 | End: 2023-11-20 | Stop reason: HOSPADM

## 2023-11-14 RX ORDER — AMOXICILLIN 250 MG
2 CAPSULE ORAL 2 TIMES DAILY PRN
Status: DISCONTINUED | OUTPATIENT
Start: 2023-11-14 | End: 2023-11-20 | Stop reason: HOSPADM

## 2023-11-14 RX ORDER — PRAVASTATIN SODIUM 40 MG
40 TABLET ORAL DAILY
Status: DISCONTINUED | OUTPATIENT
Start: 2023-11-14 | End: 2023-11-20 | Stop reason: HOSPADM

## 2023-11-14 RX ORDER — HEPARIN SODIUM,PORCINE 10 UNIT/ML
5-20 VIAL (ML) INTRAVENOUS
Status: DISCONTINUED | OUTPATIENT
Start: 2023-11-14 | End: 2023-11-15 | Stop reason: HOSPADM

## 2023-11-14 RX ORDER — ALBUTEROL SULFATE 0.83 MG/ML
2.5 SOLUTION RESPIRATORY (INHALATION)
Status: DISCONTINUED | OUTPATIENT
Start: 2023-11-14 | End: 2023-11-20 | Stop reason: HOSPADM

## 2023-11-14 RX ORDER — ALBUTEROL SULFATE 90 UG/1
1-2 AEROSOL, METERED RESPIRATORY (INHALATION)
Status: DISCONTINUED | OUTPATIENT
Start: 2023-11-14 | End: 2023-11-20 | Stop reason: HOSPADM

## 2023-11-14 RX ORDER — ENOXAPARIN SODIUM 100 MG/ML
40 INJECTION SUBCUTANEOUS EVERY 24 HOURS
Status: DISCONTINUED | OUTPATIENT
Start: 2023-11-14 | End: 2023-11-20 | Stop reason: HOSPADM

## 2023-11-14 RX ORDER — NALOXONE HYDROCHLORIDE 0.4 MG/ML
0.4 INJECTION, SOLUTION INTRAMUSCULAR; INTRAVENOUS; SUBCUTANEOUS
Status: DISCONTINUED | OUTPATIENT
Start: 2023-11-14 | End: 2023-11-20 | Stop reason: HOSPADM

## 2023-11-14 RX ORDER — ENOXAPARIN SODIUM 100 MG/ML
40 INJECTION SUBCUTANEOUS EVERY 24 HOURS
Status: DISCONTINUED | OUTPATIENT
Start: 2023-11-14 | End: 2023-11-14

## 2023-11-14 RX ORDER — PROCHLORPERAZINE 25 MG
12.5 SUPPOSITORY, RECTAL RECTAL EVERY 12 HOURS PRN
Status: DISCONTINUED | OUTPATIENT
Start: 2023-11-14 | End: 2023-11-20 | Stop reason: HOSPADM

## 2023-11-14 RX ORDER — ONDANSETRON 8 MG/1
8 TABLET, FILM COATED ORAL EVERY 8 HOURS
Qty: 18 TABLET | Refills: 0 | Status: COMPLETED | OUTPATIENT
Start: 2023-11-14 | End: 2023-11-20

## 2023-11-14 RX ORDER — ACETAMINOPHEN 325 MG/1
650 TABLET ORAL EVERY 6 HOURS PRN
Status: DISCONTINUED | OUTPATIENT
Start: 2023-11-14 | End: 2023-11-20 | Stop reason: HOSPADM

## 2023-11-14 RX ORDER — POLYETHYLENE GLYCOL 3350 17 G/17G
17 POWDER, FOR SOLUTION ORAL DAILY PRN
Status: DISCONTINUED | OUTPATIENT
Start: 2023-11-14 | End: 2023-11-20 | Stop reason: HOSPADM

## 2023-11-14 RX ORDER — LORAZEPAM 0.5 MG/1
.5-1 TABLET ORAL EVERY 6 HOURS PRN
Status: DISCONTINUED | OUTPATIENT
Start: 2023-11-14 | End: 2023-11-20 | Stop reason: HOSPADM

## 2023-11-14 RX ORDER — LORAZEPAM 2 MG/ML
.5-1 INJECTION INTRAMUSCULAR EVERY 6 HOURS PRN
Status: DISCONTINUED | OUTPATIENT
Start: 2023-11-14 | End: 2023-11-20 | Stop reason: HOSPADM

## 2023-11-14 RX ORDER — METHYLPREDNISOLONE SODIUM SUCCINATE 125 MG/2ML
125 INJECTION, POWDER, LYOPHILIZED, FOR SOLUTION INTRAMUSCULAR; INTRAVENOUS
Status: DISCONTINUED | OUTPATIENT
Start: 2023-11-14 | End: 2023-11-20 | Stop reason: HOSPADM

## 2023-11-14 RX ORDER — PROCHLORPERAZINE MALEATE 5 MG
5 TABLET ORAL EVERY 6 HOURS PRN
Status: DISCONTINUED | OUTPATIENT
Start: 2023-11-14 | End: 2023-11-20 | Stop reason: HOSPADM

## 2023-11-14 RX ORDER — PREGABALIN 50 MG/1
50 CAPSULE ORAL 2 TIMES DAILY
Status: DISCONTINUED | OUTPATIENT
Start: 2023-11-14 | End: 2023-11-20 | Stop reason: HOSPADM

## 2023-11-14 RX ORDER — LOSARTAN POTASSIUM 100 MG/1
100 TABLET ORAL DAILY
Status: DISCONTINUED | OUTPATIENT
Start: 2023-11-14 | End: 2023-11-20 | Stop reason: HOSPADM

## 2023-11-14 RX ORDER — AMOXICILLIN 250 MG
1 CAPSULE ORAL 2 TIMES DAILY
Status: DISCONTINUED | OUTPATIENT
Start: 2023-11-14 | End: 2023-11-14

## 2023-11-14 RX ORDER — PROCHLORPERAZINE MALEATE 5 MG
5 TABLET ORAL EVERY 6 HOURS PRN
Status: DISCONTINUED | OUTPATIENT
Start: 2023-11-14 | End: 2023-11-14

## 2023-11-14 RX ADMIN — OXYCODONE HYDROCHLORIDE 5 MG: 5 TABLET ORAL at 18:31

## 2023-11-14 RX ADMIN — THIAMINE HCL TAB 100 MG 100 MG: 100 TAB at 16:05

## 2023-11-14 RX ADMIN — LOSARTAN POTASSIUM 100 MG: 100 TABLET, FILM COATED ORAL at 20:45

## 2023-11-14 RX ADMIN — DOXORUBICIN HYDROCHLORIDE 73 MG: 2 INJECTABLE, LIPOSOMAL INTRAVENOUS at 19:03

## 2023-11-14 RX ADMIN — POTASSIUM CHLORIDE: 2 INJECTION, SOLUTION, CONCENTRATE INTRAVENOUS at 18:40

## 2023-11-14 RX ADMIN — DEXAMETHASONE SODIUM PHOSPHATE 10 MG: 10 INJECTION INTRAMUSCULAR; INTRAVENOUS at 18:22

## 2023-11-14 RX ADMIN — FOSAPREPITANT 150 MG: 150 INJECTION, POWDER, LYOPHILIZED, FOR SOLUTION INTRAVENOUS at 18:22

## 2023-11-14 RX ADMIN — PREGABALIN 50 MG: 50 CAPSULE ORAL at 20:45

## 2023-11-14 RX ADMIN — SODIUM CHLORIDE, PRESERVATIVE FREE 5 ML: 5 INJECTION INTRAVENOUS at 10:11

## 2023-11-14 RX ADMIN — OXYCODONE HYDROCHLORIDE 10 MG: 5 TABLET ORAL at 22:24

## 2023-11-14 RX ADMIN — ONDANSETRON HYDROCHLORIDE 8 MG: 8 TABLET, FILM COATED ORAL at 18:22

## 2023-11-14 RX ADMIN — ENOXAPARIN SODIUM 40 MG: 40 INJECTION SUBCUTANEOUS at 20:45

## 2023-11-14 RX ADMIN — LIDOCAINE HYDROCHLORIDE 1 ML: 10 INJECTION, SOLUTION EPIDURAL; INFILTRATION; INTRACAUDAL; PERINEURAL at 10:11

## 2023-11-14 RX ADMIN — PRAVASTATIN SODIUM 40 MG: 40 TABLET ORAL at 20:45

## 2023-11-14 RX ADMIN — OXYCODONE HYDROCHLORIDE 5 MG: 5 TABLET ORAL at 13:29

## 2023-11-14 RX ADMIN — MESNA 2910 MG: 100 INJECTION, SOLUTION INTRAVENOUS at 20:39

## 2023-11-14 ASSESSMENT — ACTIVITIES OF DAILY LIVING (ADL)
ADLS_ACUITY_SCORE: 20
ADLS_ACUITY_SCORE: 35
ADLS_ACUITY_SCORE: 20

## 2023-11-14 NOTE — PLAN OF CARE
Admitted/transferred from:   2 RN full   skin assessment completed by Sanam uQarles RN and Ryann Phipps.  Skin assessment finding: skin intact, no problems. Newly placed PICC on R arm.   Interventions/actions: no interventions needed at this time.     Will continue to monitor.

## 2023-11-14 NOTE — PROCEDURES
[facility]    Double Lumen PICC Placement    Date/Time: 11/14/2023 10:12 AM    Performed by: Chris Garcia RN  Authorized by: Scheierl, Amber J, APRN CNP  Indications: Chemotherapy.      UNIVERSAL PROTOCOL   Site Marked: Yes  Prior Images Obtained and Reviewed:  Yes  Required items: Required blood products, implants, devices and special equipment available    Patient identity confirmed:  Verbally with patient, hospital-assigned identification number, arm band and provided demographic data  Patient was reevaluated immediately before administering moderate or deep sedation or anesthesia  Confirmation Checklist:  Patient's identity using two indicators, relevant allergies, procedure was appropriate and matched the consent or emergent situation and correct equipment/implants were available  Time out: Immediately prior to the procedure a time out was called    Universal Protocol: the [facility] Counts include [facility] beds at the [facility] Universal Protocol was followed    Preparation: Patient was prepped and draped in usual sterile fashion       ANESTHESIA    Anesthesia:  See MAR for details  Local Anesthetic:  Lidocaine 1% without epinephrine  Anesthetic Total (mL):  1      SEDATION    Patient Sedated: No        Preparation: skin prepped with ChloraPrep  Skin prep agent: skin prep agent completely dried prior to procedure  Sterile barriers: maximum sterile barriers were used: cap, mask, sterile gown, sterile gloves, and large sterile sheet  Hand hygiene: hand hygiene performed prior to central venous catheter insertion  Type of line used: PICC  Catheter type: double lumen  Lumen type: non-valved and power PICC  Lumen Identification: Purple and Red  Catheter size: 5 Fr  Brand: Bard  Lot number: [id number]  Placement method: venipuncture, MST, ultrasound and tip navigation system  Number of attempts: 1  Difficulty threading catheter: no  Successful placement: yes  Orientation: right    Location: brachial  vein (medial) (vein diameter - 0.35 cm)  Arm circumference: adults 10 cm  Extremity circumference: 31  Visible catheter length: 1  Total catheter length: 41  Dressing and securement: alcohol impregnated caps, chlorhexidine disc applied, transparent dressing, tissue adhesive, sterile dressing applied, statlock and site cleansed  Post procedure assessment: blood return through all ports, free fluid flow and placement verified by 3CG technology  PROCEDURE   Patient Tolerance:  Patient tolerated the procedure well with no immediate complicationsDescribe Procedure: PICC tip is in satisfactory location as verified by [employer] 3CG Tip Confirmation System. PICC is OK to use.  Disposal: sharps and needle count correct at the end of procedure, needles and guidewire disposed in sharps container

## 2023-11-14 NOTE — PLAN OF CARE
Goal Outcome Evaluation:      Plan of Care Reviewed With: patient    Overall Patient Progress: no changeOverall Patient Progress: no change  Nursing Focus: Admission  D: Arrived at 1030. Patient accompanied by . Admitted for chemo. .      I: Admission process began.  Patient oriented to room, enviroment, call light.  Md. notified of patients arrival on unit.     A: Vital signs stable, afebrile.  Patient stable at this time.  Complaining of right leg pain.     P: Implement plan of care when available. Continue to monitor patient. Nursing interventions as appropriate. Notify md with changes in pt status.

## 2023-11-14 NOTE — PROVIDER NOTIFICATION
11/14/23 1012   PICC 11/14/23 Double Lumen Right Brachial vein medial Chemotherapy   Placement Date/Time: 11/14/23 (c) 1012   Lumens (Required): Double Lumen  Lumen Identification: Purple;Red  Catheter Brand: TVDeck  Catheter Size: 5 fr  Lot #: [id number]  Full barrier precautions done: Yes, hand hygiene, sterile gown, sterile gloves, mas...   Site Assessment WDL   External Cath Length (cm) (S)  1 cm   Extremity Circumference (cm) 31 cm   Dressing Chlorhexidine disk;Transparent;Securement device   Dressing Status clean;dry;intact   Dressing Change Due (S)  11/21/23   Line Necessity Yes, meets criteria   Purple - Status blood return noted;heparin locked   Purple - Cap Change Due 11/18/23   Purple - Intervention Flushed   Red - Status blood return noted;heparin locked   Red - Cap Change Due 11/18/23   Red - Intervention Flushed   Phlebitis Scale 0-->no symptoms   Infiltration? no   PICC Comment (S)  PICC is OK to use

## 2023-11-14 NOTE — CONSULTS
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  Primary Outpatient Oncologist: Roney Jimenez  Current Treatment Plan: Doxil/Ifos mesna     Reason for Consult: Admitted for cycle 2 of chemotherapy    Assessment & Plan:   Mirta Cardenas is a 68 year old female with locally advanced STS here for cycle 2 of Ifos/doxil/mesna      Recommendations:   - Chemotherapy plans:     Day 1 - Doxil 40mg/m2  Days 1-5 CIVI Ifosfamide 1500mg/m2  Day 5-6 Mesna 1500mg/m2    Nausea prophylaxis - Emend/Dexamethasone 10mg today  Zofran 8mg q8 hours  Prn compazine/Ativan    - Please order the following labs Labs today ok to proceed - check daily cbc w diff and CMP and Mg and Phos.     - [facility]  Clinic contacted to schedule close oncologic follow up within 1 week after expected discharge.   Will need neulasta injection after discharge (Scheduled for 11/21 at [facility])- labs 3x/ week after discharge.     # CLL - stable    #Pain - taking lyrica.  Did not tolerate Cymbalta.  Ok to use ibuprofen but need to be sure renal function is ok.    #Neurotoxicity - Monitor for ifosfamide neurotoxicity - neurochecks daily during Ifosfamide.     #DVT prophy - would use lovenox 40 mg daily.     See detailed Oncologic History below.       Oncologic History:  68-year-old female with PMH of CLL and recent Dx of High Grade Pleomorphic sarcoma, with intermittent right calf pain but then continuous calf pain beginning in March 2023.  She had a recent x-ray which shows a destructive lesion in the midportion R LE in between the fibula and the tibia. She is experiencing significant pain, she is restless during the visit due to pain. She is currently taking both Advil and Tylenol and oxycodone for pain, with minimal control, feels ibuprofen is what helps the most. She has been alternating all pain  "medications without optimizing doses of them.      C1D1 10/17/23    She tolerated cycle 1 very well. Had few complications.       Thank you for the opportunity to partake in this patients plan of care. Please do not hesitate to page with questions. We will continue to follow.      Dong Hardy D.O.  [facility] 7[address]    ___________________________________________________________________     Subjective & Interval History:    Pt is feeling well. She did very well with cycle 1.  She did not have any nausea. Did well at home. DId not have mouth sores or other complications. Today she overall feels well. She has some pain in her R calf - she does think lyrica is helping, but not as well as cymbalta (unfortunately she was unable to tolerate cymbalta).  She is otherwise feeling well. SHe is eating well. ROS is otherwise negative.     Physical Exam:    Blood pressure (!) 145/83, pulse 99, temperature 98.3  F (36.8  C), temperature source Oral, resp. rate 18, height 1.555 m (5' 1.22\"), weight 76.1 kg (167 lb 11.2 oz), SpO2 98%, not currently breastfeeding.  General: alert and cooperative, lying in bed, no acute distress  HEENT: sclera anicteric, EOMI, MMM  Neck: supple, normal ROM  CV: RRR, no murmurs  Resp: CTAB, normal respiratory effort on ambient air  GI: soft, non-tender, non-distended, bowel sounds present and normoactive  MSK: warm and well-perfused, normal tone  Skin: no rashes on limited exam, no jaundice  Neuro: Alert and interactive, moves all extremities equally, no focal deficits    Past Medical History:  Past Medical History:   Diagnosis Date    Asthma     Asthma     reactive airway disease, per patient    Blood transfusion     Cancer (H)     chronic lymphocytic leukemia    History of transfusion     Hypertension     Hypertension     Leukemia, chronic lymphocytic (H)     Malignant neoplasm (H)     CLL       Past Surgical History:  Past Surgical History:   Procedure Laterality Date    ABDOMEN SURGERY      c section " *3    APPENDECTOMY      C/SECTION, CLASSICAL  ,,    , Classical    COLONOSCOPY      HYSTERECTOMY      HYSTERECTOMY, PAP NO LONGER INDICATED  1998    PICC DOUBLE LUMEN PLACEMENT Left 10/17/2023    left basilic 4 fr dl power picc 41 cm    RELEASE CARPAL TUNNEL  2012    Procedure:RELEASE CARPAL TUNNEL; Right Carpal Tunnel Release & Right Ring A1 Pulley Release; Surgeon:SERGEY HEATON; Location:[place] OR    RELEASE TRIGGER FINGER  2012    Procedure:RELEASE TRIGGER FINGER; Surgeon:SERGEY HEATON; Location:[place]    RELEASE TRIGGER FINGER  10/12/2012    Procedure: RELEASE TRIGGER FINGER;  Right Thumb A1 Pulley Release;  Surgeon: Sergey Heaton MD;  Location: [place] OR    SALPINGO OOPHORECTOMY,R/L/ULICES  2008    right       Social History:  Social History     Socioeconomic History    Marital status:    Tobacco Use    Smoking status: Never    Smokeless tobacco: Never   Vaping Use    Vaping Use: Never used   Substance and Sexual Activity    Alcohol use: Yes     Comment: Occasionally    Drug use: No    Sexual activity: Not Currently     Partners: Male     Birth control/protection: Post-menopausal   Other Topics Concern    Parent/sibling w/ CABG, MI or angioplasty before 65F 55M? No     Social Determinants of Health     Financial Resource Strain: Low Risk  (10/26/2023)    Financial Resource Strain     Within the past 12 months, have you or your family members you live with been unable to get utilities (heat, electricity) when it was really needed?: No   Food Insecurity: Low Risk  (10/26/2023)    Food Insecurity     Within the past 12 months, did you worry that your food would run out before you got money to buy more?: No     Within the past 12 months, did the food you bought just not last and you didn t have money to get more?: No   Transportation Needs: Low Risk  (10/26/2023)    Transportation Needs     Within the past 12 months, has lack of transportation kept you from  medical appointments, getting your medicines, non-medical meetings or appointments, work, or from getting things that you need?: No   Interpersonal Safety: Low Risk  (10/26/2023)    Interpersonal Safety     Do you feel physically and emotionally safe where you currently live?: Yes     Within the past 12 months, have you been hit, slapped, kicked or otherwise physically hurt by someone?: No     Within the past 12 months, have you been humiliated or emotionally abused in other ways by your partner or ex-partner?: No   Housing Stability: Low Risk  (10/26/2023)    Housing Stability     Do you have housing? : Yes     Are you worried about losing your housing?: No        Family History  Family History   Problem Relation Age of Onset    Hypertension Brother     Heart Disease Brother 65        bypass    Cancer Sister         multiple myloma    Obesity Son     Obesity Son     Hypertension Son     Osteoporosis Mother     Melanoma No family hx of        Outpatient Medications:  No current facility-administered medications on file prior to encounter.  ASPIRIN 81 MG OR TABS, Take 81 mg by mouth daily   calcipotriene (DOVONOX) 0.005 % external cream, Apply twice daily to areas of psoriasis on Saturday and Sunday.  clobetasol (TEMOVATE) 0.05 % external cream, Apply twice daily to psoriasis on Monday through Friday.  diclofenac (VOLTAREN) 1 % topical gel, Apply 4 g topically 4 times daily To right lower leg (Patient not taking: Reported on 10/30/2023)  diphenhydrAMINE-acetaminophen (TYLENOL PM)  MG tablet, Take 1 tablet by mouth nightly as needed for sleep  ibuprofen (ADVIL/MOTRIN) 200 MG capsule, Take 400 mg by mouth every 4 hours as needed for fever  levofloxacin (LEVAQUIN) 500 MG tablet, Take 1 tablet (500 mg) by mouth daily (Patient not taking: Reported on 11/8/2023)  losartan (COZAAR) 100 MG tablet, Take 1 tablet (100 mg) by mouth daily  METAMUCIL FIBER PO,   Multiple Vitamins-Calcium (ONE-A-DAY WOMENS FORMULA PO), Take  1 tablet by mouth daily  omeprazole (PRILOSEC) 20 MG DR capsule, Take 1 capsule by mouth every morning.  oxyCODONE (ROXICODONE) 5 MG tablet, Take 1-2 tablets (5-10 mg) by mouth as needed for severe pain  polyethylene glycol (MIRALAX) 17 GM/Dose powder, Take 17 g by mouth daily as needed for constipation  pravastatin (PRAVACHOL) 40 MG tablet, Take 1 tablet (40 mg) by mouth daily  pregabalin (LYRICA) 50 MG capsule, Take 1 capsule (50 mg) by mouth 3 times daily for 30 days  prochlorperazine (COMPAZINE) 5 MG tablet, Take 1 tablet (5 mg) by mouth every 6 hours as needed (Breakthrough Nausea / Vomiting) (Patient not taking: Reported on 11/8/2023)  senna-docusate (SENOKOT-S/PERICOLACE) 8.6-50 MG tablet, Take 1 tablet by mouth 2 times daily as needed for constipation           Labs & Studies: I personally reviewed the following studies:  ROUTINE LABS (Last four results):  CMP  Recent Labs   Lab 11/14/23  0856 11/10/23  1232 11/08/23  1226    139 139   POTASSIUM 4.8 4.0 4.1   CHLORIDE 104 102 106   CO2 25 26 25   ANIONGAP 10 11 8   * 81 100*   BUN 9.5 14.8 13.8   CR 0.73 0.79 0.75   GFRESTIMATED 90 82 87   MONIK 9.6 9.3 9.0   MAG  --  1.8 1.9   PHOS  --  3.8 3.1   PROTTOTAL 7.2 7.3 6.8   ALBUMIN 4.2 4.3 3.8   BILITOTAL 0.4 0.4 0.3   ALKPHOS 154* 154* 139*   AST 20 17 20   ALT 14 16 16     CBC  Recent Labs   Lab 11/14/23  0856 11/10/23  1232 11/08/23  1226   WBC 7.5 8.5 6.7   RBC 3.34* 3.22* 3.03*   HGB 8.8* 8.7* 8.1*   HCT 28.1* 26.4* 24.7*   MCV 84 82 82   MCH 26.3* 27.0 26.7   MCHC 31.3* 33.0 32.8   RDW 17.7* 16.8* 16.5*    222 192     INRNo lab results found in last 7 days.    IMAGING:  Reviewed her PET/CT from October

## 2023-11-14 NOTE — H&P
[facility]    History and Physical - Medicine Service, RONY TEAM 2       Date of Admission:  11/14/2023    Assessment & Plan   68-year-old female with PMH of CLL and recent Dx of  high grade pleomorphic soft tissue sarcoma of the leg, up to 9 cm. No evidence of metastatic disease. Admitted for planned neoadjuvant chemotherapy with ifos/doxil/mesna over 5 days.      #Soft tissue sarcoma, from RLE   - Ifos/doxil/mesna over 5 days   - Tylenol prn   - Oxycodone 5-10 mg q4hr prn     #HTN: PTA on losartan and chlorthalidone BP stable  - Continue PTA losartan   - Discontinued chlorthalidone     Other Medical Issues:  Anal fissure: With increased straining for BM over the past 24 hours. Continue bowel regimen, avoid constipation  - Patient denies concerns at this time but would like to have ability to fill NitroBid prn if fissure flares. Given no flare at discharge, will defer to PCP  Hypokalemia, hyponatremia, hypomagnesemia: Resolved. See notes 10/22 and prior for details. Trend BMP with PCP. Holding PTA chlorthalidone indefinitely   CKDIII: BL Cr 0.6-0.8 and stable this admission  Anemia of chronic disease: Recent BL hgb 8.5-10 and stable this admission  Leukocytosis: Resolved. See notes 10/22 and prior  Psoriasis: Continue PTA topicals prn (pt would prefer to use her home supply if possible). Follow up OP with dermatology 12/2023 as planned  GERD: Continue PPI   HLD: Continue statin  H/o reactive airway disease: No concerns this admission           Diet: Regular Diet Adult  DVT Prophylaxis: Enoxaparin (Lovenox) SQ  Branham Catheter: Not present  Fluids: None, able to take PO   Lines: PRESENT      PICC 11/14/23 Double Lumen Right Brachial vein medial Chemotherapy-Site Assessment: WDL      Cardiac Monitoring: None  Code Status: Full Code      Clinically Significant Risk Factors Present on Admission                  # Hypertension: Noted on problem list      # Obesity:  "Estimated body mass index is 31.46 kg/m  as calculated from the following:    Height as of this encounter: 1.555 m (5' 1.22\").    Weight as of this encounter: 76.1 kg (167 lb 11.2 oz).       # Asthma: noted on problem list        Disposition Plan      Expected Discharge Date: 11/20/2023        Discharge Comments: chemo - needs neulasta 11/21 or 11/22        The patient's care was discussed with the Attending Physician, Dr. Harrison .    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 No evidence of metastatic disease. Admitted for planned neoadjuvant chemotherapy with doxil 40mg/m2 and ifosfamide 8g/m2 over 5 days.     Tolerated last round of chemotherapy without major issue. Did have poor appetite, \"just felt sick\" and had some electrolyte derangements, but all of this was related to Cymbalta and resolved once this medication was discontinued.     Has pain RLE which is chronic for her. Takes Tylenol as needed and recalls that her doctor advised her to be cautious with ibuprofen and she was started on oxycodone as outpatient.     No fevers, chills, sick contacts. Does have some constipation which she relates to her oxycodone use.     Past Medical History    Past Medical History:   Diagnosis Date    Asthma     Asthma     reactive airway disease, per patient    Blood transfusion     Cancer (H)     chronic lymphocytic leukemia    History of transfusion     Hypertension     Hypertension     Leukemia, chronic lymphocytic (H)     Malignant neoplasm " (H)     CLL       Past Surgical History   Past Surgical History:   Procedure Laterality Date    ABDOMEN SURGERY      c section *3    APPENDECTOMY      C/SECTION, CLASSICAL  ,,    , Classical    COLONOSCOPY      HYSTERECTOMY      HYSTERECTOMY, PAP NO LONGER INDICATED  1998    PICC DOUBLE LUMEN PLACEMENT Left 10/17/2023    left basilic 4 fr dl power picc 41 cm    RELEASE CARPAL TUNNEL  2012    Procedure:RELEASE CARPAL TUNNEL; Right Carpal Tunnel Release & Right Ring A1 Pulley Release; Surgeon:SERGEY HEATON; Location:[place] [facility]    RELEASE TRIGGER FINGER  2012    Procedure:RELEASE TRIGGER FINGER; Surgeon:SERGEY HEATON; Location:[place]    RELEASE TRIGGER FINGER  10/12/2012    Procedure: RELEASE TRIGGER FINGER;  Right Thumb A1 Pulley Release;  Surgeon: Sergey Heaton MD;  Location: [facility] [facility]    SALPINGO OOPHORECTOMY,R/L/ULICES  2008    right       Prior to Admission Medications   Prior to Admission Medications   Prescriptions Last Dose Informant Patient Reported? Taking?   METAMUCIL FIBER PO   Yes No   Multiple Vitamins-Calcium (ONE-A-DAY WOMENS FORMULA PO)  Self Yes No   Sig: Take 1 tablet by mouth daily   calcipotriene (DOVONOX) 0.005 % external cream   No No   Sig: Apply twice daily to areas of psoriasis on Saturday and .   clobetasol (TEMOVATE) 0.05 % external cream   No No   Sig: Apply twice daily to psoriasis on Monday through Friday.   diclofenac (VOLTAREN) 1 % topical gel   No No   Sig: Apply 4 g topically 4 times daily To right lower leg   Patient not taking: Reported on 10/30/2023   ibuprofen (ADVIL/MOTRIN) 200 MG capsule   Yes No   Sig: Take 400 mg by mouth every 4 hours as needed for fever   levofloxacin (LEVAQUIN) 500 MG tablet   No No   Sig: Take 1 tablet (500 mg) by mouth daily   Patient not taking: Reported on 2023   losartan (COZAAR) 100 MG tablet   No No   Sig: Take 1 tablet (100 mg) by mouth daily   omeprazole (PRILOSEC) 20 MG DR capsule  Self  Yes No   Sig: Take 1 capsule by mouth every morning.   oxyCODONE (ROXICODONE) 5 MG tablet   No No   Sig: Take 1-2 tablets (5-10 mg) by mouth as needed for severe pain   polyethylene glycol (MIRALAX) 17 GM/Dose powder   No No   Sig: Take 17 g by mouth daily as needed for constipation   pravastatin (PRAVACHOL) 40 MG tablet   No No   Sig: Take 1 tablet (40 mg) by mouth daily   pregabalin (LYRICA) 50 MG capsule   No No   Sig: Take 1 capsule (50 mg) by mouth 3 times daily for 30 days   prochlorperazine (COMPAZINE) 5 MG tablet   No No   Sig: Take 1 tablet (5 mg) by mouth every 6 hours as needed (Breakthrough Nausea / Vomiting)   Patient not taking: Reported on 11/8/2023      Facility-Administered Medications: None           Physical Exam   Vital Signs: Temp: 98.3  F (36.8  C) Temp src: Oral BP: 123/87 Pulse: 89   Resp: 18 SpO2: 97 % O2 Device: None (Room air)    Weight: 167 lbs 11.2 oz  General: well-appearing, resting comfortably   HEENT: PERRLA, EOMi, mucous membranes are moist   Pulm/Resp: clear breath sounds bilaterally without rhonchi, crackles or wheeze, breathing non-labored  CV: RRR, no murmurs, rubs, gallops   Abdomen: soft, non-distended, non-tender   Ext: Moves all 4 extremities without difficulty, 2+ distal pulses bilaterally. RLE is markedly more swollen compared to the LLE, but no tenderness to palpation or overlying skin changes   Neuro: Alert and answering questions appropriately    Skin: Warm and well perfused. No rashes or other skin changes     Medical Decision Making     Please see A&P for additional details of medical decision making.      Data     I have personally reviewed the following data over the past 24 hrs:    7.5  \   8.8 (L)   / 229     139 104 9.5 /  111 (H)   4.8 25 0.73 \     ALT: 14 AST: 20 AP: 154 (H) TBILI: 0.4   ALB: 4.2 TOT PROTEIN: 7.2 LIPASE: N/A

## 2023-11-15 LAB
ALBUMIN SERPL BCG-MCNC: 3.7 G/DL (ref 3.5–5.2)
ALP SERPL-CCNC: 132 U/L (ref 40–150)
ALT SERPL W P-5'-P-CCNC: 15 U/L (ref 0–50)
ANION GAP SERPL CALCULATED.3IONS-SCNC: 8 MMOL/L (ref 7–15)
AST SERPL W P-5'-P-CCNC: 14 U/L (ref 0–45)
BASOPHILS # BLD AUTO: 0 10E3/UL (ref 0–0.2)
BASOPHILS NFR BLD AUTO: 0 %
BILIRUB SERPL-MCNC: 0.2 MG/DL
BUN SERPL-MCNC: 11.1 MG/DL (ref 8–23)
CALCIUM SERPL-MCNC: 8.9 MG/DL (ref 8.8–10.2)
CHLORIDE SERPL-SCNC: 103 MMOL/L (ref 98–107)
CREAT SERPL-MCNC: 0.65 MG/DL (ref 0.51–0.95)
DEPRECATED HCO3 PLAS-SCNC: 27 MMOL/L (ref 22–29)
EGFRCR SERPLBLD CKD-EPI 2021: >90 ML/MIN/1.73M2
EOSINOPHIL # BLD AUTO: 0 10E3/UL (ref 0–0.7)
EOSINOPHIL NFR BLD AUTO: 0 %
ERYTHROCYTE [DISTWIDTH] IN BLOOD BY AUTOMATED COUNT: 17 % (ref 10–15)
GLUCOSE SERPL-MCNC: 158 MG/DL (ref 70–99)
HCT VFR BLD AUTO: 24.6 % (ref 35–47)
HGB BLD-MCNC: 7.9 G/DL (ref 11.7–15.7)
IMM GRANULOCYTES # BLD: 0.1 10E3/UL
IMM GRANULOCYTES NFR BLD: 1 %
LYMPHOCYTES # BLD AUTO: 1.1 10E3/UL (ref 0.8–5.3)
LYMPHOCYTES NFR BLD AUTO: 18 %
MAGNESIUM SERPL-MCNC: 1.9 MG/DL (ref 1.7–2.3)
MCH RBC QN AUTO: 26.3 PG (ref 26.5–33)
MCHC RBC AUTO-ENTMCNC: 32.1 G/DL (ref 31.5–36.5)
MCV RBC AUTO: 82 FL (ref 78–100)
MONOCYTES # BLD AUTO: 0.1 10E3/UL (ref 0–1.3)
MONOCYTES NFR BLD AUTO: 2 %
NEUTROPHILS # BLD AUTO: 4.9 10E3/UL (ref 1.6–8.3)
NEUTROPHILS NFR BLD AUTO: 79 %
NRBC # BLD AUTO: 0 10E3/UL
NRBC BLD AUTO-RTO: 0 /100
PHOSPHATE SERPL-MCNC: 2.1 MG/DL (ref 2.5–4.5)
PLATELET # BLD AUTO: 212 10E3/UL (ref 150–450)
POTASSIUM SERPL-SCNC: 4.4 MMOL/L (ref 3.4–5.3)
PROT SERPL-MCNC: 6.5 G/DL (ref 6.4–8.3)
RBC # BLD AUTO: 3 10E6/UL (ref 3.8–5.2)
SODIUM SERPL-SCNC: 138 MMOL/L (ref 135–145)
WBC # BLD AUTO: 6.2 10E3/UL (ref 4–11)

## 2023-11-15 PROCEDURE — 85004 AUTOMATED DIFF WBC COUNT: CPT

## 2023-11-15 PROCEDURE — 83735 ASSAY OF MAGNESIUM: CPT

## 2023-11-15 PROCEDURE — 250N000009 HC RX 250: Performed by: STUDENT IN AN ORGANIZED HEALTH CARE EDUCATION/TRAINING PROGRAM

## 2023-11-15 PROCEDURE — 250N000011 HC RX IP 250 OP 636: Performed by: STUDENT IN AN ORGANIZED HEALTH CARE EDUCATION/TRAINING PROGRAM

## 2023-11-15 PROCEDURE — 250N000013 HC RX MED GY IP 250 OP 250 PS 637

## 2023-11-15 PROCEDURE — 80053 COMPREHEN METABOLIC PANEL: CPT

## 2023-11-15 PROCEDURE — 250N000013 HC RX MED GY IP 250 OP 250 PS 637: Performed by: STUDENT IN AN ORGANIZED HEALTH CARE EDUCATION/TRAINING PROGRAM

## 2023-11-15 PROCEDURE — 258N000003 HC RX IP 258 OP 636: Performed by: STUDENT IN AN ORGANIZED HEALTH CARE EDUCATION/TRAINING PROGRAM

## 2023-11-15 PROCEDURE — 250N000013 HC RX MED GY IP 250 OP 250 PS 637: Performed by: INTERNAL MEDICINE

## 2023-11-15 PROCEDURE — 84100 ASSAY OF PHOSPHORUS: CPT

## 2023-11-15 PROCEDURE — 99231 SBSQ HOSP IP/OBS SF/LOW 25: CPT | Performed by: INTERNAL MEDICINE

## 2023-11-15 PROCEDURE — 120N000002 HC R&B MED SURG/OB UMMC

## 2023-11-15 PROCEDURE — 250N000011 HC RX IP 250 OP 636: Mod: JZ

## 2023-11-15 PROCEDURE — 99232 SBSQ HOSP IP/OBS MODERATE 35: CPT | Mod: GC | Performed by: INTERNAL MEDICINE

## 2023-11-15 RX ORDER — CALCIPOTRIENE 50 UG/G
CREAM TOPICAL 2 TIMES DAILY PRN
Status: DISCONTINUED | OUTPATIENT
Start: 2023-11-15 | End: 2023-11-20 | Stop reason: HOSPADM

## 2023-11-15 RX ORDER — DOCUSATE SODIUM 100 MG/1
100 CAPSULE, LIQUID FILLED ORAL 2 TIMES DAILY PRN
Status: DISCONTINUED | OUTPATIENT
Start: 2023-11-15 | End: 2023-11-20 | Stop reason: HOSPADM

## 2023-11-15 RX ORDER — CLOBETASOL PROPIONATE 0.5 MG/G
CREAM TOPICAL 2 TIMES DAILY PRN
Status: DISCONTINUED | OUTPATIENT
Start: 2023-11-15 | End: 2023-11-20 | Stop reason: HOSPADM

## 2023-11-15 RX ORDER — NYSTATIN 100000 U/G
OINTMENT TOPICAL 2 TIMES DAILY PRN
Status: DISCONTINUED | OUTPATIENT
Start: 2023-11-15 | End: 2023-11-20 | Stop reason: HOSPADM

## 2023-11-15 RX ORDER — DOCUSATE SODIUM 100 MG/1
100 CAPSULE, LIQUID FILLED ORAL 2 TIMES DAILY PRN
COMMUNITY
End: 2024-02-01

## 2023-11-15 RX ADMIN — POTASSIUM CHLORIDE: 2 INJECTION, SOLUTION, CONCENTRATE INTRAVENOUS at 03:51

## 2023-11-15 RX ADMIN — ONDANSETRON HYDROCHLORIDE 8 MG: 8 TABLET, FILM COATED ORAL at 02:06

## 2023-11-15 RX ADMIN — POTASSIUM & SODIUM PHOSPHATES POWDER PACK 280-160-250 MG 1 PACKET: 280-160-250 PACK at 23:59

## 2023-11-15 RX ADMIN — OXYCODONE HYDROCHLORIDE 5 MG: 5 TABLET ORAL at 09:47

## 2023-11-15 RX ADMIN — POTASSIUM & SODIUM PHOSPHATES POWDER PACK 280-160-250 MG 1 PACKET: 280-160-250 PACK at 19:30

## 2023-11-15 RX ADMIN — POTASSIUM CHLORIDE: 2 INJECTION, SOLUTION, CONCENTRATE INTRAVENOUS at 20:19

## 2023-11-15 RX ADMIN — ONDANSETRON HYDROCHLORIDE 8 MG: 8 TABLET, FILM COATED ORAL at 15:55

## 2023-11-15 RX ADMIN — LOSARTAN POTASSIUM 100 MG: 100 TABLET, FILM COATED ORAL at 19:30

## 2023-11-15 RX ADMIN — POTASSIUM & SODIUM PHOSPHATES POWDER PACK 280-160-250 MG 1 PACKET: 280-160-250 PACK at 16:49

## 2023-11-15 RX ADMIN — ONDANSETRON HYDROCHLORIDE 8 MG: 8 TABLET, FILM COATED ORAL at 08:13

## 2023-11-15 RX ADMIN — POTASSIUM CHLORIDE: 2 INJECTION, SOLUTION, CONCENTRATE INTRAVENOUS at 08:20

## 2023-11-15 RX ADMIN — POLYETHYLENE GLYCOL 3350 17 G: 17 POWDER, FOR SOLUTION ORAL at 08:19

## 2023-11-15 RX ADMIN — OXYCODONE HYDROCHLORIDE 10 MG: 5 TABLET ORAL at 21:59

## 2023-11-15 RX ADMIN — DOCUSATE SODIUM AND SENNOSIDES 2 TABLET: 8.6; 5 TABLET, FILM COATED ORAL at 14:06

## 2023-11-15 RX ADMIN — ONDANSETRON HYDROCHLORIDE 8 MG: 8 TABLET, FILM COATED ORAL at 23:59

## 2023-11-15 RX ADMIN — PREGABALIN 50 MG: 50 CAPSULE ORAL at 08:13

## 2023-11-15 RX ADMIN — THIAMINE HCL TAB 100 MG 100 MG: 100 TAB at 08:13

## 2023-11-15 RX ADMIN — ENOXAPARIN SODIUM 40 MG: 40 INJECTION SUBCUTANEOUS at 19:30

## 2023-11-15 RX ADMIN — PREGABALIN 50 MG: 50 CAPSULE ORAL at 19:30

## 2023-11-15 RX ADMIN — MESNA 2910 MG: 100 INJECTION, SOLUTION INTRAVENOUS at 20:09

## 2023-11-15 RX ADMIN — PRAVASTATIN SODIUM 40 MG: 40 TABLET ORAL at 19:30

## 2023-11-15 RX ADMIN — PSYLLIUM HUSK 1 PACKET: 3.4 POWDER ORAL at 19:36

## 2023-11-15 RX ADMIN — OMEPRAZOLE 20 MG: 20 CAPSULE, DELAYED RELEASE ORAL at 08:13

## 2023-11-15 ASSESSMENT — ACTIVITIES OF DAILY LIVING (ADL)
ADLS_ACUITY_SCORE: 20

## 2023-11-15 NOTE — PROGRESS NOTES
"[facility]    Progress Note - Medicine Service, RONY TEAM 2       Date of Admission:  11/14/2023    Assessment & Plan   Mirta Cardenas is a 68 year old female admitted on 11/14/2023. She has a history of soft tissue sarcoma of the leg being admitted for cycle 2 of scheduled chemotherapy. Tolerated cycle 1 without issues. Oncology managing chemotherapy. Will monitory closely for toxicity and side effects.     #Soft tissue sarcoma, from RLE   - Ifos/doxil/mesna per heme-onc  - Tylenol prn   - Oxycodone 5-10 mg q4hr prn      #HTN:   PTA on losartan and chlorthalidone BP stable  - Continue PTA losartan   - Discontinued chlorthalidone    #CKDIII  - Baseline creatinine 0.6-0.8, stable this admission   - Monitor renal function, avoid NSAIDs where possible     #Psoriasis  - No active flare currently. Continue PTA topicals prn (pt would prefer to use her home supply if possible but meds reordered here). Follow up OP with dermatology 12/2023 as planned    #Hypophosphatemia   - RN managed phos ordered    #GERD   - Continue PPI     #HLD  - Continue statin          Diet: Regular Diet Adult    DVT Prophylaxis: Enoxaparin (Lovenox) SQ  Branham Catheter: Not present  Fluids: None, patient taking PO  Lines: PRESENT      PICC 11/14/23 Double Lumen Right Brachial vein [facility] Chemotherapy-Site Assessment: WDL      Cardiac Monitoring: None  Code Status: Full Code      Clinically Significant Risk Factors Present on Admission                  # Hypertension: Noted on problem list      # Obesity: Estimated body mass index is 31.46 kg/m  as calculated from the following:    Height as of this encounter: 1.555 m (5' 1.22\").    Weight as of this encounter: 76.1 kg (167 lb 11.2 oz).       # Asthma: noted on problem list        Disposition Plan      Expected Discharge Date: 11/20/2023        Discharge Comments: chemo - needs neulasta 11/21 or 11/22        The patient's care was discussed with " the Attending Physician, Dr. Harrison .    Andrew Talavera MD  Medicine Service, MARO[facility] TEAM 2  [facility]  Securely message with [employer] (more info)  Text page via [facility] Paging/Directory   See signed in provider for up to date coverage information  ______________________________________________________________________    Interval History   No acute overnight events. Patient continues to have right calf pain, but under control with current regimen. Denies any nausea, vomiting, diarrhea, but has had some itching around her back.     Physical Exam   Vital Signs: Temp: 97.5  F (36.4  C) Temp src: Oral BP: (!) 142/78 Pulse: 86   Resp: 18 SpO2: 98 % O2 Device: None (Room air)    Weight: 167 lbs 11.2 oz  General: Well-appearing, resting comfortably   HEENT: PERRLA, EOMi, mucous membranes are moist   Pulm/Resp: clear breath sounds bilaterally without rhonchi, crackles or wheeze, breathing non-labored  CV: RRR, no murmurs, rubs, gallops   Abdomen: soft, non-distended, non-tender   Ext: Moves all 4 extremities without difficulty, no pitting edema, 2+ distal pulses   Neuro: Alert and answering questions appropriately    Skin: Warm and well perfused. Raised scattered urticarial lesions over the chest wall and back that are pruritic.     Medical Decision Making       Please see A&P for additional details of medical decision making.      Data     I have personally reviewed the following data over the past 24 hrs:    6.2  \   7.9 (L)   / 212     138 103 11.1 /  158 (H)   4.4 27 0.65 \     ALT: 15 AST: 14 AP: 132 TBILI: 0.2   ALB: 3.7 TOT PROTEIN: 6.5 LIPASE: N/A

## 2023-11-15 NOTE — PROGRESS NOTES
Ifosfamide Toxicity Assessment      Patient is Alert & Oriented x4. There are NO signs of lethargy, confusion or hallucinations     Patient is having new incontinence of bowel or bladder No.       Pincer Grasp intact YES    Tremors present No     Provider was not notified No of changes 0 . Interventions include 0.     Will continue to monitor for s/sx of ifosfamide toxicity: hallucinations, AMS, emotional lability, somnolence, myoclonic jerks, tremors, coordination difficulties. If these occur, please call the APPs/Attending on days and fellow on-call for evening and nights for recommendations and further instruction.

## 2023-11-15 NOTE — PROGRESS NOTES
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  She had a recent x-ray which shows a destructive lesion in the midportion R LE in between the fibula and the tibia. She is experiencing significant pain, she is restless during the visit due to pain. She is currently taking both Advil and Tylenol and oxycodone for pain, with minimal control, feels ibuprofen is what helps the most. She has been alternating all pain medications without optimizing doses of them.      C1D1 10/17/23    C2D1 11-14-23     She tolerated cycle 1 very well. Had few complications.       ___________________________________________________________________     Subjective & Interval History:    Pt is feeling well. Today she overall feels well.  She has 3/10 pain in the right lower leg.  No fatigue, depression or anxiety.  She has some pain in her R calf - she does think lyrica is helping.  She is otherwise feeling well. She is eating well. ROS is otherwise negative.      Physical Exam:    BP (!) 144/81 (BP Location: Left arm)   Pulse 92   Temp 98.8  F (37.1  C) (Oral)   Resp 18   Ht 1.555 m (5' 1.22\")   Wt 76.1 kg (167 lb 11.2 oz)   SpO2 98%   BMI 31.46 kg/m    General: alert and cooperative, lying in bed, no acute distress  HEENT: sclera anicteric, EOMI, MMM  Neck: supple, normal ROM  CV: RRR, no murmurs  Resp: CTAB, normal respiratory effort on ambient air  GI: soft, non-tender, non-distended, bowel sounds " present and normoactive  MSK: warm and well-perfused, normal tone  Skin: no rashes on limited exam, no jaundice  Neuro: Alert and interactive, moves all extremities equally, no focal deficits  Extremities:  Right lower leg is increased in circumference vs. Left. No erythema or warmth.      Past Medical History:  Past Medical History        Past Medical History:   Diagnosis Date    Asthma      Asthma       reactive airway disease, per patient    Blood transfusion      Cancer (H)       chronic lymphocytic leukemia    History of transfusion      Hypertension      Hypertension      Leukemia, chronic lymphocytic (H)      Malignant neoplasm (H)       CLL            Past Surgical History:  Past Surgical History         Past Surgical History:   Procedure Laterality Date    ABDOMEN SURGERY         c section *3    APPENDECTOMY        C/SECTION, CLASSICAL   ,,     , Classical    COLONOSCOPY        HYSTERECTOMY        HYSTERECTOMY, PAP NO LONGER INDICATED   1998    PICC DOUBLE LUMEN PLACEMENT Left 10/17/2023     left basilic 4 fr dl power picc 41 cm    RELEASE CARPAL TUNNEL   2012     Procedure:RELEASE CARPAL TUNNEL; Right Carpal Tunnel Release & Right Ring A1 Pulley Release; Surgeon:SERGEY HEATON; Location:[facility] OR    RELEASE TRIGGER FINGER   2012     Procedure:RELEASE TRIGGER FINGER; Surgeon:SERGEY HEATON; Location:[facility]    RELEASE TRIGGER FINGER   10/12/2012     Procedure: RELEASE TRIGGER FINGER;  Right Thumb A1 Pulley Release;  Surgeon: Sergey Heaton MD;  Location: [facility] OR    SALPINGO OOPHORECTOMY,R/L/ULICES   2008     right               Family History  Family History         Family History   Problem Relation Age of Onset    Hypertension Brother      Heart Disease Brother 65         bypass    Cancer Sister           multiple myloma    Obesity Son      Obesity Son      Hypertension Son      Osteoporosis Mother      Melanoma No family hx of              Current  Facility-Administered Medications   Medication    acetaminophen (TYLENOL) tablet 650 mg    albuterol (PROVENTIL HFA/VENTOLIN HFA) inhaler    albuterol (PROVENTIL) neb solution 2.5 mg    Chemotherapy Infusing-Continuous Infusion    D5W 1,000 mL with potassium chloride 20 mEq/L, sodium bicarbonate 100 mEq/L infusion    [Held by provider] dextrose 5 % flush PRE/POST medication    [Held by provider] dextrose 5 % flush PRE/POST medication    diphenhydrAMINE (BENADRYL) injection 50 mg    enoxaparin ANTICOAGULANT (LOVENOX) injection 40 mg    EPINEPHrine PF (ADRENALIN) injection 0.3 mg    famotidine (PEPCID) injection 20 mg    [Held by provider] filgrastim-aafi (NIVESTYM) 390 mcg in D5W 26 mL infusion    Ifosfamide (IFEX) 2,910 mg, mesna (MESNEX) 2,910 mg in sodium chloride 0.9 % 1,137.3 mL infusion    LORazepam (ATIVAN) injection 0.5-1 mg    LORazepam (ATIVAN) tablet 0.5-1 mg    losartan (COZAAR) tablet 100 mg    melatonin tablet 1 mg    meperidine (DEMEROL) injection 25 mg    [START ON 11/19/2023] mesna (MESNEX) 2,910 mg in sodium chloride 0.9 % 1,079.1 mL infusion    methylPREDNISolone sodium succinate (solu-MEDROL) injection 125 mg    naloxone (NARCAN) injection 0.2 mg    Or    naloxone (NARCAN) injection 0.4 mg    Or    naloxone (NARCAN) injection 0.2 mg    Or    naloxone (NARCAN) injection 0.4 mg    omeprazole (PriLOSEC) CR capsule 20 mg    ondansetron (ZOFRAN) tablet 8 mg    oxyCODONE (ROXICODONE) tablet 5-10 mg    polyethylene glycol (MIRALAX) Packet 17 g    pravastatin (PRAVACHOL) tablet 40 mg    pregabalin (LYRICA) capsule 50 mg    prochlorperazine (COMPAZINE) injection 5 mg    Or    prochlorperazine (COMPAZINE) tablet 5 mg    Or    prochlorperazine (COMPAZINE) suppository 12.5 mg    psyllium (METAMUCIL/KONSYL) Packet 1 packet    senna-docusate (SENOKOT-S/PERICOLACE) 8.6-50 MG per tablet 1 tablet    Or    senna-docusate (SENOKOT-S/PERICOLACE) 8.6-50 MG per tablet 2 tablet    sodium chloride 0.9% BOLUS 500 mL     thiamine (B-1) tablet 100 mg            Labs & Studies: I personally reviewed the following studies:    Recent Results (from the past 24 hour(s))   Comprehensive metabolic panel    Collection Time: 11/15/23  3:50 AM   Result Value Ref Range    Sodium 138 135 - 145 mmol/L    Potassium 4.4 3.4 - 5.3 mmol/L    Carbon Dioxide (CO2) 27 22 - 29 mmol/L    Anion Gap 8 7 - 15 mmol/L    Urea Nitrogen 11.1 8.0 - 23.0 mg/dL    Creatinine 0.65 0.51 - 0.95 mg/dL    GFR Estimate >90 >60 mL/min/1.73m2    Calcium 8.9 8.8 - 10.2 mg/dL    Chloride 103 98 - 107 mmol/L    Glucose 158 (H) 70 - 99 mg/dL    Alkaline Phosphatase 132 40 - 150 U/L    AST 14 0 - 45 U/L    ALT 15 0 - 50 U/L    Protein Total 6.5 6.4 - 8.3 g/dL    Albumin 3.7 3.5 - 5.2 g/dL    Bilirubin Total 0.2 <=1.2 mg/dL   Magnesium    Collection Time: 11/15/23  3:50 AM   Result Value Ref Range    Magnesium 1.9 1.7 - 2.3 mg/dL   Phosphorus    Collection Time: 11/15/23  3:50 AM   Result Value Ref Range    Phosphorus 2.1 (L) 2.5 - 4.5 mg/dL   CBC with platelets and differential    Collection Time: 11/15/23  3:50 AM   Result Value Ref Range    WBC Count 6.2 4.0 - 11.0 10e3/uL    RBC Count 3.00 (L) 3.80 - 5.20 10e6/uL    Hemoglobin 7.9 (L) 11.7 - 15.7 g/dL    Hematocrit 24.6 (L) 35.0 - 47.0 %    MCV 82 78 - 100 fL    MCH 26.3 (L) 26.5 - 33.0 pg    MCHC 32.1 31.5 - 36.5 g/dL    RDW 17.0 (H) 10.0 - 15.0 %    Platelet Count 212 150 - 450 10e3/uL    % Neutrophils 79 %    % Lymphocytes 18 %    % Monocytes 2 %    % Eosinophils 0 %    % Basophils 0 %    % Immature Granulocytes 1 %    NRBCs per 100 WBC 0 <1 /100    Absolute Neutrophils 4.9 1.6 - 8.3 10e3/uL    Absolute Lymphocytes 1.1 0.8 - 5.3 10e3/uL    Absolute Monocytes 0.1 0.0 - 1.3 10e3/uL    Absolute Eosinophils 0.0 0.0 - 0.7 10e3/uL    Absolute Basophils 0.0 0.0 - 0.2 10e3/uL    Absolute Immature Granulocytes 0.1 <=0.4 10e3/uL    Absolute NRBCs 0.0 10e3/uL          IMAGING:  Reviewed her PET/CT from October Assessment &  Plan:   Mirta Cardenas is a 68 year old female with locally advanced STS here for cycle 2 of Ifos/doxil/mesna        Recommendations:   - Chemotherapy plans:      Day 1 - Doxil 40mg/m2  Days 1-5 CIVI Ifosfamide 1500mg/m2  Day 5-6 Mesna 1500mg/m2     Nausea prophylaxis - Emend/Dexamethasone 10mg today  Zofran 8mg q8 hours  Prn compazine/Ativan     - Please order the following labs Labs today ok to proceed - check daily cbc w diff and CMP and Mg and Phos.      - [facility] contacted to schedule close oncologic follow up within 1 week after expected discharge.   Will need neulasta injection after discharge (Scheduled for 11/21 at [facility])- labs 3x/ week after discharge.      # CLL - stable     #Pain - taking lyrica.  Did not tolerate Cymbalta.  Ok to use ibuprofen but need to be sure renal function is ok.    #Neurotoxicity - Monitor for ifosfamide neurotoxicity - neurochecks daily during Ifosfamide.      #DVT prophy - would use lovenox 40 mg daily.     # replace phosphorous     See detailed Oncologic History below.         Thank you for allowing us to participate in this patient's care.       Sincerely,      Josue Mccarty MD  Professor  [facility]  [phone].    I spent 35 minutes with the patient more than 50% of which was in counseling and coordination of care.

## 2023-11-15 NOTE — PROGRESS NOTES
Ifosfamide Toxicity Assessment      Neuro asssessment completed Yes, significant findings were N/A     Patient is Alert & Oriented x4. There are  No signs of lethargy, confusion or hallucinations.     Patient is having new incontinence of bowel or bladder  No.       Pincer Grasp intact Yes    Tremors None.    Provider notification not needed, pt not showing any s/sx of toxicity.     -- Monitor for s/sx of ifosfamide toxicity: hallucinations, AMS, emotional lability, somnolence, myoclonic jerks, tremors, coordination difficulties, etc. If these occur, please call the fellow on call for recommendations

## 2023-11-15 NOTE — PLAN OF CARE
Goal Outcome Evaluation:  [id number]  AVSS, alert and oriented 4, denies pain/nausea/sob.  Up independently, voiding adequately, LBM 11/14  Getting her first dose of doxorubicin and ifos/mesna this evening and IVM fluid.  Continue to monitor care.  Given PRN oxycodone 5 mg for pain at right leg.

## 2023-11-15 NOTE — PLAN OF CARE
Goal Outcome Evaluation:      Plan of Care Reviewed With: patient    Overall Patient Progress: no changeOverall Patient Progress: no change           Status: (R) leg soft tissue sarcoma  Activity: Standby assist of 1  Neuro: A&O x4  Cardiac: WDL except for soft hypertensive BPs.  Respiratory: WDL on RA  GI/: Recent anal fissures from straining. PRN Senna available, last BM 11/14. Void w/d.  Diet: Adult Regular  Skin: WDL  Lines/Drains: (R) Double lumen PICC  Pain/Nausea: Pain in (R) leg, 4/10 managed with PRN Oxy 5-10mg.  Changes:

## 2023-11-15 NOTE — PHARMACY-ADMISSION MEDICATION HISTORY
Pharmacist Admission Medication History    Admission medication history is complete. The information provided in this note is only as accurate as the sources available at the time of the update.    Information Source(s): Patient via in-person    Pertinent Information: no pertinent changes. Did inform patient that diclofenac gel is available over the counter as an alternative to oral NSAIDs, which the patient states their outpatient provider wanted them to stop. Counseled on use of diclofenac gel if she were to try it outpatient.    Changes made to PTA medication list:  Added:   Docusate 100 mg BID  Deleted:   Calcipotrien cream (marked as not taking - patient was using for eczema outbreak in summer but hasn't been using since cancer diagnosed)  Clobetasol cream (marked as not taking - patient was using for eczema outbreak in summer but hasn't been using since cancer diagnosed)  Miralax (patient took a couple times after last discharge but is now using docusate)  Levofloxacin (completed 10 day course after last hospitalization)  Ibuprofen (marked as not taking - patient reported outpatient provider wanted to stop ibuprofen due to history of kidney issues and they prefer she take oxycodone for now)  Changed:   Losartan 25 mg daily -> 25 mg every evening  Pravastatin 40 mg daily -> 40 mg every evening  Metamucil no SIG -> 1 teaspoonful every evening after dinner  Oxycodone 5-10 mg PRN severe pain -> TID PRN   Pregabalin 50 mg TID -> 50 mg BID    Allergies reviewed with patient and updates made in EHR: yes    Medication History Completed By: Brent Fam RPH 11/15/2023 12:38 PM    PTA Med List   Medication Sig Last Dose    docusate sodium (COLACE) 100 MG capsule Take 100 mg by mouth 2 times daily 11/14/2023 at AM    METAMUCIL FIBER PO Take 1 teaspoonful by mouth daily After supper 11/13/2023 at evening    Multiple Vitamins-Calcium (ONE-A-DAY WOMENS FORMULA PO) Take 1 tablet by mouth daily 11/14/2023 at AM     omeprazole (PRILOSEC) 20 MG DR capsule Take 1 capsule by mouth every morning. 11/14/2023 at AM    oxyCODONE (ROXICODONE) 5 MG tablet Take 1-2 tablets (5-10 mg) by mouth as needed for severe pain (Patient taking differently: Take 5 mg by mouth 3 times daily as needed for severe pain) 11/14/2023 at AM    pravastatin (PRAVACHOL) 40 MG tablet Take 1 tablet (40 mg) by mouth daily (Patient taking differently: Take 40 mg by mouth every evening) 11/13/2023 at evening    pregabalin (LYRICA) 50 MG capsule Take 1 capsule (50 mg) by mouth 3 times daily for 30 days (Patient taking differently: Take 50 mg by mouth 2 times daily) 11/14/2023 at AM    prochlorperazine (COMPAZINE) 5 MG tablet Take 1 tablet (5 mg) by mouth every 6 hours as needed (Breakthrough Nausea / Vomiting) Unknown at hasn't taken but has available     Brent Fam, PharmD

## 2023-11-15 NOTE — PLAN OF CARE
Goal Outcome Evaluation:      Plan of Care Reviewed With: patient, spouse    Overall Patient Progress: no changeOverall Patient Progress: no change  Pt afebrile with stable vs. Continues to receive Day 1 Ifosfamide/Mesna CIVI without difficulty. Neuros intact (see ICANS assessment), denies nausea. Good urine output.

## 2023-11-15 NOTE — PLAN OF CARE
[id number]:    A&O x4. UAL. VSS on RA. Pt reports mild pain in RLE, PRN oxy (10mg) given x1 and leg elevated with some relief. Pt denies having any N/V or SOB. Last BM: 11/14, pt voiding spontaneously with AUOP. CIVI chemo infusing, pt tolerating well and good blood return noted. Continue POC.

## 2023-11-15 NOTE — PROGRESS NOTES
Nursing Focus: Chemotherapy    D: Positive blood return via PICC . Insertion site is clean/dry/intact, dressing intact with no complaints of pain.  Pre infusion assessment documented in Flowsheet (if applicable).      I: Premedications given per order (see electronic medical administration record). Dose #1 of ifos/mesna started to infuse over 24 hours. Reviewed pt teaching on chemotherapy side effects.  Pt denies need for further teaching. Chemotherapy double checked per protocol by two chemotherapy competent RN's.     A: Tolerating infusion well. Denies nausea and or pain.     P: Continue to monitor urine output and symptoms of nausea. Screen for symptoms of toxicity.                                                   Ifosfamide Toxicity Assessment                                                         *Baseline Assessment*     Neuro asssessment completed Yes, significant findings were N/A     Patient is Alert & Oriented x4. There are No signs of lethargy, confusion or hallucinations.     Patient is having new incontinence of bowel or bladder No.       Pincer Grasp intact Yes    Tremors None    Provider notification not needed. This was baseline assessment completed prior to hanging her first bag.     -- Monitor for s/sx of ifosfamide toxicity: hallucinations, AMS, emotional lability, somnolence, myoclonic jerks, tremors, coordination difficulties, etc. If these occur, please call the fellow on call for recommendations

## 2023-11-15 NOTE — PLAN OF CARE
Nursing Focus: Chemotherapy  D: Positive blood return via PICC . Insertion site is clean/dry/intact, dressing intact with no complaints of pain.  Pre infusion assessment documented in Flowsheet (if applicable).    I: Premedications given per order (see electronic medical administration record). Dose #1 of Doxorubicin started to infuse over 1 hours/minutes. Reviewed pt teaching on chemotherapy side effects.  Pt denies need for further teaching. Chemotherapy double checked per protocol by two chemotherapy competent RN's.   A: Tolerating infusion well. Denies nausea and or pain.   P: Continue to monitor urine output and symptoms of nausea. Screen for symptoms of toxicity.

## 2023-11-15 NOTE — PROGRESS NOTES
Ifosfamide Toxicity Assessment      Patient is Alert & Oriented x4. There are signs of lethargy, confusion or hallucinations NO    Patient is having new incontinence of bowel or bladder No.       Pincer Grasp intact Yes    Tremors present No     Will continue to monitor for s/sx of ifosfamide toxicity: hallucinations, AMS, emotional lability, somnolence, myoclonic jerks, tremors, coordination difficulties. If these occur, please call the APPs/Attending on days and fellow on-call for evening and nights for recommendations and further instruction.

## 2023-11-16 LAB
ALBUMIN SERPL BCG-MCNC: 3.4 G/DL (ref 3.5–5.2)
ALP SERPL-CCNC: 109 U/L (ref 40–150)
ALT SERPL W P-5'-P-CCNC: 12 U/L (ref 0–50)
ANION GAP SERPL CALCULATED.3IONS-SCNC: 6 MMOL/L (ref 7–15)
AST SERPL W P-5'-P-CCNC: 13 U/L (ref 0–45)
BASOPHILS # BLD AUTO: 0 10E3/UL (ref 0–0.2)
BASOPHILS NFR BLD AUTO: 1 %
BILIRUB SERPL-MCNC: <0.2 MG/DL
BUN SERPL-MCNC: 10.5 MG/DL (ref 8–23)
CALCIUM SERPL-MCNC: 8.7 MG/DL (ref 8.8–10.2)
CHLORIDE SERPL-SCNC: 103 MMOL/L (ref 98–107)
CREAT SERPL-MCNC: 0.81 MG/DL (ref 0.51–0.95)
DEPRECATED HCO3 PLAS-SCNC: 32 MMOL/L (ref 22–29)
EGFRCR SERPLBLD CKD-EPI 2021: 80 ML/MIN/1.73M2
EOSINOPHIL # BLD AUTO: 0 10E3/UL (ref 0–0.7)
EOSINOPHIL NFR BLD AUTO: 0 %
ERYTHROCYTE [DISTWIDTH] IN BLOOD BY AUTOMATED COUNT: 18.3 % (ref 10–15)
GLUCOSE SERPL-MCNC: 112 MG/DL (ref 70–99)
HCT VFR BLD AUTO: 22 % (ref 35–47)
HGB BLD-MCNC: 7.1 G/DL (ref 11.7–15.7)
IMM GRANULOCYTES # BLD: 0 10E3/UL
IMM GRANULOCYTES NFR BLD: 1 %
LYMPHOCYTES # BLD AUTO: 1.2 10E3/UL (ref 0.8–5.3)
LYMPHOCYTES NFR BLD AUTO: 16 %
MAGNESIUM SERPL-MCNC: 1.8 MG/DL (ref 1.7–2.3)
MCH RBC QN AUTO: 27.2 PG (ref 26.5–33)
MCHC RBC AUTO-ENTMCNC: 32.3 G/DL (ref 31.5–36.5)
MCV RBC AUTO: 84 FL (ref 78–100)
MONOCYTES # BLD AUTO: 0.5 10E3/UL (ref 0–1.3)
MONOCYTES NFR BLD AUTO: 7 %
NEUTROPHILS # BLD AUTO: 5.4 10E3/UL (ref 1.6–8.3)
NEUTROPHILS NFR BLD AUTO: 75 %
NRBC # BLD AUTO: 0 10E3/UL
NRBC BLD AUTO-RTO: 0 /100
PHOSPHATE SERPL-MCNC: 3.6 MG/DL (ref 2.5–4.5)
PLATELET # BLD AUTO: 165 10E3/UL (ref 150–450)
POTASSIUM SERPL-SCNC: 4.2 MMOL/L (ref 3.4–5.3)
PROT SERPL-MCNC: 5.8 G/DL (ref 6.4–8.3)
RBC # BLD AUTO: 2.61 10E6/UL (ref 3.8–5.2)
SODIUM SERPL-SCNC: 141 MMOL/L (ref 135–145)
WBC # BLD AUTO: 7.2 10E3/UL (ref 4–11)

## 2023-11-16 PROCEDURE — 99231 SBSQ HOSP IP/OBS SF/LOW 25: CPT | Performed by: INTERNAL MEDICINE

## 2023-11-16 PROCEDURE — 85025 COMPLETE CBC W/AUTO DIFF WBC: CPT

## 2023-11-16 PROCEDURE — 99232 SBSQ HOSP IP/OBS MODERATE 35: CPT | Mod: GC | Performed by: INTERNAL MEDICINE

## 2023-11-16 PROCEDURE — 120N000002 HC R&B MED SURG/OB UMMC

## 2023-11-16 PROCEDURE — 250N000011 HC RX IP 250 OP 636: Performed by: STUDENT IN AN ORGANIZED HEALTH CARE EDUCATION/TRAINING PROGRAM

## 2023-11-16 PROCEDURE — 250N000013 HC RX MED GY IP 250 OP 250 PS 637: Performed by: PHYSICIAN ASSISTANT

## 2023-11-16 PROCEDURE — 250N000009 HC RX 250: Performed by: STUDENT IN AN ORGANIZED HEALTH CARE EDUCATION/TRAINING PROGRAM

## 2023-11-16 PROCEDURE — 250N000013 HC RX MED GY IP 250 OP 250 PS 637: Performed by: STUDENT IN AN ORGANIZED HEALTH CARE EDUCATION/TRAINING PROGRAM

## 2023-11-16 PROCEDURE — 258N000003 HC RX IP 258 OP 636: Performed by: STUDENT IN AN ORGANIZED HEALTH CARE EDUCATION/TRAINING PROGRAM

## 2023-11-16 PROCEDURE — 250N000013 HC RX MED GY IP 250 OP 250 PS 637

## 2023-11-16 PROCEDURE — 83735 ASSAY OF MAGNESIUM: CPT

## 2023-11-16 PROCEDURE — 80053 COMPREHEN METABOLIC PANEL: CPT

## 2023-11-16 PROCEDURE — 84100 ASSAY OF PHOSPHORUS: CPT | Performed by: INTERNAL MEDICINE

## 2023-11-16 PROCEDURE — 250N000011 HC RX IP 250 OP 636: Mod: JZ

## 2023-11-16 RX ORDER — BENZOCAINE/MENTHOL 6 MG-10 MG
LOZENGE MUCOUS MEMBRANE 2 TIMES DAILY PRN
Status: DISCONTINUED | OUTPATIENT
Start: 2023-11-16 | End: 2023-11-20 | Stop reason: HOSPADM

## 2023-11-16 RX ADMIN — DOCUSATE SODIUM AND SENNOSIDES 1 TABLET: 8.6; 5 TABLET, FILM COATED ORAL at 13:48

## 2023-11-16 RX ADMIN — CLOBETASOL PROPIONATE: 0.5 CREAM TOPICAL at 12:37

## 2023-11-16 RX ADMIN — OXYCODONE HYDROCHLORIDE 5 MG: 5 TABLET ORAL at 22:33

## 2023-11-16 RX ADMIN — PREGABALIN 50 MG: 50 CAPSULE ORAL at 07:55

## 2023-11-16 RX ADMIN — THIAMINE HCL TAB 100 MG 100 MG: 100 TAB at 07:55

## 2023-11-16 RX ADMIN — PSYLLIUM HUSK 1 PACKET: 3.4 POWDER ORAL at 20:03

## 2023-11-16 RX ADMIN — POTASSIUM CHLORIDE: 2 INJECTION, SOLUTION, CONCENTRATE INTRAVENOUS at 12:41

## 2023-11-16 RX ADMIN — ENOXAPARIN SODIUM 40 MG: 40 INJECTION SUBCUTANEOUS at 19:46

## 2023-11-16 RX ADMIN — OXYCODONE HYDROCHLORIDE 5 MG: 5 TABLET ORAL at 16:49

## 2023-11-16 RX ADMIN — PRAVASTATIN SODIUM 40 MG: 40 TABLET ORAL at 19:46

## 2023-11-16 RX ADMIN — POTASSIUM CHLORIDE: 2 INJECTION, SOLUTION, CONCENTRATE INTRAVENOUS at 22:32

## 2023-11-16 RX ADMIN — PREGABALIN 50 MG: 50 CAPSULE ORAL at 20:03

## 2023-11-16 RX ADMIN — THIAMINE HCL TAB 100 MG 100 MG: 100 TAB at 19:46

## 2023-11-16 RX ADMIN — ONDANSETRON HYDROCHLORIDE 8 MG: 8 TABLET, FILM COATED ORAL at 16:15

## 2023-11-16 RX ADMIN — THIAMINE HCL TAB 100 MG 100 MG: 100 TAB at 13:47

## 2023-11-16 RX ADMIN — ONDANSETRON HYDROCHLORIDE 8 MG: 8 TABLET, FILM COATED ORAL at 07:55

## 2023-11-16 RX ADMIN — NYSTATIN: 100000 OINTMENT TOPICAL at 07:58

## 2023-11-16 RX ADMIN — LOSARTAN POTASSIUM 100 MG: 100 TABLET, FILM COATED ORAL at 20:03

## 2023-11-16 RX ADMIN — OXYCODONE HYDROCHLORIDE 5 MG: 5 TABLET ORAL at 10:00

## 2023-11-16 RX ADMIN — CALCIPOTRIENE: 50 CREAM TOPICAL at 12:37

## 2023-11-16 RX ADMIN — POLYETHYLENE GLYCOL 3350 17 G: 17 POWDER, FOR SOLUTION ORAL at 07:54

## 2023-11-16 RX ADMIN — OMEPRAZOLE 20 MG: 20 CAPSULE, DELAYED RELEASE ORAL at 07:55

## 2023-11-16 RX ADMIN — POTASSIUM CHLORIDE: 2 INJECTION, SOLUTION, CONCENTRATE INTRAVENOUS at 05:41

## 2023-11-16 RX ADMIN — MESNA 2910 MG: 100 INJECTION, SOLUTION INTRAVENOUS at 19:58

## 2023-11-16 ASSESSMENT — ACTIVITIES OF DAILY LIVING (ADL)
ADLS_ACUITY_SCORE: 20

## 2023-11-16 NOTE — PROGRESS NOTES
Progress Notes  Signed     Date of Service: 11/15/2023  9:49 AM  Creation Time: 11/15/2023  9:49 AM                                                    Ifosfamide Toxicity Assessment       Patient is Alert & Oriented x4. There are NO signs of lethargy, confusion or hallucinations      Patient is having new incontinence of bowel or bladder No.                   Pincer Grasp intact YES     Tremors present No      Provider was not notified No of changes 0 . Interventions include 0.      Will continue to monitor for s/sx of ifosfamide toxicity: hallucinations, AMS, emotional lability, somnolence, myoclonic jerks, tremors, coordination difficulties. If these occur, please call the APPs/Attending on days and fellow on-call for evening and nights for recommendations and further instruction.

## 2023-11-16 NOTE — PLAN OF CARE
Goal Outcome Evaluation:      Plan of Care Reviewed With: patient    Overall Patient Progress: no change    [id number]  Admitted 11/14 for cycle 2 of doxil/ifos mesna  Diagnosis: soft tissue sarcoma - high grade pleomorphic      A&Ox4, VSS on RA. Denies pain this shift. Ifosfamide, mesna infusing through PICC, tolerating well. Voiding adequately. Up ad ghassan.

## 2023-11-16 NOTE — PROGRESS NOTES
Brief Oncology Note    Mirta Cardenas is a 68-year-old female patient who was admitted on 11/14/23 for planned C2 of Doxil, ifosfamide, and mesna for a high-grade pleomorphic sarcoma of the right lower leg. Today is Day 2 from chemotherapy initiation. Around 1 PM, I was notified by the bedside RN that patient was experiencing mild involuntary jerking of the left hand. I evaluated the patient, who reported that she first noticed symptoms this morning when she went to  her phone. She noted intermittent left-hand jerking motions and worried that she might drop something. She reported that she may have had a few twitches of the right hand as well. Otherwise, she is feeling well and denies headaches, vision changes, numbness, tingling, weakness, gait disturbance, somnolence, seizure activity, or other concerns. Exam was reassuring, and there was no reproducible tremor or myoclonus seen on exam; the remainder of a neurological exam was otherwise non-focal. Reviewed with patient that this is likely secondary to her ifosfamide, and that currently, symptoms are very mild. These may wax and wane throughout her treatment course and are expected to resolve upon completion of the chemotherapy. As such, we will continue to monitor her closely, but there is presently no indication to stop chemotherapy or slow the infusion rate. Case discussed with patient's primary oncologist, Dr. Jimenez, who agreed with this assessment. She recommended increasing thiamine to 100 mg TID to see if this helps and to continue chemotherapy as written.    Plan:  - Continue to monitor for s/sx of ifosfamide neurotoxicity. Please notify the oncology team (Heme/Onc Team 3, or the fellow on call after 5 PM) with any progressive myoclonus, significant tremulousness, seizure-like activity, or other concerns.   - Increase thiamine to 100 mg TID (ordered for you).  - Continue continuous infusion ifosfamide as ordered.    Etta Villeda,  BENJA  Hematology/Oncology  Pager: #[phone]

## 2023-11-16 NOTE — PROGRESS NOTES
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  She had a recent x-ray which shows a destructive lesion in the midportion R LE in between the fibula and the tibia. She is experiencing significant pain, she is restless during the visit due to pain. She is currently taking both Advil and Tylenol and oxycodone for pain, with minimal control, feels ibuprofen is what helps the most. She has been alternating all pain medications without optimizing doses of them.      C1D1 10/17/23     C2D1 11-14-23     She tolerated cycle 1 very well. Had few complications.       ___________________________________________________________________     Subjective & Interval History:    Pt was feeling will this morning. She has 3/10 pain in the right lower leg.  No fatigue, depression or anxiety.  She has some pain in her R calf - she does think lyrica is helping.  She is otherwise feeling well. She is eating well. ROS is otherwise negative.     Hand twitching reported this afternoon.  Dr. Jimenez recommended increasing thiamine.     Physical Exam:    /66 (BP Location: Right arm)   Pulse 88   Temp 98.4  F (36.9  C) (Oral)   Resp 18   Ht 1.555 m (5' 1.22\")   Wt 81 kg (178 lb 8 oz)   SpO2 96%   BMI 33.49 kg/m    General: alert and cooperative, lying in bed, no acute distress  HEENT: sclera anicteric, EOMI, MMM  Neck: supple, normal ROM  CV: RRR, no murmurs  Resp: CTAB, normal respiratory effort on ambient " air  GI: soft, non-tender, non-distended, bowel sounds present and normoactive  MSK: warm and well-perfused, normal tone  Skin: no rashes on limited exam, no jaundice  Neuro: Alert and interactive, moves all extremities equally, no focal deficits.  Can stand on toes.   Extremities:  Right lower leg is increased in circumference vs. Left. Decreased right lower leg swelling this 6 AM but she was in bed. No erythema or warmth.      Past Medical History:  Past Medical History           Past Medical History:   Diagnosis Date    Asthma      Asthma       reactive airway disease, per patient    Blood transfusion      Cancer (H)       chronic lymphocytic leukemia    History of transfusion      Hypertension      Hypertension      Leukemia, chronic lymphocytic (H)      Malignant neoplasm (H)       CLL            Past Surgical History:  Past Surgical History             Past Surgical History:   Procedure Laterality Date    ABDOMEN SURGERY         c section *3    APPENDECTOMY        C/SECTION, CLASSICAL   ,,     , Classical    COLONOSCOPY        HYSTERECTOMY        HYSTERECTOMY, PAP NO LONGER INDICATED   1998    PICC DOUBLE LUMEN PLACEMENT Left 10/17/2023     left basilic 4 fr dl power picc 41 cm    RELEASE CARPAL TUNNEL   2012     Procedure:RELEASE CARPAL TUNNEL; Right Carpal Tunnel Release & Right Ring A1 Pulley Release; Surgeon:SERGEY HEATON; Location:[facility] OR    RELEASE TRIGGER FINGER   2012     Procedure:RELEASE TRIGGER FINGER; Surgeon:SERGEY HEATON; Location:[facility]    RELEASE TRIGGER FINGER   10/12/2012     Procedure: RELEASE TRIGGER FINGER;  Right Thumb A1 Pulley Release;  Surgeon: Sergey Heaton MD;  Location: [facility] OR    SALPINGO OOPHORECTOMY,R/L/ULICES   2008     right               Family History  Family History             Family History   Problem Relation Age of Onset    Hypertension Brother      Heart Disease Brother 65         bypass    Cancer Sister            multiple myloma    Obesity Son      Obesity Son      Hypertension Son      Osteoporosis Mother      Melanoma No family hx of              Current Facility-Administered Medications   Medication    acetaminophen (TYLENOL) tablet 650 mg    albuterol (PROVENTIL HFA/VENTOLIN HFA) inhaler    albuterol (PROVENTIL) neb solution 2.5 mg    calcipotriene (DOVONOX) 0.005 % cream    Chemotherapy Infusing-Continuous Infusion    clobetasol (TEMOVATE) 0.05 % cream    D5W 1,000 mL with potassium chloride 20 mEq/L, sodium bicarbonate 100 mEq/L infusion    [Held by provider] dextrose 5 % flush PRE/POST medication    [Held by provider] dextrose 5 % flush PRE/POST medication    diphenhydrAMINE (BENADRYL) injection 50 mg    docusate sodium (COLACE) capsule 100 mg    enoxaparin ANTICOAGULANT (LOVENOX) injection 40 mg    EPINEPHrine PF (ADRENALIN) injection 0.3 mg    famotidine (PEPCID) injection 20 mg    [Held by provider] filgrastim-aafi (NIVESTYM) 390 mcg in D5W 26 mL infusion    hydrocortisone (CORTAID) 1 % cream    Ifosfamide (IFEX) 2,910 mg, mesna (MESNEX) 2,910 mg in sodium chloride 0.9 % 1,137.3 mL infusion    LORazepam (ATIVAN) injection 0.5-1 mg    LORazepam (ATIVAN) tablet 0.5-1 mg    losartan (COZAAR) tablet 100 mg    melatonin tablet 1 mg    meperidine (DEMEROL) injection 25 mg    [START ON 11/19/2023] mesna (MESNEX) 2,910 mg in sodium chloride 0.9 % 1,079.1 mL infusion    methylPREDNISolone sodium succinate (solu-MEDROL) injection 125 mg    naloxone (NARCAN) injection 0.2 mg    Or    naloxone (NARCAN) injection 0.4 mg    Or    naloxone (NARCAN) injection 0.2 mg    Or    naloxone (NARCAN) injection 0.4 mg    nystatin (MYCOSTATIN) ointment    omeprazole (PriLOSEC) CR capsule 20 mg    ondansetron (ZOFRAN) tablet 8 mg    oxyCODONE (ROXICODONE) tablet 5-10 mg    polyethylene glycol (MIRALAX) Packet 17 g    pravastatin (PRAVACHOL) tablet 40 mg    pregabalin (LYRICA) capsule 50 mg    prochlorperazine (COMPAZINE) injection 5 mg    Or     prochlorperazine (COMPAZINE) tablet 5 mg    Or    prochlorperazine (COMPAZINE) suppository 12.5 mg    psyllium (METAMUCIL/KONSYL) Packet 1 packet    senna-docusate (SENOKOT-S/PERICOLACE) 8.6-50 MG per tablet 1 tablet    Or    senna-docusate (SENOKOT-S/PERICOLACE) 8.6-50 MG per tablet 2 tablet    sodium chloride 0.9% BOLUS 500 mL    thiamine (B-1) tablet 100 mg              Labs & Studies: I personally reviewed the following studies:  Lab on 11/14/2023   Component Date Value Ref Range Status    Sodium 11/14/2023 139  135 - 145 mmol/L Final    Reference intervals for this test were updated on 09/26/2023 to more accurately reflect our healthy population. There may be differences in the flagging of prior results with similar values performed with this method. Interpretation of those prior results can be made in the context of the updated reference intervals.     Potassium 11/14/2023 4.8  3.4 - 5.3 mmol/L Final    Carbon Dioxide (CO2) 11/14/2023 25  22 - 29 mmol/L Final    Anion Gap 11/14/2023 10  7 - 15 mmol/L Final    Urea Nitrogen 11/14/2023 9.5  8.0 - 23.0 mg/dL Final    Creatinine 11/14/2023 0.73  0.51 - 0.95 mg/dL Final    GFR Estimate 11/14/2023 90  >60 mL/min/1.73m2 Final    Calcium 11/14/2023 9.6  8.8 - 10.2 mg/dL Final    Chloride 11/14/2023 104  98 - 107 mmol/L Final    Glucose 11/14/2023 111 (H)  70 - 99 mg/dL Final    Alkaline Phosphatase 11/14/2023 154 (H)  35 - 104 U/L Final    AST 11/14/2023 20  0 - 45 U/L Final    Reference intervals for this test were updated on 6/12/2023 to more accurately reflect our healthy population. There may be differences in the flagging of prior results with similar values performed with this method. Interpretation of those prior results can be made in the context of the updated reference intervals.    ALT 11/14/2023 14  0 - 50 U/L Final    Reference intervals for this test were updated on 6/12/2023 to more accurately reflect our healthy population. There may be differences  in the flagging of prior results with similar values performed with this method. Interpretation of those prior results can be made in the context of the updated reference intervals.      Protein Total 11/14/2023 7.2  6.4 - 8.3 g/dL Final    Albumin 11/14/2023 4.2  3.5 - 5.2 g/dL Final    Bilirubin Total 11/14/2023 0.4  <=1.2 mg/dL Final    WBC Count 11/14/2023 7.5  4.0 - 11.0 10e3/uL Final    RBC Count 11/14/2023 3.34 (L)  3.80 - 5.20 10e6/uL Final    Hemoglobin 11/14/2023 8.8 (L)  11.7 - 15.7 g/dL Final    Hematocrit 11/14/2023 28.1 (L)  35.0 - 47.0 % Final    MCV 11/14/2023 84  78 - 100 fL Final    MCH 11/14/2023 26.3 (L)  26.5 - 33.0 pg Final    MCHC 11/14/2023 31.3 (L)  31.5 - 36.5 g/dL Final    RDW 11/14/2023 17.7 (H)  10.0 - 15.0 % Final    Platelet Count 11/14/2023 229  150 - 450 10e3/uL Final    % Neutrophils 11/14/2023 74  % Final    % Lymphocytes 11/14/2023 16  % Final    % Monocytes 11/14/2023 7  % Final    % Eosinophils 11/14/2023 1  % Final    % Basophils 11/14/2023 1  % Final    % Immature Granulocytes 11/14/2023 1  % Final    NRBCs per 100 WBC 11/14/2023 0  <1 /100 Final    Absolute Neutrophils 11/14/2023 5.6  1.6 - 8.3 10e3/uL Final    Absolute Lymphocytes 11/14/2023 1.2  0.8 - 5.3 10e3/uL Final    Absolute Monocytes 11/14/2023 0.5  0.0 - 1.3 10e3/uL Final    Absolute Eosinophils 11/14/2023 0.0  0.0 - 0.7 10e3/uL Final    Absolute Basophils 11/14/2023 0.1  0.0 - 0.2 10e3/uL Final    Absolute Immature Granulocytes 11/14/2023 0.1  <=0.4 10e3/uL Final    Absolute NRBCs 11/14/2023 0.0  10e3/uL Final             IMAGING:  Reviewed her PET/CT from October        Assessment & Plan:   Mirta Cardenas is a 68 year old female with locally advanced STS here for cycle 2 of Ifos/doxil/mesna        Recommendations:   - Chemotherapy plans:      Day 1 - Doxil 40mg/m2  Days 1-5 CIVI Ifosfamide 1500mg/m2  Day 5-6 Mesna 1500mg/m2     Nausea prophylaxis - Emend/Dexamethasone 10mg today  Zofran 8mg q8 hours  Prn  compazine/Ativan     - Please order the following labs Labs today ok to proceed - check daily cbc w diff and CMP and Mg and Phos.      - [facility] contacted to schedule close oncologic follow up within 1 week after expected discharge.   Will need neulasta injection after discharge (Scheduled for 11/21 at [facility])- labs 3x/ week after discharge.      # CLL - stable     #Pain - taking lyrica.  Did not tolerate Cymbalta.  Ok to use ibuprofen but need to be sure renal function is ok.    #Neurotoxicity - Monitor for ifosfamide neurotoxicity - neurochecks daily during Ifosfamide.      #DVT prophy - would use lovenox 40 mg daily.      # replace phosphorous    # twitch in hand.  Increase thiamine per Dr. Jimenez's recommendation.           Thank you for allowing us to participate in this patient's care.       Sincerely,      Josue Mccarty MD  Professor  [facility]  [phone].     I spent 35 minutes with the patient more than 50% of which was in counseling and coordination of care.

## 2023-11-16 NOTE — PROGRESS NOTES
Care Management Follow Up    Length of Stay (days): 2  Expected Discharge Date: 11/20/2023  Concerns to be Addressed:  discharge planning     Patient plan of care discussed at interdisciplinary rounds: Yes  Anticipated Discharge Disposition:  Home   Anticipated Discharge Services:  NA  Anticipated Discharge DME:  NA  Patient/family educated on Medicare website which has current facility and service quality ratings:  NA  Education Provided on the Discharge Plan:  yes  Patient/Family in Agreement with the Plan:  yes  Referrals Placed by CM/SW:  NA  Private pay costs discussed: Not applicable    Additional Information:  During morning huddle, RNCC was notified pt will need Neulasta injection on either 11/21 or 11/22. RNCC called [facility] and confirmed infusion appointment on 11/21 was for Neulasta.     Nam Delgado RN BSN  5A RN Care Coordinator   Ph: [phone]  Pager: [phone]

## 2023-11-16 NOTE — PLAN OF CARE
Goal Outcome Evaluation:      Plan of Care Reviewed With: patient    Overall Patient Progress: no change     VSS, afebrile. C/o pain in R leg, given Oxycodone x1. D2 Ifosfamide infusing through PICC, tolerating well. No s/s ifos toxicity. Voiding adequately. UAL. Continue w/ POC.

## 2023-11-16 NOTE — PROGRESS NOTES
Ifosfamide Toxicity Assessment      Neuro asssessment completed Yes, significant findings were n/a     Patient is Alert & Oriented x4. There are NO signs of lethargy, confusion or hallucinations.     Patient is having new incontinence of bowel or bladder NO       Pincer Grasp intact YES    Tremors baseline     -- Monitor for s/sx of ifosfamide toxicity: hallucinations, AMS, emotional lability, somnolence, myoclonic jerks, tremors, coordination difficulties, etc. If these occur, please call the fellow on call for recommendations

## 2023-11-16 NOTE — PROGRESS NOTES
Ifosfamide Toxicity Assessment      Neuro asssessment completed Yes, significant findings were none.      Patient is Alert & Oriented x4. There are no signs of lethargy, confusion or hallucinations.     Patient is having new incontinence of bowel or bladder: No.       Pincer Grasp intact: Yes    Tremors: None    Provider was notified: No. No signs or symptoms of toxicity.     -- Monitor for s/sx of ifosfamide toxicity: hallucinations, AMS, emotional lability, somnolence, myoclonic jerks, tremors, coordination difficulties, etc. If these occur, please call the fellow on call for recommendations

## 2023-11-16 NOTE — PROGRESS NOTES
"[facility]    Progress Note - Medicine Service, RON[facility] TEAM 2       Date of Admission:  11/14/2023    Assessment & Plan   Mirta Cardenas is a 68 year old female admitted on 11/14/2023. She has a history of soft tissue sarcoma of the leg being admitted for cycle 2 of scheduled chemotherapy. Tolerated cycle 1 without issues. Oncology managing chemotherapy. Will monitory closely for toxicity and side effects. Currently on Day 3.     #Soft tissue sarcoma, from RLE   Chemotherapy as per Oncology   Day 1 - Doxil 40mg/m2  Days 1-5 CIVI Ifosfamide 1500mg/m2  Day 5-6 Mesna 1500mg/m2  - Tylenol prn   - Oxycodone 5-10 mg q4hr prn      #HTN:   PTA on losartan and chlorthalidone BP stable  - Continue PTA losartan   - Discontinued chlorthalidone    #CKDIII  - Baseline creatinine 0.6-0.8, stable this admission   - Monitor renal function, avoid NSAIDs where possible     #Psoriasis  - No active flare currently. Continue PTA topicals prn (pt would prefer to use her home supply if possible but meds reordered here). Follow up OP with dermatology 12/2023 as planned    #Pruritic Rash   - Steroid creams available prn     #Hypophosphatemia   - RN managed phos ordered    #GERD   - Continue PPI     #HLD  - Continue statin          Diet: Regular Diet Adult    DVT Prophylaxis: Enoxaparin (Lovenox) SQ  Branham Catheter: Not present  Fluids: None, patient taking PO  Lines: PRESENT      PICC 11/14/23 Double Lumen Right Brachial vein medial Chemotherapy-Site Assessment: WDL      Cardiac Monitoring: None  Code Status: Full Code      Clinically Significant Risk Factors              # Hypoalbuminemia: Lowest albumin = 3.4 g/dL at 11/16/2023  4:46 AM, will monitor as appropriate     # Hypertension: Noted on problem list        # Obesity: Estimated body mass index is 31.46 kg/m  as calculated from the following:    Height as of this encounter: 1.555 m (5' 1.22\").    Weight as of this encounter: 76.1 kg " "(167 lb 11.2 oz)., PRESENT ON ADMISSION     # Asthma: noted on problem list        Disposition Plan     Expected Discharge Date: 11/20/2023        Discharge Comments: chemo - needs neulasta 11/21 or 11/22        The patient's care was discussed with the Attending Physician, Dr. Harrison .    Tawnya Barroso MD  Medicine Service, [address]  Securely message with [employer] (more info)  Text page via [employer] Paging/Directory   See signed in provider for up to date coverage information  ______________________________________________________________________    Interval History   No acute overnight events. Weight is up 11lb since admission, feels mildly \"puffy\" in her legs but otherwise has not noticed this. Leg pain is controlled with her oxyocodone. Some constipation today but stooled w/ bowel meds. Mildly itchy rash on chest. No other complaints.     Physical Exam   Vital Signs: Temp: 98.3  F (36.8  C) Temp src: Oral BP: 135/83 Pulse: 76   Resp: 18 SpO2: 97 % O2 Device: None (Room air)    Weight: 167 lbs 11.2 oz  General: Well-appearing, resting comfortably, pleasant   HEENT: PERRLA, EOMi, mucous membranes are moist   Pulm/Resp: clear breath sounds bilaterally without rhonchi, crackles or wheeze, breathing non-labored  CV: RRR, no murmurs, rubs, gallops   Abdomen: soft, non-distended, non-tender   Ext: Moves all 4 extremities without difficulty, no pitting edema, 2+ distal pulses   Neuro: Alert and answering questions appropriately    Skin: Warm and well perfused. Raised scattered urticarial lesions over the chest wall. Improved rash on back from yesterday     Medical Decision Making       Please see A&P for additional details of medical decision making.      Data     I have personally reviewed the following data over the past 24 hrs:    7.2  \   7.1 (L)   / 165     141 103 10.5 /  112 (H)   4.2 32 (H) 0.81 \     ALT: 12 AST: 13 AP: 109 TBILI: <0.2   ALB: 3.4 (L) " TOT PROTEIN: 5.8 (L) LIPASE: N/A

## 2023-11-16 NOTE — PROGRESS NOTES
Nursing Focus: Chemotherapy  D: Positive blood return via PICC. Insertion site is clean/dry/intact, dressing intact with no complaints of pain.  Urine output is recorded in intake in Doc Flowsheet.    I: Premedications given per order (see electronic medical administration record). Dose #2 of Ifosfamide/Mesna started to infuse over 24 hours. Reviewed pt teaching on chemotherapy side effects.  Pt denies need for further teaching. Chemotherapy double checked per protocol by two chemotherapy competent RN's.   A: Tolerating procedure well. Denies nausea and or pain.   P: Continue to monitor urine output and symptoms of nausea. Screen for symptoms of toxicity.

## 2023-11-16 NOTE — PLAN OF CARE
Goal Outcome Evaluation:      Plan of Care Reviewed With: patient    Overall Patient Progress: no changeOverall Patient Progress: no change  Pt afebrile with stable vs. Continues to receive Day 2 Ifosfamide/Mesna CIVI. Initial ICANS assessment normal in morning, but midday pt c/o left hand twitching. Slight, Occasional twitch present. Oncology PA Prabha Villeda in house assessed pt and she notified primary Oncologist Dr. Jimenez who instructed to continue with Ifosfamide infusion, increase thiamine to TID and monitor. Page sent to update Dr. Mccarty- (consulting inpt oncologist, who saw pt early in morning).Wt is increased 11lb since admission. Pt asymptomatic. Medical team notified. Continues to receive MIVF with chemo at 125cc/hr. Denies nausea. Adequate urine output. Resolution of yesterday's constipation. Pain adequately controlled with oxycodone and lyrica.

## 2023-11-16 NOTE — PROGRESS NOTES
Ifosfamide Toxicity Assessment       Patient is Alert & Oriented x4. There are NO signs of lethargy, confusion or hallucinations      Patient is having new incontinence of bowel or bladder No.                   Pincer Grasp intact YES     Tremors present YES     Provider was notified of changes TREMOR . Interventions include THIAMINE frequency increased. Continue Ifosfamide infusion and continue to monitor.      Will continue to monitor for s/sx of ifosfamide toxicity: hallucinations, AMS, emotional lability, somnolence, myoclonic jerks, tremors, coordination difficulties. If these occur, please call the APPs/Attending on days and fellow on-call for evening and nights for recommendations and further instruction.

## 2023-11-16 NOTE — PROVIDER NOTIFICATION
Notified Dr Mccarty from Oncology Mirta Machado has begun slight twitch in her hand. I had Oncology PA Prabha Villeda assess her, she contacted Dr. Jimenez who said to increase thiamine and continue ifosfamide infusion and monitor.

## 2023-11-17 LAB
ABO/RH(D): NORMAL
ALBUMIN SERPL BCG-MCNC: 3.3 G/DL (ref 3.5–5.2)
ALP SERPL-CCNC: 111 U/L (ref 40–150)
ALT SERPL W P-5'-P-CCNC: 13 U/L (ref 0–50)
ANION GAP SERPL CALCULATED.3IONS-SCNC: 6 MMOL/L (ref 7–15)
ANTIBODY SCREEN: NEGATIVE
AST SERPL W P-5'-P-CCNC: 12 U/L (ref 0–45)
BASOPHILS # BLD AUTO: 0 10E3/UL (ref 0–0.2)
BASOPHILS NFR BLD AUTO: 1 %
BILIRUB SERPL-MCNC: 0.2 MG/DL
BLD PROD TYP BPU: NORMAL
BLOOD COMPONENT TYPE: NORMAL
BUN SERPL-MCNC: 9.8 MG/DL (ref 8–23)
CALCIUM SERPL-MCNC: 8.7 MG/DL (ref 8.8–10.2)
CHLORIDE SERPL-SCNC: 103 MMOL/L (ref 98–107)
CODING SYSTEM: NORMAL
CREAT SERPL-MCNC: 0.88 MG/DL (ref 0.51–0.95)
CROSSMATCH: NORMAL
DEPRECATED HCO3 PLAS-SCNC: 32 MMOL/L (ref 22–29)
EGFRCR SERPLBLD CKD-EPI 2021: 72 ML/MIN/1.73M2
EOSINOPHIL # BLD AUTO: 0.1 10E3/UL (ref 0–0.7)
EOSINOPHIL NFR BLD AUTO: 2 %
ERYTHROCYTE [DISTWIDTH] IN BLOOD BY AUTOMATED COUNT: 18.6 % (ref 10–15)
GLUCOSE SERPL-MCNC: 99 MG/DL (ref 70–99)
HCT VFR BLD AUTO: 23.4 % (ref 35–47)
HGB BLD-MCNC: 7.3 G/DL (ref 11.7–15.7)
IMM GRANULOCYTES # BLD: 0 10E3/UL
IMM GRANULOCYTES NFR BLD: 0 %
ISSUE DATE AND TIME: NORMAL
LYMPHOCYTES # BLD AUTO: 1 10E3/UL (ref 0.8–5.3)
LYMPHOCYTES NFR BLD AUTO: 21 %
MAGNESIUM SERPL-MCNC: 1.7 MG/DL (ref 1.7–2.3)
MCH RBC QN AUTO: 26.9 PG (ref 26.5–33)
MCHC RBC AUTO-ENTMCNC: 31.2 G/DL (ref 31.5–36.5)
MCV RBC AUTO: 86 FL (ref 78–100)
MONOCYTES # BLD AUTO: 0.5 10E3/UL (ref 0–1.3)
MONOCYTES NFR BLD AUTO: 11 %
NEUTROPHILS # BLD AUTO: 3.1 10E3/UL (ref 1.6–8.3)
NEUTROPHILS NFR BLD AUTO: 65 %
NRBC # BLD AUTO: 0 10E3/UL
NRBC BLD AUTO-RTO: 0 /100
PHOSPHATE SERPL-MCNC: 3.8 MG/DL (ref 2.5–4.5)
PLATELET # BLD AUTO: 135 10E3/UL (ref 150–450)
POTASSIUM SERPL-SCNC: 4.6 MMOL/L (ref 3.4–5.3)
PROT SERPL-MCNC: 5.6 G/DL (ref 6.4–8.3)
RBC # BLD AUTO: 2.71 10E6/UL (ref 3.8–5.2)
SODIUM SERPL-SCNC: 141 MMOL/L (ref 135–145)
SPECIMEN EXPIRATION DATE: NORMAL
UNIT ABO/RH: NORMAL
UNIT NUMBER: NORMAL
UNIT STATUS: NORMAL
UNIT TYPE ISBT: 6200
WBC # BLD AUTO: 4.7 10E3/UL (ref 4–11)

## 2023-11-17 PROCEDURE — 250N000009 HC RX 250: Performed by: STUDENT IN AN ORGANIZED HEALTH CARE EDUCATION/TRAINING PROGRAM

## 2023-11-17 PROCEDURE — 258N000003 HC RX IP 258 OP 636: Performed by: STUDENT IN AN ORGANIZED HEALTH CARE EDUCATION/TRAINING PROGRAM

## 2023-11-17 PROCEDURE — 86901 BLOOD TYPING SEROLOGIC RH(D): CPT

## 2023-11-17 PROCEDURE — 80053 COMPREHEN METABOLIC PANEL: CPT

## 2023-11-17 PROCEDURE — 250N000013 HC RX MED GY IP 250 OP 250 PS 637

## 2023-11-17 PROCEDURE — 120N000002 HC R&B MED SURG/OB UMMC

## 2023-11-17 PROCEDURE — 84100 ASSAY OF PHOSPHORUS: CPT

## 2023-11-17 PROCEDURE — 99232 SBSQ HOSP IP/OBS MODERATE 35: CPT | Mod: GC | Performed by: INTERNAL MEDICINE

## 2023-11-17 PROCEDURE — 250N000011 HC RX IP 250 OP 636: Performed by: STUDENT IN AN ORGANIZED HEALTH CARE EDUCATION/TRAINING PROGRAM

## 2023-11-17 PROCEDURE — 250N000011 HC RX IP 250 OP 636: Mod: JZ

## 2023-11-17 PROCEDURE — 85025 COMPLETE CBC W/AUTO DIFF WBC: CPT

## 2023-11-17 PROCEDURE — 250N000013 HC RX MED GY IP 250 OP 250 PS 637: Performed by: PHYSICIAN ASSISTANT

## 2023-11-17 PROCEDURE — 86923 COMPATIBILITY TEST ELECTRIC: CPT

## 2023-11-17 PROCEDURE — 83735 ASSAY OF MAGNESIUM: CPT

## 2023-11-17 PROCEDURE — P9016 RBC LEUKOCYTES REDUCED: HCPCS

## 2023-11-17 PROCEDURE — 99231 SBSQ HOSP IP/OBS SF/LOW 25: CPT | Performed by: INTERNAL MEDICINE

## 2023-11-17 RX ADMIN — POTASSIUM CHLORIDE: 2 INJECTION, SOLUTION, CONCENTRATE INTRAVENOUS at 19:12

## 2023-11-17 RX ADMIN — LOSARTAN POTASSIUM 100 MG: 100 TABLET, FILM COATED ORAL at 19:24

## 2023-11-17 RX ADMIN — ONDANSETRON HYDROCHLORIDE 8 MG: 8 TABLET, FILM COATED ORAL at 00:15

## 2023-11-17 RX ADMIN — MESNA 2910 MG: 100 INJECTION, SOLUTION INTRAVENOUS at 19:45

## 2023-11-17 RX ADMIN — ENOXAPARIN SODIUM 40 MG: 40 INJECTION SUBCUTANEOUS at 19:26

## 2023-11-17 RX ADMIN — OXYCODONE HYDROCHLORIDE 5 MG: 5 TABLET ORAL at 11:34

## 2023-11-17 RX ADMIN — PRAVASTATIN SODIUM 40 MG: 40 TABLET ORAL at 19:24

## 2023-11-17 RX ADMIN — THIAMINE HCL TAB 100 MG 100 MG: 100 TAB at 15:41

## 2023-11-17 RX ADMIN — THIAMINE HCL TAB 100 MG 100 MG: 100 TAB at 19:24

## 2023-11-17 RX ADMIN — POLYETHYLENE GLYCOL 3350 17 G: 17 POWDER, FOR SOLUTION ORAL at 09:38

## 2023-11-17 RX ADMIN — PREGABALIN 50 MG: 50 CAPSULE ORAL at 09:35

## 2023-11-17 RX ADMIN — PSYLLIUM HUSK 1 PACKET: 3.4 POWDER ORAL at 19:30

## 2023-11-17 RX ADMIN — DOCUSATE SODIUM AND SENNOSIDES 1 TABLET: 8.6; 5 TABLET, FILM COATED ORAL at 09:38

## 2023-11-17 RX ADMIN — ONDANSETRON HYDROCHLORIDE 8 MG: 8 TABLET, FILM COATED ORAL at 16:59

## 2023-11-17 RX ADMIN — PREGABALIN 50 MG: 50 CAPSULE ORAL at 19:24

## 2023-11-17 RX ADMIN — ONDANSETRON HYDROCHLORIDE 8 MG: 8 TABLET, FILM COATED ORAL at 09:36

## 2023-11-17 RX ADMIN — THIAMINE HCL TAB 100 MG 100 MG: 100 TAB at 09:35

## 2023-11-17 RX ADMIN — OMEPRAZOLE 20 MG: 20 CAPSULE, DELAYED RELEASE ORAL at 09:35

## 2023-11-17 ASSESSMENT — ACTIVITIES OF DAILY LIVING (ADL)
ADLS_ACUITY_SCORE: 20

## 2023-11-17 NOTE — PROGRESS NOTES
[facility]    Progress Note - Medicine Service, RONY TEAM 2       Date of Admission:  11/14/2023    Assessment & Plan   Mirta Cardenas is a 68 year old female admitted on 11/14/2023. She has a history of soft tissue sarcoma of the leg being admitted for cycle 2 of scheduled chemotherapy. Tolerated cycle 1 without issues. Oncology managing chemotherapy. Will monitory closely for toxicity and side effects. Currently on Day 4.     #Soft tissue sarcoma, from RLE   Chemotherapy as per Oncology   Day 1 - Doxil 40mg/m2  Days 1-5 CIVI Ifosfamide 1500mg/m2  Day 5-6 Mesna 1500mg/m2  - Tylenol prn   - Oxycodone 5-10 mg q4hr prn     #Chronic Anemia   - Transfuse 1U today as per Dr. Mccarty, given on active chemo Oncology would prefer Hgb >8.0     #HTN:   PTA on losartan and chlorthalidone BP stable  - Continue PTA losartan   - Discontinued chlorthalidone    #CKDIII  - Baseline creatinine 0.6-0.8, stable this admission   - Monitor renal function, avoid NSAIDs where possible     #Psoriasis  - No active flare currently. Continue PTA topicals prn (pt would prefer to use her home supply if possible but meds reordered here). Follow up OP with dermatology 12/2023 as planned    #Pruritic Rash   - Steroid creams available prn     #Hypophosphatemia   - RN managed phos ordered    #GERD   - Continue PPI     #HLD  - Continue statin          Diet: Regular Diet Adult    DVT Prophylaxis: Enoxaparin (Lovenox) SQ  Branham Catheter: Not present  Fluids: None, patient taking PO  Lines: PRESENT      PICC 11/14/23 Double Lumen Right Brachial vein medial Chemotherapy-Site Assessment: WDL      Cardiac Monitoring: None  Code Status: Full Code        Disposition Plan      Expected Discharge Date: 11/20/2023        Discharge Comments: chemo - needs neulasta 11/21 or 11/22        The patient's care was discussed with the Attending Physician, Dr. Harrison .    Tawnya Barroso MD  Medicine Service, [facility]  TEAM 2  [facility]  Securely message with [employer] (more info)  Text page via [employer] Paging/Directory   See signed in provider for up to date coverage information  ______________________________________________________________________    Interval History   No acute overnight events overnight. Has some ongoing leg swelling this AM but does not feel she needs any diuretics at this time, weight is down 1kg since yesterday. Has had some hand twitching which is attributed to Ifos per Onc team, is not overly bothersome to her but is new compared to prior.     Physical Exam   Vital Signs: Temp: 98.6  F (37  C) Temp src: Oral BP: (!) 160/85 Pulse: 87   Resp: 18 SpO2: 99 % O2 Device: None (Room air)    Weight: 176 lbs 6.4 oz  General: Well-appearing, resting comfortably, pleasant   HEENT: PERRLA, EOMi, mucous membranes are moist   Pulm/Resp: clear breath sounds bilaterally without rhonchi, crackles or wheeze, breathing non-labored  CV: RRR, no murmurs, rubs, gallops   Abdomen: soft, non-distended, non-tender   Ext: Moves all 4 extremities without difficulty, no pitting edema, 2+ distal pulses   Neuro: Alert and answering questions appropriately    Skin: Warm and well perfused. Raised scattered urticarial lesions over the chest wall. Improved rash on back from yesterday     Medical Decision Making       Please see A&P for additional details of medical decision making.      Data     I have personally reviewed the following data over the past 24 hrs:    4.7  \   7.3 (L)   / 135 (L)     141 103 9.8 /  99   4.6 32 (H) 0.88 \     ALT: 13 AST: 12 AP: 111 TBILI: 0.2   ALB: 3.3 (L) TOT PROTEIN: 5.6 (L) LIPASE: N/A

## 2023-11-17 NOTE — PROGRESS NOTES
Nursing Focus: Chemotherapy  D: Positive blood return via PICC . Insertion site is clean/dry/intact, dressing intact with no complaints of pain.  Pre infusion assessment documented in Flowsheet (if applicable).    I: Premedications given per order (see electronic medical administration record). Dose #3 of Ifosfamide started to infuse over 24 hours. Reviewed pt teaching on chemotherapy side effects.  Pt denies need for further teaching. Chemotherapy double checked per protocol by two chemotherapy competent RN's.   A: Tolerating infusion well. Denies nausea and or pain.   P: Continue to monitor urine output and symptoms of nausea. Screen for symptoms of toxicity.

## 2023-11-17 NOTE — PROGRESS NOTES
"Assumed care of patient at 1500.    Blood pressure 134/66, pulse 88, temperature 98.4  F (36.9  C), temperature source Oral, resp. rate 18, height 1.555 m (5' 1.22\"), weight 81 kg (178 lb 8 oz), SpO2 96%, not currently breastfeeding.    AVSS, alert and oriented x4.  No witnessed episodes of left hand twitching/tremor.  Pt tolerating Ifosfamide, see nursing notes from chemo RN.  PICC infusing in both lumens.  Rates 4/10 pain to posterior RLE.  Oxycodone 5mg prn given x2, see MAR.  States Tylenol is not effective.  Voiding, no BM this shift.  Cont with POC.  "

## 2023-11-17 NOTE — PROGRESS NOTES
Ifosfamide Toxicity Assessment      Neuro asssessment completed YES, significant findings were NA     Patient is Alert & Oriented x4. There are NO signs of lethargy, confusion or hallucinations.     Patient is having new incontinence of bowel or bladder NO.       Pincer Grasp intact YES    Tremors NONE (noted at this time)    Provider was notified NO      -- Monitor for s/sx of ifosfamide toxicity: hallucinations, AMS, emotional lability, somnolence, myoclonic jerks, tremors, coordination difficulties, etc. If these occur, please call the fellow on call for recommendations

## 2023-11-17 NOTE — PROGRESS NOTES
Ifosfamide Toxicity Assessment      Patient is Alert & Oriented x4. There are signs of lethargy, confusion or hallucinations No.     Patient is having new incontinence of bowel or bladder Yes.       Pincer Grasp intact Yes    Tremors present Yes; please explain: slightly more than yesterday afternoon      Provider was notified Yes (yesterday)of changes tremor . Interventions include thiamine increased to TID.     Will continue to monitor for s/sx of ifosfamide toxicity: hallucinations, AMS, emotional lability, somnolence, myoclonic jerks, tremors, coordination difficulties. If these occur, please call the APPs/Attending on days and fellow on-call for evening and nights for recommendations and further instruction.

## 2023-11-17 NOTE — PLAN OF CARE
Goal Outcome Evaluation:      Plan of Care Reviewed With: patient, spouse    Overall Patient Progress: no changeOverall Patient Progress: no change  Pt afebrile with stable vs except a little hypertensive. Continues to receive Day 3 Ifosfamide/Mesna CIVI. ICANS assessment  continues with bilateral hand twitching, more than yesterday, but remainder of assessment intact. Pt does endorse being more tired than usual, but is not lethargic. Continues to receive thiamine TID. Wt decreased 2lb since yesterday (had increased wt yesterday). Received 1 unit PRBCs for hgb 7.3, per 1x order. Continues to receive MIVF at 125cc/hr. Denies nausea. Adequate urine output. Pain adequately controlled with oxycodone and lyrica.

## 2023-11-17 NOTE — PROGRESS NOTES
Ifosfamide Toxicity Assessment      Neuro asssessment completed YES, significant findings were NA     Patient is Alert & Oriented x4. There are NO signs of lethargy, confusion or hallucinations.     Patient is having new incontinence of bowel or bladder NO.       Pincer Grasp intact Yes    Tremors Yes (providers aware, witnesses slight hand drop when I had her hold onto the thermometer)     Provider was notified No, already increased thiamine to TID.     -- Monitor for s/sx of ifosfamide toxicity: hallucinations, AMS, emotional lability, somnolence, myoclonic jerks, tremors, coordination difficulties, etc. If these occur, please call the fellow on call for recommendations

## 2023-11-17 NOTE — PLAN OF CARE
Goal Outcome Evaluation:  [id number]: Pt A&Ox4 with VSS on RA, no complaints overnight. CIVI Ifos infusing without issue, continuing to have minimal hand tremor, team already aware. MIVF infusing @ 125mL/hr. Sleeping between cares and able to make needs known. Continue with POC.

## 2023-11-18 LAB
ALBUMIN SERPL BCG-MCNC: 3.7 G/DL (ref 3.5–5.2)
ALP SERPL-CCNC: 135 U/L (ref 40–150)
ALT SERPL W P-5'-P-CCNC: 12 U/L (ref 0–50)
ANION GAP SERPL CALCULATED.3IONS-SCNC: 8 MMOL/L (ref 7–15)
AST SERPL W P-5'-P-CCNC: 14 U/L (ref 0–45)
BASOPHILS # BLD AUTO: 0 10E3/UL (ref 0–0.2)
BASOPHILS NFR BLD AUTO: 0 %
BILIRUB SERPL-MCNC: 0.3 MG/DL
BUN SERPL-MCNC: 8.9 MG/DL (ref 8–23)
CALCIUM SERPL-MCNC: 9.1 MG/DL (ref 8.8–10.2)
CHLORIDE SERPL-SCNC: 103 MMOL/L (ref 98–107)
CREAT SERPL-MCNC: 0.81 MG/DL (ref 0.51–0.95)
DEPRECATED HCO3 PLAS-SCNC: 28 MMOL/L (ref 22–29)
EGFRCR SERPLBLD CKD-EPI 2021: 80 ML/MIN/1.73M2
EOSINOPHIL # BLD AUTO: 0.1 10E3/UL (ref 0–0.7)
EOSINOPHIL NFR BLD AUTO: 2 %
ERYTHROCYTE [DISTWIDTH] IN BLOOD BY AUTOMATED COUNT: 17.4 % (ref 10–15)
GLUCOSE SERPL-MCNC: 103 MG/DL (ref 70–99)
HCT VFR BLD AUTO: 29.4 % (ref 35–47)
HGB BLD-MCNC: 9.5 G/DL (ref 11.7–15.7)
IMM GRANULOCYTES # BLD: 0 10E3/UL
IMM GRANULOCYTES NFR BLD: 1 %
LYMPHOCYTES # BLD AUTO: 1.1 10E3/UL (ref 0.8–5.3)
LYMPHOCYTES NFR BLD AUTO: 19 %
MAGNESIUM SERPL-MCNC: 2 MG/DL (ref 1.7–2.3)
MCH RBC QN AUTO: 26.4 PG (ref 26.5–33)
MCHC RBC AUTO-ENTMCNC: 32.3 G/DL (ref 31.5–36.5)
MCV RBC AUTO: 82 FL (ref 78–100)
MONOCYTES # BLD AUTO: 0.5 10E3/UL (ref 0–1.3)
MONOCYTES NFR BLD AUTO: 8 %
NEUTROPHILS # BLD AUTO: 4.3 10E3/UL (ref 1.6–8.3)
NEUTROPHILS NFR BLD AUTO: 70 %
NRBC # BLD AUTO: 0 10E3/UL
NRBC BLD AUTO-RTO: 0 /100
PHOSPHATE SERPL-MCNC: 3.4 MG/DL (ref 2.5–4.5)
PLATELET # BLD AUTO: 181 10E3/UL (ref 150–450)
POTASSIUM SERPL-SCNC: 4.5 MMOL/L (ref 3.4–5.3)
PROT SERPL-MCNC: 6.3 G/DL (ref 6.4–8.3)
RBC # BLD AUTO: 3.6 10E6/UL (ref 3.8–5.2)
SODIUM SERPL-SCNC: 139 MMOL/L (ref 135–145)
WBC # BLD AUTO: 6 10E3/UL (ref 4–11)

## 2023-11-18 PROCEDURE — 250N000013 HC RX MED GY IP 250 OP 250 PS 637: Performed by: PHYSICIAN ASSISTANT

## 2023-11-18 PROCEDURE — 250N000009 HC RX 250: Performed by: STUDENT IN AN ORGANIZED HEALTH CARE EDUCATION/TRAINING PROGRAM

## 2023-11-18 PROCEDURE — 83735 ASSAY OF MAGNESIUM: CPT

## 2023-11-18 PROCEDURE — 258N000003 HC RX IP 258 OP 636: Performed by: STUDENT IN AN ORGANIZED HEALTH CARE EDUCATION/TRAINING PROGRAM

## 2023-11-18 PROCEDURE — 84100 ASSAY OF PHOSPHORUS: CPT

## 2023-11-18 PROCEDURE — 120N000002 HC R&B MED SURG/OB UMMC

## 2023-11-18 PROCEDURE — 85025 COMPLETE CBC W/AUTO DIFF WBC: CPT

## 2023-11-18 PROCEDURE — 80053 COMPREHEN METABOLIC PANEL: CPT

## 2023-11-18 PROCEDURE — 250N000011 HC RX IP 250 OP 636: Mod: JZ

## 2023-11-18 PROCEDURE — 99231 SBSQ HOSP IP/OBS SF/LOW 25: CPT | Performed by: INTERNAL MEDICINE

## 2023-11-18 PROCEDURE — 99232 SBSQ HOSP IP/OBS MODERATE 35: CPT | Mod: GC | Performed by: INTERNAL MEDICINE

## 2023-11-18 PROCEDURE — 250N000013 HC RX MED GY IP 250 OP 250 PS 637

## 2023-11-18 PROCEDURE — 250N000011 HC RX IP 250 OP 636: Mod: JZ | Performed by: STUDENT IN AN ORGANIZED HEALTH CARE EDUCATION/TRAINING PROGRAM

## 2023-11-18 RX ADMIN — ONDANSETRON HYDROCHLORIDE 8 MG: 8 TABLET, FILM COATED ORAL at 16:11

## 2023-11-18 RX ADMIN — MESNA 2910 MG: 100 INJECTION, SOLUTION INTRAVENOUS at 20:24

## 2023-11-18 RX ADMIN — PREGABALIN 50 MG: 50 CAPSULE ORAL at 20:08

## 2023-11-18 RX ADMIN — THIAMINE HCL TAB 100 MG 100 MG: 100 TAB at 13:13

## 2023-11-18 RX ADMIN — OXYCODONE HYDROCHLORIDE 5 MG: 5 TABLET ORAL at 13:13

## 2023-11-18 RX ADMIN — PRAVASTATIN SODIUM 40 MG: 40 TABLET ORAL at 20:08

## 2023-11-18 RX ADMIN — ONDANSETRON HYDROCHLORIDE 8 MG: 8 TABLET, FILM COATED ORAL at 00:04

## 2023-11-18 RX ADMIN — POTASSIUM CHLORIDE: 2 INJECTION, SOLUTION, CONCENTRATE INTRAVENOUS at 04:12

## 2023-11-18 RX ADMIN — THIAMINE HCL TAB 100 MG 100 MG: 100 TAB at 09:30

## 2023-11-18 RX ADMIN — ENOXAPARIN SODIUM 40 MG: 40 INJECTION SUBCUTANEOUS at 20:13

## 2023-11-18 RX ADMIN — OXYCODONE HYDROCHLORIDE 5 MG: 5 TABLET ORAL at 20:09

## 2023-11-18 RX ADMIN — LOSARTAN POTASSIUM 100 MG: 100 TABLET, FILM COATED ORAL at 20:08

## 2023-11-18 RX ADMIN — PREGABALIN 50 MG: 50 CAPSULE ORAL at 09:30

## 2023-11-18 RX ADMIN — ONDANSETRON HYDROCHLORIDE 8 MG: 8 TABLET, FILM COATED ORAL at 09:30

## 2023-11-18 RX ADMIN — ONDANSETRON HYDROCHLORIDE 8 MG: 8 TABLET, FILM COATED ORAL at 23:48

## 2023-11-18 RX ADMIN — OXYCODONE HYDROCHLORIDE 10 MG: 5 TABLET ORAL at 06:07

## 2023-11-18 RX ADMIN — OMEPRAZOLE 20 MG: 20 CAPSULE, DELAYED RELEASE ORAL at 09:30

## 2023-11-18 RX ADMIN — POLYETHYLENE GLYCOL 3350 17 G: 17 POWDER, FOR SOLUTION ORAL at 09:31

## 2023-11-18 RX ADMIN — POTASSIUM CHLORIDE: 2 INJECTION, SOLUTION, CONCENTRATE INTRAVENOUS at 19:59

## 2023-11-18 RX ADMIN — DOCUSATE SODIUM AND SENNOSIDES 2 TABLET: 8.6; 5 TABLET, FILM COATED ORAL at 09:31

## 2023-11-18 RX ADMIN — PSYLLIUM HUSK 1 PACKET: 3.4 POWDER ORAL at 20:13

## 2023-11-18 RX ADMIN — THIAMINE HCL TAB 100 MG 100 MG: 100 TAB at 20:08

## 2023-11-18 RX ADMIN — POTASSIUM CHLORIDE: 2 INJECTION, SOLUTION, CONCENTRATE INTRAVENOUS at 11:13

## 2023-11-18 ASSESSMENT — ACTIVITIES OF DAILY LIVING (ADL)
ADLS_ACUITY_SCORE: 20

## 2023-11-18 NOTE — PROGRESS NOTES
Ifosfamide Toxicity Assessment      Patient is Alert & Oriented x4. There are signs of lethargy, confusion or hallucinations No.     Patient is having new incontinence of bowel or bladder No.       Pincer Grasp intact No    Tremors present Yes; please explain: at baseline      Provider was notified No of changes No . Interventions include Monitor.     Will continue to monitor for s/sx of ifosfamide toxicity: hallucinations, AMS, emotional lability, somnolence, myoclonic jerks, tremors, coordination difficulties. If these occur, please call the APPs/Attending on days and fellow on-call for evening and nights for recommendations and further instruction.

## 2023-11-18 NOTE — PROGRESS NOTES
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  She had a recent x-ray which shows a destructive lesion in the midportion R LE in between the fibula and the tibia. She is experiencing significant pain, she is restless during the visit due to pain. She is currently taking both Advil and Tylenol and oxycodone for pain, with minimal control, feels ibuprofen is what helps the most. She has been alternating all pain medications without optimizing doses of them.      C1D1 10/17/23     C2D1 11-14-23     She tolerated cycle 1 very well. Had few complications.       ___________________________________________________________________     Subjective & Interval History:    Pt was feeling will this morning. She has 3/10 pain in the right lower leg.  No fatigue, depression or anxiety.  She has some pain in her R calf - she does think lyrica is helping.  She is otherwise feeling well. She is eating well. ROS is otherwise negative.      Physical Exam:    BP (!) 158/77 (BP Location: Left arm)   Pulse 98   Temp 98.4  F (36.9  C) (Oral)   Resp 16   Ht 1.555 m (5' 1.22\")   Wt 80 kg (176 lb 6.4 oz)   SpO2 95%   BMI 33.09 kg/m    General: alert and cooperative, lying in bed, no acute distress  HEENT: sclera anicteric, EOMI, MMM  Neck: supple, normal ROM  CV: RRR, no murmurs  Resp: CTAB, normal respiratory effort on ambient air  GI: soft, non-tender, non-distended, bowel sounds present and normoactive  MSK: " warm and well-perfused, normal tone  Skin: no rashes on limited exam, no jaundice  Neuro: Alert and interactive, moves all extremities equally, no focal deficits.  Finger to nose intact bilaterally.  Extremities:  Right lower leg is increased in circumference vs. Left. Decreased right lower leg swelling this 6 AM but she was in bed. No erythema or warmth.      Past Medical History:  Past Medical History           Past Medical History:   Diagnosis Date    Asthma      Asthma       reactive airway disease, per patient    Blood transfusion      Cancer (H)       chronic lymphocytic leukemia    History of transfusion      Hypertension      Hypertension      Leukemia, chronic lymphocytic (H)      Malignant neoplasm (H)       CLL            Past Surgical History:  Past Surgical History             Past Surgical History:   Procedure Laterality Date    ABDOMEN SURGERY         c section *3    APPENDECTOMY        C/SECTION, CLASSICAL   ,,     , Classical    COLONOSCOPY        HYSTERECTOMY        HYSTERECTOMY, PAP NO LONGER INDICATED   1998    PICC DOUBLE LUMEN PLACEMENT Left 10/17/2023     left basilic 4 fr dl power picc 41 cm    RELEASE CARPAL TUNNEL   2012     Procedure:RELEASE CARPAL TUNNEL; Right Carpal Tunnel Release & Right Ring A1 Pulley Release; Surgeon:SERGEY HEATON; Location:[facility]    RELEASE TRIGGER FINGER   2012     Procedure:RELEASE TRIGGER FINGER; Surgeon:SERGEY HEATON; Location:[facility]    RELEASE TRIGGER FINGER   10/12/2012     Procedure: RELEASE TRIGGER FINGER;  Right Thumb A1 Pulley Release;  Surgeon: Sergey Heaton MD;  Location: [facility]    SALPINGO OOPHORECTOMY,R/L/ULICES   2008     right               Family History  Family History             Family History   Problem Relation Age of Onset    Hypertension Brother      Heart Disease Brother 65         bypass    Cancer Sister           multiple myloma    Obesity Son      Obesity Son      Hypertension Son       Osteoporosis Mother      Melanoma No family hx of            Current Facility-Administered Medications   Medication    acetaminophen (TYLENOL) tablet 650 mg    albuterol (PROVENTIL HFA/VENTOLIN HFA) inhaler    albuterol (PROVENTIL) neb solution 2.5 mg    calcipotriene (DOVONOX) 0.005 % cream    Chemotherapy Infusing-Continuous Infusion    clobetasol (TEMOVATE) 0.05 % cream    D5W 1,000 mL with potassium chloride 20 mEq/L, sodium bicarbonate 100 mEq/L infusion    [Held by provider] dextrose 5 % flush PRE/POST medication    [Held by provider] dextrose 5 % flush PRE/POST medication    diphenhydrAMINE (BENADRYL) injection 50 mg    docusate sodium (COLACE) capsule 100 mg    enoxaparin ANTICOAGULANT (LOVENOX) injection 40 mg    EPINEPHrine PF (ADRENALIN) injection 0.3 mg    famotidine (PEPCID) injection 20 mg    [Held by provider] filgrastim-aafi (NIVESTYM) 390 mcg in D5W 26 mL infusion    hydrocortisone (CORTAID) 1 % cream    Ifosfamide (IFEX) 2,910 mg, mesna (MESNEX) 2,910 mg in sodium chloride 0.9 % 1,137.3 mL infusion    LORazepam (ATIVAN) injection 0.5-1 mg    LORazepam (ATIVAN) tablet 0.5-1 mg    losartan (COZAAR) tablet 100 mg    melatonin tablet 1 mg    meperidine (DEMEROL) injection 25 mg    [START ON 11/19/2023] mesna (MESNEX) 2,910 mg in sodium chloride 0.9 % 1,079.1 mL infusion    methylPREDNISolone sodium succinate (solu-MEDROL) injection 125 mg    naloxone (NARCAN) injection 0.2 mg    Or    naloxone (NARCAN) injection 0.4 mg    Or    naloxone (NARCAN) injection 0.2 mg    Or    naloxone (NARCAN) injection 0.4 mg    nystatin (MYCOSTATIN) ointment    omeprazole (PriLOSEC) CR capsule 20 mg    ondansetron (ZOFRAN) tablet 8 mg    oxyCODONE (ROXICODONE) tablet 5-10 mg    polyethylene glycol (MIRALAX) Packet 17 g    pravastatin (PRAVACHOL) tablet 40 mg    pregabalin (LYRICA) capsule 50 mg    prochlorperazine (COMPAZINE) injection 5 mg    Or    prochlorperazine (COMPAZINE) tablet 5 mg    Or    prochlorperazine  (COMPAZINE) suppository 12.5 mg    psyllium (METAMUCIL/KONSYL) Packet 1 packet    senna-docusate (SENOKOT-S/PERICOLACE) 8.6-50 MG per tablet 1 tablet    Or    senna-docusate (SENOKOT-S/PERICOLACE) 8.6-50 MG per tablet 2 tablet    sodium chloride 0.9% BOLUS 500 mL    thiamine (B-1) tablet 100 mg             Labs & Studies: I personally reviewed the following studies:  Recent Results (from the past 24 hour(s))   Phosphorus    Collection Time: 11/17/23  5:21 AM   Result Value Ref Range    Phosphorus 3.8 2.5 - 4.5 mg/dL   Magnesium    Collection Time: 11/17/23  5:21 AM   Result Value Ref Range    Magnesium 1.7 1.7 - 2.3 mg/dL   Comprehensive metabolic panel    Collection Time: 11/17/23  5:21 AM   Result Value Ref Range    Sodium 141 135 - 145 mmol/L    Potassium 4.6 3.4 - 5.3 mmol/L    Carbon Dioxide (CO2) 32 (H) 22 - 29 mmol/L    Anion Gap 6 (L) 7 - 15 mmol/L    Urea Nitrogen 9.8 8.0 - 23.0 mg/dL    Creatinine 0.88 0.51 - 0.95 mg/dL    GFR Estimate 72 >60 mL/min/1.73m2    Calcium 8.7 (L) 8.8 - 10.2 mg/dL    Chloride 103 98 - 107 mmol/L    Glucose 99 70 - 99 mg/dL    Alkaline Phosphatase 111 40 - 150 U/L    AST 12 0 - 45 U/L    ALT 13 0 - 50 U/L    Protein Total 5.6 (L) 6.4 - 8.3 g/dL    Albumin 3.3 (L) 3.5 - 5.2 g/dL    Bilirubin Total 0.2 <=1.2 mg/dL   CBC with platelets and differential    Collection Time: 11/17/23  5:21 AM   Result Value Ref Range    WBC Count 4.7 4.0 - 11.0 10e3/uL    RBC Count 2.71 (L) 3.80 - 5.20 10e6/uL    Hemoglobin 7.3 (L) 11.7 - 15.7 g/dL    Hematocrit 23.4 (L) 35.0 - 47.0 %    MCV 86 78 - 100 fL    MCH 26.9 26.5 - 33.0 pg    MCHC 31.2 (L) 31.5 - 36.5 g/dL    RDW 18.6 (H) 10.0 - 15.0 %    Platelet Count 135 (L) 150 - 450 10e3/uL    % Neutrophils 65 %    % Lymphocytes 21 %    % Monocytes 11 %    % Eosinophils 2 %    % Basophils 1 %    % Immature Granulocytes 0 %    NRBCs per 100 WBC 0 <1 /100    Absolute Neutrophils 3.1 1.6 - 8.3 10e3/uL    Absolute Lymphocytes 1.0 0.8 - 5.3 10e3/uL     Absolute Monocytes 0.5 0.0 - 1.3 10e3/uL    Absolute Eosinophils 0.1 0.0 - 0.7 10e3/uL    Absolute Basophils 0.0 0.0 - 0.2 10e3/uL    Absolute Immature Granulocytes 0.0 <=0.4 10e3/uL    Absolute NRBCs 0.0 10e3/uL   Adult Type and Screen    Collection Time: 11/17/23  5:21 AM   Result Value Ref Range    ABO/RH(D) A POS     Antibody Screen Negative Negative    SPECIMEN EXPIRATION DATE [id number]    Prepare red blood cells (unit)    Collection Time: 11/17/23  8:05 AM   Result Value Ref Range    Blood Component Type Red Blood Cells     Product Code T2062B07     Unit Status Transfused     Unit Number [id number]     CROSSMATCH Compatible     CODING SYSTEM DVXH794     ISSUE DATE AND TIME [id number]     UNIT ABO/RH A+     UNIT TYPE ISBT 6200           IMAGING:  Reviewed her PET/CT from October        Assessment & Plan:   Mirta Cardenas is a 68 year old female with locally advanced STS here for cycle 2 of Ifos/doxil/mesna        Recommendations:   - Chemotherapy plans:      Day 1 - Doxil 40mg/m2  Days 1-5 CIVI Ifosfamide 1500mg/m2  Day 5-6 Mesna 1500mg/m2     Nausea prophylaxis - Emend/Dexamethasone 10mg today  Zofran 8mg q8 hours  Prn compazine/Ativan     - Please order the following labs Labs today ok to proceed - check daily cbc w diff and CMP and Mg and Phos.      - [facility] contacted to schedule close oncologic follow up within 1 week after expected discharge.   Will need neulasta injection after discharge (Scheduled for 11/21 at [facility])- labs 3x/ week after discharge.      # CLL - stable     #Pain - taking lyrica.  Did not tolerate Cymbalta.  Ok to use ibuprofen but need to be sure renal function is ok.    #Neurotoxicity - Monitor for ifosfamide neurotoxicity - neurochecks daily during Ifosfamide.      #DVT prophy - would use lovenox 40 mg daily.      # replace phosphorous     # twitch in hand.  Increase thiamine per Dr. Jimenez's recommendation.    Transfuse 1 unit of PRBC for Hb of 7.3 and receiving  chemotherapy           Thank you for allowing us to participate in this patient's care.       Sincerely,      Josue Mccarty MD  Professor  [facility]  [phone].     I spent 35 minutes with the patient more than 50% of which was in counseling and coordination of care.

## 2023-11-18 NOTE — PROGRESS NOTES
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  She had a recent x-ray which shows a destructive lesion in the midportion R LE in between the fibula and the tibia. She is experiencing significant pain, she is restless during the visit due to pain. She is currently taking both Advil and Tylenol and oxycodone for pain, with minimal control, feels ibuprofen is what helps the most. She has been alternating all pain medications without optimizing doses of them.      C1D1 10/17/23     C2D1 11-14-23     She tolerated cycle 1 very well. Had few complications.       ___________________________________________________________________     Subjective & Interval History:    Pt was feeling will this morning. She has 3/10 pain in the right lower leg.  No fatigue, depression or anxiety.  She has some pain in her R calf - she does think lyrica is helping.  She is otherwise feeling well. She is eating well. ROS is otherwise negative.  She does have some shaking of both hands when she uses her phone.  No other neurological complaints.  Some constipation but she did have a bowel movement yesterday.     Physical Exam:    BP (!) 163/90 (BP Location: Left arm)   Pulse 84   Temp 98.5  F (36.9  C) (Oral)   Resp 18   Ht 1.555 m (5' 1.22\")   Wt 78.4 kg (172 lb 12.8 oz)   SpO2 99%   BMI 32.42 kg/m    General: alert and cooperative, lying in bed, no acute distress  HEENT: sclera anicteric, EOMI, MMM  Neck: supple, " normal ROM  CV: RRR, no murmurs  Resp: CTAB, normal respiratory effort on ambient air  GI: soft, non-tender, non-distended, bowel sounds present and normoactive  MSK: warm and well-perfused, normal tone  Skin: no rashes on limited exam, no jaundice  Neuro: Alert and interactive, moves all extremities equally, no focal deficits.  She is able to hold her hands out without any tremor.   Extremities:  Right lower leg is increased in circumference vs. Left. Swelling unchanged. No erythema or warmth.      Past Medical History:  Past Medical History           Past Medical History:   Diagnosis Date    Asthma      Asthma       reactive airway disease, per patient    Blood transfusion      Cancer (H)       chronic lymphocytic leukemia    History of transfusion      Hypertension      Hypertension      Leukemia, chronic lymphocytic (H)      Malignant neoplasm (H)       CLL            Past Surgical History:  Past Surgical History             Past Surgical History:   Procedure Laterality Date    ABDOMEN SURGERY         c section *3    APPENDECTOMY        C/SECTION, CLASSICAL   ,,     , Classical    COLONOSCOPY        HYSTERECTOMY        HYSTERECTOMY, PAP NO LONGER INDICATED   1998    PICC DOUBLE LUMEN PLACEMENT Left 10/17/2023     left basilic 4 fr dl power picc 41 cm    RELEASE CARPAL TUNNEL   2012     Procedure:RELEASE CARPAL TUNNEL; Right Carpal Tunnel Release & Right Ring A1 Pulley Release; Surgeon:SERGEY HEATON; Location:[facility] OR    RELEASE TRIGGER FINGER   2012     Procedure:RELEASE TRIGGER FINGER; Surgeon:SERGEY HEATON; Location:[facility]    RELEASE TRIGGER FINGER   10/12/2012     Procedure: RELEASE TRIGGER FINGER;  Right Thumb A1 Pulley Release;  Surgeon: Sergey Heaton MD;  Location: [facility] OR    SALPINGO OOPHORECTOMY,R/L/ULICES   2008     right               Family History  Family History             Family History   Problem Relation Age of Onset    Hypertension Brother       Heart Disease Brother 65         bypass    Cancer Sister           multiple myloma    Obesity Son      Obesity Son      Hypertension Son      Osteoporosis Mother      Melanoma No family hx of           Current Facility-Administered Medications   Medication    acetaminophen (TYLENOL) tablet 650 mg    albuterol (PROVENTIL HFA/VENTOLIN HFA) inhaler    albuterol (PROVENTIL) neb solution 2.5 mg    calcipotriene (DOVONOX) 0.005 % cream    Chemotherapy Infusing-Continuous Infusion    clobetasol (TEMOVATE) 0.05 % cream    D5W 1,000 mL with potassium chloride 20 mEq/L, sodium bicarbonate 100 mEq/L infusion    [Held by provider] dextrose 5 % flush PRE/POST medication    [Held by provider] dextrose 5 % flush PRE/POST medication    diphenhydrAMINE (BENADRYL) injection 50 mg    docusate sodium (COLACE) capsule 100 mg    enoxaparin ANTICOAGULANT (LOVENOX) injection 40 mg    EPINEPHrine PF (ADRENALIN) injection 0.3 mg    famotidine (PEPCID) injection 20 mg    [Held by provider] filgrastim-aafi (NIVESTYM) 390 mcg in D5W 26 mL infusion    hydrocortisone (CORTAID) 1 % cream    Ifosfamide (IFEX) 2,910 mg, mesna (MESNEX) 2,910 mg in sodium chloride 0.9 % 1,137.3 mL infusion    LORazepam (ATIVAN) injection 0.5-1 mg    LORazepam (ATIVAN) tablet 0.5-1 mg    losartan (COZAAR) tablet 100 mg    melatonin tablet 1 mg    meperidine (DEMEROL) injection 25 mg    [START ON 11/19/2023] mesna (MESNEX) 2,910 mg in sodium chloride 0.9 % 1,079.1 mL infusion    methylPREDNISolone sodium succinate (solu-MEDROL) injection 125 mg    naloxone (NARCAN) injection 0.2 mg    Or    naloxone (NARCAN) injection 0.4 mg    Or    naloxone (NARCAN) injection 0.2 mg    Or    naloxone (NARCAN) injection 0.4 mg    nystatin (MYCOSTATIN) ointment    omeprazole (PriLOSEC) CR capsule 20 mg    ondansetron (ZOFRAN) tablet 8 mg    oxyCODONE (ROXICODONE) tablet 5-10 mg    polyethylene glycol (MIRALAX) Packet 17 g    pravastatin (PRAVACHOL) tablet 40 mg    pregabalin (LYRICA)  capsule 50 mg    prochlorperazine (COMPAZINE) injection 5 mg    Or    prochlorperazine (COMPAZINE) tablet 5 mg    Or    prochlorperazine (COMPAZINE) suppository 12.5 mg    psyllium (METAMUCIL/KONSYL) Packet 1 packet    senna-docusate (SENOKOT-S/PERICOLACE) 8.6-50 MG per tablet 1 tablet    Or    senna-docusate (SENOKOT-S/PERICOLACE) 8.6-50 MG per tablet 2 tablet    sodium chloride 0.9% BOLUS 500 mL    thiamine (B-1) tablet 100 mg             Labs & Studies: I personally reviewed the following studies:  Recent Results (from the past 24 hour(s))   Phosphorus    Collection Time: 11/18/23  5:44 AM   Result Value Ref Range    Phosphorus 3.4 2.5 - 4.5 mg/dL   Magnesium    Collection Time: 11/18/23  5:44 AM   Result Value Ref Range    Magnesium 2.0 1.7 - 2.3 mg/dL   Comprehensive metabolic panel    Collection Time: 11/18/23  5:44 AM   Result Value Ref Range    Sodium 139 135 - 145 mmol/L    Potassium 4.5 3.4 - 5.3 mmol/L    Carbon Dioxide (CO2) 28 22 - 29 mmol/L    Anion Gap 8 7 - 15 mmol/L    Urea Nitrogen 8.9 8.0 - 23.0 mg/dL    Creatinine 0.81 0.51 - 0.95 mg/dL    GFR Estimate 80 >60 mL/min/1.73m2    Calcium 9.1 8.8 - 10.2 mg/dL    Chloride 103 98 - 107 mmol/L    Glucose 103 (H) 70 - 99 mg/dL    Alkaline Phosphatase 135 40 - 150 U/L    AST 14 0 - 45 U/L    ALT 12 0 - 50 U/L    Protein Total 6.3 (L) 6.4 - 8.3 g/dL    Albumin 3.7 3.5 - 5.2 g/dL    Bilirubin Total 0.3 <=1.2 mg/dL   CBC with platelets and differential    Collection Time: 11/18/23  5:44 AM   Result Value Ref Range    WBC Count 6.0 4.0 - 11.0 10e3/uL    RBC Count 3.60 (L) 3.80 - 5.20 10e6/uL    Hemoglobin 9.5 (L) 11.7 - 15.7 g/dL    Hematocrit 29.4 (L) 35.0 - 47.0 %    MCV 82 78 - 100 fL    MCH 26.4 (L) 26.5 - 33.0 pg    MCHC 32.3 31.5 - 36.5 g/dL    RDW 17.4 (H) 10.0 - 15.0 %    Platelet Count 181 150 - 450 10e3/uL    % Neutrophils 70 %    % Lymphocytes 19 %    % Monocytes 8 %    % Eosinophils 2 %    % Basophils 0 %    % Immature Granulocytes 1 %    NRBCs per  100 WBC 0 <1 /100    Absolute Neutrophils 4.3 1.6 - 8.3 10e3/uL    Absolute Lymphocytes 1.1 0.8 - 5.3 10e3/uL    Absolute Monocytes 0.5 0.0 - 1.3 10e3/uL    Absolute Eosinophils 0.1 0.0 - 0.7 10e3/uL    Absolute Basophils 0.0 0.0 - 0.2 10e3/uL    Absolute Immature Granulocytes 0.0 <=0.4 10e3/uL    Absolute NRBCs 0.0 10e3/uL           IMAGING:  Reviewed her PET/CT from October        Assessment & Plan:   Mirta Cardenas is a 68 year old female with locally advanced STS here for cycle 2 of Ifos/doxil/mesna        Recommendations:   - Chemotherapy plans:      Day 1 - Doxil 40mg/m2  Days 1-5 CIVI Ifosfamide 1500mg/m2  Day 5-6 Mesna 1500mg/m2     Nausea prophylaxis - Emend/Dexamethasone 10mg today  Zofran 8mg q8 hours  Prn compazine/Ativan     - Please order the following labs Labs today ok to proceed - check daily cbc w diff and CMP and Mg and Phos.      - [facility] contacted to schedule close oncologic follow up within 1 week after expected discharge.   Will need neulasta injection after discharge (Scheduled for 11/21 at [facility])- labs 3x/ week after discharge.      # CLL - stable     #Pain - taking lyrica.  Did not tolerate Cymbalta.  Ok to use ibuprofen but need to be sure renal function is ok.    #Neurotoxicity - Monitor for ifosfamide neurotoxicity - neurochecks daily during Ifosfamide.      #DVT prophy - would use lovenox 40 mg daily.      # replace phosphorous     # twitch in hand unchanged.  Increase thiamine per Dr. Jimenez's recommendation.    # plan discharge for Tuesday    # monitor BP- has been trending up       Thank you for allowing us to participate in this patient's care.       Sincerely,      Josue Mccarty MD  Professor  [facility]  [phone].     I spent 35 minutes with the patient more than 50% of which was in counseling and coordination of care.

## 2023-11-18 NOTE — PLAN OF CARE
Goal Outcome Evaluation:      Plan of Care Reviewed With: patient, spouse    Overall Patient Progress: no changeOverall Patient Progress: no change  Pt afebrile with stable vs except a little hypertensive. Continues to receive Day 4 Ifosfamide/Mesna CIVI. ICANS assessment  continues with bilateral hand twitching, NO more than yesterday, and even perhaps a little less. Remainder of assessment intact. Continues to receive thiamine TID. Wt decreased another 2lb (had been increased. Continues to receive MIVF at 125cc/hr. Denies nausea. Adequate urine output. Pain adequately controlled with oxycodone and lyrica.

## 2023-11-18 NOTE — PROGRESS NOTES
Ifosfamide Toxicity Assessment      Patient is Alert & Oriented x4. There are signs of lethargy, confusion or hallucinations No.     Patient is having new incontinence of bowel or bladder No.       Pincer Grasp intact Yes.    Tremors present No.    Provider was notified No of changes.    Will continue to monitor for s/sx of ifosfamide toxicity: hallucinations, AMS, emotional lability, somnolence, myoclonic jerks, tremors, coordination difficulties. If these occur, please call the APPs/Attending on days and fellow on-call for evening and nights for recommendations and further instruction.

## 2023-11-18 NOTE — PLAN OF CARE
Goal Outcome Evaluation:    [id number]    VSS. Alert and oriented. Denies pain. Bag #4 of Ifos running. Ifos assessment intact. No acute events, continue with POC.

## 2023-11-18 NOTE — PROGRESS NOTES
Ifosfamide Toxicity Assessment      Patient is Alert & Oriented x4. There are signs of lethargy, confusion or hallucinations No.     Patient is having new incontinence of bowel or bladder No.       Pincer Grasp intact Yes    Tremors present No     Provider was notified No of changes  . Interventions include None.     Will continue to monitor for s/sx of ifosfamide toxicity: hallucinations, AMS, emotional lability, somnolence, myoclonic jerks, tremors, coordination difficulties. If these occur, please call the APPs/Attending on days and fellow on-call for evening and nights for recommendations and further instruction.

## 2023-11-18 NOTE — PLAN OF CARE
Goal Outcome Evaluation: HD3 A&Ox4, VSS on room air, up independently, voiding, BMs today. Eating and drinking well. ICANS shows very little tremor if any on assessment all other symptoms negative.     Plan: Complete Ifosfamide/mesna

## 2023-11-18 NOTE — PROGRESS NOTES
Ifosfamide Toxicity Assessment      Patient is Alert & Oriented x4  . There are signs of lethargy, confusion or hallucinations No.     Patient is having new incontinence of bowel or bladder No.       Pincer Grasp intact Yes    Tremors present Yes; please explain: bilateral hands, same as yesterday      Provider was notified No of changes (already aware)   . Interventions include thiamine already increased.     Will continue to monitor for s/sx of ifosfamide toxicity: hallucinations, AMS, emotional lability, somnolence, myoclonic jerks, tremors, coordination difficulties. If these occur, please call the APPs/Attending on days and fellow on-call for evening and nights for recommendations and further instruction.

## 2023-11-18 NOTE — PROGRESS NOTES
[facility]    Progress Note - Medicine Service, RONY TEAM 2       Date of Admission:  11/14/2023    Assessment & Plan   Mirta Cardenas is a 68 year old female admitted on 11/14/2023. She has a history of soft tissue sarcoma of the leg being admitted for cycle 2 of scheduled chemotherapy. Tolerated cycle 1 without issues. Oncology managing chemotherapy. Will monitory closely for toxicity and side effects. Currently on Day 4 (Started on 11/14).     #Soft tissue sarcoma, from RLE   Chemotherapy as per Oncology   Day 1 - Doxil 40mg/m2  Days 1-5 CIVI Ifosfamide 1500mg/m2  Day 5-6 Mesna 1500mg/m2  - Tylenol prn   - Oxycodone 5-10 mg q4hr prn     #Chronic Anemia   - Transfuse 1U today as per Dr. Mccarty, given on active chemo Oncology would prefer Hgb >8.0     #HTN:   PTA on losartan and chlorthalidone BP stable  - Continue PTA losartan   - Discontinued chlorthalidone    #CKDIII  - Baseline creatinine 0.6-0.8, stable this admission   - Monitor renal function, avoid NSAIDs where possible     #Psoriasis  - No active flare currently. Continue PTA topicals prn (pt would prefer to use her home supply if possible but meds reordered here). Follow up OP with dermatology 12/2023 as planned    #Pruritic Rash   - Steroid creams available prn     #Hypophosphatemia   - RN managed phos ordered    #GERD   - Continue PPI     #HLD  - Continue statin          Diet: Regular Diet Adult    DVT Prophylaxis: Enoxaparin (Lovenox) SQ  Branham Catheter: Not present  Fluids: None, patient taking PO  Lines: PRESENT      PICC 11/14/23 Double Lumen Right Brachial vein medial Chemotherapy-Site Assessment: WDL      Cardiac Monitoring: None  Code Status: Full Code        Disposition Plan      Expected Discharge Date: 11/20/2023,  3:00 PM      Discharge Comments: chemo - needs neulasta 11/21 or 11/22        The patient's care was discussed with the Attending Physician, Dr. Harrison .    Andrew Talavera,  MD  Medicine Service, RONY TEAM 2  [facility]  Securely message with [employer] (more info)  Text page via kompany Paging/Directory   See signed in provider for up to date coverage information  ______________________________________________________________________    Interval History   No acute overnight events overnight. Has some ongoing leg swelling this AM but does not feel she needs any diuretics at this time, weight is down 1kg since yesterday. Continues to have some hand twitching, but less than day prior     Physical Exam   Vital Signs: Temp: 98.5  F (36.9  C) Temp src: Oral BP: (!) 153/84 (pt was moving around before) Pulse: 90   Resp: 18 SpO2: 97 % O2 Device: None (Room air)    Weight: 172 lbs 12.8 oz  General: Well-appearing, resting comfortably, pleasant   HEENT: PERRLA, EOMi, mucous membranes are moist   Pulm/Resp: clear breath sounds bilaterally without rhonchi, crackles or wheeze, breathing non-labored  CV: RRR, no murmurs, rubs, gallops   Abdomen: soft, non-distended, non-tender   Ext: Moves all 4 extremities without difficulty, no pitting edema, 2+ distal pulses   Neuro: Alert and answering questions appropriately    Skin: Warm and well perfused. Raised scattered urticarial lesions over the chest wall. Improved rash on back from yesterday     Medical Decision Making       Please see A&P for additional details of medical decision making.      Data     I have personally reviewed the following data over the past 24 hrs:    6.0  \   9.5 (L)   / 181     139 103 8.9 /  103 (H)   4.5 28 0.81 \     ALT: 12 AST: 14 AP: 135 TBILI: 0.3   ALB: 3.7 TOT PROTEIN: 6.3 (L) LIPASE: N/A

## 2023-11-19 LAB
ALBUMIN SERPL BCG-MCNC: 3.9 G/DL (ref 3.5–5.2)
ALP SERPL-CCNC: 141 U/L (ref 40–150)
ALT SERPL W P-5'-P-CCNC: 12 U/L (ref 0–50)
ANION GAP SERPL CALCULATED.3IONS-SCNC: 9 MMOL/L (ref 7–15)
AST SERPL W P-5'-P-CCNC: 14 U/L (ref 0–45)
BASOPHILS # BLD AUTO: 0 10E3/UL (ref 0–0.2)
BASOPHILS NFR BLD AUTO: 1 %
BILIRUB SERPL-MCNC: 0.3 MG/DL
BUN SERPL-MCNC: 10.8 MG/DL (ref 8–23)
CALCIUM SERPL-MCNC: 9.3 MG/DL (ref 8.8–10.2)
CHLORIDE SERPL-SCNC: 104 MMOL/L (ref 98–107)
CREAT SERPL-MCNC: 0.87 MG/DL (ref 0.51–0.95)
DEPRECATED HCO3 PLAS-SCNC: 25 MMOL/L (ref 22–29)
EGFRCR SERPLBLD CKD-EPI 2021: 73 ML/MIN/1.73M2
EOSINOPHIL # BLD AUTO: 0.2 10E3/UL (ref 0–0.7)
EOSINOPHIL NFR BLD AUTO: 3 %
ERYTHROCYTE [DISTWIDTH] IN BLOOD BY AUTOMATED COUNT: 17.2 % (ref 10–15)
GLUCOSE SERPL-MCNC: 109 MG/DL (ref 70–99)
HCT VFR BLD AUTO: 30.7 % (ref 35–47)
HGB BLD-MCNC: 9.8 G/DL (ref 11.7–15.7)
IMM GRANULOCYTES # BLD: 0 10E3/UL
IMM GRANULOCYTES NFR BLD: 0 %
LYMPHOCYTES # BLD AUTO: 1.1 10E3/UL (ref 0.8–5.3)
LYMPHOCYTES NFR BLD AUTO: 21 %
MAGNESIUM SERPL-MCNC: 2.1 MG/DL (ref 1.7–2.3)
MCH RBC QN AUTO: 26.9 PG (ref 26.5–33)
MCHC RBC AUTO-ENTMCNC: 31.9 G/DL (ref 31.5–36.5)
MCV RBC AUTO: 84 FL (ref 78–100)
MONOCYTES # BLD AUTO: 0.3 10E3/UL (ref 0–1.3)
MONOCYTES NFR BLD AUTO: 6 %
NEUTROPHILS # BLD AUTO: 3.8 10E3/UL (ref 1.6–8.3)
NEUTROPHILS NFR BLD AUTO: 69 %
NRBC # BLD AUTO: 0 10E3/UL
NRBC BLD AUTO-RTO: 0 /100
PHOSPHATE SERPL-MCNC: 3.2 MG/DL (ref 2.5–4.5)
PLATELET # BLD AUTO: 208 10E3/UL (ref 150–450)
POTASSIUM SERPL-SCNC: 4.3 MMOL/L (ref 3.4–5.3)
PROT SERPL-MCNC: 6.7 G/DL (ref 6.4–8.3)
RBC # BLD AUTO: 3.64 10E6/UL (ref 3.8–5.2)
SODIUM SERPL-SCNC: 138 MMOL/L (ref 135–145)
WBC # BLD AUTO: 5.5 10E3/UL (ref 4–11)

## 2023-11-19 PROCEDURE — 85025 COMPLETE CBC W/AUTO DIFF WBC: CPT

## 2023-11-19 PROCEDURE — 83735 ASSAY OF MAGNESIUM: CPT

## 2023-11-19 PROCEDURE — 250N000011 HC RX IP 250 OP 636: Mod: JZ

## 2023-11-19 PROCEDURE — 84100 ASSAY OF PHOSPHORUS: CPT

## 2023-11-19 PROCEDURE — 250N000013 HC RX MED GY IP 250 OP 250 PS 637

## 2023-11-19 PROCEDURE — 120N000002 HC R&B MED SURG/OB UMMC

## 2023-11-19 PROCEDURE — 80053 COMPREHEN METABOLIC PANEL: CPT

## 2023-11-19 PROCEDURE — 250N000011 HC RX IP 250 OP 636: Performed by: STUDENT IN AN ORGANIZED HEALTH CARE EDUCATION/TRAINING PROGRAM

## 2023-11-19 PROCEDURE — 250N000009 HC RX 250: Performed by: STUDENT IN AN ORGANIZED HEALTH CARE EDUCATION/TRAINING PROGRAM

## 2023-11-19 PROCEDURE — 250N000013 HC RX MED GY IP 250 OP 250 PS 637: Performed by: PHYSICIAN ASSISTANT

## 2023-11-19 PROCEDURE — 258N000003 HC RX IP 258 OP 636: Performed by: STUDENT IN AN ORGANIZED HEALTH CARE EDUCATION/TRAINING PROGRAM

## 2023-11-19 PROCEDURE — 99232 SBSQ HOSP IP/OBS MODERATE 35: CPT | Mod: GC | Performed by: INTERNAL MEDICINE

## 2023-11-19 RX ADMIN — THIAMINE HCL TAB 100 MG 100 MG: 100 TAB at 19:43

## 2023-11-19 RX ADMIN — PSYLLIUM HUSK 1 PACKET: 3.4 POWDER ORAL at 19:41

## 2023-11-19 RX ADMIN — MESNA 2910 MG: 100 INJECTION, SOLUTION INTRAVENOUS at 21:31

## 2023-11-19 RX ADMIN — POLYETHYLENE GLYCOL 3350 17 G: 17 POWDER, FOR SOLUTION ORAL at 08:17

## 2023-11-19 RX ADMIN — DOCUSATE SODIUM AND SENNOSIDES 2 TABLET: 8.6; 5 TABLET, FILM COATED ORAL at 08:16

## 2023-11-19 RX ADMIN — PREGABALIN 50 MG: 50 CAPSULE ORAL at 08:16

## 2023-11-19 RX ADMIN — POTASSIUM CHLORIDE: 2 INJECTION, SOLUTION, CONCENTRATE INTRAVENOUS at 04:50

## 2023-11-19 RX ADMIN — ONDANSETRON HYDROCHLORIDE 8 MG: 8 TABLET, FILM COATED ORAL at 08:17

## 2023-11-19 RX ADMIN — ONDANSETRON HYDROCHLORIDE 8 MG: 8 TABLET, FILM COATED ORAL at 17:39

## 2023-11-19 RX ADMIN — THIAMINE HCL TAB 100 MG 100 MG: 100 TAB at 12:19

## 2023-11-19 RX ADMIN — CLOBETASOL PROPIONATE: 0.5 CREAM TOPICAL at 12:14

## 2023-11-19 RX ADMIN — POTASSIUM CHLORIDE: 2 INJECTION, SOLUTION, CONCENTRATE INTRAVENOUS at 20:41

## 2023-11-19 RX ADMIN — ENOXAPARIN SODIUM 40 MG: 40 INJECTION SUBCUTANEOUS at 19:45

## 2023-11-19 RX ADMIN — PRAVASTATIN SODIUM 40 MG: 40 TABLET ORAL at 19:43

## 2023-11-19 RX ADMIN — OMEPRAZOLE 20 MG: 20 CAPSULE, DELAYED RELEASE ORAL at 08:16

## 2023-11-19 RX ADMIN — POTASSIUM CHLORIDE: 2 INJECTION, SOLUTION, CONCENTRATE INTRAVENOUS at 08:17

## 2023-11-19 RX ADMIN — LOSARTAN POTASSIUM 100 MG: 100 TABLET, FILM COATED ORAL at 19:43

## 2023-11-19 RX ADMIN — THIAMINE HCL TAB 100 MG 100 MG: 100 TAB at 08:16

## 2023-11-19 RX ADMIN — PREGABALIN 50 MG: 50 CAPSULE ORAL at 19:43

## 2023-11-19 RX ADMIN — OXYCODONE HYDROCHLORIDE 5 MG: 5 TABLET ORAL at 10:13

## 2023-11-19 ASSESSMENT — ACTIVITIES OF DAILY LIVING (ADL)
ADLS_ACUITY_SCORE: 20

## 2023-11-19 NOTE — PROGRESS NOTES
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  She had a recent x-ray which shows a destructive lesion in the midportion R LE in between the fibula and the tibia. She is experiencing significant pain, she is restless during the visit due to pain. She is currently taking both Advil and Tylenol and oxycodone for pain, with minimal control, feels ibuprofen is what helps the most. She has been alternating all pain medications without optimizing doses of them.      C1D1 10/17/23     C2D1 11-14-23     She tolerated cycle 1 very well. Had few complications.       ___________________________________________________________________     Subjective & Interval History:    Pt was feeling will this morning. She has 3/10 pain in the right lower leg.  No fatigue, depression or anxiety.  She has some pain in her R calf - she does think lyrica is helping.  She is otherwise feeling well. She is eating well. ROS is otherwise negative.  She does have some shaking of both hands when she uses her phone.  She does have some difficulty focusing on tasks. No other neurological complaints.  Some constipation but she did have a bowel movement yesterday.       Physical Exam:    BP (!) 150/84 (BP Location: Left arm)   Pulse 87   Temp 98.4  F (36.9  C) (Oral)   Resp 18   Ht 1.555 m (5' 1.22\")   Wt 76.2 kg (168 lb)   SpO2 98%   BMI 31.52 kg/m    General: alert and cooperative, lying in bed, no acute distress  HEENT: " sclera anicteric, EOMI, MMM  Neck: supple, normal ROM  CV: RRR, no murmurs  Resp: CTAB, normal respiratory effort on ambient air  GI: soft, non-tender, non-distended, bowel sounds present and normoactive  MSK: warm and well-perfused, normal tone  Skin: no rashes on limited exam, no jaundice  Neuro: Alert and interactive, moves all extremities equally, no focal deficits.  She is able to hold her hands out without any tremor.   Extremities:  Right lower leg is increased in circumference vs. Left. Swelling unchanged. No erythema or warmth.      Past Medical History:  Past Medical History           Past Medical History:   Diagnosis Date    Asthma      Asthma       reactive airway disease, per patient    Blood transfusion      Cancer (H)       chronic lymphocytic leukemia    History of transfusion      Hypertension      Hypertension      Leukemia, chronic lymphocytic (H)      Malignant neoplasm (H)       CLL            Past Surgical History:  Past Surgical History             Past Surgical History:   Procedure Laterality Date    ABDOMEN SURGERY         c section *3    APPENDECTOMY        C/SECTION, CLASSICAL   ,,     , Classical    COLONOSCOPY        HYSTERECTOMY        HYSTERECTOMY, PAP NO LONGER INDICATED   1998    PICC DOUBLE LUMEN PLACEMENT Left 10/17/2023     left basilic 4 fr dl power picc 41 cm    RELEASE CARPAL TUNNEL   2012     Procedure:RELEASE CARPAL TUNNEL; Right Carpal Tunnel Release & Right Ring A1 Pulley Release; Surgeon:SERGEY HEATON; Location:[facility] OR    RELEASE TRIGGER FINGER   2012     Procedure:RELEASE TRIGGER FINGER; Surgeon:SERGEY HEATON; Location:[facility]    RELEASE TRIGGER FINGER   10/12/2012     Procedure: RELEASE TRIGGER FINGER;  Right Thumb A1 Pulley Release;  Surgeon: Sergey Heaton MD;  Location: [facility] OR    SALPINGO OOPHORECTOMY,R/L/ULICES   2008     right               Family History  Family History             Family History   Problem  Relation Age of Onset    Hypertension Brother      Heart Disease Brother 65         bypass    Cancer Sister           multiple myloma    Obesity Son      Obesity Son      Hypertension Son      Osteoporosis Mother      Melanoma No family hx of           Current Facility-Administered Medications   Medication    acetaminophen (TYLENOL) tablet 650 mg    albuterol (PROVENTIL HFA/VENTOLIN HFA) inhaler    albuterol (PROVENTIL) neb solution 2.5 mg    calcipotriene (DOVONOX) 0.005 % cream    Chemotherapy Infusing-Continuous Infusion    clobetasol (TEMOVATE) 0.05 % cream    D5W 1,000 mL with potassium chloride 20 mEq/L, sodium bicarbonate 100 mEq/L infusion    [Held by provider] dextrose 5 % flush PRE/POST medication    [Held by provider] dextrose 5 % flush PRE/POST medication    diphenhydrAMINE (BENADRYL) injection 50 mg    docusate sodium (COLACE) capsule 100 mg    enoxaparin ANTICOAGULANT (LOVENOX) injection 40 mg    EPINEPHrine PF (ADRENALIN) injection 0.3 mg    famotidine (PEPCID) injection 20 mg    [Held by provider] filgrastim-aafi (NIVESTYM) 390 mcg in D5W 26 mL infusion    hydrocortisone (CORTAID) 1 % cream    Ifosfamide (IFEX) 2,910 mg, mesna (MESNEX) 2,910 mg in sodium chloride 0.9 % 1,137.3 mL infusion    LORazepam (ATIVAN) injection 0.5-1 mg    LORazepam (ATIVAN) tablet 0.5-1 mg    losartan (COZAAR) tablet 100 mg    melatonin tablet 1 mg    meperidine (DEMEROL) injection 25 mg    mesna (MESNEX) 2,910 mg in sodium chloride 0.9 % 1,079.1 mL infusion    methylPREDNISolone sodium succinate (solu-MEDROL) injection 125 mg    naloxone (NARCAN) injection 0.2 mg    Or    naloxone (NARCAN) injection 0.4 mg    Or    naloxone (NARCAN) injection 0.2 mg    Or    naloxone (NARCAN) injection 0.4 mg    nystatin (MYCOSTATIN) ointment    omeprazole (PriLOSEC) CR capsule 20 mg    ondansetron (ZOFRAN) tablet 8 mg    oxyCODONE (ROXICODONE) tablet 5-10 mg    polyethylene glycol (MIRALAX) Packet 17 g    pravastatin (PRAVACHOL) tablet 40  mg    pregabalin (LYRICA) capsule 50 mg    prochlorperazine (COMPAZINE) injection 5 mg    Or    prochlorperazine (COMPAZINE) tablet 5 mg    Or    prochlorperazine (COMPAZINE) suppository 12.5 mg    psyllium (METAMUCIL/KONSYL) Packet 1 packet    senna-docusate (SENOKOT-S/PERICOLACE) 8.6-50 MG per tablet 1 tablet    Or    senna-docusate (SENOKOT-S/PERICOLACE) 8.6-50 MG per tablet 2 tablet    sodium chloride 0.9% BOLUS 500 mL    thiamine (B-1) tablet 100 mg           Labs & Studies: I personally reviewed the following studies:  Recent Results (from the past 24 hour(s))   Phosphorus    Collection Time: 11/19/23  4:45 AM   Result Value Ref Range    Phosphorus 3.2 2.5 - 4.5 mg/dL   Magnesium    Collection Time: 11/19/23  4:45 AM   Result Value Ref Range    Magnesium 2.1 1.7 - 2.3 mg/dL   Comprehensive metabolic panel    Collection Time: 11/19/23  4:45 AM   Result Value Ref Range    Sodium 138 135 - 145 mmol/L    Potassium 4.3 3.4 - 5.3 mmol/L    Carbon Dioxide (CO2) 25 22 - 29 mmol/L    Anion Gap 9 7 - 15 mmol/L    Urea Nitrogen 10.8 8.0 - 23.0 mg/dL    Creatinine 0.87 0.51 - 0.95 mg/dL    GFR Estimate 73 >60 mL/min/1.73m2    Calcium 9.3 8.8 - 10.2 mg/dL    Chloride 104 98 - 107 mmol/L    Glucose 109 (H) 70 - 99 mg/dL    Alkaline Phosphatase 141 40 - 150 U/L    AST 14 0 - 45 U/L    ALT 12 0 - 50 U/L    Protein Total 6.7 6.4 - 8.3 g/dL    Albumin 3.9 3.5 - 5.2 g/dL    Bilirubin Total 0.3 <=1.2 mg/dL   CBC with platelets and differential    Collection Time: 11/19/23  4:45 AM   Result Value Ref Range    WBC Count 5.5 4.0 - 11.0 10e3/uL    RBC Count 3.64 (L) 3.80 - 5.20 10e6/uL    Hemoglobin 9.8 (L) 11.7 - 15.7 g/dL    Hematocrit 30.7 (L) 35.0 - 47.0 %    MCV 84 78 - 100 fL    MCH 26.9 26.5 - 33.0 pg    MCHC 31.9 31.5 - 36.5 g/dL    RDW 17.2 (H) 10.0 - 15.0 %    Platelet Count 208 150 - 450 10e3/uL    % Neutrophils 69 %    % Lymphocytes 21 %    % Monocytes 6 %    % Eosinophils 3 %    % Basophils 1 %    % Immature Granulocytes  0 %    NRBCs per 100 WBC 0 <1 /100    Absolute Neutrophils 3.8 1.6 - 8.3 10e3/uL    Absolute Lymphocytes 1.1 0.8 - 5.3 10e3/uL    Absolute Monocytes 0.3 0.0 - 1.3 10e3/uL    Absolute Eosinophils 0.2 0.0 - 0.7 10e3/uL    Absolute Basophils 0.0 0.0 - 0.2 10e3/uL    Absolute Immature Granulocytes 0.0 <=0.4 10e3/uL    Absolute NRBCs 0.0 10e3/uL           IMAGING:  Reviewed her PET/CT from October        Assessment & Plan:   Mirta Cardenas is a 68 year old female with locally advanced STS here for cycle 2 of Ifos/doxil/mesna        Recommendations:   - Chemotherapy plans:      Day 1 - Doxil 40mg/m2  Days 1-5 CIVI Ifosfamide 1500mg/m2  Day 5-6 Mesna 1500mg/m2     Nausea prophylaxis - Emend/Dexamethasone 10mg today  Zofran 8mg q8 hours  Prn compazine/Ativan     - Please order the following labs Labs today ok to proceed - check daily cbc w diff and CMP and Mg and Phos.      - [facility] contacted to schedule close oncologic follow up within 1 week after expected discharge.   Will need neulasta injection after discharge (Scheduled for 11/21 at [facility])- labs 3x/ week after discharge.      # CLL - stable     #Pain - taking lyrica.  Did not tolerate Cymbalta.  Ok to use ibuprofen but need to be sure renal function is ok.    #Neurotoxicity - Monitor for ifosfamide neurotoxicity - neurochecks daily during Ifosfamide.      #DVT prophy - would use lovenox 40 mg daily.      # replace phosphorous     # twitch in hand unchanged.  Increase thiamine per Dr. Jimenez's recommendation.     # plan discharge for Tuesday    # Hb stable at 9.8     # monitor BP- has been trending up        Thank you for allowing us to participate in this patient's care.       Sincerely,      Josue Mccarty MD  Professor  [facility]  [phone].     I spent 35 minutes with the patient more than 50% of which was in counseling and coordination of care.

## 2023-11-19 NOTE — PROGRESS NOTES
Ifosfamide Toxicity Assessment      Patient is Alert & Oriented x4. There are signs of lethargy, confusion or hallucinations No. However, pt is having a very hard time focusing on a task and completing it.    Patient is having new incontinence of bowel or bladder No.       Pincer Grasp intact Yes    Tremors present Yes; please explain: unchanged from previous      Provider was notified No of changes are not new . Interventions include no new.     Will continue to monitor for s/sx of ifosfamide toxicity: hallucinations, AMS, emotional lability, somnolence, myoclonic jerks, tremors, coordination difficulties. If these occur, please call the APPs/Attending on days and fellow on-call for evening and nights for recommendations and further instruction.

## 2023-11-19 NOTE — PROGRESS NOTES
"Ifosfamide Toxicity Assessment      Patient is Alert & Oriented x 4. There are signs of lethargy, confusion or hallucinations. Pt reporting \"chemo brain\" and on assessment is just a bit off. This is not significantly changed from earlier when seen by provider. Will continue to monitor, only 3 hours left of Ifosfamide.     Patient is having new incontinence of bowel or bladder: No.       Pincer Grasp intact: Yes    Tremors present: No      Provider was not notified of changes. No interventions called for at this time.     Will continue to monitor for s/sx of ifosfamide toxicity: hallucinations, AMS, emotional lability, somnolence, myoclonic jerks, tremors, coordination difficulties. If these occur, please call the APPs/Attending on days and fellow on-call for evening and nights for recommendations and further instruction.    "

## 2023-11-19 NOTE — PLAN OF CARE
Goal Outcome Evaluation:    00:00-07:00 am  AF with stable VS at baseline.  Continues to receive CIVI Day #5 Ifosfamide/Mesna.  A&Ox4   Intermittently hands tremor especially with fine motor activities remains unchanged.  Pt is otherwise tolerating chemo well thus far.  Denied nausea, pain, SOB, and chest pain.  Up independently  Voiding well, not saving urine and 1 BM reported.  Pt endorses feeling well and no new acute events overnight.  Continue w/POC

## 2023-11-19 NOTE — PLAN OF CARE
Goal Outcome Evaluation:      Plan of Care Reviewed With: patient, spouse    Overall Patient Progress: no changeOverall Patient Progress: no change  Pt afebrile with stable vs except a little hypertensive. Continues to receive Day 5 Ifosfamide/Mesna CIVI. ICANS assessment  continues with bilateral hand twitching, slightly more than yesterday. Pt also forgetful and having difficulty tracking and not following through on tasks. She is also very tired, but easily arouses and is A & O x4 and is saying reasonable things. SHe continues to receive thiamine TID. Wt back to baseline with good urine output. Denies nausea. Pain adequately controlled with oxycodone and lyrica.

## 2023-11-19 NOTE — PROGRESS NOTES
[facility]    Progress Note - Medicine Service, RONY TEAM 2       Date of Admission:  11/14/2023    Assessment & Plan   Mirta Cardenas is a 68 year old female admitted on 11/14/2023. She has a history of soft tissue sarcoma of the leg being admitted for cycle 2 of scheduled chemotherapy. Tolerated cycle 1 without issues. Oncology managing chemotherapy. Will monitory closely for toxicity and side effects. Currently on Day 5 (Started on 11/14).     Updates:  - Patient noticed a rash over the left elbow crease, itchy, but not painful, already has topical hydrocortisone ordered     #Soft tissue sarcoma, from RLE   Chemotherapy as per Oncology   Day 1 - Doxil 40mg/m2  Days 1-5 CIVI Ifosfamide 1500mg/m2  Day 5-6 Mesna 1500mg/m2  - Tylenol prn   - Oxycodone 5-10 mg q4hr prn with good pain control     #Chronic Anemia   - Transfused 1U RBCs previously per Dr. Mccarty  - Given patient on active chemo, Oncology would prefer Hgb >8.0     #HTN:   PTA on losartan and chlorthalidone BP stable  - Continue PTA losartan   - Discontinued chlorthalidone    #CKDIII  - Baseline creatinine 0.6-0.8, stable this admission   - Monitor renal function, avoid NSAIDs where possible     #Psoriasis  #Dermatitis, likely contact   - No active flare currently. Continue PTA topicals prn (pt would prefer to use her home supply if possible but meds reordered here). Follow up OP with dermatology 12/2023 as planned  - Patient intermittently develops some symptoms of contact dermatitis, raised urticarial lesions and pruritus, but no pain and very localized - can use topical hydrocortisone cream     #Hypophosphatemia   - RN managed phos ordered    #GERD   - Continue PPI     #HLD  - Continue statin          Diet: Regular Diet Adult    DVT Prophylaxis: Enoxaparin (Lovenox) SQ  Branham Catheter: Not present  Fluids: None, patient taking PO  Lines: PRESENT      PICC 11/14/23 Double Lumen Right Brachial vein  medial Chemotherapy-Site Assessment: WDL      Cardiac Monitoring: None  Code Status: Full Code        Disposition Plan     Expected Discharge Date: 11/20/2023,  3:00 PM      Discharge Comments: chemo - needs neulasta 11/21 or 11/22        The patient's care was discussed with the Attending Physician, Dr. Harrison .    Andrew Talavera MD  Medicine Service, [address]  Securely message with [employer] (more info)  Text page via [email or web address] Paging/Directory   See signed in provider for up to date coverage information  ______________________________________________________________________    Interval History   No acute overnight events overnight. Continues to have some hand twitching that is unchanged. Patient also noticed a rash over the left elbow crease prior to shower this morning. Itchy, but not painful, has not gotten bigger at all. Otherwise feels well with no fevers, chills, or other systemic symptoms.     Physical Exam   Vital Signs: Temp: 97.9  F (36.6  C) Temp src: Oral BP: (!) 148/93 (resting) Pulse: 85   Resp: 18 SpO2: 97 % O2 Device: None (Room air)    Weight: 172 lbs 12.8 oz  General: Well-appearing, resting comfortably, pleasant   HEENT: PERRLA, EOMi, mucous membranes are moist   Pulm/Resp: clear breath sounds bilaterally without rhonchi, crackles or wheeze, breathing non-labored  CV: RRR, no murmurs, rubs, gallops   Abdomen: soft, non-distended, non-tender   Ext: Moves all 4 extremities without difficulty, no pitting edema, 2+ distal pulses   Neuro: Alert and answering questions appropriately    Skin: Warm and well perfused. Raised scattered urticarial lesions over the chest wall. Improved rash on back from yesterday     Medical Decision Making       Please see A&P for additional details of medical decision making.      Data     I have personally reviewed the following data over the past 24 hrs:    5.5  \   9.8 (L)   / 208     138 104 10.8 /  109 (H)    4.3 25 0.87 \     ALT: 12 AST: 14 AP: 141 TBILI: 0.3   ALB: 3.9 TOT PROTEIN: 6.7 LIPASE: N/A

## 2023-11-19 NOTE — PLAN OF CARE
Goal Outcome Evaluation: HD4 A&Ox4, VSS on room air. Pain is in her R leg and she rates it a 3-5. It is controlled with tyelnol and oxycodone. Up independent, voiding, BM today, passing gas. Ifos/masna running. ICANS negative.     Plan: Complete CIVI of ifos/mesna on 11/20.

## 2023-11-19 NOTE — PROGRESS NOTES
Ifosfamide Toxicity Assessment      Patient is Alert & Oriented x4. There are signs of lethargy, confusion or hallucinations No.     Patient is having new incontinence of bowel or bladder No.       Pincer Grasp intact Yes    Tremors present No     Provider was notified No of changes None noted . Interventions include None.     Will continue to monitor for s/sx of ifosfamide toxicity: hallucinations, AMS, emotional lability, somnolence, myoclonic jerks, tremors, coordination difficulties. If these occur, please call the APPs/Attending on days and fellow on-call for evening and nights for recommendations and further instruction.      Pt mood is better, can't recall trips to the bathroom or if she had a BM. Emotions improved after family visit. Forgetfulness is still very present. Bed alarm on.

## 2023-11-19 NOTE — PROGRESS NOTES
0030 am      Ifosfamide Toxicity Assessment        Patient is Alert & Oriented x4. There are signs of lethargy, confusion or hallucinations No.      Patient is having new incontinence of bowel or bladder No.                   Pincer Grasp intact Yes     Tremors present Yes;  Intermittent hands tremor with fine mortor activities. No new changes.     Provider was notified No of changes  . Interventions include None.      Will continue to monitor for s/sx of ifosfamide toxicity: hallucinations, AMS, emotional lability, somnolence, myoclonic jerks, tremors, coordination difficulties. If these occur, please call the APPs/Attending on days and fellow on-call for evening and nights for recommendations and further instruction.

## 2023-11-20 VITALS
TEMPERATURE: 98.5 F | RESPIRATION RATE: 16 BRPM | DIASTOLIC BLOOD PRESSURE: 76 MMHG | HEART RATE: 99 BPM | HEIGHT: 61 IN | WEIGHT: 168 LBS | OXYGEN SATURATION: 100 % | BODY MASS INDEX: 31.72 KG/M2 | SYSTOLIC BLOOD PRESSURE: 141 MMHG

## 2023-11-20 LAB
ALBUMIN SERPL BCG-MCNC: 3.6 G/DL (ref 3.5–5.2)
ALP SERPL-CCNC: 125 U/L (ref 40–150)
ALT SERPL W P-5'-P-CCNC: 12 U/L (ref 0–50)
ANION GAP SERPL CALCULATED.3IONS-SCNC: 9 MMOL/L (ref 7–15)
AST SERPL W P-5'-P-CCNC: 18 U/L (ref 0–45)
BASOPHILS # BLD AUTO: 0 10E3/UL (ref 0–0.2)
BASOPHILS NFR BLD AUTO: 1 %
BILIRUB SERPL-MCNC: 0.3 MG/DL
BUN SERPL-MCNC: 12.9 MG/DL (ref 8–23)
CALCIUM SERPL-MCNC: 9.1 MG/DL (ref 8.8–10.2)
CHLORIDE SERPL-SCNC: 107 MMOL/L (ref 98–107)
CREAT SERPL-MCNC: 0.9 MG/DL (ref 0.51–0.95)
DEPRECATED HCO3 PLAS-SCNC: 24 MMOL/L (ref 22–29)
EGFRCR SERPLBLD CKD-EPI 2021: 70 ML/MIN/1.73M2
EOSINOPHIL # BLD AUTO: 0.2 10E3/UL (ref 0–0.7)
EOSINOPHIL NFR BLD AUTO: 4 %
ERYTHROCYTE [DISTWIDTH] IN BLOOD BY AUTOMATED COUNT: 17.1 % (ref 10–15)
GLUCOSE SERPL-MCNC: 104 MG/DL (ref 70–99)
HCT VFR BLD AUTO: 27.4 % (ref 35–47)
HGB BLD-MCNC: 8.9 G/DL (ref 11.7–15.7)
IMM GRANULOCYTES # BLD: 0 10E3/UL
IMM GRANULOCYTES NFR BLD: 0 %
LYMPHOCYTES # BLD AUTO: 0.8 10E3/UL (ref 0.8–5.3)
LYMPHOCYTES NFR BLD AUTO: 20 %
MAGNESIUM SERPL-MCNC: 2.1 MG/DL (ref 1.7–2.3)
MCH RBC QN AUTO: 26.9 PG (ref 26.5–33)
MCHC RBC AUTO-ENTMCNC: 32.5 G/DL (ref 31.5–36.5)
MCV RBC AUTO: 83 FL (ref 78–100)
MONOCYTES # BLD AUTO: 0.2 10E3/UL (ref 0–1.3)
MONOCYTES NFR BLD AUTO: 5 %
NEUTROPHILS # BLD AUTO: 2.9 10E3/UL (ref 1.6–8.3)
NEUTROPHILS NFR BLD AUTO: 70 %
NRBC # BLD AUTO: 0 10E3/UL
NRBC BLD AUTO-RTO: 0 /100
PHOSPHATE SERPL-MCNC: 2.9 MG/DL (ref 2.5–4.5)
PLATELET # BLD AUTO: 188 10E3/UL (ref 150–450)
POTASSIUM SERPL-SCNC: 4 MMOL/L (ref 3.4–5.3)
PROT SERPL-MCNC: 6.2 G/DL (ref 6.4–8.3)
RBC # BLD AUTO: 3.31 10E6/UL (ref 3.8–5.2)
SODIUM SERPL-SCNC: 140 MMOL/L (ref 135–145)
WBC # BLD AUTO: 4.2 10E3/UL (ref 4–11)

## 2023-11-20 PROCEDURE — 250N000011 HC RX IP 250 OP 636: Performed by: STUDENT IN AN ORGANIZED HEALTH CARE EDUCATION/TRAINING PROGRAM

## 2023-11-20 PROCEDURE — 80053 COMPREHEN METABOLIC PANEL: CPT

## 2023-11-20 PROCEDURE — 250N000013 HC RX MED GY IP 250 OP 250 PS 637

## 2023-11-20 PROCEDURE — 99238 HOSP IP/OBS DSCHRG MGMT 30/<: CPT | Mod: GC | Performed by: INTERNAL MEDICINE

## 2023-11-20 PROCEDURE — 83735 ASSAY OF MAGNESIUM: CPT

## 2023-11-20 PROCEDURE — 84100 ASSAY OF PHOSPHORUS: CPT

## 2023-11-20 PROCEDURE — 85014 HEMATOCRIT: CPT

## 2023-11-20 PROCEDURE — 250N000013 HC RX MED GY IP 250 OP 250 PS 637: Performed by: PHYSICIAN ASSISTANT

## 2023-11-20 PROCEDURE — 258N000003 HC RX IP 258 OP 636: Performed by: STUDENT IN AN ORGANIZED HEALTH CARE EDUCATION/TRAINING PROGRAM

## 2023-11-20 PROCEDURE — 250N000009 HC RX 250: Performed by: STUDENT IN AN ORGANIZED HEALTH CARE EDUCATION/TRAINING PROGRAM

## 2023-11-20 PROCEDURE — 99231 SBSQ HOSP IP/OBS SF/LOW 25: CPT | Performed by: INTERNAL MEDICINE

## 2023-11-20 RX ADMIN — OXYCODONE HYDROCHLORIDE 5 MG: 5 TABLET ORAL at 11:49

## 2023-11-20 RX ADMIN — THIAMINE HCL TAB 100 MG 100 MG: 100 TAB at 08:29

## 2023-11-20 RX ADMIN — ONDANSETRON HYDROCHLORIDE 8 MG: 8 TABLET, FILM COATED ORAL at 08:29

## 2023-11-20 RX ADMIN — PREGABALIN 50 MG: 50 CAPSULE ORAL at 08:29

## 2023-11-20 RX ADMIN — OMEPRAZOLE 20 MG: 20 CAPSULE, DELAYED RELEASE ORAL at 08:29

## 2023-11-20 RX ADMIN — POTASSIUM CHLORIDE: 2 INJECTION, SOLUTION, CONCENTRATE INTRAVENOUS at 04:49

## 2023-11-20 ASSESSMENT — ACTIVITIES OF DAILY LIVING (ADL)
ADLS_ACUITY_SCORE: 20

## 2023-11-20 NOTE — CARE PLAN
"BP (!) 146/81 (BP Location: Left arm)   Pulse 91   Temp 98  F (36.7  C) (Oral)   Resp 16   Ht 1.555 m (5' 1.22\")   Wt 76.2 kg (168 lb)   SpO2 97%   BMI 31.52 kg/m     Reason for admission: D7 doxi/ifos/mesna  Neuro: Aox4 x forgetfulness   Pain: denies  CMS: intact  Cardiac: WDL  Resp: O2>90 on RA  GI/: AUOP, -BM  Skin/Wound: minor rash on elbow  Access: PICC infusing IVMF and mesna   Activity: SBA bed alarm for forgetfulness  Nutrition: Reg  Plan:  discharge 11/20 afternoon     Ifosfamide Toxicity Assessment       Patient is Alert & Oriented x4. There are no signs of lethargy, confusion or hallucinations      Patient is not having new incontinence of bowel or bladder                 Pincer Grasp is intact      Tremors not present     Will continue to monitor for s/sx of ifosfamide toxicity: hallucinations, AMS, emotional lability, somnolence, myoclonic jerks, tremors, coordination difficulties. If these occur, please call the APPs/Attending on days and fellow on-call for evening and nights for recommendations and further instruction.      Patient exhibits some forgetfulness but is AOx4, patient denies tremors and hallucinations. No tremors or muscle jerking appreciated during cares.  "

## 2023-11-20 NOTE — PLAN OF CARE
Goal Outcome Evaluation: HD5 admitted for C2 of ifosfamide/mesna, today being D5.     A&Ox4, hypertensive -160. On room air. She is forgetful and is frustrated with this. She is forgetting if she went to the bathroom or ordered dinner. Bed alarm on, but she moves independently. Pain is in her RLE and is rated 4-5. Eating and drinking ok. BM today. Ifos ended around 2000, mesna started. IVM running. Urine is yellow and clear. Emotions intact.     Plan: Continue Mesna to completion tomorrow around 1600. Monitor ICANS assessment.

## 2023-11-20 NOTE — DISCHARGE SUMMARY
[facility]  Discharge Summary - Medicine & Pediatrics       Date of Admission:  11/14/2023  Date of Discharge:  11/20/2023  5:29 PM  Discharging Provider: Dr. Harrison   Discharge Service: Medicine Service, RONY TEAM 2    Discharge Diagnoses   High grade pleomorphic sarcoma, on current chemotherapy      Clinically Significant Risk Factors     Follow-ups Needed After Discharge   Follow-up Appointments     Follow Up and recommended labs and tests      Follow up as scheduled tomorrow for your Neulasta injection, and with Dr. Jimenez as planned on 12/6.          Unresulted Labs Ordered in the Past 30 Days of this Admission       Date and Time Order Name Status Description    11/7/2023 10:15 AM PREPARE RED BLOOD CELLS (UNIT) Preliminary           Discharge Disposition   Discharged to home  Condition at discharge: Stable    Hospital Course   Mirta Cardenas is a 68 year old female admitted on 11/14/2023 for planned chemotherapy. Received 5d regimen of Ifos/doxil/mesna. Tolerated well, developed mild hand twitching felt secondary to Ifos that resolved prior to discharge.      RLE high grade, pleomorphic sarcoma  Cancer related pain  - Scheduled for Neulasta injection 11/21   - Patient will discharge home and follow up with oncology outpatient 12/6   - Continue outpatient pain management with tylenol and oxycodone prn      Chronic Anemia   - Transfused 1U PRBC 11/17 given Hgb below goal of 8.0 per Oncology. Hgb stable on discharge      HTN:   PTA on losartan BP stable here.   - Continue PTA losartan      CKDIII  - Baseline creatinine 0.6-0.8, stable this admission     Psoriasis  Dermatitis, likely contact   - Continue PTA topicals prn   - Follow up OP with dermatology 12/2023 as planned  - Patient intermittently develops some symptoms of contact dermatitis, raised urticarial lesions and pruritus, but no pain and very localized - can use topical hydrocortisone cream       Hypophosphatemia   - RN managed phos ordered     GERD   - Continue PPI      HLD  - Continue statin    Consultations This Hospital Stay   ONCOLOGY ADULT IP CONSULT    Code Status   Full Code       The patient was discussed with Dr. Christy Barroso MD  Med-Peds, PGY-3   [address] [facility]  [place] [address]  Phone: [phone]  ______________________________________________________________________    Physical Exam   Vital Signs: Temp: 98.5  F (36.9  C) Temp src: Oral BP: (!) 141/76 Pulse: 99   Resp: 16 SpO2: 100 % O2 Device: None (Room air)    Weight: 168 lbs 0 oz  General: well-appearing, resting comfortably   HEENT: PERRLA, EOMi  Pulm/Resp: clear breath sounds bilaterally without rhonchi, crackles or wheeze, breathing non-labored  CV: RRR, no murmurs, rubs, gallops   Abdomen: soft, non-distended, non-tender   Ext: Moves all 4 extremities without difficulty, 2+ distal pulses bilaterally. RLE is markedly mildly more swollen compared to the LLE, but no tenderness to palpation or overlying skin changes   Neuro: Alert and answering questions appropriately    Skin: Warm and well perfused. No rashes or other skin changes       Primary Care Physician   Joseline Fraga    Discharge Orders      Reason for your hospital stay    You were admitted for your routine chemotherapy. Aside from some mild hand tremors, and mild confusion that have resolved you did well with this. You will have your scheduled appointment with your Neulasta injection tomorrow morning that you should go to, and you should follow up with your primary Oncologist as scheduled on 12/6. Take care!     Activity    Your activity upon discharge: activity as tolerated     Follow Up and recommended labs and tests    Follow up as scheduled tomorrow for your Neulasta injection, and with Dr. Jimenez as planned on 12/6.     Diet    Follow this diet upon discharge: Orders Placed This Encounter      Regular Diet Adult       Significant Results and Procedures   Most  Recent 3 CBC's:  Recent Labs   Lab Test 11/20/23 0442 11/19/23 0445 11/18/23  0544   WBC 4.2 5.5 6.0   HGB 8.9* 9.8* 9.5*   MCV 83 84 82    208 181     Most Recent 3 BMP's:  Recent Labs   Lab Test 11/20/23 0442 11/19/23 0445 11/18/23  0544    138 139   POTASSIUM 4.0 4.3 4.5   CHLORIDE 107 104 103   CO2 24 25 28   BUN 12.9 10.8 8.9   CR 0.90 0.87 0.81   ANIONGAP 9 9 8   MONIK 9.1 9.3 9.1   * 109* 103*     Most Recent 2 LFT's:  Recent Labs   Lab Test 11/20/23 0442 11/19/23 0445   AST 18 14   ALT 12 12   ALKPHOS 125 141   BILITOTAL 0.3 0.3       Discharge Medications   Current Discharge Medication List        CONTINUE these medications which have NOT CHANGED    Details   docusate sodium (COLACE) 100 MG capsule Take 100 mg by mouth 2 times daily      METAMUCIL FIBER PO Take 1 teaspoonful by mouth daily After supper      Multiple Vitamins-Calcium (ONE-A-DAY WOMENS FORMULA PO) Take 1 tablet by mouth daily      omeprazole (PRILOSEC) 20 MG DR capsule Take 1 capsule by mouth every morning.      oxyCODONE (ROXICODONE) 5 MG tablet Take 1-2 tablets (5-10 mg) by mouth as needed for severe pain  Qty: 60 tablet, Refills: 0    Associated Diagnoses: Sarcoma of right lower extremity (H)      pravastatin (PRAVACHOL) 40 MG tablet Take 1 tablet (40 mg) by mouth daily  Qty: 90 tablet, Refills: 3    Associated Diagnoses: Hyperlipidemia LDL goal <100      pregabalin (LYRICA) 50 MG capsule Take 1 capsule (50 mg) by mouth 3 times daily for 30 days  Qty: 90 capsule, Refills: 0    Associated Diagnoses: Sarcoma of right lower extremity (H)      prochlorperazine (COMPAZINE) 5 MG tablet Take 1 tablet (5 mg) by mouth every 6 hours as needed (Breakthrough Nausea / Vomiting)  Qty: 30 tablet, Refills: 0    Associated Diagnoses: Sarcoma of right lower extremity (H)      calcipotriene (DOVONOX) 0.005 % external cream Apply twice daily to areas of psoriasis on Saturday and Sunday.  Qty: 60 g, Refills: 6    Associated Diagnoses:  Psoriasis      clobetasol (TEMOVATE) 0.05 % external cream Apply twice daily to psoriasis on Monday through Friday.  Qty: 60 g, Refills: 5    Associated Diagnoses: Psoriasis      diclofenac (VOLTAREN) 1 % topical gel Apply 4 g topically 4 times daily To right lower leg  Qty: 350 g, Refills: 1    Comments: Don't fill until patient requests  Associated Diagnoses: Sarcoma (H)      ibuprofen (ADVIL/MOTRIN) 200 MG capsule Take 400 mg by mouth every 4 hours as needed for fever      losartan (COZAAR) 100 MG tablet Take 1 tablet (100 mg) by mouth daily  Qty: 90 tablet, Refills: 3    Associated Diagnoses: Benign essential hypertension           STOP taking these medications       polyethylene glycol (MIRALAX) 17 GM/Dose powder Comments:   Reason for Stopping:             Allergies   Allergies   Allergen Reactions    Allopurinol Itching    Amoxicillin Hives    Codeine Itching    Cymbalta [Duloxetine Hcl] Fatigue    Periactin Other (See Comments)     Sleepy.    11/15/23: patient not sure about this allergy/intolerance - may be interested in removal    Tenormin [Atenolol] Fatigue     11/15/23: patient may be interested in removal of this from allergy list as it was only fatigue/sleepiness

## 2023-11-20 NOTE — PLAN OF CARE
Goal Outcome Evaluation:      Plan of Care Reviewed With: patient    Overall Patient Progress: improvingOverall Patient Progress: improving    Outcome Evaluation: [id number]:    SBP still running hypertensive. AOVSS. Oriented x4. PRN oxycodone given x1 for pain in right leg. Up independently and steady on her feet. Not forgtful this morning and said the symptoms she was having yesterday have improved today. Plan to discharge home today.

## 2023-11-21 ENCOUNTER — APPOINTMENT (OUTPATIENT)
Dept: LAB | Facility: CLINIC | Age: 68
End: 2023-11-21
Payer: COMMERCIAL

## 2023-11-21 ENCOUNTER — PATIENT OUTREACH (OUTPATIENT)
Dept: CARE COORDINATION | Facility: CLINIC | Age: 68
End: 2023-11-21

## 2023-11-21 ENCOUNTER — INFUSION THERAPY VISIT (OUTPATIENT)
Dept: INFUSION THERAPY | Facility: CLINIC | Age: 68
End: 2023-11-21
Attending: STUDENT IN AN ORGANIZED HEALTH CARE EDUCATION/TRAINING PROGRAM
Payer: COMMERCIAL

## 2023-11-21 VITALS — HEART RATE: 78 BPM | TEMPERATURE: 97.4 F | SYSTOLIC BLOOD PRESSURE: 155 MMHG | DIASTOLIC BLOOD PRESSURE: 95 MMHG

## 2023-11-21 DIAGNOSIS — Z76.89 PREVENTION OF CHEMOTHERAPY-INDUCED NEUTROPENIA: Primary | ICD-10-CM

## 2023-11-21 DIAGNOSIS — C49.21 SARCOMA OF RIGHT LOWER EXTREMITY (H): ICD-10-CM

## 2023-11-21 LAB
ALBUMIN SERPL BCG-MCNC: 4.1 G/DL (ref 3.5–5.2)
ALP SERPL-CCNC: 141 U/L (ref 40–150)
ALT SERPL W P-5'-P-CCNC: 19 U/L (ref 0–50)
ANION GAP SERPL CALCULATED.3IONS-SCNC: 11 MMOL/L (ref 7–15)
AST SERPL W P-5'-P-CCNC: 24 U/L (ref 0–45)
BASOPHILS # BLD AUTO: ABNORMAL 10*3/UL
BASOPHILS # BLD MANUAL: 0.1 10E3/UL (ref 0–0.2)
BASOPHILS NFR BLD AUTO: ABNORMAL %
BASOPHILS NFR BLD MANUAL: 1 %
BILIRUB SERPL-MCNC: 0.2 MG/DL
BUN SERPL-MCNC: 23.5 MG/DL (ref 8–23)
CALCIUM SERPL-MCNC: 9.8 MG/DL (ref 8.8–10.2)
CHLORIDE SERPL-SCNC: 108 MMOL/L (ref 98–107)
CREAT SERPL-MCNC: 1.05 MG/DL (ref 0.51–0.95)
DEPRECATED HCO3 PLAS-SCNC: 21 MMOL/L (ref 22–29)
EGFRCR SERPLBLD CKD-EPI 2021: 58 ML/MIN/1.73M2
EOSINOPHIL # BLD AUTO: ABNORMAL 10*3/UL
EOSINOPHIL # BLD MANUAL: 0.2 10E3/UL (ref 0–0.7)
EOSINOPHIL NFR BLD AUTO: ABNORMAL %
EOSINOPHIL NFR BLD MANUAL: 3 %
ERYTHROCYTE [DISTWIDTH] IN BLOOD BY AUTOMATED COUNT: 16.5 % (ref 10–15)
GLUCOSE SERPL-MCNC: 106 MG/DL (ref 70–99)
HCT VFR BLD AUTO: 29.2 % (ref 35–47)
HGB BLD-MCNC: 9.5 G/DL (ref 11.7–15.7)
IMM GRANULOCYTES # BLD: ABNORMAL 10*3/UL
IMM GRANULOCYTES NFR BLD: ABNORMAL %
LYMPHOCYTES # BLD AUTO: ABNORMAL 10*3/UL
LYMPHOCYTES # BLD MANUAL: 0.9 10E3/UL (ref 0.8–5.3)
LYMPHOCYTES NFR BLD AUTO: ABNORMAL %
LYMPHOCYTES NFR BLD MANUAL: 16 %
MAGNESIUM SERPL-MCNC: 2.2 MG/DL (ref 1.7–2.3)
MCH RBC QN AUTO: 27.1 PG (ref 26.5–33)
MCHC RBC AUTO-ENTMCNC: 32.5 G/DL (ref 31.5–36.5)
MCV RBC AUTO: 83 FL (ref 78–100)
MONOCYTES # BLD AUTO: ABNORMAL 10*3/UL
MONOCYTES # BLD MANUAL: 0.1 10E3/UL (ref 0–1.3)
MONOCYTES NFR BLD AUTO: ABNORMAL %
MONOCYTES NFR BLD MANUAL: 2 %
NEUTROPHILS # BLD AUTO: ABNORMAL 10*3/UL
NEUTROPHILS # BLD MANUAL: 4.4 10E3/UL (ref 1.6–8.3)
NEUTROPHILS NFR BLD AUTO: ABNORMAL %
NEUTROPHILS NFR BLD MANUAL: 78 %
NRBC # BLD AUTO: 0 10E3/UL
NRBC BLD AUTO-RTO: 0 /100
PHOSPHATE SERPL-MCNC: 3.9 MG/DL (ref 2.5–4.5)
PLAT MORPH BLD: ABNORMAL
PLATELET # BLD AUTO: 202 10E3/UL (ref 150–450)
POTASSIUM SERPL-SCNC: 4 MMOL/L (ref 3.4–5.3)
PROT SERPL-MCNC: 6.9 G/DL (ref 6.4–8.3)
RBC # BLD AUTO: 3.5 10E6/UL (ref 3.8–5.2)
RBC MORPH BLD: ABNORMAL
SODIUM SERPL-SCNC: 140 MMOL/L (ref 135–145)
VARIANT LYMPHS BLD QL SMEAR: PRESENT
WBC # BLD AUTO: 5.6 10E3/UL (ref 4–11)

## 2023-11-21 PROCEDURE — 96372 THER/PROPH/DIAG INJ SC/IM: CPT | Performed by: STUDENT IN AN ORGANIZED HEALTH CARE EDUCATION/TRAINING PROGRAM

## 2023-11-21 PROCEDURE — 36415 COLL VENOUS BLD VENIPUNCTURE: CPT

## 2023-11-21 PROCEDURE — 85027 COMPLETE CBC AUTOMATED: CPT | Performed by: STUDENT IN AN ORGANIZED HEALTH CARE EDUCATION/TRAINING PROGRAM

## 2023-11-21 PROCEDURE — 250N000011 HC RX IP 250 OP 636: Mod: JZ | Performed by: STUDENT IN AN ORGANIZED HEALTH CARE EDUCATION/TRAINING PROGRAM

## 2023-11-21 PROCEDURE — 80053 COMPREHEN METABOLIC PANEL: CPT | Performed by: STUDENT IN AN ORGANIZED HEALTH CARE EDUCATION/TRAINING PROGRAM

## 2023-11-21 PROCEDURE — 85007 BL SMEAR W/DIFF WBC COUNT: CPT | Performed by: STUDENT IN AN ORGANIZED HEALTH CARE EDUCATION/TRAINING PROGRAM

## 2023-11-21 PROCEDURE — 83735 ASSAY OF MAGNESIUM: CPT | Performed by: STUDENT IN AN ORGANIZED HEALTH CARE EDUCATION/TRAINING PROGRAM

## 2023-11-21 PROCEDURE — 84100 ASSAY OF PHOSPHORUS: CPT | Performed by: STUDENT IN AN ORGANIZED HEALTH CARE EDUCATION/TRAINING PROGRAM

## 2023-11-21 RX ADMIN — PEGFILGRASTIM 6 MG: 6 INJECTION SUBCUTANEOUS at 09:45

## 2023-11-21 NOTE — PLAN OF CARE
D: Patient discharged home at 1710. Patient stable and accompanied by .    I: Discharge prescriptions sent to discharge pharmacy to be filled. All discharge medications and instructions reviewed with patient. Patient instructed to call clinic triage nurse if she experiences a fever >100.4, uncontrolled nausea, vomiting, diarrhea, or pain; or experiences any signs or symptoms of bleeding. Other phone numbers to call with questions or concerns after discharge reviewed. PICC removed. Education completed.    A: She verbalized understanding of discharge medications and instructions.     P: Patient to follow-up in clinic on 11/21 with provider.

## 2023-11-21 NOTE — PROGRESS NOTES
[facility]    Background: Transitional Care Management program identified per system criteria and reviewed by [facility] team for possible outreach.    Assessment: Upon chart review, [facility] Team member will not proceed with patient outreach related to this episode of Transitional Care Management program due to reason below:    Patient has a follow up appointment with an appropriate provider today for hospital discharge    Plan: Transitional Care Management episode addressed appropriately per reason noted above.      Daphne Keating  Community Health Worker  [facility]  [phone]      *[facility] Care Resource Team does NOT follow patient ongoing. Referrals are identified based on internal discharge reports and the outreach is to ensure patient has an understanding of their discharge instructions.

## 2023-11-23 LAB
ABO/RH(D): NORMAL
ANTIBODY SCREEN: NEGATIVE
SPECIMEN EXPIRATION DATE: NORMAL

## 2023-11-24 ENCOUNTER — INFUSION THERAPY VISIT (OUTPATIENT)
Dept: INFUSION THERAPY | Facility: CLINIC | Age: 68
End: 2023-11-24
Attending: STUDENT IN AN ORGANIZED HEALTH CARE EDUCATION/TRAINING PROGRAM
Payer: COMMERCIAL

## 2023-11-24 ENCOUNTER — APPOINTMENT (OUTPATIENT)
Dept: LAB | Facility: CLINIC | Age: 68
End: 2023-11-24
Payer: COMMERCIAL

## 2023-11-24 VITALS
SYSTOLIC BLOOD PRESSURE: 145 MMHG | TEMPERATURE: 98.3 F | HEART RATE: 89 BPM | RESPIRATION RATE: 18 BRPM | DIASTOLIC BLOOD PRESSURE: 75 MMHG

## 2023-11-24 DIAGNOSIS — T45.1X5A ANEMIA DUE TO ANTINEOPLASTIC CHEMOTHERAPY: Primary | ICD-10-CM

## 2023-11-24 DIAGNOSIS — D64.81 ANEMIA DUE TO ANTINEOPLASTIC CHEMOTHERAPY: Primary | ICD-10-CM

## 2023-11-24 DIAGNOSIS — C49.21 SARCOMA OF RIGHT LOWER EXTREMITY (H): ICD-10-CM

## 2023-11-24 LAB
ALBUMIN SERPL BCG-MCNC: 3.9 G/DL (ref 3.5–5.2)
ALP SERPL-CCNC: 136 U/L (ref 40–150)
ALT SERPL W P-5'-P-CCNC: 16 U/L (ref 0–50)
ANION GAP SERPL CALCULATED.3IONS-SCNC: 12 MMOL/L (ref 7–15)
AST SERPL W P-5'-P-CCNC: 21 U/L (ref 0–45)
BASOPHILS # BLD AUTO: ABNORMAL 10*3/UL
BASOPHILS # BLD MANUAL: 0.1 10E3/UL (ref 0–0.2)
BASOPHILS NFR BLD AUTO: ABNORMAL %
BASOPHILS NFR BLD MANUAL: 3 %
BILIRUB SERPL-MCNC: 0.3 MG/DL
BLD PROD TYP BPU: NORMAL
BLOOD COMPONENT TYPE: NORMAL
BUN SERPL-MCNC: 19.4 MG/DL (ref 8–23)
CALCIUM SERPL-MCNC: 8.5 MG/DL (ref 8.8–10.2)
CHLORIDE SERPL-SCNC: 110 MMOL/L (ref 98–107)
CODING SYSTEM: NORMAL
CREAT SERPL-MCNC: 0.95 MG/DL (ref 0.51–0.95)
CROSSMATCH: NORMAL
DEPRECATED HCO3 PLAS-SCNC: 20 MMOL/L (ref 22–29)
EGFRCR SERPLBLD CKD-EPI 2021: 66 ML/MIN/1.73M2
EOSINOPHIL # BLD AUTO: ABNORMAL 10*3/UL
EOSINOPHIL # BLD MANUAL: 0 10E3/UL (ref 0–0.7)
EOSINOPHIL NFR BLD AUTO: ABNORMAL %
EOSINOPHIL NFR BLD MANUAL: 0 %
ERYTHROCYTE [DISTWIDTH] IN BLOOD BY AUTOMATED COUNT: 16 % (ref 10–15)
GLUCOSE SERPL-MCNC: 99 MG/DL (ref 70–99)
HCT VFR BLD AUTO: 23.7 % (ref 35–47)
HGB BLD-MCNC: 7.8 G/DL (ref 11.7–15.7)
IMM GRANULOCYTES # BLD: ABNORMAL 10*3/UL
IMM GRANULOCYTES NFR BLD: ABNORMAL %
ISSUE DATE AND TIME: NORMAL
LYMPHOCYTES # BLD AUTO: ABNORMAL 10*3/UL
LYMPHOCYTES # BLD MANUAL: 0.9 10E3/UL (ref 0.8–5.3)
LYMPHOCYTES NFR BLD AUTO: ABNORMAL %
LYMPHOCYTES NFR BLD MANUAL: 26 %
MCH RBC QN AUTO: 27.3 PG (ref 26.5–33)
MCHC RBC AUTO-ENTMCNC: 32.9 G/DL (ref 31.5–36.5)
MCV RBC AUTO: 83 FL (ref 78–100)
MONOCYTES # BLD AUTO: ABNORMAL 10*3/UL
MONOCYTES # BLD MANUAL: 0.2 10E3/UL (ref 0–1.3)
MONOCYTES NFR BLD AUTO: ABNORMAL %
MONOCYTES NFR BLD MANUAL: 5 %
NEUTROPHILS # BLD AUTO: ABNORMAL 10*3/UL
NEUTROPHILS # BLD MANUAL: 2.3 10E3/UL (ref 1.6–8.3)
NEUTROPHILS NFR BLD AUTO: ABNORMAL %
NEUTROPHILS NFR BLD MANUAL: 66 %
NRBC # BLD AUTO: 0 10E3/UL
NRBC BLD AUTO-RTO: 0 /100
PLAT MORPH BLD: NORMAL
PLATELET # BLD AUTO: 150 10E3/UL (ref 150–450)
POTASSIUM SERPL-SCNC: 3.9 MMOL/L (ref 3.4–5.3)
PROT SERPL-MCNC: 6.7 G/DL (ref 6.4–8.3)
RBC # BLD AUTO: 2.86 10E6/UL (ref 3.8–5.2)
RBC MORPH BLD: NORMAL
SODIUM SERPL-SCNC: 142 MMOL/L (ref 135–145)
UNIT ABO/RH: NORMAL
UNIT NUMBER: NORMAL
UNIT STATUS: NORMAL
UNIT TYPE ISBT: 6200
WBC # BLD AUTO: 3.5 10E3/UL (ref 4–11)

## 2023-11-24 PROCEDURE — 85007 BL SMEAR W/DIFF WBC COUNT: CPT | Performed by: STUDENT IN AN ORGANIZED HEALTH CARE EDUCATION/TRAINING PROGRAM

## 2023-11-24 PROCEDURE — 36430 TRANSFUSION BLD/BLD COMPNT: CPT

## 2023-11-24 PROCEDURE — 36415 COLL VENOUS BLD VENIPUNCTURE: CPT

## 2023-11-24 PROCEDURE — P9016 RBC LEUKOCYTES REDUCED: HCPCS | Performed by: STUDENT IN AN ORGANIZED HEALTH CARE EDUCATION/TRAINING PROGRAM

## 2023-11-24 PROCEDURE — 86850 RBC ANTIBODY SCREEN: CPT | Performed by: STUDENT IN AN ORGANIZED HEALTH CARE EDUCATION/TRAINING PROGRAM

## 2023-11-24 PROCEDURE — 85027 COMPLETE CBC AUTOMATED: CPT | Performed by: STUDENT IN AN ORGANIZED HEALTH CARE EDUCATION/TRAINING PROGRAM

## 2023-11-24 PROCEDURE — 86901 BLOOD TYPING SEROLOGIC RH(D): CPT | Performed by: STUDENT IN AN ORGANIZED HEALTH CARE EDUCATION/TRAINING PROGRAM

## 2023-11-24 PROCEDURE — 82310 ASSAY OF CALCIUM: CPT | Performed by: STUDENT IN AN ORGANIZED HEALTH CARE EDUCATION/TRAINING PROGRAM

## 2023-11-24 PROCEDURE — 86923 COMPATIBILITY TEST ELECTRIC: CPT | Performed by: STUDENT IN AN ORGANIZED HEALTH CARE EDUCATION/TRAINING PROGRAM

## 2023-11-24 RX ORDER — HEPARIN SODIUM,PORCINE 10 UNIT/ML
5-20 VIAL (ML) INTRAVENOUS DAILY PRN
Status: CANCELLED | OUTPATIENT
Start: 2023-11-24

## 2023-11-24 RX ORDER — HEPARIN SODIUM (PORCINE) LOCK FLUSH IV SOLN 100 UNIT/ML 100 UNIT/ML
5 SOLUTION INTRAVENOUS
Status: CANCELLED | OUTPATIENT
Start: 2023-11-24

## 2023-11-24 NOTE — PROGRESS NOTES
Infusion Nursing Note:  Mirta Cardenas presents today for 1 unit PRBCs.    Patient seen by provider today: No   present during visit today: Not Applicable.    Note: N/A.    Intravenous Access:  Peripheral IV placed.    Treatment Conditions:  Lab Results   Component Value Date    HGB 7.8 (L) 11/24/2023    WBC 3.5 (L) 11/24/2023    ANEU 2.3 11/24/2023    ANEUTAUTO 2.9 11/20/2023     11/24/2023   Results reviewed, labs MET treatment parameters, ok to proceed with treatment.  Blood transfusion consent signed 11/17/2023.    Post Infusion Assessment:  Patient tolerated infusion without incident.  Blood return noted pre and post infusion.  Site patent and intact, free from redness, edema or discomfort.  No evidence of extravasations.  Access discontinued per protocol.     Discharge Plan:   Discharge instructions reviewed with: Patient.  AVS to patient via [employer].  Patient will return 11/27/2023 for next appointment.   Patient discharged in stable condition accompanied by: self.  Departure Mode: Ambulatory.    Elvi Gallagher RN

## 2023-11-27 ENCOUNTER — APPOINTMENT (OUTPATIENT)
Dept: LAB | Facility: CLINIC | Age: 68
End: 2023-11-27
Payer: COMMERCIAL

## 2023-11-27 ENCOUNTER — INFUSION THERAPY VISIT (OUTPATIENT)
Dept: INFUSION THERAPY | Facility: CLINIC | Age: 68
End: 2023-11-27
Attending: STUDENT IN AN ORGANIZED HEALTH CARE EDUCATION/TRAINING PROGRAM
Payer: COMMERCIAL

## 2023-11-27 DIAGNOSIS — C49.21 SARCOMA OF RIGHT LOWER EXTREMITY (H): ICD-10-CM

## 2023-11-27 LAB
ALBUMIN SERPL BCG-MCNC: 3.9 G/DL (ref 3.5–5.2)
ALP SERPL-CCNC: 155 U/L (ref 40–150)
ALT SERPL W P-5'-P-CCNC: 16 U/L (ref 0–50)
ANION GAP SERPL CALCULATED.3IONS-SCNC: 10 MMOL/L (ref 7–15)
AST SERPL W P-5'-P-CCNC: 19 U/L (ref 0–45)
BASOPHILS # BLD AUTO: ABNORMAL 10*3/UL
BASOPHILS # BLD MANUAL: 0 10E3/UL (ref 0–0.2)
BASOPHILS NFR BLD AUTO: ABNORMAL %
BASOPHILS NFR BLD MANUAL: 0 %
BILIRUB SERPL-MCNC: 0.2 MG/DL
BUN SERPL-MCNC: 9.6 MG/DL (ref 8–23)
CALCIUM SERPL-MCNC: 9 MG/DL (ref 8.8–10.2)
CHLORIDE SERPL-SCNC: 107 MMOL/L (ref 98–107)
CREAT SERPL-MCNC: 0.8 MG/DL (ref 0.51–0.95)
DEPRECATED HCO3 PLAS-SCNC: 24 MMOL/L (ref 22–29)
EGFRCR SERPLBLD CKD-EPI 2021: 81 ML/MIN/1.73M2
EOSINOPHIL # BLD AUTO: ABNORMAL 10*3/UL
EOSINOPHIL # BLD MANUAL: 0 10E3/UL (ref 0–0.7)
EOSINOPHIL NFR BLD AUTO: ABNORMAL %
EOSINOPHIL NFR BLD MANUAL: 0 %
ERYTHROCYTE [DISTWIDTH] IN BLOOD BY AUTOMATED COUNT: 16.4 % (ref 10–15)
GLUCOSE SERPL-MCNC: 121 MG/DL (ref 70–99)
HCT VFR BLD AUTO: 28.2 % (ref 35–47)
HGB BLD-MCNC: 9.2 G/DL (ref 11.7–15.7)
IMM GRANULOCYTES # BLD: ABNORMAL 10*3/UL
IMM GRANULOCYTES NFR BLD: ABNORMAL %
LYMPHOCYTES # BLD AUTO: ABNORMAL 10*3/UL
LYMPHOCYTES # BLD MANUAL: 1.7 10E3/UL (ref 0.8–5.3)
LYMPHOCYTES NFR BLD AUTO: ABNORMAL %
LYMPHOCYTES NFR BLD MANUAL: 20 %
MCH RBC QN AUTO: 27.3 PG (ref 26.5–33)
MCHC RBC AUTO-ENTMCNC: 32.6 G/DL (ref 31.5–36.5)
MCV RBC AUTO: 84 FL (ref 78–100)
MONOCYTES # BLD AUTO: ABNORMAL 10*3/UL
MONOCYTES # BLD MANUAL: 0.5 10E3/UL (ref 0–1.3)
MONOCYTES NFR BLD AUTO: ABNORMAL %
MONOCYTES NFR BLD MANUAL: 6 %
NEUTROPHILS # BLD AUTO: ABNORMAL 10*3/UL
NEUTROPHILS # BLD MANUAL: 6.2 10E3/UL (ref 1.6–8.3)
NEUTROPHILS NFR BLD AUTO: ABNORMAL %
NEUTROPHILS NFR BLD MANUAL: 74 %
NRBC # BLD AUTO: 0 10E3/UL
NRBC # BLD AUTO: 0.1 10E3/UL
NRBC BLD AUTO-RTO: 0 /100
NRBC BLD MANUAL-RTO: 1 %
PLAT MORPH BLD: ABNORMAL
PLATELET # BLD AUTO: 139 10E3/UL (ref 150–450)
POTASSIUM SERPL-SCNC: 3.5 MMOL/L (ref 3.4–5.3)
PROT SERPL-MCNC: 6.7 G/DL (ref 6.4–8.3)
RBC # BLD AUTO: 3.37 10E6/UL (ref 3.8–5.2)
RBC MORPH BLD: ABNORMAL
SODIUM SERPL-SCNC: 141 MMOL/L (ref 135–145)
VARIANT LYMPHS BLD QL SMEAR: PRESENT
WBC # BLD AUTO: 8.4 10E3/UL (ref 4–11)

## 2023-11-27 PROCEDURE — 80053 COMPREHEN METABOLIC PANEL: CPT | Performed by: STUDENT IN AN ORGANIZED HEALTH CARE EDUCATION/TRAINING PROGRAM

## 2023-11-27 PROCEDURE — 85027 COMPLETE CBC AUTOMATED: CPT | Performed by: STUDENT IN AN ORGANIZED HEALTH CARE EDUCATION/TRAINING PROGRAM

## 2023-11-27 PROCEDURE — 36415 COLL VENOUS BLD VENIPUNCTURE: CPT

## 2023-11-27 PROCEDURE — 85007 BL SMEAR W/DIFF WBC COUNT: CPT | Performed by: STUDENT IN AN ORGANIZED HEALTH CARE EDUCATION/TRAINING PROGRAM

## 2023-11-27 NOTE — PROGRESS NOTES
Patient does not meet parameters for a blood transfusion today. Declined fluids. She was not seen in infusion. Will return 11/29.   Teddy Awad RN on 11/27/2023 at 1:09 PM

## 2023-11-28 ENCOUNTER — TELEPHONE (OUTPATIENT)
Dept: DERMATOLOGY | Facility: CLINIC | Age: 68
End: 2023-11-28
Payer: COMMERCIAL

## 2023-11-28 NOTE — TELEPHONE ENCOUNTER
"Spoke to patient.    \"I feel bad that I can't come in on 12-14, but I know I will be in the hospital then for chemo then.\"    \"I just got out of the hospital and I have a rash where the PICC line was inand it is amandeep flaky skin in that area I am using Clobetasol on the area.\"    I asked if she let ordering Provider of chemo know she has had a reaction to the chemo or PICC line and she has not.    \"I know you can't do anything over the phone...\"    \"I think it is only where the adhesive from the PICC line was..\"    I did encourage patient to contact ordering Provider of chemo and let them know what it going on.     Patient verbalized understanding. I did advise it should be ok to use Clobetasol on the area a few times, but be sure to let Provider and Nurse inserting PICC line know about what sounds like a reaction to the adhesive in the bandaging material holding the PICC line in place.     Patient verbalized understanding. Gudelia Huizar RN              "

## 2023-11-28 NOTE — TELEPHONE ENCOUNTER
[facility] Call Center    Phone Message    May a detailed message be left on voicemail: yes     Reason for Call: Symptoms or Concerns     If patient has red-flag symptoms, warm transfer to triage line    Current symptom or concern: Pt called and said she will not be able to see her on 12/14 due to chemo. Pt said that a rash has developed from the area that the picc line was placed. Pt has rashes all over her body now. Pt doesn't know if she is having a reaction to something. Pt doesn't know what else to do. Please call her back. Thanks     Symptoms have been present for: recently    Has patient previously been seen for this? Yes    By : Radha Matias    Date: 9/15/23    Are there any new or worsening symptoms? Yes: see above    Action Taken: Message routed to:  Other: [place] [facility]    Travel Screening: Not Applicable

## 2023-11-29 ENCOUNTER — INFUSION THERAPY VISIT (OUTPATIENT)
Dept: INFUSION THERAPY | Facility: CLINIC | Age: 68
End: 2023-11-29
Attending: STUDENT IN AN ORGANIZED HEALTH CARE EDUCATION/TRAINING PROGRAM
Payer: COMMERCIAL

## 2023-11-29 ENCOUNTER — APPOINTMENT (OUTPATIENT)
Dept: LAB | Facility: CLINIC | Age: 68
End: 2023-11-29
Payer: COMMERCIAL

## 2023-11-29 DIAGNOSIS — C49.21 SARCOMA OF RIGHT LOWER EXTREMITY (H): ICD-10-CM

## 2023-11-29 LAB
ALBUMIN SERPL BCG-MCNC: 3.8 G/DL (ref 3.5–5.2)
ALP SERPL-CCNC: 161 U/L (ref 40–150)
ALT SERPL W P-5'-P-CCNC: 13 U/L (ref 0–50)
ANION GAP SERPL CALCULATED.3IONS-SCNC: 13 MMOL/L (ref 7–15)
AST SERPL W P-5'-P-CCNC: 19 U/L (ref 0–45)
BASOPHILS # BLD AUTO: ABNORMAL 10*3/UL
BASOPHILS # BLD MANUAL: 0 10E3/UL (ref 0–0.2)
BASOPHILS NFR BLD AUTO: ABNORMAL %
BASOPHILS NFR BLD MANUAL: 0 %
BILIRUB SERPL-MCNC: 0.2 MG/DL
BUN SERPL-MCNC: 9 MG/DL (ref 8–23)
CALCIUM SERPL-MCNC: 9 MG/DL (ref 8.8–10.2)
CHLORIDE SERPL-SCNC: 104 MMOL/L (ref 98–107)
CREAT SERPL-MCNC: 0.81 MG/DL (ref 0.51–0.95)
DEPRECATED HCO3 PLAS-SCNC: 22 MMOL/L (ref 22–29)
EGFRCR SERPLBLD CKD-EPI 2021: 80 ML/MIN/1.73M2
EOSINOPHIL # BLD AUTO: ABNORMAL 10*3/UL
EOSINOPHIL # BLD MANUAL: 0 10E3/UL (ref 0–0.7)
EOSINOPHIL NFR BLD AUTO: ABNORMAL %
EOSINOPHIL NFR BLD MANUAL: 0 %
ERYTHROCYTE [DISTWIDTH] IN BLOOD BY AUTOMATED COUNT: 17.2 % (ref 10–15)
GLUCOSE SERPL-MCNC: 108 MG/DL (ref 70–99)
HCT VFR BLD AUTO: 29 % (ref 35–47)
HGB BLD-MCNC: 9.6 G/DL (ref 11.7–15.7)
IMM GRANULOCYTES # BLD: ABNORMAL 10*3/UL
IMM GRANULOCYTES NFR BLD: ABNORMAL %
LYMPHOCYTES # BLD AUTO: ABNORMAL 10*3/UL
LYMPHOCYTES # BLD MANUAL: 1.1 10E3/UL (ref 0.8–5.3)
LYMPHOCYTES NFR BLD AUTO: ABNORMAL %
LYMPHOCYTES NFR BLD MANUAL: 10 %
MCH RBC QN AUTO: 27.9 PG (ref 26.5–33)
MCHC RBC AUTO-ENTMCNC: 33.1 G/DL (ref 31.5–36.5)
MCV RBC AUTO: 84 FL (ref 78–100)
METAMYELOCYTES # BLD MANUAL: 0.1 10E3/UL
METAMYELOCYTES NFR BLD MANUAL: 1 %
MONOCYTES # BLD AUTO: ABNORMAL 10*3/UL
MONOCYTES # BLD MANUAL: 0.5 10E3/UL (ref 0–1.3)
MONOCYTES NFR BLD AUTO: ABNORMAL %
MONOCYTES NFR BLD MANUAL: 4 %
NEUTROPHILS # BLD AUTO: ABNORMAL 10*3/UL
NEUTROPHILS # BLD MANUAL: 9.6 10E3/UL (ref 1.6–8.3)
NEUTROPHILS NFR BLD AUTO: ABNORMAL %
NEUTROPHILS NFR BLD MANUAL: 85 %
NRBC # BLD AUTO: 0 10E3/UL
NRBC # BLD AUTO: 0.1 10E3/UL
NRBC BLD AUTO-RTO: 0 /100
NRBC BLD MANUAL-RTO: 1 %
PLAT MORPH BLD: ABNORMAL
PLATELET # BLD AUTO: 136 10E3/UL (ref 150–450)
POTASSIUM SERPL-SCNC: 3.4 MMOL/L (ref 3.4–5.3)
PROT SERPL-MCNC: 6.5 G/DL (ref 6.4–8.3)
RBC # BLD AUTO: 3.44 10E6/UL (ref 3.8–5.2)
RBC MORPH BLD: ABNORMAL
SODIUM SERPL-SCNC: 139 MMOL/L (ref 135–145)
WBC # BLD AUTO: 11.3 10E3/UL (ref 4–11)

## 2023-11-29 PROCEDURE — 85007 BL SMEAR W/DIFF WBC COUNT: CPT | Performed by: STUDENT IN AN ORGANIZED HEALTH CARE EDUCATION/TRAINING PROGRAM

## 2023-11-29 PROCEDURE — 85027 COMPLETE CBC AUTOMATED: CPT | Performed by: STUDENT IN AN ORGANIZED HEALTH CARE EDUCATION/TRAINING PROGRAM

## 2023-11-29 PROCEDURE — 80053 COMPREHEN METABOLIC PANEL: CPT | Performed by: STUDENT IN AN ORGANIZED HEALTH CARE EDUCATION/TRAINING PROGRAM

## 2023-11-29 PROCEDURE — 36415 COLL VENOUS BLD VENIPUNCTURE: CPT

## 2023-11-29 NOTE — PROGRESS NOTES
Infusion Nursing Note:  Mirta Cardenas presents today for possible transfusion(s).    Patient seen by provider today: No   present during visit today: Not Applicable.    Note: Labs drawn peripherally.      Intravenous Access:  N/A.    Treatment Conditions:  Results reviewed, labs did NOT meet treatment parameters. No transfusion indicated.      Post Infusion Assessment:  Mirta was not seen in the Infusion Clinic today.       Discharge Plan:   Patient discharged in stable condition accompanied by: .  Departure Mode: Ambulatory.      Faviola Padilla RN

## 2023-12-01 ENCOUNTER — APPOINTMENT (OUTPATIENT)
Dept: LAB | Facility: CLINIC | Age: 68
End: 2023-12-01
Payer: COMMERCIAL

## 2023-12-01 ENCOUNTER — INFUSION THERAPY VISIT (OUTPATIENT)
Dept: INFUSION THERAPY | Facility: CLINIC | Age: 68
End: 2023-12-01
Attending: STUDENT IN AN ORGANIZED HEALTH CARE EDUCATION/TRAINING PROGRAM
Payer: COMMERCIAL

## 2023-12-01 DIAGNOSIS — C49.21 SARCOMA OF RIGHT LOWER EXTREMITY (H): ICD-10-CM

## 2023-12-01 LAB
ALBUMIN SERPL BCG-MCNC: 4.1 G/DL (ref 3.5–5.2)
ALP SERPL-CCNC: 156 U/L (ref 40–150)
ALT SERPL W P-5'-P-CCNC: 14 U/L (ref 0–50)
ANION GAP SERPL CALCULATED.3IONS-SCNC: 9 MMOL/L (ref 7–15)
AST SERPL W P-5'-P-CCNC: 14 U/L (ref 0–45)
BASOPHILS # BLD AUTO: 0.1 10E3/UL (ref 0–0.2)
BASOPHILS NFR BLD AUTO: 1 %
BILIRUB SERPL-MCNC: 0.3 MG/DL
BUN SERPL-MCNC: 13.8 MG/DL (ref 8–23)
CALCIUM SERPL-MCNC: 8.9 MG/DL (ref 8.8–10.2)
CHLORIDE SERPL-SCNC: 106 MMOL/L (ref 98–107)
CREAT SERPL-MCNC: 0.85 MG/DL (ref 0.51–0.95)
DEPRECATED HCO3 PLAS-SCNC: 26 MMOL/L (ref 22–29)
EGFRCR SERPLBLD CKD-EPI 2021: 75 ML/MIN/1.73M2
EOSINOPHIL # BLD AUTO: 0 10E3/UL (ref 0–0.7)
EOSINOPHIL NFR BLD AUTO: 0 %
ERYTHROCYTE [DISTWIDTH] IN BLOOD BY AUTOMATED COUNT: 18.1 % (ref 10–15)
GLUCOSE SERPL-MCNC: 101 MG/DL (ref 70–99)
HCT VFR BLD AUTO: 29.4 % (ref 35–47)
HGB BLD-MCNC: 9.9 G/DL (ref 11.7–15.7)
IMM GRANULOCYTES # BLD: 0.3 10E3/UL
IMM GRANULOCYTES NFR BLD: 3 %
LYMPHOCYTES # BLD AUTO: 1.2 10E3/UL (ref 0.8–5.3)
LYMPHOCYTES NFR BLD AUTO: 11 %
MCH RBC QN AUTO: 28.5 PG (ref 26.5–33)
MCHC RBC AUTO-ENTMCNC: 33.7 G/DL (ref 31.5–36.5)
MCV RBC AUTO: 85 FL (ref 78–100)
MONOCYTES # BLD AUTO: 0.5 10E3/UL (ref 0–1.3)
MONOCYTES NFR BLD AUTO: 5 %
NEUTROPHILS # BLD AUTO: 8.3 10E3/UL (ref 1.6–8.3)
NEUTROPHILS NFR BLD AUTO: 80 %
NRBC # BLD AUTO: 0 10E3/UL
NRBC BLD AUTO-RTO: 0 /100
PLATELET # BLD AUTO: 150 10E3/UL (ref 150–450)
POTASSIUM SERPL-SCNC: 4.1 MMOL/L (ref 3.4–5.3)
PROT SERPL-MCNC: 6.5 G/DL (ref 6.4–8.3)
RBC # BLD AUTO: 3.47 10E6/UL (ref 3.8–5.2)
SODIUM SERPL-SCNC: 141 MMOL/L (ref 135–145)
WBC # BLD AUTO: 10.3 10E3/UL (ref 4–11)

## 2023-12-01 PROCEDURE — 85025 COMPLETE CBC W/AUTO DIFF WBC: CPT | Performed by: STUDENT IN AN ORGANIZED HEALTH CARE EDUCATION/TRAINING PROGRAM

## 2023-12-01 PROCEDURE — 80053 COMPREHEN METABOLIC PANEL: CPT | Performed by: STUDENT IN AN ORGANIZED HEALTH CARE EDUCATION/TRAINING PROGRAM

## 2023-12-01 PROCEDURE — 36415 COLL VENOUS BLD VENIPUNCTURE: CPT

## 2023-12-01 NOTE — PROGRESS NOTES
Hgb 9.9  Pt does not meet treatment parameters today.  Pt is aware of lab results.     Santiago MUELLER

## 2023-12-04 ENCOUNTER — INFUSION THERAPY VISIT (OUTPATIENT)
Dept: INFUSION THERAPY | Facility: CLINIC | Age: 68
End: 2023-12-04
Attending: STUDENT IN AN ORGANIZED HEALTH CARE EDUCATION/TRAINING PROGRAM
Payer: COMMERCIAL

## 2023-12-04 ENCOUNTER — APPOINTMENT (OUTPATIENT)
Dept: LAB | Facility: CLINIC | Age: 68
End: 2023-12-04
Payer: COMMERCIAL

## 2023-12-04 DIAGNOSIS — C49.21 SARCOMA OF RIGHT LOWER EXTREMITY (H): ICD-10-CM

## 2023-12-04 LAB
ALBUMIN SERPL BCG-MCNC: 3.9 G/DL (ref 3.5–5.2)
ALP SERPL-CCNC: 143 U/L (ref 40–150)
ALT SERPL W P-5'-P-CCNC: 12 U/L (ref 0–50)
ANION GAP SERPL CALCULATED.3IONS-SCNC: 8 MMOL/L (ref 7–15)
AST SERPL W P-5'-P-CCNC: 17 U/L (ref 0–45)
BASOPHILS # BLD AUTO: 0 10E3/UL (ref 0–0.2)
BASOPHILS NFR BLD AUTO: 0 %
BILIRUB SERPL-MCNC: 0.2 MG/DL
BUN SERPL-MCNC: 17.2 MG/DL (ref 8–23)
CALCIUM SERPL-MCNC: 8.9 MG/DL (ref 8.8–10.2)
CHLORIDE SERPL-SCNC: 104 MMOL/L (ref 98–107)
CREAT SERPL-MCNC: 0.9 MG/DL (ref 0.51–0.95)
DEPRECATED HCO3 PLAS-SCNC: 26 MMOL/L (ref 22–29)
EGFRCR SERPLBLD CKD-EPI 2021: 70 ML/MIN/1.73M2
EOSINOPHIL # BLD AUTO: 0 10E3/UL (ref 0–0.7)
EOSINOPHIL NFR BLD AUTO: 0 %
ERYTHROCYTE [DISTWIDTH] IN BLOOD BY AUTOMATED COUNT: 18.5 % (ref 10–15)
GLUCOSE SERPL-MCNC: 129 MG/DL (ref 70–99)
HCT VFR BLD AUTO: 29.2 % (ref 35–47)
HGB BLD-MCNC: 9.6 G/DL (ref 11.7–15.7)
IMM GRANULOCYTES # BLD: 0.1 10E3/UL
IMM GRANULOCYTES NFR BLD: 1 %
LYMPHOCYTES # BLD AUTO: 1.1 10E3/UL (ref 0.8–5.3)
LYMPHOCYTES NFR BLD AUTO: 12 %
MCH RBC QN AUTO: 28.2 PG (ref 26.5–33)
MCHC RBC AUTO-ENTMCNC: 32.9 G/DL (ref 31.5–36.5)
MCV RBC AUTO: 86 FL (ref 78–100)
MONOCYTES # BLD AUTO: 0.5 10E3/UL (ref 0–1.3)
MONOCYTES NFR BLD AUTO: 6 %
NEUTROPHILS # BLD AUTO: 7.3 10E3/UL (ref 1.6–8.3)
NEUTROPHILS NFR BLD AUTO: 81 %
PLATELET # BLD AUTO: 164 10E3/UL (ref 150–450)
POTASSIUM SERPL-SCNC: 4.2 MMOL/L (ref 3.4–5.3)
PROT SERPL-MCNC: 6.5 G/DL (ref 6.4–8.3)
RBC # BLD AUTO: 3.41 10E6/UL (ref 3.8–5.2)
SODIUM SERPL-SCNC: 138 MMOL/L (ref 135–145)
WBC # BLD AUTO: 9 10E3/UL (ref 4–11)

## 2023-12-04 PROCEDURE — 85025 COMPLETE CBC W/AUTO DIFF WBC: CPT | Performed by: STUDENT IN AN ORGANIZED HEALTH CARE EDUCATION/TRAINING PROGRAM

## 2023-12-04 PROCEDURE — 36415 COLL VENOUS BLD VENIPUNCTURE: CPT

## 2023-12-04 PROCEDURE — 80053 COMPREHEN METABOLIC PANEL: CPT | Performed by: STUDENT IN AN ORGANIZED HEALTH CARE EDUCATION/TRAINING PROGRAM

## 2023-12-04 NOTE — PROGRESS NOTES
Patient does not meet parameters for transfusion today. She was not seen in infusion. Will return 12/8. Hgb 9.6, Plt 164.    Teddy Awad RN on 12/4/2023 at 1:44 PM

## 2023-12-05 ENCOUNTER — HOSPITAL ENCOUNTER (OUTPATIENT)
Dept: PET IMAGING | Facility: HOSPITAL | Age: 68
Discharge: HOME | End: 2023-12-05
Attending: STUDENT IN AN ORGANIZED HEALTH CARE EDUCATION/TRAINING PROGRAM | Admitting: STUDENT IN AN ORGANIZED HEALTH CARE EDUCATION/TRAINING PROGRAM
Payer: COMMERCIAL

## 2023-12-05 DIAGNOSIS — C49.21 SARCOMA OF RIGHT LOWER EXTREMITY (H): ICD-10-CM

## 2023-12-05 DIAGNOSIS — C49.21 SARCOMA OF RIGHT LOWER EXTREMITY (H): Primary | ICD-10-CM

## 2023-12-05 LAB — GLUCOSE BLDC GLUCOMTR-MCNC: 81 MG/DL (ref 70–99)

## 2023-12-05 PROCEDURE — 82962 GLUCOSE BLOOD TEST: CPT

## 2023-12-05 PROCEDURE — 78816 PET IMAGE W/CT FULL BODY: CPT | Mod: PS

## 2023-12-05 PROCEDURE — 343N000001 HC RX 343: Performed by: STUDENT IN AN ORGANIZED HEALTH CARE EDUCATION/TRAINING PROGRAM

## 2023-12-05 PROCEDURE — 999N000248 HC STATISTIC IV INSERT WITH US BY RN

## 2023-12-05 PROCEDURE — A9552 F18 FDG: HCPCS | Performed by: STUDENT IN AN ORGANIZED HEALTH CARE EDUCATION/TRAINING PROGRAM

## 2023-12-05 RX ADMIN — FLUDEOXYGLUCOSE F-18 10.15 MILLICURIE: 500 INJECTION, SOLUTION INTRAVENOUS at 12:13

## 2023-12-06 ENCOUNTER — ONCOLOGY VISIT (OUTPATIENT)
Dept: ONCOLOGY | Facility: CLINIC | Age: 68
End: 2023-12-06
Attending: STUDENT IN AN ORGANIZED HEALTH CARE EDUCATION/TRAINING PROGRAM
Payer: COMMERCIAL

## 2023-12-06 VITALS
DIASTOLIC BLOOD PRESSURE: 89 MMHG | RESPIRATION RATE: 12 BRPM | HEART RATE: 76 BPM | BODY MASS INDEX: 32.83 KG/M2 | WEIGHT: 175 LBS | TEMPERATURE: 98.8 F | OXYGEN SATURATION: 100 % | SYSTOLIC BLOOD PRESSURE: 172 MMHG

## 2023-12-06 DIAGNOSIS — C49.21 SARCOMA OF RIGHT LOWER EXTREMITY (H): ICD-10-CM

## 2023-12-06 DIAGNOSIS — Z76.89 PREVENTION OF CHEMOTHERAPY-INDUCED NEUTROPENIA: Primary | ICD-10-CM

## 2023-12-06 PROCEDURE — 99215 OFFICE O/P EST HI 40 MIN: CPT | Performed by: STUDENT IN AN ORGANIZED HEALTH CARE EDUCATION/TRAINING PROGRAM

## 2023-12-06 PROCEDURE — G0463 HOSPITAL OUTPT CLINIC VISIT: HCPCS | Performed by: STUDENT IN AN ORGANIZED HEALTH CARE EDUCATION/TRAINING PROGRAM

## 2023-12-06 RX ORDER — OXYCODONE HYDROCHLORIDE 5 MG/1
5-10 TABLET ORAL PRN
Qty: 60 TABLET | Refills: 0 | Status: ON HOLD | OUTPATIENT
Start: 2023-12-06 | End: 2024-04-24

## 2023-12-06 ASSESSMENT — PAIN SCALES - GENERAL: PAINLEVEL: MILD PAIN (3)

## 2023-12-06 NOTE — NURSING NOTE
"Oncology Rooming Note    December 6, 2023 12:51 PM   Mirta Cardenas is a 68 year old female who presents for:    Chief Complaint   Patient presents with    Oncology Clinic Visit     Sarcoma of right lower extremity     Initial Vitals: BP (!) 172/89 (BP Location: Right arm, Patient Position: Sitting, Cuff Size: Adult Regular)   Pulse 76   Temp 98.8  F (37.1  C) (Oral)   Resp 12   Wt 79.4 kg (175 lb)   SpO2 100%   BMI 32.83 kg/m   Estimated body mass index is 32.83 kg/m  as calculated from the following:    Height as of 11/14/23: 1.555 m (5' 1.22\").    Weight as of this encounter: 79.4 kg (175 lb). Body surface area is 1.85 meters squared.  Mild Pain (3) Comment: Data Unavailable   No LMP recorded. Patient has had a hysterectomy.  Allergies reviewed: Yes  Medications reviewed: Yes    Medications: Medication refills not needed today.  Pharmacy name entered into [facility]:    [facility] [address] [place] [address] [address] [facility] [place], [place] - [address] [place] [address]    Clinical concerns: None       Andree Quintero              "

## 2023-12-06 NOTE — PROGRESS NOTES
[facility]    FOLLOW UP VISIT NOTE    PATIENT NAME: Mirta Cardenas MRN # [id number]  DATE OF VISIT: December 6, 2023 YOB: 1955    REFERRING PROVIDER: Benny Barroso MD  [address]  [place] [address]    CANCER TYPE:  High grade pleomorphic sarcoma       CHIEF COMPLAIN   LE pain     HISTORY OF PRESENT ILLNESS   68-year-old female with PMH of CLL and recent Dx of High Grade Pleomorphic sarcoma, with intermittent right calf pain but then continuous calf pain beginning in March 2023.  She had a recent x-ray which shows a destructive lesion in the midportion R LE in between the fibula and the tibia. She is experiencing significant pain, she is restless during the visit due to pain. She is currently taking both Advil and Tylenol and oxycodone for pain, with minimal control, feels ibuprofen is what helps the most. She has been alternating all pain medications without optimizing doses of them.     C1D1 10/17/23  C2 11/14    Interval Hx  She reports that she tolerated chemotherapy very well, only mild fatigue, no significant nausea.        PAST ONCOLOGIC HISTORY   CLL - on monitoring     PAST MEDICAL HISTORY   Anal fisure - controlled with nitrobid  CLL - now being monitored - [place]  HTN  Cholesterol  Hx of severe COVID  Psoriasis    PAST SURGICAL HISTORY   3 c- sections  Hysterectomy  Ooforectomy     FAMILY HISTORY   Sister with MM     ALLERGIES      Allergies   Allergen Reactions    Allopurinol Itching    Amoxicillin Hives    Codeine Itching    Cymbalta [Duloxetine Hcl] Fatigue    Periactin Other (See Comments)     Sleepy.    11/15/23: patient not sure about this allergy/intolerance - may be interested in removal    Tenormin [Atenolol] Fatigue     11/15/23: patient may be interested in removal of this from allergy list as it was only fatigue/sleepiness          SOCIAL HISTORY     Social History     Social History Narrative    Not on file     Lives  with , Giancarlo, she retired last spring, denies alcohol, tobacco or other drugs.      CURRENT OUTPATIENT MEDICATIONS     Current Outpatient Medications   Medication Sig Dispense Refill    calcipotriene (DOVONOX) 0.005 % external cream Apply twice daily to areas of psoriasis on Saturday and . 60 g 6    clobetasol (TEMOVATE) 0.05 % external cream Apply twice daily to psoriasis on Monday through Friday. 60 g 5    docusate sodium (COLACE) 100 MG capsule Take 100 mg by mouth 2 times daily      losartan (COZAAR) 100 MG tablet Take 1 tablet (100 mg) by mouth daily (Patient taking differently: Take 100 mg by mouth every evening) 90 tablet 3    METAMUCIL FIBER PO Take 1 teaspoonful by mouth daily After supper      Multiple Vitamins-Calcium (ONE-A-DAY WOMENS FORMULA PO) Take 1 tablet by mouth daily      omeprazole (PRILOSEC) 20 MG DR capsule Take 1 capsule by mouth every morning.      pravastatin (PRAVACHOL) 40 MG tablet Take 1 tablet (40 mg) by mouth daily (Patient taking differently: Take 40 mg by mouth every evening) 90 tablet 3    pregabalin (LYRICA) 50 MG capsule Take 1 capsule (50 mg) by mouth 3 times daily for 30 days 90 capsule 0    diclofenac (VOLTAREN) 1 % topical gel Apply 4 g topically 4 times daily To right lower leg 350 g 1    ibuprofen (ADVIL/MOTRIN) 200 MG capsule Take 400 mg by mouth every 4 hours as needed for fever      oxyCODONE (ROXICODONE) 5 MG tablet Take 1-2 tablets (5-10 mg) by mouth as needed for severe pain 60 tablet 0    prochlorperazine (COMPAZINE) 5 MG tablet Take 1 tablet (5 mg) by mouth every 6 hours as needed (Breakthrough Nausea / Vomiting) 30 tablet 0          REVIEW OF SYSTEMS   As above in the HPI, o/w complete 12-point ROS was negative.     PHYSICAL EXAM   BP (!) 172/89 (BP Location: Right arm, Patient Position: Sitting, Cuff Size: Adult Regular)   Pulse 76   Temp 98.8  F (37.1  C) (Oral)   Resp 12   Wt 79.4 kg (175 lb)   SpO2 100%   BMI 32.83 kg/m      EGO (due to  pain related to the mass)  GEN: NAD  HEENT: PERRL, EOMI, no icterus, injection or pallor. Oropharynx is clear.  NECK: no cervical or supraclavicular lymphadenopathy  LUNGS: clear bilaterally  CV: regular, no murmurs, rubs, or gallops  ABDOMEN: soft, non-tender, non-distended, normal bowel sounds, no hepatosplenomegaly by percussion or palpation  EXT: warm, well perfused, no edema. R calf is more prominent and a deep mass appreciated on physical exam in between the tibia and fibular bone. Slightly improved from presentation.   NEURO: alert  SKIN: no rashes     LABORATORY AND IMAGING STUDIES     Lab Results   Component Value Date    WBC 9.0 12/04/2023    WBC 15.6 05/10/2021     Lab Results   Component Value Date    RBC 3.41 12/04/2023    RBC 4.89 05/10/2021     Lab Results   Component Value Date    HGB 9.6 12/04/2023    HGB 13.7 05/10/2021     Lab Results   Component Value Date    HCT 29.2 12/04/2023    HCT 41.0 05/10/2021     Lab Results   Component Value Date    MCV 86 12/04/2023    MCV 84 05/10/2021     Lab Results   Component Value Date    MCH 28.2 12/04/2023    MCH 28.0 05/10/2021     Lab Results   Component Value Date    MCHC 32.9 12/04/2023    MCHC 33.4 05/10/2021     Lab Results   Component Value Date    RDW 18.5 12/04/2023    RDW 13.1 05/10/2021     Lab Results   Component Value Date     12/04/2023     05/10/2021     Last Comprehensive Metabolic Panel:  Sodium   Date Value Ref Range Status   12/04/2023 138 135 - 145 mmol/L Final     Comment:     Reference intervals for this test were updated on 09/26/2023 to more accurately reflect our healthy population. There may be differences in the flagging of prior results with similar values performed with this method. Interpretation of those prior results can be made in the context of the updated reference intervals.    05/10/2021 141 133 - 144 mmol/L Final     Potassium   Date Value Ref Range Status   12/04/2023 4.2 3.4 - 5.3 mmol/L Final   05/24/2022  4.7 3.4 - 5.3 mmol/L Final   05/10/2021 3.9 3.4 - 5.3 mmol/L Final     Chloride   Date Value Ref Range Status   12/04/2023 104 98 - 107 mmol/L Final   05/24/2022 108 94 - 109 mmol/L Final   05/10/2021 108 94 - 109 mmol/L Final     Carbon Dioxide   Date Value Ref Range Status   05/10/2021 27 20 - 32 mmol/L Final     Carbon Dioxide (CO2)   Date Value Ref Range Status   12/04/2023 26 22 - 29 mmol/L Final   05/24/2022 29 20 - 32 mmol/L Final     Anion Gap   Date Value Ref Range Status   12/04/2023 8 7 - 15 mmol/L Final   05/24/2022 2 (L) 3 - 14 mmol/L Final   05/10/2021 6 3 - 14 mmol/L Final     Glucose   Date Value Ref Range Status   05/24/2022 108 (H) 70 - 99 mg/dL Final   05/10/2021 112 (H) 70 - 99 mg/dL Final     Comment:     Fasting specimen     GLUCOSE BY METER POCT   Date Value Ref Range Status   12/05/2023 81 70 - 99 mg/dL Final     Urea Nitrogen   Date Value Ref Range Status   12/04/2023 17.2 8.0 - 23.0 mg/dL Final   05/24/2022 14 7 - 30 mg/dL Final   05/10/2021 19 7 - 30 mg/dL Final     Creatinine   Date Value Ref Range Status   12/04/2023 0.90 0.51 - 0.95 mg/dL Final   05/10/2021 0.89 0.52 - 1.04 mg/dL Final     GFR Estimate   Date Value Ref Range Status   12/04/2023 70 >60 mL/min/1.73m2 Final   05/10/2021 69 >60 mL/min/[1.73_m2] Final     Comment:     Non  GFR Calc  Starting 12/18/2018, serum creatinine based estimated GFR (eGFR) will be   calculated using the Chronic Kidney Disease Epidemiology Collaboration   (CKD-EPI) equation.       GFR, ESTIMATED POCT   Date Value Ref Range Status   10/10/2023 >60 >60 mL/min/1.73m2 Final     Calcium   Date Value Ref Range Status   12/04/2023 8.9 8.8 - 10.2 mg/dL Final   05/10/2021 8.6 8.5 - 10.1 mg/dL Final     Bilirubin Total   Date Value Ref Range Status   12/04/2023 0.2 <=1.2 mg/dL Final   10/12/2020 0.3 0.2 - 1.3 mg/dL Final     Alkaline Phosphatase   Date Value Ref Range Status   12/04/2023 143 40 - 150 U/L Final     Comment:     Reference intervals  for this test were updated on 11/14/2023 to more accurately reflect our healthy population. There may be differences in the flagging of prior results with similar values performed with this method. Interpretation of those prior results can be made in the context of the updated reference intervals.   10/12/2020 124 40 - 150 U/L Final     ALT   Date Value Ref Range Status   12/04/2023 12 0 - 50 U/L Final     Comment:     Reference intervals for this test were updated on 6/12/2023 to more accurately reflect our healthy population. There may be differences in the flagging of prior results with similar values performed with this method. Interpretation of those prior results can be made in the context of the updated reference intervals.     10/12/2020 40 0 - 50 U/L Final     AST   Date Value Ref Range Status   12/04/2023 17 0 - 45 U/L Final     Comment:     Reference intervals for this test were updated on 6/12/2023 to more accurately reflect our healthy population. There may be differences in the flagging of prior results with similar values performed with this method. Interpretation of those prior results can be made in the context of the updated reference intervals.   10/12/2020 35 0 - 45 U/L Final     PET CT 12/5/23  PET/CT FINDINGS: Decreased size and FDG avidity of a right calf soft tissue mass with SUV max of 7.1, previously 26.4. Mass is difficult to measure anatomically by noncontrast CT. Similar cortical destruction of the right fibular diaphysis. Mild   diffusely increased marrow uptake is likely treatment related. No convincing evidence of FDG avid metastatic disease. Mildly enlarged bilateral iliac chain lymph nodes appear similar and do not demonstrate FDG avidity above that of blood pool, favored   reactive.     CT FINDINGS: Moderate coronary artery calcifications. Similar hepatosplenomegaly. Similar multifocal scarring and parenchymal atrophy of the right kidney with multiple nonobstructing renal calculi.  "Colonic diverticulosis. Large fat-containing lower   ventral abdominal wall hernia.                                                                      IMPRESSION:     Partial response to therapy with decreasing size and FDG avidity of the right calf mass. No evidence of new FDG avid disease.         PATHOLOGY   10/2/23  Final Diagnosis   A.  Soft tissue, right calf mass, excision:  - High-grade pleomorphic sarcoma  - See comment    Electronically signed by Jonny Zhou MD on 10/4/2023 at  9:09 PM   Comment  [facility]   Immunohistochemical stains show the tumor is negative for CK AE1/AE3, S100, desmin and CD34 while SATB2 demonstrates patchy reactivity.  While the primary diagnostic consideration is an undifferentiated pleomorphic sarcoma, the patchy SATB2 reactivity raises the possibility of a poorly differentiated osteosarcoma.   Clinical Information  [facility]   The patient is a 68 year old female with a history of CLL and several months of sensitivity of the lateral right calf, and recent imaging showing: \"Lytic lesion in the right fibular proximal diaphysis, with complete resorption of the medial cortex and some periosteal reaction adjacent to the lesion\".            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 No evidence of metastatic disease.     I discussed with the patient that sarcomas are rare tumors only representing 1% of all cancers and that they are very heterogeneous with over 100 different subtypes. When they present with localized disease they main approach to therapy is surgical resection. However, there are factors that influence the chances of recurrence and development of distant metastasis. In general tumors that are big in size (over 5 cm) and high grade (rapid cell division observed under the microscope) have higher probability of recurrence.     In her case, " the probability of recurrence with surgery alone is about 40% in the next 10 years. We discussed, that recurrence in this scenario is usually with distant  metastasis which then becomes uncurable disease. We discussed, that for this reason, I recommend treatment with neoadjuvant chemotherapy with the goal of preventing metastasis in the future. We discussed the potential side effects of chemotherapy and need for emergent treatment in order to improve her symptoms. The regimen will be doxil 40mg/m2 and ifosfamide 8g/m2 over 5 days. Slightly lower dose due to age and other medical conditions.   She is s/p C2 and tolerated chemotherapy well, her pain is also better controlled now. She is very active and has been doing cooking in preparation for the holidays. I reviewed the most recent Pet scan showing evidence of very good response to chemotherapy, SUV max decrease >40%.  Plan to proceed with 2 more cycles, followed by radiation therapy and surgical resection.     Plan  -Proceed with planned for chemotherapy on 1/13   -Neulasta after chemo on 12/20   -Labs and infusion appointments after chemo on 12/20, 12/22, 12/26, 12/28, 12/29   -ISIDORO toxicity check on - virtual 12/28   -Admission for C4 on 1/10   -PET scan and MRI R LE on 1/29   -Follow up with Dr. Jimenez 1/30   -Please reschedule appointment with rad onc Dr. Armstrong at the end of January after scans.   -PET scan and MRI Follow up Dr. Jimenez after scans     40 minutes spent on the date of the encounter doing chart review, review of outside records, review of test results, interpretation of tests, patient visit, documentation, and discussion with other provider(s)     Roney Nam MD   of Medicine  Hematology, Oncology and Transplantation

## 2023-12-06 NOTE — LETTER
12/6/2023         RE: Mirta Cardenas  [id number] [facility] [address]        Dear Colleague,    Thank you for referring your patient, Mirta Cardenas, to the [facility]. Please see a copy of my visit note below.    [facility]    FOLLOW UP VISIT NOTE    PATIENT NAME: Mirta Cardenas MRN # [id number]  DATE OF VISIT: December 6, 2023 YOB: 1955    REFERRING PROVIDER: Benny Barroso MD  [address]  [place] [address]    CANCER TYPE:  High grade pleomorphic sarcoma       CHIEF COMPLAIN   LE pain     HISTORY OF PRESENT ILLNESS   68-year-old female with PMH of CLL and recent Dx of High Grade Pleomorphic sarcoma, with intermittent right calf pain but then continuous calf pain beginning in March 2023.  She had a recent x-ray which shows a destructive lesion in the midportion R LE in between the fibula and the tibia. She is experiencing significant pain, she is restless during the visit due to pain. She is currently taking both Advil and Tylenol and oxycodone for pain, with minimal control, feels ibuprofen is what helps the most. She has been alternating all pain medications without optimizing doses of them.     C1D1 10/17/23  C2 11/14    Interval Hx  She reports that she tolerated chemotherapy very well, only mild fatigue, no significant nausea.        PAST ONCOLOGIC HISTORY   CLL - on monitoring     PAST MEDICAL HISTORY   Anal fisure - controlled with nitrobid  CLL - now being monitored - [place]  HTN  Cholesterol  Hx of severe COVID  Psoriasis    PAST SURGICAL HISTORY   3 c- sections  Hysterectomy  Ooforectomy     FAMILY HISTORY   Sister with [facility]     ALLERGIES      Allergies   Allergen Reactions    Allopurinol Itching    Amoxicillin Hives    Codeine Itching    Cymbalta [Duloxetine Hcl] Fatigue    Periactin Other (See Comments)     Sleepy.    11/15/23: patient not sure about this allergy/intolerance - may be  interested in removal    Tenormin [Atenolol] Fatigue     11/15/23: patient may be interested in removal of this from allergy list as it was only fatigue/sleepiness          SOCIAL HISTORY     Social History     Social History Narrative    Not on file     Lives with , Giancarlo, she retired last spring, denies alcohol, tobacco or other drugs.      CURRENT OUTPATIENT MEDICATIONS     Current Outpatient Medications   Medication Sig Dispense Refill    calcipotriene (DOVONOX) 0.005 % external cream Apply twice daily to areas of psoriasis on Saturday and Sunday. 60 g 6    clobetasol (TEMOVATE) 0.05 % external cream Apply twice daily to psoriasis on Monday through Friday. 60 g 5    docusate sodium (COLACE) 100 MG capsule Take 100 mg by mouth 2 times daily      losartan (COZAAR) 100 MG tablet Take 1 tablet (100 mg) by mouth daily (Patient taking differently: Take 100 mg by mouth every evening) 90 tablet 3    METAMUCIL FIBER PO Take 1 teaspoonful by mouth daily After supper      Multiple Vitamins-Calcium (ONE-A-DAY WOMENS FORMULA PO) Take 1 tablet by mouth daily      omeprazole (PRILOSEC) 20 MG DR capsule Take 1 capsule by mouth every morning.      pravastatin (PRAVACHOL) 40 MG tablet Take 1 tablet (40 mg) by mouth daily (Patient taking differently: Take 40 mg by mouth every evening) 90 tablet 3    pregabalin (LYRICA) 50 MG capsule Take 1 capsule (50 mg) by mouth 3 times daily for 30 days 90 capsule 0    diclofenac (VOLTAREN) 1 % topical gel Apply 4 g topically 4 times daily To right lower leg 350 g 1    ibuprofen (ADVIL/MOTRIN) 200 MG capsule Take 400 mg by mouth every 4 hours as needed for fever      oxyCODONE (ROXICODONE) 5 MG tablet Take 1-2 tablets (5-10 mg) by mouth as needed for severe pain 60 tablet 0    prochlorperazine (COMPAZINE) 5 MG tablet Take 1 tablet (5 mg) by mouth every 6 hours as needed (Breakthrough Nausea / Vomiting) 30 tablet 0          REVIEW OF SYSTEMS   As above in the HPI, o/w complete 12-point  ROS was negative.     PHYSICAL EXAM   BP (!) 172/89 (BP Location: Right arm, Patient Position: Sitting, Cuff Size: Adult Regular)   Pulse 76   Temp 98.8  F (37.1  C) (Oral)   Resp 12   Wt 79.4 kg (175 lb)   SpO2 100%   BMI 32.83 kg/m      EGO (due to pain related to the mass)  GEN: NAD  HEENT: PERRL, EOMI, no icterus, injection or pallor. Oropharynx is clear.  NECK: no cervical or supraclavicular lymphadenopathy  LUNGS: clear bilaterally  CV: regular, no murmurs, rubs, or gallops  ABDOMEN: soft, non-tender, non-distended, normal bowel sounds, no hepatosplenomegaly by percussion or palpation  EXT: warm, well perfused, no edema. R calf is more prominent and a deep mass appreciated on physical exam in between the tibia and fibular bone. Slightly improved from presentation.   NEURO: alert  SKIN: no rashes     LABORATORY AND IMAGING STUDIES     Lab Results   Component Value Date    WBC 9.0 2023    WBC 15.6 05/10/2021     Lab Results   Component Value Date    RBC 3.41 2023    RBC 4.89 05/10/2021     Lab Results   Component Value Date    HGB 9.6 2023    HGB 13.7 05/10/2021     Lab Results   Component Value Date    HCT 29.2 2023    HCT 41.0 05/10/2021     Lab Results   Component Value Date    MCV 86 2023    MCV 84 05/10/2021     Lab Results   Component Value Date    MCH 28.2 2023    MCH 28.0 05/10/2021     Lab Results   Component Value Date    MCHC 32.9 2023    MCHC 33.4 05/10/2021     Lab Results   Component Value Date    RDW 18.5 2023    RDW 13.1 05/10/2021     Lab Results   Component Value Date     2023     05/10/2021     Last Comprehensive Metabolic Panel:  Sodium   Date Value Ref Range Status   2023 138 135 - 145 mmol/L Final     Comment:     Reference intervals for this test were updated on 2023 to more accurately reflect our healthy population. There may be differences in the flagging of prior results with similar values  performed with this method. Interpretation of those prior results can be made in the context of the updated reference intervals.    05/10/2021 141 133 - 144 mmol/L Final     Potassium   Date Value Ref Range Status   12/04/2023 4.2 3.4 - 5.3 mmol/L Final   05/24/2022 4.7 3.4 - 5.3 mmol/L Final   05/10/2021 3.9 3.4 - 5.3 mmol/L Final     Chloride   Date Value Ref Range Status   12/04/2023 104 98 - 107 mmol/L Final   05/24/2022 108 94 - 109 mmol/L Final   05/10/2021 108 94 - 109 mmol/L Final     Carbon Dioxide   Date Value Ref Range Status   05/10/2021 27 20 - 32 mmol/L Final     Carbon Dioxide (CO2)   Date Value Ref Range Status   12/04/2023 26 22 - 29 mmol/L Final   05/24/2022 29 20 - 32 mmol/L Final     Anion Gap   Date Value Ref Range Status   12/04/2023 8 7 - 15 mmol/L Final   05/24/2022 2 (L) 3 - 14 mmol/L Final   05/10/2021 6 3 - 14 mmol/L Final     Glucose   Date Value Ref Range Status   05/24/2022 108 (H) 70 - 99 mg/dL Final   05/10/2021 112 (H) 70 - 99 mg/dL Final     Comment:     Fasting specimen     GLUCOSE BY METER POCT   Date Value Ref Range Status   12/05/2023 81 70 - 99 mg/dL Final     Urea Nitrogen   Date Value Ref Range Status   12/04/2023 17.2 8.0 - 23.0 mg/dL Final   05/24/2022 14 7 - 30 mg/dL Final   05/10/2021 19 7 - 30 mg/dL Final     Creatinine   Date Value Ref Range Status   12/04/2023 0.90 0.51 - 0.95 mg/dL Final   05/10/2021 0.89 0.52 - 1.04 mg/dL Final     GFR Estimate   Date Value Ref Range Status   12/04/2023 70 >60 mL/min/1.73m2 Final   05/10/2021 69 >60 mL/min/[1.73_m2] Final     Comment:     Non  GFR Calc  Starting 12/18/2018, serum creatinine based estimated GFR (eGFR) will be   calculated using the Chronic Kidney Disease Epidemiology Collaboration   (CKD-EPI) equation.       GFR, ESTIMATED POCT   Date Value Ref Range Status   10/10/2023 >60 >60 mL/min/1.73m2 Final     Calcium   Date Value Ref Range Status   12/04/2023 8.9 8.8 - 10.2 mg/dL Final   05/10/2021 8.6 8.5 -  10.1 mg/dL Final     Bilirubin Total   Date Value Ref Range Status   12/04/2023 0.2 <=1.2 mg/dL Final   10/12/2020 0.3 0.2 - 1.3 mg/dL Final     Alkaline Phosphatase   Date Value Ref Range Status   12/04/2023 143 40 - 150 U/L Final     Comment:     Reference intervals for this test were updated on 11/14/2023 to more accurately reflect our healthy population. There may be differences in the flagging of prior results with similar values performed with this method. Interpretation of those prior results can be made in the context of the updated reference intervals.   10/12/2020 124 40 - 150 U/L Final     ALT   Date Value Ref Range Status   12/04/2023 12 0 - 50 U/L Final     Comment:     Reference intervals for this test were updated on 6/12/2023 to more accurately reflect our healthy population. There may be differences in the flagging of prior results with similar values performed with this method. Interpretation of those prior results can be made in the context of the updated reference intervals.     10/12/2020 40 0 - 50 U/L Final     AST   Date Value Ref Range Status   12/04/2023 17 0 - 45 U/L Final     Comment:     Reference intervals for this test were updated on 6/12/2023 to more accurately reflect our healthy population. There may be differences in the flagging of prior results with similar values performed with this method. Interpretation of those prior results can be made in the context of the updated reference intervals.   10/12/2020 35 0 - 45 U/L Final     PET CT 12/5/23  PET/CT FINDINGS: Decreased size and FDG avidity of a right calf soft tissue mass with SUV max of 7.1, previously 26.4. Mass is difficult to measure anatomically by noncontrast CT. Similar cortical destruction of the right fibular diaphysis. Mild   diffusely increased marrow uptake is likely treatment related. No convincing evidence of FDG avid metastatic disease. Mildly enlarged bilateral iliac chain lymph nodes appear similar and do not  "demonstrate FDG avidity above that of blood pool, favored   reactive.     CT FINDINGS: Moderate coronary artery calcifications. Similar hepatosplenomegaly. Similar multifocal scarring and parenchymal atrophy of the right kidney with multiple nonobstructing renal calculi. Colonic diverticulosis. Large fat-containing lower   ventral abdominal wall hernia.                                                                      IMPRESSION:     Partial response to therapy with decreasing size and FDG avidity of the right calf mass. No evidence of new FDG avid disease.         PATHOLOGY   10/2/23  Final Diagnosis   A.  Soft tissue, right calf mass, excision:  - High-grade pleomorphic sarcoma  - See comment    Electronically signed by Jonny Zhou MD on 10/4/2023 at  9:09 PM   Comment  [facility]   Immunohistochemical stains show the tumor is negative for CK AE1/AE3, S100, desmin and CD34 while SATB2 demonstrates patchy reactivity.  While the primary diagnostic consideration is an undifferentiated pleomorphic sarcoma, the patchy SATB2 reactivity raises the possibility of a poorly differentiated osteosarcoma.   Clinical Information  [facility]   The patient is a 68 year old female with a history of CLL and several months of sensitivity of the lateral right calf, and recent imaging showing: \"Lytic lesion in the right fibular proximal diaphysis, with complete resorption of the medial cortex and some periosteal reaction adjacent to the lesion\".            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 No evidence of metastatic disease.     I discussed with the patient that sarcomas are rare tumors only representing 1% of all cancers and that they are very heterogeneous with over 100 different subtypes. When they present with localized disease they main approach to therapy is surgical resection. However, " there are factors that influence the chances of recurrence and development of distant metastasis. In general tumors that are big in size (over 5 cm) and high grade (rapid cell division observed under the microscope) have higher probability of recurrence.     In her case, the probability of recurrence with surgery alone is about 40% in the next 10 years. We discussed, that recurrence in this scenario is usually with distant  metastasis which then becomes uncurable disease. We discussed, that for this reason, I recommend treatment with neoadjuvant chemotherapy with the goal of preventing metastasis in the future. We discussed the potential side effects of chemotherapy and need for emergent treatment in order to improve her symptoms. The regimen will be doxil 40mg/m2 and ifosfamide 8g/m2 over 5 days. Slightly lower dose due to age and other medical conditions.   She is s/p C2 and tolerated chemotherapy well, her pain is also better controlled now. She is very active and has been doing cooking in preparation for the holidays. I reviewed the most recent Pet scan showing evidence of very good response to chemotherapy, SUV max decrease >40%.  Plan to proceed with 2 more cycles, followed by radiation therapy and surgical resection.     Plan  -Proceed with planned for chemotherapy on 1/13   -Neulasta after chemo on 12/20   -Labs and infusion appointments after chemo on 12/20, 12/22, 12/26, 12/28, 12/29   -ISIDORO toxicity check on - virtual 12/28   -Admission for C4 on 1/10   -PET scan and MRI R LE on 1/29   -Follow up with Dr. Jimenez 1/30   -Please reschedule appointment with rad onc Dr. Armstrong at the end of January after scans.   -PET scan and MRI Follow up Dr. Jimenez after scans     40 minutes spent on the date of the encounter doing chart review, review of outside records, review of test results, interpretation of tests, patient visit, documentation, and discussion with other provider(s)     Roney Nam,  MD   of Medicine  Hematology, Oncology and Transplantation

## 2023-12-07 RX ORDER — ONDANSETRON 4 MG/1
8 TABLET, FILM COATED ORAL EVERY 8 HOURS
Status: CANCELLED
Start: 2023-12-12

## 2023-12-07 RX ORDER — DIPHENHYDRAMINE HYDROCHLORIDE 50 MG/ML
50 INJECTION INTRAMUSCULAR; INTRAVENOUS
Status: CANCELLED
Start: 2023-12-12

## 2023-12-07 RX ORDER — DEXTROSE MONOHYDRATE 50 MG/ML
10-20 INJECTION, SOLUTION INTRAVENOUS
Status: CANCELLED | OUTPATIENT
Start: 2023-12-19

## 2023-12-07 RX ORDER — LORAZEPAM 0.5 MG/1
.5-1 TABLET ORAL EVERY 6 HOURS PRN
Status: CANCELLED
Start: 2023-12-12

## 2023-12-07 RX ORDER — METHYLPREDNISOLONE SODIUM SUCCINATE 125 MG/2ML
125 INJECTION, POWDER, LYOPHILIZED, FOR SOLUTION INTRAMUSCULAR; INTRAVENOUS
Status: CANCELLED
Start: 2023-12-12

## 2023-12-07 RX ORDER — PROCHLORPERAZINE MALEATE 5 MG
5 TABLET ORAL EVERY 6 HOURS PRN
Status: CANCELLED
Start: 2023-12-12

## 2023-12-07 RX ORDER — MEPERIDINE HYDROCHLORIDE 25 MG/ML
25 INJECTION INTRAMUSCULAR; INTRAVENOUS; SUBCUTANEOUS EVERY 30 MIN PRN
Status: CANCELLED | OUTPATIENT
Start: 2023-12-12

## 2023-12-07 RX ORDER — EPINEPHRINE 1 MG/ML
0.3 INJECTION, SOLUTION INTRAMUSCULAR; SUBCUTANEOUS EVERY 5 MIN PRN
Status: CANCELLED | OUTPATIENT
Start: 2023-12-12

## 2023-12-07 RX ORDER — ALBUTEROL SULFATE 0.83 MG/ML
2.5 SOLUTION RESPIRATORY (INHALATION)
Status: CANCELLED | OUTPATIENT
Start: 2023-12-12

## 2023-12-07 RX ORDER — LORAZEPAM 2 MG/ML
.5-1 INJECTION INTRAMUSCULAR EVERY 6 HOURS PRN
Status: CANCELLED | OUTPATIENT
Start: 2023-12-12

## 2023-12-07 RX ORDER — DEXAMETHASONE SODIUM PHOSPHATE 10 MG/ML
10 INJECTION, SOLUTION INTRAMUSCULAR; INTRAVENOUS ONCE
Status: CANCELLED
Start: 2023-12-12

## 2023-12-07 RX ORDER — ALBUTEROL SULFATE 90 UG/1
1-2 AEROSOL, METERED RESPIRATORY (INHALATION)
Status: CANCELLED
Start: 2023-12-12

## 2023-12-08 ENCOUNTER — APPOINTMENT (OUTPATIENT)
Dept: LAB | Facility: CLINIC | Age: 68
End: 2023-12-08
Payer: COMMERCIAL

## 2023-12-08 ENCOUNTER — INFUSION THERAPY VISIT (OUTPATIENT)
Dept: INFUSION THERAPY | Facility: CLINIC | Age: 68
End: 2023-12-08
Attending: STUDENT IN AN ORGANIZED HEALTH CARE EDUCATION/TRAINING PROGRAM
Payer: COMMERCIAL

## 2023-12-08 DIAGNOSIS — C49.21 SARCOMA OF RIGHT LOWER EXTREMITY (H): ICD-10-CM

## 2023-12-08 LAB
ALBUMIN SERPL BCG-MCNC: 4.1 G/DL (ref 3.5–5.2)
ALP SERPL-CCNC: 128 U/L (ref 40–150)
ALT SERPL W P-5'-P-CCNC: 13 U/L (ref 0–50)
ANION GAP SERPL CALCULATED.3IONS-SCNC: 10 MMOL/L (ref 7–15)
AST SERPL W P-5'-P-CCNC: 19 U/L (ref 0–45)
BASOPHILS # BLD AUTO: 0.1 10E3/UL (ref 0–0.2)
BASOPHILS NFR BLD AUTO: 1 %
BILIRUB SERPL-MCNC: 0.3 MG/DL
BUN SERPL-MCNC: 15.9 MG/DL (ref 8–23)
CALCIUM SERPL-MCNC: 9.1 MG/DL (ref 8.8–10.2)
CHLORIDE SERPL-SCNC: 106 MMOL/L (ref 98–107)
CREAT SERPL-MCNC: 0.89 MG/DL (ref 0.51–0.95)
DEPRECATED HCO3 PLAS-SCNC: 26 MMOL/L (ref 22–29)
EGFRCR SERPLBLD CKD-EPI 2021: 71 ML/MIN/1.73M2
EOSINOPHIL # BLD AUTO: 0 10E3/UL (ref 0–0.7)
EOSINOPHIL NFR BLD AUTO: 0 %
ERYTHROCYTE [DISTWIDTH] IN BLOOD BY AUTOMATED COUNT: 18.2 % (ref 10–15)
GLUCOSE SERPL-MCNC: 100 MG/DL (ref 70–99)
HCT VFR BLD AUTO: 30.1 % (ref 35–47)
HGB BLD-MCNC: 10 G/DL (ref 11.7–15.7)
IMM GRANULOCYTES # BLD: 0.1 10E3/UL
IMM GRANULOCYTES NFR BLD: 1 %
LYMPHOCYTES # BLD AUTO: 1.1 10E3/UL (ref 0.8–5.3)
LYMPHOCYTES NFR BLD AUTO: 17 %
MCH RBC QN AUTO: 28.2 PG (ref 26.5–33)
MCHC RBC AUTO-ENTMCNC: 33.2 G/DL (ref 31.5–36.5)
MCV RBC AUTO: 85 FL (ref 78–100)
MONOCYTES # BLD AUTO: 0.5 10E3/UL (ref 0–1.3)
MONOCYTES NFR BLD AUTO: 8 %
NEUTROPHILS # BLD AUTO: 4.7 10E3/UL (ref 1.6–8.3)
NEUTROPHILS NFR BLD AUTO: 73 %
NRBC # BLD AUTO: 0 10E3/UL
NRBC BLD AUTO-RTO: 0 /100
PLATELET # BLD AUTO: 174 10E3/UL (ref 150–450)
POTASSIUM SERPL-SCNC: 4.4 MMOL/L (ref 3.4–5.3)
PROT SERPL-MCNC: 6.7 G/DL (ref 6.4–8.3)
RBC # BLD AUTO: 3.54 10E6/UL (ref 3.8–5.2)
SODIUM SERPL-SCNC: 142 MMOL/L (ref 135–145)
WBC # BLD AUTO: 6.4 10E3/UL (ref 4–11)

## 2023-12-08 PROCEDURE — 85025 COMPLETE CBC W/AUTO DIFF WBC: CPT | Performed by: STUDENT IN AN ORGANIZED HEALTH CARE EDUCATION/TRAINING PROGRAM

## 2023-12-08 PROCEDURE — 80053 COMPREHEN METABOLIC PANEL: CPT | Performed by: STUDENT IN AN ORGANIZED HEALTH CARE EDUCATION/TRAINING PROGRAM

## 2023-12-08 PROCEDURE — 36415 COLL VENOUS BLD VENIPUNCTURE: CPT

## 2023-12-08 NOTE — PROGRESS NOTES
Infusion Nursing Note:  Mirta Cardenas presents today for labs and possible blood transfusion.    Patient seen by provider today: No   present during visit today: Not Applicable.    Note: Pt not seen in infusion today.        Treatment Conditions:  Lab Results   Component Value Date    HGB 10.0 (L) 12/08/2023    WBC 6.4 12/08/2023    ANEU 9.6 (H) 11/29/2023    ANEUTAUTO 4.7 12/08/2023     12/08/2023        Results reviewed, labs did NOT meet treatment parameters: Pt did not qualify for a blood transfusion today.  Copy of lab results given to pt for her to review.         Discharge Plan:   Patient discharged in stable condition accompanied by: .  Departure Mode: Ambulatory.      Shayy Gordon RN

## 2023-12-13 ENCOUNTER — HOSPITAL ENCOUNTER (OUTPATIENT)
Dept: VASCULAR ULTRASOUND | Facility: CLINIC | Age: 68
Discharge: HOME | DRG: 847 | End: 2023-12-13
Attending: STUDENT IN AN ORGANIZED HEALTH CARE EDUCATION/TRAINING PROGRAM
Payer: COMMERCIAL

## 2023-12-13 ENCOUNTER — HOSPITAL ENCOUNTER (INPATIENT)
Facility: CLINIC | Age: 68
LOS: 6 days | Discharge: HOME | DRG: 847 | End: 2023-12-19
Attending: STUDENT IN AN ORGANIZED HEALTH CARE EDUCATION/TRAINING PROGRAM | Admitting: INTERNAL MEDICINE
Payer: COMMERCIAL

## 2023-12-13 DIAGNOSIS — C49.21 SARCOMA OF RIGHT LOWER EXTREMITY (H): ICD-10-CM

## 2023-12-13 DIAGNOSIS — Z76.89 PREVENTION OF CHEMOTHERAPY-INDUCED NEUTROPENIA: ICD-10-CM

## 2023-12-13 DIAGNOSIS — I10 BENIGN ESSENTIAL HYPERTENSION: ICD-10-CM

## 2023-12-13 DIAGNOSIS — C49.21 SARCOMA OF RIGHT LOWER EXTREMITY (H): Primary | ICD-10-CM

## 2023-12-13 LAB
ALBUMIN SERPL BCG-MCNC: 4.2 G/DL (ref 3.5–5.2)
ALP SERPL-CCNC: 118 U/L (ref 40–150)
ALT SERPL W P-5'-P-CCNC: 16 U/L (ref 0–50)
ANION GAP SERPL CALCULATED.3IONS-SCNC: 9 MMOL/L (ref 7–15)
AST SERPL W P-5'-P-CCNC: 19 U/L (ref 0–45)
BASOPHILS # BLD AUTO: 0 10E3/UL (ref 0–0.2)
BASOPHILS NFR BLD AUTO: 1 %
BILIRUB SERPL-MCNC: 0.3 MG/DL
BUN SERPL-MCNC: 18.5 MG/DL (ref 8–23)
CALCIUM SERPL-MCNC: 9.5 MG/DL (ref 8.8–10.2)
CHLORIDE SERPL-SCNC: 104 MMOL/L (ref 98–107)
CREAT SERPL-MCNC: 0.83 MG/DL (ref 0.51–0.95)
DEPRECATED HCO3 PLAS-SCNC: 27 MMOL/L (ref 22–29)
EGFRCR SERPLBLD CKD-EPI 2021: 77 ML/MIN/1.73M2
EOSINOPHIL # BLD AUTO: 0.1 10E3/UL (ref 0–0.7)
EOSINOPHIL NFR BLD AUTO: 2 %
ERYTHROCYTE [DISTWIDTH] IN BLOOD BY AUTOMATED COUNT: 18.2 % (ref 10–15)
GLUCOSE SERPL-MCNC: 107 MG/DL (ref 70–99)
HCT VFR BLD AUTO: 32 % (ref 35–47)
HGB BLD-MCNC: 10.8 G/DL (ref 11.7–15.7)
IMM GRANULOCYTES # BLD: 0 10E3/UL
IMM GRANULOCYTES NFR BLD: 0 %
LYMPHOCYTES # BLD AUTO: 1.1 10E3/UL (ref 0.8–5.3)
LYMPHOCYTES NFR BLD AUTO: 20 %
MAGNESIUM SERPL-MCNC: 2.1 MG/DL (ref 1.7–2.3)
MCH RBC QN AUTO: 28.6 PG (ref 26.5–33)
MCHC RBC AUTO-ENTMCNC: 33.8 G/DL (ref 31.5–36.5)
MCV RBC AUTO: 85 FL (ref 78–100)
MONOCYTES # BLD AUTO: 0.6 10E3/UL (ref 0–1.3)
MONOCYTES NFR BLD AUTO: 11 %
NEUTROPHILS # BLD AUTO: 3.8 10E3/UL (ref 1.6–8.3)
NEUTROPHILS NFR BLD AUTO: 66 %
NRBC # BLD AUTO: 0 10E3/UL
NRBC BLD AUTO-RTO: 0 /100
PHOSPHATE SERPL-MCNC: 3.4 MG/DL (ref 2.5–4.5)
PLATELET # BLD AUTO: 158 10E3/UL (ref 150–450)
POTASSIUM SERPL-SCNC: 4.2 MMOL/L (ref 3.4–5.3)
PROT SERPL-MCNC: 6.8 G/DL (ref 6.4–8.3)
RBC # BLD AUTO: 3.77 10E6/UL (ref 3.8–5.2)
SODIUM SERPL-SCNC: 140 MMOL/L (ref 135–145)
WBC # BLD AUTO: 5.6 10E3/UL (ref 4–11)

## 2023-12-13 PROCEDURE — 272N000451 HC KIT SHRLOCK 5FR POWER PICC DOUBLE LUMEN

## 2023-12-13 PROCEDURE — 99207 PR APP CREDIT; MD BILLING SHARED VISIT: CPT | Performed by: PHYSICIAN ASSISTANT

## 2023-12-13 PROCEDURE — 250N000011 HC RX IP 250 OP 636: Mod: JZ | Performed by: PHYSICIAN ASSISTANT

## 2023-12-13 PROCEDURE — 83735 ASSAY OF MAGNESIUM: CPT | Performed by: PHYSICIAN ASSISTANT

## 2023-12-13 PROCEDURE — 36569 INSJ PICC 5 YR+ W/O IMAGING: CPT

## 2023-12-13 PROCEDURE — 120N000002 HC R&B MED SURG/OB UMMC

## 2023-12-13 PROCEDURE — 272N000473 HC KIT, VPS RHYTHM STYLET

## 2023-12-13 PROCEDURE — 250N000009 HC RX 250: Performed by: STUDENT IN AN ORGANIZED HEALTH CARE EDUCATION/TRAINING PROGRAM

## 2023-12-13 PROCEDURE — 85025 COMPLETE CBC W/AUTO DIFF WBC: CPT | Performed by: STUDENT IN AN ORGANIZED HEALTH CARE EDUCATION/TRAINING PROGRAM

## 2023-12-13 PROCEDURE — 99222 1ST HOSP IP/OBS MODERATE 55: CPT | Mod: FS | Performed by: STUDENT IN AN ORGANIZED HEALTH CARE EDUCATION/TRAINING PROGRAM

## 2023-12-13 PROCEDURE — 250N000011 HC RX IP 250 OP 636: Mod: JZ | Performed by: STUDENT IN AN ORGANIZED HEALTH CARE EDUCATION/TRAINING PROGRAM

## 2023-12-13 PROCEDURE — 258N000003 HC RX IP 258 OP 636: Performed by: STUDENT IN AN ORGANIZED HEALTH CARE EDUCATION/TRAINING PROGRAM

## 2023-12-13 PROCEDURE — 250N000013 HC RX MED GY IP 250 OP 250 PS 637: Performed by: STUDENT IN AN ORGANIZED HEALTH CARE EDUCATION/TRAINING PROGRAM

## 2023-12-13 PROCEDURE — 84100 ASSAY OF PHOSPHORUS: CPT | Performed by: PHYSICIAN ASSISTANT

## 2023-12-13 PROCEDURE — 80053 COMPREHEN METABOLIC PANEL: CPT | Performed by: PHYSICIAN ASSISTANT

## 2023-12-13 PROCEDURE — 3E04305 INTRODUCTION OF OTHER ANTINEOPLASTIC INTO CENTRAL VEIN, PERCUTANEOUS APPROACH: ICD-10-PCS | Performed by: STUDENT IN AN ORGANIZED HEALTH CARE EDUCATION/TRAINING PROGRAM

## 2023-12-13 PROCEDURE — 250N000013 HC RX MED GY IP 250 OP 250 PS 637: Performed by: PHYSICIAN ASSISTANT

## 2023-12-13 PROCEDURE — 99223 1ST HOSP IP/OBS HIGH 75: CPT | Performed by: STUDENT IN AN ORGANIZED HEALTH CARE EDUCATION/TRAINING PROGRAM

## 2023-12-13 RX ORDER — ALBUTEROL SULFATE 90 UG/1
1-2 AEROSOL, METERED RESPIRATORY (INHALATION)
Status: DISCONTINUED | OUTPATIENT
Start: 2023-12-13 | End: 2023-12-19 | Stop reason: HOSPADM

## 2023-12-13 RX ORDER — PREGABALIN 50 MG/1
50 CAPSULE ORAL 3 TIMES DAILY
Status: DISCONTINUED | OUTPATIENT
Start: 2023-12-13 | End: 2023-12-19 | Stop reason: HOSPADM

## 2023-12-13 RX ORDER — HEPARIN SODIUM,PORCINE 10 UNIT/ML
5-20 VIAL (ML) INTRAVENOUS
Status: DISCONTINUED | OUTPATIENT
Start: 2023-12-13 | End: 2023-12-14 | Stop reason: HOSPADM

## 2023-12-13 RX ORDER — PRAVASTATIN SODIUM 40 MG
40 TABLET ORAL EVERY EVENING
Status: DISCONTINUED | OUTPATIENT
Start: 2023-12-13 | End: 2023-12-19 | Stop reason: HOSPADM

## 2023-12-13 RX ORDER — ONDANSETRON 8 MG/1
8 TABLET, FILM COATED ORAL EVERY 8 HOURS
Qty: 18 TABLET | Refills: 0 | Status: COMPLETED | OUTPATIENT
Start: 2023-12-13 | End: 2023-12-19

## 2023-12-13 RX ORDER — DEXAMETHASONE SODIUM PHOSPHATE 10 MG/ML
10 INJECTION INTRAMUSCULAR; INTRAVENOUS ONCE
Qty: 1 ML | Refills: 0 | Status: COMPLETED | OUTPATIENT
Start: 2023-12-13 | End: 2023-12-13

## 2023-12-13 RX ORDER — EPINEPHRINE 1 MG/ML
0.3 INJECTION, SOLUTION, CONCENTRATE INTRAVENOUS EVERY 5 MIN PRN
Status: DISCONTINUED | OUTPATIENT
Start: 2023-12-13 | End: 2023-12-19 | Stop reason: HOSPADM

## 2023-12-13 RX ORDER — AMOXICILLIN 250 MG
1 CAPSULE ORAL 2 TIMES DAILY PRN
Status: DISCONTINUED | OUTPATIENT
Start: 2023-12-13 | End: 2023-12-19 | Stop reason: HOSPADM

## 2023-12-13 RX ORDER — ALBUTEROL SULFATE 0.83 MG/ML
2.5 SOLUTION RESPIRATORY (INHALATION)
Status: DISCONTINUED | OUTPATIENT
Start: 2023-12-13 | End: 2023-12-19 | Stop reason: HOSPADM

## 2023-12-13 RX ORDER — LIDOCAINE 40 MG/G
CREAM TOPICAL
Status: DISCONTINUED | OUTPATIENT
Start: 2023-12-13 | End: 2023-12-19 | Stop reason: HOSPADM

## 2023-12-13 RX ORDER — NALOXONE HYDROCHLORIDE 0.4 MG/ML
0.4 INJECTION, SOLUTION INTRAMUSCULAR; INTRAVENOUS; SUBCUTANEOUS
Status: DISCONTINUED | OUTPATIENT
Start: 2023-12-13 | End: 2023-12-19 | Stop reason: HOSPADM

## 2023-12-13 RX ORDER — CALCIUM CARBONATE 500 MG/1
1000 TABLET, CHEWABLE ORAL 4 TIMES DAILY PRN
Status: DISCONTINUED | OUTPATIENT
Start: 2023-12-13 | End: 2023-12-19 | Stop reason: HOSPADM

## 2023-12-13 RX ORDER — AMOXICILLIN 250 MG
2 CAPSULE ORAL 2 TIMES DAILY PRN
Status: DISCONTINUED | OUTPATIENT
Start: 2023-12-13 | End: 2023-12-19 | Stop reason: HOSPADM

## 2023-12-13 RX ORDER — CLOBETASOL PROPIONATE 0.5 MG/G
CREAM TOPICAL 2 TIMES DAILY PRN
Status: DISCONTINUED | OUTPATIENT
Start: 2023-12-13 | End: 2023-12-19 | Stop reason: HOSPADM

## 2023-12-13 RX ORDER — POLYETHYLENE GLYCOL 3350 17 G/17G
17 POWDER, FOR SOLUTION ORAL DAILY
Status: DISCONTINUED | OUTPATIENT
Start: 2023-12-14 | End: 2023-12-19 | Stop reason: HOSPADM

## 2023-12-13 RX ORDER — CALCIPOTRIENE 50 UG/G
CREAM TOPICAL 2 TIMES DAILY PRN
Status: DISCONTINUED | OUTPATIENT
Start: 2023-12-13 | End: 2023-12-19 | Stop reason: HOSPADM

## 2023-12-13 RX ORDER — MULTIPLE VITAMINS W/ MINERALS TAB 9MG-400MCG
1 TAB ORAL DAILY
Status: DISCONTINUED | OUTPATIENT
Start: 2023-12-14 | End: 2023-12-19 | Stop reason: HOSPADM

## 2023-12-13 RX ORDER — AMLODIPINE BESYLATE 5 MG/1
5 TABLET ORAL DAILY
Status: DISCONTINUED | OUTPATIENT
Start: 2023-12-13 | End: 2023-12-17

## 2023-12-13 RX ORDER — DIPHENHYDRAMINE HYDROCHLORIDE 50 MG/ML
50 INJECTION INTRAMUSCULAR; INTRAVENOUS
Status: DISCONTINUED | OUTPATIENT
Start: 2023-12-13 | End: 2023-12-19 | Stop reason: HOSPADM

## 2023-12-13 RX ORDER — ACETAMINOPHEN 650 MG/1
650 SUPPOSITORY RECTAL EVERY 4 HOURS PRN
Status: DISCONTINUED | OUTPATIENT
Start: 2023-12-13 | End: 2023-12-19 | Stop reason: HOSPADM

## 2023-12-13 RX ORDER — NALOXONE HYDROCHLORIDE 0.4 MG/ML
0.2 INJECTION, SOLUTION INTRAMUSCULAR; INTRAVENOUS; SUBCUTANEOUS
Status: DISCONTINUED | OUTPATIENT
Start: 2023-12-13 | End: 2023-12-19 | Stop reason: HOSPADM

## 2023-12-13 RX ORDER — PROCHLORPERAZINE MALEATE 5 MG
5 TABLET ORAL EVERY 6 HOURS PRN
Status: DISCONTINUED | OUTPATIENT
Start: 2023-12-13 | End: 2023-12-19 | Stop reason: HOSPADM

## 2023-12-13 RX ORDER — LOSARTAN POTASSIUM 100 MG/1
100 TABLET ORAL EVERY EVENING
Status: DISCONTINUED | OUTPATIENT
Start: 2023-12-13 | End: 2023-12-19 | Stop reason: HOSPADM

## 2023-12-13 RX ORDER — DEXTROSE MONOHYDRATE 50 MG/ML
10-20 INJECTION, SOLUTION INTRAVENOUS
Status: DISCONTINUED | OUTPATIENT
Start: 2023-12-20 | End: 2023-12-19 | Stop reason: HOSPADM

## 2023-12-13 RX ORDER — MEPERIDINE HYDROCHLORIDE 25 MG/ML
25 INJECTION INTRAMUSCULAR; INTRAVENOUS; SUBCUTANEOUS EVERY 30 MIN PRN
Status: DISCONTINUED | OUTPATIENT
Start: 2023-12-13 | End: 2023-12-19 | Stop reason: HOSPADM

## 2023-12-13 RX ORDER — LORAZEPAM 0.5 MG/1
.5-1 TABLET ORAL EVERY 6 HOURS PRN
Status: DISCONTINUED | OUTPATIENT
Start: 2023-12-13 | End: 2023-12-19 | Stop reason: HOSPADM

## 2023-12-13 RX ORDER — HEPARIN SODIUM,PORCINE 10 UNIT/ML
5-20 VIAL (ML) INTRAVENOUS EVERY 24 HOURS
Status: DISCONTINUED | OUTPATIENT
Start: 2023-12-13 | End: 2023-12-14 | Stop reason: HOSPADM

## 2023-12-13 RX ORDER — OXYCODONE HYDROCHLORIDE 5 MG/1
5-10 TABLET ORAL EVERY 4 HOURS PRN
Status: DISCONTINUED | OUTPATIENT
Start: 2023-12-13 | End: 2023-12-19 | Stop reason: HOSPADM

## 2023-12-13 RX ORDER — ACETAMINOPHEN 325 MG/1
650 TABLET ORAL EVERY 4 HOURS PRN
Status: DISCONTINUED | OUTPATIENT
Start: 2023-12-13 | End: 2023-12-19 | Stop reason: HOSPADM

## 2023-12-13 RX ORDER — LORAZEPAM 2 MG/ML
.5-1 INJECTION INTRAMUSCULAR EVERY 6 HOURS PRN
Status: DISCONTINUED | OUTPATIENT
Start: 2023-12-13 | End: 2023-12-19 | Stop reason: HOSPADM

## 2023-12-13 RX ORDER — METHYLPREDNISOLONE SODIUM SUCCINATE 125 MG/2ML
125 INJECTION, POWDER, LYOPHILIZED, FOR SOLUTION INTRAMUSCULAR; INTRAVENOUS
Status: DISCONTINUED | OUTPATIENT
Start: 2023-12-13 | End: 2023-12-19 | Stop reason: HOSPADM

## 2023-12-13 RX ORDER — ENOXAPARIN SODIUM 100 MG/ML
40 INJECTION SUBCUTANEOUS EVERY 24 HOURS
Status: DISCONTINUED | OUTPATIENT
Start: 2023-12-13 | End: 2023-12-19 | Stop reason: HOSPADM

## 2023-12-13 RX ORDER — PROCHLORPERAZINE MALEATE 5 MG
5 TABLET ORAL EVERY 6 HOURS PRN
Status: DISCONTINUED | OUTPATIENT
Start: 2023-12-13 | End: 2023-12-13

## 2023-12-13 RX ORDER — PANTOPRAZOLE SODIUM 40 MG/1
40 TABLET, DELAYED RELEASE ORAL EVERY MORNING
Status: DISCONTINUED | OUTPATIENT
Start: 2023-12-14 | End: 2023-12-19 | Stop reason: HOSPADM

## 2023-12-13 RX ADMIN — SODIUM BICARBONATE: 84 INJECTION INTRAVENOUS at 23:44

## 2023-12-13 RX ADMIN — ONDANSETRON HYDROCHLORIDE 8 MG: 8 TABLET, FILM COATED ORAL at 20:33

## 2023-12-13 RX ADMIN — ENOXAPARIN SODIUM 40 MG: 40 INJECTION SUBCUTANEOUS at 20:34

## 2023-12-13 RX ADMIN — PREGABALIN 50 MG: 50 CAPSULE ORAL at 20:34

## 2023-12-13 RX ADMIN — FOSAPREPITANT 150 MG: 150 INJECTION, POWDER, LYOPHILIZED, FOR SOLUTION INTRAVENOUS at 15:47

## 2023-12-13 RX ADMIN — DEXAMETHASONE SODIUM PHOSPHATE 10 MG: 10 INJECTION INTRAMUSCULAR; INTRAVENOUS at 15:49

## 2023-12-13 RX ADMIN — DOXORUBICIN HYDROCHLORIDE 73 MG: 2 INJECTION, SUSPENSION, LIPOSOMAL INTRAVENOUS at 16:17

## 2023-12-13 RX ADMIN — THIAMINE HCL TAB 100 MG 100 MG: 100 TAB at 20:33

## 2023-12-13 RX ADMIN — SODIUM BICARBONATE: 84 INJECTION INTRAVENOUS at 15:08

## 2023-12-13 RX ADMIN — LOSARTAN POTASSIUM 100 MG: 100 TABLET, FILM COATED ORAL at 20:34

## 2023-12-13 RX ADMIN — MESNA 2910 MG: 100 INJECTION, SOLUTION INTRAVENOUS at 17:49

## 2023-12-13 RX ADMIN — PRAVASTATIN SODIUM 40 MG: 40 TABLET ORAL at 20:33

## 2023-12-13 RX ADMIN — OXYCODONE HYDROCHLORIDE 5 MG: 5 TABLET ORAL at 21:09

## 2023-12-13 RX ADMIN — ONDANSETRON HYDROCHLORIDE 8 MG: 8 TABLET, FILM COATED ORAL at 15:57

## 2023-12-13 RX ADMIN — PREGABALIN 50 MG: 50 CAPSULE ORAL at 14:24

## 2023-12-13 RX ADMIN — AMLODIPINE BESYLATE 5 MG: 5 TABLET ORAL at 12:58

## 2023-12-13 RX ADMIN — THIAMINE HCL TAB 100 MG 100 MG: 100 TAB at 14:24

## 2023-12-13 ASSESSMENT — ACTIVITIES OF DAILY LIVING (ADL)
ADLS_ACUITY_SCORE: 22
ADLS_ACUITY_SCORE: 22
FALL_HISTORY_WITHIN_LAST_SIX_MONTHS: NO
ADLS_ACUITY_SCORE: 20
ADLS_ACUITY_SCORE: 23
ADLS_ACUITY_SCORE: 23
ADLS_ACUITY_SCORE: 22
ADLS_ACUITY_SCORE: 23

## 2023-12-13 NOTE — CONSULTS
[facility] - Medical Oncology New Inpatient Consult Note  Oncology Team 3  2023    Patient Identifiers     Name: Mirta Cardenas  Preferred Address: [place]  Preferred Language: English  : 1955  Gender: female    Assessment and Plan     Ms. 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 Medical oncology consulted for chemotherapy management guidance.    Current Treatment: Cycle 3 Doxil/Ifos  Treatment Day: 1    Plan:  Ms. Cardenas presents for admission for cycle 3 of Doxil/Ifos.  Patient pretty cycles have been relatively well-tolerated, with only slight alterations to mentation potentially related to steroid  premedications for chemotherapy.  We will monitor her mentation during his admission and provide support as necessary.  Of note, Dr. Morrison has increased patient's thiamine dosing from 4 days to 7 days in order to potentially relieve these adverse events.      Patient also notes constipation during prior cycles, which Medicine team managed with appropriate bowel regimen. Finally, patient also is concerned regarding her elevated BP's and is requesting consideration of an additional controlling medication in place of the chlorthalidone which was removed during prior cycles.    Given she is tolerating treatment well, no indication for dose adjustment or treatment alteration this time.  Plan for initiation of cycle 3 as scheduled, with cycle 4 likely planned for early January (potentially ).  Remainder of follow-up from Dr. Morrison's excellent note on  is copied below and should guide discharge planning.    -Proceed with planned for chemotherapy on  (corrected from prior note)   -Neulasta after chemo on    -Labs and infusion appointments after chemo on , , , ,    -ISIDORO toxicity check on - virtual    -Admission for C4 on  "1/10   -PET scan and MRI R LE on 1/29   -Follow up with Dr. Jimenez 1/30   -Please reschedule appointment with rad onc Dr. Armstrong at the end of January after scans.   -PET scan and MRI Follow up Dr. Jimenez after scans     Thank you for this interesting consult. Oncology Consult Service to follow. Feel free to reach out with further questions.     90 minutes spent on the date of the encounter doing chart review, review of test results, interpretation of tests, patient visit, documentation, and discussion with other provider(s)     Shun Osei MD, PhD   of Medicine  Division of Hematology, Oncology and Transplantation  [facility]    -----------------------------------    Oncology Summary and HPI     Cancer Staging   No matching staging information was found for the patient.      Oncology History   Sarcoma of right lower extremity (H)   10/16/2023 Initial Diagnosis    Sarcoma of right lower extremity (H)     10/17/2023 -  Chemotherapy    IP ONC Sarcoma - Liposomal DOXOrubicin (DOXIL) / Ifosfamide / [place]  Plan Provider: Roney Nam MD  Treatment goal: Curative  Line of treatment: First Line         Subjective/Interval Events     - patient presented during cycle 1 with significant electrolyte abnormalities likely related to SIADH from Cymbalta and chlorthalidone. [place] medications altered at that time  - pt's blood pressures have been elevated since discontinuation of chlorthalidone. She requests Medicine team to adjust as needed  - she notes that she was 'talky' during first cycle, and had significant 'brain fog' toward the end of infusion for cycle 2. We will monitor for these symptoms with this cycle and intervene as necessary    Physical Exam     Vital signs: BP (!) 174/103 (BP Location: Right arm)   Pulse 72   Temp 97.8  F (36.6  C) (Oral)   Resp 18   Ht 1.549 m (5' 1\")   Wt 77.9 kg (171 lb 12.8 oz)   SpO2 99%   BMI 32.46 kg/m      ECOG performance status:  " 0  Vascular access:  PICC    Physical Exam:  Exam performed, notable for:  - no notables    Objective Data     Lab data:  I have personally reviewed the lab data, notable for:    - last labs 12/08; no notables other than below  - Hgb 10.0    Radiology data:  I have personally reviewed the radiology data, notable for:  - no new data    Pathology and other data:  I have personally reviewed and interpreted the pathology data, notable for:    - no new data    Medical/Surgical History     Past medical history:  Active Ambulatory Problems     Diagnosis Date Noted    Calculus of kidney 03/09/2005    Benign essential hypertension 03/09/2005    Esophageal reflux 03/09/2005    Chronic lymphoid leukemia in remission (H) 03/09/2005    Mild intermittent asthma 04/14/2005    Hypercalcuria 07/14/2005    Other hyperparathyroidism (H24) 10/13/2005    Female stress incontinence 12/19/2006    Thyroid nodule 04/28/2008    Anemia 05/12/2008    Fatty liver 03/31/2009    High level of uric acid in blood 06/04/2009    Hyperlipidemia LDL goal <100 10/31/2010    CKD (chronic kidney disease) stage 3, GFR 30-59 ml/min (H) 05/08/2012    Advanced directives, counseling/discussion 05/31/2012    Sebaceous cyst 12/03/2013    Impaired fasting glucose 08/28/2020    Syncope and collapse 10/13/2020    Pneumonia due to COVID-19 virus 10/13/2020    CINV (chemotherapy-induced nausea and vomiting) 10/12/2023    Sarcoma of right lower extremity (H) 10/16/2023    Sarcoma (H) 10/17/2023    Prevention of chemotherapy-induced neutropenia 10/23/2023    Chemotherapy management, encounter for 11/14/2023     Resolved Ambulatory Problems     Diagnosis Date Noted    Allergic rhinitis 03/09/2005    Other specified aftercare following surgery 05/12/2008    Acute renal failure (H24) 01/12/2009    Health Care Home 02/15/2012    History of colonoscopy 08/08/2012    Morbid obesity (H) 08/09/2018    COVID-19 virus infection 10/13/2020     Past Medical History:   Diagnosis  Date    Asthma     Asthma     Blood transfusion     Cancer (H)     History of transfusion     Hypertension     Hypertension     Leukemia, chronic lymphocytic (H)     Malignant neoplasm (H)        Past surgical history:  Past Surgical History:   Procedure Laterality Date    ABDOMEN SURGERY      c section *3    APPENDECTOMY      C/SECTION, CLASSICAL  ,,    , Classical    COLONOSCOPY      HYSTERECTOMY      HYSTERECTOMY, PAP NO LONGER INDICATED  1998    PICC DOUBLE LUMEN PLACEMENT Left 10/17/2023    left basilic 4 fr dl power picc 41 cm    PICC DOUBLE LUMEN PLACEMENT Left 2023    Medial Brachial, 42 cm, 3 cm external length    RELEASE CARPAL TUNNEL  2012    Procedure:RELEASE CARPAL TUNNEL; Right Carpal Tunnel Release & Right Ring A1 Pulley Release; Surgeon:SERGEY HEATON; Location:[facility] OR    RELEASE TRIGGER FINGER  2012    Procedure:RELEASE TRIGGER FINGER; Surgeon:SERGEY HEATON; Location:[facility]    RELEASE TRIGGER FINGER  10/12/2012    Procedure: RELEASE TRIGGER FINGER;  Right Thumb A1 Pulley Release;  Surgeon: Sergey Heaton MD;  Location: [facility] OR    SALPINGO OOPHORECTOMY,R/L/ULICES  2008    right        Social history:   Social History     Tobacco Use    Smoking status: Never    Smokeless tobacco: Never   Vaping Use    Vaping Use: Never used   Substance Use Topics    Alcohol use: Yes     Comment: Occasionally    Drug use: No       Family history:  Family History   Problem Relation Age of Onset    Hypertension Brother     Heart Disease Brother 65        bypass    Cancer Sister         multiple myloma    Obesity Son     Obesity Son     Hypertension Son     Osteoporosis Mother     Melanoma No family hx of        Allergies:   Allergies   Allergen Reactions    Allopurinol Itching    Amoxicillin Hives    Codeine Itching    Cymbalta [Duloxetine Hcl] Fatigue    Periactin Other (See Comments)     Sleepy.    11/15/23: patient not sure about this allergy/intolerance - may  be interested in removal    Tenormin [Atenolol] Fatigue     11/15/23: patient may be interested in removal of this from allergy list as it was only fatigue/sleepiness       Outpatient medications:   No current facility-administered medications for this encounter.    Facility-Administered Medications Ordered in Other Encounters:     heparin lock flush 10 UNIT/ML injection 5-20 mL, 5-20 mL, Intracatheter, Q24H, Roney Nam MD    heparin lock flush 10 UNIT/ML injection 5-20 mL, 5-20 mL, Intracatheter, Q1H PRN, Roney Nam MD    sodium chloride (PF) 0.9% PF flush 10-20 mL, 10-20 mL, Intracatheter, q1 min prn, Roney Nam MD    sodium chloride (PF) 0.9% PF flush 10-20 mL, 10-20 mL, Intracatheter, q1 min prn, Roney Nam MD    sodium chloride (PF) 0.9% PF flush 10-40 mL, 10-40 mL, Intracatheter, Q8H, Roney Nam MD    sodium chloride (PF) 0.9% PF flush 10-40 mL, 10-40 mL, Intracatheter, Q8H, Roney Nam MD

## 2023-12-13 NOTE — H&P
[facility]    History and Physical - Hospitalist Service, GOLD TEAM        Date of Admission:  12/13/2023    Assessment & Plan     Ms. Mirta Cardenas is a pleasant 69 yo female with hx of HTN, psoriasis, HLD, GERD, remote hx of CLL (on surveillance), and RLE sarcoma diagnosed earlier this year admitted to [facility] as direct admit to proceed with chemotherapy as directed by Oncology team.      # RLE high grade, pleomorphic sarcoma  # Cancer related pain  - Chemo and other management per Oncology.   - Pain: Continue PTA scheduled Lyrica, and prn oxycodone and Tylenol.      # HTN: PTA on losartan 100 mg qpm. Was on chlorthalidone in the past but discontinued during admission this past October due to severe electrolyte abnormalities. Pt states PTA BP measurements at home ~ 160s / 90s, and initial BP here 170 / low 100s. Pt denies HA, blurry vision, CP and other acute physical concerns at this time.    - Continue PTA losartan  - Start amlodipine 5 mg daily and titrate up dose if indicated.     # Anemia of chronic disease:  Recent BL Hgb 7-9s, most recent above baseline at 10.0.   - CTM via daily CBC    # Hx of constipation: During hospitalization this past Oct., pt developed an anal fissure due to increased straining, that improved after start of Miralax. LBM recent as pt states she restarted Miralax prior to this hospitalization.   - Continue Miralax 17 g daily  - Add prn Senokot-S     # Psoriasis: Continue PTA topicals (pt would prefer to use her home supply if possible)     # GERD: No current concerns  - Continue PTA daily PPI     # HLD: Continue PTA statin     # Hx of reactive airway disease: Pt denies hx of asthma and has not used any inhalers for over a year. Denies dyspnea and no e/o bronchospasm on exam.  - Noted.        Diet: Combination Diet Regular Diet Adult    DVT Prophylaxis: Enoxaparin (Lovenox) SQ  Branham Catheter: Not present  Lines: PRESENT      Cardiac  Monitoring: None  Code Status: Full Code        Disposition Plan To home after chemotherapy finishes      Expected Discharge Date: 12/15/2023      Destination: home with family          The patient's care was discussed with the Attending Physician, Dr. Velazquez, Bedside Nurse, and Patient.    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     History is obtained from the patient and electronic health record    History of Present Illness   Please refer to Oncology note dated  by Dr. Osei for full details. In brief, Ms. Mirta Cardenas is a pleasant 69 yo female with hx of HTN, psoriasis, HLD, GERD, remote hx of CLL (on surveillance), and RLE sarcoma diagnosed earlier this year admitted to [facility] as direct admit to proceed with chemotherapy as directed by Oncology team.     Currently pt states RLE cancer related pain controlled. Denies fever, chills, chest pain, SOB, abd pain, nausea, bowel and bladder concerns.       Past Medical History    Past Medical History:   Diagnosis Date    Asthma     Asthma     reactive airway disease, per patient    Blood transfusion     Cancer (H)     chronic lymphocytic leukemia    History of transfusion     Hypertension     Hypertension     Leukemia, chronic lymphocytic (H)     Malignant neoplasm (H)     CLL       Past Surgical History   Past Surgical History:   Procedure Laterality Date    ABDOMEN SURGERY      c section *3    APPENDECTOMY      C/SECTION, CLASSICAL  ,,    , Classical    COLONOSCOPY      HYSTERECTOMY      HYSTERECTOMY, PAP NO LONGER INDICATED  1998    PICC DOUBLE LUMEN PLACEMENT Left  10/17/2023    left basilic 4 fr dl power picc 41 cm    PICC DOUBLE LUMEN PLACEMENT Left 12/13/2023    Medial Brachial, 42 cm, 3 cm external length    RELEASE CARPAL TUNNEL  06/05/2012    Procedure:RELEASE CARPAL TUNNEL; Right Carpal Tunnel Release & Right Ring A1 Pulley Release; Surgeon:SERGEY HEATON; Location:[place] OR    RELEASE TRIGGER FINGER  06/05/2012    Procedure:RELEASE TRIGGER FINGER; Surgeon:SERGEY HEATON; Location:[place]    RELEASE TRIGGER FINGER  10/12/2012    Procedure: RELEASE TRIGGER FINGER;  Right Thumb A1 Pulley Release;  Surgeon: Sergey Heaton MD;  Location: [place] OR    SALPINGO OOPHORECTOMY,R/L/ULICES  05/02/2008    right       Prior to Admission Medications   Prior to Admission Medications   Prescriptions Last Dose Informant Patient Reported? Taking?   METAMUCIL FIBER PO 12/12/2023  Yes Yes   Sig: Take 1 teaspoonful by mouth daily After supper   Multiple Vitamins-Calcium (ONE-A-DAY WOMENS FORMULA PO) 12/13/2023 Self Yes Yes   Sig: Take 1 tablet by mouth daily   calcipotriene (DOVONOX) 0.005 % external cream Unknown  No Yes   Sig: Apply twice daily to areas of psoriasis on Saturday and Sunday.   clobetasol (TEMOVATE) 0.05 % external cream Past Week  No Yes   Sig: Apply twice daily to psoriasis on Monday through Friday.   diclofenac (VOLTAREN) 1 % topical gel   No No   Sig: Apply 4 g topically 4 times daily To right lower leg   docusate sodium (COLACE) 100 MG capsule 12/12/2023  Yes Yes   Sig: Take 100 mg by mouth 2 times daily   ibuprofen (ADVIL/MOTRIN) 200 MG capsule   Yes No   Sig: Take 400 mg by mouth every 4 hours as needed for fever   losartan (COZAAR) 100 MG tablet 12/12/2023  No Yes   Sig: Take 1 tablet (100 mg) by mouth daily   Patient taking differently: Take 100 mg by mouth every evening   omeprazole (PRILOSEC) 20 MG DR capsule 12/13/2023 Self Yes Yes   Sig: Take 1 capsule by mouth every morning.   oxyCODONE (ROXICODONE) 5 MG tablet 12/13/2023  No Yes   Sig: Take 1-2 tablets (5-10  mg) by mouth as needed for severe pain   pravastatin (PRAVACHOL) 40 MG tablet   No No   Sig: Take 1 tablet (40 mg) by mouth daily   Patient taking differently: Take 40 mg by mouth every evening   pregabalin (LYRICA) 50 MG capsule   No No   Sig: Take 1 capsule (50 mg) by mouth 3 times daily for 30 days   prochlorperazine (COMPAZINE) 5 MG tablet Unknown  No Yes   Sig: Take 1 tablet (5 mg) by mouth every 6 hours as needed (Breakthrough Nausea / Vomiting)      Facility-Administered Medications: None        Review of Systems    The 10 point Review of Systems is negative other than noted in the HPI or here.     Social History   I have reviewed this patient's social history and updated it with pertinent information if needed.  Social History     Tobacco Use    Smoking status: Never    Smokeless tobacco: Never   Vaping Use    Vaping Use: Never used   Substance Use Topics    Alcohol use: Yes     Comment: Occasionally    Drug use: No         Family History   I have reviewed this patient's family history and updated it with pertinent information if needed.  Family History   Problem Relation Age of Onset    Hypertension Brother     Heart Disease Brother 65        bypass    Cancer Sister         multiple myloma    Obesity Son     Obesity Son     Hypertension Son     Osteoporosis Mother     Melanoma No family hx of          Allergies   Allergies   Allergen Reactions    Allopurinol Itching    Amoxicillin Hives    Codeine Itching    Cymbalta [Duloxetine Hcl] Fatigue    Periactin Other (See Comments)     Sleepy.    11/15/23: patient not sure about this allergy/intolerance - may be interested in removal    Tenormin [Atenolol] Fatigue     11/15/23: patient may be interested in removal of this from allergy list as it was only fatigue/sleepiness        Physical Exam   Vital Signs: Temp: 97.8  F (36.6  C) Temp src: Oral BP: (!) 178/101 Pulse: 72   Resp: 18 SpO2: 99 % O2 Device: None (Room air)    GEN: In NAD  HEENT: NCAT; PERRL; sclerae  non-icteric  LUNGS: CTAB  CV: RRR  ABD: +BSs; SNTND  EXT: No BLE edema  SKIN: No acute rashes noted on exposed areas.  NEURO: AAOx3; CNs grossly intact; No acute focal deficits noted.      Medical Decision Making       60 MINUTES SPENT BY ME on the date of service doing chart review, history, exam, documentation & further activities per the note.      Data     CMP  Recent Labs   Lab 12/08/23  1302      POTASSIUM 4.4   CHLORIDE 106   CO2 26   ANIONGAP 10   *   BUN 15.9   CR 0.89   GFRESTIMATED 71   MONIK 9.1   PROTTOTAL 6.7   ALBUMIN 4.1   BILITOTAL 0.3   ALKPHOS 128   AST 19   ALT 13     CBC  Recent Labs   Lab 12/08/23  1302   WBC 6.4   RBC 3.54*   HGB 10.0*   HCT 30.1*   MCV 85   MCH 28.2   MCHC 33.2   RDW 18.2*

## 2023-12-13 NOTE — PROGRESS NOTES
The patient was accepted to medicine service.  This is a planned admission for chemotherapy.  Her care was assigned to my colleague Ba Melton.  Oncology team 3 will be the consultant for this patient.

## 2023-12-13 NOTE — PROGRESS NOTES
Ifosfamide Toxicity Assessment      Patient is Alert & Oriented x 4. There are no signs of lethargy, confusion or hallucinations.     Patient is having new incontinence of bowel or bladder: No    Pincer Grasp intact: Yes    Tremors: No    Provider was notified not notified of any remarkable changes. No interventions called for at this time.    -- Monitor for s/sx of ifosfamide toxicity: hallucinations, AMS, emotional lability, somnolence, myoclonic jerks, tremors, coordination difficulties, etc. If these occur, please call the fellow on call for recommendations

## 2023-12-13 NOTE — PLAN OF CARE
"Goal Outcome Evaluation:      Plan of Care Reviewed With: patient, spouse    Overall Patient Progress: no changeOverall Patient Progress: no change    1100 - 1530:  BP (!) 178/101 (BP Location: Right arm)   Pulse 72   Temp 97.8  F (36.6  C) (Oral)   Resp 18   Ht 1.549 m (5' 1\")   Wt 77.9 kg (171 lb 12.8 oz)   SpO2 99%   BMI 32.46 kg/m      Pt admitted today for C3 Doxil + Ifos/Mesna.  PICC placed this morning & good to use with good blood return.  A&O x 4, BP elevated in 170's/101, MD notified & aware. Gave amlodipine 5 mg, pt on BP med's, took BP before coming to hospital.  Denies pain/nausea/sob on RA. Pt took oxycodone from home & take lyrica 50 mg TID.   Plan is to start chemo once it comes from pharmacy.  UAL, voiding spontaneously, had a bm this morning.  Continue with poc..        "

## 2023-12-13 NOTE — PROCEDURES
[facility]    Double Lumen PICC Placement    Date/Time: 12/13/2023 10:09 AM    Performed by: Adriana Vidal RN  Authorized by: Roney Nam MD  Indications: vascular access      UNIVERSAL PROTOCOL   Site Marked: Yes  Prior Images Obtained and Reviewed:  Yes  Required items: Required blood products, implants, devices and special equipment available    Patient identity confirmed:  Verbally with patient, arm band, provided demographic data, hospital-assigned identification number and anonymous protocol, patient vented/unresponsive  NA - No sedation, light sedation, or local anesthesia  Confirmation Checklist:  Patient's identity using two indicators, relevant allergies, procedure was appropriate and matched the consent or emergent situation and correct equipment/implants were available  Time out: Immediately prior to the procedure a time out was called    Universal Protocol: the Joint Commission Universal Protocol was followed    Preparation: Patient was prepped and draped in usual sterile fashion       ANESTHESIA    Anesthesia:  Local infiltration  Local Anesthetic:  Lidocaine 1% without epinephrine  Anesthetic Total (mL):  3.5      SEDATION    Patient Sedated: No        Preparation: skin prepped with ChloraPrep  Skin prep agent: skin prep agent completely dried prior to procedure  Sterile barriers: maximum sterile barriers were used: cap, mask, sterile gown, sterile gloves, and large sterile sheet  Hand hygiene: hand hygiene performed prior to central venous catheter insertion  Type of line used: PICC  Catheter type: double lumen  Lumen type: power PICC and non-valved  Lumen Identification: Purple and Red  Catheter size: 5 Fr  Brand: Bard  Lot number: [id number]  Placement method: venipuncture, MST, ultrasound and tip navigation system  Number of attempts: 1  Difficulty threading catheter: no  Successful placement: yes  Orientation: left  Catheter to Vein  (%): 24  Location: brachial vein (medial) (0.76 cm vein diameter)  Tip Location: SVC  Arm circumference: adults 10 cm  Extremity circumference: 33  Visible catheter length: 3  Total catheter length: 42  Dressing and securement: adhesive securement device, blood cleaned with CHG, alcohol impregnated caps, chlorhexidine disc applied, dressing applied, transparent dressing, gloves changed prior to final dressing and glue  Post procedure assessment: blood return through all ports, placement verified by 3CG technology and free fluid flow  PROCEDURE Describe Procedure: PICC was ready to use.  Disposal: sharps and needle count correct at the end of procedure, needles and guidewire disposed in sharps container

## 2023-12-13 NOTE — PLAN OF CARE
Goal Outcome Evaluation:    Nursing Focus: Admission    D: Patient admitted/transferred from home via self for C3 Doxil & Ifos/Mesna.      I: Upon arrival to the unit patient was oriented to room, unit, and call light. Patient s height, weight, and vital signs were obtained. Allergies reviewed and allergy band applied. MD notified of patient s arrival on the unit. Adult AVS completed. Head to toe assessment completed. Education assessment completed. Care plan initiated.     A: Vital signs stable upon admission. Patient rates pain at 0. Two RN skin assessment completed Yes. Second RN was Shena SOTO Significant Skin Findings include None. [facility] Nurse Consult Ordered No. Bed Algorithm can be found in PCS flow sheets (Support Surface Algorithm) and on IP [facility] NURSE RESOURCE TAB, was this used during this assessment? Yes.     P: Continue to monitor patient s poc and intervene as needed. Continue with plan of care. Notify MD with any concerns or changes in patient status.

## 2023-12-13 NOTE — PHARMACY-ADMISSION MEDICATION HISTORY
Pharmacist Admission Medication History    Admission medication history is complete. The information provided in this note is only as accurate as the sources available at the time of the update.    Information Source(s): Patient via phone  Reviewed dispense history    Pertinent Information: Mirta said she did not need calcipotriene and clobetasol creams while she is in the hospital.    Changes made to PTA medication list:  Added: None  Deleted: None  Changed: None    Medication Affordability: not assessed     Allergies reviewed with patient and updates made in EHR: yes    Medication History Completed By: Ryann Bliss, Pharm.D., BCPS, BCTXP  Pager [phone]   12/13/2023 1:47 PM    PTA Med List   Medication Sig Note Last Dose    calcipotriene (DOVONOX) 0.005 % external cream Apply twice daily to areas of psoriasis on Saturday and Sunday. 12/13/2023: Pt doesn't want to use cream at hospital. Have supplies at home. Unknown    clobetasol (TEMOVATE) 0.05 % external cream Apply twice daily to psoriasis on Monday through Friday. 12/13/2023: Pt doesn't want to use at hospital. Have supplies for home. Past Week    docusate sodium (COLACE) 100 MG capsule Take 100 mg by mouth 2 times daily  12/12/2023    losartan (COZAAR) 100 MG tablet Take 1 tablet (100 mg) by mouth daily (Patient taking differently: Take 100 mg by mouth every evening)  12/12/2023    METAMUCIL FIBER PO Take 1 teaspoonful by mouth daily After supper 12/13/2023: Pt will bring own supply. 12/12/2023    Multiple Vitamins-Calcium (ONE-A-DAY WOMENS FORMULA PO) Take 1 tablet by mouth daily  12/13/2023    omeprazole (PRILOSEC) 20 MG DR capsule Take 1 capsule by mouth every morning.  12/13/2023    oxyCODONE (ROXICODONE) 5 MG tablet Take 1-2 tablets (5-10 mg) by mouth as needed for severe pain  12/13/2023    pravastatin (PRAVACHOL) 40 MG tablet Take 1 tablet (40 mg) by mouth daily (Patient taking differently: Take 40 mg by mouth every evening)  12/12/2023     pregabalin (LYRICA) 50 MG capsule Take 1 capsule (50 mg) by mouth 3 times daily for 30 days  12/13/2023    prochlorperazine (COMPAZINE) 5 MG tablet Take 1 tablet (5 mg) by mouth every 6 hours as needed (Breakthrough Nausea / Vomiting)  Unknown

## 2023-12-14 ENCOUNTER — TELEPHONE (OUTPATIENT)
Dept: FAMILY MEDICINE | Facility: CLINIC | Age: 68
End: 2023-12-14

## 2023-12-14 LAB
ALBUMIN SERPL BCG-MCNC: 4.2 G/DL (ref 3.5–5.2)
ALP SERPL-CCNC: 110 U/L (ref 40–150)
ALT SERPL W P-5'-P-CCNC: 15 U/L (ref 0–50)
ANION GAP SERPL CALCULATED.3IONS-SCNC: 9 MMOL/L (ref 7–15)
AST SERPL W P-5'-P-CCNC: 15 U/L (ref 0–45)
BASOPHILS # BLD AUTO: 0 10E3/UL (ref 0–0.2)
BASOPHILS NFR BLD AUTO: 0 %
BILIRUB SERPL-MCNC: 0.3 MG/DL
BUN SERPL-MCNC: 18.4 MG/DL (ref 8–23)
CALCIUM SERPL-MCNC: 9.1 MG/DL (ref 8.8–10.2)
CHLORIDE SERPL-SCNC: 101 MMOL/L (ref 98–107)
CREAT SERPL-MCNC: 0.72 MG/DL (ref 0.51–0.95)
DEPRECATED HCO3 PLAS-SCNC: 27 MMOL/L (ref 22–29)
EGFRCR SERPLBLD CKD-EPI 2021: >90 ML/MIN/1.73M2
EOSINOPHIL # BLD AUTO: 0 10E3/UL (ref 0–0.7)
EOSINOPHIL NFR BLD AUTO: 0 %
ERYTHROCYTE [DISTWIDTH] IN BLOOD BY AUTOMATED COUNT: 17.5 % (ref 10–15)
GLUCOSE SERPL-MCNC: 129 MG/DL (ref 70–99)
HCT VFR BLD AUTO: 30.2 % (ref 35–47)
HGB BLD-MCNC: 10.4 G/DL (ref 11.7–15.7)
IMM GRANULOCYTES # BLD: 0 10E3/UL
IMM GRANULOCYTES NFR BLD: 0 %
LYMPHOCYTES # BLD AUTO: 0.9 10E3/UL (ref 0.8–5.3)
LYMPHOCYTES NFR BLD AUTO: 13 %
MAGNESIUM SERPL-MCNC: 1.9 MG/DL (ref 1.7–2.3)
MCH RBC QN AUTO: 29.1 PG (ref 26.5–33)
MCHC RBC AUTO-ENTMCNC: 34.4 G/DL (ref 31.5–36.5)
MCV RBC AUTO: 84 FL (ref 78–100)
MONOCYTES # BLD AUTO: 0.1 10E3/UL (ref 0–1.3)
MONOCYTES NFR BLD AUTO: 2 %
NEUTROPHILS # BLD AUTO: 6 10E3/UL (ref 1.6–8.3)
NEUTROPHILS NFR BLD AUTO: 85 %
NRBC # BLD AUTO: 0 10E3/UL
NRBC BLD AUTO-RTO: 0 /100
PHOSPHATE SERPL-MCNC: 2.3 MG/DL (ref 2.5–4.5)
PLATELET # BLD AUTO: 165 10E3/UL (ref 150–450)
POTASSIUM SERPL-SCNC: 4.6 MMOL/L (ref 3.4–5.3)
PROT SERPL-MCNC: 6.5 G/DL (ref 6.4–8.3)
RBC # BLD AUTO: 3.58 10E6/UL (ref 3.8–5.2)
SODIUM SERPL-SCNC: 137 MMOL/L (ref 135–145)
WBC # BLD AUTO: 7 10E3/UL (ref 4–11)

## 2023-12-14 PROCEDURE — 250N000011 HC RX IP 250 OP 636: Mod: JZ | Performed by: PHYSICIAN ASSISTANT

## 2023-12-14 PROCEDURE — 250N000009 HC RX 250: Performed by: STUDENT IN AN ORGANIZED HEALTH CARE EDUCATION/TRAINING PROGRAM

## 2023-12-14 PROCEDURE — 250N000013 HC RX MED GY IP 250 OP 250 PS 637: Performed by: PHYSICIAN ASSISTANT

## 2023-12-14 PROCEDURE — 80053 COMPREHEN METABOLIC PANEL: CPT | Performed by: STUDENT IN AN ORGANIZED HEALTH CARE EDUCATION/TRAINING PROGRAM

## 2023-12-14 PROCEDURE — 83735 ASSAY OF MAGNESIUM: CPT | Performed by: STUDENT IN AN ORGANIZED HEALTH CARE EDUCATION/TRAINING PROGRAM

## 2023-12-14 PROCEDURE — 99233 SBSQ HOSP IP/OBS HIGH 50: CPT | Mod: FS | Performed by: PHYSICIAN ASSISTANT

## 2023-12-14 PROCEDURE — 250N000009 HC RX 250: Performed by: PHYSICIAN ASSISTANT

## 2023-12-14 PROCEDURE — 250N000011 HC RX IP 250 OP 636: Mod: JZ | Performed by: STUDENT IN AN ORGANIZED HEALTH CARE EDUCATION/TRAINING PROGRAM

## 2023-12-14 PROCEDURE — 258N000003 HC RX IP 258 OP 636: Performed by: PHYSICIAN ASSISTANT

## 2023-12-14 PROCEDURE — 84100 ASSAY OF PHOSPHORUS: CPT | Performed by: STUDENT IN AN ORGANIZED HEALTH CARE EDUCATION/TRAINING PROGRAM

## 2023-12-14 PROCEDURE — 258N000003 HC RX IP 258 OP 636: Performed by: STUDENT IN AN ORGANIZED HEALTH CARE EDUCATION/TRAINING PROGRAM

## 2023-12-14 PROCEDURE — 120N000002 HC R&B MED SURG/OB UMMC

## 2023-12-14 PROCEDURE — 99233 SBSQ HOSP IP/OBS HIGH 50: CPT | Performed by: STUDENT IN AN ORGANIZED HEALTH CARE EDUCATION/TRAINING PROGRAM

## 2023-12-14 PROCEDURE — 85004 AUTOMATED DIFF WBC COUNT: CPT | Performed by: STUDENT IN AN ORGANIZED HEALTH CARE EDUCATION/TRAINING PROGRAM

## 2023-12-14 PROCEDURE — 99207 PR APP CREDIT; MD BILLING SHARED VISIT: CPT | Mod: FS | Performed by: STUDENT IN AN ORGANIZED HEALTH CARE EDUCATION/TRAINING PROGRAM

## 2023-12-14 PROCEDURE — 250N000013 HC RX MED GY IP 250 OP 250 PS 637: Performed by: STUDENT IN AN ORGANIZED HEALTH CARE EDUCATION/TRAINING PROGRAM

## 2023-12-14 RX ADMIN — OXYCODONE HYDROCHLORIDE 5 MG: 5 TABLET ORAL at 22:02

## 2023-12-14 RX ADMIN — ONDANSETRON HYDROCHLORIDE 8 MG: 8 TABLET, FILM COATED ORAL at 04:47

## 2023-12-14 RX ADMIN — PRAVASTATIN SODIUM 40 MG: 40 TABLET ORAL at 20:02

## 2023-12-14 RX ADMIN — POTASSIUM PHOSPHATE, MONOBASIC POTASSIUM PHOSPHATE, DIBASIC 9 MMOL: 224; 236 INJECTION, SOLUTION, CONCENTRATE INTRAVENOUS at 11:23

## 2023-12-14 RX ADMIN — THIAMINE HCL TAB 100 MG 100 MG: 100 TAB at 09:01

## 2023-12-14 RX ADMIN — PREGABALIN 50 MG: 50 CAPSULE ORAL at 09:01

## 2023-12-14 RX ADMIN — SODIUM BICARBONATE: 84 INJECTION INTRAVENOUS at 08:26

## 2023-12-14 RX ADMIN — MICONAZOLE NITRATE: 20 POWDER TOPICAL at 13:39

## 2023-12-14 RX ADMIN — PREGABALIN 50 MG: 50 CAPSULE ORAL at 13:44

## 2023-12-14 RX ADMIN — THIAMINE HCL TAB 100 MG 100 MG: 100 TAB at 13:44

## 2023-12-14 RX ADMIN — POLYETHYLENE GLYCOL 3350 17 G: 17 POWDER, FOR SOLUTION ORAL at 08:59

## 2023-12-14 RX ADMIN — DOCUSATE SODIUM 50 MG AND SENNOSIDES 8.6 MG 2 TABLET: 8.6; 5 TABLET, FILM COATED ORAL at 16:35

## 2023-12-14 RX ADMIN — PREGABALIN 50 MG: 50 CAPSULE ORAL at 20:02

## 2023-12-14 RX ADMIN — PANTOPRAZOLE SODIUM 40 MG: 40 TABLET, DELAYED RELEASE ORAL at 09:01

## 2023-12-14 RX ADMIN — ENOXAPARIN SODIUM 40 MG: 40 INJECTION SUBCUTANEOUS at 20:02

## 2023-12-14 RX ADMIN — LOSARTAN POTASSIUM 100 MG: 100 TABLET, FILM COATED ORAL at 20:02

## 2023-12-14 RX ADMIN — ONDANSETRON HYDROCHLORIDE 8 MG: 8 TABLET, FILM COATED ORAL at 21:55

## 2023-12-14 RX ADMIN — MESNA 2910 MG: 100 INJECTION, SOLUTION INTRAVENOUS at 17:31

## 2023-12-14 RX ADMIN — THIAMINE HCL TAB 100 MG 100 MG: 100 TAB at 20:02

## 2023-12-14 RX ADMIN — ONDANSETRON HYDROCHLORIDE 8 MG: 8 TABLET, FILM COATED ORAL at 13:44

## 2023-12-14 RX ADMIN — SODIUM BICARBONATE: 84 INJECTION INTRAVENOUS at 16:27

## 2023-12-14 RX ADMIN — AMLODIPINE BESYLATE 5 MG: 5 TABLET ORAL at 09:01

## 2023-12-14 ASSESSMENT — ACTIVITIES OF DAILY LIVING (ADL)
ADLS_ACUITY_SCORE: 22

## 2023-12-14 NOTE — PROGRESS NOTES
Nursing Focus: Chemotherapy  D: Positive brisk bright red blood return via PICC. Insertion site is clean/dry/intact, dressing intact with no complaints of pain.  Urine output is recorded in intake Doc Flowsheet.    I: On scheduled premedications given per order (see electronic medical administration record). Dose #2 of Ifosfamide/Mesna started to infuse over 24 hours. Reviewed pt teaching on chemotherapy side effects.  Pt denies need for further teaching. Chemotherapy double checked per protocol by two chemotherapy competent RN's.   A: Tolerating chemo well. Denies nausea.   P: Continue to monitor urine output and symptoms of nausea. Screen for symptoms of toxicity.

## 2023-12-14 NOTE — PROGRESS NOTES
Ifosfamide Toxicity Assessment      Patient is Alert & Oriented x4. There are No signs of lethargy, confusion or hallucinations.     Patient is having new incontinence of bowel or bladder No.       Pincer Grasp intact  Yes     Tremors No     Provider was notified  No of changes.    -- Monitor for s/sx of ifosfamide toxicity: hallucinations, AMS, emotional lability, somnolence, myoclonic jerks, tremors, coordination difficulties, etc. If these occur, please call the fellow on call for recommendations

## 2023-12-14 NOTE — PROGRESS NOTES
[facility]    Medicine Progress Note - Hospitalist Service, GOLD TEAM 2    Date of Admission:  12/13/2023    Updates today:  -Discontinue Voltaren and MVI patient not currently eating here  -Miconazole topical as needed intertrigo  -Appreciate oncology input, so far tolerating chemo well  - phos replacement protocol    Assessment & Plan   Mirta Cardenas is a pleasant 69 yo female with hx of HTN, psoriasis, HLD, GERD, remote hx of CLL (on surveillance), and RLE sarcoma diagnosed earlier this year admitted to [facility] as direct admit to proceed with chemotherapy as directed by Oncology team.      # RLE high grade, pleomorphic sarcoma  # Cancer related pain   Tolerating chemo well so far  - appreciate onc input   Current Treatment: Cycle 3 Doxil/Ifos  Day 1 - Doxil 40mg/m2  Days 1-5 CIVI Ifosfamide/Mesna 1600mg/m2  Day 6 Mesna 1600mg/m2 over 18hrs  Treatment Day: 2 of 6  - Chemo and other management per Oncology.   - Pain: Continue PTA scheduled Lyrica, and prn oxycodone and Tylenol.  (Stopped voltaren)     # HTN: PTA on losartan 100 mg qpm. Was on chlorthalidone in the past but discontinued during admission this past October due to severe electrolyte abnormalities. Pt states PTA BP measurements at home ~ 160s / 90s, and initial BP here 170 / low 100s. Pt denies HA, blurry vision, CP and other acute physical concerns at this time.  Bp improved 12/14.  - Continue PTA losartan  - continue amlodipine 5 mg daily and titrate up dose if indicated.     # Anemia of chronic disease:  Recent BL Hgb 7-9s, most recent above baseline at 10.0.   - CTM via daily CBC    # Hx of constipation: During hospitalization this past Oct., pt developed an anal fissure due to increased straining, that improved after start of Miralax. LBM recent as pt states she restarted Miralax prior to this hospitalization. + BM on 12/14  - Continue Miralax 17 g daily  - Add prn Senokot-S     # Psoriasis: Continue PTA  "topicals (pt would prefer to use her home supply if possible)     # GERD: No current concerns - Continue PTA daily PPI     # HLD: Continue PTA statin     # Hx of reactive airway disease: Pt denies hx of asthma and has not used any inhalers for over a year. Denies dyspnea and no e/o bronchospasm on exam.- monitor          Diet: Combination Diet Regular Diet Adult    DVT Prophylaxis: Enoxaparin (Lovenox) SQ  Branham Catheter: Not present  Lines: PRESENT      PICC 12/13/23 Double Lumen Left Brachial vein medial Chemotherapy. PICC was ready to use.-Site Assessment: WDL      Cardiac Monitoring: None  Code Status: Full Code      Clinically Significant Risk Factors Present on Admission                  # Hypertension: Noted on problem list      # Obesity: Estimated body mass index is 32.5 kg/m  as calculated from the following:    Height as of this encounter: 1.549 m (5' 1\").    Weight as of this encounter: 78 kg (172 lb).       # Asthma: noted on problem list        Disposition Plan     Expected Discharge Date: 12/15/2023      Destination: home with family            The patient's care was discussed with the Attending Physician, Dr. Velazquez, Bedside Nurse, and Patient.    Kanika Barraza PA-C  Hospitalist Service, GOLD TEAM 2  [facility]  Securely message with [employer] (more info)  Text page via [employer] Paging/Directory   See signed in provider for up to date coverage information  ______________________________________________________________________    Interval History     Mirta is feeling okay and notes that she is so far tolerating chemotherapy well.  She is seen in the company of her  and called her nurse into the room so that we could review some minor requests.     Patient notes concern for her blood pressure given it was quite elevated when she presented.  Able to sit upright independently      She is not noticing any signs for infection including any fevers, chills, " cough, rashes, sore throat she is not having any symptoms including any chest pain, pressure, palpitations, shortness of breath..  Patient is also not having any concerning changes to bowels or urination or p.o. intolerance.  She is not experiencing any nausea or vomiting.    Physical Exam   Vital Signs: Temp: 98  F (36.7  C) Temp src: Oral BP: (!) 147/90 Pulse: 77   Resp: 16 SpO2: 98 % O2 Device: None (Room air)    Weight: 172 lbs 0 oz  GENERAL: Alert and oriented x 3. NAD.  Able to sit upright independently pupils equal and round. Cooperative.   HEENT: Anicteric sclera.  NC. AT.    CV: RRR. S1, S2. No murmurs appreciated.   RESPIRATORY: Effort normal on RA. Lungs CTAB with no wheezing, rales, rhonchi.   GI: Abdomen soft, NT, NABS  NEUROLOGICAL: No focal deficits. Moves all extremities.    EXTREMITIES: Trace bilateral L>R peripheral edema. Intact bilateral pedal pulses.   SKIN: No jaundice. No rashes on exposed skin.  BACK: no CVA tenderness, no spinous tenderness      Medical Decision Making       50 MINUTES SPENT BY ME on the date of service doing chart review, history, exam, documentation & further activities per the note.      Data     I have personally reviewed the following data over the past 24 hrs:    7.0  \   10.4 (L)   / 165     137 101 18.4 /  129 (H)   4.6 27 0.72 \     ALT: 15 AST: 15 AP: 110 TBILI: 0.3   ALB: 4.2 TOT PROTEIN: 6.5 LIPASE: N/A

## 2023-12-14 NOTE — PROGRESS NOTES
[facility] - Medical Oncology Follow-Up Consult Note  2023    Patient Identifiers     Name: Mirta Cardenas  Preferred Address: [place]  Preferred Language: English  : 1955  Gender: female    Assessment and Recommendations     Ms. 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 Medical oncology consulted for chemotherapy management guidance.     Current Treatment: Cycle 3 Doxil/Ifos  Day 1 - Doxil 40mg/m2  Days 1-5 CIVI Ifosfamide/Mesna 1600mg/m2  Day 6 Mesna 1600mg/m2 over 18hrs  Treatment Day:     Plan:  Ms. Cardenas remains admitted for cycle 3-day 2 of Doxil/Ifos/Mesna.  She continues to tolerate treatment well, with no nausea, diarrhea, constipation, or abdominal discomfort.  She is working today to resolve an issue associated with a [place] present  for her .  Based on her labs and clinical status, no changes to dose or schedule of regimen at this time.    Start Ifos/Mesna infusions today, through day 5.  Plan for Mesna only infusion on day 6 over 18 hours noted to expedite discharge.  Replete electrolytes per protocol.  Remainder of management per medicine expertise.  Oncology remains available for any questions.    Thank you for this interesting consult. Oncology Consult Service to follow. Feel free to reach out with further questions.     60 minutes spent on the date of the encounter doing chart review, review of test results, interpretation of tests, patient visit, and documentation     Shun Osei MD, PhD   of Medicine  Division of Hematology, Oncology and Transplantation  [facility]    -----------------------------------    Oncology Summary and HPI     Cancer Staging   No matching staging information was found for the patient.      Oncology History   Sarcoma of right lower extremity (H)   10/16/2023 Initial Diagnosis     "Sarcoma of right lower extremity (H)     10/17/2023 -  Chemotherapy    IP ONC Sarcoma - Liposomal DOXOrubicin (DOXIL) / Ifosfamide / Mesna  Plan Provider: Roney Nam MD  Treatment goal: Curative  Line of treatment: First Line         Subjective/Interval Events     - patient is working through email regarding return for a nice leather jacket she is gifting her  for [employer]. Is frustrated by this process but in overall good mood and health.  - no nausea, vomiting, diarrhea, constipation, or abdominal discomfort  - no fever/chills, SOB, or cough    Physical Exam     Vital signs: BP (!) 147/90 (BP Location: Right arm)   Pulse 77   Temp 98  F (36.7  C) (Oral)   Resp 16   Ht 1.549 m (5' 1\")   Wt 78 kg (172 lb)   SpO2 98%   BMI 32.50 kg/m      ECOG performance status:  0  Vascular access:  PICC    Physical Exam:  Exam performed, notable for:  - no notables    Objective Data     Lab data:  I have personally reviewed the lab data, notable for:    - Phos 2.3  - Hgb 10.4  - Glu 129    Radiology data:  I have personally reviewed the radiology data, notable for:  - no new data    Pathology and other data:  I have personally reviewed and interpreted the pathology data, notable for:    - no new data    " 2

## 2023-12-14 NOTE — PLAN OF CARE
Goal Outcome Evaluation:  [id number]  Pt admitted today for C3 Doxil + Ifos/Mesna.   AVSS, alert and oriented x 4, denies pain/nausea/sob.  Today C3 day 1 Doxil given and CIVI chemo dose # 1 ifos/mesna is infusing via PICC and IVM fluid is infusing @ 125 ml/hr. Neuro's is intact and chemo is tolerating good wit good blood return. Up independent, voiding adequately, LBM 12/13  Continue to monitor care.

## 2023-12-14 NOTE — PLAN OF CARE
[id number]    A&Ox4, Hypertensive within parameters, OVSS on room air. C3D1 CIVI Ifos/Mesna & IV fluids infusing through DL PICC. Denies nausea, SOB. Received 5mg oxycodone x1 for RLE pain and headache with relief. Ifos assessment intact. Voiding spontaneously, LBM 12/13. Continue w/ POC

## 2023-12-14 NOTE — PROGRESS NOTES
Nursing Focus: Chemotherapy  D: Positive blood return via PICC. Insertion site is clean/dry/intact, dressing intact with no complaints of pain. Urine output is recorded in intake in Doc Flowsheet.    I: Premedications given per order (see electronic medical administration record). Dose #1 of Doxil infused over 1 hour. Dose #1 of Ifosfamide/Mesna started to infuse over 24 hours. Reviewed pt teaching on chemotherapy side effects. Pt denies need for further teaching. Chemotherapy double checked per protocol by two chemotherapy competent RN's.   A: Tolerating procedure well. Denies nausea and or pain.   P: Continue to monitor urine output and symptoms of nausea. Screen for symptoms of toxicity.

## 2023-12-14 NOTE — PROGRESS NOTES
Ifosfamide Toxicity Assessment      Patient is Alert & Oriented x4. There are NO signs of lethargy, confusion or hallucinations.     Patient is having new incontinence of bowel or bladder No.      Pincer Grasp intact Yes.    Tremors No    Provider was notified not notified of changes. No interventions at this time.     -- Monitor for s/sx of ifosfamide toxicity: hallucinations, AMS, emotional lability, somnolence, myoclonic jerks, tremors, coordination difficulties, etc. If these occur, please call the fellow on call for recommendations

## 2023-12-14 NOTE — TELEPHONE ENCOUNTER
Patient Quality Outreach    Patient is due for the following:   Hypertension -  BP check    Next Steps:   Patient has upcoming appointment, these items will be addressed at that time.    Type of outreach:    Chart review performed, no outreach needed.      Questions for provider review:    None           Leia Gutiérrez MA

## 2023-12-14 NOTE — PLAN OF CARE
Goal Outcome Evaluation:      Plan of Care Reviewed With: patient, spouse    Overall Patient Progress: no changeOverall Patient Progress: no change  Pt afebrile with stable vs, except a little hypertensive, but just initiated new amlodipine yesterday. Continues to receive Ifosfamide/Mesna CIVI without difficulty, so far. No change to ICANS assessment. Receiving scheduled lyrica for leg pain with adequate relief this shift. Phos level replaced per protocol, redraw tomorrow.

## 2023-12-15 LAB
ALBUMIN SERPL BCG-MCNC: 4 G/DL (ref 3.5–5.2)
ALP SERPL-CCNC: 96 U/L (ref 40–150)
ALT SERPL W P-5'-P-CCNC: 13 U/L (ref 0–50)
ANION GAP SERPL CALCULATED.3IONS-SCNC: 8 MMOL/L (ref 7–15)
AST SERPL W P-5'-P-CCNC: 14 U/L (ref 0–45)
BASOPHILS # BLD AUTO: 0 10E3/UL (ref 0–0.2)
BASOPHILS NFR BLD AUTO: 1 %
BILIRUB SERPL-MCNC: 0.2 MG/DL
BUN SERPL-MCNC: 15 MG/DL (ref 8–23)
CALCIUM SERPL-MCNC: 9 MG/DL (ref 8.8–10.2)
CHLORIDE SERPL-SCNC: 105 MMOL/L (ref 98–107)
CREAT SERPL-MCNC: 0.82 MG/DL (ref 0.51–0.95)
DEPRECATED HCO3 PLAS-SCNC: 29 MMOL/L (ref 22–29)
EGFRCR SERPLBLD CKD-EPI 2021: 78 ML/MIN/1.73M2
EOSINOPHIL # BLD AUTO: 0 10E3/UL (ref 0–0.7)
EOSINOPHIL NFR BLD AUTO: 0 %
ERYTHROCYTE [DISTWIDTH] IN BLOOD BY AUTOMATED COUNT: 19.1 % (ref 10–15)
GLUCOSE SERPL-MCNC: 98 MG/DL (ref 70–99)
HCT VFR BLD AUTO: 27.9 % (ref 35–47)
HGB BLD-MCNC: 9.3 G/DL (ref 11.7–15.7)
IMM GRANULOCYTES # BLD: 0 10E3/UL
IMM GRANULOCYTES NFR BLD: 0 %
LYMPHOCYTES # BLD AUTO: 1.2 10E3/UL (ref 0.8–5.3)
LYMPHOCYTES NFR BLD AUTO: 16 %
MAGNESIUM SERPL-MCNC: 1.9 MG/DL (ref 1.7–2.3)
MCH RBC QN AUTO: 28.4 PG (ref 26.5–33)
MCHC RBC AUTO-ENTMCNC: 33.3 G/DL (ref 31.5–36.5)
MCV RBC AUTO: 85 FL (ref 78–100)
MONOCYTES # BLD AUTO: 0.6 10E3/UL (ref 0–1.3)
MONOCYTES NFR BLD AUTO: 8 %
NEUTROPHILS # BLD AUTO: 6 10E3/UL (ref 1.6–8.3)
NEUTROPHILS NFR BLD AUTO: 75 %
NRBC # BLD AUTO: 0 10E3/UL
NRBC BLD AUTO-RTO: 0 /100
PHOSPHATE SERPL-MCNC: 3.2 MG/DL (ref 2.5–4.5)
PLATELET # BLD AUTO: 129 10E3/UL (ref 150–450)
POTASSIUM SERPL-SCNC: 4.3 MMOL/L (ref 3.4–5.3)
PROT SERPL-MCNC: 6 G/DL (ref 6.4–8.3)
RBC # BLD AUTO: 3.27 10E6/UL (ref 3.8–5.2)
SODIUM SERPL-SCNC: 142 MMOL/L (ref 135–145)
WBC # BLD AUTO: 7.9 10E3/UL (ref 4–11)

## 2023-12-15 PROCEDURE — 250N000013 HC RX MED GY IP 250 OP 250 PS 637: Performed by: STUDENT IN AN ORGANIZED HEALTH CARE EDUCATION/TRAINING PROGRAM

## 2023-12-15 PROCEDURE — 250N000011 HC RX IP 250 OP 636: Mod: JZ | Performed by: PHYSICIAN ASSISTANT

## 2023-12-15 PROCEDURE — 80053 COMPREHEN METABOLIC PANEL: CPT | Performed by: STUDENT IN AN ORGANIZED HEALTH CARE EDUCATION/TRAINING PROGRAM

## 2023-12-15 PROCEDURE — 84100 ASSAY OF PHOSPHORUS: CPT | Performed by: STUDENT IN AN ORGANIZED HEALTH CARE EDUCATION/TRAINING PROGRAM

## 2023-12-15 PROCEDURE — 120N000002 HC R&B MED SURG/OB UMMC

## 2023-12-15 PROCEDURE — 99232 SBSQ HOSP IP/OBS MODERATE 35: CPT | Mod: FS | Performed by: PHYSICIAN ASSISTANT

## 2023-12-15 PROCEDURE — 258N000003 HC RX IP 258 OP 636: Performed by: STUDENT IN AN ORGANIZED HEALTH CARE EDUCATION/TRAINING PROGRAM

## 2023-12-15 PROCEDURE — 99233 SBSQ HOSP IP/OBS HIGH 50: CPT | Performed by: STUDENT IN AN ORGANIZED HEALTH CARE EDUCATION/TRAINING PROGRAM

## 2023-12-15 PROCEDURE — 99207 PR APP CREDIT; MD BILLING SHARED VISIT: CPT | Mod: FS | Performed by: STUDENT IN AN ORGANIZED HEALTH CARE EDUCATION/TRAINING PROGRAM

## 2023-12-15 PROCEDURE — 250N000013 HC RX MED GY IP 250 OP 250 PS 637: Performed by: PHYSICIAN ASSISTANT

## 2023-12-15 PROCEDURE — 250N000009 HC RX 250: Performed by: STUDENT IN AN ORGANIZED HEALTH CARE EDUCATION/TRAINING PROGRAM

## 2023-12-15 PROCEDURE — 250N000011 HC RX IP 250 OP 636: Mod: JZ | Performed by: STUDENT IN AN ORGANIZED HEALTH CARE EDUCATION/TRAINING PROGRAM

## 2023-12-15 PROCEDURE — 85025 COMPLETE CBC W/AUTO DIFF WBC: CPT | Performed by: STUDENT IN AN ORGANIZED HEALTH CARE EDUCATION/TRAINING PROGRAM

## 2023-12-15 PROCEDURE — 83735 ASSAY OF MAGNESIUM: CPT | Performed by: STUDENT IN AN ORGANIZED HEALTH CARE EDUCATION/TRAINING PROGRAM

## 2023-12-15 RX ADMIN — ONDANSETRON HYDROCHLORIDE 8 MG: 8 TABLET, FILM COATED ORAL at 22:04

## 2023-12-15 RX ADMIN — ENOXAPARIN SODIUM 40 MG: 40 INJECTION SUBCUTANEOUS at 19:43

## 2023-12-15 RX ADMIN — THIAMINE HCL TAB 100 MG 100 MG: 100 TAB at 08:11

## 2023-12-15 RX ADMIN — PANTOPRAZOLE SODIUM 40 MG: 40 TABLET, DELAYED RELEASE ORAL at 08:11

## 2023-12-15 RX ADMIN — ONDANSETRON HYDROCHLORIDE 8 MG: 8 TABLET, FILM COATED ORAL at 05:37

## 2023-12-15 RX ADMIN — PRAVASTATIN SODIUM 40 MG: 40 TABLET ORAL at 19:42

## 2023-12-15 RX ADMIN — THIAMINE HCL TAB 100 MG 100 MG: 100 TAB at 19:42

## 2023-12-15 RX ADMIN — ONDANSETRON HYDROCHLORIDE 8 MG: 8 TABLET, FILM COATED ORAL at 13:15

## 2023-12-15 RX ADMIN — SODIUM BICARBONATE: 84 INJECTION INTRAVENOUS at 08:24

## 2023-12-15 RX ADMIN — OXYCODONE HYDROCHLORIDE 5 MG: 5 TABLET ORAL at 22:04

## 2023-12-15 RX ADMIN — THIAMINE HCL TAB 100 MG 100 MG: 100 TAB at 13:15

## 2023-12-15 RX ADMIN — SODIUM BICARBONATE: 84 INJECTION INTRAVENOUS at 00:26

## 2023-12-15 RX ADMIN — PREGABALIN 50 MG: 50 CAPSULE ORAL at 08:11

## 2023-12-15 RX ADMIN — PREGABALIN 50 MG: 50 CAPSULE ORAL at 13:14

## 2023-12-15 RX ADMIN — LOSARTAN POTASSIUM 100 MG: 100 TABLET, FILM COATED ORAL at 19:42

## 2023-12-15 RX ADMIN — POLYETHYLENE GLYCOL 3350 17 G: 17 POWDER, FOR SOLUTION ORAL at 08:14

## 2023-12-15 RX ADMIN — AMLODIPINE BESYLATE 5 MG: 5 TABLET ORAL at 08:11

## 2023-12-15 RX ADMIN — PREGABALIN 50 MG: 50 CAPSULE ORAL at 19:42

## 2023-12-15 RX ADMIN — MESNA 2910 MG: 100 INJECTION, SOLUTION INTRAVENOUS at 17:44

## 2023-12-15 RX ADMIN — SODIUM BICARBONATE: 84 INJECTION INTRAVENOUS at 16:54

## 2023-12-15 ASSESSMENT — ACTIVITIES OF DAILY LIVING (ADL)
ADLS_ACUITY_SCORE: 22

## 2023-12-15 NOTE — PLAN OF CARE
[id number]    A&Ox4, Hypertensive within parameters, OVSS on room air. C3D2 CIVI Ifos/Mesna & IV fluids infusing through DL PICC with brisk blood return. Denies nausea, SOB. Received 5mg oxycodone x1 for RLE pain before bed with relief, offered pain meds this morning and patient states she would like to wait. Ifosfamide assessment intact. Voiding spontaneously and writing on amount on white board, BM x1 this shift. Continue w/ POC.

## 2023-12-15 NOTE — PROGRESS NOTES
[facility]    Medicine Progress Note - Hospitalist Service, GOLD TEAM 2    Date of Admission:  12/13/2023    Updates today:  - patient anticipates chemo will cause some confusion over weekend, continue to support as able  - repeat CBC in a.m.-- slight downward drift likely dilutional  - no e/o chemo toxicity at this time, continue to monitor   -Patient would like to hold off on ordering our compression stockings as family may use her own if they have brought in, we are happy to supply if she requests    Assessment & Plan   Mirta Cardenas is a pleasant 67 yo female with hx of HTN, psoriasis, HLD, GERD, remote hx of CLL (on surveillance), and RLE sarcoma diagnosed earlier this year admitted to [facility] as direct admit to proceed with chemotherapy as directed by Oncology team.      # RLE high grade, pleomorphic sarcoma  # Cancer related pain   Tolerating chemo well so far, continues w/ excellent pincer capabilties, no evidence of tremors or shortness.  Bowels and bladder have been regular.  - appreciate onc input   Current Treatment: Cycle 3 Doxil/Ifos  Day 1 - Doxil 40mg/m2  Days 1-5 CIVI Ifosfamide/Mesna 1600mg/m2  Day 6 Mesna 1600mg/m2 over 18hrs  Treatment Day: 2 of 6  - Chemo and other management per Oncology.   - Pain: Continue PTA scheduled Lyrica, and prn oxycodone and Tylenol.  (Stopped voltaren)     # HTN: PTA on losartan 100 mg qpm. Was on chlorthalidone in the past but discontinued during admission this past October due to severe electrolyte abnormalities. Pt states PTA BP measurements at home ~ 160s / 90s, and initial BP here 170 / low 100s. Pt denies HA, blurry vision, CP and other acute physical concerns at this time.  Bp improved 12/14.  - Continue PTA losartan  - continue amlodipine 5 mg daily and titrate up dose if indicated.     # Anemia of chronic disease:  Recent BL Hgb 7-9s, most recent above baseline at 10.0.  Slight downward drift on 12/15 may be  "delusional, versus prior hemoconcentration  - CTM via daily CBC    # Hx of constipation: During hospitalization this past Oct., pt developed an anal fissure due to increased straining, that improved after start of Miralax. LBM recent as pt states she restarted Miralax prior to this hospitalization. + BM on 12/14 ans 12/15  - Continue Miralax 17 g daily  - Add prn Senokot-S     # Psoriasis: Continue PTA topicals (pt would prefer to use her home supply if possible)     # GERD: No current concerns - Continue PTA daily PPI     # HLD: Continue PTA statin     # Hx of reactive airway disease: Pt denies hx of asthma and has not used any inhalers for over a year. Denies dyspnea and no e/o bronchospasm on exam.- monitor          Diet: Combination Diet Regular Diet Adult    DVT Prophylaxis: Enoxaparin (Lovenox) SQ  Branham Catheter: Not present  Lines: PRESENT      PICC 12/13/23 Double Lumen Left Brachial vein medial Chemotherapy. PICC was ready to use.-Site Assessment: WDL      Cardiac Monitoring: None  Code Status: Full Code      Clinically Significant Risk Factors                # Thrombocytopenia: Lowest platelets = 129 in last 2 days, will monitor for bleeding   # Hypertension: Noted on problem list        # Obesity: Estimated body mass index is 32.5 kg/m  as calculated from the following:    Height as of this encounter: 1.549 m (5' 1\").    Weight as of this encounter: 78 kg (172 lb)., PRESENT ON ADMISSION     # Asthma: noted on problem list        Disposition Plan     Expected Discharge Date: 12/19/2023      Destination: home with family  Discharge Comments: Chemo admit          The patient's care was discussed with the Attending Physician, Dr. Vealzquez, Bedside Nurse, and Patient.    Kanika Barraza PA-C  Hospitalist Service, GOLD TEAM [address] [place] [address]  Securely message with [employer] (more info)  Text page via [employer] Paging/Directory   See signed in provider for up to date coverage " "information  ______________________________________________________________________    Interval History   Mirta is generally doing well today and is not noticing any side effects of chemotherapy and specifically is not noticing any new tremors, difficulty using her hands, urinary or bowel concerns and is not experiencing any nausea, vomiting or p.o. intolerance.    She reports that last time she had chemo approximately 2 days after starting she was experiencing some forgetfulness and mild mental status changes that felt like she was \"drunk\".     She is not noticing any new signs for infection including any fevers, chills, cough, sore throat, rashes, urinary complaints, changes to bowels.      Physical Exam   Vital Signs: Temp: 97.8  F (36.6  C) Temp src: Oral BP: (!) 148/81 Pulse: 72   Resp: 16 SpO2: 100 % O2 Device: None (Room air)    Weight: 172 lbs 0 oz  GENERAL: Alert and oriented x 3. NAD. Ambulatory from chair to bed without assistance. Cooperative.   HEENT: Anicteric sclera. Mucous membranes moist. NC. AT.  Pupils equal and round  CV: RRR. S1, S2. No murmurs appreciated.   RESPIRATORY: Effort normal on RA. Lungs CTAB with no wheezing, rales, rhonchi.   GI: Abdomen soft, NT, NABS  NEUROLOGICAL: No focal deficits. Moves all extremities.  Pincer  intact.  No resting or intention tremor.  No clonus.  EXTREMITIES: R>L trace LE edema. Intact bilateral pedal pulses.   SKIN: No jaundice. No rashes on exposed skin.  BACK: no lesions      Medical Decision Making       45 MINUTES SPENT BY ME on the date of service doing chart review, history, exam, documentation & further activities per the note.      Data     I have personally reviewed the following data over the past 24 hrs:    7.9  \   9.3 (L)   / 129 (L)     142 105 15.0 /  98   4.3 29 0.82 \     ALT: 13 AST: 14 AP: 96 TBILI: 0.2   ALB: 4.0 TOT PROTEIN: 6.0 (L) LIPASE: N/A       "

## 2023-12-15 NOTE — PROGRESS NOTES
Ifosfamide Toxicity Assessment      Patient is Alert & Oriented x4. There are No signs of lethargy, confusion or hallucinations.     Patient is having new incontinence of bowel or bladder No.       Pincer Grasp intact Yes    Tremors No    Provider was notified No of changes. No change or interventions at this time.    -- Monitor for s/sx of ifosfamide toxicity: hallucinations, AMS, emotional lability, somnolence, myoclonic jerks, tremors, coordination difficulties, etc. If these occur, please call the fellow on call for recommendations

## 2023-12-15 NOTE — PROGRESS NOTES
Ifosfamide Toxicity Assessment      Patient is Alert & Oriented x4   There are signs of lethargy, confusion or hallucinations NO    Patient is having new incontinence of bowel or bladder No.       Pincer Grasp intact No    Tremors present No     Provider was notified No of changes 0   . Interventions include 0  .     Will continue to monitor for s/sx of ifosfamide toxicity: hallucinations, AMS, emotional lability, somnolence, myoclonic jerks, tremors, coordination difficulties. If these occur, please call the APPs/Attending on days and fellow on-call for evening and nights for recommendations and further instruction.

## 2023-12-15 NOTE — PLAN OF CARE
Goal Outcome Evaluation:      Plan of Care Reviewed With: patient, spouse    Overall Patient Progress: no changeOverall Patient Progress: no change  Pt afebrile with stable vs, except a little hypertensive. Continues to receive Ifosfamide/Mesna CIVI without difficulty, so far. No change to ICANS assessment. Receiving scheduled lyrica for leg pain with adequate relief this shift. No electrolyte replacement required today. Wt increased 5lb since admission, currently asymptomatic. LBM today

## 2023-12-15 NOTE — PLAN OF CARE
"Goal Outcome Evaluation:      Plan of Care Reviewed With: patient    Overall Patient Progress: no changeOverall Patient Progress: no change     1500 - 1930:   /71 (BP Location: Right arm)   Pulse 80   Temp 97.8  F (36.6  C) (Oral)   Resp 18   Ht 1.549 m (5' 1\")   Wt 78 kg (172 lb)   SpO2 99%   BMI 32.50 kg/m      D#2 ifosfamide/Mesna infusing via PICC.  D5W + KCL+ sodium bicarb infusing at 125 ml/hr.   A&O x 4, ifos assessment intact, AVSS.  R lower extremity pain managed on scheduled lyrica TID.   PRN oxycodone available if needed.  UAL, voiding spontaneously, had a bm this evening after 2 senokot.  Continue with poc...      "

## 2023-12-16 LAB
ALBUMIN SERPL BCG-MCNC: 3.5 G/DL (ref 3.5–5.2)
ALP SERPL-CCNC: 91 U/L (ref 40–150)
ALT SERPL W P-5'-P-CCNC: 13 U/L (ref 0–50)
ANION GAP SERPL CALCULATED.3IONS-SCNC: 7 MMOL/L (ref 7–15)
AST SERPL W P-5'-P-CCNC: 13 U/L (ref 0–45)
BASOPHILS # BLD AUTO: 0 10E3/UL (ref 0–0.2)
BASOPHILS NFR BLD AUTO: 1 %
BILIRUB SERPL-MCNC: 0.2 MG/DL
BUN SERPL-MCNC: 15.3 MG/DL (ref 8–23)
CALCIUM SERPL-MCNC: 8.8 MG/DL (ref 8.8–10.2)
CHLORIDE SERPL-SCNC: 103 MMOL/L (ref 98–107)
CREAT SERPL-MCNC: 0.85 MG/DL (ref 0.51–0.95)
DEPRECATED HCO3 PLAS-SCNC: 31 MMOL/L (ref 22–29)
EGFRCR SERPLBLD CKD-EPI 2021: 75 ML/MIN/1.73M2
EOSINOPHIL # BLD AUTO: 0.1 10E3/UL (ref 0–0.7)
EOSINOPHIL NFR BLD AUTO: 3 %
ERYTHROCYTE [DISTWIDTH] IN BLOOD BY AUTOMATED COUNT: 19.2 % (ref 10–15)
GLUCOSE SERPL-MCNC: 94 MG/DL (ref 70–99)
HCT VFR BLD AUTO: 27.5 % (ref 35–47)
HGB BLD-MCNC: 9.2 G/DL (ref 11.7–15.7)
IMM GRANULOCYTES # BLD: 0 10E3/UL
IMM GRANULOCYTES NFR BLD: 1 %
LYMPHOCYTES # BLD AUTO: 1.2 10E3/UL (ref 0.8–5.3)
LYMPHOCYTES NFR BLD AUTO: 28 %
MAGNESIUM SERPL-MCNC: 1.9 MG/DL (ref 1.7–2.3)
MCH RBC QN AUTO: 28.7 PG (ref 26.5–33)
MCHC RBC AUTO-ENTMCNC: 33.5 G/DL (ref 31.5–36.5)
MCV RBC AUTO: 86 FL (ref 78–100)
MONOCYTES # BLD AUTO: 0.5 10E3/UL (ref 0–1.3)
MONOCYTES NFR BLD AUTO: 11 %
NEUTROPHILS # BLD AUTO: 2.5 10E3/UL (ref 1.6–8.3)
NEUTROPHILS NFR BLD AUTO: 56 %
NRBC # BLD AUTO: 0 10E3/UL
NRBC BLD AUTO-RTO: 0 /100
PHOSPHATE SERPL-MCNC: 4 MG/DL (ref 2.5–4.5)
PLATELET # BLD AUTO: 118 10E3/UL (ref 150–450)
POTASSIUM SERPL-SCNC: 4.4 MMOL/L (ref 3.4–5.3)
PROT SERPL-MCNC: 5.7 G/DL (ref 6.4–8.3)
RBC # BLD AUTO: 3.21 10E6/UL (ref 3.8–5.2)
SODIUM SERPL-SCNC: 141 MMOL/L (ref 135–145)
WBC # BLD AUTO: 4.3 10E3/UL (ref 4–11)

## 2023-12-16 PROCEDURE — 250N000009 HC RX 250: Performed by: STUDENT IN AN ORGANIZED HEALTH CARE EDUCATION/TRAINING PROGRAM

## 2023-12-16 PROCEDURE — 84100 ASSAY OF PHOSPHORUS: CPT | Performed by: STUDENT IN AN ORGANIZED HEALTH CARE EDUCATION/TRAINING PROGRAM

## 2023-12-16 PROCEDURE — 99232 SBSQ HOSP IP/OBS MODERATE 35: CPT | Mod: FS | Performed by: PHYSICIAN ASSISTANT

## 2023-12-16 PROCEDURE — 83735 ASSAY OF MAGNESIUM: CPT | Performed by: STUDENT IN AN ORGANIZED HEALTH CARE EDUCATION/TRAINING PROGRAM

## 2023-12-16 PROCEDURE — 85025 COMPLETE CBC W/AUTO DIFF WBC: CPT | Performed by: STUDENT IN AN ORGANIZED HEALTH CARE EDUCATION/TRAINING PROGRAM

## 2023-12-16 PROCEDURE — 258N000003 HC RX IP 258 OP 636: Performed by: STUDENT IN AN ORGANIZED HEALTH CARE EDUCATION/TRAINING PROGRAM

## 2023-12-16 PROCEDURE — 120N000002 HC R&B MED SURG/OB UMMC

## 2023-12-16 PROCEDURE — 250N000011 HC RX IP 250 OP 636: Mod: JZ | Performed by: PHYSICIAN ASSISTANT

## 2023-12-16 PROCEDURE — 250N000011 HC RX IP 250 OP 636: Mod: JZ | Performed by: STUDENT IN AN ORGANIZED HEALTH CARE EDUCATION/TRAINING PROGRAM

## 2023-12-16 PROCEDURE — 99233 SBSQ HOSP IP/OBS HIGH 50: CPT | Performed by: STUDENT IN AN ORGANIZED HEALTH CARE EDUCATION/TRAINING PROGRAM

## 2023-12-16 PROCEDURE — 250N000013 HC RX MED GY IP 250 OP 250 PS 637: Performed by: PHYSICIAN ASSISTANT

## 2023-12-16 PROCEDURE — 250N000013 HC RX MED GY IP 250 OP 250 PS 637: Performed by: STUDENT IN AN ORGANIZED HEALTH CARE EDUCATION/TRAINING PROGRAM

## 2023-12-16 PROCEDURE — 99207 PR APP CREDIT; MD BILLING SHARED VISIT: CPT | Mod: FS | Performed by: STUDENT IN AN ORGANIZED HEALTH CARE EDUCATION/TRAINING PROGRAM

## 2023-12-16 PROCEDURE — 80053 COMPREHEN METABOLIC PANEL: CPT | Performed by: STUDENT IN AN ORGANIZED HEALTH CARE EDUCATION/TRAINING PROGRAM

## 2023-12-16 RX ADMIN — THIAMINE HCL TAB 100 MG 100 MG: 100 TAB at 19:44

## 2023-12-16 RX ADMIN — ENOXAPARIN SODIUM 40 MG: 40 INJECTION SUBCUTANEOUS at 19:44

## 2023-12-16 RX ADMIN — PREGABALIN 50 MG: 50 CAPSULE ORAL at 09:04

## 2023-12-16 RX ADMIN — ONDANSETRON HYDROCHLORIDE 8 MG: 8 TABLET, FILM COATED ORAL at 21:53

## 2023-12-16 RX ADMIN — POLYETHYLENE GLYCOL 3350 17 G: 17 POWDER, FOR SOLUTION ORAL at 09:04

## 2023-12-16 RX ADMIN — LOSARTAN POTASSIUM 100 MG: 100 TABLET, FILM COATED ORAL at 19:44

## 2023-12-16 RX ADMIN — ONDANSETRON HYDROCHLORIDE 8 MG: 8 TABLET, FILM COATED ORAL at 13:41

## 2023-12-16 RX ADMIN — THIAMINE HCL TAB 100 MG 100 MG: 100 TAB at 09:04

## 2023-12-16 RX ADMIN — PANTOPRAZOLE SODIUM 40 MG: 40 TABLET, DELAYED RELEASE ORAL at 06:32

## 2023-12-16 RX ADMIN — SODIUM BICARBONATE: 84 INJECTION INTRAVENOUS at 09:16

## 2023-12-16 RX ADMIN — AMLODIPINE BESYLATE 5 MG: 5 TABLET ORAL at 09:04

## 2023-12-16 RX ADMIN — ONDANSETRON HYDROCHLORIDE 8 MG: 8 TABLET, FILM COATED ORAL at 06:32

## 2023-12-16 RX ADMIN — THIAMINE HCL TAB 100 MG 100 MG: 100 TAB at 13:41

## 2023-12-16 RX ADMIN — PREGABALIN 50 MG: 50 CAPSULE ORAL at 13:41

## 2023-12-16 RX ADMIN — PREGABALIN 50 MG: 50 CAPSULE ORAL at 19:44

## 2023-12-16 RX ADMIN — SODIUM BICARBONATE: 84 INJECTION INTRAVENOUS at 18:14

## 2023-12-16 RX ADMIN — PRAVASTATIN SODIUM 40 MG: 40 TABLET ORAL at 19:44

## 2023-12-16 RX ADMIN — OXYCODONE HYDROCHLORIDE 5 MG: 5 TABLET ORAL at 21:53

## 2023-12-16 RX ADMIN — MESNA 2910 MG: 100 INJECTION, SOLUTION INTRAVENOUS at 18:05

## 2023-12-16 RX ADMIN — SODIUM BICARBONATE: 84 INJECTION INTRAVENOUS at 00:49

## 2023-12-16 ASSESSMENT — ACTIVITIES OF DAILY LIVING (ADL)
ADLS_ACUITY_SCORE: 22

## 2023-12-16 NOTE — PROGRESS NOTES
[facility]    Medicine Progress Note - Hospitalist Service, GOLD TEAM 2    Date of Admission:  12/13/2023    Updates today:  -Continues to do well without any evidence of chemotherapy toxicity  -Appropriate to continue current dose of amlodipine if BP uptrending can consider uptitrating    Assessment & Plan   Mirta Cardenas is a pleasant 69 yo female with hx of HTN, psoriasis, HLD, GERD, remote hx of CLL (on surveillance), and RLE sarcoma diagnosed earlier this year admitted to [facility] as direct admit to proceed with chemotherapy as directed by Oncology team.      # RLE high grade, pleomorphic sarcoma  # Cancer related pain   Tolerating chemo well so far, continues w/ excellent pincer capabilties, no evidence of tremors or shortness.  Bowels and bladder have been regular.  - appreciate onc input   Current Treatment: Cycle 3 Doxil/Ifos  Day 1 - Doxil 40mg/m2  Days 1-5 CIVI Ifosfamide/Mesna 1600mg/m2  Day 6 Mesna 1600mg/m2 over 18hrs  Treatment Day: 2 of 6  - Chemo and other management per Oncology.   - Pain: Continue PTA scheduled Lyrica, and prn oxycodone and Tylenol.  (Stopped voltaren)     # HTN: PTA on losartan 100 mg qpm. Was on chlorthalidone in the past but discontinued during admission this past October due to severe electrolyte abnormalities. Pt states PTA BP measurements at home ~ 160s / 90s, and initial BP here 170 / low 100s. Pt denies HA, blurry vision, CP and other acute physical concerns at this time.  Bp improved 12/14-12/16  - Continue PTA losartan  - continue amlodipine 5 mg daily and titrate up dose if indicated.     # Anemia of chronic disease:  Recent BL Hgb 7-9s, most recent above baseline at 10.0.  Slight downward drift on 12/15 may be delusional, versus prior hemoconcentration  - CTM via daily CBC    # Hx of constipation: During hospitalization this past Oct., pt developed an anal fissure due to increased straining, that improved after start of  "Miralax. LBM recent as pt states she restarted Miralax prior to this hospitalization. + BM on 12/14 ans 12/15  - Continue Miralax 17 g daily  - Add prn Senokot-S     # Psoriasis: No evidence of active lesions-continue PTA topicals (pt would prefer to use her home supply if possible)     # GERD: No current concerns - Continue PTA daily PPI     # HLD: Continue PTA statin     # Hx of reactive airway disease: Pt denies hx of asthma and has not used any inhalers for over a year. Denies dyspnea and no e/o bronchospasm on exam.- monitor          Diet: Combination Diet Regular Diet Adult    DVT Prophylaxis: Enoxaparin (Lovenox) SQ  Branham Catheter: Not present  Lines: PRESENT      PICC 12/13/23 Double Lumen Left Brachial vein medial Chemotherapy. PICC was ready to use.-Site Assessment: WDL      Cardiac Monitoring: None  Code Status: Full Code      Clinically Significant Risk Factors                # Thrombocytopenia: Lowest platelets = 118 in last 2 days, will monitor for bleeding   # Hypertension: Noted on problem list        # Obesity: Estimated body mass index is 33.31 kg/m  as calculated from the following:    Height as of this encounter: 1.549 m (5' 1\").    Weight as of this encounter: 80 kg (176 lb 4.8 oz)., PRESENT ON ADMISSION     # Asthma: noted on problem list        Disposition Plan     Expected Discharge Date: 12/19/2023      Destination: home with family  Discharge Comments: Chemo admit - Last day of chemo is 12/19          The patient's care was discussed with the Attending Physician, Dr. Velazquez, Bedside Nurse, Patient, Patient's Family, and communicated via this note to the oncology Team.    Kanika Barraza PA-C  Hospitalist Service, GOLD TEAM 2  [facility]  Securely message with [employer] (more info)  Text page via [facility] Paging/Directory   See signed in provider for up to date coverage " information  ______________________________________________________________________    Interval History   Mirta continues to do well and was initially seen ambulating in the hallways with her  and was then seen in her room.  She continues to feel well.  She notes having an episode of feeling less energetic yesterday that seems to have improved.  She continues to tolerate p.o. without any nausea or vomiting.  She is not having any chest symptoms.  She is not having any infectious signs.  She continues to be without any tremor or difficulties with pincer grasp and is overall feeling stable.    Physical Exam   Vital Signs: Temp: 98.3  F (36.8  C) Temp src: Oral BP: (!) 153/87 Pulse: 75   Resp: 16 SpO2: 98 % O2 Device: None (Room air)    Weight: 176 lbs 4.8 oz  GENERAL: Alert and oriented x 3. NAD. Ambulatory in perez without assistance. Cooperative.   HEENT: Anicteric sclera. Mucous membranes moist. NC. AT.  Pupils equal and round  CV: RRR. S1, S2. No murmurs appreciated.   RESPIRATORY: Effort normal on RA. Lungs CTAB with no wheezing, rales, rhonchi.   GI: Abdomen soft, NT, NABS  NEUROLOGICAL: No focal deficits. Moves all extremities.  Pincer  intact.  No resting or intention tremor.  No clonus.  EXTREMITIES: bilateral trace LE edema. Intact bilateral pedal pulses.   SKIN: No jaundice. No rashes on exposed skin.    Medical Decision Making       40 MINUTES SPENT BY ME on the date of service doing chart review, history, exam, documentation & further activities per the note.      Data     I have personally reviewed the following data over the past 24 hrs:    4.3  \   9.2 (L)   / 118 (L)     141 103 15.3 /  94   4.4 31 (H) 0.85 \     ALT: 13 AST: 13 AP: 91 TBILI: 0.2   ALB: 3.5 TOT PROTEIN: 5.7 (L) LIPASE: N/A

## 2023-12-16 NOTE — PLAN OF CARE
[id number]    Day 3 Ifos/Mesna. Hypertensive, within parameters. OVSS, afebrile and on RA. A&Ox4, no signs of Ifos toxicity. Denies SOB/nausea. Mild pain reported in R yañez, gave 5 mg Oxy at bedtime. Dose #3 of Ifosfamide hung with brisk blood return via PICC. See chemo note. Feeling more fatigued this evening, did ambulate in hallway. Good appetite. LBM 12/15, voiding with adequate UOP. Up independently in room. Continue to monitor & w/ POC.     Goal Outcome Evaluation:      Plan of Care Reviewed With: patient    Overall Patient Progress: no changeOverall Patient Progress: no change    Outcome Evaluation: Day 3 Ifos/Mesna

## 2023-12-16 NOTE — PLAN OF CARE
Goal Outcome Evaluation:    00:00-07:00 am  AF OVSS on room air.  A&Ox4  Currently day #3 CIVI Ifosfamide/Mesna continue infusing.  Besides feeling more tired, pt tolerating chemo well thus far.  No s/sx of neuro toxicity.  Denied nausea, SOB, and chest pain.  Pain in right shin and bottom of right foot well manageable with prn Oxycodone 5 mg given at HS and declined need for pain med overnight.  Voiding spontaneously well, LBM 12/15.  No new acute event overnight. Continue w/POC

## 2023-12-16 NOTE — PROGRESS NOTES
02:00 am                              Ifosfamide Toxicity Assessment       Patient is Alert & Oriented x4. There are signs of lethargy, confusion or hallucinations No.      Patient is having new incontinence of bowel or bladder No.                   Pincer Grasp intact Yes     Tremors present No      Provider was notified No of changes, not changes noted. No interventions needed at this time.      Will continue to monitor for s/sx of ifosfamide toxicity: hallucinations, AMS, emotional lability, somnolence, myoclonic jerks, tremors, coordination difficulties. If these occur, please call the APPs/Attending on days and fellow on-call for evening and nights for recommendations and further instruction.

## 2023-12-16 NOTE — PROGRESS NOTES
[facility] - Medical Oncology Follow-Up Consult Note  Oncology Team 3  12/15/2023    Patient Identifiers     Name: Mirta Cardenas  Preferred Address: [place]  Preferred Language: English  : 1955  Gender: female    Assessment and Recommendations     Ms. 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 Medical oncology consulted for chemotherapy management guidance.     Current Treatment: Cycle 3 Doxil/Ifos  Day 1 - Doxil 40mg/m2  Days 1-5 CIVI Ifosfamide/Mesna 1600mg/m2  Day 6 Mesna 1600mg/m2 over 18hrs  Treatment Day: 3 of 6    Plan:  Ms. Cardenas in the beginning cycle 3-day 3 of Doxil/Ifos/Mesna.  She continues to be at her clinical baseline, with no significant symptoms of treatment.  In particular, no nausea, diarrhea, constipation, confusion, ataxia.  Plan to continue ifosfamide/mesna infusions through day 5, with subsequent truncated as infusion 26 to assist with discharge.    Appreciate medicine and 50s in her care.  Replete electrolytes per protocol.  Oncology remains available for questions.    Thank you for this interesting consult. Oncology Consult Service to follow. Feel free to reach out with further questions.     60 minutes spent on the date of the encounter doing chart review, review of test results, interpretation of tests, patient visit, and documentation     Shun Osei MD, PhD   of Medicine  Division of Hematology, Oncology and Transplantation  [facility]    -----------------------------------    Oncology Summary and HPI     Cancer Staging   No matching staging information was found for the patient.      Oncology History   Sarcoma of right lower extremity (H)   10/16/2023 Initial Diagnosis    Sarcoma of right lower extremity (H)     10/17/2023 -  Chemotherapy    IP ONC Sarcoma - Liposomal DOXOrubicin (DOXIL) / Ifosfamide / Mesna  Plan  "Provider: Roney Nam MD  Treatment goal: Curative  Line of treatment: First Line         Subjective/Interval Events     - no adverse events from treatment  - no nausea, vomiting, diarrhea, constipation, abdominal pain, or bloating  - no confusion, ataxia, trips/falls    Physical Exam     Vital signs: BP (!) 144/85 (BP Location: Right arm)   Pulse 74   Temp 98  F (36.7  C) (Oral)   Resp 16   Ht 1.549 m (5' 1\")   Wt 80 kg (176 lb 4.8 oz)   SpO2 98%   BMI 33.31 kg/m      ECOG performance status:  1  Vascular access:  PICC    Physical Exam:  Exam performed, notable for:  - no notables    Objective Data     Lab data:  I have personally reviewed the lab data, notable for:    - Tprot 6.0  - Hgb 9.3  - Plt 129    Radiology data:  I have personally reviewed the radiology data, notable for:  - no new data    Pathology and other data:  I have personally reviewed and interpreted the pathology data, notable for:    - no new data    "

## 2023-12-16 NOTE — PROGRESS NOTES
[facility] - Medical Oncology Follow-Up Consult Note  Oncology Team 3  2023    Patient Identifiers     Name: Mirta Cardenas  Preferred Address: [place]  Preferred Language: English  : 1955  Gender: female    Assessment and Recommendations     Ms. 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 Medical oncology consulted for chemotherapy management guidance.      Current Treatment: Cycle 3 Doxil/Ifos  Day 1 - Doxil 40mg/m2  Days 1-5 CIVI Ifosfamide/Mesna 1600mg/m2  Day 6 Mesna 1600mg/m2 over 18hrs  Treatment Day:     Plan:  Ms. Cardenas remains admitted for cycle 3-day 4 of Doxil/Ifos/Mesna. She continues to tolerate treatment extremely well, as prior.  She has not experienced any nausea, diarrhea, constipation, confusion, or ataxia.  She continues to struggle with exchange of leather jacket [place] gift for her .  Based on her labs and clinical status, plan to continue treatment at current dosing and schedule.  Anticipate discharge on Tuesday following completion of treatment.     Appreciate medicine management.  Continue electrolyte repletion as per protocol.  Oncology available for questions.    Thank you for this interesting consult. Oncology Consult Service to follow. Feel free to reach out with further questions.     60 minutes spent on the date of the encounter doing chart review, review of test results, interpretation of tests, patient visit, and documentation     Shun Osei MD, PhD   of Medicine  Division of Hematology, Oncology and Transplantation  [facility]    -----------------------------------    Oncology Summary and HPI     Cancer Staging   No matching staging information was found for the patient.      Oncology History   Sarcoma of right lower extremity (H)   10/16/2023 Initial Diagnosis    Sarcoma of right lower  "extremity (H)     10/17/2023 -  Chemotherapy    IP ONC Sarcoma - Liposomal DOXOrubicin (DOXIL) / Ifosfamide / Mesna  Plan Provider: Roney Nam MD  Treatment goal: Curative  Line of treatment: First Line         Subjective/Interval Events     - she continues to tolerate treatment well without symptoms  - no nausea, confusion, fatigue, or ataxia  - feels more 'awake' today during interview as she just came out of the shower    Physical Exam     Vital signs: BP (!) 144/85 (BP Location: Right arm)   Pulse 74   Temp 98  F (36.7  C) (Oral)   Resp 16   Ht 1.549 m (5' 1\")   Wt 80 kg (176 lb 4.8 oz)   SpO2 98%   BMI 33.31 kg/m      ECOG performance status:  0  Vascular access:  PICC    Physical Exam:  Exam performed, notable for:  - good neurologic reflexes; no notables on exam    Objective Data     Lab data:  I have personally reviewed the lab data, notable for:    - Hgb 9.2  - Plt 118    Radiology data:  I have personally reviewed the radiology data, notable for:  - no new data    Pathology and other data:  I have personally reviewed and interpreted the pathology data, notable for:    - no new data    "

## 2023-12-16 NOTE — PROGRESS NOTES
Nursing Focus: Chemotherapy  D: Positive blood return via PICC. Insertion site is clean/dry/intact, dressing intact with no complaints of pain.  Urine output is recorded in intake in Doc Flowsheet.    I: Premedications given per order (see electronic medical administration record). Dose #3 of Ifos/Mesna started to infuse over 24 hours. Reviewed pt teaching on chemotherapy side effects.  Pt denies need for further teaching. Chemotherapy double checked per protocol by two chemotherapy competent RN's.   A: Tolerating procedure well. Denies nausea and or pain.   P: Continue to monitor urine output and symptoms of nausea. Screen for symptoms of toxicity.                                                  Ifosfamide Toxicity Assessment      Patient is Alert & Oriented x4. There are signs of lethargy, confusion or hallucinations No.     Patient is having new incontinence of bowel or bladder No.       Pincer Grasp intact Yes    Tremors present No     Provider was notified No of changes, not changes noted. No interventions needed at this time.     Will continue to monitor for s/sx of ifosfamide toxicity: hallucinations, AMS, emotional lability, somnolence, myoclonic jerks, tremors, coordination difficulties. If these occur, please call the APPs/Attending on days and fellow on-call for evening and nights for recommendations and further instruction.

## 2023-12-17 LAB
ALBUMIN SERPL BCG-MCNC: 3.9 G/DL (ref 3.5–5.2)
ALP SERPL-CCNC: 102 U/L (ref 40–150)
ALT SERPL W P-5'-P-CCNC: 13 U/L (ref 0–50)
ANION GAP SERPL CALCULATED.3IONS-SCNC: 13 MMOL/L (ref 7–15)
AST SERPL W P-5'-P-CCNC: 16 U/L (ref 0–45)
BASOPHILS # BLD AUTO: 0 10E3/UL (ref 0–0.2)
BASOPHILS NFR BLD AUTO: 1 %
BILIRUB SERPL-MCNC: 0.2 MG/DL
BUN SERPL-MCNC: 13.3 MG/DL (ref 8–23)
CALCIUM SERPL-MCNC: 9.1 MG/DL (ref 8.8–10.2)
CHLORIDE SERPL-SCNC: 100 MMOL/L (ref 98–107)
CREAT SERPL-MCNC: 0.85 MG/DL (ref 0.51–0.95)
DEPRECATED HCO3 PLAS-SCNC: 26 MMOL/L (ref 22–29)
EGFRCR SERPLBLD CKD-EPI 2021: 75 ML/MIN/1.73M2
EOSINOPHIL # BLD AUTO: 0.2 10E3/UL (ref 0–0.7)
EOSINOPHIL NFR BLD AUTO: 4 %
ERYTHROCYTE [DISTWIDTH] IN BLOOD BY AUTOMATED COUNT: 18.5 % (ref 10–15)
GLUCOSE SERPL-MCNC: 100 MG/DL (ref 70–99)
HCT VFR BLD AUTO: 30.5 % (ref 35–47)
HGB BLD-MCNC: 10.1 G/DL (ref 11.7–15.7)
IMM GRANULOCYTES # BLD: 0 10E3/UL
IMM GRANULOCYTES NFR BLD: 0 %
LYMPHOCYTES # BLD AUTO: 0.9 10E3/UL (ref 0.8–5.3)
LYMPHOCYTES NFR BLD AUTO: 20 %
MAGNESIUM SERPL-MCNC: 1.9 MG/DL (ref 1.7–2.3)
MCH RBC QN AUTO: 28.4 PG (ref 26.5–33)
MCHC RBC AUTO-ENTMCNC: 33.1 G/DL (ref 31.5–36.5)
MCV RBC AUTO: 86 FL (ref 78–100)
MONOCYTES # BLD AUTO: 0.5 10E3/UL (ref 0–1.3)
MONOCYTES NFR BLD AUTO: 11 %
NEUTROPHILS # BLD AUTO: 2.9 10E3/UL (ref 1.6–8.3)
NEUTROPHILS NFR BLD AUTO: 64 %
NRBC # BLD AUTO: 0 10E3/UL
NRBC BLD AUTO-RTO: 0 /100
PHOSPHATE SERPL-MCNC: 3.6 MG/DL (ref 2.5–4.5)
PLATELET # BLD AUTO: 138 10E3/UL (ref 150–450)
POTASSIUM SERPL-SCNC: 4.2 MMOL/L (ref 3.4–5.3)
PROT SERPL-MCNC: 6.2 G/DL (ref 6.4–8.3)
RBC # BLD AUTO: 3.56 10E6/UL (ref 3.8–5.2)
SODIUM SERPL-SCNC: 139 MMOL/L (ref 135–145)
WBC # BLD AUTO: 4.5 10E3/UL (ref 4–11)

## 2023-12-17 PROCEDURE — 250N000011 HC RX IP 250 OP 636: Mod: JZ | Performed by: STUDENT IN AN ORGANIZED HEALTH CARE EDUCATION/TRAINING PROGRAM

## 2023-12-17 PROCEDURE — 80053 COMPREHEN METABOLIC PANEL: CPT | Performed by: STUDENT IN AN ORGANIZED HEALTH CARE EDUCATION/TRAINING PROGRAM

## 2023-12-17 PROCEDURE — 250N000011 HC RX IP 250 OP 636: Mod: JZ | Performed by: PHYSICIAN ASSISTANT

## 2023-12-17 PROCEDURE — 258N000003 HC RX IP 258 OP 636: Performed by: STUDENT IN AN ORGANIZED HEALTH CARE EDUCATION/TRAINING PROGRAM

## 2023-12-17 PROCEDURE — 85025 COMPLETE CBC W/AUTO DIFF WBC: CPT | Performed by: STUDENT IN AN ORGANIZED HEALTH CARE EDUCATION/TRAINING PROGRAM

## 2023-12-17 PROCEDURE — 250N000013 HC RX MED GY IP 250 OP 250 PS 637: Performed by: PHYSICIAN ASSISTANT

## 2023-12-17 PROCEDURE — 84100 ASSAY OF PHOSPHORUS: CPT | Performed by: STUDENT IN AN ORGANIZED HEALTH CARE EDUCATION/TRAINING PROGRAM

## 2023-12-17 PROCEDURE — 250N000009 HC RX 250: Performed by: STUDENT IN AN ORGANIZED HEALTH CARE EDUCATION/TRAINING PROGRAM

## 2023-12-17 PROCEDURE — 83735 ASSAY OF MAGNESIUM: CPT | Performed by: STUDENT IN AN ORGANIZED HEALTH CARE EDUCATION/TRAINING PROGRAM

## 2023-12-17 PROCEDURE — 120N000002 HC R&B MED SURG/OB UMMC

## 2023-12-17 PROCEDURE — 250N000013 HC RX MED GY IP 250 OP 250 PS 637: Performed by: STUDENT IN AN ORGANIZED HEALTH CARE EDUCATION/TRAINING PROGRAM

## 2023-12-17 PROCEDURE — 99233 SBSQ HOSP IP/OBS HIGH 50: CPT | Performed by: STUDENT IN AN ORGANIZED HEALTH CARE EDUCATION/TRAINING PROGRAM

## 2023-12-17 RX ORDER — AMLODIPINE BESYLATE 2.5 MG/1
2.5 TABLET ORAL ONCE
Qty: 1 TABLET | Refills: 0 | Status: COMPLETED | OUTPATIENT
Start: 2023-12-17 | End: 2023-12-17

## 2023-12-17 RX ADMIN — THIAMINE HCL TAB 100 MG 100 MG: 100 TAB at 19:41

## 2023-12-17 RX ADMIN — POLYETHYLENE GLYCOL 3350 17 G: 17 POWDER, FOR SOLUTION ORAL at 08:06

## 2023-12-17 RX ADMIN — ONDANSETRON HYDROCHLORIDE 8 MG: 8 TABLET, FILM COATED ORAL at 13:51

## 2023-12-17 RX ADMIN — SODIUM BICARBONATE: 84 INJECTION INTRAVENOUS at 09:46

## 2023-12-17 RX ADMIN — ENOXAPARIN SODIUM 40 MG: 40 INJECTION SUBCUTANEOUS at 19:42

## 2023-12-17 RX ADMIN — OXYCODONE HYDROCHLORIDE 5 MG: 5 TABLET ORAL at 21:43

## 2023-12-17 RX ADMIN — AMLODIPINE BESYLATE 2.5 MG: 2.5 TABLET ORAL at 11:48

## 2023-12-17 RX ADMIN — PREGABALIN 50 MG: 50 CAPSULE ORAL at 19:41

## 2023-12-17 RX ADMIN — PRAVASTATIN SODIUM 40 MG: 40 TABLET ORAL at 19:41

## 2023-12-17 RX ADMIN — AMLODIPINE BESYLATE 5 MG: 5 TABLET ORAL at 08:06

## 2023-12-17 RX ADMIN — THIAMINE HCL TAB 100 MG 100 MG: 100 TAB at 13:51

## 2023-12-17 RX ADMIN — ONDANSETRON HYDROCHLORIDE 8 MG: 8 TABLET, FILM COATED ORAL at 21:43

## 2023-12-17 RX ADMIN — THIAMINE HCL TAB 100 MG 100 MG: 100 TAB at 08:06

## 2023-12-17 RX ADMIN — PREGABALIN 50 MG: 50 CAPSULE ORAL at 13:51

## 2023-12-17 RX ADMIN — PANTOPRAZOLE SODIUM 40 MG: 40 TABLET, DELAYED RELEASE ORAL at 05:49

## 2023-12-17 RX ADMIN — LOSARTAN POTASSIUM 100 MG: 100 TABLET, FILM COATED ORAL at 19:41

## 2023-12-17 RX ADMIN — ONDANSETRON HYDROCHLORIDE 8 MG: 8 TABLET, FILM COATED ORAL at 05:49

## 2023-12-17 RX ADMIN — SODIUM BICARBONATE: 84 INJECTION INTRAVENOUS at 18:03

## 2023-12-17 RX ADMIN — MESNA 2910 MG: 100 INJECTION, SOLUTION INTRAVENOUS at 19:37

## 2023-12-17 RX ADMIN — PREGABALIN 50 MG: 50 CAPSULE ORAL at 08:06

## 2023-12-17 RX ADMIN — SODIUM BICARBONATE: 84 INJECTION INTRAVENOUS at 01:10

## 2023-12-17 ASSESSMENT — ACTIVITIES OF DAILY LIVING (ADL)
ADLS_ACUITY_SCORE: 22

## 2023-12-17 NOTE — PLAN OF CARE
Alert and oriented X 4. /89. Denies headache, visual changes, dizziness, chest pain or palpitations. Denies changes in movement, balance or mentation. Dose # 4 Ifosfamide/Mesna and sodium bicarbonate infusing. Tolerating infusions well. Voiding spontaneously. Adequate urine output. Ifosfamide toxicity education reinforced. Sleeping in between cares.                                                  Ifosfamide Toxicity Assessment      Patient is Alert & Oriented x 4. There are No signs of lethargy, confusion or hallucinations.     Patient is having new incontinence of bowel or bladder  No.       Pincer Grasp intact  Yes    Tremors  baseline     Provider was not notified    -- Monitor for s/sx of ifosfamide toxicity: hallucinations, AMS, emotional lability, somnolence, myoclonic jerks, tremors, coordination difficulties, etc. If these occur, please call the fellow on call for recommendations

## 2023-12-17 NOTE — PLAN OF CARE
[id number]    Day 4 Ifos/Mesna. Hypertensive, within parameters. OVSS, afebrile and on RA. A&Ox4, no signs of Ifos toxicity. Denies nausea/SOB. Intermittent soreness in R yañez, gave 5 mg oxy at bedtime. Ambulating frequently. Hung dose #4 of Ifos/Mesna with brisk blood return via PICC. See chemo note. Good appetite this evening, voiding with adequate UOP. LBM earlier to day. Continue to monitor & w/ POC.     Goal Outcome Evaluation:      Plan of Care Reviewed With: patient    Overall Patient Progress: no changeOverall Patient Progress: no change    Outcome Evaluation: Day 4 Ifos/Mesna

## 2023-12-17 NOTE — PROGRESS NOTES
[facility] - Medical Oncology Follow-Up Consult Note  2023    Patient Identifiers     Name: Mirta Cardenas  Preferred Address: [place]  Preferred Language: English  : 1955  Gender: female    Assessment and Recommendations     Ms. 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 Medical oncology consulted for chemotherapy management guidance.       Current Treatment: Cycle 3 Doxil/Ifos  Day 1 - Doxil 40mg/m2  Days 1-5 CIVI Ifosfamide/Mesna 1600mg/m2  Day 6 Mesna 1600mg/m2 over 18hrs  Treatment Day:     Plan:  Ms. Cardenas remains admitted for cycle 3-day 5 of Doxil/ifosfamide.  As prior, she continues to tolerate treatment extremely well.  No nausea, vomiting, abdominal pain, diarrhea, or  neurotoxicity.  Plan to continue treatment at current dosing and schedule.    Appreciate medicine expertise encompass care.  Continue electrolyte repletion per protocol.  Oncology failed for questions.    Thank you for this interesting consult. Oncology Consult Service to follow. Feel free to reach out with further questions.     60 minutes spent on the date of the encounter doing chart review, review of test results, interpretation of tests, patient visit, and documentation     Shun Osei MD, PhD   of Medicine  Division of Hematology, Oncology and Transplantation  [facility]    -----------------------------------    Oncology Summary and HPI     Cancer Staging   No matching staging information was found for the patient.      Oncology History   Sarcoma of right lower extremity (H)   10/16/2023 Initial Diagnosis    Sarcoma of right lower extremity (H)     10/17/2023 -  Chemotherapy    IP ONC Sarcoma - Liposomal DOXOrubicin (DOXIL) / Ifosfamide / Mesna  Plan Provider: Roney Nam MD  Treatment goal: Curative  Line of treatment: First Line    "      Subjective/Interval Events     - she was walking comfortably when approached for interview  - we reviewed her lab trend over the past few days and highlighted minimal changes and positive trend  - has not required phosphate supplementation since 12/14; no Mag supplementation required    Physical Exam     Vital signs: BP (!) 173/87 (BP Location: Right arm)   Pulse 84   Temp 97.6  F (36.4  C) (Oral)   Resp 16   Ht 1.549 m (5' 1\")   Wt 78.5 kg (173 lb 1.6 oz)   SpO2 98%   BMI 32.71 kg/m      ECOG performance status:  1  Vascular access:  PICC    Physical Exam:  Exam performed, notable for:  - no notables    Objective Data     Lab data:  I have personally reviewed the lab data, notable for:    - Tprot 6.2  - Hgb 10.1    Radiology data:  I have personally reviewed the radiology data, notable for:  - no new data    Pathology and other data:  I have personally reviewed and interpreted the pathology data, notable for:    - no new data    "

## 2023-12-17 NOTE — PLAN OF CARE
Goal Outcome Evaluation:      Plan of Care Reviewed With: patient, spouse    Overall Patient Progress: no changeOverall Patient Progress: no change  Pt afebrile with stable vs, except a little hypertensive. Amlodipine increased from 5mg to 7.5 today. Continues to receive day 4 Ifosfamide/Mesna CIVI without difficulty, so far. Pt reports that occasionally she can feel a slight twitch in her finger when she is texting on her phone. But unable to be observed (pt had this significantly last cycle. Much less this cycle). No change to ICANS assessment. Receiving scheduled lyrica for leg pain with adequate relief this shift. No electrolyte replacement required today. LBM today

## 2023-12-17 NOTE — PROGRESS NOTES
[facility]    Medicine Progress Note - Hospitalist Service, GOLD TEAM 2    Date of Admission:  12/13/2023    Updates today:  - increase amlodipine to 7.5mg /day and consider further increase if BP remains elevated  -Continues to be without significant evidence for chemo associated toxicity    Assessment & Plan   Mirta Cardenas is a pleasant 69 yo female with hx of HTN, psoriasis, HLD, GERD, remote hx of CLL (on surveillance), and RLE sarcoma diagnosed earlier this year admitted to [facility] as direct admit to proceed with chemotherapy as directed by Oncology team.      # RLE high grade, pleomorphic sarcoma  # Cancer related pain   Tolerating chemo well so far, continues w/ excellent pincer capabilties, no evidence of tremors or shortness.  Bowels and bladder have been regular.  - appreciate onc input   Current Treatment: Cycle 3 Doxil/Ifos  Day 1 - Doxil 40mg/m2  Days 1-5 CIVI Ifosfamide/Mesna 1600mg/m2  Day 6 Mesna 1600mg/m2 over 18hrs  Treatment Day: 2 of 6  - Chemo and other management per Oncology.   - Pain: Continue PTA scheduled Lyrica, and prn oxycodone and Tylenol.  (Stopped voltaren)     # HTN: PTA on losartan 100 mg qpm. Was on chlorthalidone in the past but discontinued during admission this past October due to severe electrolyte abnormalities. Pt states PTA BP measurements at home ~ 160s / 90s, and initial BP here 170 / low 100s. Pt denies HA, blurry vision, CP and other acute physical concerns at this time.  Bp improved 12/14-12/16  - Continue PTA losartan  - continue amlodipine, increased from 5 mg daily to 7.5 mg daily on 12/17    # Anemia of chronic disease:  Recent BL Hgb 7-9s, most recent above baseline at 10.0.  Slight downward drift on 12/15 may be delusional, versus prior hemoconcentration, in the interim counts have been stable  - CTM via daily CBC    # Hx of constipation: During hospitalization this past Oct., pt developed an anal fissure due to  "increased straining, that improved after start of Miralax. LBM recent as pt states she restarted Miralax prior to this hospitalization. + BM on 12/14 ans 12/15  - Continue Miralax 17 g daily  - Add prn Senokot-S     # Psoriasis: No evidence of active lesions-continue PTA topicals (pt would prefer to use her home supply if possible)     # GERD: No current concerns - Continue PTA daily PPI     # HLD: Continue PTA statin     # Hx of reactive airway disease: Pt denies hx of asthma and has not used any inhalers for over a year. Denies dyspnea and no e/o bronchospasm on exam.- monitor          Diet: Combination Diet Regular Diet Adult    DVT Prophylaxis: Enoxaparin (Lovenox) SQ  Branham Catheter: Not present  Lines: PRESENT      PICC 12/13/23 Double Lumen Left Brachial vein medial Chemotherapy. PICC was ready to use.-Site Assessment: WDL      Cardiac Monitoring: None  Code Status: Full Code      Clinically Significant Risk Factors                # Thrombocytopenia: Lowest platelets = 118 in last 2 days, will monitor for bleeding   # Hypertension: Noted on problem list        # Obesity: Estimated body mass index is 32.71 kg/m  as calculated from the following:    Height as of this encounter: 1.549 m (5' 1\").    Weight as of this encounter: 78.5 kg (173 lb 1.6 oz)., PRESENT ON ADMISSION     # Asthma: noted on problem list        Disposition Plan      Expected Discharge Date: 12/19/2023, 12:00 PM    Destination: home with family  Discharge Comments: Chemo admit - Last day of chemo is 12/19          The patient's care was discussed with the Attending Physician, Dr. Velazquez, Bedside Nurse, Patient, and Patient's Family.    Kanika Barraza PA-C  Hospitalist Service, GOLD TEAM 2  [facility]  Securely message with [employer] (more info)  Text page via [facility] Paging/Directory   See signed in provider for up to date coverage " information  ______________________________________________________________________    Interval History   Mirta continues to feel well today.  She endorses that she may have noticed some slight tremulousness with tending to text which was not observed by her nurse.  She is otherwise not noticing any tremors or difficulty with pincer grasp.  She continues to be thinking clearly and has been actively ambulating, tolerating p.o. and otherwise feeling well.  She has not had any nausea, vomiting, fever, chills, chest concerns, other complaints.    Physical Exam   Vital Signs: Temp: 97.6  F (36.4  C) Temp src: Oral BP: (!) 173/87 Pulse: 84   Resp: 16 SpO2: 98 % O2 Device: None (Room air)    Weight: 173 lbs 1.6 oz  GENERAL: Alert and oriented x 3. NAD.  Sitting upright at the edge of the bed.  Appears comfortable.  HEENT: Anicteric sclera.  Pupils equal and round.  NC.  AT.  RESPIRATORY: Effort normal on RA  NEUROLOGICAL: No focal deficits. Moves all extremities.    SKIN: No jaundice. No rashes.  BACK: no lesions      Medical Decision Making       40 MINUTES SPENT BY ME on the date of service doing chart review, history, exam, documentation & further activities per the note.      Data     I have personally reviewed the following data over the past 24 hrs:    4.5  \   10.1 (L)   / 138 (L)     139 100 13.3 /  100 (H)   4.2 26 0.85 \     ALT: 13 AST: 16 AP: 102 TBILI: 0.2   ALB: 3.9 TOT PROTEIN: 6.2 (L) LIPASE: N/A

## 2023-12-17 NOTE — PROGRESS NOTES
Nursing Focus: Chemotherapy  D: Positive blood return via PICC. Insertion site is clean/dry/intact, dressing intact with no complaints of pain.  Urine output is recorded in intake in Doc Flowsheet.    I: Premedications given per order (see electronic medical administration record). Dose #4 of Ifos/Mesna started to infuse over 24 hours. Reviewed pt teaching on chemotherapy side effects.  Pt denies need for further teaching. Chemotherapy double checked per protocol by two chemotherapy competent RN's.   A: Tolerating procedure well. Denies nausea and or pain.   P: Continue to monitor urine output and symptoms of nausea. Screen for symptoms of toxicity. No signs of Ifos toxicity.                                                 Ifosfamide Toxicity Assessment      Patient is Alert & Oriented x4. There are signs of lethargy, confusion or hallucinations No.     Patient is having new incontinence of bowel or bladder No.       Pincer Grasp intact Yes    Tremors present No     Provider was notified No of changes, no changes noted. No interventions needed at this time.     Will continue to monitor for s/sx of ifosfamide toxicity: hallucinations, AMS, emotional lability, somnolence, myoclonic jerks, tremors, coordination difficulties. If these occur, please call the APPs/Attending on days and fellow on-call for evening and nights for recommendations and further instruction.

## 2023-12-18 LAB
ALBUMIN SERPL BCG-MCNC: 3.8 G/DL (ref 3.5–5.2)
ALP SERPL-CCNC: 98 U/L (ref 40–150)
ALT SERPL W P-5'-P-CCNC: 13 U/L (ref 0–50)
ANION GAP SERPL CALCULATED.3IONS-SCNC: 8 MMOL/L (ref 7–15)
AST SERPL W P-5'-P-CCNC: 17 U/L (ref 0–45)
BASOPHILS # BLD AUTO: 0 10E3/UL (ref 0–0.2)
BASOPHILS NFR BLD AUTO: 1 %
BILIRUB SERPL-MCNC: 0.3 MG/DL
BUN SERPL-MCNC: 16.4 MG/DL (ref 8–23)
CALCIUM SERPL-MCNC: 9.1 MG/DL (ref 8.8–10.2)
CHLORIDE SERPL-SCNC: 106 MMOL/L (ref 98–107)
CREAT SERPL-MCNC: 0.93 MG/DL (ref 0.51–0.95)
DEPRECATED HCO3 PLAS-SCNC: 24 MMOL/L (ref 22–29)
EGFRCR SERPLBLD CKD-EPI 2021: 67 ML/MIN/1.73M2
EOSINOPHIL # BLD AUTO: 0.2 10E3/UL (ref 0–0.7)
EOSINOPHIL NFR BLD AUTO: 5 %
ERYTHROCYTE [DISTWIDTH] IN BLOOD BY AUTOMATED COUNT: 17.9 % (ref 10–15)
GLUCOSE SERPL-MCNC: 111 MG/DL (ref 70–99)
HCT VFR BLD AUTO: 30.4 % (ref 35–47)
HGB BLD-MCNC: 10.1 G/DL (ref 11.7–15.7)
IMM GRANULOCYTES # BLD: 0 10E3/UL
IMM GRANULOCYTES NFR BLD: 0 %
LYMPHOCYTES # BLD AUTO: 0.8 10E3/UL (ref 0.8–5.3)
LYMPHOCYTES NFR BLD AUTO: 21 %
MAGNESIUM SERPL-MCNC: 2 MG/DL (ref 1.7–2.3)
MCH RBC QN AUTO: 28.9 PG (ref 26.5–33)
MCHC RBC AUTO-ENTMCNC: 33.2 G/DL (ref 31.5–36.5)
MCV RBC AUTO: 87 FL (ref 78–100)
MONOCYTES # BLD AUTO: 0.3 10E3/UL (ref 0–1.3)
MONOCYTES NFR BLD AUTO: 8 %
NEUTROPHILS # BLD AUTO: 2.6 10E3/UL (ref 1.6–8.3)
NEUTROPHILS NFR BLD AUTO: 65 %
NRBC # BLD AUTO: 0 10E3/UL
NRBC BLD AUTO-RTO: 0 /100
PHOSPHATE SERPL-MCNC: 3.4 MG/DL (ref 2.5–4.5)
PLATELET # BLD AUTO: 130 10E3/UL (ref 150–450)
POTASSIUM SERPL-SCNC: 4.4 MMOL/L (ref 3.4–5.3)
PROT SERPL-MCNC: 6.1 G/DL (ref 6.4–8.3)
RBC # BLD AUTO: 3.49 10E6/UL (ref 3.8–5.2)
SODIUM SERPL-SCNC: 138 MMOL/L (ref 135–145)
WBC # BLD AUTO: 4 10E3/UL (ref 4–11)

## 2023-12-18 PROCEDURE — 99233 SBSQ HOSP IP/OBS HIGH 50: CPT | Performed by: STUDENT IN AN ORGANIZED HEALTH CARE EDUCATION/TRAINING PROGRAM

## 2023-12-18 PROCEDURE — 250N000013 HC RX MED GY IP 250 OP 250 PS 637: Performed by: STUDENT IN AN ORGANIZED HEALTH CARE EDUCATION/TRAINING PROGRAM

## 2023-12-18 PROCEDURE — 258N000003 HC RX IP 258 OP 636: Performed by: STUDENT IN AN ORGANIZED HEALTH CARE EDUCATION/TRAINING PROGRAM

## 2023-12-18 PROCEDURE — 250N000009 HC RX 250: Performed by: STUDENT IN AN ORGANIZED HEALTH CARE EDUCATION/TRAINING PROGRAM

## 2023-12-18 PROCEDURE — 83735 ASSAY OF MAGNESIUM: CPT | Performed by: STUDENT IN AN ORGANIZED HEALTH CARE EDUCATION/TRAINING PROGRAM

## 2023-12-18 PROCEDURE — 82040 ASSAY OF SERUM ALBUMIN: CPT | Performed by: STUDENT IN AN ORGANIZED HEALTH CARE EDUCATION/TRAINING PROGRAM

## 2023-12-18 PROCEDURE — 250N000011 HC RX IP 250 OP 636: Mod: JZ | Performed by: STUDENT IN AN ORGANIZED HEALTH CARE EDUCATION/TRAINING PROGRAM

## 2023-12-18 PROCEDURE — 85049 AUTOMATED PLATELET COUNT: CPT | Performed by: STUDENT IN AN ORGANIZED HEALTH CARE EDUCATION/TRAINING PROGRAM

## 2023-12-18 PROCEDURE — 84100 ASSAY OF PHOSPHORUS: CPT | Performed by: STUDENT IN AN ORGANIZED HEALTH CARE EDUCATION/TRAINING PROGRAM

## 2023-12-18 PROCEDURE — 120N000002 HC R&B MED SURG/OB UMMC

## 2023-12-18 PROCEDURE — 99207 PR APP CREDIT; MD BILLING SHARED VISIT: CPT | Mod: FS | Performed by: STUDENT IN AN ORGANIZED HEALTH CARE EDUCATION/TRAINING PROGRAM

## 2023-12-18 PROCEDURE — 250N000011 HC RX IP 250 OP 636: Mod: JZ | Performed by: PHYSICIAN ASSISTANT

## 2023-12-18 PROCEDURE — 99232 SBSQ HOSP IP/OBS MODERATE 35: CPT | Mod: FS | Performed by: STUDENT IN AN ORGANIZED HEALTH CARE EDUCATION/TRAINING PROGRAM

## 2023-12-18 PROCEDURE — 250N000013 HC RX MED GY IP 250 OP 250 PS 637: Performed by: PHYSICIAN ASSISTANT

## 2023-12-18 RX ADMIN — SODIUM BICARBONATE: 84 INJECTION INTRAVENOUS at 10:32

## 2023-12-18 RX ADMIN — PRAVASTATIN SODIUM 40 MG: 40 TABLET ORAL at 21:07

## 2023-12-18 RX ADMIN — THIAMINE HCL TAB 100 MG 100 MG: 100 TAB at 08:23

## 2023-12-18 RX ADMIN — PREGABALIN 50 MG: 50 CAPSULE ORAL at 15:03

## 2023-12-18 RX ADMIN — LOSARTAN POTASSIUM 100 MG: 100 TABLET, FILM COATED ORAL at 21:07

## 2023-12-18 RX ADMIN — ENOXAPARIN SODIUM 40 MG: 40 INJECTION SUBCUTANEOUS at 21:07

## 2023-12-18 RX ADMIN — PREGABALIN 50 MG: 50 CAPSULE ORAL at 21:07

## 2023-12-18 RX ADMIN — THIAMINE HCL TAB 100 MG 100 MG: 100 TAB at 15:03

## 2023-12-18 RX ADMIN — ONDANSETRON HYDROCHLORIDE 8 MG: 8 TABLET, FILM COATED ORAL at 15:03

## 2023-12-18 RX ADMIN — POLYETHYLENE GLYCOL 3350 17 G: 17 POWDER, FOR SOLUTION ORAL at 08:22

## 2023-12-18 RX ADMIN — ONDANSETRON HYDROCHLORIDE 8 MG: 8 TABLET, FILM COATED ORAL at 21:07

## 2023-12-18 RX ADMIN — SODIUM BICARBONATE: 84 INJECTION INTRAVENOUS at 19:36

## 2023-12-18 RX ADMIN — PANTOPRAZOLE SODIUM 40 MG: 40 TABLET, DELAYED RELEASE ORAL at 05:27

## 2023-12-18 RX ADMIN — PREGABALIN 50 MG: 50 CAPSULE ORAL at 08:23

## 2023-12-18 RX ADMIN — ONDANSETRON HYDROCHLORIDE 8 MG: 8 TABLET, FILM COATED ORAL at 05:27

## 2023-12-18 RX ADMIN — SODIUM BICARBONATE: 84 INJECTION INTRAVENOUS at 02:08

## 2023-12-18 RX ADMIN — AMLODIPINE BESYLATE 7.5 MG: 5 TABLET ORAL at 08:23

## 2023-12-18 RX ADMIN — MESNA 2910 MG: 100 INJECTION, SOLUTION INTRAVENOUS at 19:43

## 2023-12-18 RX ADMIN — THIAMINE HCL TAB 100 MG 100 MG: 100 TAB at 21:07

## 2023-12-18 ASSESSMENT — ACTIVITIES OF DAILY LIVING (ADL)
ADLS_ACUITY_SCORE: 22

## 2023-12-18 NOTE — PROGRESS NOTES
Nursing Focus: Chemotherapy  D: Positive blood return via PICC, purple lumen. Insertion site is clean/dry/intact, dressing intact with no complaints of pain. Urine output is recorded in intake in Doc Flowsheet.    I: Premedications given per order (see electronic medical administration record). Dose #5 of Ifosfamide started to infuse over 24 hours. Reviewed pt teaching on chemotherapy side effects. Pt denies need for further teaching. Chemotherapy double checked per protocol by two chemotherapy competent RN's.   A: Tolerating procedure well. Denies nausea and or pain.   P: Continue to monitor urine output and symptoms of nausea. Screen for symptoms of toxicity.                     Ifosfamide Toxicity Assessment    Patient is Alert & Oriented x4. There are signs of lethargy, confusion or hallucinations: No     Patient is having new incontinence of bowel or bladder: No     Pincer Grasp intact: Yes     Tremors present: No     Provider was notified: No, not necessary at this time. No signs of Ifos toxicity present.     Will continue to monitor for s/sx of ifosfamide toxicity: hallucinations, AMS, emotional lability, somnolence, myoclonic jerks, tremors, coordination difficulties. If these occur, please call the APPs/Attending on days and fellow on-call for evening and nights for recommendations and further instruction.

## 2023-12-18 NOTE — PROGRESS NOTES
Ifosfamide Toxicity Assessment    Patient is Alert & Oriented x4. There are signs of lethargy, confusion or hallucinations: No      Patient is having new incontinence of bowel or bladder: No      Pincer grasp intact: Yes      Tremors present: No      Provider was notified: No     Will continue to monitor for s/sx of ifosfamide toxicity: hallucinations, AMS, emotional lability, somnolence, myoclonic jerks, tremors, coordination difficulties. If these occur, please call the APPs/Attending on days and fellow on-call for evening and nights for recommendations and further instruction.

## 2023-12-18 NOTE — PLAN OF CARE
[id number]    A&Ox4. VSS on RA. Pain managed with prn oxycodone x1. Denies nausea and SOB. CIVI Ifosfamide infusing, pt tolerating well. No s/s Ifos toxicity present. Continuous fluids infusing 125 mL/hr. Up independently. No acute events this shift. Continue with plan of care.

## 2023-12-18 NOTE — PROGRESS NOTES
Ifosfamide Toxicity Assessment      Patient is Alert & Oriented x4. There are No signs of lethargy, confusion or hallucinations.     Patient is having new incontinence of bowel or bladder No.       Pincer Grasp intact  Yes     Tremors no baseline or NEW    Provider was notified  No of changes no . Interventions include continue to monitor Ifosfamide toxicity.     -- Monitor for s/sx of ifosfamide toxicity: hallucinations, AMS, emotional lability, somnolence, myoclonic jerks, tremors, coordination difficulties, etc. If these occur, please call the fellow on call for recommendations

## 2023-12-18 NOTE — PROGRESS NOTES
Ifosfamide Toxicity Assessment      Patient is Alert & Oriented x4. There are signs of lethargy, confusion or hallucinations No.     Patient is having new incontinence of bowel or bladder Yes.       Pincer Grasp intact Yes    Tremors present No     Provider was notified No of changes no . Interventions include continue to monitor for any changes.     Will continue to monitor for s/sx of ifosfamide toxicity: hallucinations, AMS, emotional lability, somnolence, myoclonic jerks, tremors, coordination difficulties. If these occur, please call the APPs/Attending on days and fellow on-call for evening and nights for recommendations and further instruction.

## 2023-12-18 NOTE — PLAN OF CARE
Goal Outcome Evaluation:  Afebrile. Denied pain or nausea. Chemotherapy infusing without problems. Good blood return from PICC line. She will discharge tomorrow after Mesna is done. Up independently.

## 2023-12-18 NOTE — PROGRESS NOTES
[facility]    Medicine Progress Note - Hospitalist Service, GOLD TEAM 2    Date of Admission:  12/13/2023    Assessment & Plan    Mirta Cardenas is a pleasant 67 yo female with hx of HTN, psoriasis, HLD, GERD, remote hx of CLL (on surveillance), and RLE sarcoma diagnosed earlier this year admitted to [facility] as direct admit to proceed with chemotherapy as directed by Oncology team.     12/18  - Patient overall doing well today, has no acute concerns  - Amlodipine increased yesterday with some improvement blood pressure.  Plan to continue doses of 0.5 mg on discharge  - Possible discharge tomorrow after completion of chemotherapy.       # RLE high grade, pleomorphic sarcoma  # Cancer related pain   Tolerating chemo well so far, continues w/ excellent pincer capabilties, no evidence of tremors or shortness.  Bowels and bladder have been regular.  - appreciate onc input   Current Treatment: Cycle 3 Doxil/Ifos  Day 1 - Doxil 40mg/m2  Days 1-5 CIVI Ifosfamide/Mesna 1600mg/m2  Day 6 Mesna 1600mg/m2 over 18hrs  Treatment Day: 2 of 6  - Chemo and other management per Oncology.   - Pain: Continue PTA scheduled Lyrica, and prn oxycodone and Tylenol.  (Stopped voltaren)     # HTN:   PTA on losartan 100 mg qpm. Was on chlorthalidone in the past but discontinued during admission this past October due to severe electrolyte abnormalities. Pt states PTA BP measurements at home ~ 160s / 90s, and initial BP here 170 / low 100s. Pt denies HA, blurry vision, CP and other acute physical concerns at this time.  Bp improved 12/14-12/16  - Continue PTA losartan  - continue amlodipine, increased from 5 mg daily to 7.5 mg daily on 12/17     # Anemia of chronic disease:  Recent BL Hgb 7-9s, most recent above baseline at 10.0.  Slight downward drift on 12/15 may be delusional, versus prior hemoconcentration, in the interim counts have been stable  - CTM via daily CBC     # Hx of constipation: During  "hospitalization this past Oct., pt developed an anal fissure due to increased straining, that improved after start of Miralax. LBM recent as pt states she restarted Miralax prior to this hospitalization. + BM on 12/14 ans 12/15  - Continue Miralax 17 g daily  - Add prn Senokot-S     # Psoriasis: No evidence of active lesions-continue PTA topicals (pt would prefer to use her home supply if possible)     # GERD: No current concerns - Continue PTA daily PPI     # HLD: Continue PTA statin     # Hx of reactive airway disease: Pt denies hx of asthma and has not used any inhalers for over a year. Denies dyspnea and no e/o bronchospasm on exam.- monitor            Diet: Combination Diet Regular Diet Adult    DVT Prophylaxis: Enoxaparin (Lovenox) SQ  Branham Catheter: Not present  Lines: PRESENT      PICC 12/13/23 Double Lumen Left Brachial vein medial Chemotherapy. PICC was ready to use.-Site Assessment: WDL      Cardiac Monitoring: None  Code Status: Full Code      Disposition Plan     Expected Discharge Date: 12/19/2023, 12:00 PM    Destination: home with family  Discharge Comments: Chemo admit - Last day of chemo is 12/19        The patient's care was discussed with staff physician, Bedside Nurse and Patient.    Cole Bettencourt PA-C  Hospitalist Service, [facility] TEAM [address] [place] [address]  Securely message with [employer] (more info)  Text page via [facility] Paging/Directory     Clinically Significant Risk Factors                  # Hypertension: Noted on problem list        # Obesity: Estimated body mass index is 32.2 kg/m  as calculated from the following:    Height as of this encounter: 1.549 m (5' 1\").    Weight as of this encounter: 77.3 kg (170 lb 6.4 oz).      # Asthma: noted on problem list        ______________________________________________________________________    Interval History   Patient reports overall doing well today.  Has no acute concerns.  Says she has tolerated chemo well.  " Denies nausea, vomiting, abdominal pain.  Hopeful for possible stricture tomorrow.    Data reviewed today: I reviewed all medications, new labs and imaging results over the last 24 hours. Please see discussion of these results in the A/P.    Physical Exam     Vital Signs: Temp: 98  F (36.7  C) Temp src: Oral BP: (!) 148/91 Pulse: 84     Resp: 17 SpO2: 96 % O2 Device: None (Room air)    Weight: 170 lbs 6.4 oz    Constitutional: awake, alert, cooperative, in no acute distress. A&Ox3   Eyes: EOM, PERRLA. Sclera clear, conjunctiva normal. Anicteric   HENT: Normocephalic, without obvious abnormality, atraumatic.   Respiratory: No increased work of breathing.  CTAB  Cards: RRR, no m/r/g. Trace bilateral LE Edema   GI: Soft, non-tender without rebound or guarding.   Musculoskeletal:  Full range of motion noted.    Neurologic: Cranial nerves II-XII are grossly intact.   Neuropsychiatric: General: normal, calm and normal eye contact. Normal affect          Data   Recent Labs   Lab 12/18/23  0532 12/17/23  0616 12/16/23  0429   WBC 4.0 4.5 4.3   HGB 10.1* 10.1* 9.2*   MCV 87 86 86   * 138* 118*    139 141   POTASSIUM 4.4 4.2 4.4   CHLORIDE 106 100 103   CO2 24 26 31*   BUN 16.4 13.3 15.3   CR 0.93 0.85 0.85   ANIONGAP 8 13 7   MONIK 9.1 9.1 8.8   * 100* 94   ALBUMIN 3.8 3.9 3.5   PROTTOTAL 6.1* 6.2* 5.7*   BILITOTAL 0.3 0.2 0.2   ALKPHOS 98 102 91   ALT 13 13 13   AST 17 16 13       No results found for this or any previous visit (from the past 24 hour(s)).

## 2023-12-18 NOTE — PROGRESS NOTES
Admitted 12/13/23 for scheduled chemotherapy, has hx of RLE Sarcoma, HTN, psoriasis    Vitals stable, alert/oriented x4, RA, denies pain UAL, ambulated on unit with IV pole, no chemo toxicity observed during assessment, will need new bag of Ifosfamide/Mesna once line is done flushing, needs new tubing and cap change for purple lumen with new bag, good blood return from purple lumen

## 2023-12-18 NOTE — PROGRESS NOTES
Ifosfamide Toxicity Assessment      Patient is Alert & Oriented x4. There are signs of lethargy, confusion or hallucinations No.     Patient is having new incontinence of bowel or bladder No.       Pincer Grasp intact Yes    Tremors present No     Provider was notified No of changes NA . Interventions include NA.     Will continue to monitor for s/sx of ifosfamide toxicity: hallucinations, AMS, emotional lability, somnolence, myoclonic jerks, tremors, coordination difficulties. If these occur, please call the APPs/Attending on days and fellow on-call for evening and nights for recommendations and further instruction.

## 2023-12-19 VITALS
DIASTOLIC BLOOD PRESSURE: 93 MMHG | WEIGHT: 170.4 LBS | RESPIRATION RATE: 17 BRPM | OXYGEN SATURATION: 97 % | TEMPERATURE: 97.8 F | HEART RATE: 78 BPM | SYSTOLIC BLOOD PRESSURE: 158 MMHG | HEIGHT: 61 IN | BODY MASS INDEX: 32.17 KG/M2

## 2023-12-19 LAB
ALBUMIN SERPL BCG-MCNC: 3.8 G/DL (ref 3.5–5.2)
ALP SERPL-CCNC: 92 U/L (ref 40–150)
ALT SERPL W P-5'-P-CCNC: 15 U/L (ref 0–50)
ANION GAP SERPL CALCULATED.3IONS-SCNC: 8 MMOL/L (ref 7–15)
AST SERPL W P-5'-P-CCNC: 19 U/L (ref 0–45)
BASOPHILS # BLD AUTO: 0 10E3/UL (ref 0–0.2)
BASOPHILS NFR BLD AUTO: 1 %
BILIRUB SERPL-MCNC: 0.3 MG/DL
BUN SERPL-MCNC: 15.3 MG/DL (ref 8–23)
CALCIUM SERPL-MCNC: 8.9 MG/DL (ref 8.8–10.2)
CHLORIDE SERPL-SCNC: 108 MMOL/L (ref 98–107)
CREAT SERPL-MCNC: 0.92 MG/DL (ref 0.51–0.95)
DEPRECATED HCO3 PLAS-SCNC: 24 MMOL/L (ref 22–29)
EGFRCR SERPLBLD CKD-EPI 2021: 68 ML/MIN/1.73M2
EOSINOPHIL # BLD AUTO: 0.2 10E3/UL (ref 0–0.7)
EOSINOPHIL NFR BLD AUTO: 5 %
ERYTHROCYTE [DISTWIDTH] IN BLOOD BY AUTOMATED COUNT: 17.4 % (ref 10–15)
GLUCOSE SERPL-MCNC: 113 MG/DL (ref 70–99)
HCT VFR BLD AUTO: 28.8 % (ref 35–47)
HGB BLD-MCNC: 9.6 G/DL (ref 11.7–15.7)
IMM GRANULOCYTES # BLD: 0 10E3/UL
IMM GRANULOCYTES NFR BLD: 0 %
LYMPHOCYTES # BLD AUTO: 0.8 10E3/UL (ref 0.8–5.3)
LYMPHOCYTES NFR BLD AUTO: 20 %
MAGNESIUM SERPL-MCNC: 2 MG/DL (ref 1.7–2.3)
MCH RBC QN AUTO: 28.5 PG (ref 26.5–33)
MCHC RBC AUTO-ENTMCNC: 33.3 G/DL (ref 31.5–36.5)
MCV RBC AUTO: 86 FL (ref 78–100)
MONOCYTES # BLD AUTO: 0.2 10E3/UL (ref 0–1.3)
MONOCYTES NFR BLD AUTO: 5 %
NEUTROPHILS # BLD AUTO: 2.8 10E3/UL (ref 1.6–8.3)
NEUTROPHILS NFR BLD AUTO: 69 %
NRBC # BLD AUTO: 0 10E3/UL
NRBC BLD AUTO-RTO: 0 /100
PHOSPHATE SERPL-MCNC: 3.2 MG/DL (ref 2.5–4.5)
PLATELET # BLD AUTO: 128 10E3/UL (ref 150–450)
POTASSIUM SERPL-SCNC: 4 MMOL/L (ref 3.4–5.3)
PROT SERPL-MCNC: 5.9 G/DL (ref 6.4–8.3)
RBC # BLD AUTO: 3.37 10E6/UL (ref 3.8–5.2)
SODIUM SERPL-SCNC: 140 MMOL/L (ref 135–145)
WBC # BLD AUTO: 4.1 10E3/UL (ref 4–11)

## 2023-12-19 PROCEDURE — 99239 HOSP IP/OBS DSCHRG MGMT >30: CPT | Mod: FS | Performed by: STUDENT IN AN ORGANIZED HEALTH CARE EDUCATION/TRAINING PROGRAM

## 2023-12-19 PROCEDURE — 84100 ASSAY OF PHOSPHORUS: CPT | Performed by: STUDENT IN AN ORGANIZED HEALTH CARE EDUCATION/TRAINING PROGRAM

## 2023-12-19 PROCEDURE — 80053 COMPREHEN METABOLIC PANEL: CPT | Performed by: STUDENT IN AN ORGANIZED HEALTH CARE EDUCATION/TRAINING PROGRAM

## 2023-12-19 PROCEDURE — 83735 ASSAY OF MAGNESIUM: CPT | Performed by: STUDENT IN AN ORGANIZED HEALTH CARE EDUCATION/TRAINING PROGRAM

## 2023-12-19 PROCEDURE — 250N000011 HC RX IP 250 OP 636: Mod: JZ | Performed by: STUDENT IN AN ORGANIZED HEALTH CARE EDUCATION/TRAINING PROGRAM

## 2023-12-19 PROCEDURE — 250N000013 HC RX MED GY IP 250 OP 250 PS 637: Performed by: PHYSICIAN ASSISTANT

## 2023-12-19 PROCEDURE — 250N000009 HC RX 250: Performed by: STUDENT IN AN ORGANIZED HEALTH CARE EDUCATION/TRAINING PROGRAM

## 2023-12-19 PROCEDURE — 250N000013 HC RX MED GY IP 250 OP 250 PS 637: Performed by: STUDENT IN AN ORGANIZED HEALTH CARE EDUCATION/TRAINING PROGRAM

## 2023-12-19 PROCEDURE — 85025 COMPLETE CBC W/AUTO DIFF WBC: CPT | Performed by: STUDENT IN AN ORGANIZED HEALTH CARE EDUCATION/TRAINING PROGRAM

## 2023-12-19 PROCEDURE — 258N000003 HC RX IP 258 OP 636: Performed by: STUDENT IN AN ORGANIZED HEALTH CARE EDUCATION/TRAINING PROGRAM

## 2023-12-19 PROCEDURE — 99207 PR APP CREDIT; MD BILLING SHARED VISIT: CPT | Performed by: INTERNAL MEDICINE

## 2023-12-19 RX ORDER — AMLODIPINE BESYLATE 2.5 MG/1
7.5 TABLET ORAL DAILY
Qty: 30 TABLET | Refills: 0 | Status: SHIPPED | OUTPATIENT
Start: 2023-12-20 | End: 2023-12-26

## 2023-12-19 RX ADMIN — AMLODIPINE BESYLATE 7.5 MG: 5 TABLET ORAL at 08:30

## 2023-12-19 RX ADMIN — SODIUM BICARBONATE: 84 INJECTION INTRAVENOUS at 03:56

## 2023-12-19 RX ADMIN — PANTOPRAZOLE SODIUM 40 MG: 40 TABLET, DELAYED RELEASE ORAL at 06:19

## 2023-12-19 RX ADMIN — ONDANSETRON HYDROCHLORIDE 8 MG: 8 TABLET, FILM COATED ORAL at 06:20

## 2023-12-19 RX ADMIN — THIAMINE HCL TAB 100 MG 100 MG: 100 TAB at 13:39

## 2023-12-19 RX ADMIN — ACETAMINOPHEN 650 MG: 325 TABLET, FILM COATED ORAL at 06:19

## 2023-12-19 RX ADMIN — THIAMINE HCL TAB 100 MG 100 MG: 100 TAB at 08:31

## 2023-12-19 RX ADMIN — PREGABALIN 50 MG: 50 CAPSULE ORAL at 08:34

## 2023-12-19 RX ADMIN — PREGABALIN 50 MG: 50 CAPSULE ORAL at 13:38

## 2023-12-19 ASSESSMENT — ACTIVITIES OF DAILY LIVING (ADL)
ADLS_ACUITY_SCORE: 22

## 2023-12-19 NOTE — PLAN OF CARE
Goal Outcome Evaluation:  Afebrile. Denied pain or nausea. Mesna hung and she will discharge tomorrow afternoon. Up independently.

## 2023-12-19 NOTE — DISCHARGE SUMMARY
[facility]  Hospitalist Discharge Summary      Date of Admission:  12/13/2023  Date of Discharge:  12/19/2023  Discharging Provider: Cole Bettencourt PA-C  Discharge Service: Hospitalist Service, GOLD TEAM 2    Discharge Diagnoses   # Chemotherapy induction  # RLE high grade, pleomorphic sarcoma  # Cancer related pain  # HTN  # Anemia of chronic disease     Follow-ups Needed After Discharge   Follow-up Appointments     Follow Up ([facility])      Oncology follow ups:  -Tomorrow (12/20) for Neulasta    -Labs and infusion appointments after chemo on 12/20, 12/22, 12/26, 12/28,   12/29   -ISIDORO toxicity check on - virtual 12/28   -Admission for C4 on 1/10   -PET scan and MRI R LE on 1/29   -Follow up with Dr. Jimenez 1/30     Recommend follow up with PCP in 1-2 weeks for blood pressure monitoring   due to addition of Amlodipine    Appointments on [place] and/or [facility] (with [facility] or [facility]   provider or service). Call [phone] if you haven't heard regarding   these appointments within 7 days of discharge.            Discharge Disposition   Discharged to home  Condition at discharge: Stable    Hospital Course   Mirta Cardenas is a pleasant 67 yo female with hx of HTN, psoriasis, HLD, GERD, remote hx of CLL (on surveillance), and RLE sarcoma diagnosed earlier this year admitted to [facility] as direct admit to proceed with chemotherapy as directed by Oncology team. Management as follows:    # RLE high grade, pleomorphic sarcoma  # Cancer related pain  Pt received Cycle 3 Doxil/Ifos with the following regiment: Day 1 - Doxil 40mg/m2, Days 1-5 CIVI, Ifosfamide/Mesna 1600mg/m2, and Day 6 Mesna 1600mg/m2 over 18hrs. Overall, she tolerated this very well with minimal side effects. Minimal nausea. Pain was managed with PTA scheduled Lyrica, and prn oxycodone and Tylenol. She will discharge to home today with follow up plans as above.        # HTN:   PTA on losartan 100 mg qpm. Was  on chlorthalidone in the past but discontinued during admission this past October due to severe electrolyte abnormalities. Pt states PTA BP measurements at home ~ 160s / 90s, and initial BP here 170 / low 100s. Pt denies HA, blurry vision, CP and other acute physical concerns at this time.  Bp improved 12/14-12/19 after her amlodipine was increased to 7.5 mg daily  - Continue Losartan 100mg daily and Amlodipine 7.5 mg daily at discharge   - Follow up with PCP in 1-2 weeks for BP monitoring       Consultations This Hospital Stay   ONCOLOGY ADULT IP CONSULT    Code Status   Full Code    Time Spent on this Encounter   I, Cole Bettencourt PA-C, personally saw the patient today and spent greater than 30 minutes discharging this patient.       Cole Bettencourt PA-C  [address] ONCOLOGY  [address] [facility] [address]  Phone: [phone]  ______________________________________________________________________    Physical Exam   Vital Signs: Temp: 97.8  F (36.6  C) Temp src: Oral BP: (!) 158/93 Pulse: 78   Resp: 17 SpO2: 97 % O2 Device: None (Room air)    Weight: 170 lbs 6.4 oz     Constitutional: awake, alert, cooperative, in no acute distress. A&Ox3   Eyes: EOM, PERRLA. Sclera clear, conjunctiva normal. Anicteric   HENT: Normocephalic, without obvious abnormality, atraumatic.   Respiratory: No increased work of breathing.  CTAB  Cards: RRR, no m/r/g. Trace bilateral LE Edema   GI: Soft, non-tender without rebound or guarding.   Musculoskeletal:  Full range of motion noted.    Neurologic: Cranial nerves II-XII are grossly intact.   Neuropsychiatric: General: normal, calm and normal eye contact. Normal affect         Primary Care Physician   Joseline Fraga    Discharge Orders      Reason for your hospital stay    You were hospitalized for cycle 3 of your chemotherapy.     Activity    Your activity upon discharge: activity as tolerated     Follow Up ([facility])    Oncology follow ups:  -Tomorrow (12/20) for Neulasta     -Labs and infusion appointments after chemo on 12/20, 12/22, 12/26, 12/28, 12/29   -ISIDORO toxicity check on - virtual 12/28   -Admission for C4 on 1/10   -PET scan and MRI R LE on 1/29   -Follow up with Dr. Jimenez 1/30     Recommend follow up with PCP in 1-2 weeks for blood pressure monitoring due to addition of Amlodipine    Appointments on [place] and/or [facility] (with [facility] or [facility] provider or service). Call [phone] if you haven't heard regarding these appointments within 7 days of discharge.     Diet    Regular diet       Significant Results and Procedures   Most Recent 3 CBC's:  Recent Labs   Lab Test 12/19/23  0414 12/18/23  0532 12/17/23  0616   WBC 4.1 4.0 4.5   HGB 9.6* 10.1* 10.1*   MCV 86 87 86   * 130* 138*     Most Recent 3 BMP's:  Recent Labs   Lab Test 12/19/23  0414 12/18/23  0532 12/17/23  0616    138 139   POTASSIUM 4.0 4.4 4.2   CHLORIDE 108* 106 100   CO2 24 24 26   BUN 15.3 16.4 13.3   CR 0.92 0.93 0.85   ANIONGAP 8 8 13   MONIK 8.9 9.1 9.1   * 111* 100*     Most Recent 3 INR's:  Recent Labs   Lab Test 10/17/20  0836 10/16/20  1031 10/15/20  0709   INR 1.19* 1.18* 1.06   ,   Results for orders placed or performed during the hospital encounter of 12/05/23   PET Oncology Whole Body    Narrative    EXAM: PET ONCOLOGY WHOLE BODY  LOCATION: 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 High-grade pleomorphic sarcoma of the right calf. Interval chemotherapy.  COMPARISON: FDG PET/CT 10/10/2023.  CONTRAST: None.  TECHNIQUE: Serum glucose level 81 mg/dL. One hour post intravenous administration of 10.15 mCi F-18 FDG, PET imaging was performed from the skull vertex to feet, utilizing attenuation correction with concurrent axial CT and PET/CT image fusion. Dose   reduction techniques were used.    PET/CT FINDINGS:  Decreased size and FDG avidity of a right calf soft tissue mass with SUV max of 7.1, previously 26.4. Mass is difficult to measure anatomically by noncontrast CT. Similar cortical destruction of the right fibular diaphysis. Mild   diffusely increased marrow uptake is likely treatment related. No convincing evidence of FDG avid metastatic disease. Mildly enlarged bilateral iliac chain lymph nodes appear similar and do not demonstrate FDG avidity above that of blood pool, favored   reactive.    CT FINDINGS: Moderate coronary artery calcifications. Similar hepatosplenomegaly. Similar multifocal scarring and parenchymal atrophy of the right kidney with multiple nonobstructing renal calculi. Colonic diverticulosis. Large fat-containing lower   ventral abdominal wall hernia.      Impression    IMPRESSION:    Partial response to therapy with decreasing size and FDG avidity of the right calf mass. No evidence of new FDG avid disease.       Discharge Medications   Current Discharge Medication List        START taking these medications    Details   amLODIPine (NORVASC) 2.5 MG tablet Take 3 tablets (7.5 mg) by mouth daily for 30 days  Qty: 30 tablet, Refills: 0    Associated Diagnoses: Benign essential hypertension           CONTINUE these medications which have NOT CHANGED    Details   calcipotriene (DOVONOX) 0.005 % external cream Apply twice daily to areas of psoriasis on Saturday and Sunday.  Qty: 60 g, Refills: 6    Associated Diagnoses: Psoriasis      clobetasol (TEMOVATE) 0.05 % external cream Apply twice daily to psoriasis on Monday through Friday.  Qty: 60 g, Refills: 5    Associated Diagnoses: Psoriasis      docusate sodium (COLACE) 100 MG capsule Take 100 mg by mouth 2 times daily      losartan (COZAAR) 100 MG tablet Take 1 tablet (100 mg) by mouth daily  Qty: 90 tablet, Refills: 3    Associated Diagnoses: Benign essential hypertension      METAMUCIL FIBER PO Take 1 teaspoonful by mouth daily After supper       Multiple Vitamins-Calcium (ONE-A-DAY WOMENS FORMULA PO) Take 1 tablet by mouth daily      omeprazole (PRILOSEC) 20 MG DR capsule Take 1 capsule by mouth every morning.      oxyCODONE (ROXICODONE) 5 MG tablet Take 1-2 tablets (5-10 mg) by mouth as needed for severe pain  Qty: 60 tablet, Refills: 0    Associated Diagnoses: Sarcoma of right lower extremity (H)      pravastatin (PRAVACHOL) 40 MG tablet Take 1 tablet (40 mg) by mouth daily  Qty: 90 tablet, Refills: 3    Associated Diagnoses: Hyperlipidemia LDL goal <100      pregabalin (LYRICA) 50 MG capsule Take 1 capsule (50 mg) by mouth 3 times daily for 30 days  Qty: 90 capsule, Refills: 0    Associated Diagnoses: Sarcoma of right lower extremity (H)      prochlorperazine (COMPAZINE) 5 MG tablet Take 1 tablet (5 mg) by mouth every 6 hours as needed (Breakthrough Nausea / Vomiting)  Qty: 30 tablet, Refills: 0    Associated Diagnoses: Sarcoma of right lower extremity (H)      diclofenac (VOLTAREN) 1 % topical gel Apply 4 g topically 4 times daily To right lower leg  Qty: 350 g, Refills: 1    Comments: Don't fill until patient requests  Associated Diagnoses: Sarcoma (H)      ibuprofen (ADVIL/MOTRIN) 200 MG capsule Take 400 mg by mouth every 4 hours as needed for fever           Allergies   Allergies   Allergen Reactions    Allopurinol Itching    Amoxicillin Hives    Codeine Itching    Cymbalta [Duloxetine Hcl] Fatigue    Periactin Other (See Comments)     Sleepy.    11/15/23: patient not sure about this allergy/intolerance - may be interested in removal    Tenormin [Atenolol] Fatigue     11/15/23: patient may be interested in removal of this from allergy list as it was only fatigue/sleepiness

## 2023-12-19 NOTE — PROGRESS NOTES
Ifosfamide Toxicity Assessment      Patient is Alert & Oriented x4. There are signs of lethargy, confusion or hallucinations No.     Patient is having new incontinence of bowel or bladder No.       Pincer Grasp intact Yes    Tremors present No     Provider was notified No of changes N/A . No interventions indicated at this time.     Will continue to monitor for s/sx of ifosfamide toxicity: hallucinations, AMS, emotional lability, somnolence, myoclonic jerks, tremors, coordination difficulties. If these occur, please call the APPs/Attending on days and fellow on-call for evening and nights for recommendations and further instruction.

## 2023-12-19 NOTE — PLAN OF CARE
[id number]  Goal Outcome Evaluation:      Plan of Care Reviewed With: patient    Overall Patient Progress: no change    Pt A&Ox4, no IFOS toxicities noted, afebrile, BP elevated to 165/94, OVSS on RA. Denies N/V, though notes lack of appetite when thinking of breakfast this AM. Pain managed with tylenol x1. Mesna infusing via PICC, should finish by 1400. No BM, documenting good UOP on white board. Sleep/rest promoted overnight with clustered cares. Makes needs known. Continue to monitor and with POC.

## 2023-12-19 NOTE — PROGRESS NOTES
Reviewed discharge orders and medications with Mirta and she verbalized understanding. She has a follow up clinic appointment and phone numbers to call with questions.

## 2023-12-20 ENCOUNTER — APPOINTMENT (OUTPATIENT)
Dept: LAB | Facility: CLINIC | Age: 68
End: 2023-12-20
Payer: COMMERCIAL

## 2023-12-20 ENCOUNTER — PATIENT OUTREACH (OUTPATIENT)
Dept: CARE COORDINATION | Facility: CLINIC | Age: 68
End: 2023-12-20
Payer: COMMERCIAL

## 2023-12-20 ENCOUNTER — INFUSION THERAPY VISIT (OUTPATIENT)
Dept: INFUSION THERAPY | Facility: CLINIC | Age: 68
End: 2023-12-20
Attending: STUDENT IN AN ORGANIZED HEALTH CARE EDUCATION/TRAINING PROGRAM
Payer: COMMERCIAL

## 2023-12-20 VITALS — SYSTOLIC BLOOD PRESSURE: 150 MMHG | TEMPERATURE: 97.9 F | HEART RATE: 89 BPM | DIASTOLIC BLOOD PRESSURE: 79 MMHG

## 2023-12-20 DIAGNOSIS — Z76.89 PREVENTION OF CHEMOTHERAPY-INDUCED NEUTROPENIA: Primary | ICD-10-CM

## 2023-12-20 DIAGNOSIS — C49.21 SARCOMA OF RIGHT LOWER EXTREMITY (H): ICD-10-CM

## 2023-12-20 LAB
ALBUMIN SERPL BCG-MCNC: 4.5 G/DL (ref 3.5–5.2)
ALP SERPL-CCNC: 113 U/L (ref 40–150)
ALT SERPL W P-5'-P-CCNC: 20 U/L (ref 0–50)
ANION GAP SERPL CALCULATED.3IONS-SCNC: 10 MMOL/L (ref 7–15)
AST SERPL W P-5'-P-CCNC: 21 U/L (ref 0–45)
BASOPHILS # BLD AUTO: ABNORMAL 10*3/UL
BASOPHILS # BLD MANUAL: 0 10E3/UL (ref 0–0.2)
BASOPHILS NFR BLD AUTO: ABNORMAL %
BASOPHILS NFR BLD MANUAL: 1 %
BILIRUB SERPL-MCNC: 0.3 MG/DL
BUN SERPL-MCNC: 23.9 MG/DL (ref 8–23)
CALCIUM SERPL-MCNC: 9.7 MG/DL (ref 8.8–10.2)
CHLORIDE SERPL-SCNC: 108 MMOL/L (ref 98–107)
CREAT SERPL-MCNC: 1.14 MG/DL (ref 0.51–0.95)
DEPRECATED HCO3 PLAS-SCNC: 22 MMOL/L (ref 22–29)
EGFRCR SERPLBLD CKD-EPI 2021: 53 ML/MIN/1.73M2
EOSINOPHIL # BLD AUTO: ABNORMAL 10*3/UL
EOSINOPHIL # BLD MANUAL: 0 10E3/UL (ref 0–0.7)
EOSINOPHIL NFR BLD AUTO: ABNORMAL %
EOSINOPHIL NFR BLD MANUAL: 0 %
ERYTHROCYTE [DISTWIDTH] IN BLOOD BY AUTOMATED COUNT: 16.6 % (ref 10–15)
GLUCOSE SERPL-MCNC: 112 MG/DL (ref 70–99)
HCT VFR BLD AUTO: 30.3 % (ref 35–47)
HGB BLD-MCNC: 10.3 G/DL (ref 11.7–15.7)
IMM GRANULOCYTES # BLD: ABNORMAL 10*3/UL
IMM GRANULOCYTES NFR BLD: ABNORMAL %
LYMPHOCYTES # BLD AUTO: ABNORMAL 10*3/UL
LYMPHOCYTES # BLD MANUAL: 0.9 10E3/UL (ref 0.8–5.3)
LYMPHOCYTES NFR BLD AUTO: ABNORMAL %
LYMPHOCYTES NFR BLD MANUAL: 20 %
MCH RBC QN AUTO: 28.5 PG (ref 26.5–33)
MCHC RBC AUTO-ENTMCNC: 34 G/DL (ref 31.5–36.5)
MCV RBC AUTO: 84 FL (ref 78–100)
MONOCYTES # BLD AUTO: ABNORMAL 10*3/UL
MONOCYTES # BLD MANUAL: 0.2 10E3/UL (ref 0–1.3)
MONOCYTES NFR BLD AUTO: ABNORMAL %
MONOCYTES NFR BLD MANUAL: 5 %
NEUTROPHILS # BLD AUTO: ABNORMAL 10*3/UL
NEUTROPHILS # BLD MANUAL: 3.4 10E3/UL (ref 1.6–8.3)
NEUTROPHILS NFR BLD AUTO: ABNORMAL %
NEUTROPHILS NFR BLD MANUAL: 74 %
NEUTS HYPERSEG BLD QL SMEAR: PRESENT
NRBC # BLD AUTO: 0 10E3/UL
NRBC BLD AUTO-RTO: 0 /100
PLAT MORPH BLD: ABNORMAL
PLATELET # BLD AUTO: 176 10E3/UL (ref 150–450)
POTASSIUM SERPL-SCNC: 3.7 MMOL/L (ref 3.4–5.3)
PROT SERPL-MCNC: 6.9 G/DL (ref 6.4–8.3)
RBC # BLD AUTO: 3.61 10E6/UL (ref 3.8–5.2)
RBC MORPH BLD: ABNORMAL
SODIUM SERPL-SCNC: 140 MMOL/L (ref 135–145)
WBC # BLD AUTO: 4.6 10E3/UL (ref 4–11)

## 2023-12-20 PROCEDURE — 96372 THER/PROPH/DIAG INJ SC/IM: CPT | Performed by: STUDENT IN AN ORGANIZED HEALTH CARE EDUCATION/TRAINING PROGRAM

## 2023-12-20 PROCEDURE — 85007 BL SMEAR W/DIFF WBC COUNT: CPT | Performed by: STUDENT IN AN ORGANIZED HEALTH CARE EDUCATION/TRAINING PROGRAM

## 2023-12-20 PROCEDURE — 36415 COLL VENOUS BLD VENIPUNCTURE: CPT

## 2023-12-20 PROCEDURE — 82310 ASSAY OF CALCIUM: CPT | Performed by: STUDENT IN AN ORGANIZED HEALTH CARE EDUCATION/TRAINING PROGRAM

## 2023-12-20 PROCEDURE — 82374 ASSAY BLOOD CARBON DIOXIDE: CPT | Performed by: STUDENT IN AN ORGANIZED HEALTH CARE EDUCATION/TRAINING PROGRAM

## 2023-12-20 PROCEDURE — 250N000011 HC RX IP 250 OP 636: Mod: JZ | Performed by: STUDENT IN AN ORGANIZED HEALTH CARE EDUCATION/TRAINING PROGRAM

## 2023-12-20 PROCEDURE — 85027 COMPLETE CBC AUTOMATED: CPT | Performed by: STUDENT IN AN ORGANIZED HEALTH CARE EDUCATION/TRAINING PROGRAM

## 2023-12-20 RX ADMIN — PEGFILGRASTIM 6 MG: 6 INJECTION SUBCUTANEOUS at 13:00

## 2023-12-20 NOTE — LETTER
[facility] CARE COORDINATION  [address] [facility] [address]     December 20, 2023    Mirta Cardenas  [address] [facility] [address]      Dear Mirta,    I am a clinic care coordinator who works with Joseline Fraga MD with the [facility]. I wanted to thank you for spending the time to talk with me.  Below is a description of clinic care coordination and how I can further assist you.       The clinic care coordination team is made up of a registered nurse, , financial resource worker and community health worker who understand the health care system. The goal of clinic care coordination is to help you manage your health and improve access to the health care system. Our team works alongside your provider to assist you in determining your health and social needs. We can help you obtain health care and community resources, providing you with necessary information and education. We can work with you through any barriers and develop a care plan that helps coordinate and strengthen the communication between you and your care team.  Our services are voluntary and are offered without charge to you personally.    Please feel free to contact me with any questions or concerns regarding care coordination and what we can offer.      We are focused on providing you with the highest-quality healthcare experience possible.    Sincerely,     [facility]   Kristen Angulo RN, Care Coordinator   [facility]'s   E-mail [email or web address]   [phone]     Enclosed: I have enclosed a copy of a 24 Hour Access Plan. This has helpful phone numbers for you to call when needed. Please keep this in an easy to access place to use as needed.

## 2023-12-20 NOTE — PROGRESS NOTES
Clinic Care Coordination Contact  Clinic Care Coordination Contact  OUTREACH    Referral Information:  Referral Source: IP Handoff    Primary Diagnosis: Oncology    Chief Complaint   Patient presents with    Clinic Care Coordination - Post Hospital     Clinic Care Coordination RN         Universal Utilization: 12/13/2023 - 12/19/2023 (6 days)  [facility]     Todd Velazquez MD  Last attending  Treatment team Sarcoma (H)  Principal problem   Chemotherapy cycle 3  Clinic Utilization  Difficulty keeping appointments:: No  Compliance Concerns: No  No-Show Concerns: No  No PCP office visit in Past Year: No  Utilization      No Show Count (past year)  0             ED Visits  0             Hospital Admissions  3                    Current as of: 12/20/2023  7:36 AM                Clinical Concerns:  Current Medical Concerns:  Patient reports no side effects from previous Chemotherapy.  Today she is feeling a little tired   CC RN encouraged pacing activity throughout the day  Patient has a Oncology visit today and states she has a Oncology nurse contact for questions or concerns   Current Behavioral Concerns: No    Education Provided to patient: CC introductory letter sent via My Chart    Pain  Pain (GOAL):: No  Health Maintenance Reviewed: Not assessed  Clinical Pathway: None    Medication Management:  Medication review status: Medications reviewed.  Changes noted per patient report.         Living Situation:  Current living arrangement:: I live in a private home with spouse    Lifestyle & Psychosocial Needs:    Social Determinants of Health     Food Insecurity: Low Risk  (10/26/2023)    Food Insecurity     Within the past 12 months, did you worry that your food would run out before you got money to buy more?: No     Within the past 12 months, did the food you bought just not last and you didn t have money to get more?: No   Depression: Not at risk (9/26/2023)    PHQ-2      PHQ-2 Score: 0   Housing Stability: Low Risk  (10/26/2023)    Housing Stability     Do you have housing? : Yes     Are you worried about losing your housing?: No   Tobacco Use: Low Risk  (12/13/2023)    Patient History     Smoking Tobacco Use: Never     Smokeless Tobacco Use: Never     Passive Exposure: Not on file   Financial Resource Strain: Low Risk  (10/26/2023)    Financial Resource Strain     Within the past 12 months, have you or your family members you live with been unable to get utilities (heat, electricity) when it was really needed?: No   Alcohol Use: Not on file   Transportation Needs: Low Risk  (10/26/2023)    Transportation Needs     Within the past 12 months, has lack of transportation kept you from medical appointments, getting your medicines, non-medical meetings or appointments, work, or from getting things that you need?: No   Physical Activity: Not on file   Interpersonal Safety: Low Risk  (10/26/2023)    Interpersonal Safety     Do you feel physically and emotionally safe where you currently live?: Yes     Within the past 12 months, have you been hit, slapped, kicked or otherwise physically hurt by someone?: No     Within the past 12 months, have you been humiliated or emotionally abused in other ways by your partner or ex-partner?: No   Stress: Not on file   Social Connections: Not on file     Diet:: Regular  Inadequate nutrition (GOAL):: No  Tube Feeding: No  Inadequate activity/exercise (GOAL):: No  Significant changes in sleep pattern (GOAL): No        Pentecostal or spiritual beliefs that impact treatment:: No  Mental health DX:: No  Mental health management concern (GOAL):: No  Chemical Dependency Status: No Current Concerns  Informal Support system:: Spouse             Resources and Interventions:  Current Resources:      Community Resources: None  Supplies Currently Used at Home: None  Equipment Currently Used at Home: none                 Referrals Placed: None      Patient/Caregiver  understanding: Patient expresses good understanding of discharge instructions        Future Appointments                Today [place] [facility] [place] [facility]    Today ROOM 8 [place]; [place] [facility] [place] [facility]    In 2 days ROOM 11 [place]; [place] [facility] [place] [facility]    In 6 days [place] [facility] [place] [facility]    In 6 days ROOM 10 [place]; [place] [facility] [place] [facility]    In 1 week [place] [facility] [place]    In 1 week ROOM 11 [place]; [place] [facility] [place] [facility]    In 1 week [place] [facility]    In 1 week ROOM 3 [place]; [place] [facility] [place] [facility]    In 1 week Scheierl, Amber J, APRN CNP [facility]    In 3 weeks [facility] LAB DRAW [facility]    In 3 weeks Gunjan Garza PA-C [facility]    In 1 month [facility]    In 1 month [facility] MR 1 [facility]    In 1 month [facility] LAB DRAW [facility]    In 1 month Roney Nam MD [facility]    In 1 month Patrice Armstrong MD  [facility] RAD THER    In 4 months [facility] LAB DRAW [facility]    In 4 months Griffin Duckworth MD [facility]    In 4 months Radha Matias PA-C [facility]            Plan:   Patient is closely followed by Oncology group   No unmet needs, no further  care coordination needed at this time   [facility]   Kristen Angulo RN, Care Coordinator   [facility]   E-mail [email or web address]   [phone]

## 2023-12-20 NOTE — LETTER
Health Care Home - Access Care Plan    About Me:    Patient Name:  Mirta Yadav    YOB: 1955  Age:                      68 year old   Edda MRN:     [id number] Telephone Information:   Home Phone [phone]   Mobile [phone]       Address:  [address] [facility] [address] Email address:  [email or web address]      Emergency Contact(s)   Name Relationship Lgl Grd Work Phone Home Phone Mobile Phone   1. SERGEY YADAV Spouse  [phone]   2. DANISHA YADAV Son    [phone]   3. SILVIANO YADAV Son    [phone]             Health Maintenance: Routine Health maintenance Reviewed: Not assessed    My Access Plan  Medical Emergency 911   Questions or concerns during clinic hours Primary Clinic Line, I will call the clinic directly: [facility] - [phone]   24 Hour Appointment Line [phone] or  [phone] [facility] ([phone]) (toll free)   24 Hour Nurse Line [phone] (toll free)   Questions or concerns outside clinic hours 24 Hour Appointment Line, I will call the after-hours on-call line:   [facility] [phone] or [phone] ([phone]) (toll-free)   Preferred Urgent Care [facility], [phone]   Preferred Hospital [facility]  [phone]   Preferred Pharmacy [facility]     Behavioral Health Crisis Line The National Suicide Prevention Lifeline at 1-609.333.7113 or 91                     My Care Team Members  Patient Care Team         Relationship Specialty Notifications Start End    Joseline Fraga MD PCP - General Family Medicine  6/9/23     Phone: [phone] Fax: [phone] [address] [place] [address]    Giovany Acosta MD MD Hematology & Oncology Admissions 4/28/15     Phone: [phone] Fax: [phone]         [address] [place] [address]  [facility] [address]    Radha Matias PA-C Physician Assistant Dermatology  5/10/23     Phone: [phone] Fax: [phone]         [address] [facility] [address]    Joseline Fraga MD Assigned PCP   6/24/23     Phone: [phone] Fax: [phone]         [address] [place] [address]    Cheng Vincent DPM Assigned Surgical Provider   9/23/23     Phone: [phone] Fax: [phone] [address] [facility] [address]    Patrice Armstrong MD MD Radiation Oncology  10/6/23     Phone: [phone] Fax: [phone] [address] [facility] [address]    Joseline Fraga MD Assigned Pain Medication Provider   10/7/23     Phone: [phone] Fax: [phone]         [address] [place] [address]    Lakhwinder Juarez PALupeC Assigned Musculoskeletal Provider   10/7/23     Phone: [phone] Fax: [phone]         [address] [place] [address]    Johnathan Bernstein, RN Specialty Care Coordinator Hematology & Oncology Admissions 10/11/23     Phone: [phone] Pager: [phone]        Roney Nam MD Assigned Cancer Care Provider   11/18/23     Phone: [phone] Fax: [phone]         [address] [place] [address]    Kristen Angulo, RN Lead Care Coordinator Primary Care - CC Admissions 12/20/23 12/20/23    Phone: [phone]                  My Medical and Care Information  Problem List   Patient Active Problem List   Diagnosis    Calculus of kidney    Benign essential hypertension    Esophageal reflux    Chronic lymphoid leukemia in remission (H)    Mild intermittent asthma    Hypercalcuria    Other hyperparathyroidism (H24)    Female stress incontinence    Thyroid nodule    Anemia    Fatty liver    High level of uric acid in blood    Hyperlipidemia LDL goal <100    CKD (chronic kidney disease) stage 3, GFR 30-59 ml/min (H)    Advanced directives, counseling/discussion    Sebaceous cyst    Impaired fasting glucose    Syncope and  collapse    Pneumonia due to COVID-19 virus    CINV (chemotherapy-induced nausea and vomiting)    Sarcoma of right lower extremity (H)    Sarcoma (H)    Prevention of chemotherapy-induced neutropenia    Chemotherapy management, encounter for      Current Medications and Allergies:    Current Outpatient Medications   Medication    amLODIPine (NORVASC) 2.5 MG tablet    calcipotriene (DOVONOX) 0.005 % external cream    clobetasol (TEMOVATE) 0.05 % external cream    docusate sodium (COLACE) 100 MG capsule    losartan (COZAAR) 100 MG tablet    METAMUCIL FIBER PO    Multiple Vitamins-Calcium (ONE-A-DAY WOMENS FORMULA PO)    omeprazole (PRILOSEC) 20 MG DR capsule    oxyCODONE (ROXICODONE) 5 MG tablet    pravastatin (PRAVACHOL) 40 MG tablet    diclofenac (VOLTAREN) 1 % topical gel    ibuprofen (ADVIL/MOTRIN) 200 MG capsule    pregabalin (LYRICA) 50 MG capsule    prochlorperazine (COMPAZINE) 5 MG tablet     No current facility-administered medications for this visit.

## 2023-12-20 NOTE — PROGRESS NOTES
Infusion Nursing Note:  Mirta Cardenas presents today for Possible blood transfusion and Neulasta.    Patient seen by provider today: No   present during visit today: Not Applicable.    Note: N/A.    Intravenous Access:  Labs drawn peripherally.    Treatment Conditions:  Lab Results   Component Value Date    HGB 10.3 (L) 12/20/2023    WBC 4.6 12/20/2023    ANEU 9.6 (H) 11/29/2023    ANEUTAUTO 2.8 12/19/2023     12/20/2023        Results reviewed, labs did NOT meet treatment parameters: Hgb 10.3, Plt 176    Post Infusion Assessment:  Patient tolerated injection without incident.     Discharge Plan:   Patient discharged in stable condition accompanied by: self.  Departure Mode: Ambulatory.    Teddy Awad RN

## 2023-12-20 NOTE — PROGRESS NOTES
[facility] - Medical Oncology Follow-Up Consult Note  2023    Patient Identifiers     Name: Mirta Cardenas  Preferred Address: [place]  Preferred Language: English  : 1955  Gender: female    Assessment and Recommendations     Ms. 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 Medical oncology consulted for chemotherapy management guidance.        Current Treatment: Cycle 3 Doxil/Ifos  Day 1 - Doxil 40mg/m2  Days 1-5 CIVI Ifosfamide/Mesna 1600mg/m2  Day 6 Mesna 1600mg/m2 over 18hrs  Treatment Day:     Plan:  Ms. Cardenas remains admitted for cycle 3-day 6 of Doxil/Ifosfamide.  She continues to tolerate treatment well, with no evidence of the fogginess which she had during prior cycles.  She did mention her finger sticking during work on her phone.  This symptom was never replicated, and is not present on exam either yesterday or today.  Neurologic exam is overall intact.  She additionally has no nausea, vomiting, abdominal pain, diarrhea, or other neurotoxicity.  Patient is now transitioning to final day of treatment with meds not only.  Plan for discharge following meds and therapy.  Post discharge follow-up, per Dr. Morrison's note on , is copied below.    -Neulasta after chemo on    -Labs and infusion appointments after chemo on , , , ,    -ISIDORO toxicity check on - virtual    -Admission for C4 on 1/10   -PET scan and MRI R LE on    -Follow up with Dr. Jimenez    -Please reschedule appointment with rad onc Dr. Armstrong at the end of January after scans.   -PET scan and MRI Follow up Dr. Jimenez after scans     We have confirmed these appointments with Ms. Cardenas on my chart review.  She requires no additional PRN's, and feels ready to go home following completion of medicine infusion tomorrow.  Appreciate medicine management  "and complex patient care.  Oncology available for questions.    Thank you for this interesting consult. Oncology Consult Service to follow. Feel free to reach out with further questions.     60 minutes spent on the date of the encounter doing chart review, review of test results, interpretation of tests, patient visit, and documentation     Shun Osei MD, PhD   of Medicine  Division of Hematology, Oncology and Transplantation  [facility]    -----------------------------------    Oncology Summary and HPI     Cancer Staging   No matching staging information was found for the patient.      Oncology History   Sarcoma of right lower extremity (H)   10/16/2023 Initial Diagnosis    Sarcoma of right lower extremity (H)     10/17/2023 -  Chemotherapy    IP ONC Sarcoma - Liposomal DOXOrubicin (DOXIL) / Ifosfamide / Mesna  Plan Provider: Roney Nam MD  Treatment goal: Curative  Line of treatment: First Line         Subjective/Interval Events     - no complaints  - she looks forward to going home tomorrow. [facility] She does not require additional PRN's for home use    Physical Exam     Vital signs: BP (!) 158/93 (BP Location: Right arm)   Pulse 78   Temp 97.8  F (36.6  C) (Oral)   Resp 17   Ht 1.549 m (5' 1\")   Wt 77.3 kg (170 lb 6.4 oz)   SpO2 97%   BMI 32.20 kg/m      ECOG performance status:  1  Vascular access:  PICC    Physical Exam:  Exam performed, notable for:  - no notables on exam    Objective Data     Lab data:  I have personally reviewed the lab data, notable for:    - Tprot 6.1  - Hgb 10.1  - Plt 128    Radiology data:  I have personally reviewed the radiology data, notable for:  - no new data    Pathology and other data:  I have personally reviewed and interpreted the pathology data, notable for:    - no new data    "

## 2023-12-22 ENCOUNTER — INFUSION THERAPY VISIT (OUTPATIENT)
Dept: INFUSION THERAPY | Facility: CLINIC | Age: 68
End: 2023-12-22
Attending: STUDENT IN AN ORGANIZED HEALTH CARE EDUCATION/TRAINING PROGRAM
Payer: COMMERCIAL

## 2023-12-22 ENCOUNTER — LAB (OUTPATIENT)
Dept: LAB | Facility: CLINIC | Age: 68
End: 2023-12-22
Payer: COMMERCIAL

## 2023-12-22 DIAGNOSIS — C49.21 SARCOMA OF RIGHT LOWER EXTREMITY (H): ICD-10-CM

## 2023-12-22 LAB
ALBUMIN SERPL BCG-MCNC: 4.4 G/DL (ref 3.5–5.2)
ALP SERPL-CCNC: 119 U/L (ref 40–150)
ALT SERPL W P-5'-P-CCNC: 16 U/L (ref 0–50)
ANION GAP SERPL CALCULATED.3IONS-SCNC: 12 MMOL/L (ref 7–15)
AST SERPL W P-5'-P-CCNC: 15 U/L (ref 0–45)
BASOPHILS # BLD AUTO: ABNORMAL 10*3/UL
BASOPHILS # BLD MANUAL: 0.1 10E3/UL (ref 0–0.2)
BASOPHILS NFR BLD AUTO: ABNORMAL %
BASOPHILS NFR BLD MANUAL: 2 %
BILIRUB SERPL-MCNC: 0.3 MG/DL
BUN SERPL-MCNC: 22.8 MG/DL (ref 8–23)
CALCIUM SERPL-MCNC: 9.2 MG/DL (ref 8.8–10.2)
CHLORIDE SERPL-SCNC: 108 MMOL/L (ref 98–107)
CREAT SERPL-MCNC: 0.88 MG/DL (ref 0.51–0.95)
DEPRECATED HCO3 PLAS-SCNC: 20 MMOL/L (ref 22–29)
EGFRCR SERPLBLD CKD-EPI 2021: 72 ML/MIN/1.73M2
EOSINOPHIL # BLD AUTO: ABNORMAL 10*3/UL
EOSINOPHIL # BLD MANUAL: 0.1 10E3/UL (ref 0–0.7)
EOSINOPHIL NFR BLD AUTO: ABNORMAL %
EOSINOPHIL NFR BLD MANUAL: 2 %
ERYTHROCYTE [DISTWIDTH] IN BLOOD BY AUTOMATED COUNT: 16.3 % (ref 10–15)
GLUCOSE SERPL-MCNC: 104 MG/DL (ref 70–99)
HCT VFR BLD AUTO: 26.6 % (ref 35–47)
HGB BLD-MCNC: 9.1 G/DL (ref 11.7–15.7)
IMM GRANULOCYTES # BLD: ABNORMAL 10*3/UL
IMM GRANULOCYTES NFR BLD: ABNORMAL %
LYMPHOCYTES # BLD AUTO: ABNORMAL 10*3/UL
LYMPHOCYTES # BLD MANUAL: 1.2 10E3/UL (ref 0.8–5.3)
LYMPHOCYTES NFR BLD AUTO: ABNORMAL %
LYMPHOCYTES NFR BLD MANUAL: 18 %
MCH RBC QN AUTO: 28.7 PG (ref 26.5–33)
MCHC RBC AUTO-ENTMCNC: 34.2 G/DL (ref 31.5–36.5)
MCV RBC AUTO: 84 FL (ref 78–100)
METAMYELOCYTES # BLD MANUAL: 0.1 10E3/UL
METAMYELOCYTES NFR BLD MANUAL: 1 %
MONOCYTES # BLD AUTO: ABNORMAL 10*3/UL
MONOCYTES # BLD MANUAL: 0.4 10E3/UL (ref 0–1.3)
MONOCYTES NFR BLD AUTO: ABNORMAL %
MONOCYTES NFR BLD MANUAL: 6 %
MYELOCYTES # BLD MANUAL: 0.1 10E3/UL
MYELOCYTES NFR BLD MANUAL: 1 %
NEUTROPHILS # BLD AUTO: ABNORMAL 10*3/UL
NEUTROPHILS # BLD MANUAL: 4.7 10E3/UL (ref 1.6–8.3)
NEUTROPHILS NFR BLD AUTO: ABNORMAL %
NEUTROPHILS NFR BLD MANUAL: 70 %
NRBC # BLD AUTO: 0 10E3/UL
NRBC BLD AUTO-RTO: 0 /100
PLAT MORPH BLD: ABNORMAL
PLATELET # BLD AUTO: 115 10E3/UL (ref 150–450)
POTASSIUM SERPL-SCNC: 3.6 MMOL/L (ref 3.4–5.3)
PROT SERPL-MCNC: 6.8 G/DL (ref 6.4–8.3)
RBC # BLD AUTO: 3.17 10E6/UL (ref 3.8–5.2)
RBC MORPH BLD: ABNORMAL
SODIUM SERPL-SCNC: 140 MMOL/L (ref 135–145)
WBC # BLD AUTO: 6.7 10E3/UL (ref 4–11)

## 2023-12-22 PROCEDURE — 80053 COMPREHEN METABOLIC PANEL: CPT | Performed by: STUDENT IN AN ORGANIZED HEALTH CARE EDUCATION/TRAINING PROGRAM

## 2023-12-22 PROCEDURE — 85027 COMPLETE CBC AUTOMATED: CPT | Performed by: STUDENT IN AN ORGANIZED HEALTH CARE EDUCATION/TRAINING PROGRAM

## 2023-12-22 PROCEDURE — 85007 BL SMEAR W/DIFF WBC COUNT: CPT | Performed by: STUDENT IN AN ORGANIZED HEALTH CARE EDUCATION/TRAINING PROGRAM

## 2023-12-22 PROCEDURE — 36415 COLL VENOUS BLD VENIPUNCTURE: CPT

## 2023-12-22 ASSESSMENT — ASTHMA QUESTIONNAIRES: ACT_TOTALSCORE: 25

## 2023-12-22 NOTE — PROGRESS NOTES
Pt did not require any transfusions today. Pt not seen in the infusion clinic. Was given the results while she was in the waiting area. Monica Lee RN

## 2023-12-26 ENCOUNTER — OFFICE VISIT (OUTPATIENT)
Dept: FAMILY MEDICINE | Facility: CLINIC | Age: 68
End: 2023-12-26
Payer: COMMERCIAL

## 2023-12-26 VITALS
BODY MASS INDEX: 32.1 KG/M2 | HEART RATE: 86 BPM | WEIGHT: 170 LBS | SYSTOLIC BLOOD PRESSURE: 136 MMHG | OXYGEN SATURATION: 99 % | RESPIRATION RATE: 14 BRPM | HEIGHT: 61 IN | TEMPERATURE: 97.6 F | DIASTOLIC BLOOD PRESSURE: 74 MMHG

## 2023-12-26 DIAGNOSIS — K59.03 DRUG-INDUCED CONSTIPATION: ICD-10-CM

## 2023-12-26 DIAGNOSIS — C49.21 SARCOMA OF RIGHT LOWER EXTREMITY (H): ICD-10-CM

## 2023-12-26 DIAGNOSIS — I10 BENIGN ESSENTIAL HYPERTENSION: Primary | ICD-10-CM

## 2023-12-26 LAB
ALBUMIN SERPL BCG-MCNC: 4.4 G/DL (ref 3.5–5.2)
ALP SERPL-CCNC: 138 U/L (ref 40–150)
ALT SERPL W P-5'-P-CCNC: 17 U/L (ref 0–50)
ANION GAP SERPL CALCULATED.3IONS-SCNC: 10 MMOL/L (ref 7–15)
AST SERPL W P-5'-P-CCNC: 19 U/L (ref 0–45)
BASOPHILS # BLD AUTO: ABNORMAL 10*3/UL
BASOPHILS # BLD MANUAL: 0.1 10E3/UL (ref 0–0.2)
BASOPHILS NFR BLD AUTO: ABNORMAL %
BASOPHILS NFR BLD MANUAL: 1 %
BILIRUB SERPL-MCNC: 0.2 MG/DL
BUN SERPL-MCNC: 15.6 MG/DL (ref 8–23)
CALCIUM SERPL-MCNC: 9.3 MG/DL (ref 8.8–10.2)
CHLORIDE SERPL-SCNC: 108 MMOL/L (ref 98–107)
CREAT SERPL-MCNC: 0.73 MG/DL (ref 0.51–0.95)
DEPRECATED HCO3 PLAS-SCNC: 23 MMOL/L (ref 22–29)
EGFRCR SERPLBLD CKD-EPI 2021: 90 ML/MIN/1.73M2
EOSINOPHIL # BLD AUTO: ABNORMAL 10*3/UL
EOSINOPHIL # BLD MANUAL: 0 10E3/UL (ref 0–0.7)
EOSINOPHIL NFR BLD AUTO: ABNORMAL %
EOSINOPHIL NFR BLD MANUAL: 0 %
ERYTHROCYTE [DISTWIDTH] IN BLOOD BY AUTOMATED COUNT: 16 % (ref 10–15)
GLUCOSE SERPL-MCNC: 112 MG/DL (ref 70–99)
HCT VFR BLD AUTO: 27.1 % (ref 35–47)
HGB BLD-MCNC: 9.2 G/DL (ref 11.7–15.7)
IMM GRANULOCYTES # BLD: ABNORMAL 10*3/UL
IMM GRANULOCYTES NFR BLD: ABNORMAL %
LYMPHOCYTES # BLD AUTO: ABNORMAL 10*3/UL
LYMPHOCYTES # BLD MANUAL: 0.8 10E3/UL (ref 0.8–5.3)
LYMPHOCYTES NFR BLD AUTO: ABNORMAL %
LYMPHOCYTES NFR BLD MANUAL: 13 %
MCH RBC QN AUTO: 28.8 PG (ref 26.5–33)
MCHC RBC AUTO-ENTMCNC: 33.9 G/DL (ref 31.5–36.5)
MCV RBC AUTO: 85 FL (ref 78–100)
MONOCYTES # BLD AUTO: ABNORMAL 10*3/UL
MONOCYTES # BLD MANUAL: 0.4 10E3/UL (ref 0–1.3)
MONOCYTES NFR BLD AUTO: ABNORMAL %
MONOCYTES NFR BLD MANUAL: 6 %
NEUTROPHILS # BLD AUTO: ABNORMAL 10*3/UL
NEUTROPHILS # BLD MANUAL: 5.2 10E3/UL (ref 1.6–8.3)
NEUTROPHILS NFR BLD AUTO: ABNORMAL %
NEUTROPHILS NFR BLD MANUAL: 80 %
NRBC # BLD AUTO: 0 10E3/UL
NRBC BLD AUTO-RTO: 0 /100
PLAT MORPH BLD: NORMAL
PLATELET # BLD AUTO: 124 10E3/UL (ref 150–450)
POTASSIUM SERPL-SCNC: 3.9 MMOL/L (ref 3.4–5.3)
PROT SERPL-MCNC: 7 G/DL (ref 6.4–8.3)
RBC # BLD AUTO: 3.2 10E6/UL (ref 3.8–5.2)
RBC MORPH BLD: NORMAL
SODIUM SERPL-SCNC: 141 MMOL/L (ref 135–145)
WBC # BLD AUTO: 6.5 10E3/UL (ref 4–11)

## 2023-12-26 PROCEDURE — 36415 COLL VENOUS BLD VENIPUNCTURE: CPT | Performed by: STUDENT IN AN ORGANIZED HEALTH CARE EDUCATION/TRAINING PROGRAM

## 2023-12-26 PROCEDURE — 85027 COMPLETE CBC AUTOMATED: CPT | Performed by: STUDENT IN AN ORGANIZED HEALTH CARE EDUCATION/TRAINING PROGRAM

## 2023-12-26 PROCEDURE — 80053 COMPREHEN METABOLIC PANEL: CPT | Performed by: STUDENT IN AN ORGANIZED HEALTH CARE EDUCATION/TRAINING PROGRAM

## 2023-12-26 PROCEDURE — 85007 BL SMEAR W/DIFF WBC COUNT: CPT | Performed by: STUDENT IN AN ORGANIZED HEALTH CARE EDUCATION/TRAINING PROGRAM

## 2023-12-26 PROCEDURE — 99495 TRANSJ CARE MGMT MOD F2F 14D: CPT | Performed by: STUDENT IN AN ORGANIZED HEALTH CARE EDUCATION/TRAINING PROGRAM

## 2023-12-26 RX ORDER — AMLODIPINE BESYLATE 5 MG/1
7.5 TABLET ORAL DAILY
Qty: 135 TABLET | Refills: 3 | Status: SHIPPED | OUTPATIENT
Start: 2023-12-26 | End: 2024-06-18

## 2023-12-26 ASSESSMENT — PAIN SCALES - GENERAL: PAINLEVEL: MILD PAIN (2)

## 2023-12-26 NOTE — PROGRESS NOTES
Assessment & Plan     Patient is a very pleasant 68-year-old female who presents today for follow-up from recent hospital stay.    Benign essential hypertension  During recent hospital stay, outpatient visits over at least the last month, patient has been having increasing blood pressure.  During her recent hospital stay, was started on amlodipine, tapered up to 7.5 mg, doing well on this at home.  She brings home blood pressure numbers that are consistent and similar to what we have here today.  We discussed how to check blood pressure, in particular resting.  In addition, if she were to develop any lightheadedness or dizziness, recommended that she check her blood pressure.  Refill of 7.5 mg of amlodipine sent to the pharmacy for her today.  Continue to monitor over time.  - amLODIPine (NORVASC) 5 MG tablet  Dispense: 135 tablet; Refill: 3    Sarcoma of right lower extremity (H)  Regarding history of sarcoma, recent hospital stay, patient is doing well.  Had had some neurologic symptoms after her last chemo prior to that, but with an increased dose of thiamine, seems to be doing well this time.  She is due for labs, which are obtained today.    - Comprehensive metabolic panel  - CBC with platelets differential    Drug-induced constipation  She and I discussed constipation she has been dealing with, and having difficulty with deciding how to take her medication.  We discussed that it is possible that she might just need medications for the week or so after her hospital stay, they may be able to go to as needed.  She is using both stool softener and MiraLAX.  Will continue to monitor at home, adjust medications as needed to have neither constipation or diarrhea.  In addition, she does not take the oxycodone typically, so we discussed that her need for stool softeners may be less because of this.    I spent a total of 21 minutes on the day of the visit.   Time spent by me doing chart review, history and exam,  "documentation and further activities per the note     MED REC REQUIRED  Post Medication Reconciliation Status:  Discharge medications reconciled, continue medications without change  BMI:   Estimated body mass index is 32.12 kg/m  as calculated from the following:    Height as of this encounter: 1.549 m (5' 1\").    Weight as of this encounter: 77.1 kg (170 lb).     Joseline Fraga MD  [facility]    Makayla Kirby is a 68 year old, presenting for the following health issues:  Hospital F/U        12/26/2023     9:07 AM   Additional Questions   Roomed by Leia KURTZ       [facility]       Hospital Follow-up Visit:    Hospital/Nursing Home/IP Rehab Facility: [facility]  Date of Admission: 12/13/2023  Date of Discharge: 12/19/2023  Reason(s) for Admission: Sarcoma of right lower extremity    Was your hospitalization related to COVID-19? No   Problems taking medications regularly:  None  Medication changes since discharge: None  Problems adhering to non-medication therapy:  None    Summary of hospitalization:  [facility] discharge summary reviewed  Diagnostic Tests/Treatments reviewed.  Follow up needed: labs, previously ordered  Other Healthcare Providers Involved in Patient s Care:         None  Update since discharge: stable.       Blood pressure elevation in thi shouseital and outpatient    Increased b vitamin this kamryn (thiamine)  Did better with that.     Warn out this time.   Tired but also can't sleep well.       Plan of care communicated with patient             Review of Systems   Constitutional, HEENT, cardiovascular, pulmonary, GI, , musculoskeletal, neuro, skin, endocrine and psych systems are negative, except as otherwise noted.      Objective    /74 (BP Location: Right arm, Patient Position: Sitting, Cuff Size: Adult Large)   Pulse 86   Temp 97.6  F (36.4  C) (Tympanic)   Resp 14   Ht 1.549 m (5' 1\")   Wt " 77.1 kg (170 lb)   SpO2 99%   BMI 32.12 kg/m    Body mass index is 32.12 kg/m .  Physical Exam  Constitutional:       Appearance: Normal appearance.   HENT:      Head: Normocephalic.   Eyes:      General: No scleral icterus.     Extraocular Movements: Extraocular movements intact.      Conjunctiva/sclera: Conjunctivae normal.   Cardiovascular:      Rate and Rhythm: Normal rate.   Pulmonary:      Effort: Pulmonary effort is normal.   Neurological:      General: No focal deficit present.      Mental Status: She is alert and oriented to person, place, and time.           Results from this visit  Results for orders placed or performed in visit on 12/26/23   CBC with platelets differential     Status: None (In process)    Narrative    The following orders were created for panel order CBC with platelets differential.  Procedure                               Abnormality         Status                     ---------                               -----------         ------                     CBC with platelets and d...[[id number]]                      In process                   Please view results for these tests on the individual orders.

## 2023-12-28 ENCOUNTER — INFUSION THERAPY VISIT (OUTPATIENT)
Dept: INFUSION THERAPY | Facility: CLINIC | Age: 68
End: 2023-12-28
Attending: STUDENT IN AN ORGANIZED HEALTH CARE EDUCATION/TRAINING PROGRAM
Payer: COMMERCIAL

## 2023-12-28 ENCOUNTER — LAB (OUTPATIENT)
Dept: LAB | Facility: CLINIC | Age: 68
End: 2023-12-28
Payer: COMMERCIAL

## 2023-12-28 DIAGNOSIS — Z76.89 PREVENTION OF CHEMOTHERAPY-INDUCED NEUTROPENIA: ICD-10-CM

## 2023-12-28 DIAGNOSIS — C49.21 SARCOMA OF RIGHT LOWER EXTREMITY (H): ICD-10-CM

## 2023-12-28 DIAGNOSIS — D64.81 ANEMIA DUE TO ANTINEOPLASTIC CHEMOTHERAPY: ICD-10-CM

## 2023-12-28 DIAGNOSIS — C49.21 SARCOMA OF RIGHT LOWER EXTREMITY (H): Primary | ICD-10-CM

## 2023-12-28 DIAGNOSIS — T45.1X5A ANEMIA DUE TO ANTINEOPLASTIC CHEMOTHERAPY: ICD-10-CM

## 2023-12-28 LAB
ALBUMIN SERPL BCG-MCNC: 4.3 G/DL (ref 3.5–5.2)
ALP SERPL-CCNC: 141 U/L (ref 40–150)
ALT SERPL W P-5'-P-CCNC: 13 U/L (ref 0–50)
ANION GAP SERPL CALCULATED.3IONS-SCNC: 11 MMOL/L (ref 7–15)
AST SERPL W P-5'-P-CCNC: 15 U/L (ref 0–45)
BASOPHILS # BLD AUTO: 0.1 10E3/UL (ref 0–0.2)
BASOPHILS NFR BLD AUTO: 1 %
BILIRUB SERPL-MCNC: 0.2 MG/DL
BUN SERPL-MCNC: 19.7 MG/DL (ref 8–23)
CALCIUM SERPL-MCNC: 9.2 MG/DL (ref 8.8–10.2)
CHLORIDE SERPL-SCNC: 109 MMOL/L (ref 98–107)
CREAT SERPL-MCNC: 0.84 MG/DL (ref 0.51–0.95)
DEPRECATED HCO3 PLAS-SCNC: 22 MMOL/L (ref 22–29)
EGFRCR SERPLBLD CKD-EPI 2021: 76 ML/MIN/1.73M2
EOSINOPHIL # BLD AUTO: 0.1 10E3/UL (ref 0–0.7)
EOSINOPHIL NFR BLD AUTO: 1 %
ERYTHROCYTE [DISTWIDTH] IN BLOOD BY AUTOMATED COUNT: 17.2 % (ref 10–15)
GLUCOSE SERPL-MCNC: 109 MG/DL (ref 70–99)
HCT VFR BLD AUTO: 25.9 % (ref 35–47)
HGB BLD-MCNC: 8.9 G/DL (ref 11.7–15.7)
IMM GRANULOCYTES # BLD: 0.3 10E3/UL
IMM GRANULOCYTES NFR BLD: 4 %
LYMPHOCYTES # BLD AUTO: 1.3 10E3/UL (ref 0.8–5.3)
LYMPHOCYTES NFR BLD AUTO: 15 %
MCH RBC QN AUTO: 28.9 PG (ref 26.5–33)
MCHC RBC AUTO-ENTMCNC: 34.4 G/DL (ref 31.5–36.5)
MCV RBC AUTO: 84 FL (ref 78–100)
MONOCYTES # BLD AUTO: 0.5 10E3/UL (ref 0–1.3)
MONOCYTES NFR BLD AUTO: 6 %
NEUTROPHILS # BLD AUTO: 6.3 10E3/UL (ref 1.6–8.3)
NEUTROPHILS NFR BLD AUTO: 73 %
NRBC # BLD AUTO: 0 10E3/UL
NRBC BLD AUTO-RTO: 0 /100
PLATELET # BLD AUTO: 132 10E3/UL (ref 150–450)
POTASSIUM SERPL-SCNC: 3.6 MMOL/L (ref 3.4–5.3)
PROT SERPL-MCNC: 6.7 G/DL (ref 6.4–8.3)
RBC # BLD AUTO: 3.08 10E6/UL (ref 3.8–5.2)
SODIUM SERPL-SCNC: 142 MMOL/L (ref 135–145)
WBC # BLD AUTO: 8.6 10E3/UL (ref 4–11)

## 2023-12-28 PROCEDURE — 83735 ASSAY OF MAGNESIUM: CPT | Mod: GT,95 | Performed by: STUDENT IN AN ORGANIZED HEALTH CARE EDUCATION/TRAINING PROGRAM

## 2023-12-28 PROCEDURE — 85025 COMPLETE CBC W/AUTO DIFF WBC: CPT

## 2023-12-28 PROCEDURE — 84100 ASSAY OF PHOSPHORUS: CPT | Mod: GT,95 | Performed by: STUDENT IN AN ORGANIZED HEALTH CARE EDUCATION/TRAINING PROGRAM

## 2023-12-28 PROCEDURE — 82040 ASSAY OF SERUM ALBUMIN: CPT

## 2023-12-28 PROCEDURE — 36415 COLL VENOUS BLD VENIPUNCTURE: CPT

## 2023-12-28 NOTE — PROGRESS NOTES
Infusion Nursing Note:  Mirta Cardenas presents today for possible blood products.    Patient seen by provider today: No   present during visit today: Not Applicable.    Note: Pt not seen in infusion today, did not meet parameters.      Intravenous Access:  No Intravenous access/labs at this visit.    Treatment Conditions:  Lab Results   Component Value Date    HGB 8.9 (L) 12/28/2023    WBC 8.6 12/28/2023    ANEU 5.2 12/26/2023    ANEUTAUTO 6.3 12/28/2023     (L) 12/28/2023        Results reviewed, labs did NOT meet treatment parameters: hgb 8.9, plt 132.      Post Infusion Assessment:  NA.       Discharge Plan:   Copy of AVS reviewed with patient and/or family.  Patient will return 1/3/24 for next appointment.  Patient discharged in stable condition accompanied by: self and .  Departure Mode: Ambulatory.      PAVEL ROSAS RN

## 2023-12-29 ENCOUNTER — VIRTUAL VISIT (OUTPATIENT)
Dept: ONCOLOGY | Facility: CLINIC | Age: 68
End: 2023-12-29
Attending: STUDENT IN AN ORGANIZED HEALTH CARE EDUCATION/TRAINING PROGRAM
Payer: COMMERCIAL

## 2023-12-29 VITALS — HEIGHT: 61 IN | WEIGHT: 170 LBS | BODY MASS INDEX: 32.1 KG/M2

## 2023-12-29 DIAGNOSIS — E83.42 HYPOMAGNESEMIA: ICD-10-CM

## 2023-12-29 DIAGNOSIS — C49.21 SARCOMA OF RIGHT LOWER EXTREMITY (H): Primary | ICD-10-CM

## 2023-12-29 LAB
MAGNESIUM SERPL-MCNC: 1.5 MG/DL (ref 1.7–2.3)
PHOSPHATE SERPL-MCNC: 3.6 MG/DL (ref 2.5–4.5)

## 2023-12-29 PROCEDURE — 99215 OFFICE O/P EST HI 40 MIN: CPT | Mod: 95 | Performed by: STUDENT IN AN ORGANIZED HEALTH CARE EDUCATION/TRAINING PROGRAM

## 2023-12-29 RX ORDER — PREGABALIN 50 MG/1
50 CAPSULE ORAL 3 TIMES DAILY
Qty: 90 CAPSULE | Refills: 0 | Status: SHIPPED | OUTPATIENT
Start: 2023-12-29 | End: 2024-01-31

## 2023-12-29 RX ORDER — MAGNESIUM OXIDE 400 MG/1
400 TABLET ORAL 2 TIMES DAILY
Qty: 4 TABLET | Refills: 0 | Status: SHIPPED | OUTPATIENT
Start: 2023-12-29 | End: 2024-01-03

## 2023-12-29 ASSESSMENT — PAIN SCALES - GENERAL: PAINLEVEL: MILD PAIN (3)

## 2023-12-29 NOTE — PROGRESS NOTES
Virtual Visit Details    Type of service:  Video Visit   Video Start Time:  1:47PM  Video End Time: 2:05PM    Originating Location (pt. Location): Home    Distant Location (provider location):  On-site  Platform used for Video Visit: [facility]    FOLLOW UP VISIT NOTE    PATIENT NAME: Mirta Cardenas MRN # [id number]  DATE OF VISIT: Dec 29, 2023  YOB: 1955    CANCER TYPE:  High grade pleomorphic sarcoma, right calf       HISTORY OF PRESENT ILLNESS/ONCOLOGY HISTORY   Mirta is a 68 year old female with PMH of CLL and recent Dx of High Grade Pleomorphic sarcoma, with intermittent right calf pain but then continuous calf pain beginning in March 2023.  She had a recent x-ray which shows a destructive lesion in the midportion R LE in between the fibula and the tibia.     -10/2/23, Biopsy  Final Diagnosis   A.  Soft tissue, right calf mass, excision:  - High-grade pleomorphic sarcoma  - See comment      -10/10/23, PET/CT with no evidence of mets (mildly enlarged righ iliac chain LN with no hypermetabolic uptake; thought to be reactive).     -10/17/23-10/24/23, Inpatient Cycle 1 of Doxil/ifosfamide  -11/4/23, Inpatient cycle 2 Doxil/ifosfamide  -12/13/23, Cycle 3 inpatient Doxil/ifosfamide     PAST ONCOLOGIC HISTORY   CLL - on monitoring      INTERVAL HISTORY   -Mirta presents for a toxicity check following cycle 3.  She reports doing fairly well overall. She was busy with the holidays as she hosted [employer]. She did notice that she was slower to recover from this 3rd round with just feeling more tired.  -Her leg pain had actually seemed to resolve in the right leg and she had stopped oxycodone for a couple days but then it returned in the last couple days. The right leg swelling continues to improve. She has been taking Lyrica 2-3x per day. Since she still has pain, she wonders if she should continue this; if so, she will need Lyrica refilled today   -No nausea  "with chemo. Not using anti-emetics. Appetite is good  -Constipation responds to daily docusate or dulcolax (she's not sure) and miralax if needed.  -This round of chemo with the increased thiamine dosing she did not have any of the hand twitching or brain fog that she had with the previous cycle.  -She has periodically checked her BP and its been about 140's/80's. No headaches or blurred vision.  -Some ANN with stairs when carrying laundry but otherwise no SOB  -No fever or cough  -No rash  -No mouth sores  -No chest pain      REVIEW OF SYSTEMS   As noted in HPI.     PAST MEDICAL HISTORY   Anal fisure - controlled with nitrobid  CLL - now being monitored - [place]  HTN  Cholesterol  Hx of severe COVID  Psoriasis       PHYSICAL EXAM   Ht 1.549 m (5' 1\")   Wt 77.1 kg (170 lb)   BMI 32.12 kg/m     12/26/2023  9:29 AM   Vital Signs    Systolic 136    Diastolic 74    Pulse 86    Temperature 97.6  F (36.4  C)    Respirations 14    Weight (LB) 170 lb    Height 5' 1\"    BMI (Calculated) 32.12    Pain Score 2 (Mild)    O2 99 %          Video physical exam  General: Patient appears well in no acute distress.   Skin: No visualized rash or lesions on visualized skin  Eyes: EOMI, no erythema, sclera icterus or discharge noted  Resp: Appears to be breathing comfortably without accessory muscle usage, speaking in full sentences, no cough  Neurologic: No apparent tremors, facial movements symmetric. Hearing intact. No dysarthria.   Psych: affect appropriate, alert and oriented. Pleasant, talkative.      LABORATORY AND IMAGING STUDIES     Most Recent 3 CBC's:  Recent Labs   Lab Test 12/28/23  1228 12/26/23  1004 12/22/23  1215 12/20/23  1236 12/19/23  0414 12/18/23  0532   WBC 8.6 6.5 6.7   < > 4.1 4.0   HGB 8.9* 9.2* 9.1*   < > 9.6* 10.1*   MCV 84 85 84   < > 86 87   * 124* 115*   < > 128* 130*   ANEUTAUTO 6.3  --   --   --  2.8 2.6    < > = values in this interval not displayed.     Most Recent 3 BMP's:  Recent Labs "   Lab Test 12/28/23  1228 12/26/23  1004 12/22/23  1215    141 140   POTASSIUM 3.6 3.9 3.6   CHLORIDE 109* 108* 108*   CO2 22 23 20*   BUN 19.7 15.6 22.8   CR 0.84 0.73 0.88   ANIONGAP 11 10 12   MONIK 9.2 9.3 9.2   * 112* 104*   PROTTOTAL 6.7 7.0 6.8   ALBUMIN 4.3 4.4 4.4    Most Recent 3 LFT's:  Recent Labs   Lab Test 12/28/23  1228 12/26/23  1004 12/22/23  1215   AST 15 19 15   ALT 13 17 16   ALKPHOS 141 138 119   BILITOTAL 0.2 0.2 0.3    Most Recent 2 TSH and T4:No lab results found.    12/28/23: Phos normal. Mg low at 1.5    I reviewed the above labs today.       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 No evidence of metastatic disease.     She began neoadjuvant chemotherapy with Doxil/ifosfmaide on 10/17/23. Repeat imaging (PET 12/5/23) after 2 cycles showed treatment response. The plan is to continue with the same regimen for 2 more cycle (4 total) and then proceed with radiation followed by surgery.     She is now s/p 3 cycles of Doxil/ifosfamide. She received these at a slightly lower dose due to age and other medical conditions (doxil 40mg/m2 and ifosfamide 8g/m2 over 5 days). She has tolerated these well overall. With increase in thiamine to TID during chemo she had no hand tremors/twitches this cycle so we will plan to continue this for upcoming cycles.     Plan  -Continue with labs next week as planned; may be able to cancel Friday's labs if no concerning trends on Wednesday  -ISIDORO and labs prior to planned cycle 4 Doxil/ifosfamide on 1/10/24  -PICC requested for 1/10/24  prior to admission  -At discharge will need Neulasta. Then will need labs (CBC/p/d, CMP, PO4, Mg) and possible transfusion at [place] 3x week (M, W, F) for 2 weeks. She will also need levaquin po 500mg once daily for 10  days at discharge.  -Repeat imaging (PET and MRI tib fib) on 1/29 followed by visit with Dr. Jimenez  -Follow up Dr. Jimenez 11/7/23   -Radiation therapy and ortho surgery visits on 2/1 as planned    #Neurotoxicity prophylaxis  Thiamine (B-1) 100mg TID for 7 days during chemo    #Hypomagnesemia  Start magnesium oxide 400mg BID for 2 days. Recheck labs on 1/3/24.     #Cancer-related pain, right calf  -Improving as compared to pre-treatment  - Continue ibuprofen prn  -Continue Lyrica 50mg BID to TID (refilled today)  -Continue oxycodone 5-10mg once daily prn  -Previously offered palliative consult; can revisit prn  -Of note, did not tolerate Cymbalta    CV  #Hypertension  - Continue PTA losartan (100mg daily)  -Amlodipine increased while inpatient to 7.5 mg once daily and BP appears under better control  - Chlorthalidone discontinued d/t electrolyte disturbance    #Right Leg Swelling, improving  US RLE on 10/21/23 was negative     40 minutes spent on the date of the encounter doing chart review, review of test results, interpretation of tests, patient visit, and documentation      Amber Scheierl, CNP  [facility]  [address] [place] [address] [id number] [phone]

## 2023-12-29 NOTE — LETTER
12/29/2023         RE: Mirta Cardenas  [address] [facility] [id number]        Dear Colleague,    Thank you for referring your patient, Mirta Cardenas, to the [facility]. Please see a copy of my visit note below.    Virtual Visit Details    Type of service:  Video Visit   Video Start Time:  1:47PM  Video End Time: 2:05PM    Originating Location (pt. Location): Home    Distant Location (provider location):  On-site  Platform used for Video Visit: [facility]    FOLLOW UP VISIT NOTE    PATIENT NAME: Mirta Cardenas MRN # [id number]  DATE OF VISIT: Dec 29, 2023  YOB: 1955    CANCER TYPE:  High grade pleomorphic sarcoma, right calf       HISTORY OF PRESENT ILLNESS/ONCOLOGY HISTORY   Mirta is a 68 year old female with PMH of CLL and recent Dx of High Grade Pleomorphic sarcoma, with intermittent right calf pain but then continuous calf pain beginning in March 2023.  She had a recent x-ray which shows a destructive lesion in the midportion R LE in between the fibula and the tibia.     -10/2/23, Biopsy  Final Diagnosis   A.  Soft tissue, right calf mass, excision:  - High-grade pleomorphic sarcoma  - See comment      -10/10/23, PET/CT with no evidence of mets (mildly enlarged righ iliac chain LN with no hypermetabolic uptake; thought to be reactive).     -10/17/23-10/24/23, Inpatient Cycle 1 of Doxil/ifosfamide  -11/4/23, Inpatient cycle 2 Doxil/ifosfamide  -12/13/23, Cycle 3 inpatient Doxil/ifosfamide     PAST ONCOLOGIC HISTORY   CLL - on monitoring      INTERVAL HISTORY   -Mirta presents for a toxicity check following cycle 3.  She reports doing fairly well overall. She was busy with the holidays as she hosted [place]. She did notice that she was slower to recover from this 3rd round with just feeling more tired.  -Her leg pain had actually seemed to resolve in the right leg and she had stopped oxycodone for a  "couple days but then it returned in the last couple days. The right leg swelling continues to improve. She has been taking Lyrica 2-3x per day. Since she still has pain, she wonders if she should continue this; if so, she will need Lyrica refilled today   -No nausea with chemo. Not using anti-emetics. Appetite is good  -Constipation responds to daily docusate or dulcolax (she's not sure) and miralax if needed.  -This round of chemo with the increased thiamine dosing she did not have any of the hand twitching or brain fog that she had with the previous cycle.  -She has periodically checked her BP and its been about 140's/80's. No headaches or blurred vision.  -Some ANN with stairs when carrying laundry but otherwise no SOB  -No fever or cough  -No rash  -No mouth sores  -No chest pain      REVIEW OF SYSTEMS   As noted in HPI.     PAST MEDICAL HISTORY   Anal fisure - controlled with nitrobid  CLL - now being monitored - [place]  HTN  Cholesterol  Hx of severe COVID  Psoriasis       PHYSICAL EXAM   Ht 1.549 m (5' 1\")   Wt 77.1 kg (170 lb)   BMI 32.12 kg/m     12/26/2023  9:29 AM   Vital Signs    Systolic 136    Diastolic 74    Pulse 86    Temperature 97.6  F (36.4  C)    Respirations 14    Weight (LB) 170 lb    Height 5' 1\"    BMI (Calculated) 32.12    Pain Score 2 (Mild)    O2 99 %          Video physical exam  General: Patient appears well in no acute distress.   Skin: No visualized rash or lesions on visualized skin  Eyes: EOMI, no erythema, sclera icterus or discharge noted  Resp: Appears to be breathing comfortably without accessory muscle usage, speaking in full sentences, no cough  Neurologic: No apparent tremors, facial movements symmetric. Hearing intact. No dysarthria.   Psych: affect appropriate, alert and oriented. Pleasant, talkative.      LABORATORY AND IMAGING STUDIES     Most Recent 3 CBC's:  Recent Labs   Lab Test 12/28/23  1228 12/26/23  1004 12/22/23  1215 12/20/23  1236 12/19/23  0414 " 12/18/23  0532   WBC 8.6 6.5 6.7   < > 4.1 4.0   HGB 8.9* 9.2* 9.1*   < > 9.6* 10.1*   MCV 84 85 84   < > 86 87   * 124* 115*   < > 128* 130*   ANEUTAUTO 6.3  --   --   --  2.8 2.6    < > = values in this interval not displayed.     Most Recent 3 BMP's:  Recent Labs   Lab Test 12/28/23  1228 12/26/23  1004 12/22/23  1215    141 140   POTASSIUM 3.6 3.9 3.6   CHLORIDE 109* 108* 108*   CO2 22 23 20*   BUN 19.7 15.6 22.8   CR 0.84 0.73 0.88   ANIONGAP 11 10 12   MONIK 9.2 9.3 9.2   * 112* 104*   PROTTOTAL 6.7 7.0 6.8   ALBUMIN 4.3 4.4 4.4    Most Recent 3 LFT's:  Recent Labs   Lab Test 12/28/23  1228 12/26/23  1004 12/22/23  1215   AST 15 19 15   ALT 13 17 16   ALKPHOS 141 138 119   BILITOTAL 0.2 0.2 0.3    Most Recent 2 TSH and T4:No lab results found.    12/28/23: Phos normal. Mg low at 1.5    I reviewed the above labs today.       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 No evidence of metastatic disease.     She began neoadjuvant chemotherapy with Doxil/ifosfmaide on 10/17/23. Repeat imaging (PET 12/5/23) after 2 cycles showed treatment response. The plan is to continue with the same regimen for 2 more cycle (4 total) and then proceed with radiation followed by surgery.     She is now s/p 3 cycles of Doxil/ifosfamide. She received these at a slightly lower dose due to age and other medical conditions (doxil 40mg/m2 and ifosfamide 8g/m2 over 5 days). She has tolerated these well overall. With increase in thiamine to TID during chemo she had no hand tremors/twitches this cycle so we will plan to continue this for upcoming cycles.     Plan  -Continue with labs next week as planned; may be able to cancel Friday's labs if no concerning trends on Wednesday  -ISIDORO and labs prior to planned cycle 4  Doxil/ifosfamide on 1/10/24  -PICC requested for 1/10/24  prior to admission  -At discharge will need Neulasta. Then will need labs (CBC/p/d, CMP, PO4, Mg) and possible transfusion at [place] 3x week (M, W, F) for 2 weeks. She will also need levaquin po 500mg once daily for 10 days at discharge.  -Repeat imaging (PET and MRI tib fib) on 1/29 followed by visit with Dr. Jimenez  -Follow up Dr. Jimenez 11/7/23   -Radiation therapy and ortho surgery visits on 2/1 as planned    #Neurotoxicity prophylaxis  Thiamine (B-1) 100mg TID for 7 days during chemo    #Hypomagnesemia  Start magnesium oxide 400mg BID for 2 days. Recheck labs on 1/3/24.     #Cancer-related pain, right calf  -Improving as compared to pre-treatment  - Continue ibuprofen prn  -Continue Lyrica 50mg BID to TID (refilled today)  -Continue oxycodone 5-10mg once daily prn  -Previously offered palliative consult; can revisit prn  -Of note, did not tolerate Cymbalta    CV  #Hypertension  - Continue PTA losartan (100mg daily)  -Amlodipine increased while inpatient to 7.5 mg once daily and BP appears under better control  - Chlorthalidone discontinued d/t electrolyte disturbance    #Right Leg Swelling, improving  US RLE on 10/21/23 was negative     40 minutes spent on the date of the encounter doing chart review, review of test results, interpretation of tests, patient visit, and documentation      Amber Scheierl, CNP  [facility]  [address] [place] [id number] [phone]

## 2023-12-29 NOTE — NURSING NOTE
Is the patient currently in the state of [place]? YES    Visit mode:VIDEO    If the visit is dropped, the patient can be reconnected by: VIDEO VISIT: Send to e-mail at: [email or web address]    Will anyone else be joining the visit? NO  (If patient encounters technical issues they should call [phone])    How would you like to obtain your AVS? MyChart    Are changes needed to the allergy or medication list?  Pt states her allergies and medications were up-to-date during echeck-in.    Reason for visit: RECHECK    Alex HERRON

## 2024-01-03 ENCOUNTER — INFUSION THERAPY VISIT (OUTPATIENT)
Dept: INFUSION THERAPY | Facility: CLINIC | Age: 69
End: 2024-01-03
Attending: STUDENT IN AN ORGANIZED HEALTH CARE EDUCATION/TRAINING PROGRAM
Payer: COMMERCIAL

## 2024-01-03 ENCOUNTER — APPOINTMENT (OUTPATIENT)
Dept: LAB | Facility: CLINIC | Age: 69
End: 2024-01-03
Payer: COMMERCIAL

## 2024-01-03 DIAGNOSIS — E83.42 HYPOMAGNESEMIA: ICD-10-CM

## 2024-01-03 DIAGNOSIS — C49.21 SARCOMA OF RIGHT LOWER EXTREMITY (H): ICD-10-CM

## 2024-01-03 LAB
ALBUMIN SERPL BCG-MCNC: 4 G/DL (ref 3.5–5.2)
ALP SERPL-CCNC: 123 U/L (ref 40–150)
ALT SERPL W P-5'-P-CCNC: 14 U/L (ref 0–50)
ANION GAP SERPL CALCULATED.3IONS-SCNC: 5 MMOL/L (ref 7–15)
AST SERPL W P-5'-P-CCNC: 17 U/L (ref 0–45)
BASOPHILS # BLD AUTO: 0.1 10E3/UL (ref 0–0.2)
BASOPHILS NFR BLD AUTO: 1 %
BILIRUB SERPL-MCNC: 0.3 MG/DL
BUN SERPL-MCNC: 25.4 MG/DL (ref 8–23)
CALCIUM SERPL-MCNC: 8.9 MG/DL (ref 8.8–10.2)
CHLORIDE SERPL-SCNC: 108 MMOL/L (ref 98–107)
CREAT SERPL-MCNC: 0.85 MG/DL (ref 0.51–0.95)
DEPRECATED HCO3 PLAS-SCNC: 28 MMOL/L (ref 22–29)
EGFRCR SERPLBLD CKD-EPI 2021: 75 ML/MIN/1.73M2
EOSINOPHIL # BLD AUTO: 0 10E3/UL (ref 0–0.7)
EOSINOPHIL NFR BLD AUTO: 1 %
ERYTHROCYTE [DISTWIDTH] IN BLOOD BY AUTOMATED COUNT: 18.6 % (ref 10–15)
GLUCOSE SERPL-MCNC: 107 MG/DL (ref 70–99)
HCT VFR BLD AUTO: 25.1 % (ref 35–47)
HGB BLD-MCNC: 8.6 G/DL (ref 11.7–15.7)
IMM GRANULOCYTES # BLD: 0.1 10E3/UL
IMM GRANULOCYTES NFR BLD: 1 %
LYMPHOCYTES # BLD AUTO: 1.1 10E3/UL (ref 0.8–5.3)
LYMPHOCYTES NFR BLD AUTO: 16 %
MAGNESIUM SERPL-MCNC: 1.6 MG/DL (ref 1.7–2.3)
MCH RBC QN AUTO: 29.7 PG (ref 26.5–33)
MCHC RBC AUTO-ENTMCNC: 34.3 G/DL (ref 31.5–36.5)
MCV RBC AUTO: 87 FL (ref 78–100)
MONOCYTES # BLD AUTO: 0.5 10E3/UL (ref 0–1.3)
MONOCYTES NFR BLD AUTO: 7 %
NEUTROPHILS # BLD AUTO: 5.4 10E3/UL (ref 1.6–8.3)
NEUTROPHILS NFR BLD AUTO: 74 %
NRBC # BLD AUTO: 0 10E3/UL
NRBC BLD AUTO-RTO: 0 /100
PHOSPHATE SERPL-MCNC: 3.3 MG/DL (ref 2.5–4.5)
PLATELET # BLD AUTO: 140 10E3/UL (ref 150–450)
POTASSIUM SERPL-SCNC: 3.9 MMOL/L (ref 3.4–5.3)
PROT SERPL-MCNC: 6.3 G/DL (ref 6.4–8.3)
RBC # BLD AUTO: 2.9 10E6/UL (ref 3.8–5.2)
SODIUM SERPL-SCNC: 141 MMOL/L (ref 135–145)
WBC # BLD AUTO: 7.2 10E3/UL (ref 4–11)

## 2024-01-03 PROCEDURE — 36415 COLL VENOUS BLD VENIPUNCTURE: CPT

## 2024-01-03 PROCEDURE — 85004 AUTOMATED DIFF WBC COUNT: CPT | Performed by: STUDENT IN AN ORGANIZED HEALTH CARE EDUCATION/TRAINING PROGRAM

## 2024-01-03 PROCEDURE — 80053 COMPREHEN METABOLIC PANEL: CPT | Performed by: STUDENT IN AN ORGANIZED HEALTH CARE EDUCATION/TRAINING PROGRAM

## 2024-01-03 PROCEDURE — 84100 ASSAY OF PHOSPHORUS: CPT | Performed by: STUDENT IN AN ORGANIZED HEALTH CARE EDUCATION/TRAINING PROGRAM

## 2024-01-03 PROCEDURE — 83735 ASSAY OF MAGNESIUM: CPT | Performed by: STUDENT IN AN ORGANIZED HEALTH CARE EDUCATION/TRAINING PROGRAM

## 2024-01-03 RX ORDER — MAGNESIUM OXIDE 400 MG/1
400 TABLET ORAL 2 TIMES DAILY
Qty: 4 TABLET | Refills: 0 | Status: SHIPPED | OUTPATIENT
Start: 2024-01-03 | End: 2024-01-03

## 2024-01-03 RX ORDER — MAGNESIUM OXIDE 400 MG/1
TABLET ORAL
Qty: 20 TABLET | Refills: 0 | Status: SHIPPED | OUTPATIENT
Start: 2024-01-03 | End: 2024-01-25 | Stop reason: SINTOL

## 2024-01-03 NOTE — PROGRESS NOTES
Infusion Nursing Note:  Mirta Cardenas presents today for possible blood/plts transfusion.    Patient seen by provider today: No   present during visit today: Not Applicable.    Note: N/A.      Intravenous Access:  No Intravenous access/labs at this visit.    Treatment Conditions:  Results reviewed, labs did NOT meet treatment parameters: hgb 8.6, pt denies feeling symptomatic.  Pt's magnesium = 1.6; reordered Magnesium Oxide for pt to take 2 days. Notified her via p/c of this. She verbalized understanding.    Discharge Plan:   Not seen in the infusion clinic.  Patient discharged, accompanied by: .  Departure Mode: Ambulatory.      Monica Lee RN

## 2024-01-05 ENCOUNTER — INFUSION THERAPY VISIT (OUTPATIENT)
Dept: INFUSION THERAPY | Facility: CLINIC | Age: 69
End: 2024-01-05
Attending: STUDENT IN AN ORGANIZED HEALTH CARE EDUCATION/TRAINING PROGRAM
Payer: COMMERCIAL

## 2024-01-05 ENCOUNTER — APPOINTMENT (OUTPATIENT)
Dept: LAB | Facility: CLINIC | Age: 69
End: 2024-01-05
Payer: COMMERCIAL

## 2024-01-05 DIAGNOSIS — C49.21 SARCOMA OF RIGHT LOWER EXTREMITY (H): ICD-10-CM

## 2024-01-05 LAB
ALBUMIN SERPL BCG-MCNC: 4 G/DL (ref 3.5–5.2)
ALP SERPL-CCNC: 115 U/L (ref 40–150)
ALT SERPL W P-5'-P-CCNC: 14 U/L (ref 0–50)
ANION GAP SERPL CALCULATED.3IONS-SCNC: 10 MMOL/L (ref 7–15)
AST SERPL W P-5'-P-CCNC: 18 U/L (ref 0–45)
BASOPHILS # BLD AUTO: 0.1 10E3/UL (ref 0–0.2)
BASOPHILS NFR BLD AUTO: 1 %
BILIRUB SERPL-MCNC: 0.3 MG/DL
BUN SERPL-MCNC: 23.2 MG/DL (ref 8–23)
CALCIUM SERPL-MCNC: 9 MG/DL (ref 8.8–10.2)
CHLORIDE SERPL-SCNC: 107 MMOL/L (ref 98–107)
CREAT SERPL-MCNC: 0.83 MG/DL (ref 0.51–0.95)
DEPRECATED HCO3 PLAS-SCNC: 24 MMOL/L (ref 22–29)
EGFRCR SERPLBLD CKD-EPI 2021: 77 ML/MIN/1.73M2
EOSINOPHIL # BLD AUTO: 0 10E3/UL (ref 0–0.7)
EOSINOPHIL NFR BLD AUTO: 1 %
ERYTHROCYTE [DISTWIDTH] IN BLOOD BY AUTOMATED COUNT: 18.5 % (ref 10–15)
GLUCOSE SERPL-MCNC: 94 MG/DL (ref 70–99)
HCT VFR BLD AUTO: 25.6 % (ref 35–47)
HGB BLD-MCNC: 8.9 G/DL (ref 11.7–15.7)
IMM GRANULOCYTES # BLD: 0.1 10E3/UL
IMM GRANULOCYTES NFR BLD: 1 %
LYMPHOCYTES # BLD AUTO: 1 10E3/UL (ref 0.8–5.3)
LYMPHOCYTES NFR BLD AUTO: 17 %
MAGNESIUM SERPL-MCNC: 1.9 MG/DL (ref 1.7–2.3)
MCH RBC QN AUTO: 30 PG (ref 26.5–33)
MCHC RBC AUTO-ENTMCNC: 34.8 G/DL (ref 31.5–36.5)
MCV RBC AUTO: 86 FL (ref 78–100)
MONOCYTES # BLD AUTO: 0.6 10E3/UL (ref 0–1.3)
MONOCYTES NFR BLD AUTO: 9 %
NEUTROPHILS # BLD AUTO: 4.3 10E3/UL (ref 1.6–8.3)
NEUTROPHILS NFR BLD AUTO: 71 %
NRBC # BLD AUTO: 0 10E3/UL
NRBC BLD AUTO-RTO: 0 /100
PHOSPHATE SERPL-MCNC: 3.2 MG/DL (ref 2.5–4.5)
PLATELET # BLD AUTO: 144 10E3/UL (ref 150–450)
POTASSIUM SERPL-SCNC: 4.4 MMOL/L (ref 3.4–5.3)
PROT SERPL-MCNC: 6.3 G/DL (ref 6.4–8.3)
RBC # BLD AUTO: 2.97 10E6/UL (ref 3.8–5.2)
SODIUM SERPL-SCNC: 141 MMOL/L (ref 135–145)
WBC # BLD AUTO: 6 10E3/UL (ref 4–11)

## 2024-01-05 PROCEDURE — 83735 ASSAY OF MAGNESIUM: CPT | Performed by: STUDENT IN AN ORGANIZED HEALTH CARE EDUCATION/TRAINING PROGRAM

## 2024-01-05 PROCEDURE — 82040 ASSAY OF SERUM ALBUMIN: CPT | Performed by: STUDENT IN AN ORGANIZED HEALTH CARE EDUCATION/TRAINING PROGRAM

## 2024-01-05 PROCEDURE — 85025 COMPLETE CBC W/AUTO DIFF WBC: CPT | Performed by: STUDENT IN AN ORGANIZED HEALTH CARE EDUCATION/TRAINING PROGRAM

## 2024-01-05 PROCEDURE — 84100 ASSAY OF PHOSPHORUS: CPT | Performed by: STUDENT IN AN ORGANIZED HEALTH CARE EDUCATION/TRAINING PROGRAM

## 2024-01-05 PROCEDURE — 36415 COLL VENOUS BLD VENIPUNCTURE: CPT

## 2024-01-05 NOTE — PROGRESS NOTES
Infusion Nursing Note:  Mirta Cardenas presents today for Possible transfusion.    Patient seen by provider today: No   present during visit today: Not Applicable.    Note: Patient not seen in infusion.    Intravenous Access:  Labs drawn peripherally by .    Treatment Conditions:  Lab Results   Component Value Date    HGB 8.9 (L) 01/05/2024    WBC 6.0 01/05/2024    ANEU 5.2 12/26/2023    ANEUTAUTO 4.3 01/05/2024     (L) 01/05/2024        Results reviewed, labs did NOT meet treatment parameters: Hgb 8.9 and Plt 144.    Post Infusion Assessment:  N/A.     Discharge Plan:   Discharge instructions reviewed with: Patient.  Patient and/or family verbalized understanding of discharge instructions and all questions answered.  AVS to patient via [employer].  Patient will return 1/17/2024 for next appointment.   Patient discharged in stable condition accompanied by: self.  Departure Mode: Ambulatory.    Elvi Gallagher RN

## 2024-01-09 NOTE — PROGRESS NOTES
[facility]  FOLLOW UP VISIT NOTE    PATIENT NAME: Mirta Cardenas MRN # [id number]  DATE OF VISIT: Damon 10, 2024  YOB: 1955    CANCER TYPE:  High grade pleomorphic sarcoma, right calf       HISTORY OF PRESENT ILLNESS/ONCOLOGY HISTORY   Mirta is a 68 year old female with PMH of CLL and recent Dx of High Grade Pleomorphic sarcoma, with intermittent right calf pain but then continuous calf pain beginning in March 2023.  She had a recent x-ray which shows a destructive lesion in the midportion R LE in between the fibula and the tibia.     -10/2/23, Biopsy  Final Diagnosis   A.  Soft tissue, right calf mass, excision:  - High-grade pleomorphic sarcoma  - See comment      -10/10/23, PET/CT with no evidence of mets (mildly enlarged righ iliac chain LN with no hypermetabolic uptake; thought to be reactive).     -10/17/23-10/24/23, Inpatient Cycle 1 of Doxil/ifosfamide  -11/4/23, Inpatient cycle 2 Doxil/ifosfamide  -12/13/23, Cycle 3 inpatient Doxil/ifosfamide     PAST ONCOLOGIC HISTORY   CLL - on monitoring      INTERVAL HISTORY   Patient reports that she feels like she has tolerated chemotherapy pretty well lately.  She did have a left-sided headache for a few days last week that improved with Tylenol and oxycodone.  She reports that she does not consistently have issues with headaches.  She has noticed some intermittent loose stools over the last 3 weeks.  These seem to occur in the morning without any associated bleeding or abdominal cramping.  At its worst she has had up to 4 loose stools per day.  They have very occasionally been watery.  Previously, she had had trouble with constipation so had been taking a stool softener which she stopped about 2 weeks ago.  Of note, she has started on a daily magnesium supplement as her magnesium has been low recurrently.  She reports some increase in leg swelling that is most bothersome in the evenings over the last 2 weeks.  She  did recently have an increase in the dose on her amlodipine.  She also notices some stiffness in her feet.  She has had a hard time tolerating compression stockings due to feeling that they are too tight around her knees. She has not needed to take oxycodone recently for her right leg pain. She denies other concerns.    REVIEW OF SYSTEMS   Patient denies any of the following except if noted above: fevers, chills, difficulty with energy, vision or hearing changes, chest pain, dyspnea, abdominal pain, nausea, vomiting, constipation, urinary concerns, numbness, tingling, issues with sleep or mood.      PAST MEDICAL HISTORY   Anal fisure - controlled with nitrobid  CLL - now being monitored   HTN  Cholesterol  Hx of severe COVID  Psoriasis       PHYSICAL EXAM   General: The patient is a pleasant female in no acute distress.  BP (!) 155/76 (BP Location: Right arm, Patient Position: Sitting, Cuff Size: Adult Regular)   Pulse 89   Temp 97.7  F (36.5  C) (Oral)   Resp 16   Wt 80 kg (176 lb 6.4 oz)   SpO2 99%   BMI 33.33 kg/m    Wt Readings from Last 10 Encounters:   01/10/24 80 kg (176 lb 6.4 oz)   12/29/23 77.1 kg (170 lb)   12/26/23 77.1 kg (170 lb)   12/17/23 77.3 kg (170 lb 6.4 oz)   12/06/23 79.4 kg (175 lb)   11/19/23 76.2 kg (168 lb)   11/08/23 78.5 kg (173 lb)   10/30/23 79.1 kg (174 lb 6.4 oz)   10/26/23 77.1 kg (170 lb)   10/24/23 76.7 kg (169 lb 1.6 oz)   HEENT: EOMI. Sclerae are anicteric.   Lymph: Neck is supple with no lymphadenopathy in the cervical or supraclavicular areas.   Heart: Regular rate and rhythm.   Lungs: Clear to auscultation bilaterally.   Abdomen: Bowel sounds present, soft, nontender with no palpable hepatosplenomegaly or masses.   Extremities: Trace lower extremity edema noted bilaterally, right slightly greater than left.   Neuro: Cranial nerves II through XII are grossly intact.  Skin: No rashes, petechiae, or bruising noted on exposed skin.     LABORATORY AND IMAGING STUDIES     Most  Recent 3 CBC's:  Recent Labs   Lab Test 01/10/24  0748 01/05/24  1026 01/03/24  1223   WBC 5.1 6.0 7.2   HGB 9.5* 8.9* 8.6*   MCV 86 86 87    144* 140*   ANEUTAUTO 3.5 4.3 5.4     Most Recent 3 BMP's:  Recent Labs   Lab Test 01/10/24  0748 01/05/24  1026 01/03/24  1223    141 141   POTASSIUM 4.4 4.4 3.9   CHLORIDE 105 107 108*   CO2 24 24 28   BUN 21.9 23.2* 25.4*   CR 0.86 0.83 0.85   ANIONGAP 10 10 5*   MONIK 9.2 9.0 8.9   GLC 94 94 107*   PROTTOTAL 6.9 6.3* 6.3*   ALBUMIN 4.3 4.0 4.0    Most Recent 3 LFT's:  Recent Labs   Lab Test 01/10/24  0748 01/05/24  1026 01/03/24  1223   AST 21 18 17   ALT 13 14 14   ALKPHOS 109 115 123   BILITOTAL 0.4 0.3 0.3     Phosphorus   Date Value Ref Range Status   01/10/2024 3.2 2.5 - 4.5 mg/dL Final   01/05/2024 3.2 2.5 - 4.5 mg/dL Final   01/03/2024 3.3 2.5 - 4.5 mg/dL Final   12/28/2023 3.6 2.5 - 4.5 mg/dL Final   04/23/2008 3.4 2.5 - 4.5 mg/dL Final   08/17/2005 3.6 2.5 - 4.5 mg/dL Final     Magnesium   Date Value Ref Range Status   01/10/2024 1.8 1.7 - 2.3 mg/dL Final   01/05/2024 1.9 1.7 - 2.3 mg/dL Final   01/03/2024 1.6 (L) 1.7 - 2.3 mg/dL Final   12/28/2023 1.5 (L) 1.7 - 2.3 mg/dL Final   10/12/2020 1.9 1.6 - 2.3 mg/dL Final   08/17/2005 2.0 1.6 - 2.3 mg/dL Final     I reviewed the above labs today.       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 No evidence of metastatic disease.     She began neoadjuvant chemotherapy with Doxil/ifosfmaide on 10/17/23. Repeat imaging (PET 12/5/23) after 2 cycles showed treatment response. The plan is to continue with the same regimen for 2 more cycle (4 total) and then proceed with radiation followed by surgery.     She is now s/p 3 cycles of Doxil/ifosfamide. She received these at a slightly lower dose due to age and other  medical conditions (doxil 40mg/m2 and ifosfamide 8g/m2 over 5 days). She has tolerated these well overall. With continue thiamine TID during chemo which has helped with hand tremors/twitches.    Plan  -Admit today for cycle 4 Doxil/ifosfamide   -At discharge will need Neulasta. Then will need labs (CBC/p/d, CMP, PO4, Mg) and possible transfusion at [place] 2x week for 2 weeks, as it appears she did not require any blood products with the last cycle. She will also need levaquin po 500 mg once daily for 10 days at discharge.  -Repeat imaging (PET and MRI tib fib) on 1/29 followed by visit with Dr. Jimenez  -Radiation therapy and ortho surgery visits on 2/1 as planned    #Neurotoxicity prophylaxis  Thiamine (B-1) 100mg TID for 7 days during chemo    #Hypomagnesemia  On magnesium oxide 400 mg daily. Magnesium is normal today. Suspect loose stools are related to daily magnesium use. Will continue for now.     #Cancer-related pain, right calf  -Much improved as compared to pre-treatment  -Continue ibuprofen prn  -Continue Lyrica 50mg TID  -Continue oxycodone 5-10mg once daily prn  -Of note, did not tolerate Cymbalta    CV  #Hypertension  -Continue losartan 100 mg daily and amlodipine 7.5 mg once daily.  -BP is still elevated in clinic today. Will continue to monitor during the hospital admission. If remains elevated, may need further adjustments to his BP meds.   -Chlorthalidone discontinued d/t electrolyte disturbance    #Right Leg Swelling, improving  - US RLE on 10/21/23 was negative  - Now with mild bilateral LE edema. Suspect secondary to amlodipine. Recommend compression stockings versus ACE wraps and leg elevation.      Gunjan Garza PA-C  [facility]  [address] [place] [address] [phone]    34 minutes spent on the date of the encounter doing chart review, review of test results, interpretation of tests, patient visit, and documentation

## 2024-01-09 NOTE — H&P
[facility]    History and Physical  Hematology / Oncology     Date of Admission:  1/10/24  Date of Service (when I saw the patient): 01/10/24    Assessment & Plan   Mirta Cardenas is a 68 year old female who presents with past medical history of HTN, HLD, GERD, constipation, CLL (on surveillance) and 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 Currently being admitted for Doxil/Ifosfamide (C4D1=1/10/24).     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 Nam. Patient noted intermittent right calf pain that began in March 2023. Xray noted a destructive lesion in the midportion of the right fibula. MRI 9/28/23 with 3.3 x5.2 x 9.3 cm soft tissue mass in proximal/mid calf. Bony invasion into the fibular shaft with cortical disruption. Biopsy of soft tissue mass in October 2023 confirmed diagnosis of high-grade pleomorphic sarcoma. PET/CT 10/10/23 without evidence of metastatic disease but did have an enlarged R iliac chain LN, thought to be reactive. She initiated treatment with Doxil/Ifosfamide C1D1=10/17/23 overall tolerated well with some electrolyte disturbances. U7N9=5511/14/23; which was complicated with neurotoxicity in the form of intermittent involuntary jerking and twitching. Restaging PET/CT 12/5/23 shows a partial response to therapy with decreasing size and FDG avidity of the right calf mass. A5F8=0312/13/23 overall tolerated well. Patient feeling well and stable upon admission, she elects to proceed with cycle 4 doxil/ifosfamide today.  - PICC placed PTA; anticipate removing at discharge   - Restaging PET/CT scheduled 1/29/24      Treatment Plan: Doxil/Ifosfamide (C4D1=1/10/24)   - Doxorubicin liposome (Doxil) 40 mg/m2 (73 mg) IV -- D1    - Ifosfamide/[place] 1600 mg/m2 (2,910 mg) IV -- D1-5    - Mesna 1600 mg/m2 (2,910 mg) IV -- D6     - Premeds: Thiamine, Emend, Zofran, Dexamethasone   HEME   # H/o CLL   Previously  followed with Dr. Acosta. CLL has been stable. Not on current treatment.   - No inpatient needs at this time.    # Chronic cancer pain  Related to patients known R calf sarcoma. Patient reports this pain has much improved over the cycles of chemotherapy. She reports taking her oxycodone very infrequently; took 3-4 pills over the past two weeks. She has had no major pain crises.  - Continue PTA Oxycodone 5-10 mg q6h PRN   - Continue PTA Lyrica 50 mg TID     CARDS   # Bilateral LE edema  # HTN   Patient reports that her BP's has been consistently high. She keeps a BP log at home and doesn't feel BP has been well controlled. She has not been taking chlorthalidone, it was discontinued d/t an electrolyte disturbance. She has been experiencing side effects from her increased dose of amlodipine such as headaches and increased bilateral LE edema (which is a known side effects). Due to this, opted to decrease amlodipine to 5 mg daily to see if she tolerates this better and start hydrochlorothiazide 25 mg (better tolerated) as this chemo regimen requires fluids. Her BP's were high on admission. She was agreeable to trial a new diuretic today. Patient case discussed with pharmacist who agrees with this plan. Will monitor for electrolyte disturbances or SE's.  - Decrease PTA Amlodipine to 5 mg daily   - Continue PTA Losartan 100 mg daily  - 1/10/24 added hydrochlorothiazide 25 mg    # HLD   - Hold PTA Pravastatin while inpatient, ok to resume on discharge.    GI   # GERD   Takes omeprazole outpatient. Will rotate to pantoprazole 40 mg while admitted (d/t pharmacy supply).    # Opioid induced constipation  # Loose stool  Patient reports experiencing severe constipation with previous cycles of chemotherapy, especially when she takes oxy for pain. She takes daily Psyllium and Colace, will continue this admission. Can increase bowel regimen depending on BM frequency. She does report 3-4 loose stools per day for three days last  "week which she believes is 2/2 PO magnesium supplement she has been taking.  - CTM BM's daily    DERM   # Psoriasis  - PTA topicals on hold while in the hospital     FEN:  -IVF per chemo protocol   -PRN lyte replacement  -RDAT    Prophy/Misc:  -VTE: Ppx Lovenox; hold if plts <50K   -GI/PUD: PTA PPI   -Bowels: PTA Psyllium and Colace    Clinically Significant Risk Factors Present on Admission                  # Hypertension: Noted on problem list      # Obesity: Estimated body mass index is 33.35 kg/m  as calculated from the following:    Height as of this encounter: 1.549 m (5' 1\").    Weight as of this encounter: 80.1 kg (176 lb 8 oz).       # Asthma: noted on problem list        This patient was discussed with Dr. Fuentes     I spent 50 minutes face-to-face or coordinating care of Mirta Cardenas. Over 50% of our time on the unit was spent counseling the patient and coordinating care.    Adali Mederos PA-C  Hematology/Oncology  Pager: [phone]  Phone: [phone]       Code Status   Full Code    Primary Care Physician   Joseline Fraga    Chief Complaint   Admission for Cycle 4 Doxil/Ifos     History is obtained from the patient    History of Present Illness   Mirta Cardenas is a  68 year old female who presents with past medical history of HTN, HLD, GERD, constipation, CLL (on surveillance) and 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 Currently being admitted for Cycle 4 Doxil/ Ifosfamide (D1=1/10/24).     On admission, patient is seen sitting up in bed with her  Giancarlo at bedside. Patient reports she has been feeling well overall. She has had increased LE edema since starting her increase dose of amlodipine. She also had headaches last week and loose stools both of which have resolved. She believes the headaches are 2/2 amlodipine as well. Reports loose stools may be 2/2 PO magnesium she is taking. She reports history of opioid induced constipation. But she has been taking her oxy infrequently so " "hasn't been struggling with constipation. Her pain has much improved over the last few cycles of chemotherapy. She reports taking 3-4 oxy in the past two weeks. R calf pain worse when standing for many hours over the course of the day. She denies full ROS such as N/V/D, F/C/NS, dizziness, vision or hearing changes, chest pain, SOB, palpitations, abdominal pain, swelling, rash, bruises, or lesions. She denied having any respiratory symptoms, has not been sick as of late, specifically no cough or SOB. She worries her BP hasn't been well controlled and is open to adjustments during this admission. She elects to proceed with chemotherapy cycle 4 today, understands risks and benefits, denies having any concerns with the plan. She does have baseline anxieties and asks \"what if this metastasizes to the lungs\". Much empathetic listening and validation given to her concerns. She knows she will be here for 6-7 days for this chemotherapy and is overall in good spirits given the situation.     A comprehensive review of systems was obtained and is negative other than noted here or in the HPI.      Past Medical History    I have reviewed this patient's medical history and updated it with pertinent information if needed.   Past Medical History:   Diagnosis Date    Asthma     Asthma     reactive airway disease, per patient    Blood transfusion     Cancer (H)     chronic lymphocytic leukemia    CINV (chemotherapy-induced nausea and vomiting) 10/12/2023    History of transfusion     Hypertension     Hypertension     Leukemia, chronic lymphocytic (H)     Malignant neoplasm (H)     CLL    Thyroid nodule 2008       Past Surgical History   I have reviewed this patient's surgical history and updated it with pertinent information if needed.  Past Surgical History:   Procedure Laterality Date    ABDOMEN SURGERY      c section *3    APPENDECTOMY      C/SECTION, CLASSICAL  ,,    , Classical    COLONOSCOPY      " HYSTERECTOMY      HYSTERECTOMY, PAP NO LONGER INDICATED  01/01/1998    PICC DOUBLE LUMEN PLACEMENT Left 10/17/2023    left basilic 4 fr dl power picc 41 cm    PICC DOUBLE LUMEN PLACEMENT Left 12/13/2023    Medial Brachial, 42 cm, 3 cm external length    PICC DOUBLE LUMEN PLACEMENT Left 01/10/2024    left medial brachial 5 fr dl power picc 41 cm    RELEASE CARPAL TUNNEL  06/05/2012    Procedure:RELEASE CARPAL TUNNEL; Right Carpal Tunnel Release & Right Ring A1 Pulley Release; Surgeon:SERGEY HEATON; Location:[facility] OR    RELEASE TRIGGER FINGER  06/05/2012    Procedure:RELEASE TRIGGER FINGER; Surgeon:SERGEY HEATON; Location:[facility] OR    RELEASE TRIGGER FINGER  10/12/2012    Procedure: RELEASE TRIGGER FINGER;  Right Thumb A1 Pulley Release;  Surgeon: Sergey Heaton MD;  Location: [facility] OR    SALPINGO OOPHORECTOMY,R/L/ULICES  05/02/2008    right       Prior to Admission Medications   Prior to Admission Medications   Prescriptions Last Dose Informant Patient Reported? Taking?   METAMUCIL FIBER PO 1/9/2024 at 1800  Yes Yes   Sig: Take 1 teaspoonful by mouth daily After supper   Multiple Vitamins-Calcium (ONE-A-DAY WOMENS FORMULA PO) 1/10/2024 at 0600 Self Yes Yes   Sig: Take 1 tablet by mouth daily   amLODIPine (NORVASC) 5 MG tablet 1/10/2024 at 0600  No Yes   Sig: Take 1.5 tablets (7.5 mg) by mouth daily   calcipotriene (DOVONOX) 0.005 % external cream   No No   Sig: Apply twice daily to areas of psoriasis on Saturday and Sunday.   clobetasol (TEMOVATE) 0.05 % external cream 1/10/2024  No Yes   Sig: Apply twice daily to psoriasis on Monday through Friday.   Patient taking differently: Apply twice daily to psoriasis on Monday through Friday as needed   docusate sodium (COLACE) 100 MG capsule 1/10/2024 at 0600  Yes Yes   Sig: Take 100 mg by mouth 2 times daily as needed   losartan (COZAAR) 100 MG tablet 1/9/2024 at 2000  No Yes   Sig: Take 1 tablet (100 mg) by mouth daily   Patient taking differently: Take 100 mg by mouth  every evening   magnesium oxide (MAG-OX) 400 MG tablet 1/10/2024 at 0600  No Yes   Sig: Take 1 tablet (400mg) by mouth 2 times daily for 2 days; then take 1 tablet once daily until directed otherwise by clinic.   omeprazole (PRILOSEC) 20 MG DR capsule 1/10/2024 at 0600 Self Yes Yes   Sig: Take 1 capsule by mouth every morning.   oxyCODONE (ROXICODONE) 5 MG tablet Past Week at prn  No Yes   Sig: Take 1-2 tablets (5-10 mg) by mouth as needed for severe pain   Patient taking differently: Take 5-10 mg by mouth daily as needed for severe pain   pravastatin (PRAVACHOL) 40 MG tablet 1/9/2024 at 2000  No Yes   Sig: Take 1 tablet (40 mg) by mouth daily   Patient taking differently: Take 40 mg by mouth every evening   pregabalin (LYRICA) 50 MG capsule 1/10/2024 at 0600  No Yes   Sig: Take 1 capsule (50 mg) by mouth 3 times daily   prochlorperazine (COMPAZINE) 5 MG tablet  at PRN - has never used at home  No No   Sig: Take 1 tablet (5 mg) by mouth every 6 hours as needed (Breakthrough Nausea / Vomiting)      Facility-Administered Medications: None     Allergies   Allergies   Allergen Reactions    Allopurinol Itching    Amoxicillin Hives    Codeine Itching    Cymbalta [Duloxetine Hcl] Fatigue    Periactin Other (See Comments)     Sleepy.    11/15/23: patient not sure about this allergy/intolerance - may be interested in removal    Tenormin [Atenolol] Fatigue     11/15/23: patient may be interested in removal of this from allergy list as it was only fatigue/sleepiness       Social History   I have reviewed this patient's social history and updated it with pertinent information if needed. Mirat Guardadoult  reports that she has never smoked. She has never been exposed to tobacco smoke. She has never used smokeless tobacco. She reports current alcohol use. She reports that she does not use drugs.    Family History   I have reviewed this patient's family history and updated it with pertinent information if needed.   Family History    Problem Relation Age of Onset    Hypertension Brother     Heart Disease Brother 65        bypass    Cancer Sister         multiple myloma    Obesity Son     Obesity Son     Hypertension Son     Osteoporosis Mother     Melanoma No family hx of        Review of Systems   A comprehensive review of symptoms was obtained and negative unless noted above.    Physical Exam   Temp: 98.5  F (36.9  C) Temp src: Oral BP: (!) 167/81 Pulse: 79   Resp: 16 SpO2: 99 % O2 Device: None (Room air)    Vital Signs with Ranges  Temp:  [97.7  F (36.5  C)-98.8  F (37.1  C)] 98.5  F (36.9  C)  Pulse:  [79-90] 79  Resp:  [16-18] 16  BP: (153-167)/(76-83) 167/81  SpO2:  [99 %] 99 %  176 lbs 8 oz    Constitutional: Pleasant female seen resting comfortably in bed in NAD, non-toxic appearing, alert and interactive.   HEENT: NCAT. PERRL, EOMI, anicteric sclera. Oral mucosa pink and moist with no lesions or thrush on limited exam.  Hematologic / Lymphatic: No overt bleeding. No cervical or clavicular adenopathy.  Respiratory: Non-labored breathing, good air exchange, lungs clear to auscultation bilaterally. No cough or wheeze noted.   Cardiovascular: Regular rate and rhythm. No M/R/G.  GI: Normoactive bowel sounds. Abdomen soft, non-distended, and non-tender. No palpable masses or organomegaly.  Skin: Warm and dry. No concerning lesions or rash on exposed surfaces.  Musculoskeletal: Extremities grossly normal, non-tender. Skin is tight from bilateral LE edema. Good strength and ROM. Ambulates independently.  Neuropsychiatric: A&Ox3. Motor function intact. Calm. Mildly anxious and tearful.  Vascular Access: PICC; CDI without erythema, swelling, or discharge.     Data   Lab Results   Component Value Date    WBC 5.1 01/10/2024    WBC 6.0 01/05/2024    WBC 7.2 01/03/2024    HGB 9.5 (L) 01/10/2024    HGB 8.9 (L) 01/05/2024    HGB 8.6 (L) 01/03/2024    HCT 27.7 (L) 01/10/2024    HCT 25.6 (L) 01/05/2024    HCT 25.1 (L) 01/03/2024     01/10/2024      (L) 01/05/2024     (L) 01/03/2024     01/10/2024     01/05/2024     01/03/2024    POTASSIUM 4.4 01/10/2024    POTASSIUM 4.4 01/05/2024    POTASSIUM 3.9 01/03/2024    CHLORIDE 105 01/10/2024    CHLORIDE 107 01/05/2024    CHLORIDE 108 (H) 01/03/2024    CO2 24 01/10/2024    CO2 24 01/05/2024    CO2 28 01/03/2024    BUN 21.9 01/10/2024    BUN 23.2 (H) 01/05/2024    BUN 25.4 (H) 01/03/2024    CR 0.86 01/10/2024    CR 0.83 01/05/2024    CR 0.85 01/03/2024    GLC 94 01/10/2024    GLC 94 01/05/2024     (H) 01/03/2024    SED 17 05/23/2007    DD 0.47 10/17/2020    DD 0.54 (H) 10/16/2020    DD 0.42 10/15/2020    TROPI <0.015 10/12/2020    AST 21 01/10/2024    AST 18 01/05/2024    AST 17 01/03/2024    ALT 13 01/10/2024    ALT 14 01/05/2024    ALT 14 01/03/2024    ALKPHOS 109 01/10/2024    ALKPHOS 115 01/05/2024    ALKPHOS 123 01/03/2024    BILITOTAL 0.4 01/10/2024    BILITOTAL 0.3 01/05/2024    BILITOTAL 0.3 01/03/2024    INR 1.19 (H) 10/17/2020    INR 1.18 (H) 10/16/2020    INR 1.06 10/15/2020

## 2024-01-10 ENCOUNTER — HOSPITAL ENCOUNTER (INPATIENT)
Facility: CLINIC | Age: 69
LOS: 6 days | Discharge: HOME | DRG: 846 | End: 2024-01-16
Attending: STUDENT IN AN ORGANIZED HEALTH CARE EDUCATION/TRAINING PROGRAM | Admitting: STUDENT IN AN ORGANIZED HEALTH CARE EDUCATION/TRAINING PROGRAM
Payer: COMMERCIAL

## 2024-01-10 ENCOUNTER — APPOINTMENT (OUTPATIENT)
Dept: LAB | Facility: CLINIC | Age: 69
DRG: 846 | End: 2024-01-10
Attending: STUDENT IN AN ORGANIZED HEALTH CARE EDUCATION/TRAINING PROGRAM
Payer: COMMERCIAL

## 2024-01-10 ENCOUNTER — ONCOLOGY VISIT (OUTPATIENT)
Dept: ONCOLOGY | Facility: CLINIC | Age: 69
DRG: 846 | End: 2024-01-10
Attending: PHYSICIAN ASSISTANT
Payer: COMMERCIAL

## 2024-01-10 ENCOUNTER — HOSPITAL ENCOUNTER (OUTPATIENT)
Dept: VASCULAR ULTRASOUND | Facility: CLINIC | Age: 69
Discharge: HOME | DRG: 846 | End: 2024-01-10
Attending: STUDENT IN AN ORGANIZED HEALTH CARE EDUCATION/TRAINING PROGRAM
Payer: COMMERCIAL

## 2024-01-10 VITALS
OXYGEN SATURATION: 99 % | HEART RATE: 89 BPM | BODY MASS INDEX: 33.33 KG/M2 | WEIGHT: 176.4 LBS | SYSTOLIC BLOOD PRESSURE: 155 MMHG | RESPIRATION RATE: 16 BRPM | DIASTOLIC BLOOD PRESSURE: 76 MMHG | TEMPERATURE: 97.7 F

## 2024-01-10 DIAGNOSIS — Z76.89 PREVENTION OF CHEMOTHERAPY-INDUCED NEUTROPENIA: ICD-10-CM

## 2024-01-10 DIAGNOSIS — C49.21 SARCOMA OF RIGHT LOWER EXTREMITY (H): ICD-10-CM

## 2024-01-10 DIAGNOSIS — T45.1X5A CINV (CHEMOTHERAPY-INDUCED NAUSEA AND VOMITING): ICD-10-CM

## 2024-01-10 DIAGNOSIS — R79.89 ELEVATED SERUM CREATININE: ICD-10-CM

## 2024-01-10 DIAGNOSIS — C49.21 SARCOMA OF RIGHT LOWER EXTREMITY (H): Primary | ICD-10-CM

## 2024-01-10 DIAGNOSIS — R11.2 CINV (CHEMOTHERAPY-INDUCED NAUSEA AND VOMITING): ICD-10-CM

## 2024-01-10 DIAGNOSIS — I10 BENIGN ESSENTIAL HYPERTENSION: ICD-10-CM

## 2024-01-10 PROBLEM — C49.9 SARCOMA (H): Status: ACTIVE | Noted: 2023-10-17

## 2024-01-10 LAB
ALBUMIN SERPL BCG-MCNC: 4.3 G/DL (ref 3.5–5.2)
ALP SERPL-CCNC: 109 U/L (ref 40–150)
ALT SERPL W P-5'-P-CCNC: 13 U/L (ref 0–50)
ANION GAP SERPL CALCULATED.3IONS-SCNC: 10 MMOL/L (ref 7–15)
AST SERPL W P-5'-P-CCNC: 21 U/L (ref 0–45)
BASOPHILS # BLD AUTO: 0.1 10E3/UL (ref 0–0.2)
BASOPHILS NFR BLD AUTO: 1 %
BILIRUB SERPL-MCNC: 0.4 MG/DL
BUN SERPL-MCNC: 21.9 MG/DL (ref 8–23)
CALCIUM SERPL-MCNC: 9.2 MG/DL (ref 8.8–10.2)
CHLORIDE SERPL-SCNC: 105 MMOL/L (ref 98–107)
CREAT SERPL-MCNC: 0.86 MG/DL (ref 0.51–0.95)
DEPRECATED HCO3 PLAS-SCNC: 24 MMOL/L (ref 22–29)
EGFRCR SERPLBLD CKD-EPI 2021: 74 ML/MIN/1.73M2
EOSINOPHIL # BLD AUTO: 0.1 10E3/UL (ref 0–0.7)
EOSINOPHIL NFR BLD AUTO: 1 %
ERYTHROCYTE [DISTWIDTH] IN BLOOD BY AUTOMATED COUNT: 18 % (ref 10–15)
GLUCOSE SERPL-MCNC: 94 MG/DL (ref 70–99)
HCT VFR BLD AUTO: 27.7 % (ref 35–47)
HGB BLD-MCNC: 9.5 G/DL (ref 11.7–15.7)
IMM GRANULOCYTES # BLD: 0 10E3/UL
IMM GRANULOCYTES NFR BLD: 0 %
LYMPHOCYTES # BLD AUTO: 1 10E3/UL (ref 0.8–5.3)
LYMPHOCYTES NFR BLD AUTO: 20 %
MAGNESIUM SERPL-MCNC: 1.8 MG/DL (ref 1.7–2.3)
MCH RBC QN AUTO: 29.3 PG (ref 26.5–33)
MCHC RBC AUTO-ENTMCNC: 34.3 G/DL (ref 31.5–36.5)
MCV RBC AUTO: 86 FL (ref 78–100)
MONOCYTES # BLD AUTO: 0.5 10E3/UL (ref 0–1.3)
MONOCYTES NFR BLD AUTO: 9 %
NEUTROPHILS # BLD AUTO: 3.5 10E3/UL (ref 1.6–8.3)
NEUTROPHILS NFR BLD AUTO: 69 %
NRBC # BLD AUTO: 0 10E3/UL
NRBC BLD AUTO-RTO: 0 /100
PHOSPHATE SERPL-MCNC: 3.2 MG/DL (ref 2.5–4.5)
PLATELET # BLD AUTO: 154 10E3/UL (ref 150–450)
POTASSIUM SERPL-SCNC: 4.4 MMOL/L (ref 3.4–5.3)
PROT SERPL-MCNC: 6.9 G/DL (ref 6.4–8.3)
RBC # BLD AUTO: 3.24 10E6/UL (ref 3.8–5.2)
SODIUM SERPL-SCNC: 139 MMOL/L (ref 135–145)
WBC # BLD AUTO: 5.1 10E3/UL (ref 4–11)

## 2024-01-10 PROCEDURE — 36415 COLL VENOUS BLD VENIPUNCTURE: CPT | Performed by: PHYSICIAN ASSISTANT

## 2024-01-10 PROCEDURE — 250N000013 HC RX MED GY IP 250 OP 250 PS 637: Performed by: PHYSICIAN ASSISTANT

## 2024-01-10 PROCEDURE — 258N000003 HC RX IP 258 OP 636: Performed by: PHYSICIAN ASSISTANT

## 2024-01-10 PROCEDURE — 3E04305 INTRODUCTION OF OTHER ANTINEOPLASTIC INTO CENTRAL VEIN, PERCUTANEOUS APPROACH: ICD-10-PCS

## 2024-01-10 PROCEDURE — 99214 OFFICE O/P EST MOD 30 MIN: CPT | Performed by: PHYSICIAN ASSISTANT

## 2024-01-10 PROCEDURE — 250N000011 HC RX IP 250 OP 636: Performed by: PHYSICIAN ASSISTANT

## 2024-01-10 PROCEDURE — 36569 INSJ PICC 5 YR+ W/O IMAGING: CPT

## 2024-01-10 PROCEDURE — 83735 ASSAY OF MAGNESIUM: CPT | Performed by: PHYSICIAN ASSISTANT

## 2024-01-10 PROCEDURE — 120N000002 HC R&B MED SURG/OB UMMC

## 2024-01-10 PROCEDURE — 272N000019 HC KIT OPEN ENDED DOUBLE LUMEN

## 2024-01-10 PROCEDURE — 84100 ASSAY OF PHOSPHORUS: CPT | Performed by: PHYSICIAN ASSISTANT

## 2024-01-10 PROCEDURE — 99223 1ST HOSP IP/OBS HIGH 75: CPT | Mod: FS

## 2024-01-10 PROCEDURE — G0463 HOSPITAL OUTPT CLINIC VISIT: HCPCS | Performed by: PHYSICIAN ASSISTANT

## 2024-01-10 PROCEDURE — 272N000451 HC KIT SHRLOCK 5FR POWER PICC DOUBLE LUMEN

## 2024-01-10 PROCEDURE — 250N000009 HC RX 250: Performed by: PHYSICIAN ASSISTANT

## 2024-01-10 PROCEDURE — 250N000013 HC RX MED GY IP 250 OP 250 PS 637

## 2024-01-10 PROCEDURE — 80053 COMPREHEN METABOLIC PANEL: CPT | Performed by: PHYSICIAN ASSISTANT

## 2024-01-10 PROCEDURE — 85025 COMPLETE CBC W/AUTO DIFF WBC: CPT | Performed by: PHYSICIAN ASSISTANT

## 2024-01-10 PROCEDURE — 99418 PROLNG IP/OBS E/M EA 15 MIN: CPT | Mod: FS

## 2024-01-10 RX ORDER — LORAZEPAM 0.5 MG/1
.5-1 TABLET ORAL EVERY 6 HOURS PRN
Status: CANCELLED | OUTPATIENT
Start: 2024-01-10

## 2024-01-10 RX ORDER — LORAZEPAM 2 MG/ML
.5-1 INJECTION INTRAMUSCULAR EVERY 6 HOURS PRN
Status: CANCELLED | OUTPATIENT
Start: 2024-01-10

## 2024-01-10 RX ORDER — ONDANSETRON 4 MG/1
4 TABLET, ORALLY DISINTEGRATING ORAL EVERY 8 HOURS PRN
Status: DISCONTINUED | OUTPATIENT
Start: 2024-01-10 | End: 2024-01-10

## 2024-01-10 RX ORDER — PREGABALIN 50 MG/1
50 CAPSULE ORAL 3 TIMES DAILY
Status: DISCONTINUED | OUTPATIENT
Start: 2024-01-10 | End: 2024-01-16 | Stop reason: HOSPADM

## 2024-01-10 RX ORDER — DEXAMETHASONE SODIUM PHOSPHATE 10 MG/ML
10 INJECTION INTRAMUSCULAR; INTRAVENOUS ONCE
Status: CANCELLED | OUTPATIENT
Start: 2024-01-10

## 2024-01-10 RX ORDER — ALBUTEROL SULFATE 90 UG/1
1-2 AEROSOL, METERED RESPIRATORY (INHALATION)
Status: DISCONTINUED | OUTPATIENT
Start: 2024-01-10 | End: 2024-01-16 | Stop reason: HOSPADM

## 2024-01-10 RX ORDER — PROCHLORPERAZINE MALEATE 5 MG
5 TABLET ORAL EVERY 6 HOURS PRN
Status: DISCONTINUED | OUTPATIENT
Start: 2024-01-10 | End: 2024-01-10

## 2024-01-10 RX ORDER — DEXTROSE MONOHYDRATE 50 MG/ML
10-20 INJECTION, SOLUTION INTRAVENOUS
Status: DISCONTINUED | OUTPATIENT
Start: 2024-01-17 | End: 2024-01-16 | Stop reason: HOSPADM

## 2024-01-10 RX ORDER — DIPHENHYDRAMINE HYDROCHLORIDE 50 MG/ML
50 INJECTION INTRAMUSCULAR; INTRAVENOUS
Status: CANCELLED | OUTPATIENT
Start: 2024-01-10

## 2024-01-10 RX ORDER — DEXTROSE MONOHYDRATE 50 MG/ML
10-20 INJECTION, SOLUTION INTRAVENOUS
Status: CANCELLED | OUTPATIENT
Start: 2024-01-17

## 2024-01-10 RX ORDER — HEPARIN SODIUM,PORCINE 10 UNIT/ML
5-20 VIAL (ML) INTRAVENOUS
Status: DISCONTINUED | OUTPATIENT
Start: 2024-01-10 | End: 2024-01-16 | Stop reason: HOSPADM

## 2024-01-10 RX ORDER — MEPERIDINE HYDROCHLORIDE 25 MG/ML
25 INJECTION INTRAMUSCULAR; INTRAVENOUS; SUBCUTANEOUS EVERY 30 MIN PRN
Status: DISCONTINUED | OUTPATIENT
Start: 2024-01-10 | End: 2024-01-16 | Stop reason: HOSPADM

## 2024-01-10 RX ORDER — HEPARIN SODIUM,PORCINE 10 UNIT/ML
5-20 VIAL (ML) INTRAVENOUS EVERY 24 HOURS
Status: DISCONTINUED | OUTPATIENT
Start: 2024-01-10 | End: 2024-01-16 | Stop reason: HOSPADM

## 2024-01-10 RX ORDER — PANTOPRAZOLE SODIUM 40 MG/1
40 TABLET, DELAYED RELEASE ORAL EVERY MORNING
Status: DISCONTINUED | OUTPATIENT
Start: 2024-01-10 | End: 2024-01-16 | Stop reason: HOSPADM

## 2024-01-10 RX ORDER — MULTIVIT,CALC,MINS/IRON/FOLIC 500-18-0.4
TABLET ORAL DAILY
Status: DISCONTINUED | OUTPATIENT
Start: 2024-01-10 | End: 2024-01-10

## 2024-01-10 RX ORDER — ONDANSETRON 8 MG/1
8 TABLET, FILM COATED ORAL EVERY 8 HOURS
Qty: 18 TABLET | Refills: 0 | Status: DISCONTINUED | OUTPATIENT
Start: 2024-01-10 | End: 2024-01-13

## 2024-01-10 RX ORDER — AMLODIPINE BESYLATE 5 MG/1
5 TABLET ORAL DAILY
Status: DISCONTINUED | OUTPATIENT
Start: 2024-01-11 | End: 2024-01-14

## 2024-01-10 RX ORDER — ACETAMINOPHEN 325 MG/1
650 TABLET ORAL EVERY 4 HOURS PRN
Status: DISCONTINUED | OUTPATIENT
Start: 2024-01-10 | End: 2024-01-16 | Stop reason: HOSPADM

## 2024-01-10 RX ORDER — ALBUTEROL SULFATE 0.83 MG/ML
2.5 SOLUTION RESPIRATORY (INHALATION)
Status: CANCELLED | OUTPATIENT
Start: 2024-01-10

## 2024-01-10 RX ORDER — DOCUSATE SODIUM 100 MG/1
100 CAPSULE, LIQUID FILLED ORAL 2 TIMES DAILY
Status: DISCONTINUED | OUTPATIENT
Start: 2024-01-10 | End: 2024-01-16 | Stop reason: HOSPADM

## 2024-01-10 RX ORDER — PROCHLORPERAZINE MALEATE 5 MG
5 TABLET ORAL EVERY 6 HOURS PRN
Status: DISCONTINUED | OUTPATIENT
Start: 2024-01-10 | End: 2024-01-13

## 2024-01-10 RX ORDER — LOSARTAN POTASSIUM 100 MG/1
100 TABLET ORAL DAILY
Status: DISCONTINUED | OUTPATIENT
Start: 2024-01-10 | End: 2024-01-16 | Stop reason: HOSPADM

## 2024-01-10 RX ORDER — ALBUTEROL SULFATE 0.83 MG/ML
2.5 SOLUTION RESPIRATORY (INHALATION)
Status: DISCONTINUED | OUTPATIENT
Start: 2024-01-10 | End: 2024-01-16 | Stop reason: HOSPADM

## 2024-01-10 RX ORDER — OXYCODONE HYDROCHLORIDE 5 MG/1
5-10 TABLET ORAL EVERY 6 HOURS PRN
Status: DISCONTINUED | OUTPATIENT
Start: 2024-01-10 | End: 2024-01-16 | Stop reason: HOSPADM

## 2024-01-10 RX ORDER — DIPHENHYDRAMINE HYDROCHLORIDE 50 MG/ML
50 INJECTION INTRAMUSCULAR; INTRAVENOUS
Status: DISCONTINUED | OUTPATIENT
Start: 2024-01-10 | End: 2024-01-16 | Stop reason: HOSPADM

## 2024-01-10 RX ORDER — NALOXONE HYDROCHLORIDE 0.4 MG/ML
0.4 INJECTION, SOLUTION INTRAMUSCULAR; INTRAVENOUS; SUBCUTANEOUS
Status: DISCONTINUED | OUTPATIENT
Start: 2024-01-10 | End: 2024-01-16 | Stop reason: HOSPADM

## 2024-01-10 RX ORDER — ONDANSETRON 2 MG/ML
4 INJECTION INTRAMUSCULAR; INTRAVENOUS EVERY 8 HOURS PRN
Status: DISCONTINUED | OUTPATIENT
Start: 2024-01-10 | End: 2024-01-10

## 2024-01-10 RX ORDER — EPINEPHRINE 1 MG/ML
0.3 INJECTION, SOLUTION, CONCENTRATE INTRAVENOUS EVERY 5 MIN PRN
Status: DISCONTINUED | OUTPATIENT
Start: 2024-01-10 | End: 2024-01-16 | Stop reason: HOSPADM

## 2024-01-10 RX ORDER — NALOXONE HYDROCHLORIDE 0.4 MG/ML
0.2 INJECTION, SOLUTION INTRAMUSCULAR; INTRAVENOUS; SUBCUTANEOUS
Status: DISCONTINUED | OUTPATIENT
Start: 2024-01-10 | End: 2024-01-16 | Stop reason: HOSPADM

## 2024-01-10 RX ORDER — AMOXICILLIN 250 MG
1 CAPSULE ORAL 2 TIMES DAILY PRN
Status: DISCONTINUED | OUTPATIENT
Start: 2024-01-10 | End: 2024-01-16 | Stop reason: HOSPADM

## 2024-01-10 RX ORDER — METHYLPREDNISOLONE SODIUM SUCCINATE 125 MG/2ML
125 INJECTION, POWDER, LYOPHILIZED, FOR SOLUTION INTRAMUSCULAR; INTRAVENOUS
Status: DISCONTINUED | OUTPATIENT
Start: 2024-01-10 | End: 2024-01-16 | Stop reason: HOSPADM

## 2024-01-10 RX ORDER — METHYLPREDNISOLONE SODIUM SUCCINATE 125 MG/2ML
125 INJECTION, POWDER, LYOPHILIZED, FOR SOLUTION INTRAMUSCULAR; INTRAVENOUS
Status: CANCELLED | OUTPATIENT
Start: 2024-01-10

## 2024-01-10 RX ORDER — PROCHLORPERAZINE MALEATE 5 MG
5 TABLET ORAL EVERY 6 HOURS PRN
Status: CANCELLED | OUTPATIENT
Start: 2024-01-10

## 2024-01-10 RX ORDER — MEPERIDINE HYDROCHLORIDE 25 MG/ML
25 INJECTION INTRAMUSCULAR; INTRAVENOUS; SUBCUTANEOUS EVERY 30 MIN PRN
Status: CANCELLED | OUTPATIENT
Start: 2024-01-10

## 2024-01-10 RX ORDER — LORAZEPAM 2 MG/ML
.5-1 INJECTION INTRAMUSCULAR EVERY 6 HOURS PRN
Status: DISCONTINUED | OUTPATIENT
Start: 2024-01-10 | End: 2024-01-16 | Stop reason: HOSPADM

## 2024-01-10 RX ORDER — ENOXAPARIN SODIUM 100 MG/ML
40 INJECTION SUBCUTANEOUS EVERY 24 HOURS
Status: DISCONTINUED | OUTPATIENT
Start: 2024-01-10 | End: 2024-01-16 | Stop reason: HOSPADM

## 2024-01-10 RX ORDER — ONDANSETRON 4 MG/1
8 TABLET, FILM COATED ORAL EVERY 8 HOURS
Status: CANCELLED | OUTPATIENT
Start: 2024-01-10

## 2024-01-10 RX ORDER — HYDROCHLOROTHIAZIDE 25 MG/1
25 TABLET ORAL DAILY
Status: DISCONTINUED | OUTPATIENT
Start: 2024-01-10 | End: 2024-01-15

## 2024-01-10 RX ORDER — EPINEPHRINE 1 MG/ML
0.3 INJECTION, SOLUTION INTRAMUSCULAR; SUBCUTANEOUS EVERY 5 MIN PRN
Status: CANCELLED | OUTPATIENT
Start: 2024-01-10

## 2024-01-10 RX ORDER — LORAZEPAM 0.5 MG/1
.5-1 TABLET ORAL EVERY 6 HOURS PRN
Status: DISCONTINUED | OUTPATIENT
Start: 2024-01-10 | End: 2024-01-16 | Stop reason: HOSPADM

## 2024-01-10 RX ORDER — DEXAMETHASONE SODIUM PHOSPHATE 10 MG/ML
10 INJECTION INTRAMUSCULAR; INTRAVENOUS ONCE
Qty: 1 ML | Refills: 0 | Status: COMPLETED | OUTPATIENT
Start: 2024-01-10 | End: 2024-01-10

## 2024-01-10 RX ORDER — ALBUTEROL SULFATE 90 UG/1
1-2 AEROSOL, METERED RESPIRATORY (INHALATION)
Status: CANCELLED | OUTPATIENT
Start: 2024-01-10

## 2024-01-10 RX ORDER — ONDANSETRON 4 MG/1
4 TABLET, FILM COATED ORAL EVERY 8 HOURS PRN
Status: DISCONTINUED | OUTPATIENT
Start: 2024-01-10 | End: 2024-01-10

## 2024-01-10 RX ORDER — AMOXICILLIN 250 MG
2 CAPSULE ORAL 2 TIMES DAILY PRN
Status: DISCONTINUED | OUTPATIENT
Start: 2024-01-10 | End: 2024-01-16 | Stop reason: HOSPADM

## 2024-01-10 RX ORDER — POLYETHYLENE GLYCOL 3350 17 G/17G
17 POWDER, FOR SOLUTION ORAL DAILY PRN
Status: DISCONTINUED | OUTPATIENT
Start: 2024-01-10 | End: 2024-01-16 | Stop reason: HOSPADM

## 2024-01-10 RX ADMIN — THIAMINE HCL TAB 100 MG 100 MG: 100 TAB at 13:45

## 2024-01-10 RX ADMIN — DEXAMETHASONE SODIUM PHOSPHATE 10 MG: 10 INJECTION INTRAMUSCULAR; INTRAVENOUS at 14:08

## 2024-01-10 RX ADMIN — PREGABALIN 50 MG: 50 CAPSULE ORAL at 14:38

## 2024-01-10 RX ADMIN — ONDANSETRON HYDROCHLORIDE 8 MG: 8 TABLET, FILM COATED ORAL at 22:02

## 2024-01-10 RX ADMIN — PREGABALIN 50 MG: 50 CAPSULE ORAL at 20:11

## 2024-01-10 RX ADMIN — THIAMINE HCL TAB 100 MG 100 MG: 100 TAB at 20:10

## 2024-01-10 RX ADMIN — PSYLLIUM HUSK 1 PACKET: 3.4 POWDER ORAL at 19:22

## 2024-01-10 RX ADMIN — LOSARTAN POTASSIUM 100 MG: 100 TABLET, FILM COATED ORAL at 20:10

## 2024-01-10 RX ADMIN — FOSAPREPITANT 150 MG: 150 INJECTION, POWDER, LYOPHILIZED, FOR SOLUTION INTRAVENOUS at 13:52

## 2024-01-10 RX ADMIN — DOXORUBICIN HYDROCHLORIDE 73 MG: 2 INJECTION, SUSPENSION, LIPOSOMAL INTRAVENOUS at 14:59

## 2024-01-10 RX ADMIN — HYDROCHLOROTHIAZIDE 25 MG: 25 TABLET ORAL at 19:22

## 2024-01-10 RX ADMIN — POTASSIUM CHLORIDE: 2 INJECTION, SOLUTION, CONCENTRATE INTRAVENOUS at 14:38

## 2024-01-10 RX ADMIN — ONDANSETRON HYDROCHLORIDE 8 MG: 8 TABLET, FILM COATED ORAL at 13:45

## 2024-01-10 RX ADMIN — POTASSIUM CHLORIDE: 2 INJECTION, SOLUTION, CONCENTRATE INTRAVENOUS at 22:47

## 2024-01-10 RX ADMIN — ENOXAPARIN SODIUM 40 MG: 40 INJECTION SUBCUTANEOUS at 13:47

## 2024-01-10 RX ADMIN — MESNA 2910 MG: 100 INJECTION, SOLUTION INTRAVENOUS at 16:36

## 2024-01-10 ASSESSMENT — ACTIVITIES OF DAILY LIVING (ADL)
ADLS_ACUITY_SCORE: 20

## 2024-01-10 ASSESSMENT — PAIN SCALES - GENERAL: PAINLEVEL: NO PAIN (0)

## 2024-01-10 NOTE — PROGRESS NOTES
Chemo note:  D/I: pt received emend, zofran and decadron as premeds. Brisk blood return from PICC . Day 1 doxil initiated to infuse over 1 hr.   A/P: Pt tolerating well, thus far. Good urine output. Denies nausea. Continue to monitor for side effects.

## 2024-01-10 NOTE — NURSING NOTE
Chief Complaint   Patient presents with    Blood Draw     Labs drawn via  by RN. VS taken.     Labs collected from venipuncture by RN. Vitals taken. Checked in for appointment(s).     Thelma Roblero RN

## 2024-01-10 NOTE — LETTER
1/10/2024         RE: Mirta Cardenas  [address] [facility] [address]        Dear Colleague,    Thank you for referring your patient, Mirta Cardenas, to the [facility]. Please see a copy of my visit note below.      [facility]  FOLLOW UP VISIT NOTE    PATIENT NAME: Mirta Cardenas MRN # [id number]  DATE OF VISIT: Damon 10, 2024  YOB: 1955    CANCER TYPE:  High grade pleomorphic sarcoma, right calf       HISTORY OF PRESENT ILLNESS/ONCOLOGY HISTORY   Mirta is a 68 year old female with PMH of CLL and recent Dx of High Grade Pleomorphic sarcoma, with intermittent right calf pain but then continuous calf pain beginning in March 2023.  She had a recent x-ray which shows a destructive lesion in the midportion R LE in between the fibula and the tibia.     -10/2/23, Biopsy  Final Diagnosis   A.  Soft tissue, right calf mass, excision:  - High-grade pleomorphic sarcoma  - See comment      -10/10/23, PET/CT with no evidence of mets (mildly enlarged righ iliac chain LN with no hypermetabolic uptake; thought to be reactive).     -10/17/23-10/24/23, Inpatient Cycle 1 of Doxil/ifosfamide  -11/4/23, Inpatient cycle 2 Doxil/ifosfamide  -12/13/23, Cycle 3 inpatient Doxil/ifosfamide     PAST ONCOLOGIC HISTORY   CLL - on monitoring      INTERVAL HISTORY   Patient reports that she feels like she has tolerated chemotherapy pretty well lately.  She did have a left-sided headache for a few days last week that improved with Tylenol and oxycodone.  She reports that she does not consistently have issues with headaches.  She has noticed some intermittent loose stools over the last 3 weeks.  These seem to occur in the morning without any associated bleeding or abdominal cramping.  At its worst she has had up to 4 loose stools per day.  They have very occasionally been watery.  Previously, she had had trouble with constipation so had been taking a  stool softener which she stopped about 2 weeks ago.  Of note, she has started on a daily magnesium supplement as her magnesium has been low recurrently.  She reports some increase in leg swelling that is most bothersome in the evenings over the last 2 weeks.  She did recently have an increase in the dose on her amlodipine.  She also notices some stiffness in her feet.  She has had a hard time tolerating compression stockings due to feeling that they are too tight around her knees. She has not needed to take oxycodone recently for her right leg pain. She denies other concerns.    REVIEW OF SYSTEMS   Patient denies any of the following except if noted above: fevers, chills, difficulty with energy, vision or hearing changes, chest pain, dyspnea, abdominal pain, nausea, vomiting, constipation, urinary concerns, numbness, tingling, issues with sleep or mood.      PAST MEDICAL HISTORY   Anal fisure - controlled with nitrobid  CLL - now being monitored   HTN  Cholesterol  Hx of severe COVID  Psoriasis       PHYSICAL EXAM   General: The patient is a pleasant female in no acute distress.  BP (!) 155/76 (BP Location: Right arm, Patient Position: Sitting, Cuff Size: Adult Regular)   Pulse 89   Temp 97.7  F (36.5  C) (Oral)   Resp 16   Wt 80 kg (176 lb 6.4 oz)   SpO2 99%   BMI 33.33 kg/m    Wt Readings from Last 10 Encounters:   01/10/24 80 kg (176 lb 6.4 oz)   12/29/23 77.1 kg (170 lb)   12/26/23 77.1 kg (170 lb)   12/17/23 77.3 kg (170 lb 6.4 oz)   12/06/23 79.4 kg (175 lb)   11/19/23 76.2 kg (168 lb)   11/08/23 78.5 kg (173 lb)   10/30/23 79.1 kg (174 lb 6.4 oz)   10/26/23 77.1 kg (170 lb)   10/24/23 76.7 kg (169 lb 1.6 oz)   HEENT: EOMI. Sclerae are anicteric.   Lymph: Neck is supple with no lymphadenopathy in the cervical or supraclavicular areas.   Heart: Regular rate and rhythm.   Lungs: Clear to auscultation bilaterally.   Abdomen: Bowel sounds present, soft, nontender with no palpable hepatosplenomegaly or  masses.   Extremities: Trace lower extremity edema noted bilaterally, right slightly greater than left.   Neuro: Cranial nerves II through XII are grossly intact.  Skin: No rashes, petechiae, or bruising noted on exposed skin.     LABORATORY AND IMAGING STUDIES     Most Recent 3 CBC's:  Recent Labs   Lab Test 01/10/24  0748 01/05/24  1026 01/03/24  1223   WBC 5.1 6.0 7.2   HGB 9.5* 8.9* 8.6*   MCV 86 86 87    144* 140*   ANEUTAUTO 3.5 4.3 5.4     Most Recent 3 BMP's:  Recent Labs   Lab Test 01/10/24  0748 01/05/24  1026 01/03/24  1223    141 141   POTASSIUM 4.4 4.4 3.9   CHLORIDE 105 107 108*   CO2 24 24 28   BUN 21.9 23.2* 25.4*   CR 0.86 0.83 0.85   ANIONGAP 10 10 5*   MONIK 9.2 9.0 8.9   GLC 94 94 107*   PROTTOTAL 6.9 6.3* 6.3*   ALBUMIN 4.3 4.0 4.0    Most Recent 3 LFT's:  Recent Labs   Lab Test 01/10/24  0748 01/05/24  1026 01/03/24  1223   AST 21 18 17   ALT 13 14 14   ALKPHOS 109 115 123   BILITOTAL 0.4 0.3 0.3     Phosphorus   Date Value Ref Range Status   01/10/2024 3.2 2.5 - 4.5 mg/dL Final   01/05/2024 3.2 2.5 - 4.5 mg/dL Final   01/03/2024 3.3 2.5 - 4.5 mg/dL Final   12/28/2023 3.6 2.5 - 4.5 mg/dL Final   04/23/2008 3.4 2.5 - 4.5 mg/dL Final   08/17/2005 3.6 2.5 - 4.5 mg/dL Final     Magnesium   Date Value Ref Range Status   01/10/2024 1.8 1.7 - 2.3 mg/dL Final   01/05/2024 1.9 1.7 - 2.3 mg/dL Final   01/03/2024 1.6 (L) 1.7 - 2.3 mg/dL Final   12/28/2023 1.5 (L) 1.7 - 2.3 mg/dL Final   10/12/2020 1.9 1.6 - 2.3 mg/dL Final   08/17/2005 2.0 1.6 - 2.3 mg/dL Final     I reviewed the above labs today.       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 No evidence of metastatic disease.     She began neoadjuvant chemotherapy with Doxil/ifosfmaide on 10/17/23. Repeat imaging (PET 12/5/23) after 2  cycles showed treatment response. The plan is to continue with the same regimen for 2 more cycle (4 total) and then proceed with radiation followed by surgery.     She is now s/p 3 cycles of Doxil/ifosfamide. She received these at a slightly lower dose due to age and other medical conditions (doxil 40mg/m2 and ifosfamide 8g/m2 over 5 days). She has tolerated these well overall. With continue thiamine TID during chemo which has helped with hand tremors/twitches.    Plan  -Admit today for cycle 4 Doxil/ifosfamide   -At discharge will need Neulasta. Then will need labs (CBC/p/d, CMP, PO4, Mg) and possible transfusion at [place] 2x week for 2 weeks, as it appears she did not require any blood products with the last cycle. She will also need levaquin po 500 mg once daily for 10 days at discharge.  -Repeat imaging (PET and MRI tib fib) on 1/29 followed by visit with Dr. Jimenez  -Radiation therapy and ortho surgery visits on 2/1 as planned    #Neurotoxicity prophylaxis  Thiamine (B-1) 100mg TID for 7 days during chemo    #Hypomagnesemia  On magnesium oxide 400 mg daily. Magnesium is normal today. Suspect loose stools are related to daily magnesium use. Will continue for now.     #Cancer-related pain, right calf  -Much improved as compared to pre-treatment  -Continue ibuprofen prn  -Continue Lyrica 50mg TID  -Continue oxycodone 5-10mg once daily prn  -Of note, did not tolerate Cymbalta    CV  #Hypertension  -Continue losartan 100 mg daily and amlodipine 7.5 mg once daily.  -BP is still elevated in clinic today. Will continue to monitor during the hospital admission. If remains elevated, may need further adjustments to his BP meds.   -Chlorthalidone discontinued d/t electrolyte disturbance    #Right Leg Swelling, improving  - US RLE on 10/21/23 was negative  - Now with mild bilateral LE edema. Suspect secondary to amlodipine. Recommend compression stockings versus ACE wraps and leg elevation.      [facility]jan Vogt  [facility]  [address] [place] [address]  [phone]    34 minutes spent on the date of the encounter doing chart review, review of test results, interpretation of tests, patient visit, and documentation

## 2024-01-10 NOTE — NURSING NOTE
"Oncology Rooming Note    January 10, 2024 8:03 AM   Mirta Cardenas is a 68 year old female who presents for:    Chief Complaint   Patient presents with    Blood Draw     Labs drawn via  by RN. VS taken.    Oncology Clinic Visit     Sarcoma     Initial Vitals: BP (!) 155/76 (BP Location: Right arm, Patient Position: Sitting, Cuff Size: Adult Regular)   Pulse 89   Temp 97.7  F (36.5  C) (Oral)   Resp 16   Wt 80 kg (176 lb 6.4 oz)   SpO2 99%   BMI 33.33 kg/m   Estimated body mass index is 33.33 kg/m  as calculated from the following:    Height as of 12/29/23: 1.549 m (5' 1\").    Weight as of this encounter: 80 kg (176 lb 6.4 oz). Body surface area is 1.86 meters squared.  No Pain (0) Comment: Data Unavailable   No LMP recorded. Patient has had a hysterectomy.  Allergies reviewed: Yes  Medications reviewed: Yes    Medications: Medication refills not needed today.  Pharmacy name entered into [facility]:    [facility] [address] [place] [address] [address] [facility] [address] [place] [address]    Frailty Screening:   Is the patient here for a new oncology consult visit in cancer care? 2. No      Clinical concerns:  Patient states there are no new concerns to discuss with provider.  Gunjan was not notified.        Lizbet Polo CMA              "

## 2024-01-10 NOTE — PLAN OF CARE
Goal Outcome Evaluation:      Plan of Care Reviewed With: patient, spouse    Overall Patient Progress: no changeOverall Patient Progress: no change  Nursing Focus: Admission  D: Arrived at 1100 from clinic, s/p PICC placement. Patient accompanied by . Admitted for chemotherapy.       I: Admission process began.  Patient oriented to room, enviroment, call light.  Md. notified of patients arrival on unit.     A: Vital signs stable, afebrile.  Patient stable at this time.  Complaining of LE edema.     P: Implement plan of care when available. Continue to monitor patient. Nursing interventions as appropriate. Notify md with changes in pt status.     Pt afebrile with stable vs. Day 1 chemotherapy begun (See note). Independent with ambulation. Continue with plan of care.

## 2024-01-10 NOTE — PROGRESS NOTES
Ifosfamide Toxicity Assessment      Patient is Alert & Oriented x4. There are No signs of lethargy, confusion or hallucinations.     Patient is having new incontinence of bowel or bladder NO     Pincer Grasp intact Yes    Tremors NO     Provider was notified NO . Interventions include n/a.     -- Monitor for s/sx of ifosfamide toxicity: hallucinations, AMS, emotional lability, somnolence, myoclonic jerks, tremors, coordination difficulties, etc. If these occur, please call the fellow on call for recommendations

## 2024-01-10 NOTE — PHARMACY-ADMISSION MEDICATION HISTORY
Pharmacy Intern Admission Medication History    Admission medication history is complete. The information provided in this note is only as accurate as the sources available at the time of the update.    Information Source(s): Patient via in-person    Pertinent Information: Patient reports not wanting to receive calcipotriene cream, clobetasol cream, metamucil  and One-A-Day Women's tablet while inpatient.    Changes made to PTA medication list:  Added: None  Deleted: None  Changed: None    Allergies reviewed with patient and updates made in EHR: yes    Medication History Completed By: Anton Morfin 1/10/2024 1:37 PM    PTA Med List   Medication Sig Last Dose    amLODIPine (NORVASC) 5 MG tablet Take 1.5 tablets (7.5 mg) by mouth daily 1/10/2024 at 0600    clobetasol (TEMOVATE) 0.05 % external cream Apply twice daily to psoriasis on Monday through Friday. (Patient taking differently: Apply twice daily to psoriasis on Monday through Friday as needed) 1/10/2024    docusate sodium (COLACE) 100 MG capsule Take 100 mg by mouth 2 times daily as needed 1/10/2024 at 0600    losartan (COZAAR) 100 MG tablet Take 1 tablet (100 mg) by mouth daily (Patient taking differently: Take 100 mg by mouth every evening) 1/9/2024 at 2000    magnesium oxide (MAG-OX) 400 MG tablet Take 1 tablet (400mg) by mouth 2 times daily for 2 days; then take 1 tablet once daily until directed otherwise by clinic. 1/10/2024 at 0600    METAMUCIL FIBER PO Take 1 teaspoonful by mouth daily After supper 1/9/2024 at 1800    Multiple Vitamins-Calcium (ONE-A-DAY WOMENS FORMULA PO) Take 1 tablet by mouth daily 1/10/2024 at 0600    omeprazole (PRILOSEC) 20 MG DR capsule Take 1 capsule by mouth every morning. 1/10/2024 at 0600    oxyCODONE (ROXICODONE) 5 MG tablet Take 1-2 tablets (5-10 mg) by mouth as needed for severe pain (Patient taking differently: Take 5-10 mg by mouth daily as needed for severe pain) Past Week at prn    pravastatin (PRAVACHOL) 40 MG tablet  Take 1 tablet (40 mg) by mouth daily (Patient taking differently: Take 40 mg by mouth every evening) 1/9/2024 at 2000    pregabalin (LYRICA) 50 MG capsule Take 1 capsule (50 mg) by mouth 3 times daily 1/10/2024 at 0600

## 2024-01-10 NOTE — PROGRESS NOTES
Nursing Focus: Chemotherapy  D: Positive blood return via PICC . Insertion site is clean/dry/intact, dressing intact with no complaints of pain.  Pre infusion assessment documented in Flowsheet (if applicable).    I: Premedications given per order (see electronic medical administration record). Dose #1 of 5  started to infuse over 24 hours/minutes. Reviewed pt teaching on chemotherapy side effects.  Pt denies need for further teaching. Chemotherapy double checked per protocol by two chemotherapy competent RN's.   A: Tolerating infusion well. Denies nausea and or pain.   P: Continue to monitor urine output and symptoms of nausea. Screen for symptoms of toxicity.

## 2024-01-10 NOTE — PROCEDURES
[facility]    Double Lumen PICC Placement    Date/Time: 1/10/2024 10:43 AM    Performed by: Zita Bhakta RN  Authorized by: Scheierl, Amber J, APRN CNP  Indications: vascular access      UNIVERSAL PROTOCOL   Site Marked: Yes  Prior Images Obtained and Reviewed:  Yes  Required items: Required blood products, implants, devices and special equipment available    Patient identity confirmed:  Verbally with patient and arm band  NA - No sedation, light sedation, or local anesthesia  Confirmation Checklist:  Patient's identity using two indicators and relevant allergies  Time out: Immediately prior to the procedure a time out was called    Universal Protocol: the Joint Commission Universal Protocol was followed    Preparation: Patient was prepped and draped in usual sterile fashion       ANESTHESIA    Anesthesia:  Local infiltration  Local Anesthetic:  Lidocaine 1% without epinephrine  Anesthetic Total (mL):  5      SEDATION    Patient Sedated: No        Preparation: skin prepped with ChloraPrep  Skin prep agent: skin prep agent completely dried prior to procedure  Sterile barriers: maximum sterile barriers were used: cap, mask, sterile gown, sterile gloves, and large sterile sheet  Hand hygiene: hand hygiene performed prior to central venous catheter insertion  Type of line used: PICC  Catheter type: double lumen  Lumen type: non-valved and power PICC  Lumen Identification: Purple and Red  Catheter size: 5 Fr  Brand: Bard  Lot number: [id number]  Placement method: venipuncture, MST, ultrasound and tip navigation system  Number of attempts: 1  Difficulty threading catheter: no  Successful placement: yes  Orientation: left  Catheter to Vein (%): 34  Location: brachial vein (medial)  Tip Location: SVC  Arm circumference: adults 10 cm  Extremity circumference: 30  Visible catheter length: 1  Total catheter length: 41  Dressing and securement: adhesive securement  device, alcohol impregnated caps, chlorhexidine disc applied, gloves changed prior to final dressing, glue, securement device, site cleansed, statlock and transparent securement dressing  Post procedure assessment: free fluid flow, blood return through all ports and placement verified by 3CG technology  PROCEDURE   Patient Tolerance:  Patient tolerated the procedure well with no immediate complicationsDescribe Procedure: PICC ok to use  Disposal: sharps and needle count correct at the end of procedure, needles and guidewire disposed in sharps container

## 2024-01-11 LAB
ALBUMIN SERPL BCG-MCNC: 3.8 G/DL (ref 3.5–5.2)
ALP SERPL-CCNC: 94 U/L (ref 40–150)
ALT SERPL W P-5'-P-CCNC: 15 U/L (ref 0–50)
ANION GAP SERPL CALCULATED.3IONS-SCNC: 8 MMOL/L (ref 7–15)
AST SERPL W P-5'-P-CCNC: 18 U/L (ref 0–45)
BASOPHILS # BLD AUTO: 0 10E3/UL (ref 0–0.2)
BASOPHILS NFR BLD AUTO: 0 %
BILIRUB SERPL-MCNC: 0.2 MG/DL
BUN SERPL-MCNC: 20.7 MG/DL (ref 8–23)
CALCIUM SERPL-MCNC: 8.9 MG/DL (ref 8.8–10.2)
CHLORIDE SERPL-SCNC: 104 MMOL/L (ref 98–107)
CREAT SERPL-MCNC: 0.85 MG/DL (ref 0.51–0.95)
DEPRECATED HCO3 PLAS-SCNC: 26 MMOL/L (ref 22–29)
EGFRCR SERPLBLD CKD-EPI 2021: 75 ML/MIN/1.73M2
EOSINOPHIL # BLD AUTO: 0 10E3/UL (ref 0–0.7)
EOSINOPHIL NFR BLD AUTO: 0 %
ERYTHROCYTE [DISTWIDTH] IN BLOOD BY AUTOMATED COUNT: 17.3 % (ref 10–15)
FIBRINOGEN PPP-MCNC: 313 MG/DL (ref 170–490)
GLUCOSE SERPL-MCNC: 147 MG/DL (ref 70–99)
HCT VFR BLD AUTO: 24 % (ref 35–47)
HGB BLD-MCNC: 8.4 G/DL (ref 11.7–15.7)
IMM GRANULOCYTES # BLD: 0.1 10E3/UL
IMM GRANULOCYTES NFR BLD: 1 %
INR PPP: 1.14 (ref 0.85–1.15)
LYMPHOCYTES # BLD AUTO: 1.2 10E3/UL (ref 0.8–5.3)
LYMPHOCYTES NFR BLD AUTO: 17 %
MAGNESIUM SERPL-MCNC: 1.7 MG/DL (ref 1.7–2.3)
MCH RBC QN AUTO: 29.6 PG (ref 26.5–33)
MCHC RBC AUTO-ENTMCNC: 35 G/DL (ref 31.5–36.5)
MCV RBC AUTO: 85 FL (ref 78–100)
MONOCYTES # BLD AUTO: 0.1 10E3/UL (ref 0–1.3)
MONOCYTES NFR BLD AUTO: 2 %
NEUTROPHILS # BLD AUTO: 5.6 10E3/UL (ref 1.6–8.3)
NEUTROPHILS NFR BLD AUTO: 80 %
NRBC # BLD AUTO: 0 10E3/UL
NRBC BLD AUTO-RTO: 0 /100
PHOSPHATE SERPL-MCNC: 2.3 MG/DL (ref 2.5–4.5)
PLATELET # BLD AUTO: 144 10E3/UL (ref 150–450)
POTASSIUM SERPL-SCNC: 4.5 MMOL/L (ref 3.4–5.3)
PROT SERPL-MCNC: 6 G/DL (ref 6.4–8.3)
RBC # BLD AUTO: 2.84 10E6/UL (ref 3.8–5.2)
SODIUM SERPL-SCNC: 138 MMOL/L (ref 135–145)
URATE SERPL-MCNC: 4.8 MG/DL (ref 2.4–5.7)
WBC # BLD AUTO: 6.9 10E3/UL (ref 4–11)

## 2024-01-11 PROCEDURE — 250N000013 HC RX MED GY IP 250 OP 250 PS 637: Performed by: PHYSICIAN ASSISTANT

## 2024-01-11 PROCEDURE — 258N000003 HC RX IP 258 OP 636: Performed by: PHYSICIAN ASSISTANT

## 2024-01-11 PROCEDURE — 250N000013 HC RX MED GY IP 250 OP 250 PS 637

## 2024-01-11 PROCEDURE — 84100 ASSAY OF PHOSPHORUS: CPT | Performed by: PHYSICIAN ASSISTANT

## 2024-01-11 PROCEDURE — 83735 ASSAY OF MAGNESIUM: CPT | Performed by: PHYSICIAN ASSISTANT

## 2024-01-11 PROCEDURE — 120N000002 HC R&B MED SURG/OB UMMC

## 2024-01-11 PROCEDURE — 250N000011 HC RX IP 250 OP 636: Mod: JZ | Performed by: PHYSICIAN ASSISTANT

## 2024-01-11 PROCEDURE — 250N000009 HC RX 250: Performed by: PHYSICIAN ASSISTANT

## 2024-01-11 PROCEDURE — 80053 COMPREHEN METABOLIC PANEL: CPT | Performed by: PHYSICIAN ASSISTANT

## 2024-01-11 PROCEDURE — 85610 PROTHROMBIN TIME: CPT | Performed by: PHYSICIAN ASSISTANT

## 2024-01-11 PROCEDURE — 85384 FIBRINOGEN ACTIVITY: CPT | Performed by: PHYSICIAN ASSISTANT

## 2024-01-11 PROCEDURE — 250N000013 HC RX MED GY IP 250 OP 250 PS 637: Performed by: STUDENT IN AN ORGANIZED HEALTH CARE EDUCATION/TRAINING PROGRAM

## 2024-01-11 PROCEDURE — 250N000011 HC RX IP 250 OP 636: Performed by: PHYSICIAN ASSISTANT

## 2024-01-11 PROCEDURE — 84550 ASSAY OF BLOOD/URIC ACID: CPT | Performed by: PHYSICIAN ASSISTANT

## 2024-01-11 PROCEDURE — 99232 SBSQ HOSP IP/OBS MODERATE 35: CPT | Mod: FS

## 2024-01-11 PROCEDURE — 85025 COMPLETE CBC W/AUTO DIFF WBC: CPT | Performed by: PHYSICIAN ASSISTANT

## 2024-01-11 RX ADMIN — POTASSIUM & SODIUM PHOSPHATES POWDER PACK 280-160-250 MG 1 PACKET: 280-160-250 PACK at 09:04

## 2024-01-11 RX ADMIN — MESNA 2910 MG: 100 INJECTION, SOLUTION INTRAVENOUS at 16:09

## 2024-01-11 RX ADMIN — LOSARTAN POTASSIUM 100 MG: 100 TABLET, FILM COATED ORAL at 19:44

## 2024-01-11 RX ADMIN — ENOXAPARIN SODIUM 40 MG: 40 INJECTION SUBCUTANEOUS at 07:50

## 2024-01-11 RX ADMIN — THIAMINE HCL TAB 100 MG 100 MG: 100 TAB at 07:46

## 2024-01-11 RX ADMIN — THIAMINE HCL TAB 100 MG 100 MG: 100 TAB at 13:20

## 2024-01-11 RX ADMIN — POTASSIUM & SODIUM PHOSPHATES POWDER PACK 280-160-250 MG 1 PACKET: 280-160-250 PACK at 13:20

## 2024-01-11 RX ADMIN — PREGABALIN 50 MG: 50 CAPSULE ORAL at 19:44

## 2024-01-11 RX ADMIN — THIAMINE HCL TAB 100 MG 100 MG: 100 TAB at 19:44

## 2024-01-11 RX ADMIN — AMLODIPINE BESYLATE 5 MG: 5 TABLET ORAL at 07:46

## 2024-01-11 RX ADMIN — DOCUSATE SODIUM 100 MG: 100 CAPSULE, LIQUID FILLED ORAL at 07:48

## 2024-01-11 RX ADMIN — POTASSIUM & SODIUM PHOSPHATES POWDER PACK 280-160-250 MG 1 PACKET: 280-160-250 PACK at 06:19

## 2024-01-11 RX ADMIN — ONDANSETRON HYDROCHLORIDE 8 MG: 8 TABLET, FILM COATED ORAL at 16:21

## 2024-01-11 RX ADMIN — PSYLLIUM HUSK 1 PACKET: 3.4 POWDER ORAL at 18:14

## 2024-01-11 RX ADMIN — PANTOPRAZOLE SODIUM 40 MG: 40 TABLET, DELAYED RELEASE ORAL at 07:46

## 2024-01-11 RX ADMIN — POTASSIUM CHLORIDE: 2 INJECTION, SOLUTION, CONCENTRATE INTRAVENOUS at 06:41

## 2024-01-11 RX ADMIN — POTASSIUM CHLORIDE: 2 INJECTION, SOLUTION, CONCENTRATE INTRAVENOUS at 21:43

## 2024-01-11 RX ADMIN — HYDROCHLOROTHIAZIDE 25 MG: 25 TABLET ORAL at 07:46

## 2024-01-11 RX ADMIN — SENNOSIDES AND DOCUSATE SODIUM 1 TABLET: 8.6; 5 TABLET ORAL at 06:23

## 2024-01-11 RX ADMIN — ONDANSETRON HYDROCHLORIDE 8 MG: 8 TABLET, FILM COATED ORAL at 06:19

## 2024-01-11 RX ADMIN — POTASSIUM CHLORIDE: 2 INJECTION, SOLUTION, CONCENTRATE INTRAVENOUS at 14:01

## 2024-01-11 RX ADMIN — PREGABALIN 50 MG: 50 CAPSULE ORAL at 07:45

## 2024-01-11 ASSESSMENT — ACTIVITIES OF DAILY LIVING (ADL)
ADLS_ACUITY_SCORE: 21
ADLS_ACUITY_SCORE: 20
ADLS_ACUITY_SCORE: 21
ADLS_ACUITY_SCORE: 20
ADLS_ACUITY_SCORE: 21
ADLS_ACUITY_SCORE: 20

## 2024-01-11 NOTE — PLAN OF CARE
Goal Outcome Evaluation:      Plan of Care Reviewed With: patient    Overall Patient Progress: no changeOverall Patient Progress: no change  Afebrile with stable vs. Continues to receive Day 1 Ifosfamide/mesna CIVI without difficulty. ICANS assessment unchanged, denies nausea. Phos level 2.3 replaced per protocol, redraw tomorrow. LBM today. Good pain control with scheduled lyrica.

## 2024-01-11 NOTE — PROGRESS NOTES
Ifosfamide Toxicity Assessment      Patient is Alert & Oriented x4. There are signs of lethargy, confusion or hallucinations  No    Patient is having new incontinence of bowel or bladder No.       Pincer Grasp intact Yes    Tremors present No     Provider was notified No of changes 0 . Interventions include 0.     Will continue to monitor for s/sx of ifosfamide toxicity: hallucinations, AMS, emotional lability, somnolence, myoclonic jerks, tremors, coordination difficulties. If these occur, please call the APPs/Attending on days and fellow on-call for evening and nights for recommendations and further instruction.

## 2024-01-11 NOTE — PLAN OF CARE
Goal Outcome Evaluation:      Plan of Care Reviewed With: patient    Overall Patient Progress: no change    Alert, oriented, AVSS, up independently.   Able to sleep.  Tolerating continuous Ifos infusion, no s/s toxicity.  Denies pain, nausea.  Tolerating regular diet.  Voiding good amounts, LE edema decreasing. Passing gas, BM yesterday.  1st dose of PO phos replacement given.

## 2024-01-11 NOTE — PLAN OF CARE
Goal Outcome Evaluation:       Assumed care of patient [id number]. Tolerated Doxil. Continuous ifos+mesna started, tolerating with no complications. Ifos assessment q8h. Up ad ghassan in room. Voiding. Continue with POC.

## 2024-01-11 NOTE — PROGRESS NOTES
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 Currently being admitted for Doxil/Ifosfamide (C4D1=1/10/24).     Today:  - Day 2 cycle 4 doxil/ifosfamide.  - Patient is tolerating chemotherapy well so far. Plan for today is to continue Ifosfamide/mesna.  - Q shift ICANS neurotoxicity monitoring.  - Continue to monitor and provide best supportive cares.    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 Nam. Patient noted intermittent right calf pain that began in March 2023. Xray noted a destructive lesion in the midportion of the right fibula. MRI 9/28/23 with 3.3 x5.2 x 9.3 cm soft tissue mass in proximal/mid calf. Bony invasion into the fibular shaft with cortical disruption. Biopsy of soft tissue mass in October 2023 confirmed diagnosis of high-grade pleomorphic sarcoma. PET/CT 10/10/23 without evidence of metastatic disease but did have an enlarged R iliac chain LN, thought to be reactive. She initiated treatment with Doxil/Ifosfamide C1D1=10/17/23 overall tolerated well with some electrolyte disturbances. L1B1=5311/14/23; which was complicated with neurotoxicity in the form of intermittent involuntary jerking and twitching. Restaging PET/CT 12/5/23 shows a partial response to therapy with decreasing size and FDG avidity of the right calf mass. N9Q3=8612/13/23 overall tolerated well. Patient feeling well and stable upon admission, she elects to proceed with cycle 4 doxil/ifosfamide today.  - PICC placed PTA; anticipate removing at discharge   - Restaging PET/CT scheduled 1/29/24                   Treatment Plan:  Doxil/Ifosfamide (C4D1=1/10/24)               - Doxorubicin liposome (Doxil) 40 mg/m2 (73 mg) IV -- D1                - Ifosfamide/Mesna 1600 mg/m2 (2,910 mg) IV -- D1-5                - Mesna 1600 mg/m2 (2,910 mg) IV -- D6                             - Premeds: Thiamine, Emend, Zofran, Dexamethasone   HEME   # H/o CLL   Previously followed with Dr. Acosta. CLL has been stable. Not on current treatment.   - No inpatient needs at this time.     # Chronic cancer pain  Related to patients known R calf sarcoma. Patient reports this pain has much improved over the cycles of chemotherapy. She reports taking her oxycodone very infrequently; took 3-4 pills over the past two weeks. She has had no major pain crises.  - Continue PTA Oxycodone 5-10 mg q6h PRN   - Continue PTA Lyrica 50 mg TID      CARDS   # Bilateral LE edema  # HTN   Patient reports that her BP's has been consistently high. She keeps a BP log at home and doesn't feel BP has been well controlled. She has not been taking chlorthalidone, it was discontinued d/t an electrolyte disturbance. She has been experiencing side effects from her increased dose of amlodipine such as headaches and increased bilateral LE edema (which is a known side effects). Due to this, opted to decrease amlodipine to 5 mg daily to see if she tolerates this better and start hydrochlorothiazide 25 mg (better tolerated) as this chemo regimen requires fluids. Her BP's were high on admission. She was agreeable to trial a new diuretic today. Patient case discussed with pharmacist who agrees with this plan. Will monitor for electrolyte disturbances or SE's.  - Decrease PTA Amlodipine to 5 mg daily   - Continue PTA Losartan 100 mg daily  - 1/10/24 added hydrochlorothiazide 25 mg     # HLD   - Hold PTA Pravastatin while inpatient, ok to resume on discharge.     GI   # GERD   Takes omeprazole outpatient. Will rotate to pantoprazole 40 mg while admitted (d/t pharmacy supply).     # Opioid induced  "constipation  # Loose stool  Patient reports experiencing severe constipation with previous cycles of chemotherapy, especially when she takes oxy for pain. She takes daily Psyllium and Colace, will continue this admission. Can increase bowel regimen depending on BM frequency. She does report 3-4 loose stools per day for three days last week which she believes is 2/2 PO magnesium supplement she has been taking.  - CTM BM's daily     DERM   # Psoriasis  - PTA topicals on hold while in the hospital      FEN:  -IVF per chemo protocol   -PRN lyte replacement  -RDAT     Prophy/Misc:  -VTE: Ppx Lovenox; hold if plts <50K   -GI/PUD: PTA PPI   -Bowels: PTA Psyllium and Colace    Clinically Significant Risk Factors Present on Admission                  # Hypertension: Noted on problem list      # Obesity: Estimated body mass index is 33.35 kg/m  as calculated from the following:    Height as of this encounter: 1.549 m (5' 1\").    Weight as of this encounter: 80.1 kg (176 lb 8 oz).       # Asthma: noted on problem list           Code Status: FULL  Disposition: Goal for discharge 1/16th pending stability and barring no complications arise.  Follow up: 1/17th at [place] for labs, infusion, neulasta    This patient care plan was thoroughly discussed with the attending, Dr. Fuentes.    I spent 45 minutes face-to-face or coordinating care of Mirta Cardenas. Over 50% of our time on the unit was spent counseling the patient and coordinating care.    Adali Mederos PA-C  Hematology/Oncology  Pager: [phone]  Phone: [phone]       Interval History   Chart and nursing notes reviewed showing no acute events occurred overnight. Patient is afebrile and HDS. She has tolerated chemotherapy well so far.    Upon my visit today patient is seen sitting up in her bedside chair, no acute distress, nontoxic-appearing.  She reports tolerating chemotherapy well without any new symptoms or side effects. She reports a reduction in her bilateral leg edema, " 1+ pitting on PE, not as tight, legs not painful. She denies full ROS such as N/V/D, F/C/NS, headache, dizziness, vision or hearing changes, chest pain, SOB, palpitations, abdominal pain, swelling, rash, bruises, or lesions. She has been eating and drinking well.  Normal urine output and bowel movements. She denies having any questions or concerns for the treatment team today. No signs of adverse neurological toxicity.      A comprehensive review of symptoms was performed and was negative except as detailed in the interval history above.    Physical Exam   Vital Signs with Ranges  Temp:  [97.7  F (36.5  C)-98.8  F (37.1  C)] 98  F (36.7  C)  Pulse:  [78-90] 78  Resp:  [16-18] 16  BP: (137-167)/(74-83) 137/74  SpO2:  [98 %-100 %] 98 %    I/O last 3 completed shifts:  In: 2929.25 [P.O.:940; I.V.:1293.75; IV Piggyback:695.5]  Out: 4300 [Urine:4300]    Vitals:    01/10/24 1123   Weight: 80.1 kg (176 lb 8 oz)       General: Pleasant female sitting up in bedside chair, NAD, non-toxic appearing, interactive and pleasant.  HEENT: NCAT w/ no obvious deformities, EOMI, anicteric sclera, moist mucous membranes on limited exam, no signs of thrush.  Respiratory: Breathing comfortably on room air, satting 100% O2, lungs CTAB with no rhonchi, wheezes, or crackles.  Cardiac: No chest heaves or obvious deformities, auscultation showing RRR with no murmurs, gallops, or rubs. Improved LE edema.  Derm: Warm, dry, well perfused appearing, w/ no ecchymoses, rashes, or wounds.  GI: No obvious external abdomen abnormalities on inspection, non-tender to palpation, no wincing or guarding.  Musculoskeletal: No joint swelling or erythema. Moves all extremities without obvious signs of deficits or pain. Ambulates well independently.  Psych: Calm, affect WNL, no pressured speech.  Neuro: No obvious focal deficits upon basic neuro exam; A&Ox3, motor function appears preserved.  Vascular access: PICC; CDI, non-tender, no surrounding  erythema.    Medications    D5W 1,000 mL with potassium chloride 20 mEq/L, sodium bicarbonate 100 mEq/L infusion 125 mL/hr at 01/11/24 0641    - MEDICATION INSTRUCTIONS -          amLODIPine  5 mg Oral Daily    Chemotherapy Infusing-Continuous Infusion   Does not apply Q8H    [START ON 1/17/2024] dextrose 5% water  10-20 mL Intracatheter Daily at 8 pm    [START ON 1/17/2024] dextrose 5% water  10-20 mL Intracatheter Daily at 8 pm    docusate sodium  100 mg Oral BID    enoxaparin ANTICOAGULANT  40 mg Subcutaneous Q24H    [START ON 1/17/2024] filgrastim-aafi  5 mcg/kg (Order-Specific) Intravenous Daily at 8 pm    heparin lock flush  5-20 mL Intracatheter Q24H    hydrochlorothiazide  25 mg Oral Daily    Ifosfamide (IFEX) 2,910 mg, mesna (MESNEX) 2,910 mg in sodium chloride 0.9 % 1,137.3 mL infusion  1,600 mg/m2 (Order-Specific) Intravenous Q24H    losartan  100 mg Oral Daily    [START ON 1/15/2024] mesna (MESNEX) 2,910 mg in sodium chloride 0.9 % 1,079.1 mL infusion  1,600 mg/m2 (Order-Specific) Intravenous Once    ondansetron  8 mg Oral Q8H    pantoprazole  40 mg Oral QAM    potassium & sodium phosphates  1 packet Oral or Feeding Tube Q4H    pregabalin  50 mg Oral TID    psyllium  1 packet Oral Daily    thiamine  100 mg Oral TID       Antiinfectives  Anti-infectives (From now, onward)      None            Data   CBC   Recent Labs   Lab 01/11/24  0421 01/10/24  0748 01/05/24  1026   WBC 6.9 5.1 6.0   RBC 2.84* 3.24* 2.97*   HGB 8.4* 9.5* 8.9*   HCT 24.0* 27.7* 25.6*   MCV 85 86 86   MCH 29.6 29.3 30.0   MCHC 35.0 34.3 34.8   RDW 17.3* 18.0* 18.5*   * 154 144*       CMP   Recent Labs   Lab 01/11/24  0421 01/10/24  0748 01/05/24  1026    139 141   POTASSIUM 4.5 4.4 4.4   CHLORIDE 104 105 107   CO2 26 24 24   ANIONGAP 8 10 10   * 94 94   BUN 20.7 21.9 23.2*   CR 0.85 0.86 0.83   GFRESTIMATED 75 74 77   MONIK 8.9 9.2 9.0   MAG 1.7 1.8 1.9   PHOS 2.3* 3.2 3.2   PROTTOTAL 6.0* 6.9 6.3*   ALBUMIN 3.8 4.3 4.0    BILITOTAL 0.2 0.4 0.3   ALKPHOS 94 109 115   AST 18 21 18   ALT 15 13 14       LFTs   Recent Labs   Lab 01/11/24  0421   PROTTOTAL 6.0*   ALBUMIN 3.8   BILITOTAL 0.2   ALKPHOS 94   AST 18   ALT 15       Coagulation Studies   Recent Labs   Lab 01/11/24  0421   INR 1.14

## 2024-01-12 LAB
ABO/RH(D): NORMAL
ALBUMIN SERPL BCG-MCNC: 3.6 G/DL (ref 3.5–5.2)
ALP SERPL-CCNC: 81 U/L (ref 40–150)
ALT SERPL W P-5'-P-CCNC: 13 U/L (ref 0–50)
ANION GAP SERPL CALCULATED.3IONS-SCNC: 6 MMOL/L (ref 7–15)
ANTIBODY SCREEN: NEGATIVE
AST SERPL W P-5'-P-CCNC: 14 U/L (ref 0–45)
BASOPHILS # BLD AUTO: 0 10E3/UL (ref 0–0.2)
BASOPHILS NFR BLD AUTO: 0 %
BILIRUB SERPL-MCNC: <0.2 MG/DL
BUN SERPL-MCNC: 16.7 MG/DL (ref 8–23)
CALCIUM SERPL-MCNC: 8.7 MG/DL (ref 8.8–10.2)
CHLORIDE SERPL-SCNC: 105 MMOL/L (ref 98–107)
CREAT SERPL-MCNC: 0.92 MG/DL (ref 0.51–0.95)
DEPRECATED HCO3 PLAS-SCNC: 31 MMOL/L (ref 22–29)
EGFRCR SERPLBLD CKD-EPI 2021: 68 ML/MIN/1.73M2
EOSINOPHIL # BLD AUTO: 0 10E3/UL (ref 0–0.7)
EOSINOPHIL NFR BLD AUTO: 0 %
ERYTHROCYTE [DISTWIDTH] IN BLOOD BY AUTOMATED COUNT: 18.6 % (ref 10–15)
FIBRINOGEN PPP-MCNC: 278 MG/DL (ref 170–490)
GLUCOSE SERPL-MCNC: 108 MG/DL (ref 70–99)
HCT VFR BLD AUTO: 22.7 % (ref 35–47)
HGB BLD-MCNC: 7.7 G/DL (ref 11.7–15.7)
IMM GRANULOCYTES # BLD: 0 10E3/UL
IMM GRANULOCYTES NFR BLD: 1 %
INR PPP: 1.19 (ref 0.85–1.15)
LYMPHOCYTES # BLD AUTO: 1.3 10E3/UL (ref 0.8–5.3)
LYMPHOCYTES NFR BLD AUTO: 17 %
MAGNESIUM SERPL-MCNC: 1.8 MG/DL (ref 1.7–2.3)
MCH RBC QN AUTO: 29.8 PG (ref 26.5–33)
MCHC RBC AUTO-ENTMCNC: 33.9 G/DL (ref 31.5–36.5)
MCV RBC AUTO: 88 FL (ref 78–100)
MONOCYTES # BLD AUTO: 0.7 10E3/UL (ref 0–1.3)
MONOCYTES NFR BLD AUTO: 10 %
NEUTROPHILS # BLD AUTO: 5.4 10E3/UL (ref 1.6–8.3)
NEUTROPHILS NFR BLD AUTO: 72 %
NRBC # BLD AUTO: 0 10E3/UL
NRBC BLD AUTO-RTO: 0 /100
PHOSPHATE SERPL-MCNC: 3.2 MG/DL (ref 2.5–4.5)
PLATELET # BLD AUTO: 118 10E3/UL (ref 150–450)
POTASSIUM SERPL-SCNC: 4.3 MMOL/L (ref 3.4–5.3)
PROT SERPL-MCNC: 5.5 G/DL (ref 6.4–8.3)
RBC # BLD AUTO: 2.58 10E6/UL (ref 3.8–5.2)
SODIUM SERPL-SCNC: 142 MMOL/L (ref 135–145)
SPECIMEN EXPIRATION DATE: NORMAL
URATE SERPL-MCNC: 4.4 MG/DL (ref 2.4–5.7)
WBC # BLD AUTO: 7.5 10E3/UL (ref 4–11)

## 2024-01-12 PROCEDURE — 250N000009 HC RX 250: Performed by: PHYSICIAN ASSISTANT

## 2024-01-12 PROCEDURE — 83735 ASSAY OF MAGNESIUM: CPT | Performed by: PHYSICIAN ASSISTANT

## 2024-01-12 PROCEDURE — 120N000002 HC R&B MED SURG/OB UMMC

## 2024-01-12 PROCEDURE — 80053 COMPREHEN METABOLIC PANEL: CPT | Performed by: PHYSICIAN ASSISTANT

## 2024-01-12 PROCEDURE — 85025 COMPLETE CBC W/AUTO DIFF WBC: CPT | Performed by: PHYSICIAN ASSISTANT

## 2024-01-12 PROCEDURE — 250N000013 HC RX MED GY IP 250 OP 250 PS 637: Performed by: PHYSICIAN ASSISTANT

## 2024-01-12 PROCEDURE — 99232 SBSQ HOSP IP/OBS MODERATE 35: CPT | Mod: FS

## 2024-01-12 PROCEDURE — 250N000013 HC RX MED GY IP 250 OP 250 PS 637

## 2024-01-12 PROCEDURE — 84100 ASSAY OF PHOSPHORUS: CPT | Performed by: STUDENT IN AN ORGANIZED HEALTH CARE EDUCATION/TRAINING PROGRAM

## 2024-01-12 PROCEDURE — 85384 FIBRINOGEN ACTIVITY: CPT | Performed by: PHYSICIAN ASSISTANT

## 2024-01-12 PROCEDURE — 250N000011 HC RX IP 250 OP 636: Performed by: PHYSICIAN ASSISTANT

## 2024-01-12 PROCEDURE — 85610 PROTHROMBIN TIME: CPT | Performed by: PHYSICIAN ASSISTANT

## 2024-01-12 PROCEDURE — 86900 BLOOD TYPING SEROLOGIC ABO: CPT | Performed by: PHYSICIAN ASSISTANT

## 2024-01-12 PROCEDURE — 250N000011 HC RX IP 250 OP 636: Mod: JZ | Performed by: PHYSICIAN ASSISTANT

## 2024-01-12 PROCEDURE — 258N000003 HC RX IP 258 OP 636: Performed by: PHYSICIAN ASSISTANT

## 2024-01-12 PROCEDURE — 84550 ASSAY OF BLOOD/URIC ACID: CPT | Performed by: PHYSICIAN ASSISTANT

## 2024-01-12 RX ADMIN — DOCUSATE SODIUM 100 MG: 100 CAPSULE, LIQUID FILLED ORAL at 07:47

## 2024-01-12 RX ADMIN — POTASSIUM CHLORIDE: 2 INJECTION, SOLUTION, CONCENTRATE INTRAVENOUS at 13:49

## 2024-01-12 RX ADMIN — DOCUSATE SODIUM 100 MG: 100 CAPSULE, LIQUID FILLED ORAL at 21:23

## 2024-01-12 RX ADMIN — MESNA 2910 MG: 100 INJECTION, SOLUTION INTRAVENOUS at 16:09

## 2024-01-12 RX ADMIN — ONDANSETRON HYDROCHLORIDE 8 MG: 8 TABLET, FILM COATED ORAL at 16:09

## 2024-01-12 RX ADMIN — ONDANSETRON HYDROCHLORIDE 8 MG: 8 TABLET, FILM COATED ORAL at 07:47

## 2024-01-12 RX ADMIN — THIAMINE HCL TAB 100 MG 100 MG: 100 TAB at 21:23

## 2024-01-12 RX ADMIN — PREGABALIN 50 MG: 50 CAPSULE ORAL at 13:30

## 2024-01-12 RX ADMIN — POTASSIUM CHLORIDE: 2 INJECTION, SOLUTION, CONCENTRATE INTRAVENOUS at 05:54

## 2024-01-12 RX ADMIN — THIAMINE HCL TAB 100 MG 100 MG: 100 TAB at 13:30

## 2024-01-12 RX ADMIN — PREGABALIN 50 MG: 50 CAPSULE ORAL at 21:23

## 2024-01-12 RX ADMIN — ENOXAPARIN SODIUM 40 MG: 40 INJECTION SUBCUTANEOUS at 07:49

## 2024-01-12 RX ADMIN — THIAMINE HCL TAB 100 MG 100 MG: 100 TAB at 07:48

## 2024-01-12 RX ADMIN — HYDROCHLOROTHIAZIDE 25 MG: 25 TABLET ORAL at 07:47

## 2024-01-12 RX ADMIN — AMLODIPINE BESYLATE 5 MG: 5 TABLET ORAL at 07:47

## 2024-01-12 RX ADMIN — ONDANSETRON HYDROCHLORIDE 8 MG: 8 TABLET, FILM COATED ORAL at 00:49

## 2024-01-12 RX ADMIN — PREGABALIN 50 MG: 50 CAPSULE ORAL at 07:48

## 2024-01-12 RX ADMIN — LOSARTAN POTASSIUM 100 MG: 100 TABLET, FILM COATED ORAL at 21:23

## 2024-01-12 RX ADMIN — PSYLLIUM HUSK 1 PACKET: 3.4 POWDER ORAL at 17:45

## 2024-01-12 RX ADMIN — PANTOPRAZOLE SODIUM 40 MG: 40 TABLET, DELAYED RELEASE ORAL at 07:47

## 2024-01-12 RX ADMIN — POTASSIUM CHLORIDE: 2 INJECTION, SOLUTION, CONCENTRATE INTRAVENOUS at 21:58

## 2024-01-12 ASSESSMENT — ACTIVITIES OF DAILY LIVING (ADL)
ADLS_ACUITY_SCORE: 20

## 2024-01-12 NOTE — PLAN OF CARE
[id number]    Day #2 Doxil + Ifos/Mesna. Tmax 99.0, OVSS and on RA. A&Ox4, no signs of Ifos toxicity. Denies SOB/nausea. Mild pain due to skipped afternoon Lyrica dose, however declined any additional pain meds. Hung dose #2 of Ifos/Mesna with brisk blood return via PICC. See chemo note. Good appetite. Had BM earlier today, voiding with adequate UOP. Ambulating independently in room. Continue to monitor & w/ POC.     Goal Outcome Evaluation:      Plan of Care Reviewed With: patient    Overall Patient Progress: no changeOverall Patient Progress: no change    Outcome Evaluation: Day #2 of Ifos/Mesna, tolerating chemo well.

## 2024-01-12 NOTE — PROGRESS NOTES
Ifosfamide Toxicity Assessment      Patient is Alert & Oriented x4. There are NO signs of lethargy, confusion or hallucinations.     Patient is having new incontinence of bowel or bladder NO.       Pincer Grasp intact YES     Tremors NONE baseline    -- Monitor for s/sx of ifosfamide toxicity: hallucinations, AMS, emotional lability, somnolence, myoclonic jerks, tremors, coordination difficulties, etc. If these occur, please call the fellow on call for recommendations

## 2024-01-12 NOTE — PLAN OF CARE
Goal Outcome Evaluation:      Plan of Care Reviewed With: patient    -alert and oriented x4, afebrile, VSS on RA  -denies pain, nausea, sob   -double lumen PICC running ifos/mesna and bicarb at 125 ml/hr, tolerating well, negative for ifos toxicity, see note  -UAL  -voiding spontaneously without difficulty  -LBM 1/11    PLAN: continue with poc

## 2024-01-12 NOTE — PROGRESS NOTES
Nursing Focus: Chemotherapy  D: Positive blood return via PICC. Insertion site is clean/dry/intact, dressing intact with no complaints of pain.  Urine output is recorded in intake in Doc Flowsheet.    I: Premedications given per order (see electronic medical administration record). Dose #2 of Ifos/Mesna started to infuse over 24 hours. Reviewed pt teaching on chemotherapy side effects.  Pt denies need for further teaching. Chemotherapy double checked per protocol by two chemotherapy competent RN's.   A: Tolerating procedure well. Denies nausea and or pain.   P: Continue to monitor urine output and symptoms of nausea. Screen for symptoms of toxicity. No signs of Ifos toxicity.                                                  Ifosfamide Toxicity Assessment      Patient is Alert & Oriented x4. There are signs of lethargy, confusion or hallucinations  No    Patient is having new incontinence of bowel or bladder No.       Pincer Grasp intact Yes    Tremors present No     Provider was notified No of changes, no changes noted. No interventions needed at this time.     Will continue to monitor for s/sx of ifosfamide toxicity: hallucinations, AMS, emotional lability, somnolence, myoclonic jerks, tremors, coordination difficulties. If these occur, please call the APPs/Attending on days and fellow on-call for evening and nights for recommendations and further instruction.

## 2024-01-12 NOTE — PLAN OF CARE
Goal Outcome Evaluation:      Plan of Care Reviewed With: patient, spouse    Overall Patient Progress: no changeOverall Patient Progress: no change  Afebrile with stable vs. Continues to receive Day 1 Ifosfamide/mesna CIVI without difficulty. ICANS assessment unchanged, denies nausea. No electrolyte replacement required today. Wt increased 3lb since yesterday. Asymptomatic. PA notified. No additional orders. Good pain control with scheduled lyrica.

## 2024-01-12 NOTE — PROGRESS NOTES
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 Currently being admitted for Doxil/Ifosfamide (C4D1=1/10/24).     Today:  - Day 3 cycle 4 doxil/ifosfamide.  - Patient is tolerating chemotherapy well so far. Plan for today is to continue Ifosfamide/mesna.  -  Started hydrochlorothiazide, there is room to increase dose, patient is getting fluid w/ chemotherapy and +3lbs from admission weight.   - Q shift ICANS neurotoxicity monitoring.  - Continue to monitor and provide best supportive cares.    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 Nam. Patient noted intermittent right calf pain that began in March 2023. Xray noted a destructive lesion in the midportion of the right fibula. MRI 9/28/23 with 3.3 x5.2 x 9.3 cm soft tissue mass in proximal/mid calf. Bony invasion into the fibular shaft with cortical disruption. Biopsy of soft tissue mass in October 2023 confirmed diagnosis of high-grade pleomorphic sarcoma. PET/CT 10/10/23 without evidence of metastatic disease but did have an enlarged R iliac chain LN, thought to be reactive. She initiated treatment with Doxil/Ifosfamide C1D1=10/17/23 overall tolerated well with some electrolyte disturbances. R8F2=6211/14/23; which was complicated with neurotoxicity in the form of intermittent involuntary jerking and twitching. Restaging PET/CT 12/5/23 shows a partial response to therapy with decreasing size and FDG avidity of the right calf mass. O9L6=7612/13/23 overall tolerated well. Patient feeling well and stable upon admission, she elects to proceed with cycle 4  doxil/ifosfamide today.  - PICC placed PTA; anticipate removing at discharge   - Restaging PET/CT scheduled 1/29/24                   Treatment Plan: Doxil/Ifosfamide (C4D1=1/10/24)               - Doxorubicin liposome (Doxil) 40 mg/m2 (73 mg) IV -- D1                - Ifosfamide/Mesna 1600 mg/m2 (2,910 mg) IV -- D1-5                - Mesna 1600 mg/m2 (2,910 mg) IV -- D6                             - Premeds: Thiamine, Emend, Zofran, Dexamethasone   # H/o CLL   Previously followed with Dr. Acosta. CLL has been stable. Not on current treatment.   - No inpatient needs at this time.     # Anemia  # Thrombocytopenia  2/2 malignancy and chemotherapy requiring intermit transfusions. Hemoglobin steadily dropping this admission; will likely need blood consent and transfusion 1/13.  - Daily CBC monitoring  - Transfuse RBC's for Hgb <7  - Transfuse PLT's for <10    # Chronic cancer pain  Related to patients known R calf sarcoma. Patient reports this pain has much improved over the cycles of chemotherapy. She reports taking her oxycodone very infrequently; took 3-4 pills over the past two weeks. She has had no major pain crises.  Patient reported to writer on 1/12 she no longer had any pain and decided to self wean off her Lyrica, she missed 1 dose last night and subsequently experienced a deep pain to her right calf rated 2/10.  Education given to patient on not self discontinuing meds, possible withdrawal with Lyrica, and concern to cause pain crisis d/t relatively short half-life and this requires multiple administrations per day to maintain therapeutic levels. Of note there is literature showing possible interactions between ACE inhibitors and pregabalin which may exacerbate fluid retention; can consider decreasing dose though effectiveness may decrease w/ reduced dose.   - Continue PTA Oxycodone 5-10 mg q6h PRN   - Continue PTA Lyrica 50 mg TID      CARDS   # Bilateral LE edema  # HTN   Patient reports that her BP's has  been consistently high. She keeps a BP log at home and doesn't feel BP has been well controlled. She has not been taking chlorthalidone, it was discontinued d/t an electrolyte disturbance. She has been experiencing side effects from her increased dose of amlodipine such as headaches and increased bilateral LE edema (which is a known side effects). Due to this, opted to decrease amlodipine to 5 mg daily to see if she tolerates this better and start hydrochlorothiazide 25 mg (better tolerated) as this chemo regimen requires fluids. Her BP's were high on admission. She was agreeable to trial a new diuretic today. Patient case discussed with pharmacist who agrees with this plan. Will monitor for electrolyte disturbances or SE's.  - Decrease PTA Amlodipine to 5 mg daily   - Continue PTA Losartan 100 mg daily  - 1/10/24 added hydrochlorothiazide 25 mg; can increase dose up to 50 mg if BP not improving.      # HLD   - Hold PTA Pravastatin while inpatient, ok to resume on discharge.     GI   # GERD   Takes omeprazole outpatient. Will rotate to pantoprazole 40 mg while admitted (d/t pharmacy supply).     # Opioid induced constipation  # Loose stool  Patient reports experiencing severe constipation with previous cycles of chemotherapy, especially when she takes oxy for pain. She takes daily Psyllium and Colace, will continue this admission. Can increase bowel regimen depending on BM frequency. She does report 3-4 loose stools per day for three days last week which she believes is 2/2 PO magnesium supplement she has been taking.  - CTM BM's daily     DERM   # Psoriasis  - PTA topicals on hold while in the hospital      FEN:  -IVF per chemo protocol   -PRN lyte replacement  -RDAT     Prophy/Misc:  -VTE: Ppx Lovenox; hold if plts <50K   -GI/PUD: PTA PPI   -Bowels: PTA Psyllium and Colace    Clinically Significant Risk Factors               # Coagulation Defect: INR = 1.19 (Ref range: 0.85 - 1.15) and/or PTT = N/A, will monitor  "for bleeding  # Thrombocytopenia: Lowest platelets = 118 in last 2 days, will monitor for bleeding   # Hypertension: Noted on problem list        # Obesity: Estimated body mass index is 33.82 kg/m  as calculated from the following:    Height as of this encounter: 1.549 m (5' 1\").    Weight as of this encounter: 81.2 kg (179 lb)., PRESENT ON ADMISSION     # Asthma: noted on problem list           Code Status: FULL  Disposition: Goal for discharge 1/16th pending stability and barring no complications arise.  Follow up: 1/17th at [place] for labs, infusion, neulasta    This patient care plan was thoroughly discussed with the attending, Dr. Gomez.    I spent 50 minutes face-to-face or coordinating care of Mirta Cardenas. Over 50% of our time on the unit was spent counseling the patient and coordinating care.    Adali Mederos PA-C  Hematology/Oncology  Pager: [phone]  Phone: [phone]       Interval History   Chart and nursing notes reviewed showing no acute events occurred overnight. Patient is afebrile and HDS. She has tolerated chemotherapy well so far.    Upon my visit today patient is seen sitting up in her hospital bed, her  is at bedside, she is in no acute distress and nontoxic-appearing.  She endorses feeling well overall today and has been tolerating chemotherapy without complication.  She continues to endorse improvement of bilateral lower extremity edema since starting hydrochlorothiazide.  We discussed her chronic cancer pain and she states she has had no pain and self discontinued her Lyrica yesterday but only missed 1 dose after which she felt 2/10 pain to her right calf.  She would like to continue Lyrica.  We can revisit this in the future and discuss decrease dose. She denies full ROS such as N/V/D, F/C/NS, headache, dizziness, vision or hearing changes, chest pain, SOB, palpitations, abdominal pain, swelling, rash, bruises, or lesions. She maintains she is eating and drinking well without further " loose stools. Urine output remains normal.  We discussed plan today is to continue to monitor closely and provide best supportive cares.  She nor Juanito had any questions or concerns for the treatment team today. No signs of adverse neurological toxicity with this chemotherapy.      A comprehensive review of symptoms was performed and was negative except as detailed in the interval history above.    Physical Exam   Vital Signs with Ranges  Temp:  [97.9  F (36.6  C)-99  F (37.2  C)] 97.9  F (36.6  C)  Pulse:  [72-87] 72  Resp:  [17-20] 20  BP: (134-152)/(69-88) 152/88  SpO2:  [98 %-100 %] 99 %    I/O last 3 completed shifts:  In: 6857.6 [P.O.:2720; I.V.:3000; IV Piggyback:1137.6]  Out: 5200 [Urine:5200]    Vitals:    01/10/24 1123 01/11/24 0833 01/12/24 0837   Weight: 80.1 kg (176 lb 8 oz) 80 kg (176 lb 6.4 oz) 81.2 kg (179 lb)       General: Pleasant female sitting up on side of hospital bed in NAD, non-toxic appearing, interactive and pleasant.  HEENT: NCAT w/ no obvious deformities, EOMI, anicteric sclera, moist mucous membranes on limited exam.  Respiratory: Breathing comfortably on room air, satting 99% O2, lungs CTAB with no rhonchi, wheezes, or crackles.  Cardiac: No chest heaves or obvious deformities, auscultation showing RRR with no murmurs, gallops, or rubs. Improved bilateral LE edema; non-pitting.  Skin: Warm, dry, well perfused appearing, w/ no ecchymoses, rashes, or wounds.  GI: No obvious external abdomen abnormalities on inspection, non-tender to palpation, no wincing or guarding.  Musculoskeletal: No joint swelling or erythema. Moves all extremities without obvious signs of deficits or pain. Ambulates well independently.  Psych: Mildly anxious, affect WNL, no pressured speech.  Neuro: No obvious focal deficits upon basic neuro exam; A&Ox3, motor function appears preserved.  Vascular access: PICC; CDI, non-tender, no surrounding erythema.    Medications    D5W 1,000 mL with potassium chloride 20  mEq/L, sodium bicarbonate 100 mEq/L infusion 125 mL/hr at 01/12/24 0554    - MEDICATION INSTRUCTIONS -          amLODIPine  5 mg Oral Daily    Chemotherapy Infusing-Continuous Infusion   Does not apply Q8H    [START ON 1/17/2024] dextrose 5% water  10-20 mL Intracatheter Daily at 8 pm    [START ON 1/17/2024] dextrose 5% water  10-20 mL Intracatheter Daily at 8 pm    docusate sodium  100 mg Oral BID    enoxaparin ANTICOAGULANT  40 mg Subcutaneous Q24H    [START ON 1/17/2024] filgrastim-aafi  5 mcg/kg (Order-Specific) Intravenous Daily at 8 pm    heparin lock flush  5-20 mL Intracatheter Q24H    hydrochlorothiazide  25 mg Oral Daily    Ifosfamide (IFEX) 2,910 mg, mesna (MESNEX) 2,910 mg in sodium chloride 0.9 % 1,137.3 mL infusion  1,600 mg/m2 (Order-Specific) Intravenous Q24H    losartan  100 mg Oral Daily    [START ON 1/15/2024] mesna (MESNEX) 2,910 mg in sodium chloride 0.9 % 1,079.1 mL infusion  1,600 mg/m2 (Order-Specific) Intravenous Once    ondansetron  8 mg Oral Q8H    pantoprazole  40 mg Oral QAM    pregabalin  50 mg Oral TID    psyllium  1 packet Oral Daily    thiamine  100 mg Oral TID       Antiinfectives  Anti-infectives (From now, onward)      None            Data   CBC   Recent Labs   Lab 01/12/24  0430 01/11/24  0421 01/10/24  0748   WBC 7.5 6.9 5.1   RBC 2.58* 2.84* 3.24*   HGB 7.7* 8.4* 9.5*   HCT 22.7* 24.0* 27.7*   MCV 88 85 86   MCH 29.8 29.6 29.3   MCHC 33.9 35.0 34.3   RDW 18.6* 17.3* 18.0*   * 144* 154       CMP   Recent Labs   Lab 01/12/24  0430 01/11/24  0421 01/10/24  0748    138 139   POTASSIUM 4.3 4.5 4.4   CHLORIDE 105 104 105   CO2 31* 26 24   ANIONGAP 6* 8 10   * 147* 94   BUN 16.7 20.7 21.9   CR 0.92 0.85 0.86   GFRESTIMATED 68 75 74   MONIK 8.7* 8.9 9.2   MAG 1.8 1.7 1.8   PHOS 3.2 2.3* 3.2   PROTTOTAL 5.5* 6.0* 6.9   ALBUMIN 3.6 3.8 4.3   BILITOTAL <0.2 0.2 0.4   ALKPHOS 81 94 109   AST 14 18 21   ALT 13 15 13       LFTs   Recent Labs   Lab 01/12/24  0430   PROTTOTAL  5.5*   ALBUMIN 3.6   BILITOTAL <0.2   ALKPHOS 81   AST 14   ALT 13       Coagulation Studies   Recent Labs   Lab 01/12/24  0430   INR 1.19*

## 2024-01-13 LAB
ALBUMIN SERPL BCG-MCNC: 3.3 G/DL (ref 3.5–5.2)
ALBUMIN UR-MCNC: 20 MG/DL
ALP SERPL-CCNC: 78 U/L (ref 40–150)
ALT SERPL W P-5'-P-CCNC: 12 U/L (ref 0–50)
ANION GAP SERPL CALCULATED.3IONS-SCNC: 5 MMOL/L (ref 7–15)
APPEARANCE UR: CLEAR
AST SERPL W P-5'-P-CCNC: 14 U/L (ref 0–45)
BASOPHILS # BLD AUTO: 0 10E3/UL (ref 0–0.2)
BASOPHILS NFR BLD AUTO: 1 %
BILIRUB SERPL-MCNC: 0.2 MG/DL
BILIRUB UR QL STRIP: NEGATIVE
BUN SERPL-MCNC: 11.8 MG/DL (ref 8–23)
CALCIUM SERPL-MCNC: 8.3 MG/DL (ref 8.8–10.2)
CHLORIDE SERPL-SCNC: 105 MMOL/L (ref 98–107)
COLOR UR AUTO: ABNORMAL
CREAT SERPL-MCNC: 0.86 MG/DL (ref 0.51–0.95)
DEPRECATED HCO3 PLAS-SCNC: 32 MMOL/L (ref 22–29)
EGFRCR SERPLBLD CKD-EPI 2021: 74 ML/MIN/1.73M2
EOSINOPHIL # BLD AUTO: 0.1 10E3/UL (ref 0–0.7)
EOSINOPHIL NFR BLD AUTO: 3 %
ERYTHROCYTE [DISTWIDTH] IN BLOOD BY AUTOMATED COUNT: 18.6 % (ref 10–15)
FIBRINOGEN PPP-MCNC: 209 MG/DL (ref 170–490)
GLUCOSE SERPL-MCNC: 87 MG/DL (ref 70–99)
GLUCOSE UR STRIP-MCNC: NEGATIVE MG/DL
HCT VFR BLD AUTO: 23.1 % (ref 35–47)
HGB BLD-MCNC: 7.7 G/DL (ref 11.7–15.7)
HGB UR QL STRIP: ABNORMAL
IMM GRANULOCYTES # BLD: 0 10E3/UL
IMM GRANULOCYTES NFR BLD: 0 %
INR PPP: 1.15 (ref 0.85–1.15)
KETONES UR STRIP-MCNC: 20 MG/DL
LEUKOCYTE ESTERASE UR QL STRIP: NEGATIVE
LYMPHOCYTES # BLD AUTO: 1.1 10E3/UL (ref 0.8–5.3)
LYMPHOCYTES NFR BLD AUTO: 28 %
MAGNESIUM SERPL-MCNC: 1.7 MG/DL (ref 1.7–2.3)
MCH RBC QN AUTO: 29.6 PG (ref 26.5–33)
MCHC RBC AUTO-ENTMCNC: 33.3 G/DL (ref 31.5–36.5)
MCV RBC AUTO: 89 FL (ref 78–100)
MONOCYTES # BLD AUTO: 0.5 10E3/UL (ref 0–1.3)
MONOCYTES NFR BLD AUTO: 14 %
NEUTROPHILS # BLD AUTO: 2.1 10E3/UL (ref 1.6–8.3)
NEUTROPHILS NFR BLD AUTO: 54 %
NITRATE UR QL: NEGATIVE
NRBC # BLD AUTO: 0 10E3/UL
NRBC BLD AUTO-RTO: 0 /100
PH UR STRIP: 8 [PH] (ref 5–7)
PHOSPHATE SERPL-MCNC: 3.9 MG/DL (ref 2.5–4.5)
PLATELET # BLD AUTO: 112 10E3/UL (ref 150–450)
POTASSIUM SERPL-SCNC: 4.3 MMOL/L (ref 3.4–5.3)
PROT SERPL-MCNC: 5.1 G/DL (ref 6.4–8.3)
RBC # BLD AUTO: 2.6 10E6/UL (ref 3.8–5.2)
RBC URINE: 27 /HPF
SODIUM SERPL-SCNC: 142 MMOL/L (ref 135–145)
SP GR UR STRIP: 1 (ref 1–1.03)
URATE SERPL-MCNC: 4.4 MG/DL (ref 2.4–5.7)
UROBILINOGEN UR STRIP-MCNC: NORMAL MG/DL
WBC # BLD AUTO: 3.8 10E3/UL (ref 4–11)
WBC URINE: 0 /HPF

## 2024-01-13 PROCEDURE — 250N000013 HC RX MED GY IP 250 OP 250 PS 637

## 2024-01-13 PROCEDURE — 80053 COMPREHEN METABOLIC PANEL: CPT | Performed by: PHYSICIAN ASSISTANT

## 2024-01-13 PROCEDURE — 250N000013 HC RX MED GY IP 250 OP 250 PS 637: Performed by: PHYSICIAN ASSISTANT

## 2024-01-13 PROCEDURE — 85025 COMPLETE CBC W/AUTO DIFF WBC: CPT | Performed by: PHYSICIAN ASSISTANT

## 2024-01-13 PROCEDURE — 85384 FIBRINOGEN ACTIVITY: CPT | Performed by: PHYSICIAN ASSISTANT

## 2024-01-13 PROCEDURE — 258N000003 HC RX IP 258 OP 636: Performed by: PHYSICIAN ASSISTANT

## 2024-01-13 PROCEDURE — 250N000011 HC RX IP 250 OP 636: Performed by: PHYSICIAN ASSISTANT

## 2024-01-13 PROCEDURE — 99233 SBSQ HOSP IP/OBS HIGH 50: CPT | Mod: FS | Performed by: PHYSICIAN ASSISTANT

## 2024-01-13 PROCEDURE — 84550 ASSAY OF BLOOD/URIC ACID: CPT | Performed by: PHYSICIAN ASSISTANT

## 2024-01-13 PROCEDURE — 84100 ASSAY OF PHOSPHORUS: CPT | Performed by: STUDENT IN AN ORGANIZED HEALTH CARE EDUCATION/TRAINING PROGRAM

## 2024-01-13 PROCEDURE — 83735 ASSAY OF MAGNESIUM: CPT | Performed by: PHYSICIAN ASSISTANT

## 2024-01-13 PROCEDURE — 120N000002 HC R&B MED SURG/OB UMMC

## 2024-01-13 PROCEDURE — 85610 PROTHROMBIN TIME: CPT | Performed by: PHYSICIAN ASSISTANT

## 2024-01-13 PROCEDURE — 250N000009 HC RX 250: Performed by: PHYSICIAN ASSISTANT

## 2024-01-13 PROCEDURE — 81001 URINALYSIS AUTO W/SCOPE: CPT | Performed by: PHYSICIAN ASSISTANT

## 2024-01-13 PROCEDURE — 99418 PROLNG IP/OBS E/M EA 15 MIN: CPT | Mod: FS | Performed by: PHYSICIAN ASSISTANT

## 2024-01-13 RX ORDER — ONDANSETRON 2 MG/ML
8 INJECTION INTRAMUSCULAR; INTRAVENOUS EVERY 8 HOURS SCHEDULED
Status: DISCONTINUED | OUTPATIENT
Start: 2024-01-13 | End: 2024-01-16 | Stop reason: HOSPADM

## 2024-01-13 RX ORDER — PROCHLORPERAZINE MALEATE 5 MG
5-10 TABLET ORAL EVERY 6 HOURS PRN
Status: DISCONTINUED | OUTPATIENT
Start: 2024-01-13 | End: 2024-01-16 | Stop reason: HOSPADM

## 2024-01-13 RX ADMIN — PANTOPRAZOLE SODIUM 40 MG: 40 TABLET, DELAYED RELEASE ORAL at 08:12

## 2024-01-13 RX ADMIN — ONDANSETRON 8 MG: 2 INJECTION INTRAMUSCULAR; INTRAVENOUS at 21:33

## 2024-01-13 RX ADMIN — ONDANSETRON 8 MG: 2 INJECTION INTRAMUSCULAR; INTRAVENOUS at 13:41

## 2024-01-13 RX ADMIN — ONDANSETRON HYDROCHLORIDE 8 MG: 8 TABLET, FILM COATED ORAL at 00:33

## 2024-01-13 RX ADMIN — DOCUSATE SODIUM 100 MG: 100 CAPSULE, LIQUID FILLED ORAL at 20:27

## 2024-01-13 RX ADMIN — LOSARTAN POTASSIUM 100 MG: 100 TABLET, FILM COATED ORAL at 20:24

## 2024-01-13 RX ADMIN — ENOXAPARIN SODIUM 40 MG: 40 INJECTION SUBCUTANEOUS at 08:19

## 2024-01-13 RX ADMIN — PREGABALIN 50 MG: 50 CAPSULE ORAL at 08:09

## 2024-01-13 RX ADMIN — POTASSIUM CHLORIDE: 2 INJECTION, SOLUTION, CONCENTRATE INTRAVENOUS at 18:11

## 2024-01-13 RX ADMIN — PROCHLORPERAZINE MALEATE 5 MG: 5 TABLET ORAL at 09:19

## 2024-01-13 RX ADMIN — THIAMINE HCL TAB 100 MG 100 MG: 100 TAB at 20:23

## 2024-01-13 RX ADMIN — ONDANSETRON HYDROCHLORIDE 8 MG: 8 TABLET, FILM COATED ORAL at 08:08

## 2024-01-13 RX ADMIN — MESNA 2910 MG: 100 INJECTION, SOLUTION INTRAVENOUS at 16:08

## 2024-01-13 RX ADMIN — PSYLLIUM HUSK 1 PACKET: 3.4 POWDER ORAL at 17:51

## 2024-01-13 RX ADMIN — PROCHLORPERAZINE MALEATE 5 MG: 5 TABLET ORAL at 10:11

## 2024-01-13 RX ADMIN — THIAMINE HCL TAB 100 MG 100 MG: 100 TAB at 08:09

## 2024-01-13 RX ADMIN — PREGABALIN 50 MG: 50 CAPSULE ORAL at 13:36

## 2024-01-13 RX ADMIN — AMLODIPINE BESYLATE 5 MG: 5 TABLET ORAL at 08:10

## 2024-01-13 RX ADMIN — HYDROCHLOROTHIAZIDE 25 MG: 25 TABLET ORAL at 08:09

## 2024-01-13 RX ADMIN — POTASSIUM CHLORIDE: 2 INJECTION, SOLUTION, CONCENTRATE INTRAVENOUS at 09:17

## 2024-01-13 RX ADMIN — PREGABALIN 50 MG: 50 CAPSULE ORAL at 20:23

## 2024-01-13 RX ADMIN — THIAMINE HCL TAB 100 MG 100 MG: 100 TAB at 13:36

## 2024-01-13 ASSESSMENT — ACTIVITIES OF DAILY LIVING (ADL)
ADLS_ACUITY_SCORE: 20

## 2024-01-13 NOTE — PROGRESS NOTES
Ifosfamide Toxicity Assessment      Patient is Alert & Oriented x 4 . There are  No signs of lethargy, confusion or hallucinations.     Patient is having new incontinence of bowel or bladder No.       Pincer Grasp intact Yes     Tremors No    Provider was notified Not of changes N/A . Interventions include N/A    Monitor for s/sx of ifosfamide toxicity: hallucinations, AMS, emotional lability, somnolence, myoclonic jerks, tremors, coordination difficulties, etc. If these occur, please call the fellow on call for recommendations

## 2024-01-13 NOTE — PLAN OF CARE
Goal Outcome Evaluation:      Plan of Care Reviewed With: patient, spouse    Overall Patient Progress: no changeOverall Patient Progress: no change  Afebrile with stable vs. Continues to receive Day 2 Ifosfamide/mesna CIVI. Urine newly light pink. PA notified. UA sent which demonstrates blood but not suspicious of UTI. No additional orders, continue to monitor. Reporting nausea. Received compazine 5mg with little relief. Compazine dose range increased to 10mg and pt received another 5mg and scheduled zofran changed from po to IV. Nausea mostly resolved, but pt feeling much more fatigued today. ICANS assessment unchanged. No electrolyte replacement required today. Good pain control with scheduled lyrica.

## 2024-01-13 NOTE — PROGRESS NOTES
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     Today:  - Day 4 from Cycle 4 of doxil/ifosfamide/carter.  - Noted mildly pinkish urine, UA notable for mild-moderate blood (27 RBCs), no signs of infection. Receiving concomitant mesna with ifosfamide. Continue to monitor closely.  - Newly nauseous, changed Zofran to 8 mg IV Q8H. Increase compazine to 5-10 mg Q6H PRN. Can trial Ativan for breakthrough symptoms. Consider re-dosing Emend tomorrow if symptoms persist.     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 Nam. Patient noted intermittent right calf pain that began in March 2023. Xray noted a destructive lesion in the midportion of the right fibula. MRI 9/28/23 with 3.3 x5.2 x 9.3 cm soft tissue mass in proximal/mid calf. Bony invasion into the fibular shaft with cortical disruption. Biopsy of soft tissue mass in October 2023 confirmed diagnosis of high-grade pleomorphic sarcoma. PET/CT 10/10/23 without evidence of metastatic disease but did have an enlarged R iliac chain LN, thought to be reactive. She initiated treatment with Doxil/Ifosfamide C1D1=10/17/23 overall tolerated well with some electrolyte disturbances. U7M8=1311/14/23; which was complicated with neurotoxicity in the form of intermittent involuntary jerking and twitching. Restaging PET/CT 12/5/23 shows a partial response to therapy with decreasing size and FDG avidity of the right calf mass. Y8T6=3512/13/23 overall tolerated well. Patient feeling well and stable upon  admission, she elects to proceed with cycle 4 doxil/ifosfamide today.  - PICC placed PTA; anticipate removing at discharge   - RN to perform Q8H neurotoxicity assessments and notify covering provider of any changes/new symptoms  - Restaging PET/CT scheduled 1/29/24, follow-up with Dr. Jimenez 1/31/24, and Rad/Onc visit 2/1/24                  Treatment Plan: Doxil/Ifosfamide/Mesna (C4D1=1/10/24)               - Doxorubicin liposome (Doxil) 40 mg/m2 (73 mg) IV -- D1                - Ifosfamide/Mesna 1600 mg/m2 (2,910 mg) IV -- D1-5                - Mesna 1600 mg/m2 (2,910 mg) IV over 18h -- D6                - Premeds: Thiamine, Emend, Zofran, Dexamethasone     # H/o CLL   Previously followed with Dr. Acosta. CLL has been stable. Not on current treatment.   - No inpatient needs at this time.   - Scheduled for follow-up with Dr. Duckworth in 04/2024    # Pancytopenia  2/2 recent chemotherapy. Has previously required intermittent transfusion support.  - Daily CBC monitoring  - Transfuse to keep Hgb >7, plt >7  - Blood transfusion consent on file    # Cancer-related pain  Related to patients known R calf sarcoma. Has improved with chemotherapy and pain management as below.  - Continue PTA Oxycodone 5-10 mg q6h PRN   - Continue PTA Lyrica 50 mg TID     # Chemotherapy-induced nausea  Reported 1/13 AM. No vomiting, associated abdominal pain/distension, or other red flag symptoms. Presumably due to chemotherapy (ifosfamide).  - Change Zofran to 8 mg IV Q8H scheduled  - Increase compazine to 5-10 mg Q6H PRN  - Ativan 0.5-1 mg Q6H PRN  - Consider re-dosing Emend tomorrow if no improvement  - Could also consider addition of Zyprexa if persistent (would recheck QTc prior)     CV  # Bilateral LE edema  # HTN   Patient reports poorly controlled BP at home prior to admission. Previously on chlorthalidone, which was discontinued due electrolyte derangements. Recently advised to increase amlodipine dose, but subsequently noticed  bothersome LE edema. Started on hydrochlorothiazide 25 mg daily x1/10/24 with improvement in pressures as well as edema.  - Continue PTA amlodipine; reduced back to 5 mg daily given edema  - Continue PTA losartan 100 mg daily  - Continue hydrochlorothiazide 25 mg daily (x1/10/24); consider dose increase to 50 mg daily if      # HLD   - Hold PTA pravastatin while inpatient, okay to resume on discharge.     GI/  # GERD   - PTA omeprazole rotated to pantoprazole 40 mg while admitted (therapeutic interchange).     # Opioid-induced constipation  # Loose stool  Patient reports experiencing severe constipation with previous cycles of chemotherapy, especially when she takes oxy for pain. She takes daily Psyllium and Colace, will continue this admission. Can increase bowel regimen depending on BM frequency. She does report 3-4 loose stools per day for three days last week which she believes is 2/2 PO magnesium supplement she has been taking.  - Continue to monitor  - Reporting daily normal BMs    # Microscopic hematuria  Noted 1/13. RN reported pinkish color to urine. UA obtained and notable for clear color, 27 RBC, no WBC, -LE/-nitrites. Patient denies dysuria, frequency, urgency, flank pain, or other s/sx of concern. Hemorrhagic cystitis is a side effect of ifosfamide, though less likely as patient is receiving concurrent Mesna and has tolerated this regimen/dose in the past.  - Continue to monitor closely  - Consider further work-up if hematuria persists or becomes gross     DERM   # Psoriasis  - PTA topicals on hold while in the hospital      FEN:  - IVF per chemo protocol   - PRN lyte replacement  - RDAT     Prophy/Misc:  - VTE: PPx Lovenox; hold if plts <50K   - GI/PUD: PTA PPI   - Bowels: PTA Psyllium and Colace    Clinically Significant Risk Factors          # Hypocalcemia: Lowest Ca = 8.3 mg/dL in last 2 days, will monitor and replace as appropriate     # Hypoalbuminemia: Lowest albumin = 3.3 g/dL at 1/13/2024   "4:56 AM, will monitor as appropriate   # Thrombocytopenia: Lowest platelets = 112 in last 2 days, will monitor for bleeding   # Hypertension: Noted on problem list        # Obesity: Estimated body mass index is 33.24 kg/m  as calculated from the following:    Height as of this encounter: 1.549 m (5' 1\").    Weight as of this encounter: 79.8 kg (175 lb 14.4 oz)., PRESENT ON ADMISSION     # Asthma: noted on problem list      Code Status: FULL  Disposition: Inpatient admission for scheduled chemotherapy and careful monitoring; tentatively anticipate discharge to home on 1/16/24, pending tolerance of the chemotherapy regimen.  Follow up:   - 2x weekly labs/transfusions  - 1/29 repeat PET/CT  - 1/31 follow-up with Dr. Jimenez  - 2/1 Rad/Onc    Staffed with Dr. Gomez.     Etta Villeda PA-C  Hematology/Oncology  Pager: #[phone]     I spent 65 minutes in the care of this patient today, which included time necessary for review of interval events, obtaining history and physical exam, ordering medication(s)/test(s) as medically indicated, discussion with interdisciplinary/consult team(s), and documentation time. Over 50% of time was spent face-to-face and/or coordinating care.     Interval History   No acute overnight events. Mirta is overall doing well today. She does note that she woke up feeling a bit nauseous. She took some compazine and is hoping that will help. She denies fevers, chills, abdominal pain. She does have some burping/belching, perhaps mildly increased from previous. No vomiting. She has been taking her pantoprazole. No headaches, vision changes, numbness, tingling, weakness, tremors, uncontrolled muscle movements, gait disturbance, change in mentation, or emotionality. Reviewed management of nausea in detail. All questions/concerns addressed at bedside.     A comprehensive review of symptoms was performed and was negative except as detailed in the interval history above.    Physical Exam   Vital Signs " with Ranges  Temp:  [98  F (36.7  C)-99  F (37.2  C)] 98.9  F (37.2  C)  Pulse:  [73-85] 81  Resp:  [18-20] 20  BP: (139-165)/(73-87) 139/73  SpO2:  [97 %-100 %] 98 %    I/O last 3 completed shifts:  In: 4860.62 [P.O.:960; I.V.:2810.42; IV Piggyback:1090.2]  Out: 6200 [Urine:6200]    Vitals:    01/11/24 0833 01/12/24 0837 01/13/24 0731   Weight: 80 kg (176 lb 6.4 oz) 81.2 kg (179 lb) 79.8 kg (175 lb 14.4 oz)     General: Pleasant female sitting up on side of hospital bed in NAD, non-toxic appearing, interactive and pleasant.  HEENT: NCAT w/ no obvious deformities, EOMI, anicteric sclera, moist mucous membranes on limited exam.  Cardiac: RRR, no apparent murmur.  Respiratory: Breathing comfortably on room air, lungs clear to auscultation bilaterally.  GI: Abdomen is soft, non-tender, and non-distended. Bowel sounds present throughout.  : No CVA tenderness bilaterally.  Skin: Warm, dry, no rashes or lesions on limited exam.  Musculoskeletal: Palpable mass to right anterior lower leg without overlying erythema or induration.  Psych: Calm, normal mood and affect.  Neuro: Normal speech, A&Ox4. Moves all extremities spontaneously. No tremors or myoclonus. Normal gain when seen ambulating about the room. FNF and rapid alternating movements intact.  Vascular access: PICC on LUE CDI, non-tender, no surrounding erythema.    Medications    D5W 1,000 mL with potassium chloride 20 mEq/L, sodium bicarbonate 100 mEq/L infusion 125 mL/hr at 01/13/24 0917    - MEDICATION INSTRUCTIONS -          amLODIPine  5 mg Oral Daily    Chemotherapy Infusing-Continuous Infusion   Does not apply Q8H    [START ON 1/17/2024] dextrose 5% water  10-20 mL Intracatheter Daily at 8 pm    [START ON 1/17/2024] dextrose 5% water  10-20 mL Intracatheter Daily at 8 pm    docusate sodium  100 mg Oral BID    enoxaparin ANTICOAGULANT  40 mg Subcutaneous Q24H    [START ON 1/17/2024] filgrastim-aafi  5 mcg/kg (Order-Specific) Intravenous Daily at 8 pm     heparin lock flush  5-20 mL Intracatheter Q24H    hydrochlorothiazide  25 mg Oral Daily    Ifosfamide (IFEX) 2,910 mg, mesna (MESNEX) 2,910 mg in sodium chloride 0.9 % 1,137.3 mL infusion  1,600 mg/m2 (Order-Specific) Intravenous Q24H    losartan  100 mg Oral Daily    [START ON 1/15/2024] mesna (MESNEX) 2,910 mg in sodium chloride 0.9 % 1,079.1 mL infusion  1,600 mg/m2 (Order-Specific) Intravenous Once    ondansetron  8 mg Intravenous Q8H MONICA    pantoprazole  40 mg Oral QAM    pregabalin  50 mg Oral TID    psyllium  1 packet Oral Daily    thiamine  100 mg Oral TID       Antiinfectives  Anti-infectives (From now, onward)      None            Data   CBC   Recent Labs   Lab 01/13/24 0456 01/12/24  0430 01/11/24  0421 01/10/24  0748   WBC 3.8* 7.5 6.9 5.1   RBC 2.60* 2.58* 2.84* 3.24*   HGB 7.7* 7.7* 8.4* 9.5*   HCT 23.1* 22.7* 24.0* 27.7*   MCV 89 88 85 86   MCH 29.6 29.8 29.6 29.3   MCHC 33.3 33.9 35.0 34.3   RDW 18.6* 18.6* 17.3* 18.0*   * 118* 144* 154       CMP   Recent Labs   Lab 01/13/24 0456 01/12/24  0430 01/11/24  0421 01/10/24  0748    142 138 139   POTASSIUM 4.3 4.3 4.5 4.4   CHLORIDE 105 105 104 105   CO2 32* 31* 26 24   ANIONGAP 5* 6* 8 10   GLC 87 108* 147* 94   BUN 11.8 16.7 20.7 21.9   CR 0.86 0.92 0.85 0.86   GFRESTIMATED 74 68 75 74   MONIK 8.3* 8.7* 8.9 9.2   MAG 1.7 1.8 1.7 1.8   PHOS 3.9 3.2 2.3* 3.2   PROTTOTAL 5.1* 5.5* 6.0* 6.9   ALBUMIN 3.3* 3.6 3.8 4.3   BILITOTAL 0.2 <0.2 0.2 0.4   ALKPHOS 78 81 94 109   AST 14 14 18 21   ALT 12 13 15 13       LFTs   Recent Labs   Lab 01/13/24  0456   PROTTOTAL 5.1*   ALBUMIN 3.3*   BILITOTAL 0.2   ALKPHOS 78   AST 14   ALT 12       Coagulation Studies   Recent Labs   Lab 01/13/24  0456   INR 1.15

## 2024-01-13 NOTE — PLAN OF CARE
[id number]: Pt afebrile. BP slightly elevated. OVSS on RA. Pt denies pain, nausea, SOB. Pt voiding adequately. Ifosfamide infusing at 47.4 ml/hr. Pt is tolerating infusion well. ICANS are stable and unchanged. Pt is up ind. Continue to monitor.   Problem: Adult Inpatient Plan of Care  Goal: Plan of Care Review  Description: The Plan of Care Review/Shift note should be completed every shift.  The Outcome Evaluation is a brief statement about your assessment that the patient is improving, declining, or no change.  This information will be displayed automatically on your shift  note.  Outcome: Progressing  Goal: Optimal Comfort and Wellbeing  Outcome: Progressing   Goal Outcome Evaluation:

## 2024-01-13 NOTE — PLAN OF CARE
Goal Outcome Evaluation:    Status: Pleomorphic sarcoma of R calf, admit for doxil/fosfamide.   Vitals: VSS ex elevated BP not within parameters to notify. On RA.   Neuros: A+Ox4  IV: Port, ifosfamide infusing 47.4 ml/hr  Labs/Electrolytes: Labs drawn this AM  Diet: Regular  GI/: Voiding adequately  Pain: Denied  Activity: UAL  Plan: Continue with current plan of care.

## 2024-01-13 NOTE — PROGRESS NOTES
Stop time on MAR & chart I & O  Chemo drug: Ifosfamide   Tolerated: Pt is tolerating continuous infusion well. ICANs completed q shift. Blood return present prior to beginning new bag.  Intervention: NA   Response: NA   Plan: chemo will infuse over 24 hrs. Continue to monitor for SE, ICANS, and blood return q 4 hrs.

## 2024-01-14 LAB
ALBUMIN SERPL BCG-MCNC: 3.5 G/DL (ref 3.5–5.2)
ALP SERPL-CCNC: 85 U/L (ref 40–150)
ALT SERPL W P-5'-P-CCNC: 7 U/L (ref 0–50)
ANION GAP SERPL CALCULATED.3IONS-SCNC: 7 MMOL/L (ref 7–15)
AST SERPL W P-5'-P-CCNC: 16 U/L (ref 0–45)
BASOPHILS # BLD AUTO: 0 10E3/UL (ref 0–0.2)
BASOPHILS NFR BLD AUTO: 1 %
BILIRUB SERPL-MCNC: 0.2 MG/DL
BUN SERPL-MCNC: 13.8 MG/DL (ref 8–23)
CALCIUM SERPL-MCNC: 8.7 MG/DL (ref 8.8–10.2)
CHLORIDE SERPL-SCNC: 105 MMOL/L (ref 98–107)
CREAT SERPL-MCNC: 0.97 MG/DL (ref 0.51–0.95)
DEPRECATED HCO3 PLAS-SCNC: 27 MMOL/L (ref 22–29)
EGFRCR SERPLBLD CKD-EPI 2021: 64 ML/MIN/1.73M2
EOSINOPHIL # BLD AUTO: 0.2 10E3/UL (ref 0–0.7)
EOSINOPHIL NFR BLD AUTO: 4 %
ERYTHROCYTE [DISTWIDTH] IN BLOOD BY AUTOMATED COUNT: 17.8 % (ref 10–15)
FIBRINOGEN PPP-MCNC: 294 MG/DL (ref 170–490)
GLUCOSE SERPL-MCNC: 101 MG/DL (ref 70–99)
HCT VFR BLD AUTO: 24.4 % (ref 35–47)
HGB BLD-MCNC: 8.3 G/DL (ref 11.7–15.7)
IMM GRANULOCYTES # BLD: 0 10E3/UL
IMM GRANULOCYTES NFR BLD: 0 %
INR PPP: 1.13 (ref 0.85–1.15)
LACTATE SERPL-SCNC: 0.9 MMOL/L (ref 0.7–2)
LYMPHOCYTES # BLD AUTO: 0.7 10E3/UL (ref 0.8–5.3)
LYMPHOCYTES NFR BLD AUTO: 20 %
MAGNESIUM SERPL-MCNC: 1.7 MG/DL (ref 1.7–2.3)
MCH RBC QN AUTO: 30.3 PG (ref 26.5–33)
MCHC RBC AUTO-ENTMCNC: 34 G/DL (ref 31.5–36.5)
MCV RBC AUTO: 89 FL (ref 78–100)
MONOCYTES # BLD AUTO: 0.4 10E3/UL (ref 0–1.3)
MONOCYTES NFR BLD AUTO: 12 %
NEUTROPHILS # BLD AUTO: 2.3 10E3/UL (ref 1.6–8.3)
NEUTROPHILS NFR BLD AUTO: 63 %
NRBC # BLD AUTO: 0 10E3/UL
NRBC BLD AUTO-RTO: 0 /100
PHOSPHATE SERPL-MCNC: 3.6 MG/DL (ref 2.5–4.5)
PLATELET # BLD AUTO: 114 10E3/UL (ref 150–450)
POTASSIUM SERPL-SCNC: 4.4 MMOL/L (ref 3.4–5.3)
PROT SERPL-MCNC: 5.5 G/DL (ref 6.4–8.3)
RBC # BLD AUTO: 2.74 10E6/UL (ref 3.8–5.2)
SODIUM SERPL-SCNC: 139 MMOL/L (ref 135–145)
URATE SERPL-MCNC: 4.5 MG/DL (ref 2.4–5.7)
WBC # BLD AUTO: 3.7 10E3/UL (ref 4–11)

## 2024-01-14 PROCEDURE — 83605 ASSAY OF LACTIC ACID: CPT | Performed by: PHYSICIAN ASSISTANT

## 2024-01-14 PROCEDURE — 250N000011 HC RX IP 250 OP 636: Performed by: PHYSICIAN ASSISTANT

## 2024-01-14 PROCEDURE — 85610 PROTHROMBIN TIME: CPT | Performed by: PHYSICIAN ASSISTANT

## 2024-01-14 PROCEDURE — 258N000003 HC RX IP 258 OP 636: Performed by: PHYSICIAN ASSISTANT

## 2024-01-14 PROCEDURE — 250N000011 HC RX IP 250 OP 636: Mod: JZ | Performed by: PHYSICIAN ASSISTANT

## 2024-01-14 PROCEDURE — 84100 ASSAY OF PHOSPHORUS: CPT | Performed by: STUDENT IN AN ORGANIZED HEALTH CARE EDUCATION/TRAINING PROGRAM

## 2024-01-14 PROCEDURE — 99418 PROLNG IP/OBS E/M EA 15 MIN: CPT | Mod: FS | Performed by: PHYSICIAN ASSISTANT

## 2024-01-14 PROCEDURE — 120N000002 HC R&B MED SURG/OB UMMC

## 2024-01-14 PROCEDURE — 99233 SBSQ HOSP IP/OBS HIGH 50: CPT | Mod: FS | Performed by: PHYSICIAN ASSISTANT

## 2024-01-14 PROCEDURE — 250N000009 HC RX 250: Performed by: PHYSICIAN ASSISTANT

## 2024-01-14 PROCEDURE — 250N000013 HC RX MED GY IP 250 OP 250 PS 637: Performed by: PHYSICIAN ASSISTANT

## 2024-01-14 PROCEDURE — 85025 COMPLETE CBC W/AUTO DIFF WBC: CPT | Performed by: PHYSICIAN ASSISTANT

## 2024-01-14 PROCEDURE — 84550 ASSAY OF BLOOD/URIC ACID: CPT | Performed by: PHYSICIAN ASSISTANT

## 2024-01-14 PROCEDURE — 80053 COMPREHEN METABOLIC PANEL: CPT | Performed by: PHYSICIAN ASSISTANT

## 2024-01-14 PROCEDURE — 83735 ASSAY OF MAGNESIUM: CPT | Performed by: PHYSICIAN ASSISTANT

## 2024-01-14 PROCEDURE — 85384 FIBRINOGEN ACTIVITY: CPT | Performed by: PHYSICIAN ASSISTANT

## 2024-01-14 PROCEDURE — 250N000013 HC RX MED GY IP 250 OP 250 PS 637

## 2024-01-14 RX ORDER — HYDRALAZINE HYDROCHLORIDE 20 MG/ML
10 INJECTION INTRAMUSCULAR; INTRAVENOUS EVERY 6 HOURS PRN
Status: DISCONTINUED | OUTPATIENT
Start: 2024-01-14 | End: 2024-01-14

## 2024-01-14 RX ORDER — AMLODIPINE BESYLATE 2.5 MG/1
2.5 TABLET ORAL ONCE
Status: COMPLETED | OUTPATIENT
Start: 2024-01-14 | End: 2024-01-14

## 2024-01-14 RX ADMIN — LOSARTAN POTASSIUM 100 MG: 100 TABLET, FILM COATED ORAL at 20:05

## 2024-01-14 RX ADMIN — ONDANSETRON 8 MG: 2 INJECTION INTRAMUSCULAR; INTRAVENOUS at 21:48

## 2024-01-14 RX ADMIN — ONDANSETRON 8 MG: 2 INJECTION INTRAMUSCULAR; INTRAVENOUS at 13:27

## 2024-01-14 RX ADMIN — PREGABALIN 50 MG: 50 CAPSULE ORAL at 13:27

## 2024-01-14 RX ADMIN — THIAMINE HCL TAB 100 MG 100 MG: 100 TAB at 13:26

## 2024-01-14 RX ADMIN — AMLODIPINE BESYLATE 2.5 MG: 2.5 TABLET ORAL at 09:59

## 2024-01-14 RX ADMIN — POTASSIUM CHLORIDE: 2 INJECTION, SOLUTION, CONCENTRATE INTRAVENOUS at 19:41

## 2024-01-14 RX ADMIN — AMLODIPINE BESYLATE 5 MG: 5 TABLET ORAL at 08:24

## 2024-01-14 RX ADMIN — PREGABALIN 50 MG: 50 CAPSULE ORAL at 08:37

## 2024-01-14 RX ADMIN — MESNA 2910 MG: 100 INJECTION, SOLUTION INTRAVENOUS at 16:32

## 2024-01-14 RX ADMIN — POTASSIUM CHLORIDE: 2 INJECTION, SOLUTION, CONCENTRATE INTRAVENOUS at 10:14

## 2024-01-14 RX ADMIN — PANTOPRAZOLE SODIUM 40 MG: 40 TABLET, DELAYED RELEASE ORAL at 08:24

## 2024-01-14 RX ADMIN — DOCUSATE SODIUM 100 MG: 100 CAPSULE, LIQUID FILLED ORAL at 08:24

## 2024-01-14 RX ADMIN — POTASSIUM CHLORIDE: 2 INJECTION, SOLUTION, CONCENTRATE INTRAVENOUS at 02:17

## 2024-01-14 RX ADMIN — HYDROCHLOROTHIAZIDE 25 MG: 25 TABLET ORAL at 08:24

## 2024-01-14 RX ADMIN — ONDANSETRON 8 MG: 2 INJECTION INTRAMUSCULAR; INTRAVENOUS at 05:18

## 2024-01-14 RX ADMIN — PREGABALIN 50 MG: 50 CAPSULE ORAL at 20:05

## 2024-01-14 RX ADMIN — PROCHLORPERAZINE MALEATE 5 MG: 5 TABLET ORAL at 09:59

## 2024-01-14 RX ADMIN — ENOXAPARIN SODIUM 40 MG: 40 INJECTION SUBCUTANEOUS at 08:38

## 2024-01-14 RX ADMIN — DOCUSATE SODIUM 100 MG: 100 CAPSULE, LIQUID FILLED ORAL at 20:05

## 2024-01-14 RX ADMIN — THIAMINE HCL TAB 100 MG 100 MG: 100 TAB at 08:24

## 2024-01-14 RX ADMIN — THIAMINE HCL TAB 100 MG 100 MG: 100 TAB at 20:06

## 2024-01-14 ASSESSMENT — ACTIVITIES OF DAILY LIVING (ADL)
ADLS_ACUITY_SCORE: 20

## 2024-01-14 NOTE — PLAN OF CARE
Goal Outcome Evaluation: [id number]      Plan of Care Reviewed With: patient    Overall Patient Progress: no change    Outcome Evaluation: Hypertensive w/ max , recheck . PRN hydralazine ordered and available if needed. OVSS on RA. A&Ox4. CIVI Ifos infusing. Denies pain, N/V, SOB. Voiding w/ AUOP. Denies any pink urine. UAL. Continue w/ POC.

## 2024-01-14 NOTE — PROGRESS NOTES
Ifosfamide Toxicity Assessment      Patient is Alert & Oriented x4. There are signs of lethargy, confusion or hallucinations  No    Patient is having new incontinence of bowel or bladder No      Pincer Grasp intact Yes    Tremors present No     Provider was notified No of changes N/A . Interventions include N/A.     Continue to monitor for s/sx of ifosfamide toxicity: hallucinations, AMS, emotional lability, somnolence, myoclonic jerks, tremors, coordination difficulties. If these occur, please call the APPs/Attending on days and fellow on-call for evening and nights for recommendations and further instruction.

## 2024-01-14 NOTE — PLAN OF CARE
Goal Outcome Evaluation:      Plan of Care Reviewed With: patient    Overall Patient Progress: no changeOverall Patient Progress: no change  T max 99.6 with stable vs except a little hypertensive. Amlodipine dose increased to 7.5mg. Lat /75. Triggered SIRS protocol. Prabha TABARES notified. Vss, Lactic acid. 0.9. Continues to receive Day 4 Ifosfamide/mesna CIVI. No signs of pink urine today. Continues to be a little nauseated. Received compazine with some relief. ICANS assessment unchanged. No electrolyte replacement required today. Good pain control with scheduled lyrica.

## 2024-01-14 NOTE — PROGRESS NOTES
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     Today:  - Day 5 from Cycle 4 of doxil/ifosfamide/carter.  - Hematuria resolved, continue to monitor closely.  - Nausea improved, continue current antiemetics.  - Engaged in shared decision-making around blood pressure medications. Offered close observation versus trial of increasing amlodipine back to 7.5 mg daily (stopped due to edema concerns). Patient preferred the latter, dose adjustment made. Continue to monitor BP closely.    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 Nam. Patient noted intermittent right calf pain that began in March 2023. Xray noted a destructive lesion in the midportion of the right fibula. MRI 9/28/23 with 3.3 x5.2 x 9.3 cm soft tissue mass in proximal/mid calf. Bony invasion into the fibular shaft with cortical disruption. Biopsy of soft tissue mass in October 2023 confirmed diagnosis of high-grade pleomorphic sarcoma. PET/CT 10/10/23 without evidence of metastatic disease but did have an enlarged R iliac chain LN, thought to be reactive. She initiated treatment with Doxil/Ifosfamide C1D1=10/17/23 overall tolerated well with some electrolyte disturbances. N3K5=4511/14/23; which was complicated with neurotoxicity in the form of intermittent involuntary jerking and twitching. Restaging PET/CT 12/5/23 shows a partial response to therapy with decreasing size and FDG avidity of the right calf mass. X7W6=3412/13/23 overall tolerated well. Patient feeling well and  stable upon admission, she elects to proceed with cycle 4 doxil/ifosfamide today.  - PICC placed PTA; anticipate removing at discharge   - RN to perform Q8H neurotoxicity assessments and notify covering provider of any changes/new symptoms  - Restaging PET/CT scheduled 1/29/24, follow-up with Dr. Jimenez 1/31/24, and Rad/Onc visit 2/1/24                  Treatment Plan: Doxil/Ifosfamide/Mesna (C4D1=1/10/24)               - Doxorubicin liposome (Doxil) 40 mg/m2 (73 mg) IV -- D1                - Ifosfamide/Mesna 1600 mg/m2 (2,910 mg) IV -- D1-5                - Mesna 1600 mg/m2 (2,910 mg) IV over 18h -- D6                - Premeds: Thiamine, Emend, Zofran, Dexamethasone     # H/o CLL   Previously followed with Dr. Acosta. CLL has been stable. Not on current treatment.   - No inpatient needs at this time.   - Scheduled for follow-up with Dr. Duckworth in 04/2024    # Pancytopenia  2/2 recent chemotherapy. Has previously required intermittent transfusion support.  - Daily CBC monitoring  - Transfuse to keep Hgb >7, plt >7  - Blood transfusion consent on file    # Cancer-related pain  Related to patients known R calf sarcoma. Has improved with chemotherapy and pain management as below.  - Continue PTA Oxycodone 5-10 mg q6h PRN   - Continue PTA Lyrica 50 mg TID     # Chemotherapy-induced nausea  Reported 1/13 AM. No vomiting, associated abdominal pain/distension, or other red flag symptoms. Presumably due to chemotherapy (ifosfamide).  - Change Zofran to 8 mg IV Q8H scheduled  - Increase compazine to 5-10 mg Q6H PRN  - Ativan 0.5-1 mg Q6H PRN  - Consider re-dosing Emend tomorrow if no improvement  - Could also consider addition of Zyprexa if persistent (would recheck QTc prior)     CV  # Bilateral LE edema  # HTN   Patient reports poorly controlled BP at home prior to admission. Previously on chlorthalidone, which was discontinued due electrolyte derangements. Recently advised to increase amlodipine dose, but subsequently  noticed bothersome LE edema. Started on hydrochlorothiazide 25 mg daily x1/10/24 with improvement in pressures as well as edema.  - Continue PTA amlodipine; increased to 7.5 mg daily x1/14/24 following shared decision-making discussion. Patient reports she believes she tolerated this higher dose fine, but it was reduced due to concerns for LE edema. She is amenable to re-trialing now. She was also offered observation, but preferred to intervene while in the hospital to see how she tolerates the changes prior to discharge.  - Continue PTA losartan 100 mg daily  - Continue hydrochlorothiazide 25 mg daily (x1/10/24)  - Continue to monitor blood pressures closely     # HLD   - Hold PTA pravastatin while inpatient, okay to resume on discharge.     GI/  # GERD   - PTA omeprazole rotated to pantoprazole 40 mg while admitted (therapeutic interchange).     # Opioid-induced constipation  # Loose stool  Patient reports experiencing severe constipation with previous cycles of chemotherapy, especially when she takes oxy for pain. She takes daily Psyllium and Colace, will continue this admission. Can increase bowel regimen depending on BM frequency. She does report 3-4 loose stools per day for three days last week which she believes is 2/2 PO magnesium supplement she has been taking.  - Continue to monitor  - Reporting daily normal BMs    # Microscopic hematuria, resolved  Noted 1/13. RN reported pinkish color to urine. UA obtained and notable for clear color, 27 RBC, no WBC, -LE/-nitrites. Patient denies dysuria, frequency, urgency, flank pain, or other s/sx of concern. Hemorrhagic cystitis is a side effect of ifosfamide, though less likely as patient is receiving concurrent Mesna and has tolerated this regimen/dose in the past.  - Continue to monitor closely  - Consider further work-up if hematuria persists or becomes gross     DERM   # Psoriasis  - PTA topicals on hold while in the hospital      FEN:  - IVF per chemo  "protocol   - PRN lyte replacement  - RDAT     Prophy/Misc:  - VTE: PPx Lovenox; hold if plts <50K   - GI/PUD: PTA PPI   - Bowels: PTA Psyllium and Colace    Clinically Significant Risk Factors          # Hypocalcemia: Lowest Ca = 8.3 mg/dL in last 2 days, will monitor and replace as appropriate     # Hypoalbuminemia: Lowest albumin = 3.3 g/dL at 1/13/2024  4:56 AM, will monitor as appropriate   # Thrombocytopenia: Lowest platelets = 112 in last 2 days, will monitor for bleeding   # Hypertension: Noted on problem list        # Obesity: Estimated body mass index is 32.63 kg/m  as calculated from the following:    Height as of this encounter: 1.549 m (5' 1\").    Weight as of this encounter: 78.3 kg (172 lb 11.2 oz).      # Asthma: noted on problem list      Code Status: FULL  Disposition: Inpatient admission for scheduled chemotherapy and careful monitoring; tentatively anticipate discharge to home on 1/16/24, pending tolerance of the chemotherapy regimen.  Follow up:   - 2x weekly labs/transfusions  - 1/29 repeat PET/CT  - 1/31 follow-up with Dr. Jimenez  - 2/1 Rad/Onc    Staffed with Dr. Gomez.     Etta Villeda PAMARIO  Hematology/Oncology  Pager: #[phone]     I spent 55 minutes in the care of this patient today, which included time necessary for review of interval events, obtaining history and physical exam, ordering medication(s)/test(s) as medically indicated, discussion with interdisciplinary/consult team(s), and documentation time. Over 50% of time was spent face-to-face and/or coordinating care.     Interval History   No acute overnight events. Mirta is overall doing well today. Her nausea is much better, and she ate a good breakfast. She denies abdominal pain, diarrhea, constipation. She denies fevers or chills. Notes her BP was elevated overnight; she was entirely asymptomatic with this. We reviewed the difference between symptomatic and asymptomatic hypertension and discussed management strategies. We " offered options including observation or medication adjustment. Discussed that she is maxed out on losartan and likely near the upper limits of utility as far as hydrochlorothiazide goes (increases past 25 mg typically increase side effects and has limited effect on BP). Reviewed that the only medication amenable to adjustment is amlodipine. Patient's amlodipine dose was recently decreased due to concerns for edema, but patient isn't sure that this was the culprit. Following our discussion, patient preferred to trial medication adjustment so we could monitor for efficacy and side effects while she is in the hospital. Dose adjusted. Our visit was cut slightly short due to the surprise arrival of her son and grandson, whom she was delighted to see. Mirta voiced no other concerns or needs.    A comprehensive review of symptoms was performed and was negative except as detailed in the interval history above.    Physical Exam   Vital Signs with Ranges  Temp:  [98.1  F (36.7  C)-99.6  F (37.6  C)] 99.1  F (37.3  C)  Pulse:  [81-92] 85  Resp:  [16-20] 16  BP: (139-167)/(73-96) 144/75  SpO2:  [98 %-100 %] 100 %    I/O last 3 completed shifts:  In: 6057.6 [P.O.:1920; I.V.:3000; IV Piggyback:1137.6]  Out: 8250 [Urine:8250]    Vitals:    01/12/24 0837 01/13/24 0731 01/14/24 0810   Weight: 81.2 kg (179 lb) 79.8 kg (175 lb 14.4 oz) 78.3 kg (172 lb 11.2 oz)     General: Pleasant female sitting up on side of hospital bed in NAD, non-toxic appearing, interactive and pleasant.  HEENT: NCAT w/ no obvious deformities, EOMI, anicteric sclera, moist mucous membranes on limited exam.  Cardiac: RRR, no apparent murmur.  Respiratory: Breathing comfortably on room air, lungs clear to auscultation bilaterally.  GI: Abdomen is soft, non-tender, and non-distended. Bowel sounds present throughout.  : No CVA tenderness bilaterally.  Skin: Warm, dry, no rashes or lesions on limited exam.  Musculoskeletal: Palpable mass to right anterior lower  leg without overlying erythema or induration.  Psych: Calm, normal mood and affect.  Neuro: Normal speech, A&Ox4. Moves all extremities spontaneously. No tremors or myoclonus. Normal gain when seen ambulating about the room. FNF and rapid alternating movements intact.  Vascular access: PICC on LUE CDI, non-tender, no surrounding erythema.    Medications    D5W 1,000 mL with potassium chloride 20 mEq/L, sodium bicarbonate 100 mEq/L infusion 125 mL/hr at 01/14/24 1014    - MEDICATION INSTRUCTIONS -          [START ON 1/15/2024] amLODIPine  7.5 mg Oral Daily    Chemotherapy Infusing-Continuous Infusion   Does not apply Q8H    [START ON 1/17/2024] dextrose 5% water  10-20 mL Intracatheter Daily at 8 pm    [START ON 1/17/2024] dextrose 5% water  10-20 mL Intracatheter Daily at 8 pm    docusate sodium  100 mg Oral BID    enoxaparin ANTICOAGULANT  40 mg Subcutaneous Q24H    [START ON 1/17/2024] filgrastim-aafi  5 mcg/kg (Order-Specific) Intravenous Daily at 8 pm    heparin lock flush  5-20 mL Intracatheter Q24H    hydrochlorothiazide  25 mg Oral Daily    Ifosfamide (IFEX) 2,910 mg, mesna (MESNEX) 2,910 mg in sodium chloride 0.9 % 1,137.3 mL infusion  1,600 mg/m2 (Order-Specific) Intravenous Q24H    losartan  100 mg Oral Daily    [START ON 1/15/2024] mesna (MESNEX) 2,910 mg in sodium chloride 0.9 % 1,079.1 mL infusion  1,600 mg/m2 (Order-Specific) Intravenous Once    ondansetron  8 mg Intravenous Q8H [facility]    pantoprazole  40 mg Oral QAM    pregabalin  50 mg Oral TID    psyllium  1 packet Oral Daily    thiamine  100 mg Oral TID       Antiinfectives  Anti-infectives (From now, onward)      None            Data   CBC   Recent Labs   Lab 01/14/24  0507 01/13/24  0456 01/12/24  0430 01/11/24  0421   WBC 3.7* 3.8* 7.5 6.9   RBC 2.74* 2.60* 2.58* 2.84*   HGB 8.3* 7.7* 7.7* 8.4*   HCT 24.4* 23.1* 22.7* 24.0*   MCV 89 89 88 85   MCH 30.3 29.6 29.8 29.6   MCHC 34.0 33.3 33.9 35.0   RDW 17.8* 18.6* 18.6* 17.3*   * 112* 118* 144*        CMP   Recent Labs   Lab 01/14/24  0507 01/13/24  0456 01/12/24  0430 01/11/24  0421    142 142 138   POTASSIUM 4.4 4.3 4.3 4.5   CHLORIDE 105 105 105 104   CO2 27 32* 31* 26   ANIONGAP 7 5* 6* 8   * 87 108* 147*   BUN 13.8 11.8 16.7 20.7   CR 0.97* 0.86 0.92 0.85   GFRESTIMATED 64 74 68 75   MONIK 8.7* 8.3* 8.7* 8.9   MAG 1.7 1.7 1.8 1.7   PHOS 3.6 3.9 3.2 2.3*   PROTTOTAL 5.5* 5.1* 5.5* 6.0*   ALBUMIN 3.5 3.3* 3.6 3.8   BILITOTAL 0.2 0.2 <0.2 0.2   ALKPHOS 85 78 81 94   AST 16 14 14 18   ALT 7 12 13 15       LFTs   Recent Labs   Lab 01/14/24  0507   PROTTOTAL 5.5*   ALBUMIN 3.5   BILITOTAL 0.2   ALKPHOS 85   AST 16   ALT 7       Coagulation Studies   Recent Labs   Lab 01/14/24  0507   INR 1.13

## 2024-01-14 NOTE — PLAN OF CARE
15:00-23:30    Goal Outcome Evaluation:    Plan of Care Reviewed With: patient    Overall Patient Progress: no change    Status:  admitted on 1/10  for Doxil/Ifosfamide     -denied pain   -tolerated Regular diet well and ate a bowl of soup, peaches and cottage cheese without nausea  -up ad ghassan  -Temp: 98.8  F (37.1  C) Temp src: Oral BP: 139/73 Pulse: 84   Resp: 20 SpO2: 98 % O2 Device: None (Room air)    -voiding good amount and pt writes down output on the white board; urine used to be pink but now yellow and MD is aware  -had a BM today  -tolerating Ifosfamide/mesna well via PICC, positive blood return    Continue with POC

## 2024-01-15 LAB
ABO/RH(D): NORMAL
ALBUMIN SERPL BCG-MCNC: 3.6 G/DL (ref 3.5–5.2)
ALP SERPL-CCNC: 89 U/L (ref 40–150)
ALT SERPL W P-5'-P-CCNC: 14 U/L (ref 0–50)
ANION GAP SERPL CALCULATED.3IONS-SCNC: 7 MMOL/L (ref 7–15)
ANTIBODY SCREEN: NEGATIVE
AST SERPL W P-5'-P-CCNC: 17 U/L (ref 0–45)
BASOPHILS # BLD AUTO: 0 10E3/UL (ref 0–0.2)
BASOPHILS NFR BLD AUTO: 1 %
BILIRUB SERPL-MCNC: 0.2 MG/DL
BUN SERPL-MCNC: 15.1 MG/DL (ref 8–23)
CALCIUM SERPL-MCNC: 8.8 MG/DL (ref 8.8–10.2)
CHLORIDE SERPL-SCNC: 106 MMOL/L (ref 98–107)
CREAT SERPL-MCNC: 1.03 MG/DL (ref 0.51–0.95)
DEPRECATED HCO3 PLAS-SCNC: 26 MMOL/L (ref 22–29)
EGFRCR SERPLBLD CKD-EPI 2021: 60 ML/MIN/1.73M2
EOSINOPHIL # BLD AUTO: 0.2 10E3/UL (ref 0–0.7)
EOSINOPHIL NFR BLD AUTO: 5 %
ERYTHROCYTE [DISTWIDTH] IN BLOOD BY AUTOMATED COUNT: 17.1 % (ref 10–15)
FIBRINOGEN PPP-MCNC: 313 MG/DL (ref 170–490)
GLUCOSE SERPL-MCNC: 108 MG/DL (ref 70–99)
HCT VFR BLD AUTO: 25.5 % (ref 35–47)
HGB BLD-MCNC: 8.6 G/DL (ref 11.7–15.7)
IMM GRANULOCYTES # BLD: 0 10E3/UL
IMM GRANULOCYTES NFR BLD: 0 %
INR PPP: 1.1 (ref 0.85–1.15)
LACTATE SERPL-SCNC: 0.5 MMOL/L (ref 0.7–2)
LYMPHOCYTES # BLD AUTO: 0.8 10E3/UL (ref 0.8–5.3)
LYMPHOCYTES NFR BLD AUTO: 21 %
MAGNESIUM SERPL-MCNC: 1.8 MG/DL (ref 1.7–2.3)
MCH RBC QN AUTO: 29.2 PG (ref 26.5–33)
MCHC RBC AUTO-ENTMCNC: 33.7 G/DL (ref 31.5–36.5)
MCV RBC AUTO: 86 FL (ref 78–100)
MONOCYTES # BLD AUTO: 0.3 10E3/UL (ref 0–1.3)
MONOCYTES NFR BLD AUTO: 8 %
NEUTROPHILS # BLD AUTO: 2.4 10E3/UL (ref 1.6–8.3)
NEUTROPHILS NFR BLD AUTO: 65 %
NRBC # BLD AUTO: 0 10E3/UL
NRBC BLD AUTO-RTO: 0 /100
PHOSPHATE SERPL-MCNC: 3.3 MG/DL (ref 2.5–4.5)
PLATELET # BLD AUTO: 151 10E3/UL (ref 150–450)
POTASSIUM SERPL-SCNC: 4.1 MMOL/L (ref 3.4–5.3)
PROT SERPL-MCNC: 5.7 G/DL (ref 6.4–8.3)
RBC # BLD AUTO: 2.95 10E6/UL (ref 3.8–5.2)
SODIUM SERPL-SCNC: 139 MMOL/L (ref 135–145)
SPECIMEN EXPIRATION DATE: NORMAL
URATE SERPL-MCNC: 4.5 MG/DL (ref 2.4–5.7)
WBC # BLD AUTO: 3.7 10E3/UL (ref 4–11)

## 2024-01-15 PROCEDURE — 86900 BLOOD TYPING SEROLOGIC ABO: CPT | Performed by: PHYSICIAN ASSISTANT

## 2024-01-15 PROCEDURE — 80053 COMPREHEN METABOLIC PANEL: CPT | Performed by: PHYSICIAN ASSISTANT

## 2024-01-15 PROCEDURE — 120N000002 HC R&B MED SURG/OB UMMC

## 2024-01-15 PROCEDURE — 99232 SBSQ HOSP IP/OBS MODERATE 35: CPT | Mod: FS | Performed by: PHYSICIAN ASSISTANT

## 2024-01-15 PROCEDURE — 258N000003 HC RX IP 258 OP 636: Performed by: PHYSICIAN ASSISTANT

## 2024-01-15 PROCEDURE — 250N000013 HC RX MED GY IP 250 OP 250 PS 637: Performed by: PHYSICIAN ASSISTANT

## 2024-01-15 PROCEDURE — 84100 ASSAY OF PHOSPHORUS: CPT | Performed by: PHYSICIAN ASSISTANT

## 2024-01-15 PROCEDURE — 250N000009 HC RX 250: Performed by: PHYSICIAN ASSISTANT

## 2024-01-15 PROCEDURE — 85610 PROTHROMBIN TIME: CPT | Performed by: PHYSICIAN ASSISTANT

## 2024-01-15 PROCEDURE — 250N000011 HC RX IP 250 OP 636: Performed by: PHYSICIAN ASSISTANT

## 2024-01-15 PROCEDURE — 85004 AUTOMATED DIFF WBC COUNT: CPT | Performed by: PHYSICIAN ASSISTANT

## 2024-01-15 PROCEDURE — 85384 FIBRINOGEN ACTIVITY: CPT | Performed by: PHYSICIAN ASSISTANT

## 2024-01-15 PROCEDURE — 250N000013 HC RX MED GY IP 250 OP 250 PS 637

## 2024-01-15 PROCEDURE — 83605 ASSAY OF LACTIC ACID: CPT | Performed by: STUDENT IN AN ORGANIZED HEALTH CARE EDUCATION/TRAINING PROGRAM

## 2024-01-15 PROCEDURE — 83735 ASSAY OF MAGNESIUM: CPT | Performed by: PHYSICIAN ASSISTANT

## 2024-01-15 PROCEDURE — 84550 ASSAY OF BLOOD/URIC ACID: CPT | Performed by: PHYSICIAN ASSISTANT

## 2024-01-15 RX ORDER — HYDROCHLOROTHIAZIDE 25 MG/1
50 TABLET ORAL DAILY
Status: DISCONTINUED | OUTPATIENT
Start: 2024-01-16 | End: 2024-01-16 | Stop reason: HOSPADM

## 2024-01-15 RX ORDER — HYDROCHLOROTHIAZIDE 25 MG/1
25 TABLET ORAL
Status: DISCONTINUED | OUTPATIENT
Start: 2024-01-15 | End: 2024-01-15

## 2024-01-15 RX ORDER — HYDROCHLOROTHIAZIDE 25 MG/1
25 TABLET ORAL ONCE
Status: COMPLETED | OUTPATIENT
Start: 2024-01-15 | End: 2024-01-15

## 2024-01-15 RX ORDER — HYDRALAZINE HYDROCHLORIDE 20 MG/ML
5 INJECTION INTRAMUSCULAR; INTRAVENOUS EVERY 6 HOURS PRN
Status: DISCONTINUED | OUTPATIENT
Start: 2024-01-15 | End: 2024-01-16 | Stop reason: HOSPADM

## 2024-01-15 RX ADMIN — PSYLLIUM HUSK 1 PACKET: 3.4 POWDER ORAL at 18:47

## 2024-01-15 RX ADMIN — THIAMINE HCL TAB 100 MG 100 MG: 100 TAB at 07:50

## 2024-01-15 RX ADMIN — DOCUSATE SODIUM 100 MG: 100 CAPSULE, LIQUID FILLED ORAL at 07:50

## 2024-01-15 RX ADMIN — THIAMINE HCL TAB 100 MG 100 MG: 100 TAB at 20:39

## 2024-01-15 RX ADMIN — ONDANSETRON 8 MG: 2 INJECTION INTRAMUSCULAR; INTRAVENOUS at 14:04

## 2024-01-15 RX ADMIN — POTASSIUM CHLORIDE: 2 INJECTION, SOLUTION, CONCENTRATE INTRAVENOUS at 12:12

## 2024-01-15 RX ADMIN — PREGABALIN 50 MG: 50 CAPSULE ORAL at 14:03

## 2024-01-15 RX ADMIN — HYDROCHLOROTHIAZIDE 25 MG: 25 TABLET ORAL at 07:50

## 2024-01-15 RX ADMIN — PREGABALIN 50 MG: 50 CAPSULE ORAL at 20:39

## 2024-01-15 RX ADMIN — ENOXAPARIN SODIUM 40 MG: 40 INJECTION SUBCUTANEOUS at 07:59

## 2024-01-15 RX ADMIN — POTASSIUM CHLORIDE: 2 INJECTION, SOLUTION, CONCENTRATE INTRAVENOUS at 21:28

## 2024-01-15 RX ADMIN — DOCUSATE SODIUM 100 MG: 100 CAPSULE, LIQUID FILLED ORAL at 20:39

## 2024-01-15 RX ADMIN — ONDANSETRON 8 MG: 2 INJECTION INTRAMUSCULAR; INTRAVENOUS at 21:48

## 2024-01-15 RX ADMIN — THIAMINE HCL TAB 100 MG 100 MG: 100 TAB at 14:03

## 2024-01-15 RX ADMIN — LOSARTAN POTASSIUM 100 MG: 100 TABLET, FILM COATED ORAL at 20:39

## 2024-01-15 RX ADMIN — HYDROCHLOROTHIAZIDE 25 MG: 25 TABLET ORAL at 11:52

## 2024-01-15 RX ADMIN — MESNA 2910 MG: 100 INJECTION, SOLUTION INTRAVENOUS at 17:04

## 2024-01-15 RX ADMIN — PANTOPRAZOLE SODIUM 40 MG: 40 TABLET, DELAYED RELEASE ORAL at 07:50

## 2024-01-15 RX ADMIN — POTASSIUM CHLORIDE: 2 INJECTION, SOLUTION, CONCENTRATE INTRAVENOUS at 02:56

## 2024-01-15 RX ADMIN — AMLODIPINE BESYLATE 7.5 MG: 5 TABLET ORAL at 07:51

## 2024-01-15 RX ADMIN — PREGABALIN 50 MG: 50 CAPSULE ORAL at 07:51

## 2024-01-15 RX ADMIN — ONDANSETRON 8 MG: 2 INJECTION INTRAMUSCULAR; INTRAVENOUS at 05:05

## 2024-01-15 ASSESSMENT — ACTIVITIES OF DAILY LIVING (ADL)
ADLS_ACUITY_SCORE: 20
DEPENDENT_IADLS:: INDEPENDENT
ADLS_ACUITY_SCORE: 20

## 2024-01-15 NOTE — PLAN OF CARE
15:00-23:30    Goal Outcome Evaluation:    Plan of Care Reviewed With: patient    Overall Patient Progress: no change    Status:  admitted on 1/10  for Doxil/Ifosfamide      -denied pain   -tolerated Regular diet well, C/O slight nausea, on scheduled IV Zofran  -up ad ghassan  -Temp: 98.9  F (37.2  C) Temp src: Oral BP: (!) 146/83 Pulse: 63   Resp: 17 SpO2: 99 % O2 Device: None (Room air)    -voiding good amount and pt writes down output on the white board; urine color yellow/straw  -had a BM today  -tolerating Day # 4 Ifosfamide/mesna well via PICC, positive blood return     Continue with POC

## 2024-01-15 NOTE — PLAN OF CARE
Problem: Care Coordination Assessment  Goal: Care Coordinator Assessment  Description: -Safe discharge placement   Goal Outcome Evaluation:  Outcome: Progressing

## 2024-01-15 NOTE — PROGRESS NOTES
Ifosfamide Toxicity Assessment       Patient is Alert & Oriented x 4. There are No signs of lethargy, confusion or hallucinations.      Patient is having new incontinence of bowel or bladder No.                   Pincer Grasp intact Yes      Tremors No     Provider was notified No of changes 0 . Interventions include 0.      Monitor for s/sx of ifosfamide toxicity: hallucinations, AMS, emotional lability, somnolence, myoclonic jerks, tremors, coordination difficulties, etc. If these occur, please call the fellow on call for recommendations

## 2024-01-15 NOTE — PROGRESS NOTES
Ifosfamide Toxicity Assessment      Patient is Alert & Oriented x4. There are No signs of lethargy, confusion or hallucinations.     Patient is having new incontinence of bowel or bladder No.       Pincer Grasp intact yes    Tremors none     Provider was notified  No of changes n/a .     -- Monitor for s/sx of ifosfamide toxicity: hallucinations, AMS, emotional lability, somnolence, myoclonic jerks, tremors, coordination difficulties, etc. If these occur, please call the fellow on call for recommendations

## 2024-01-15 NOTE — PLAN OF CARE
[id number]    A&Ox4. Hypertensive within parameters, OVSS on RA. Denies pain, nausea, and SOB. CIVI Ifosfamide infusing, pt tolerating well. No s/s of Ifos toxicity present. Continuous fluids infusing 125 mL/hr. Good appetite. Voiding with adequate urine output. Up independently. Continue with plan of care.

## 2024-01-15 NOTE — CONSULTS
Care Management Initial Consult    General Information  Assessment completed with: Patient, Mirta & her , Juanito  Type of CM/SW Visit: Initial Assessment    Primary Care Provider verified and updated as needed: Yes   Readmission within the last 30 days: no previous admission in last 30 days   Reason for Consult: discharge planning and elevated risk score  Advance Care Planning: Advance Care Planning Reviewed: no concerns identified  Patient states she has a HCD at home that she is working on completing.  No further information requested.       Communication Assessment  Patient's communication style: spoken language (English or Bilingual)    Hearing Difficulty or Deaf: no   Wear Glasses or Blind: yes    Cognitive  Cognitive/Neuro/Behavioral: WDL                      Living Environment:   People in home: spouse     Current living Arrangements: house      Able to return to prior arrangements: yes       Family/Social Support:  Care provided by: self, spouse/significant other  Provides care for: no one  Marital Status:     Description of Support System: , Juanito, Children (3 adult sons); Supportive, Involved    Support Assessment: Adequate family and caregiver support, Adequate social supports    Current Resources:   Patient receiving home care services: No     Community Resources: has gotten labs & PRN transfusions at [facility] previously  Equipment currently used at home: none  Supplies currently used at home: None    Employment/Financial:  Employment Status:  (not discussed)        Financial Concerns: none   Referral to Financial Worker: No       Does the patient's insurance plan have a 3 day qualifying hospital stay waiver?  No    Lifestyle & Psychosocial Needs:  Social Determinants of Health     Food Insecurity: Low Risk  (12/22/2023)    Food Insecurity     Within the past 12 months, did you worry that your food would run out before you got money to buy more?: No     Within the  past 12 months, did the food you bought just not last and you didn t have money to get more?: No   Depression: Not at risk (9/26/2023)    PHQ-2     PHQ-2 Score: 0   Housing Stability: Low Risk  (12/22/2023)    Housing Stability     Do you have housing? : Yes     Are you worried about losing your housing?: No   Tobacco Use: Low Risk  (1/10/2024)    Patient History     Smoking Tobacco Use: Never     Smokeless Tobacco Use: Never     Passive Exposure: Never   Financial Resource Strain: Low Risk  (12/22/2023)    Financial Resource Strain     Within the past 12 months, have you or your family members you live with been unable to get utilities (heat, electricity) when it was really needed?: No   Alcohol Use: Not on file   Transportation Needs: Low Risk  (12/22/2023)    Transportation Needs     Within the past 12 months, has lack of transportation kept you from medical appointments, getting your medicines, non-medical meetings or appointments, work, or from getting things that you need?: No   Physical Activity: Not on file   Interpersonal Safety: Low Risk  (10/26/2023)    Interpersonal Safety     Do you feel physically and emotionally safe where you currently live?: Yes     Within the past 12 months, have you been hit, slapped, kicked or otherwise physically hurt by someone?: No     Within the past 12 months, have you been humiliated or emotionally abused in other ways by your partner or ex-partner?: No   Stress: Not on file   Social Connections: Not on file       Functional Status:  Prior to admission patient needed assistance:   Dependent ADLs:: Independent  Dependent IADLs:: Independent ( helps as needed)       Mental Health Status:  Mental Health Status: No Current Concerns       Chemical Dependency Status:  Chemical Dependency Status: No Current Concerns             Values/Beliefs:  Spiritual, Cultural Beliefs, Yazidi Practices, Values that affect care: no               Additional Information:  Per H&P:   "\"Mirta Cardenas is a 68 year old female who presents with past medical history of HTN, HLD, GERD, constipation, CLL (on surveillance) and 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 Currently being admitted for Doxil/Ifosfamide (C4D1=1/10/24).\"    Initial assessment completed at bedside due to high risk readmission score. This patient has been here before for chemotherapy, but has not had an initial care management assessment.  See details above.  Care management will follow for any discharge needs.     Writer checked with Sabrina Veloz/Onc PA, to check if patient has any needs writer should set up.  Per RAYSHAWN Gamboa, no needs for RNCC to set up.  Heme/onc team set up follow up appts at [facility] already.       No anticipated discharge needs for RNCC to set up.  RNCC will continue to follow as needed.       Sybil Daniels, RN, BSN  RN Care Coordinator    5A Medical Oncology/5C non-BMT  5A beds [id number]  5C beds [id number] (non-BMT)  [address] [address]  [email or web address]  Gender pronouns: she/her  Pager: [phone]      "

## 2024-01-15 NOTE — PROGRESS NOTES
Ifosfamide Toxicity Assessment      Patient is Alert & Oriented x4. There are signs of lethargy, confusion or hallucinations: No    Patient is having new incontinence of bowel or bladder: No     Pincer Grasp intact: Yes    Tremors present: No     Provider was notified: No, no interventions required at this time     Will continue to monitor for s/sx of ifosfamide toxicity: hallucinations, AMS, emotional lability, somnolence, myoclonic jerks, tremors, coordination difficulties. If these occur, please call the APPs/Attending on days and fellow on-call for evening and nights for recommendations and further instruction.

## 2024-01-15 NOTE — PROGRESS NOTES
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     Today:  - Day 6 from Cycle 4 of doxil/ifosfamide/carter; continue to monitor for chemo related side effects  - Hematuria resolved, continue to monitor closely.  - Nausea well controlled on current regimen, continue current antiemetics.  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 Nam. Patient noted intermittent right calf pain that began in March 2023. Xray noted a destructive lesion in the midportion of the right fibula. MRI 9/28/23 with 3.3 x5.2 x 9.3 cm soft tissue mass in proximal/mid calf. Bony invasion into the fibular shaft with cortical disruption. Biopsy of soft tissue mass in October 2023 confirmed diagnosis of high-grade pleomorphic sarcoma. PET/CT 10/10/23 without evidence of metastatic disease but did have an enlarged R iliac chain LN, thought to be reactive. She initiated treatment with Doxil/Ifosfamide C1D1=10/17/23 overall tolerated well with some electrolyte disturbances. M6I1=3111/14/23; which was complicated with neurotoxicity in the form of intermittent involuntary jerking and twitching. Restaging PET/CT 12/5/23 shows a partial response to therapy with decreasing size and FDG avidity of the right calf mass. J7Z2=8012/13/23 overall tolerated well. Patient feeling  well and stable upon admission, she elects to proceed with cycle 4 doxil/ifosfamide today.  - PICC placed PTA; anticipate removing at discharge   - RN to perform Q8H neurotoxicity assessments and notify covering provider of any changes/new symptoms  - Restaging PET/CT scheduled 1/29/24, follow-up with Dr. Jimenez 1/31/24, and Rad/Onc visit 2/1/24                  Treatment Plan: Doxil/Ifosfamide/Mesna (C4D1=1/10/24)               - Doxorubicin liposome (Doxil) 40 mg/m2 (73 mg) IV -- D1                - Ifosfamide/Mesna 1600 mg/m2 (2,910 mg) IV -- D1-5                - Mesna 1600 mg/m2 (2,910 mg) IV over 18h -- D6                - Premeds: Thiamine, Emend, Zofran, Dexamethasone     # H/o CLL   Previously followed with Dr. Acosta. CLL has been stable. Not on current treatment.   - No inpatient needs at this time.   - Scheduled for follow-up with Dr. Duckworth in 04/2024    # Pancytopenia  2/2 recent chemotherapy. Has previously required intermittent transfusion support.  - Daily CBC monitoring  - Transfuse to keep Hgb >7, plt >7  - Blood transfusion consent on file    # Cancer-related pain  Related to patients known R calf sarcoma. Has improved with chemotherapy and pain management as below.  - Continue PTA Oxycodone 5-10 mg q6h PRN   - Continue PTA Lyrica 50 mg TID     # Chemotherapy-induced nausea  Reported 1/13 AM. No vomiting, associated abdominal pain/distension, or other red flag symptoms. Presumably due to chemotherapy (ifosfamide).  - Change Zofran to 8 mg IV Q8H scheduled  - Increase compazine to 5-10 mg Q6H PRN  - Ativan 0.5-1 mg Q6H PRN  - Consider re-dosing Emend if needed; nausea well controlled as of x1/15  - Could also consider addition of Zyprexa if persistent (would recheck QTc prior)     CV  # Bilateral LE edema  # HTN   Patient reports poorly controlled BP at home prior to admission. Previously on chlorthalidone, which was discontinued due electrolyte derangements. Recently advised to increase  amlodipine dose, but subsequently noticed bothersome LE edema. Started on hydrochlorothiazide 25 mg daily x1/10/24 with improvement in pressures as well as edema.  - Continue PTA amlodipine; increased to 7.5 mg daily x1/14/24 following shared decision-making discussion. Patient reports she believes she tolerated this higher dose fine, but it was reduced due to concerns for LE edema. She is amenable to re-trialing now. She was also offered observation, but preferred to intervene while in the hospital to see how she tolerates the changes prior to discharge.  - Continue PTA losartan 100 mg daily  - Continue hydrochlorothiazide 25 mg daily (x1/10/24); increase to 50 mg daily x1/15  - Continue to monitor blood pressures closely     # HLD   - Hold PTA pravastatin while inpatient, okay to resume on discharge.     GI/  # GERD   - PTA omeprazole rotated to pantoprazole 40 mg while admitted (therapeutic interchange).     # Opioid-induced constipation  # Loose stool  Patient reports experiencing severe constipation with previous cycles of chemotherapy, especially when she takes oxy for pain. She takes daily Psyllium and Colace, will continue this admission. Can increase bowel regimen depending on BM frequency. She does report 3-4 loose stools per day for three days last week which she believes is 2/2 PO magnesium supplement she has been taking.  - Continue to monitor  - Reporting daily normal BMs    # Microscopic hematuria, resolved  Noted 1/13. RN reported pinkish color to urine. UA obtained and notable for clear color, 27 RBC, no WBC, -LE/-nitrites. Patient denies dysuria, frequency, urgency, flank pain, or other s/sx of concern. Hemorrhagic cystitis is a side effect of ifosfamide, though less likely as patient is receiving concurrent Mesna and has tolerated this regimen/dose in the past.  - Continue to monitor closely  - Consider further work-up if hematuria persists or becomes gross     DERM   # Psoriasis  - PTA topicals  "on hold while in the hospital      FEN:  - IVF per chemo protocol   - PRN lyte replacement  - RDAT     Prophy/Misc:  - VTE: PPx Lovenox; hold if plts <50K   - GI/PUD: PTA PPI   - Bowels: PTA Psyllium and Colace    Clinically Significant Risk Factors              # Hypoalbuminemia: Lowest albumin = 3.3 g/dL at 1/13/2024  4:56 AM, will monitor as appropriate   # Thrombocytopenia: Lowest platelets = 114 in last 2 days, will monitor for bleeding   # Hypertension: Noted on problem list        # Obesity: Estimated body mass index is 32.2 kg/m  as calculated from the following:    Height as of this encounter: 1.549 m (5' 1\").    Weight as of this encounter: 77.3 kg (170 lb 6.4 oz).      # Asthma: noted on problem list      Code Status: FULL  Disposition: Inpatient admission for scheduled chemotherapy and careful monitoring; tentatively anticipate discharge to home on 1/16/24, pending tolerance of the chemotherapy regimen.  Follow up: [facility] post-hospital discharge follow up requested for sometime 1/19-1/22; awaiting scheduling  - 2-3x weekly labs/transfusions - starting 1/17 in [place], [place]  - 1/17 Neulasta injection  - 1/29 repeat PET/CT  - 1/31 follow-up with Dr. Jimenez  - 2/1 Rad/Onc    Staffed with Dr. Fuentes.    Bee Sanon PA-C  Hematology/Oncology  Pager #[phone]    I spent 45 minutes in the care of this patient today, which included time necessary for review of interval events, obtaining history and physical exam, ordering medication(s)/test(s) as medically indicated, discussion with interdisciplinary/consult team(s), and documentation time. Over 50% of time was spent face-to-face and/or coordinating care.     Interval History   Chart reviewed, no acute overnight events. Mirta is overall feeling well today.  Nausea continues to be well-controlled on current regimen.  She has had no further episodes of hematuria.  Eating well and has been getting up to walk around as able.  Continues to have high blood pressure readings, " remains asymptomatic denies headache, dizziness, chest pain, shortness of breath.  We discussed increased hydrochlorothiazide dosing with continued amlodipine and losartan at current dosing.  She is agreeable with this plan.  Otherwise feeling well denies fevers, chills, abdominal pain, bowel or bladder changes.  We reviewed plan for chemotherapy with anticipated discharge home tomorrow if all goes well.  She expressed understanding and agreement with this plan.  No questions at this time.    A comprehensive review of symptoms was performed and was negative except as detailed in the interval history above.    Physical Exam   Vital Signs with Ranges  Temp:  [97.5  F (36.4  C)-99.1  F (37.3  C)] 97.5  F (36.4  C)  Pulse:  [63-91] 82  Resp:  [16-18] 16  BP: (146-170)/(78-98) 168/88  SpO2:  [98 %-100 %] 99 %    I/O last 3 completed shifts:  In: 5073.4 [P.O.:2315; I.V.:2000; IV Piggyback:758.4]  Out: 8300 [Urine:8300]    Vitals:    01/13/24 0731 01/14/24 0810 01/15/24 0754   Weight: 79.8 kg (175 lb 14.4 oz) 78.3 kg (172 lb 11.2 oz) 77.3 kg (170 lb 6.4 oz)     General: Pleasant female sitting up on side of hospital bed in NAD, non-toxic appearing, interactive and pleasant.  HEENT: NCAT w/ no obvious deformities, EOMI, anicteric sclera, moist mucous membranes on limited exam.  Cardiac: RRR, no apparent murmur.  Respiratory: Breathing comfortably on room air, lungs clear to auscultation bilaterally.  GI: Abdomen is soft, non-tender, and non-distended. Bowel sounds present throughout.  : No CVA tenderness bilaterally.  Skin: Warm, dry, no rashes or lesions on limited exam.  Musculoskeletal: Palpable mass to right anterior lower leg without overlying erythema or induration.  Psych: Calm, normal mood and affect.  Neuro: Normal speech, A&Ox4. Moves all extremities spontaneously. No tremors or myoclonus. Normal gait when seen ambulating about the room. FNF and rapid alternating movements intact.  Vascular access: PICC on LUE  CDI, non-tender, no surrounding erythema.    Medications    D5W 1,000 mL with potassium chloride 20 mEq/L, sodium bicarbonate 100 mEq/L infusion 125 mL/hr at 01/15/24 1212    - MEDICATION INSTRUCTIONS -          amLODIPine  7.5 mg Oral Daily    Chemotherapy Infusing-Continuous Infusion   Does not apply Q8H    [START ON 1/17/2024] dextrose 5% water  10-20 mL Intracatheter Daily at 8 pm    [START ON 1/17/2024] dextrose 5% water  10-20 mL Intracatheter Daily at 8 pm    docusate sodium  100 mg Oral BID    enoxaparin ANTICOAGULANT  40 mg Subcutaneous Q24H    [START ON 1/17/2024] filgrastim-aafi  5 mcg/kg (Order-Specific) Intravenous Daily at 8 pm    heparin lock flush  5-20 mL Intracatheter Q24H    [START ON 1/16/2024] hydrochlorothiazide  50 mg Oral Daily    Ifosfamide (IFEX) 2,910 mg, mesna (MESNEX) 2,910 mg in sodium chloride 0.9 % 1,137.3 mL infusion  1,600 mg/m2 (Order-Specific) Intravenous Q24H    losartan  100 mg Oral Daily    mesna (MESNEX) 2,910 mg in sodium chloride 0.9 % 1,079.1 mL infusion  1,600 mg/m2 (Order-Specific) Intravenous Once    ondansetron  8 mg Intravenous Q8H MONICA    pantoprazole  40 mg Oral QAM    pregabalin  50 mg Oral TID    psyllium  1 packet Oral Daily    thiamine  100 mg Oral TID       Antiinfectives  Anti-infectives (From now, onward)      None            Data   CBC   Recent Labs   Lab 01/15/24  0633 01/14/24  0507 01/13/24  0456 01/12/24  0430   WBC 3.7* 3.7* 3.8* 7.5   RBC 2.95* 2.74* 2.60* 2.58*   HGB 8.6* 8.3* 7.7* 7.7*   HCT 25.5* 24.4* 23.1* 22.7*   MCV 86 89 89 88   MCH 29.2 30.3 29.6 29.8   MCHC 33.7 34.0 33.3 33.9   RDW 17.1* 17.8* 18.6* 18.6*    114* 112* 118*       CMP   Recent Labs   Lab 01/15/24  0503 01/14/24  0507 01/13/24  0456 01/12/24  0430    139 142 142   POTASSIUM 4.1 4.4 4.3 4.3   CHLORIDE 106 105 105 105   CO2 26 27 32* 31*   ANIONGAP 7 7 5* 6*   * 101* 87 108*   BUN 15.1 13.8 11.8 16.7   CR 1.03* 0.97* 0.86 0.92   GFRESTIMATED 60* 64 74 68   MONIK  8.8 8.7* 8.3* 8.7*   MAG 1.8 1.7 1.7 1.8   PHOS 3.3 3.6 3.9 3.2   PROTTOTAL 5.7* 5.5* 5.1* 5.5*   ALBUMIN 3.6 3.5 3.3* 3.6   BILITOTAL 0.2 0.2 0.2 <0.2   ALKPHOS 89 85 78 81   AST 17 16 14 14   ALT 14 7 12 13       LFTs   Recent Labs   Lab 01/15/24  0503   PROTTOTAL 5.7*   ALBUMIN 3.6   BILITOTAL 0.2   ALKPHOS 89   AST 17   ALT 14       Coagulation Studies   Recent Labs   Lab 01/15/24  0503   INR 1.10

## 2024-01-15 NOTE — PLAN OF CARE
"Goal Outcome Evaluation:    Time    BP (!) 168/86 (BP Location: Right arm)   Pulse 84   Temp 98.9  F (37.2  C) (Oral)   Resp 16   Ht 1.549 m (5' 1\")   Wt 78.3 kg (172 lb 11.2 oz)   SpO2 98%   BMI 32.63 kg/m      Reason for admission: Doxil/Ifosfamide/Mesna   Activity: UAL  Pain: denies  Neuro: Alert and oriented. IFOS assessment Intact. Denies Dizziness  Cardiac: elevated BP. Provider paged,   Respiratory: Denies Sob, no distress observed, ON RA  GI/: no bm reported, voids spontaneously. Pt is able to keep track of output on White board  Diet: regular  Lines: PICC, cdi, infusing Ifos and Bicarb  Wounds: none observed  Labs/imaging: Please review lab in the chart      New changes this shift:     Plan:       Continue to monitor and follow POC    "

## 2024-01-15 NOTE — PROVIDER NOTIFICATION
Provider Jersey Ching MD paged    Pt BP is 167/90, denies Symptoms.    Thanks   Ronni Fermin RN on 1/15/2024 at 4:41 AM

## 2024-01-16 VITALS
BODY MASS INDEX: 32.02 KG/M2 | HEIGHT: 61 IN | SYSTOLIC BLOOD PRESSURE: 141 MMHG | OXYGEN SATURATION: 98 % | TEMPERATURE: 99.1 F | DIASTOLIC BLOOD PRESSURE: 84 MMHG | WEIGHT: 169.6 LBS | RESPIRATION RATE: 16 BRPM | HEART RATE: 86 BPM

## 2024-01-16 LAB
ALBUMIN SERPL BCG-MCNC: 3.6 G/DL (ref 3.5–5.2)
ALP SERPL-CCNC: 87 U/L (ref 40–150)
ALT SERPL W P-5'-P-CCNC: 20 U/L (ref 0–50)
ANION GAP SERPL CALCULATED.3IONS-SCNC: 8 MMOL/L (ref 7–15)
AST SERPL W P-5'-P-CCNC: 21 U/L (ref 0–45)
BASOPHILS # BLD AUTO: 0 10E3/UL (ref 0–0.2)
BASOPHILS NFR BLD AUTO: 1 %
BILIRUB SERPL-MCNC: 0.2 MG/DL
BUN SERPL-MCNC: 18.8 MG/DL (ref 8–23)
CALCIUM SERPL-MCNC: 8.9 MG/DL (ref 8.8–10.2)
CHLORIDE SERPL-SCNC: 107 MMOL/L (ref 98–107)
CREAT SERPL-MCNC: 1.13 MG/DL (ref 0.51–0.95)
DEPRECATED HCO3 PLAS-SCNC: 25 MMOL/L (ref 22–29)
EGFRCR SERPLBLD CKD-EPI 2021: 53 ML/MIN/1.73M2
EOSINOPHIL # BLD AUTO: 0.2 10E3/UL (ref 0–0.7)
EOSINOPHIL NFR BLD AUTO: 7 %
ERYTHROCYTE [DISTWIDTH] IN BLOOD BY AUTOMATED COUNT: 16.7 % (ref 10–15)
FIBRINOGEN PPP-MCNC: 309 MG/DL (ref 170–490)
GLUCOSE SERPL-MCNC: 113 MG/DL (ref 70–99)
HCT VFR BLD AUTO: 22 % (ref 35–47)
HGB BLD-MCNC: 7.7 G/DL (ref 11.7–15.7)
IMM GRANULOCYTES # BLD: 0 10E3/UL
IMM GRANULOCYTES NFR BLD: 0 %
INR PPP: 1.08 (ref 0.85–1.15)
LYMPHOCYTES # BLD AUTO: 0.7 10E3/UL (ref 0.8–5.3)
LYMPHOCYTES NFR BLD AUTO: 20 %
MAGNESIUM SERPL-MCNC: 1.9 MG/DL (ref 1.7–2.3)
MCH RBC QN AUTO: 29.7 PG (ref 26.5–33)
MCHC RBC AUTO-ENTMCNC: 35 G/DL (ref 31.5–36.5)
MCV RBC AUTO: 85 FL (ref 78–100)
MONOCYTES # BLD AUTO: 0.2 10E3/UL (ref 0–1.3)
MONOCYTES NFR BLD AUTO: 6 %
NEUTROPHILS # BLD AUTO: 2.2 10E3/UL (ref 1.6–8.3)
NEUTROPHILS NFR BLD AUTO: 66 %
NRBC # BLD AUTO: 0 10E3/UL
NRBC BLD AUTO-RTO: 0 /100
PLATELET # BLD AUTO: 124 10E3/UL (ref 150–450)
POTASSIUM SERPL-SCNC: 3.9 MMOL/L (ref 3.4–5.3)
PROT SERPL-MCNC: 5.6 G/DL (ref 6.4–8.3)
RBC # BLD AUTO: 2.59 10E6/UL (ref 3.8–5.2)
SODIUM SERPL-SCNC: 140 MMOL/L (ref 135–145)
URATE SERPL-MCNC: 4.7 MG/DL (ref 2.4–5.7)
WBC # BLD AUTO: 3.3 10E3/UL (ref 4–11)

## 2024-01-16 PROCEDURE — 99239 HOSP IP/OBS DSCHRG MGMT >30: CPT | Mod: FS | Performed by: PHYSICIAN ASSISTANT

## 2024-01-16 PROCEDURE — 85384 FIBRINOGEN ACTIVITY: CPT | Performed by: PHYSICIAN ASSISTANT

## 2024-01-16 PROCEDURE — 85025 COMPLETE CBC W/AUTO DIFF WBC: CPT | Performed by: PHYSICIAN ASSISTANT

## 2024-01-16 PROCEDURE — 250N000011 HC RX IP 250 OP 636: Performed by: PHYSICIAN ASSISTANT

## 2024-01-16 PROCEDURE — 250N000013 HC RX MED GY IP 250 OP 250 PS 637: Performed by: PHYSICIAN ASSISTANT

## 2024-01-16 PROCEDURE — 85610 PROTHROMBIN TIME: CPT | Performed by: PHYSICIAN ASSISTANT

## 2024-01-16 PROCEDURE — 83735 ASSAY OF MAGNESIUM: CPT | Performed by: PHYSICIAN ASSISTANT

## 2024-01-16 PROCEDURE — 82040 ASSAY OF SERUM ALBUMIN: CPT | Performed by: PHYSICIAN ASSISTANT

## 2024-01-16 PROCEDURE — 250N000009 HC RX 250: Performed by: PHYSICIAN ASSISTANT

## 2024-01-16 PROCEDURE — 84550 ASSAY OF BLOOD/URIC ACID: CPT | Performed by: PHYSICIAN ASSISTANT

## 2024-01-16 PROCEDURE — 258N000003 HC RX IP 258 OP 636: Performed by: PHYSICIAN ASSISTANT

## 2024-01-16 RX ORDER — PSYLLIUM HUSK 3.4 G/5.8G
POWDER ORAL
COMMUNITY
Start: 2024-01-16 | End: 2024-01-19

## 2024-01-16 RX ORDER — HYDROCHLOROTHIAZIDE 50 MG/1
50 TABLET ORAL DAILY
Qty: 30 TABLET | Refills: 0 | Status: SHIPPED | OUTPATIENT
Start: 2024-01-17 | End: 2024-01-31

## 2024-01-16 RX ORDER — LEVOFLOXACIN 500 MG/1
500 TABLET, FILM COATED ORAL DAILY
Qty: 10 TABLET | Refills: 0 | Status: SHIPPED | OUTPATIENT
Start: 2024-01-16 | End: 2024-02-01

## 2024-01-16 RX ADMIN — ENOXAPARIN SODIUM 40 MG: 40 INJECTION SUBCUTANEOUS at 08:11

## 2024-01-16 RX ADMIN — DOCUSATE SODIUM 100 MG: 100 CAPSULE, LIQUID FILLED ORAL at 08:11

## 2024-01-16 RX ADMIN — HYDROCHLOROTHIAZIDE 50 MG: 25 TABLET ORAL at 08:11

## 2024-01-16 RX ADMIN — PANTOPRAZOLE SODIUM 40 MG: 40 TABLET, DELAYED RELEASE ORAL at 08:11

## 2024-01-16 RX ADMIN — THIAMINE HCL TAB 100 MG 100 MG: 100 TAB at 08:11

## 2024-01-16 RX ADMIN — ONDANSETRON 8 MG: 2 INJECTION INTRAMUSCULAR; INTRAVENOUS at 06:18

## 2024-01-16 RX ADMIN — POTASSIUM CHLORIDE: 2 INJECTION, SOLUTION, CONCENTRATE INTRAVENOUS at 06:05

## 2024-01-16 RX ADMIN — THIAMINE HCL TAB 100 MG 100 MG: 100 TAB at 13:05

## 2024-01-16 RX ADMIN — AMLODIPINE BESYLATE 7.5 MG: 5 TABLET ORAL at 08:11

## 2024-01-16 RX ADMIN — PREGABALIN 50 MG: 50 CAPSULE ORAL at 08:11

## 2024-01-16 ASSESSMENT — ACTIVITIES OF DAILY LIVING (ADL)
ADLS_ACUITY_SCORE: 20

## 2024-01-16 NOTE — DISCHARGE SUMMARY
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  Tolerated this cycle of chemotherapy well.  Noted to have 1 episode of asymptomatic hematuria during admission, which resolved without intervention.  Hypertension prior to and during this admission, with adjustments in blood pressure medication regimen as below.  Trends improving though remain elevated on day of discharge.  Patient encouraged to monitor blood pressure at home and follow-up closely in clinic for medication adjustments as needed.  Additionally noted rising creatinine on day of discharge to 1.13.  It is noted similar creatinine increase at end of previous cycle of chemotherapy.  She will have close labs and ISIDORO follow-up, recommend close monitoring as an outpatient. On the day of discharge, patient is feeling well at baseline health and looking forward to going home. She is seen ambulating with a steady gait. We reviewed plan for close follow up with Neulasta scheduled in clinic the day after discharge (1/17) as well as further appointments as detailed below.    Prior to discharge, I reviewed with Mirta the plan of care, including upcoming follow-up  appointments and new medications. Appropriate prescriptions were sent to the discharge pharmacy, as needed. We reviewed strict discharge precautions, including reasons to call clinic triage or present to the ED, and she voiced understanding. She was provided with the clinic triage number, as well as written discharge instructions, in their discharge paperwork. Patient had an opportunity to ask questions, all of which were answered to their stated satisfaction. On the day of discharge, patient was overall well-appearing, hemodynamically stable, and felt safe and comfortable with the plans for discharge to home with follow-up as described.    Outpatient follow-up issues:  - Hypertension: noted elevated trend prior to and during hospital admission. Continued on amlodipine, losartan as PTA and hydrochlorothiazide added. Please follow trends as outpatient and adjust medication regimen as appropriate  - Mildly elevated Cr to 1.13 on day of discharge. Previously seen Cr to 1.14 after cycle 3 of chemo. Suspect mild nephrotoxicity, please follow up on outpatient labs.     Discharge medications:  New/changed:  - Levofloxacin 500 mg daily x 10 days  - Hydrochlorothiazide 50 mg daily    Remainder of medications continued as PTA     Upcoming follow-up:  - Neulasta injection 1/17  - Labs/possible transfusions 1/17, 1/19, and scheduled 2x weekly thereafter  - ISIDORO follow up 1/19  - Repeat PET/CT 1/29  - Follow-up with Dr. Jimenez 1/31  - Follow up Rad/Onc, ortho 2/1    Hospital Course   Mirta Cardenas was admitted on 1/10/2024.  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 Nam. Patient noted intermittent right calf pain that began in March 2023. Xray noted a destructive lesion in the midportion of the right fibula. MRI 9/28/23 with 3.3 x5.2 x 9.3 cm soft tissue mass in proximal/mid calf. Bony invasion into the  fibular shaft with cortical disruption. Biopsy of soft tissue mass in October 2023 confirmed diagnosis of high-grade pleomorphic sarcoma. PET/CT 10/10/23 without evidence of metastatic disease but did have an enlarged R iliac chain LN, thought to be reactive. She initiated treatment with Doxil/Ifosfamide C1D1=10/17/23 overall tolerated well with some electrolyte disturbances. D4H1=6811/14/23; which was complicated with neurotoxicity in the form of intermittent involuntary jerking and twitching. Restaging PET/CT 12/5/23 shows a partial response to therapy with decreasing size and FDG avidity of the right calf mass. F3U6=8712/13/23 overall tolerated well. Patient feeling well and stable upon admission, she elects to proceed with cycle 4 doxil/ifosfamide today.  - PICC placed PTA and removed at time of discharge   - RN to perform Q8H neurotoxicity assessments and notify covering provider of any changes/new symptoms  - Restaging PET/CT scheduled 1/29/24, follow-up with Dr. Jimenez 1/31/24, and Rad/Onc visit 2/1/24                  Treatment Plan: Doxil/Ifosfamide/Mesna (C4D1=1/10/24)               - Doxorubicin liposome (Doxil) 40 mg/m2 (73 mg) IV -- D1                - Ifosfamide/Mesna 1600 mg/m2 (2,910 mg) IV -- D1-5                - Mesna 1600 mg/m2 (2,910 mg) IV over 18h -- D6                - Premeds: Thiamine, Emend, Zofran, Dexamethasone      # H/o CLL   Previously followed with Dr. Acosta. CLL has been stable. Not on current treatment.   - No inpatient needs at this time.   - Scheduled for follow-up with Dr. Duckworth in 04/2024     # Pancytopenia  2/2 recent chemotherapy. Has previously required intermittent transfusion support.  - Daily CBC monitoring  - Transfuse to keep Hgb >7, plt >7  - Blood transfusion consent on file     # Cancer-related pain  Related to patients known R calf sarcoma. Has improved with chemotherapy and pain management as below.  - Continue PTA Oxycodone 5-10 mg q6h PRN   - Continue PTA Lyrica  50 mg TID      # Chemotherapy-induced nausea  Reported 1/13 AM. No vomiting, associated abdominal pain/distension, or other red flag symptoms. Presumably due to chemotherapy (ifosfamide).  - Change Zofran to 8 mg IV Q8H scheduled  - Increase compazine to 5-10 mg Q6H PRN  - Ativan 0.5-1 mg Q6H PRN  - Consider re-dosing Emend if needed; nausea well controlled as of x1/15  - Could also consider addition of Zyprexa if persistent (would recheck QTc prior)    # Prophylaxis  - Discharged with levofloxacin 500 mg daily x 10 days     CV  # Bilateral LE edema, improved  # HTN   Patient reports poorly controlled BP at home prior to admission. Previously on chlorthalidone, which was discontinued due electrolyte derangements. Recently advised to increase amlodipine dose, but subsequently noticed bothersome LE edema.  During admission, amlodipine dose was decreased to 5 mg and hydrochlorothiazide 25 mg daily was initiated.  Improvement in lower extremity edema, though blood pressure trends remained elevated.  - Ultimately antihypertensive regimen this admission as follows: PTA losartan 100 mg daily, amlodipine 7.5 mg daily, added hydrochlorothiazide 50 mg daily  - Improvement in blood pressure trends, though remains elevated above normal limits.  Recommend close monitoring upon discharge and outpatient follow-up for medication regimen adjustments as fit.     # HLD   - Held PTA pravastatin while inpatient, okay to resume on discharge.     GI/  # GERD   - PTA omeprazole rotated to pantoprazole 40 mg while admitted (therapeutic interchange).     # Opioid-induced constipation  # Loose stool  Patient reports experiencing severe constipation with previous cycles of chemotherapy, especially when she takes oxy for pain. She takes daily Psyllium and Colace, will continue this admission. Can increase bowel regimen depending on BM frequency. She does report 3-4 loose stools per day for three days last week which she believes is 2/2 PO  "magnesium supplement she has been taking.  - Continue to monitor  - Reporting daily normal BMs     # Microscopic hematuria, resolved  Noted 1/13. RN reported pinkish color to urine. UA obtained and notable for clear color, 27 RBC, no WBC, -LE/-nitrites. Patient denies dysuria, frequency, urgency, flank pain, or other s/sx of concern. Hemorrhagic cystitis is a side effect of ifosfamide, though less likely as patient is receiving concurrent Mesna and has tolerated this regimen/dose in the past.  - Continue to monitor closely  - Consider further work-up if hematuria persists or becomes gross     # Mildly elevated creatinine  Baseline Cr 0.8-0.9. Noted increase to 1.14 on 12/20 (end of C3) subsequently improved without intervention.   - Cr 1.13 on 1/16/2024 - thought likely 2/2 chemo, will continue to closely monitor with outpatient labs and follow up    DERM   # Psoriasis  - PTA topicals on hold while in the hospital      FEN:  - IVF per chemo protocol   - PRN lyte replacement  - RDAT     Prophy/Misc:  - VTE: PPx Lovenox; hold if plts <50K   - GI/PUD: PTA PPI   - Bowels: PTA Psyllium and Colace     Clinically Significant Risk Factors              # Hypoalbuminemia: Lowest albumin = 3.3 g/dL at 1/13/2024  4:56 AM, will monitor as appropriate   # Thrombocytopenia: Lowest platelets = 124 in last 2 days, will monitor for bleeding    # Acute Kidney Injury, unspecified: based on a >150% or 0.3 mg/dL increase in last creatinine compared to past 90 day average, will monitor renal function  # Hypertension: Noted on problem list        # Obesity: Estimated body mass index is 32.05 kg/m  as calculated from the following:    Height as of this encounter: 1.549 m (5' 1\").    Weight as of this encounter: 76.9 kg (169 lb 9.6 oz).        # Financial/Environmental Concerns: none  # Asthma: noted on problem list        Patient was staffed with Dr. Gina Sanon PAMARIO  Hematology/Oncology  Page: #[phone]    I spent >65 minutes in " "the care of this patient today, which included time necessary for review of interval events, obtaining history and physical exam, ordering medication(s)/test(s) as medically indicated, discussion with interdisciplinary/consult team(s), and documentation time. Over 50% of time was spent face-to-face and/or coordinating care.      Code Status   Full Code    Primary Care Physician   Joseline Fraga    BP (!) 141/84 (BP Location: Right arm)   Pulse 86   Temp 99.1  F (37.3  C) (Oral)   Resp 16   Ht 1.549 m (5' 1\")   Wt 76.9 kg (169 lb 9.6 oz)   SpO2 98%   BMI 32.05 kg/m      General: Pleasant female sitting up on side of hospital bed in NAD, non-toxic appearing, interactive and pleasant.  HEENT: NCAT w/ no obvious deformities, EOMI, anicteric sclera, moist mucous membranes on limited exam.  Cardiac: RRR, no apparent murmur.  Respiratory: Breathing comfortably on room air, lungs clear to auscultation bilaterally.  GI: Abdomen is soft, non-tender, and non-distended. Bowel sounds present throughout.  : No CVA tenderness bilaterally.  Skin: Warm, dry, no rashes or lesions on limited exam.  Musculoskeletal: Palpable mass to right anterior lower leg without overlying erythema or induration.  Psych: Calm, normal mood and affect.  Neuro: Normal speech, A&Ox4. Moves all extremities spontaneously. No tremors or myoclonus. Normal gait when seen ambulating about the room. FNF and rapid alternating movements intact.  Vascular access: PICC on LUE CDI, non-tender, no surrounding erythema.        Time Spent on this Encounter   Bee LIAO PA-C, personally saw the patient today and spent greater than 30 minutes discharging this patient.    Discharge Disposition   Discharged to home  Condition at discharge: Stable    Consultations This Hospital Stay   CARE MANAGEMENT / SOCIAL WORK IP CONSULT    Discharge Orders      Primary Care - Care Coordination Referral      Reason for your hospital stay    You were in the hospital for " cycle 4 of chemotherapy.     Activity    Your activity upon discharge: activity as tolerated     Adult [facility] Follow-up and recommended labs and tests    Please check your [employer] account for the most up to date information on appointment scheduling  Your appointments will be at the [place] labs/infusion center and [facility] - [address] [place] [address]   - 1/17, 1/19, 1/22: labs/possible transfusions  - 1/17: Neulasta infusion  - 1/19: hospital discharge follow up appointment, with Michaela (virtual visit)  - PET, visit with Dr. Jimenez, radiation oncology, and orthopedics end of Jan/early Feb      Appointments on [place] and/or [facility] (with [facility] or [facility] provider or service). Call [phone] if you haven't heard regarding these appointments within 7 days of discharge.     When to contact your care team    Please call the [facility] at [phone] if you develop temperature above 100.4, shortness of breath, chest pain, headaches, vision changes, bleeding, uncontrolled nausea, vomiting, diarrhea, pain, or any other signs or symptoms of concern. If you are concerned that your symptoms are life-threatening, don't hesitate to call 911 or go to the nearest Emergency Room.     Discharge Instructions    Please take your medications as prescribed.     New  - Start hydrochlorothiazide 50 mg daily  - Start levofloxacin 500 mg daily x 10 days  - Continue the rest of your medications as prescribed    Check your blood pressure every 1-2 days and keep a log of the values. Have this available to discuss with Michaela at your appointment on Friday.     You may take tylenol (acetaminophen) 325 mg every 6-8 hours as needed for pain. Maximum dose of 4000 mg in a 24 hour period. Do not take ibuprofen (advil/motrin), aspirin, or any other NSAIDs (due to low blood counts)    Continue the rest of your medications as you were taking prior to admission.     It was nice being a part  of your hospital care team - best of luck to you and take care!    - Bee Sanon PA-C     Diet    Follow this diet upon discharge: Regular     Check Out Appointment Request    Please keep labs/transfusions appts as currently scheduled (3x weekly)  - Neulasta to be given 1/17 with currently scheduled appointment    Please schedule ISIDORO post-hospital follow up sometime 1/19-1/22     Discharge Medications   Current Discharge Medication List        START taking these medications    Details   hydrochlorothiazide (HYDRODIURIL) 50 MG tablet Take 1 tablet (50 mg) by mouth daily  Qty: 30 tablet, Refills: 0    Associated Diagnoses: Benign essential hypertension      levofloxacin (LEVAQUIN) 500 MG tablet Take 1 tablet (500 mg) by mouth daily  Qty: 10 tablet, Refills: 0    Associated Diagnoses: Sarcoma of right lower extremity (H)      Psyllium (METAMUCIL 4 IN 1 FIBER) 51.7 % PACK            CONTINUE these medications which have NOT CHANGED    Details   amLODIPine (NORVASC) 5 MG tablet Take 1.5 tablets (7.5 mg) by mouth daily  Qty: 135 tablet, Refills: 3    Associated Diagnoses: Benign essential hypertension      clobetasol (TEMOVATE) 0.05 % external cream Apply twice daily to psoriasis on Monday through Friday.  Qty: 60 g, Refills: 5    Associated Diagnoses: Psoriasis      docusate sodium (COLACE) 100 MG capsule Take 100 mg by mouth 2 times daily as needed      losartan (COZAAR) 100 MG tablet Take 1 tablet (100 mg) by mouth daily  Qty: 90 tablet, Refills: 3    Associated Diagnoses: Benign essential hypertension      magnesium oxide (MAG-OX) 400 MG tablet Take 1 tablet (400mg) by mouth 2 times daily for 2 days; then take 1 tablet once daily until directed otherwise by clinic.  Qty: 20 tablet, Refills: 0    Associated Diagnoses: Hypomagnesemia      Multiple Vitamins-Calcium (ONE-A-DAY WOMENS FORMULA PO) Take 1 tablet by mouth daily      omeprazole (PRILOSEC) 20 MG DR capsule Take 1 capsule by mouth every morning.      oxyCODONE  (ROXICODONE) 5 MG tablet Take 1-2 tablets (5-10 mg) by mouth as needed for severe pain  Qty: 60 tablet, Refills: 0    Associated Diagnoses: Sarcoma of right lower extremity (H)      pravastatin (PRAVACHOL) 40 MG tablet Take 1 tablet (40 mg) by mouth daily  Qty: 90 tablet, Refills: 3    Associated Diagnoses: Hyperlipidemia LDL goal <100      pregabalin (LYRICA) 50 MG capsule Take 1 capsule (50 mg) by mouth 3 times daily  Qty: 90 capsule, Refills: 0    Associated Diagnoses: Sarcoma of right lower extremity (H)      calcipotriene (DOVONOX) 0.005 % external cream Apply twice daily to areas of psoriasis on Saturday and Sunday.  Qty: 60 g, Refills: 6    Associated Diagnoses: Psoriasis      prochlorperazine (COMPAZINE) 5 MG tablet Take 1 tablet (5 mg) by mouth every 6 hours as needed (Breakthrough Nausea / Vomiting)  Qty: 30 tablet, Refills: 0    Associated Diagnoses: Sarcoma of right lower extremity (H)           STOP taking these medications       METAMUCIL FIBER PO Comments:   Reason for Stopping:             Allergies   Allergies   Allergen Reactions    Allopurinol Itching    Amoxicillin Hives    Codeine Itching    Cymbalta [Duloxetine Hcl] Fatigue    Periactin Other (See Comments)     Sleepy.    11/15/23: patient not sure about this allergy/intolerance - may be interested in removal    Tenormin [Atenolol] Fatigue     11/15/23: patient may be interested in removal of this from allergy list as it was only fatigue/sleepiness     Data   Most Recent 3 CBC's:  Recent Labs   Lab Test 01/16/24  0431 01/15/24  0633 01/14/24  0507   WBC 3.3* 3.7* 3.7*   HGB 7.7* 8.6* 8.3*   MCV 85 86 89   * 151 114*      Most Recent 3 BMP's:  Recent Labs   Lab Test 01/16/24  0431 01/15/24  0503 01/14/24  0507    139 139   POTASSIUM 3.9 4.1 4.4   CHLORIDE 107 106 105   CO2 25 26 27   BUN 18.8 15.1 13.8   CR 1.13* 1.03* 0.97*   ANIONGAP 8 7 7   MONIK 8.9 8.8 8.7*   * 108* 101*     Most Recent 2 LFT's:  Recent Labs   Lab Test  01/16/24  0431 01/15/24  0503   AST 21 17   ALT 20 14   ALKPHOS 87 89   BILITOTAL 0.2 0.2     Most Recent INR's and Anticoagulation Dosing History:  Anticoagulation Dose History  More data exists         Latest Ref Rng & Units 10/17/2020 1/11/2024 1/12/2024 1/13/2024 1/14/2024 1/15/2024 1/16/2024   Recent Dosing and Labs   INR 0.85 - 1.15 1.19  1.14  1.19  1.15  1.13  1.10  1.08      Most Recent 3 Troponin's:  Recent Labs   Lab Test 10/12/20  2239   TROPI <0.015     Most Recent Cholesterol Panel:  Recent Labs   Lab Test 04/24/23  0747   CHOL 169   LDL 88   HDL 46*   TRIG 173*     Most Recent 6 Bacteria Isolates From Any Culture (See EPIC Reports for Culture Details):  Recent Labs   Lab Test 02/24/20  1121   CULT <10,000 colonies/mL  mixed urogenital estuardo  Susceptibility testing not routinely done       Most Recent TSH, T4 and A1c Labs:  Recent Labs   Lab Test 04/24/23  0747   A1C 5.4     Results for orders placed or performed during the hospital encounter of 12/05/23   PET Oncology Whole Body    Narrative    EXAM: PET ONCOLOGY WHOLE BODY  LOCATION: [facility]  DATE: 12/5/2023    INDICATION: Subsequent treatment planning and restaging for malignant neoplasm of connective and soft tissue of right lower limb, including hip. High-grade pleomorphic sarcoma of the right calf. Interval chemotherapy.  COMPARISON: FDG PET/CT 10/10/2023.  CONTRAST: None.  TECHNIQUE: Serum glucose level 81 mg/dL. One hour post intravenous administration of 10.15 mCi F-18 FDG, PET imaging was performed from the skull vertex to feet, utilizing attenuation correction with concurrent axial CT and PET/CT image fusion. Dose   reduction techniques were used.    PET/CT FINDINGS: Decreased size and FDG avidity of a right calf soft tissue mass with SUV max of 7.1, previously 26.4. Mass is difficult to measure anatomically by noncontrast CT. Similar cortical destruction of the right fibular diaphysis. Mild   diffusely increased  marrow uptake is likely treatment related. No convincing evidence of FDG avid metastatic disease. Mildly enlarged bilateral iliac chain lymph nodes appear similar and do not demonstrate FDG avidity above that of blood pool, favored   reactive.    CT FINDINGS: Moderate coronary artery calcifications. Similar hepatosplenomegaly. Similar multifocal scarring and parenchymal atrophy of the right kidney with multiple nonobstructing renal calculi. Colonic diverticulosis. Large fat-containing lower   ventral abdominal wall hernia.      Impression    IMPRESSION:    Partial response to therapy with decreasing size and FDG avidity of the right calf mass. No evidence of new FDG avid disease.

## 2024-01-16 NOTE — PLAN OF CARE
Goal Outcome Evaluation:      Plan of Care Reviewed With: patient    Overall Patient Progress: improving    [id number]  Alert, oriented, up independently.  AVSS.  Mesna and MIVF infusing into PICC.  Denies pain. Tolerating regular diet.  Says she didn't feel good over the weekend, is now feeling better but not quite back to her normal.  Voiding adequate amounts.    PLAN: Discharge home tomorrow.

## 2024-01-16 NOTE — CARE PLAN
Ifosfamide Toxicity Assessment      Patient is Alert & Oriented x4. There are No signs of lethargy, confusion or hallucinations.     Patient is having new incontinence of bowel or bladder No.       Pincer Grasp intact Yes    Tremors No    Provider was notified No, N/A    -- Monitor for s/sx of ifosfamide toxicity: hallucinations, AMS, emotional lability, somnolence, myoclonic jerks, tremors, coordination difficulties, etc. If these occur, please call the fellow on call for recommendations

## 2024-01-16 NOTE — PLAN OF CARE
Goal Outcome Evaluation:    [id number]    AVSS.  Ifos neuro check intact, see other note.  Ifos infusion complete, mesna started.  Continues on specialized maintenance fluids.  Eating well, will continue to monitor.

## 2024-01-16 NOTE — PROGRESS NOTES
D: 1/10 Sarcoma of right lower extremity     I: Monitored vitals and assessed pt status.   Changed: Assumed care at 2300. BP systolic 140's. Afebrile. A&O*4 on RA. Denies pain or nausea. Right lower leg edema 1+. Independent to bathroom. Writes void amount on white board. Mesna and Bicarb drip running. Mesna to be completed around 11-12 today. No acute overnight events.   Running: Mesna 60 ml/hr  D5+20meq KCL and Bicarb @ 125 ml/hr  PRN: none    A: Nuero: A&O*4  Tele: none  Lung: RA  GI: Last BM 1/14, no nausea  : Independent to bathroom. Voiding adequately.  Skin: Right leg edema 1+  Pain: Denies.       Temp:  [97.4  F (36.3  C)-99.1  F (37.3  C)] 99.1  F (37.3  C)  Pulse:  [82-94] 86  Resp:  [16-20] 18  BP: (143-168)/(78-90) 162/88  SpO2:  [97 %-100 %] 100 %      P: Continue to monitor Pt status and report changes to treatment team. Potential discharge after Mesna completed.

## 2024-01-16 NOTE — PLAN OF CARE
Goal Outcome Evaluation:    Nursing Focus: Discharge    D: Patient discharged to home at 1330. Patient accompanied by .    I: Discharge prescriptions sent to discharge pharmacy to be filled. All discharge medications and instructions reviewed with Jessica. Patient instructed to call clinic triage nurse if Jessica experiences a fever >100.4, uncontrolled nausea, vomiting, diarrhea, or pain; or experiences any signs or symptoms of bleeding. Other phone numbers to call with questions or concerns after discharge reviewed. PICC removed. Education completed.    A: Jessica verbalized understanding of discharge medications and instructions. Patient will  medications at discharge pharmacy.     P: Patient to follow-up in clinic on Wednesday with lab.

## 2024-01-16 NOTE — CARE PLAN
Ifosfamide Toxicity Assessment      Patient is Alert & Oriented x4. There are No signs of lethargy, confusion or hallucinations.     Patient is having new incontinence of bowel or bladder No.       Pincer Grasp intact Yes    Tremors None    Provider was notified No, N/A    -- Monitor for s/sx of ifosfamide toxicity: hallucinations, AMS, emotional lability, somnolence, myoclonic jerks, tremors, coordination difficulties, etc. If these occur, please call the fellow on call for recommendations

## 2024-01-16 NOTE — PROGRESS NOTES
"Care Management Discharge Note    Discharge Date: 01/16/2024       Discharge Disposition: Home with family    Discharge Services: None    Discharge DME: None    Discharge Transportation: family or friend will provide    Private pay costs discussed: Not applicable    Does the patient's insurance plan have a 3 day qualifying hospital stay waiver?  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 Currently being admitted for Doxil/Ifosfamide (C4D1=1/10/24).\"    Patient discharged home with no RNCC discharge needs.        Sybil Daniels, RN, BSN  RN Care Coordinator    5A Medical Oncology/5C non-BMT  5A beds [id number]   beds [id number] (non-BMT)  [place] [address]  [email or web address]  Gender pronouns: she/her  Pager: [phone]      "

## 2024-01-17 ENCOUNTER — PATIENT OUTREACH (OUTPATIENT)
Dept: CARE COORDINATION | Facility: CLINIC | Age: 69
End: 2024-01-17
Payer: COMMERCIAL

## 2024-01-17 ENCOUNTER — LAB (OUTPATIENT)
Dept: LAB | Facility: CLINIC | Age: 69
End: 2024-01-17
Payer: COMMERCIAL

## 2024-01-17 ENCOUNTER — INFUSION THERAPY VISIT (OUTPATIENT)
Dept: INFUSION THERAPY | Facility: CLINIC | Age: 69
End: 2024-01-17
Attending: STUDENT IN AN ORGANIZED HEALTH CARE EDUCATION/TRAINING PROGRAM
Payer: COMMERCIAL

## 2024-01-17 VITALS — HEART RATE: 96 BPM | TEMPERATURE: 98.8 F | DIASTOLIC BLOOD PRESSURE: 78 MMHG | SYSTOLIC BLOOD PRESSURE: 147 MMHG

## 2024-01-17 DIAGNOSIS — Z76.89 PREVENTION OF CHEMOTHERAPY-INDUCED NEUTROPENIA: ICD-10-CM

## 2024-01-17 DIAGNOSIS — T45.1X5A CINV (CHEMOTHERAPY-INDUCED NAUSEA AND VOMITING): ICD-10-CM

## 2024-01-17 DIAGNOSIS — C49.21 SARCOMA OF RIGHT LOWER EXTREMITY (H): Primary | ICD-10-CM

## 2024-01-17 DIAGNOSIS — R11.2 CINV (CHEMOTHERAPY-INDUCED NAUSEA AND VOMITING): ICD-10-CM

## 2024-01-17 DIAGNOSIS — C49.21 SARCOMA OF RIGHT LOWER EXTREMITY (H): ICD-10-CM

## 2024-01-17 DIAGNOSIS — D64.81 ANEMIA DUE TO ANTINEOPLASTIC CHEMOTHERAPY: ICD-10-CM

## 2024-01-17 DIAGNOSIS — T45.1X5A ANEMIA DUE TO ANTINEOPLASTIC CHEMOTHERAPY: ICD-10-CM

## 2024-01-17 LAB
ALBUMIN SERPL BCG-MCNC: 4.2 G/DL (ref 3.5–5.2)
ALP SERPL-CCNC: 105 U/L (ref 40–150)
ALT SERPL W P-5'-P-CCNC: 30 U/L (ref 0–50)
ANION GAP SERPL CALCULATED.3IONS-SCNC: 10 MMOL/L (ref 7–15)
AST SERPL W P-5'-P-CCNC: 28 U/L (ref 0–45)
BASOPHILS # BLD AUTO: ABNORMAL 10*3/UL
BASOPHILS # BLD MANUAL: 0 10E3/UL (ref 0–0.2)
BASOPHILS NFR BLD AUTO: ABNORMAL %
BASOPHILS NFR BLD MANUAL: 1 %
BILIRUB SERPL-MCNC: 0.2 MG/DL
BUN SERPL-MCNC: 27.3 MG/DL (ref 8–23)
CALCIUM SERPL-MCNC: 9.7 MG/DL (ref 8.8–10.2)
CHLORIDE SERPL-SCNC: 105 MMOL/L (ref 98–107)
CREAT SERPL-MCNC: 1.4 MG/DL (ref 0.51–0.95)
DEPRECATED HCO3 PLAS-SCNC: 24 MMOL/L (ref 22–29)
EGFRCR SERPLBLD CKD-EPI 2021: 41 ML/MIN/1.73M2
EOSINOPHIL # BLD AUTO: ABNORMAL 10*3/UL
EOSINOPHIL # BLD MANUAL: 0.2 10E3/UL (ref 0–0.7)
EOSINOPHIL NFR BLD AUTO: ABNORMAL %
EOSINOPHIL NFR BLD MANUAL: 5 %
ERYTHROCYTE [DISTWIDTH] IN BLOOD BY AUTOMATED COUNT: 15.8 % (ref 10–15)
GLUCOSE SERPL-MCNC: 99 MG/DL (ref 70–99)
HCT VFR BLD AUTO: 24.1 % (ref 35–47)
HGB BLD-MCNC: 8.4 G/DL (ref 11.7–15.7)
IMM GRANULOCYTES # BLD: ABNORMAL 10*3/UL
IMM GRANULOCYTES NFR BLD: ABNORMAL %
LYMPHOCYTES # BLD AUTO: ABNORMAL 10*3/UL
LYMPHOCYTES # BLD MANUAL: 0.9 10E3/UL (ref 0.8–5.3)
LYMPHOCYTES NFR BLD AUTO: ABNORMAL %
LYMPHOCYTES NFR BLD MANUAL: 21 %
MAGNESIUM SERPL-MCNC: 1.9 MG/DL (ref 1.7–2.3)
MCH RBC QN AUTO: 30.4 PG (ref 26.5–33)
MCHC RBC AUTO-ENTMCNC: 34.9 G/DL (ref 31.5–36.5)
MCV RBC AUTO: 87 FL (ref 78–100)
MONOCYTES # BLD AUTO: ABNORMAL 10*3/UL
MONOCYTES # BLD MANUAL: 0.1 10E3/UL (ref 0–1.3)
MONOCYTES NFR BLD AUTO: ABNORMAL %
MONOCYTES NFR BLD MANUAL: 3 %
NEUTROPHILS # BLD AUTO: ABNORMAL 10*3/UL
NEUTROPHILS # BLD MANUAL: 2.9 10E3/UL (ref 1.6–8.3)
NEUTROPHILS NFR BLD AUTO: ABNORMAL %
NEUTROPHILS NFR BLD MANUAL: 70 %
NRBC # BLD AUTO: 0 10E3/UL
NRBC BLD AUTO-RTO: 0 /100
PHOSPHATE SERPL-MCNC: 3.6 MG/DL (ref 2.5–4.5)
PLAT MORPH BLD: NORMAL
PLATELET # BLD AUTO: 149 10E3/UL (ref 150–450)
POTASSIUM SERPL-SCNC: 3.6 MMOL/L (ref 3.4–5.3)
PROT SERPL-MCNC: 6.6 G/DL (ref 6.4–8.3)
RBC # BLD AUTO: 2.76 10E6/UL (ref 3.8–5.2)
RBC MORPH BLD: NORMAL
SODIUM SERPL-SCNC: 139 MMOL/L (ref 135–145)
WBC # BLD AUTO: 4.1 10E3/UL (ref 4–11)

## 2024-01-17 PROCEDURE — 84100 ASSAY OF PHOSPHORUS: CPT

## 2024-01-17 PROCEDURE — 85007 BL SMEAR W/DIFF WBC COUNT: CPT

## 2024-01-17 PROCEDURE — 83735 ASSAY OF MAGNESIUM: CPT

## 2024-01-17 PROCEDURE — 80053 COMPREHEN METABOLIC PANEL: CPT

## 2024-01-17 PROCEDURE — 36415 COLL VENOUS BLD VENIPUNCTURE: CPT

## 2024-01-17 PROCEDURE — 85014 HEMATOCRIT: CPT

## 2024-01-17 PROCEDURE — 96372 THER/PROPH/DIAG INJ SC/IM: CPT | Performed by: PHYSICIAN ASSISTANT

## 2024-01-17 PROCEDURE — 250N000011 HC RX IP 250 OP 636: Mod: JZ | Performed by: PHYSICIAN ASSISTANT

## 2024-01-17 RX ORDER — ALBUTEROL SULFATE 0.83 MG/ML
2.5 SOLUTION RESPIRATORY (INHALATION)
Status: CANCELLED | OUTPATIENT
Start: 2024-01-17

## 2024-01-17 RX ORDER — DIPHENHYDRAMINE HYDROCHLORIDE 50 MG/ML
50 INJECTION INTRAMUSCULAR; INTRAVENOUS
Status: CANCELLED
Start: 2024-01-17

## 2024-01-17 RX ORDER — METHYLPREDNISOLONE SODIUM SUCCINATE 125 MG/2ML
125 INJECTION, POWDER, LYOPHILIZED, FOR SOLUTION INTRAMUSCULAR; INTRAVENOUS
Status: CANCELLED
Start: 2024-01-17

## 2024-01-17 RX ORDER — HEPARIN SODIUM (PORCINE) LOCK FLUSH IV SOLN 100 UNIT/ML 100 UNIT/ML
5 SOLUTION INTRAVENOUS
Status: CANCELLED | OUTPATIENT
Start: 2024-01-17

## 2024-01-17 RX ORDER — MEPERIDINE HYDROCHLORIDE 25 MG/ML
25 INJECTION INTRAMUSCULAR; INTRAVENOUS; SUBCUTANEOUS EVERY 30 MIN PRN
Status: CANCELLED | OUTPATIENT
Start: 2024-01-17

## 2024-01-17 RX ORDER — EPINEPHRINE 1 MG/ML
0.3 INJECTION, SOLUTION INTRAMUSCULAR; SUBCUTANEOUS EVERY 5 MIN PRN
Status: CANCELLED | OUTPATIENT
Start: 2024-01-17

## 2024-01-17 RX ORDER — HEPARIN SODIUM,PORCINE 10 UNIT/ML
5-20 VIAL (ML) INTRAVENOUS DAILY PRN
Status: CANCELLED | OUTPATIENT
Start: 2024-01-17

## 2024-01-17 RX ORDER — ALBUTEROL SULFATE 90 UG/1
1-2 AEROSOL, METERED RESPIRATORY (INHALATION)
Status: CANCELLED
Start: 2024-01-17

## 2024-01-17 RX ADMIN — PEGFILGRASTIM 6 MG: 6 INJECTION SUBCUTANEOUS at 12:40

## 2024-01-17 NOTE — PROGRESS NOTES
Clinic Care Coordination Contact  Clinic Care Coordination Contact  OUTREACH    Referral Information:  Referral Source: IP Handoff    Primary Diagnosis: Oncology    Chief Complaint   Patient presents with    Clinic Care Coordination - Post Hospital     Clinic Care Coordination RN         Universal Utilization: Hospital admission 1/10-1/16/2024 for Cycle 4 Chemotherapy   Clinic Utilization  Difficulty keeping appointments:: No  Compliance Concerns: No  No-Show Concerns: No  No PCP office visit in Past Year: No  Utilization      No Show Count (past year)  1             ED Visits  0             Hospital Admissions  4                    Current as of: 1/17/2024  8:00 AM                Clinical Concerns:  Current Medical Concerns:  Patient reports she tolerated her hopefully last chemotherapy.  Patient is on a 10 day course of antibiotic  Labs and Oncology visit today     Current Behavioral Concerns: No    Education Provided to patient: call Oncology office with questions or concerns    Pain  Pain (GOAL):: No  Health Maintenance Reviewed: Not assessed  Clinical Pathway: None    Medication Management:  Medication review status: Medications reviewed.  Changes noted per patient report.       Functional Status:       Living Situation:  Current living arrangement:: I live in a private home with spouse    Lifestyle & Psychosocial Needs:    Social Determinants of Health     Food Insecurity: Low Risk  (12/22/2023)    Food Insecurity     Within the past 12 months, did you worry that your food would run out before you got money to buy more?: No     Within the past 12 months, did the food you bought just not last and you didn t have money to get more?: No   Depression: Not at risk (9/26/2023)    PHQ-2     PHQ-2 Score: 0   Housing Stability: Low Risk  (12/22/2023)    Housing Stability     Do you have housing? : Yes     Are you worried about losing your housing?: No   Tobacco Use: Low Risk  (1/10/2024)    Patient History     Smoking  Tobacco Use: Never     Smokeless Tobacco Use: Never     Passive Exposure: Never   Financial Resource Strain: Low Risk  (12/22/2023)    Financial Resource Strain     Within the past 12 months, have you or your family members you live with been unable to get utilities (heat, electricity) when it was really needed?: No   Alcohol Use: Not on file   Transportation Needs: Low Risk  (12/22/2023)    Transportation Needs     Within the past 12 months, has lack of transportation kept you from medical appointments, getting your medicines, non-medical meetings or appointments, work, or from getting things that you need?: No   Physical Activity: Not on file   Interpersonal Safety: Low Risk  (10/26/2023)    Interpersonal Safety     Do you feel physically and emotionally safe where you currently live?: Yes     Within the past 12 months, have you been hit, slapped, kicked or otherwise physically hurt by someone?: No     Within the past 12 months, have you been humiliated or emotionally abused in other ways by your partner or ex-partner?: No   Stress: Not on file   Social Connections: Not on file     Diet:: Regular  Inadequate nutrition (GOAL):: No  Tube Feeding: No  Inadequate activity/exercise (GOAL):: No  Significant changes in sleep pattern (GOAL): No        Scientologist or spiritual beliefs that impact treatment:: No  Mental health DX:: No  Mental health management concern (GOAL):: No  Chemical Dependency Status: No Current Concerns  Informal Support system:: Spouse             Resources and Interventions:  Current Resources:      Community Resources: None  Supplies Currently Used at Home: None  Equipment Currently Used at Home: none            Advance Care Plan/Directive  Advanced Care Plans/Directives on file:: No    Referrals Placed: None      Patient/Caregiver understanding: Expresses good understanding of discharge instructions        Future Appointments                Today [facility],  [place] [facility]    Today ROOM 3 [place]; [place] NURSE [facility], [facility] [facility]    In 2 days Scheierl, Amber J, APRN CNP [facility], [facility]    In 2 days [facility], [place] [facility]    In 2 days ROOM 5 [place]; [place] NURSE [facility]    In 5 days [facility], [place] [facility]    In 5 days ROOM 11 [place]; [place] NURSE [facility]    In 1 week [facility], [place] [facility]    In 1 week ROOM 2 [place]; [place] NURSE [facility]    In 1 week [facility] 1 [facility], [facility]    In 1 week [facility] MR 1 [facility], [facility]    In 2 weeks  [facility] LAB DRAW [facility], [facility]    In 2 weeks Roney Nam MD [facility]    In 2 weeks Patrice Armstrong MD  [facility], [facility] RAD THER    In 2 weeks Benny Barroso MD [facility]    In 3 months Griffin Duckworth MD [facility]    In 3 months Radha Matias PA-C [facility]            Plan:   Patient is followed closely by Oncology   No unmet needs, no further care coordination is needed at this time    [facility]   Kristen Angulo RN, Care Coordinator   [facility]'s   E-mail [email or web address]   [phone]

## 2024-01-17 NOTE — PROGRESS NOTES
Infusion Nursing Note:  Mirta Cardenas presents today for Neulasta and possible blood transfusion.    Patient seen by provider today: No   present during visit today: Not Applicable.    Note: Creat elevated at 1.4. Offered pt fluids today. She declined and stated she would increase her fluids at home. Message sent to Gunjan Kayla regarding creatinine levels and having a fluids plan in place.     Intravenous Access:  Labs drawn peripherally.    Treatment Conditions:  Lab Results   Component Value Date    HGB 8.4 (L) 01/17/2024    WBC 4.1 01/17/2024    ANEU 2.9 01/17/2024    ANEUTAUTO 2.2 01/16/2024     (L) 01/17/2024        Results reviewed, labs did NOT meet treatment parameters: Hgb 8.4.     Post Infusion Assessment:  Patient tolerated injection without incident.     Discharge Plan:   Patient discharged in stable condition accompanied by: self.  Departure Mode: Ambulatory.    Teddy Awad RN

## 2024-01-19 ENCOUNTER — VIRTUAL VISIT (OUTPATIENT)
Dept: ONCOLOGY | Facility: CLINIC | Age: 69
End: 2024-01-19
Attending: STUDENT IN AN ORGANIZED HEALTH CARE EDUCATION/TRAINING PROGRAM
Payer: COMMERCIAL

## 2024-01-19 ENCOUNTER — APPOINTMENT (OUTPATIENT)
Dept: LAB | Facility: CLINIC | Age: 69
End: 2024-01-19
Payer: COMMERCIAL

## 2024-01-19 ENCOUNTER — INFUSION THERAPY VISIT (OUTPATIENT)
Dept: INFUSION THERAPY | Facility: CLINIC | Age: 69
End: 2024-01-19
Attending: STUDENT IN AN ORGANIZED HEALTH CARE EDUCATION/TRAINING PROGRAM
Payer: COMMERCIAL

## 2024-01-19 VITALS — HEIGHT: 61 IN | WEIGHT: 169 LBS | BODY MASS INDEX: 31.91 KG/M2

## 2024-01-19 VITALS
HEART RATE: 85 BPM | SYSTOLIC BLOOD PRESSURE: 157 MMHG | DIASTOLIC BLOOD PRESSURE: 80 MMHG | RESPIRATION RATE: 18 BRPM | TEMPERATURE: 98.1 F

## 2024-01-19 DIAGNOSIS — C49.21 SARCOMA OF RIGHT LOWER EXTREMITY (H): Primary | ICD-10-CM

## 2024-01-19 DIAGNOSIS — D64.81 ANEMIA DUE TO ANTINEOPLASTIC CHEMOTHERAPY: ICD-10-CM

## 2024-01-19 DIAGNOSIS — R79.89 ELEVATED SERUM CREATININE: ICD-10-CM

## 2024-01-19 DIAGNOSIS — T45.1X5A ANEMIA DUE TO ANTINEOPLASTIC CHEMOTHERAPY: ICD-10-CM

## 2024-01-19 DIAGNOSIS — R11.2 CINV (CHEMOTHERAPY-INDUCED NAUSEA AND VOMITING): Primary | ICD-10-CM

## 2024-01-19 DIAGNOSIS — T45.1X5A CINV (CHEMOTHERAPY-INDUCED NAUSEA AND VOMITING): Primary | ICD-10-CM

## 2024-01-19 DIAGNOSIS — C49.21 SARCOMA OF RIGHT LOWER EXTREMITY (H): ICD-10-CM

## 2024-01-19 LAB
ABO/RH(D): NORMAL
ACANTHOCYTES BLD QL SMEAR: NORMAL
ALBUMIN SERPL BCG-MCNC: 4.1 G/DL (ref 3.5–5.2)
ALBUMIN UR-MCNC: NEGATIVE MG/DL
ALP SERPL-CCNC: 105 U/L (ref 40–150)
ALT SERPL W P-5'-P-CCNC: 24 U/L (ref 0–50)
ANION GAP SERPL CALCULATED.3IONS-SCNC: 11 MMOL/L (ref 7–15)
ANTIBODY SCREEN: NEGATIVE
APPEARANCE UR: CLEAR
AST SERPL W P-5'-P-CCNC: 19 U/L (ref 0–45)
AUER BODIES BLD QL SMEAR: NORMAL
BASO STIPL BLD QL SMEAR: NORMAL
BASOPHILS # BLD AUTO: ABNORMAL 10*3/UL
BASOPHILS # BLD MANUAL: 0 10E3/UL (ref 0–0.2)
BASOPHILS NFR BLD AUTO: ABNORMAL %
BASOPHILS NFR BLD MANUAL: 1 %
BILIRUB SERPL-MCNC: 0.2 MG/DL
BILIRUB UR QL STRIP: NEGATIVE
BITE CELLS BLD QL SMEAR: NORMAL
BLD PROD TYP BPU: NORMAL
BLISTER CELLS BLD QL SMEAR: NORMAL
BLOOD COMPONENT TYPE: NORMAL
BUN SERPL-MCNC: 31.1 MG/DL (ref 8–23)
BURR CELLS BLD QL SMEAR: NORMAL
CALCIUM SERPL-MCNC: 8.9 MG/DL (ref 8.8–10.2)
CHLORIDE SERPL-SCNC: 105 MMOL/L (ref 98–107)
CODING SYSTEM: NORMAL
COLOR UR AUTO: ABNORMAL
CREAT SERPL-MCNC: 1.34 MG/DL (ref 0.51–0.95)
CROSSMATCH: NORMAL
DACRYOCYTES BLD QL SMEAR: NORMAL
DEPRECATED HCO3 PLAS-SCNC: 21 MMOL/L (ref 22–29)
EGFRCR SERPLBLD CKD-EPI 2021: 44 ML/MIN/1.73M2
ELLIPTOCYTES BLD QL SMEAR: NORMAL
EOSINOPHIL # BLD AUTO: ABNORMAL 10*3/UL
EOSINOPHIL # BLD MANUAL: 0 10E3/UL (ref 0–0.7)
EOSINOPHIL NFR BLD AUTO: ABNORMAL %
EOSINOPHIL NFR BLD MANUAL: 1 %
ERYTHROCYTE [DISTWIDTH] IN BLOOD BY AUTOMATED COUNT: 15.5 % (ref 10–15)
FRAGMENTS BLD QL SMEAR: NORMAL
GLUCOSE SERPL-MCNC: 105 MG/DL (ref 70–99)
GLUCOSE UR STRIP-MCNC: NEGATIVE MG/DL
HCT VFR BLD AUTO: 20.7 % (ref 35–47)
HGB BLD-MCNC: 7.2 G/DL (ref 11.7–15.7)
HGB C CRYSTALS: NORMAL
HGB UR QL STRIP: ABNORMAL
HOWELL-JOLLY BOD BLD QL SMEAR: NORMAL
IMM GRANULOCYTES # BLD: ABNORMAL 10*3/UL
IMM GRANULOCYTES NFR BLD: ABNORMAL %
ISSUE DATE AND TIME: NORMAL
KETONES UR STRIP-MCNC: NEGATIVE MG/DL
LEUKOCYTE ESTERASE UR QL STRIP: NEGATIVE
LYMPHOCYTES # BLD AUTO: ABNORMAL 10*3/UL
LYMPHOCYTES # BLD MANUAL: 0.6 10E3/UL (ref 0.8–5.3)
LYMPHOCYTES NFR BLD AUTO: ABNORMAL %
LYMPHOCYTES NFR BLD MANUAL: 15 %
MAGNESIUM SERPL-MCNC: 1.8 MG/DL (ref 1.7–2.3)
MCH RBC QN AUTO: 30.5 PG (ref 26.5–33)
MCHC RBC AUTO-ENTMCNC: 34.8 G/DL (ref 31.5–36.5)
MCV RBC AUTO: 88 FL (ref 78–100)
MONOCYTES # BLD AUTO: ABNORMAL 10*3/UL
MONOCYTES # BLD MANUAL: 0.2 10E3/UL (ref 0–1.3)
MONOCYTES NFR BLD AUTO: ABNORMAL %
MONOCYTES NFR BLD MANUAL: 4 %
NEUTROPHILS # BLD AUTO: ABNORMAL 10*3/UL
NEUTROPHILS # BLD MANUAL: 3.4 10E3/UL (ref 1.6–8.3)
NEUTROPHILS NFR BLD AUTO: ABNORMAL %
NEUTROPHILS NFR BLD MANUAL: 79 %
NEUTS HYPERSEG BLD QL SMEAR: NORMAL
NITRATE UR QL: NEGATIVE
PH UR STRIP: 6 [PH] (ref 5–7)
PHOSPHATE SERPL-MCNC: 3.2 MG/DL (ref 2.5–4.5)
PLAT MORPH BLD: ABNORMAL
PLAT MORPH BLD: NORMAL
PLATELET # BLD AUTO: 108 10E3/UL (ref 150–450)
POLYCHROMASIA BLD QL SMEAR: NORMAL
POTASSIUM SERPL-SCNC: 3.5 MMOL/L (ref 3.4–5.3)
PROT SERPL-MCNC: 6.3 G/DL (ref 6.4–8.3)
RBC # BLD AUTO: 2.36 10E6/UL (ref 3.8–5.2)
RBC AGGLUT BLD QL: NORMAL
RBC MORPH BLD: ABNORMAL
RBC MORPH BLD: NORMAL
RBC URINE: 0 /HPF
ROULEAUX BLD QL SMEAR: NORMAL
SICKLE CELLS BLD QL SMEAR: NORMAL
SMUDGE CELLS BLD QL SMEAR: NORMAL
SODIUM SERPL-SCNC: 137 MMOL/L (ref 135–145)
SP GR UR STRIP: 1 (ref 1–1.03)
SPECIMEN EXPIRATION DATE: NORMAL
SPHEROCYTES BLD QL SMEAR: NORMAL
SQUAMOUS EPITHELIAL: <1 /HPF
STOMATOCYTES BLD QL SMEAR: NORMAL
TARGETS BLD QL SMEAR: NORMAL
TOXIC GRANULES BLD QL SMEAR: NORMAL
UNIT ABO/RH: NORMAL
UNIT NUMBER: NORMAL
UNIT STATUS: NORMAL
UNIT TYPE ISBT: 6200
UROBILINOGEN UR STRIP-MCNC: NORMAL MG/DL
VARIANT LYMPHS BLD QL SMEAR: NORMAL
WBC # BLD AUTO: 4.3 10E3/UL (ref 4–11)
WBC URINE: 3 /HPF

## 2024-01-19 PROCEDURE — 85027 COMPLETE CBC AUTOMATED: CPT | Performed by: STUDENT IN AN ORGANIZED HEALTH CARE EDUCATION/TRAINING PROGRAM

## 2024-01-19 PROCEDURE — 81001 URINALYSIS AUTO W/SCOPE: CPT | Performed by: PHYSICIAN ASSISTANT

## 2024-01-19 PROCEDURE — 36430 TRANSFUSION BLD/BLD COMPNT: CPT

## 2024-01-19 PROCEDURE — 258N000003 HC RX IP 258 OP 636: Performed by: STUDENT IN AN ORGANIZED HEALTH CARE EDUCATION/TRAINING PROGRAM

## 2024-01-19 PROCEDURE — 96361 HYDRATE IV INFUSION ADD-ON: CPT

## 2024-01-19 PROCEDURE — 86923 COMPATIBILITY TEST ELECTRIC: CPT | Performed by: STUDENT IN AN ORGANIZED HEALTH CARE EDUCATION/TRAINING PROGRAM

## 2024-01-19 PROCEDURE — 83735 ASSAY OF MAGNESIUM: CPT | Performed by: PHYSICIAN ASSISTANT

## 2024-01-19 PROCEDURE — 86900 BLOOD TYPING SEROLOGIC ABO: CPT | Performed by: STUDENT IN AN ORGANIZED HEALTH CARE EDUCATION/TRAINING PROGRAM

## 2024-01-19 PROCEDURE — 36415 COLL VENOUS BLD VENIPUNCTURE: CPT

## 2024-01-19 PROCEDURE — P9016 RBC LEUKOCYTES REDUCED: HCPCS | Performed by: STUDENT IN AN ORGANIZED HEALTH CARE EDUCATION/TRAINING PROGRAM

## 2024-01-19 PROCEDURE — 84100 ASSAY OF PHOSPHORUS: CPT | Performed by: PHYSICIAN ASSISTANT

## 2024-01-19 PROCEDURE — 99215 OFFICE O/P EST HI 40 MIN: CPT | Mod: 95 | Performed by: STUDENT IN AN ORGANIZED HEALTH CARE EDUCATION/TRAINING PROGRAM

## 2024-01-19 PROCEDURE — 80053 COMPREHEN METABOLIC PANEL: CPT | Performed by: PHYSICIAN ASSISTANT

## 2024-01-19 PROCEDURE — 96360 HYDRATION IV INFUSION INIT: CPT

## 2024-01-19 PROCEDURE — 85007 BL SMEAR W/DIFF WBC COUNT: CPT | Performed by: STUDENT IN AN ORGANIZED HEALTH CARE EDUCATION/TRAINING PROGRAM

## 2024-01-19 RX ORDER — HEPARIN SODIUM,PORCINE 10 UNIT/ML
5-20 VIAL (ML) INTRAVENOUS DAILY PRN
OUTPATIENT
Start: 2024-01-19

## 2024-01-19 RX ORDER — HEPARIN SODIUM,PORCINE 10 UNIT/ML
5-20 VIAL (ML) INTRAVENOUS DAILY PRN
Status: CANCELLED | OUTPATIENT
Start: 2024-01-19

## 2024-01-19 RX ORDER — DIPHENHYDRAMINE HYDROCHLORIDE 50 MG/ML
50 INJECTION INTRAMUSCULAR; INTRAVENOUS
Status: CANCELLED
Start: 2024-01-19

## 2024-01-19 RX ORDER — HEPARIN SODIUM (PORCINE) LOCK FLUSH IV SOLN 100 UNIT/ML 100 UNIT/ML
5 SOLUTION INTRAVENOUS
Status: CANCELLED | OUTPATIENT
Start: 2024-01-19

## 2024-01-19 RX ORDER — ALBUTEROL SULFATE 0.83 MG/ML
2.5 SOLUTION RESPIRATORY (INHALATION)
Status: CANCELLED | OUTPATIENT
Start: 2024-01-19

## 2024-01-19 RX ORDER — MEPERIDINE HYDROCHLORIDE 25 MG/ML
25 INJECTION INTRAMUSCULAR; INTRAVENOUS; SUBCUTANEOUS EVERY 30 MIN PRN
Status: CANCELLED | OUTPATIENT
Start: 2024-01-19

## 2024-01-19 RX ORDER — METHYLPREDNISOLONE SODIUM SUCCINATE 125 MG/2ML
125 INJECTION, POWDER, LYOPHILIZED, FOR SOLUTION INTRAMUSCULAR; INTRAVENOUS
Status: CANCELLED
Start: 2024-01-19

## 2024-01-19 RX ORDER — ALBUTEROL SULFATE 90 UG/1
1-2 AEROSOL, METERED RESPIRATORY (INHALATION)
Status: CANCELLED
Start: 2024-01-19

## 2024-01-19 RX ORDER — HEPARIN SODIUM (PORCINE) LOCK FLUSH IV SOLN 100 UNIT/ML 100 UNIT/ML
5 SOLUTION INTRAVENOUS
OUTPATIENT
Start: 2024-01-19

## 2024-01-19 RX ORDER — EPINEPHRINE 1 MG/ML
0.3 INJECTION, SOLUTION, CONCENTRATE INTRAVENOUS EVERY 5 MIN PRN
Status: CANCELLED | OUTPATIENT
Start: 2024-01-19

## 2024-01-19 RX ADMIN — SODIUM CHLORIDE 1000 ML: 9 INJECTION, SOLUTION INTRAVENOUS at 11:25

## 2024-01-19 ASSESSMENT — PAIN SCALES - GENERAL
PAINLEVEL: NO PAIN (0)
PAINLEVEL: NO PAIN (0)

## 2024-01-19 NOTE — NURSING NOTE
Is the patient currently in the state of [place]? YES    Visit mode:VIDEO    If the visit is dropped, the patient can be reconnected by: VIDEO VISIT: Text to cell phone:   Telephone Information:   Mobile [phone]       Will anyone else be joining the visit? NO  (If patient encounters technical issues they should call [phone])    How would you like to obtain your AVS? MyChart    Are changes needed to the allergy or medication list? Pt stated no changes to allergies and questions about HCTZ    Reason for visit: RECHECK (Has questions about whether she should remain in hydrochlorothiazide?)    Jodi Brewster LPN

## 2024-01-19 NOTE — PROGRESS NOTES
Infusion Nursing Note:  Mirta Cardenas presents today for IVF and 1 unit PRBC.    Patient seen by provider today: Yes: Dr. Nam therefore assessment deferred   present during visit today: Not Applicable.    Note: Pt did say she has retained fluid recently in her legs and was started on hydrochlorothiazide, ran fluids over 2 hours and ran blood slowly.       Intravenous Access:  Peripheral IV placed.    Treatment Conditions:  Lab Results   Component Value Date    HGB 7.2 (L) 01/19/2024    WBC 4.3 01/19/2024    ANEU 3.4 01/19/2024    ANEUTAUTO 2.2 01/16/2024     (L) 01/19/2024        Lab Results   Component Value Date     01/19/2024    POTASSIUM 3.5 01/19/2024    MAG 1.8 01/19/2024    CR 1.34 (H) 01/19/2024    MONIK 8.9 01/19/2024    BILITOTAL 0.2 01/19/2024    ALBUMIN 4.1 01/19/2024    ALT 24 01/19/2024    AST 19 01/19/2024       Results reviewed, labs MET treatment parameters, ok to proceed with treatment.      Post Infusion Assessment:  Patient tolerated infusion without incident.  Blood return noted pre and post infusion.  Site patent and intact, free from redness, edema or discomfort.  No evidence of extravasations.  Access discontinued per protocol.       Discharge Plan:   Copy of AVS reviewed with patient and/or family.  Patient will return 1/22/24 for next appointment.  Patient discharged in stable condition accompanied by: self.  Departure Mode: Ambulatory.      PAVEL ROSAS RN

## 2024-01-19 NOTE — LETTER
1/19/2024         RE: Mirta Cardenas  [id number] [facility] [address]        Dear Colleague,    Thank you for referring your patient, Mirta Cardenas, to the [facility]. Please see a copy of my visit note below.    Virtual Visit Details    Type of service:  Video Visit   Video Start Time:  9:22AM  Video End Time: 9:41AM    Originating Location (pt. Location): Home    Distant Location (provider location):  On-site  Platform used for Video Visit: [facility]ER [facility]  FOLLOW UP VISIT NOTE    PATIENT NAME: Mirta Cardenas MRN # [id number]  DATE OF VISIT: Jan 19, 2024  YOB: 1955    CANCER TYPE:  High grade pleomorphic sarcoma, right calf       HISTORY OF PRESENT ILLNESS/ONCOLOGY HISTORY   Mirta is a 68 year old female with PMH of CLL and recent Dx of High Grade Pleomorphic sarcoma, with intermittent right calf pain but then continuous calf pain beginning in March 2023.  She had a recent x-ray which shows a destructive lesion in the midportion R LE in between the fibula and the tibia.     -10/2/23, Biopsy  Final Diagnosis   A.  Soft tissue, right calf mass, excision:  - High-grade pleomorphic sarcoma  - See comment      -10/10/23, PET/CT with no evidence of mets (mildly enlarged righ iliac chain LN with no hypermetabolic uptake; thought to be reactive).     -10/17/23-10/24/23, Inpatient Cycle 1 of Doxil/ifosfamide  -11/4/23, Inpatient cycle 2 Doxil/ifosfamide  -12/13/23, Cycle 3 inpatient Doxil/ifosfamide  -1/10/24, Cycle 4 inpatient Doxil/ifosfamide     PAST ONCOLOGIC HISTORY   CLL - on monitoring      INTERVAL HISTORY   -Mari Aesther presents today via video visit for a toxicity check following cycle 4. I reviewed the hospitalization notes and noted that she had an isolated episode of hematuria which resolved without intervention. She had poorly controlled blood pressure for which hydrochlorothiazide was added and she had mild  "elevation of her creatinine.  -She reports feeling ok. She does feel her stomach is a bit off. No abdominal pain but just lower appetite and has to think about what she wants to eat. No emesis. Nausea managed with about once daily compazine.  -Able to maintain daily activities. She is celebrating her upcoming birthday with her kids.   -No return of pink urine since 2 occurrences in hospital  -No mouth sores  -No rashes but dry hands with some cracking between fingers. Applying lotion  -Started hydrochlorothiazide. Leg swelling has reduced since starting. No dizziness  -No fevers. Highest temp was 99  -No chest pain  -Some ANN with stairs    REVIEW OF SYSTEMS   As noted in HPI.      PAST MEDICAL HISTORY   Anal fisure - controlled with nitrobid  CLL - now being monitored   HTN  Cholesterol  Hx of severe COVID  Psoriasis       PHYSICAL EXAM   Ht 1.549 m (5' 1\")   Wt 76.7 kg (169 lb)   BMI 31.93 kg/m    Wt Readings from Last 10 Encounters:   01/19/24 76.7 kg (169 lb)   01/16/24 76.9 kg (169 lb 9.6 oz)   01/10/24 80 kg (176 lb 6.4 oz)   12/29/23 77.1 kg (170 lb)   12/26/23 77.1 kg (170 lb)   12/17/23 77.3 kg (170 lb 6.4 oz)   12/06/23 79.4 kg (175 lb)   11/19/23 76.2 kg (168 lb)   11/08/23 78.5 kg (173 lb)   10/30/23 79.1 kg (174 lb 6.4 oz)      1/16/2024  11:04 AM 1/17/2024  12:39 PM 1/17/2024  12:46 PM   Vital Signs      Systolic 141 !  167 !  147 !    Diastolic 84 !  71 !  78 !    Pulse 86  96     Temperature  98.8  F (37.1  C)     Respirations 16      Weight (LB)      Height      BMI (Calculated)      Pain Score      O2 98 %      Video physical exam  General: Patient appears well in no acute distress.   Skin: No visualized rash or lesions on visualized skin  Eyes: EOMI, no erythema, sclera icterus or discharge noted  Resp: Appears to be breathing comfortably without accessory muscle usage, speaking in full sentences, no cough  MSK: Appears to have normal range of motion based on visualized movements  Neurologic: No " apparent tremors, facial movements symmetric. Hearing intact. No dysarthria.   Psych: affect appropriate, alert and oriented. Very pleasant.      LABORATORY AND IMAGING STUDIES     Most Recent 3 CBC's:  Recent Labs   Lab Test 01/17/24  1219 01/16/24  0431 01/15/24  0633 01/14/24  0507   WBC 4.1 3.3* 3.7* 3.7*   HGB 8.4* 7.7* 8.6* 8.3*   MCV 87 85 86 89   * 124* 151 114*   ANEUTAUTO  --  2.2 2.4 2.3     Most Recent 3 BMP's:  Recent Labs   Lab Test 01/17/24  1219 01/16/24  0431 01/15/24  0503    140 139   POTASSIUM 3.6 3.9 4.1   CHLORIDE 105 107 106   CO2 24 25 26   BUN 27.3* 18.8 15.1   CR 1.40* 1.13* 1.03*   ANIONGAP 10 8 7   MONIK 9.7 8.9 8.8   GLC 99 113* 108*   PROTTOTAL 6.6 5.6* 5.7*   ALBUMIN 4.2 3.6 3.6    Most Recent 3 LFT's:  Recent Labs   Lab Test 01/17/24  1219 01/16/24  0431 01/15/24  0503   AST 28 21 17   ALT 30 20 14   ALKPHOS 105 87 89   BILITOTAL 0.2 0.2 0.2     Phosphorus   Date Value Ref Range Status   01/17/2024 3.6 2.5 - 4.5 mg/dL Final   01/15/2024 3.3 2.5 - 4.5 mg/dL Final   01/14/2024 3.6 2.5 - 4.5 mg/dL Final   01/13/2024 3.9 2.5 - 4.5 mg/dL Final   04/23/2008 3.4 2.5 - 4.5 mg/dL Final   08/17/2005 3.6 2.5 - 4.5 mg/dL Final     Magnesium   Date Value Ref Range Status   01/17/2024 1.9 1.7 - 2.3 mg/dL Final   01/16/2024 1.9 1.7 - 2.3 mg/dL Final   01/15/2024 1.8 1.7 - 2.3 mg/dL Final   01/14/2024 1.7 1.7 - 2.3 mg/dL Final   10/12/2020 1.9 1.6 - 2.3 mg/dL Final   08/17/2005 2.0 1.6 - 2.3 mg/dL Final     I reviewed the above labs today.       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 No evidence of metastatic disease.     She began neoadjuvant chemotherapy with Doxil/ifosfmaide on 10/17/23. Repeat imaging (PET 12/5/23) after 2 cycles showed treatment response. The plan is to continue  with the same regimen for 2 more cycle (4 total) and then proceed with radiation followed by surgery.     She is now s/p 4 cycles of Doxil/ifosfamide. She received these at a slightly lower dose due to age and other medical conditions (doxil 40mg/m2 and ifosfamide 8g/m2 over 5 days). She has tolerated these fairly well. She did have an episode of hematuria with the most recent cycle which ha not recurred. Her creatinine was also elevated 2 days ago which could be from ifosfamide kassie HCTZ. She will have repeat labs today. If creatinine remains >1.1, we will give a liter of IVF. We will also check a UA and she will have labs again on Monday.     Plan  -Continue twice weekly labs (CBC/p/d, CMP, PO4, Mg) and possible transfusion at [place]. She has these next today.  -Recheck creat today and UA today; likely 1LNS of IVF as above.  -Repeat imaging (PET and MRI tib fib) on 1/29 followed by visit with Dr. Jimenez  -Radiation therapy and ortho surgery visits on 2/1 as planned  ADDENDUM: Creat 1.34 so stable to slightly improved. Fluids today as above. UA with small amount of blood; improved from 6 days ok; push oral fluids as well; no infection.     #Elevated creatinine  -1.40 on 1/17/24; likely s/t ifosfamide but could also be linked to hydrochlorothiazide. Continue to trend  -Recheck today and check UA; if creat 1.1 or >, will give 1LNS and recheck labs as planned Monday  -continue to push po fluids    #CINV  Continue compazine prn    CV  #Hypertension  -Continue losartan 100 mg daily and amlodipine 7.5 mg once daily.  -Hydrochlorothiazine 50mg once daily added during recent admission; 's in clinic on 1/17  -Chlorthalidone discontinued d/t electrolyte disturbance    #Hematuria  UA today  Likely s/t ifosfamide  Has not recurred  Likely IVF today as above    #Neurotoxicity prophylaxis  Thiamine (B-1) 100mg TID for 7 days during chemo    #Hypomagnesemia  On magnesium oxide 400 mg daily. Magnesium normal on 1/17.  Continue once daily. Loose stools have resolved.    #Right Leg Swelling, improving  - US RLE on 10/21/23 was negative  - BLE edema improved with addition of hydrochlorothiazide.  Can continue compression stockings and leg elevation prn.     #Cancer-related pain, right calf, not discussed today  -Much improved as compared to pre-treatment  -Continue ibuprofen prn  -Continue Lyrica 50mg TID  -Continue oxycodone 5-10mg once daily prn  -Of note, did not tolerate Cymbalta     50 minutes spent on the date of the encounter doing chart review, review of test results, interpretation of tests, patient visit, and documentation      Amber Scheierl, CNP  [facility]  [address] [place] [id number] [phone]

## 2024-01-19 NOTE — PROGRESS NOTES
Virtual Visit Details    Type of service:  Video Visit   Video Start Time:  9:22AM  Video End Time: 9:41AM    Originating Location (pt. Location): Home    Distant Location (provider location):  On-site  Platform used for Video Visit: [employer]    [facility]  FOLLOW UP VISIT NOTE    PATIENT NAME: Mirta Cardenas MRN # [id number]  DATE OF VISIT: Jan 19, 2024  YOB: 1955    CANCER TYPE:  High grade pleomorphic sarcoma, right calf       HISTORY OF PRESENT ILLNESS/ONCOLOGY HISTORY   Mirta is a 68 year old female with PMH of CLL and recent Dx of High Grade Pleomorphic sarcoma, with intermittent right calf pain but then continuous calf pain beginning in March 2023.  She had a recent x-ray which shows a destructive lesion in the midportion R LE in between the fibula and the tibia.     -10/2/23, Biopsy  Final Diagnosis   A.  Soft tissue, right calf mass, excision:  - High-grade pleomorphic sarcoma  - See comment      -10/10/23, PET/CT with no evidence of mets (mildly enlarged righ iliac chain LN with no hypermetabolic uptake; thought to be reactive).     -10/17/23-10/24/23, Inpatient Cycle 1 of Doxil/ifosfamide  -11/4/23, Inpatient cycle 2 Doxil/ifosfamide  -12/13/23, Cycle 3 inpatient Doxil/ifosfamide  -1/10/24, Cycle 4 inpatient Doxil/ifosfamide     PAST ONCOLOGIC HISTORY   CLL - on monitoring      INTERVAL HISTORY   -Maria Esther presents today via video visit for a toxicity check following cycle 4. I reviewed the hospitalization notes and noted that she had an isolated episode of hematuria which resolved without intervention. She had poorly controlled blood pressure for which hydrochlorothiazide was added and she had mild elevation of her creatinine.  -She reports feeling ok. She does feel her stomach is a bit off. No abdominal pain but just lower appetite and has to think about what she wants to eat. No emesis. Nausea managed with about once daily compazine.  -Able to maintain  "daily activities. She is celebrating her upcoming birthday with her kids.   -No return of pink urine since 2 occurrences in hospital  -No mouth sores  -No rashes but dry hands with some cracking between fingers. Applying lotion  -Started hydrochlorothiazide. Leg swelling has reduced since starting. No dizziness  -No fevers. Highest temp was 99  -No chest pain  -Some ANN with stairs    REVIEW OF SYSTEMS   As noted in HPI.      PAST MEDICAL HISTORY   Anal fisure - controlled with nitrobid  CLL - now being monitored   HTN  Cholesterol  Hx of severe COVID  Psoriasis       PHYSICAL EXAM   Ht 1.549 m (5' 1\")   Wt 76.7 kg (169 lb)   BMI 31.93 kg/m    Wt Readings from Last 10 Encounters:   01/19/24 76.7 kg (169 lb)   01/16/24 76.9 kg (169 lb 9.6 oz)   01/10/24 80 kg (176 lb 6.4 oz)   12/29/23 77.1 kg (170 lb)   12/26/23 77.1 kg (170 lb)   12/17/23 77.3 kg (170 lb 6.4 oz)   12/06/23 79.4 kg (175 lb)   11/19/23 76.2 kg (168 lb)   11/08/23 78.5 kg (173 lb)   10/30/23 79.1 kg (174 lb 6.4 oz)      1/16/2024  11:04 AM 1/17/2024  12:39 PM 1/17/2024  12:46 PM   Vital Signs      Systolic 141 !  167 !  147 !    Diastolic 84 !  71 !  78 !    Pulse 86  96     Temperature  98.8  F (37.1  C)     Respirations 16      Weight (LB)      Height      BMI (Calculated)      Pain Score      O2 98 %      Video physical exam  General: Patient appears well in no acute distress.   Skin: No visualized rash or lesions on visualized skin  Eyes: EOMI, no erythema, sclera icterus or discharge noted  Resp: Appears to be breathing comfortably without accessory muscle usage, speaking in full sentences, no cough  MSK: Appears to have normal range of motion based on visualized movements  Neurologic: No apparent tremors, facial movements symmetric. Hearing intact. No dysarthria.   Psych: affect appropriate, alert and oriented. Very pleasant.      LABORATORY AND IMAGING STUDIES     Most Recent 3 CBC's:  Recent Labs   Lab Test 01/17/24  1219 01/16/24  0431 " 01/15/24  0633 01/14/24  0507   WBC 4.1 3.3* 3.7* 3.7*   HGB 8.4* 7.7* 8.6* 8.3*   MCV 87 85 86 89   * 124* 151 114*   ANEUTAUTO  --  2.2 2.4 2.3     Most Recent 3 BMP's:  Recent Labs   Lab Test 01/17/24  1219 01/16/24  0431 01/15/24  0503    140 139   POTASSIUM 3.6 3.9 4.1   CHLORIDE 105 107 106   CO2 24 25 26   BUN 27.3* 18.8 15.1   CR 1.40* 1.13* 1.03*   ANIONGAP 10 8 7   MONIK 9.7 8.9 8.8   GLC 99 113* 108*   PROTTOTAL 6.6 5.6* 5.7*   ALBUMIN 4.2 3.6 3.6    Most Recent 3 LFT's:  Recent Labs   Lab Test 01/17/24  1219 01/16/24  0431 01/15/24  0503   AST 28 21 17   ALT 30 20 14   ALKPHOS 105 87 89   BILITOTAL 0.2 0.2 0.2     Phosphorus   Date Value Ref Range Status   01/17/2024 3.6 2.5 - 4.5 mg/dL Final   01/15/2024 3.3 2.5 - 4.5 mg/dL Final   01/14/2024 3.6 2.5 - 4.5 mg/dL Final   01/13/2024 3.9 2.5 - 4.5 mg/dL Final   04/23/2008 3.4 2.5 - 4.5 mg/dL Final   08/17/2005 3.6 2.5 - 4.5 mg/dL Final     Magnesium   Date Value Ref Range Status   01/17/2024 1.9 1.7 - 2.3 mg/dL Final   01/16/2024 1.9 1.7 - 2.3 mg/dL Final   01/15/2024 1.8 1.7 - 2.3 mg/dL Final   01/14/2024 1.7 1.7 - 2.3 mg/dL Final   10/12/2020 1.9 1.6 - 2.3 mg/dL Final   08/17/2005 2.0 1.6 - 2.3 mg/dL Final     I reviewed the above labs today.       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 No evidence of metastatic disease.     She began neoadjuvant chemotherapy with Doxil/ifosfmaide on 10/17/23. Repeat imaging (PET 12/5/23) after 2 cycles showed treatment response. The plan is to continue with the same regimen for 2 more cycle (4 total) and then proceed with radiation followed by surgery.     She is now s/p 4 cycles of Doxil/ifosfamide. She received these at a slightly lower dose due to age and other medical conditions (doxil 40mg/m2 and  ifosfamide 8g/m2 over 5 days). She has tolerated these fairly well. She did have an episode of hematuria with the most recent cycle which ha not recurred. Her creatinine was also elevated 2 days ago which could be from ifosfamide kassie HCTZ. She will have repeat labs today. If creatinine remains >1.1, we will give a liter of IVF. We will also check a UA and she will have labs again on Monday.     Plan  -Continue twice weekly labs (CBC/p/d, CMP, PO4, Mg) and possible transfusion at [place]. She has these next today.  -Recheck creat today and UA today; likely 1LNS of IVF as above.  -Repeat imaging (PET and MRI tib fib) on 1/29 followed by visit with Dr. Jimenez  -Radiation therapy and ortho surgery visits on 2/1 as planned  ADDENDUM: Creat 1.34 so stable to slightly improved. Fluids today as above. UA with small amount of blood; improved from 6 days ok; push oral fluids as well; no infection.     #Elevated creatinine  -1.40 on 1/17/24; likely s/t ifosfamide but could also be linked to hydrochlorothiazide. Continue to trend  -Recheck today and check UA; if creat 1.1 or >, will give 1LNS and recheck labs as planned Monday  -continue to push po fluids    #CINV  Continue compazine prn    CV  #Hypertension  -Continue losartan 100 mg daily and amlodipine 7.5 mg once daily.  -Hydrochlorothiazine 50mg once daily added during recent admission; 's in clinic on 1/17  -Chlorthalidone discontinued d/t electrolyte disturbance    #Hematuria  UA today  Likely s/t ifosfamide  Has not recurred  Likely IVF today as above    #Neurotoxicity prophylaxis  Thiamine (B-1) 100mg TID for 7 days during chemo    #Hypomagnesemia  On magnesium oxide 400 mg daily. Magnesium normal on 1/17. Continue once daily. Loose stools have resolved.    #Right Leg Swelling, improving  - US RLE on 10/21/23 was negative  - BLE edema improved with addition of hydrochlorothiazide.  Can continue compression stockings and leg elevation prn.     #Cancer-related  pain, right calf, not discussed today  -Much improved as compared to pre-treatment  -Continue ibuprofen prn  -Continue Lyrica 50mg TID  -Continue oxycodone 5-10mg once daily prn  -Of note, did not tolerate Cymbalta     50 minutes spent on the date of the encounter doing chart review, review of test results, interpretation of tests, patient visit, and documentation      Amber Scheierl, CNP  [facility] [address] [address] [phone]

## 2024-01-22 ENCOUNTER — APPOINTMENT (OUTPATIENT)
Dept: LAB | Facility: CLINIC | Age: 69
End: 2024-01-22
Attending: STUDENT IN AN ORGANIZED HEALTH CARE EDUCATION/TRAINING PROGRAM
Payer: COMMERCIAL

## 2024-01-22 ENCOUNTER — INFUSION THERAPY VISIT (OUTPATIENT)
Dept: INFUSION THERAPY | Facility: CLINIC | Age: 69
End: 2024-01-22
Attending: STUDENT IN AN ORGANIZED HEALTH CARE EDUCATION/TRAINING PROGRAM
Payer: COMMERCIAL

## 2024-01-22 DIAGNOSIS — C49.21 SARCOMA OF RIGHT LOWER EXTREMITY (H): ICD-10-CM

## 2024-01-22 LAB
ALBUMIN SERPL BCG-MCNC: 4.1 G/DL (ref 3.5–5.2)
ALP SERPL-CCNC: 115 U/L (ref 40–150)
ALT SERPL W P-5'-P-CCNC: 19 U/L (ref 0–50)
ANION GAP SERPL CALCULATED.3IONS-SCNC: 11 MMOL/L (ref 7–15)
AST SERPL W P-5'-P-CCNC: 19 U/L (ref 0–45)
BASOPHILS # BLD AUTO: ABNORMAL 10*3/UL
BASOPHILS # BLD MANUAL: 0 10E3/UL (ref 0–0.2)
BASOPHILS NFR BLD AUTO: ABNORMAL %
BASOPHILS NFR BLD MANUAL: 1 %
BILIRUB SERPL-MCNC: 0.3 MG/DL
BUN SERPL-MCNC: 23.5 MG/DL (ref 8–23)
CALCIUM SERPL-MCNC: 9.5 MG/DL (ref 8.8–10.2)
CHLORIDE SERPL-SCNC: 105 MMOL/L (ref 98–107)
CREAT SERPL-MCNC: 1.01 MG/DL (ref 0.51–0.95)
DEPRECATED HCO3 PLAS-SCNC: 22 MMOL/L (ref 22–29)
EGFRCR SERPLBLD CKD-EPI 2021: 61 ML/MIN/1.73M2
EOSINOPHIL # BLD AUTO: ABNORMAL 10*3/UL
EOSINOPHIL # BLD MANUAL: 0 10E3/UL (ref 0–0.7)
EOSINOPHIL NFR BLD AUTO: ABNORMAL %
EOSINOPHIL NFR BLD MANUAL: 1 %
ERYTHROCYTE [DISTWIDTH] IN BLOOD BY AUTOMATED COUNT: 14.6 % (ref 10–15)
GLUCOSE SERPL-MCNC: 100 MG/DL (ref 70–99)
HCT VFR BLD AUTO: 23.7 % (ref 35–47)
HGB BLD-MCNC: 8.2 G/DL (ref 11.7–15.7)
IMM GRANULOCYTES # BLD: ABNORMAL 10*3/UL
IMM GRANULOCYTES NFR BLD: ABNORMAL %
LYMPHOCYTES # BLD AUTO: ABNORMAL 10*3/UL
LYMPHOCYTES # BLD MANUAL: 0.4 10E3/UL (ref 0.8–5.3)
LYMPHOCYTES NFR BLD AUTO: ABNORMAL %
LYMPHOCYTES NFR BLD MANUAL: 9 %
MAGNESIUM SERPL-MCNC: 1.5 MG/DL (ref 1.7–2.3)
MCH RBC QN AUTO: 29.9 PG (ref 26.5–33)
MCHC RBC AUTO-ENTMCNC: 34.6 G/DL (ref 31.5–36.5)
MCV RBC AUTO: 87 FL (ref 78–100)
MONOCYTES # BLD AUTO: ABNORMAL 10*3/UL
MONOCYTES # BLD MANUAL: 0.4 10E3/UL (ref 0–1.3)
MONOCYTES NFR BLD AUTO: ABNORMAL %
MONOCYTES NFR BLD MANUAL: 9 %
MYELOCYTES # BLD MANUAL: 0 10E3/UL
MYELOCYTES NFR BLD MANUAL: 1 %
NEUTROPHILS # BLD AUTO: ABNORMAL 10*3/UL
NEUTROPHILS # BLD MANUAL: 3.9 10E3/UL (ref 1.6–8.3)
NEUTROPHILS NFR BLD AUTO: ABNORMAL %
NEUTROPHILS NFR BLD MANUAL: 79 %
NRBC # BLD AUTO: 0 10E3/UL
NRBC BLD AUTO-RTO: 0 /100
PHOSPHATE SERPL-MCNC: 2.8 MG/DL (ref 2.5–4.5)
PLAT MORPH BLD: ABNORMAL
PLATELET # BLD AUTO: 101 10E3/UL (ref 150–450)
POTASSIUM SERPL-SCNC: 3.7 MMOL/L (ref 3.4–5.3)
PROT SERPL-MCNC: 6.7 G/DL (ref 6.4–8.3)
RBC # BLD AUTO: 2.74 10E6/UL (ref 3.8–5.2)
RBC MORPH BLD: ABNORMAL
SODIUM SERPL-SCNC: 138 MMOL/L (ref 135–145)
VARIANT LYMPHS BLD QL SMEAR: PRESENT
WBC # BLD AUTO: 4.9 10E3/UL (ref 4–11)

## 2024-01-22 PROCEDURE — 84100 ASSAY OF PHOSPHORUS: CPT | Performed by: PHYSICIAN ASSISTANT

## 2024-01-22 PROCEDURE — 36415 COLL VENOUS BLD VENIPUNCTURE: CPT

## 2024-01-22 PROCEDURE — 85027 COMPLETE CBC AUTOMATED: CPT | Performed by: STUDENT IN AN ORGANIZED HEALTH CARE EDUCATION/TRAINING PROGRAM

## 2024-01-22 PROCEDURE — 83735 ASSAY OF MAGNESIUM: CPT | Performed by: PHYSICIAN ASSISTANT

## 2024-01-22 PROCEDURE — 80053 COMPREHEN METABOLIC PANEL: CPT | Performed by: PHYSICIAN ASSISTANT

## 2024-01-22 PROCEDURE — 85007 BL SMEAR W/DIFF WBC COUNT: CPT | Performed by: STUDENT IN AN ORGANIZED HEALTH CARE EDUCATION/TRAINING PROGRAM

## 2024-01-22 NOTE — PROGRESS NOTES
Infusion Nursing Note:  Mirta Cardenas presents today for possible blood/platelets transfusion.    Patient seen by provider today: No   present during visit today: Not Applicable.    Note: Magnesium 1.5. She takes 1 tablet per day of Magnesium. Advised her to take 2 tablets today, and tomorrow. She agreed. Will recheck 1/25.     Intravenous Access:  Labs drawn peripherally.    Treatment Conditions:  Lab Results   Component Value Date    HGB 8.2 (L) 01/22/2024    WBC 4.3 01/19/2024    ANEU 3.4 01/19/2024    ANEUTAUTO 2.2 01/16/2024     (L) 01/22/2024        Results reviewed, labs did NOT meet treatment parameters:   Hgb 8.2  Creat 1.01    Discharge Plan:   Patient discharged in stable condition accompanied by: self.  Departure Mode: Ambulatory.    Teddy Awad RN

## 2024-01-25 ENCOUNTER — APPOINTMENT (OUTPATIENT)
Dept: LAB | Facility: CLINIC | Age: 69
End: 2024-01-25
Attending: STUDENT IN AN ORGANIZED HEALTH CARE EDUCATION/TRAINING PROGRAM
Payer: COMMERCIAL

## 2024-01-25 ENCOUNTER — INFUSION THERAPY VISIT (OUTPATIENT)
Dept: INFUSION THERAPY | Facility: CLINIC | Age: 69
End: 2024-01-25
Attending: STUDENT IN AN ORGANIZED HEALTH CARE EDUCATION/TRAINING PROGRAM
Payer: COMMERCIAL

## 2024-01-25 VITALS
DIASTOLIC BLOOD PRESSURE: 72 MMHG | TEMPERATURE: 98.1 F | HEART RATE: 82 BPM | SYSTOLIC BLOOD PRESSURE: 125 MMHG | RESPIRATION RATE: 14 BRPM

## 2024-01-25 DIAGNOSIS — T45.1X5A CINV (CHEMOTHERAPY-INDUCED NAUSEA AND VOMITING): Primary | ICD-10-CM

## 2024-01-25 DIAGNOSIS — C49.21 SARCOMA OF RIGHT LOWER EXTREMITY (H): ICD-10-CM

## 2024-01-25 DIAGNOSIS — R11.2 CINV (CHEMOTHERAPY-INDUCED NAUSEA AND VOMITING): Primary | ICD-10-CM

## 2024-01-25 DIAGNOSIS — E83.42 HYPOMAGNESEMIA: Primary | ICD-10-CM

## 2024-01-25 LAB
ALBUMIN SERPL BCG-MCNC: 4.1 G/DL (ref 3.5–5.2)
ALP SERPL-CCNC: 122 U/L (ref 40–150)
ALT SERPL W P-5'-P-CCNC: 17 U/L (ref 0–50)
ANION GAP SERPL CALCULATED.3IONS-SCNC: 11 MMOL/L (ref 7–15)
AST SERPL W P-5'-P-CCNC: 19 U/L (ref 0–45)
BASOPHILS # BLD AUTO: 0.1 10E3/UL (ref 0–0.2)
BASOPHILS NFR BLD AUTO: 1 %
BILIRUB SERPL-MCNC: 0.2 MG/DL
BUN SERPL-MCNC: 24.1 MG/DL (ref 8–23)
CALCIUM SERPL-MCNC: 9.2 MG/DL (ref 8.8–10.2)
CHLORIDE SERPL-SCNC: 104 MMOL/L (ref 98–107)
CREAT SERPL-MCNC: 1.16 MG/DL (ref 0.51–0.95)
DEPRECATED HCO3 PLAS-SCNC: 23 MMOL/L (ref 22–29)
EGFRCR SERPLBLD CKD-EPI 2021: 51 ML/MIN/1.73M2
EOSINOPHIL # BLD AUTO: 0 10E3/UL (ref 0–0.7)
EOSINOPHIL NFR BLD AUTO: 0 %
ERYTHROCYTE [DISTWIDTH] IN BLOOD BY AUTOMATED COUNT: 15.5 % (ref 10–15)
GLUCOSE SERPL-MCNC: 92 MG/DL (ref 70–99)
HCT VFR BLD AUTO: 24 % (ref 35–47)
HGB BLD-MCNC: 8.3 G/DL (ref 11.7–15.7)
IMM GRANULOCYTES # BLD: 0.2 10E3/UL
IMM GRANULOCYTES NFR BLD: 3 %
LYMPHOCYTES # BLD AUTO: 0.8 10E3/UL (ref 0.8–5.3)
LYMPHOCYTES NFR BLD AUTO: 10 %
MAGNESIUM SERPL-MCNC: 1.6 MG/DL (ref 1.7–2.3)
MCH RBC QN AUTO: 30.1 PG (ref 26.5–33)
MCHC RBC AUTO-ENTMCNC: 34.6 G/DL (ref 31.5–36.5)
MCV RBC AUTO: 87 FL (ref 78–100)
MONOCYTES # BLD AUTO: 0.6 10E3/UL (ref 0–1.3)
MONOCYTES NFR BLD AUTO: 8 %
NEUTROPHILS # BLD AUTO: 5.7 10E3/UL (ref 1.6–8.3)
NEUTROPHILS NFR BLD AUTO: 78 %
NRBC # BLD AUTO: 0 10E3/UL
NRBC BLD AUTO-RTO: 0 /100
PHOSPHATE SERPL-MCNC: 3.3 MG/DL (ref 2.5–4.5)
PLATELET # BLD AUTO: 105 10E3/UL (ref 150–450)
POTASSIUM SERPL-SCNC: 4.1 MMOL/L (ref 3.4–5.3)
PROT SERPL-MCNC: 6.4 G/DL (ref 6.4–8.3)
RBC # BLD AUTO: 2.76 10E6/UL (ref 3.8–5.2)
SODIUM SERPL-SCNC: 138 MMOL/L (ref 135–145)
WBC # BLD AUTO: 7.4 10E3/UL (ref 4–11)

## 2024-01-25 PROCEDURE — 85025 COMPLETE CBC W/AUTO DIFF WBC: CPT | Performed by: STUDENT IN AN ORGANIZED HEALTH CARE EDUCATION/TRAINING PROGRAM

## 2024-01-25 PROCEDURE — 96365 THER/PROPH/DIAG IV INF INIT: CPT

## 2024-01-25 PROCEDURE — 250N000011 HC RX IP 250 OP 636: Performed by: STUDENT IN AN ORGANIZED HEALTH CARE EDUCATION/TRAINING PROGRAM

## 2024-01-25 PROCEDURE — 84100 ASSAY OF PHOSPHORUS: CPT | Performed by: PHYSICIAN ASSISTANT

## 2024-01-25 PROCEDURE — 80053 COMPREHEN METABOLIC PANEL: CPT | Performed by: PHYSICIAN ASSISTANT

## 2024-01-25 PROCEDURE — 36415 COLL VENOUS BLD VENIPUNCTURE: CPT

## 2024-01-25 PROCEDURE — 258N000003 HC RX IP 258 OP 636: Performed by: STUDENT IN AN ORGANIZED HEALTH CARE EDUCATION/TRAINING PROGRAM

## 2024-01-25 PROCEDURE — 83735 ASSAY OF MAGNESIUM: CPT | Performed by: PHYSICIAN ASSISTANT

## 2024-01-25 RX ORDER — DIPHENHYDRAMINE HYDROCHLORIDE 50 MG/ML
50 INJECTION INTRAMUSCULAR; INTRAVENOUS
Start: 2024-01-25

## 2024-01-25 RX ORDER — HEPARIN SODIUM,PORCINE 10 UNIT/ML
5-20 VIAL (ML) INTRAVENOUS DAILY PRN
OUTPATIENT
Start: 2024-01-25

## 2024-01-25 RX ORDER — HEPARIN SODIUM (PORCINE) LOCK FLUSH IV SOLN 100 UNIT/ML 100 UNIT/ML
5 SOLUTION INTRAVENOUS
OUTPATIENT
Start: 2024-01-25

## 2024-01-25 RX ORDER — METHYLPREDNISOLONE SODIUM SUCCINATE 125 MG/2ML
125 INJECTION, POWDER, LYOPHILIZED, FOR SOLUTION INTRAMUSCULAR; INTRAVENOUS
Start: 2024-01-25

## 2024-01-25 RX ORDER — MAGNESIUM SULFATE HEPTAHYDRATE 40 MG/ML
2 INJECTION, SOLUTION INTRAVENOUS ONCE
Status: COMPLETED | OUTPATIENT
Start: 2024-01-25 | End: 2024-01-25

## 2024-01-25 RX ORDER — EPINEPHRINE 1 MG/ML
0.3 INJECTION, SOLUTION, CONCENTRATE INTRAVENOUS EVERY 5 MIN PRN
OUTPATIENT
Start: 2024-01-25

## 2024-01-25 RX ORDER — ALBUTEROL SULFATE 90 UG/1
1-2 AEROSOL, METERED RESPIRATORY (INHALATION)
Start: 2024-01-25

## 2024-01-25 RX ORDER — MEPERIDINE HYDROCHLORIDE 25 MG/ML
25 INJECTION INTRAMUSCULAR; INTRAVENOUS; SUBCUTANEOUS EVERY 30 MIN PRN
OUTPATIENT
Start: 2024-01-25

## 2024-01-25 RX ORDER — ALBUTEROL SULFATE 0.83 MG/ML
2.5 SOLUTION RESPIRATORY (INHALATION)
OUTPATIENT
Start: 2024-01-25

## 2024-01-25 RX ADMIN — SODIUM CHLORIDE 1000 ML: 9 INJECTION, SOLUTION INTRAVENOUS at 11:59

## 2024-01-25 RX ADMIN — MAGNESIUM SULFATE HEPTAHYDRATE 2 G: 2 INJECTION, SOLUTION INTRAVENOUS at 11:59

## 2024-01-25 ASSESSMENT — PAIN SCALES - GENERAL: PAINLEVEL: NO PAIN (0)

## 2024-01-25 NOTE — PROGRESS NOTES
Infusion Nursing Note:  Mirta Cardenas presents today for possible blood transfusion.    Patient seen by provider today: No   present during visit today: Not Applicable.    Note: Labs drawn peripherally.      Intravenous Access:  Peripheral IV placed.    Treatment Conditions:  No transfusion indicated, however Magnesium was replaced & IVFs given for elevated SCrt & diarrhea. Provider will send Rx Slow Mag for Mirta to start tomorrow due to GI issues with current supplement.      Post Infusion Assessment:  Patient tolerated infusion without incident.  Site patent and intact, free from redness, edema or discomfort.  No evidence of extravasations.  Access discontinued per protocol.       Discharge Plan:   Patient discharged in stable condition accompanied by: .  Departure Mode: Ambulatory.      Faviola Padilla RN

## 2024-01-29 ENCOUNTER — HOSPITAL ENCOUNTER (OUTPATIENT)
Dept: PET IMAGING | Facility: HOSPITAL | Age: 69
Discharge: HOME | End: 2024-01-29
Attending: STUDENT IN AN ORGANIZED HEALTH CARE EDUCATION/TRAINING PROGRAM
Payer: COMMERCIAL

## 2024-01-29 ENCOUNTER — LAB (OUTPATIENT)
Dept: LAB | Facility: HOSPITAL | Age: 69
End: 2024-01-29
Attending: STUDENT IN AN ORGANIZED HEALTH CARE EDUCATION/TRAINING PROGRAM
Payer: COMMERCIAL

## 2024-01-29 ENCOUNTER — HOSPITAL ENCOUNTER (OUTPATIENT)
Dept: MRI IMAGING | Facility: HOSPITAL | Age: 69
Discharge: HOME | End: 2024-01-29
Attending: STUDENT IN AN ORGANIZED HEALTH CARE EDUCATION/TRAINING PROGRAM
Payer: COMMERCIAL

## 2024-01-29 DIAGNOSIS — C49.21 SARCOMA OF RIGHT LOWER EXTREMITY (H): ICD-10-CM

## 2024-01-29 DIAGNOSIS — E83.42 HYPOMAGNESEMIA: ICD-10-CM

## 2024-01-29 LAB
ANION GAP SERPL CALCULATED.3IONS-SCNC: 7 MMOL/L (ref 7–15)
BUN SERPL-MCNC: 21.3 MG/DL (ref 8–23)
CALCIUM SERPL-MCNC: 9.2 MG/DL (ref 8.8–10.2)
CHLORIDE SERPL-SCNC: 105 MMOL/L (ref 98–107)
CREAT SERPL-MCNC: 1 MG/DL (ref 0.51–0.95)
DEPRECATED HCO3 PLAS-SCNC: 29 MMOL/L (ref 22–29)
EGFRCR SERPLBLD CKD-EPI 2021: 61 ML/MIN/1.73M2
GLUCOSE BLDC GLUCOMTR-MCNC: 94 MG/DL (ref 70–99)
GLUCOSE SERPL-MCNC: 101 MG/DL (ref 70–99)
MAGNESIUM SERPL-MCNC: 1.8 MG/DL (ref 1.7–2.3)
POTASSIUM SERPL-SCNC: 4.5 MMOL/L (ref 3.4–5.3)
SODIUM SERPL-SCNC: 141 MMOL/L (ref 135–145)

## 2024-01-29 PROCEDURE — A9552 F18 FDG: HCPCS | Performed by: STUDENT IN AN ORGANIZED HEALTH CARE EDUCATION/TRAINING PROGRAM

## 2024-01-29 PROCEDURE — 82565 ASSAY OF CREATININE: CPT

## 2024-01-29 PROCEDURE — 73720 MRI LWR EXTREMITY W/O&W/DYE: CPT | Mod: RT

## 2024-01-29 PROCEDURE — 82962 GLUCOSE BLOOD TEST: CPT

## 2024-01-29 PROCEDURE — 36415 COLL VENOUS BLD VENIPUNCTURE: CPT

## 2024-01-29 PROCEDURE — A9585 GADOBUTROL INJECTION: HCPCS | Performed by: STUDENT IN AN ORGANIZED HEALTH CARE EDUCATION/TRAINING PROGRAM

## 2024-01-29 PROCEDURE — 255N000002 HC RX 255 OP 636: Performed by: STUDENT IN AN ORGANIZED HEALTH CARE EDUCATION/TRAINING PROGRAM

## 2024-01-29 PROCEDURE — 78816 PET IMAGE W/CT FULL BODY: CPT | Mod: PS

## 2024-01-29 PROCEDURE — 343N000001 HC RX 343: Performed by: STUDENT IN AN ORGANIZED HEALTH CARE EDUCATION/TRAINING PROGRAM

## 2024-01-29 PROCEDURE — 83735 ASSAY OF MAGNESIUM: CPT

## 2024-01-29 RX ORDER — GADOBUTROL 604.72 MG/ML
7.5 INJECTION INTRAVENOUS ONCE
Status: COMPLETED | OUTPATIENT
Start: 2024-01-29 | End: 2024-01-29

## 2024-01-29 RX ADMIN — GADOBUTROL 7.5 ML: 604.72 INJECTION INTRAVENOUS at 12:52

## 2024-01-29 RX ADMIN — FLUDEOXYGLUCOSE F-18 10.13 MILLICURIE: 500 INJECTION, SOLUTION INTRAVENOUS at 10:00

## 2024-01-31 ENCOUNTER — APPOINTMENT (OUTPATIENT)
Dept: LAB | Facility: CLINIC | Age: 69
End: 2024-01-31
Attending: STUDENT IN AN ORGANIZED HEALTH CARE EDUCATION/TRAINING PROGRAM
Payer: COMMERCIAL

## 2024-01-31 ENCOUNTER — ONCOLOGY VISIT (OUTPATIENT)
Dept: ONCOLOGY | Facility: CLINIC | Age: 69
End: 2024-01-31
Attending: STUDENT IN AN ORGANIZED HEALTH CARE EDUCATION/TRAINING PROGRAM
Payer: COMMERCIAL

## 2024-01-31 VITALS
TEMPERATURE: 97.5 F | OXYGEN SATURATION: 100 % | WEIGHT: 177.7 LBS | BODY MASS INDEX: 33.58 KG/M2 | RESPIRATION RATE: 18 BRPM | HEART RATE: 89 BPM | DIASTOLIC BLOOD PRESSURE: 78 MMHG | SYSTOLIC BLOOD PRESSURE: 153 MMHG

## 2024-01-31 DIAGNOSIS — I10 BENIGN ESSENTIAL HYPERTENSION: ICD-10-CM

## 2024-01-31 DIAGNOSIS — C49.21 SARCOMA OF RIGHT LOWER EXTREMITY (H): ICD-10-CM

## 2024-01-31 LAB
ALBUMIN SERPL BCG-MCNC: 4.3 G/DL (ref 3.5–5.2)
ALP SERPL-CCNC: 114 U/L (ref 40–150)
ALT SERPL W P-5'-P-CCNC: 16 U/L (ref 0–50)
ANION GAP SERPL CALCULATED.3IONS-SCNC: 10 MMOL/L (ref 7–15)
AST SERPL W P-5'-P-CCNC: 19 U/L (ref 0–45)
BASOPHILS # BLD AUTO: 0.1 10E3/UL (ref 0–0.2)
BASOPHILS NFR BLD AUTO: 1 %
BILIRUB SERPL-MCNC: 0.3 MG/DL
BUN SERPL-MCNC: 22.1 MG/DL (ref 8–23)
CALCIUM SERPL-MCNC: 9.4 MG/DL (ref 8.8–10.2)
CHLORIDE SERPL-SCNC: 104 MMOL/L (ref 98–107)
CREAT SERPL-MCNC: 0.94 MG/DL (ref 0.51–0.95)
DEPRECATED HCO3 PLAS-SCNC: 25 MMOL/L (ref 22–29)
EGFRCR SERPLBLD CKD-EPI 2021: 66 ML/MIN/1.73M2
EOSINOPHIL # BLD AUTO: 0 10E3/UL (ref 0–0.7)
EOSINOPHIL NFR BLD AUTO: 0 %
ERYTHROCYTE [DISTWIDTH] IN BLOOD BY AUTOMATED COUNT: 15.9 % (ref 10–15)
GLUCOSE SERPL-MCNC: 95 MG/DL (ref 70–99)
HCT VFR BLD AUTO: 25.8 % (ref 35–47)
HGB BLD-MCNC: 9.1 G/DL (ref 11.7–15.7)
IMM GRANULOCYTES # BLD: 0.1 10E3/UL
IMM GRANULOCYTES NFR BLD: 2 %
LYMPHOCYTES # BLD AUTO: 0.9 10E3/UL (ref 0.8–5.3)
LYMPHOCYTES NFR BLD AUTO: 13 %
MAGNESIUM SERPL-MCNC: 2 MG/DL (ref 1.7–2.3)
MCH RBC QN AUTO: 30.7 PG (ref 26.5–33)
MCHC RBC AUTO-ENTMCNC: 35.3 G/DL (ref 31.5–36.5)
MCV RBC AUTO: 87 FL (ref 78–100)
MONOCYTES # BLD AUTO: 0.6 10E3/UL (ref 0–1.3)
MONOCYTES NFR BLD AUTO: 9 %
NEUTROPHILS # BLD AUTO: 5.1 10E3/UL (ref 1.6–8.3)
NEUTROPHILS NFR BLD AUTO: 75 %
NRBC # BLD AUTO: 0 10E3/UL
NRBC BLD AUTO-RTO: 0 /100
PHOSPHATE SERPL-MCNC: 3.7 MG/DL (ref 2.5–4.5)
PLATELET # BLD AUTO: 144 10E3/UL (ref 150–450)
POTASSIUM SERPL-SCNC: 4.5 MMOL/L (ref 3.4–5.3)
PROT SERPL-MCNC: 6.7 G/DL (ref 6.4–8.3)
RBC # BLD AUTO: 2.96 10E6/UL (ref 3.8–5.2)
SODIUM SERPL-SCNC: 139 MMOL/L (ref 135–145)
WBC # BLD AUTO: 6.8 10E3/UL (ref 4–11)

## 2024-01-31 PROCEDURE — 82040 ASSAY OF SERUM ALBUMIN: CPT | Performed by: STUDENT IN AN ORGANIZED HEALTH CARE EDUCATION/TRAINING PROGRAM

## 2024-01-31 PROCEDURE — 83735 ASSAY OF MAGNESIUM: CPT | Performed by: STUDENT IN AN ORGANIZED HEALTH CARE EDUCATION/TRAINING PROGRAM

## 2024-01-31 PROCEDURE — G0463 HOSPITAL OUTPT CLINIC VISIT: HCPCS | Performed by: STUDENT IN AN ORGANIZED HEALTH CARE EDUCATION/TRAINING PROGRAM

## 2024-01-31 PROCEDURE — 85025 COMPLETE CBC W/AUTO DIFF WBC: CPT | Performed by: STUDENT IN AN ORGANIZED HEALTH CARE EDUCATION/TRAINING PROGRAM

## 2024-01-31 PROCEDURE — 36415 COLL VENOUS BLD VENIPUNCTURE: CPT | Performed by: STUDENT IN AN ORGANIZED HEALTH CARE EDUCATION/TRAINING PROGRAM

## 2024-01-31 PROCEDURE — 84100 ASSAY OF PHOSPHORUS: CPT | Performed by: STUDENT IN AN ORGANIZED HEALTH CARE EDUCATION/TRAINING PROGRAM

## 2024-01-31 PROCEDURE — 99215 OFFICE O/P EST HI 40 MIN: CPT | Performed by: STUDENT IN AN ORGANIZED HEALTH CARE EDUCATION/TRAINING PROGRAM

## 2024-01-31 RX ORDER — HYDROCHLOROTHIAZIDE 50 MG/1
50 TABLET ORAL DAILY
Qty: 30 TABLET | Refills: 3 | Status: SHIPPED | OUTPATIENT
Start: 2024-01-31 | End: 2024-05-29

## 2024-01-31 RX ORDER — PREGABALIN 50 MG/1
50 CAPSULE ORAL 3 TIMES DAILY
Qty: 90 CAPSULE | Refills: 1 | Status: SHIPPED | OUTPATIENT
Start: 2024-01-31 | End: 2024-04-15

## 2024-01-31 ASSESSMENT — PAIN SCALES - GENERAL: PAINLEVEL: NO PAIN (0)

## 2024-01-31 NOTE — PROGRESS NOTES
[facility]    FOLLOW UP VISIT NOTE    PATIENT NAME: Mirta Cardenas MRN # [id number]  DATE OF VISIT: January 31, 2023 YOB: 1955    REFERRING PROVIDER: Benny Barroso MD  [address]  [place] [address]    CANCER TYPE:  High grade pleomorphic sarcoma       CHIEF COMPLAIN   LE pain     HISTORY OF PRESENT ILLNESS   68-year-old female with PMH of CLL and recent Dx of High Grade Pleomorphic sarcoma, with intermittent right calf pain but then continuous calf pain beginning in March 2023.  She had a recent x-ray which shows a destructive lesion in the midportion R LE in between the fibula and the tibia. She is experiencing significant pain, she is restless during the visit due to pain. She is currently taking both Advil and Tylenol and oxycodone for pain, with minimal control, feels ibuprofen is what helps the most. She has been alternating all pain medications without optimizing doses of them.     C1D1 10/17/23  C2 11/14  C2 12/13  C4 1/10    Interval Hx  She reports that she tolerated chemotherapy very well, only mild fatigue, no significant nausea.        PAST ONCOLOGIC HISTORY   CLL - on monitoring     PAST MEDICAL HISTORY   Anal fisure - controlled with nitrobid  CLL - now being monitored - [place]  HTN  Cholesterol  Hx of severe COVID  Psoriasis    PAST SURGICAL HISTORY   3 c- sections  Hysterectomy  Ooforectomy     FAMILY HISTORY   Sister with MM     ALLERGIES      Allergies   Allergen Reactions    Allopurinol Itching    Amoxicillin Hives    Codeine Itching    Cymbalta [Duloxetine Hcl] Fatigue    Periactin Other (See Comments)     Sleepy.    11/15/23: patient not sure about this allergy/intolerance - may be interested in removal    Tenormin [Atenolol] Fatigue     11/15/23: patient may be interested in removal of this from allergy list as it was only fatigue/sleepiness          SOCIAL HISTORY     Social History     Social History Narrative    Not  on file     Lives with , Giancarlo, she retired last spring, denies alcohol, tobacco or other drugs.      CURRENT OUTPATIENT MEDICATIONS     Current Outpatient Medications   Medication Sig Dispense Refill    amLODIPine (NORVASC) 5 MG tablet Take 1.5 tablets (7.5 mg) by mouth daily 135 tablet 3    calcipotriene (DOVONOX) 0.005 % external cream Apply twice daily to areas of psoriasis on Saturday and Sunday. 60 g 6    clobetasol (TEMOVATE) 0.05 % external cream Apply twice daily to psoriasis on Monday through Friday. (Patient taking differently: Apply twice daily to psoriasis on Monday through Friday as needed) 60 g 5    docusate sodium (COLACE) 100 MG capsule Take 100 mg by mouth 2 times daily as needed      hydrochlorothiazide (HYDRODIURIL) 50 MG tablet Take 1 tablet (50 mg) by mouth daily 30 tablet 0    levofloxacin (LEVAQUIN) 500 MG tablet Take 1 tablet (500 mg) by mouth daily 10 tablet 0    losartan (COZAAR) 100 MG tablet Take 1 tablet (100 mg) by mouth daily (Patient taking differently: Take 100 mg by mouth every evening) 90 tablet 3    magnesium chloride 535 (64 Mg) MG TBEC CR tablet Take 1 tablet (535 mg) by mouth daily Starting Friday 1/26/24 14 tablet 0    Multiple Vitamins-Calcium (ONE-A-DAY WOMENS FORMULA PO) Take 1 tablet by mouth daily      omeprazole (PRILOSEC) 20 MG DR capsule Take 1 capsule by mouth every morning.      oxyCODONE (ROXICODONE) 5 MG tablet Take 1-2 tablets (5-10 mg) by mouth as needed for severe pain (Patient taking differently: Take 5-10 mg by mouth daily as needed for severe pain) 60 tablet 0    pravastatin (PRAVACHOL) 40 MG tablet Take 1 tablet (40 mg) by mouth daily (Patient taking differently: Take 40 mg by mouth every evening) 90 tablet 3    pregabalin (LYRICA) 50 MG capsule Take 1 capsule (50 mg) by mouth 3 times daily 90 capsule 0    prochlorperazine (COMPAZINE) 5 MG tablet Take 1 tablet (5 mg) by mouth every 6 hours as needed (Breakthrough Nausea / Vomiting) 30 tablet 0           REVIEW OF SYSTEMS   As above in the HPI, o/w complete 12-point ROS was negative.     PHYSICAL EXAM   BP (!) 153/78 (BP Location: Right arm, Patient Position: Sitting, Cuff Size: Adult Regular)   Pulse 89   Temp 97.5  F (36.4  C) (Tympanic)   Resp 18   Wt 80.6 kg (177 lb 11.2 oz)   SpO2 100%   BMI 33.58 kg/m      EGO (due to pain related to the mass)  GEN: NAD  HEENT: PERRL, EOMI, no icterus, injection or pallor. Oropharynx is clear.  NECK: no cervical or supraclavicular lymphadenopathy  LUNGS: clear bilaterally  CV: regular, no murmurs, rubs, or gallops  ABDOMEN: soft, non-tender, non-distended, normal bowel sounds, no hepatosplenomegaly by percussion or palpation  EXT: warm, well perfused, no edema. R calf is more prominent and a deep mass appreciated on physical exam in between the tibia and fibular bone. Improved from presentation.   NEURO: alert  SKIN: no rashes     LABORATORY AND IMAGING STUDIES     Lab Results   Component Value Date    WBC 6.8 2024    WBC 15.6 05/10/2021     Lab Results   Component Value Date    RBC 2.96 2024    RBC 4.89 05/10/2021     Lab Results   Component Value Date    HGB 9.1 2024    HGB 13.7 05/10/2021     Lab Results   Component Value Date    HCT 25.8 2024    HCT 41.0 05/10/2021     Lab Results   Component Value Date    MCV 87 2024    MCV 84 05/10/2021     Lab Results   Component Value Date    MCH 30.7 2024    MCH 28.0 05/10/2021     Lab Results   Component Value Date    MCHC 35.3 2024    MCHC 33.4 05/10/2021     Lab Results   Component Value Date    RDW 15.9 2024    RDW 13.1 05/10/2021     Lab Results   Component Value Date     2024     05/10/2021     Last Comprehensive Metabolic Panel:  Sodium   Date Value Ref Range Status   2024 139 135 - 145 mmol/L Final     Comment:     Reference intervals for this test were updated on 2023 to more accurately reflect our healthy population. There may  be differences in the flagging of prior results with similar values performed with this method. Interpretation of those prior results can be made in the context of the updated reference intervals.    05/10/2021 141 133 - 144 mmol/L Final     Potassium   Date Value Ref Range Status   01/31/2024 4.5 3.4 - 5.3 mmol/L Final   05/24/2022 4.7 3.4 - 5.3 mmol/L Final   05/10/2021 3.9 3.4 - 5.3 mmol/L Final     Chloride   Date Value Ref Range Status   01/31/2024 104 98 - 107 mmol/L Final   05/24/2022 108 94 - 109 mmol/L Final   05/10/2021 108 94 - 109 mmol/L Final     Carbon Dioxide   Date Value Ref Range Status   05/10/2021 27 20 - 32 mmol/L Final     Carbon Dioxide (CO2)   Date Value Ref Range Status   01/31/2024 25 22 - 29 mmol/L Final   05/24/2022 29 20 - 32 mmol/L Final     Anion Gap   Date Value Ref Range Status   01/31/2024 10 7 - 15 mmol/L Final   05/24/2022 2 (L) 3 - 14 mmol/L Final   05/10/2021 6 3 - 14 mmol/L Final     Glucose   Date Value Ref Range Status   01/31/2024 95 70 - 99 mg/dL Final   05/24/2022 108 (H) 70 - 99 mg/dL Final   05/10/2021 112 (H) 70 - 99 mg/dL Final     Comment:     Fasting specimen     GLUCOSE BY METER POCT   Date Value Ref Range Status   01/29/2024 94 70 - 99 mg/dL Final     Urea Nitrogen   Date Value Ref Range Status   01/31/2024 22.1 8.0 - 23.0 mg/dL Final   05/24/2022 14 7 - 30 mg/dL Final   05/10/2021 19 7 - 30 mg/dL Final     Creatinine   Date Value Ref Range Status   01/31/2024 0.94 0.51 - 0.95 mg/dL Final   05/10/2021 0.89 0.52 - 1.04 mg/dL Final     GFR Estimate   Date Value Ref Range Status   01/31/2024 66 >60 mL/min/1.73m2 Final   05/10/2021 69 >60 mL/min/[1.73_m2] Final     Comment:     Non  GFR Calc  Starting 12/18/2018, serum creatinine based estimated GFR (eGFR) will be   calculated using the Chronic Kidney Disease Epidemiology Collaboration   (CKD-EPI) equation.       GFR, ESTIMATED POCT   Date Value Ref Range Status   10/10/2023 >60 >60 mL/min/1.73m2 Final      Calcium   Date Value Ref Range Status   01/31/2024 9.4 8.8 - 10.2 mg/dL Final   05/10/2021 8.6 8.5 - 10.1 mg/dL Final     Bilirubin Total   Date Value Ref Range Status   01/31/2024 0.3 <=1.2 mg/dL Final   10/12/2020 0.3 0.2 - 1.3 mg/dL Final     Alkaline Phosphatase   Date Value Ref Range Status   01/31/2024 114 40 - 150 U/L Final     Comment:     Reference intervals for this test were updated on 11/14/2023 to more accurately reflect our healthy population. There may be differences in the flagging of prior results with similar values performed with this method. Interpretation of those prior results can be made in the context of the updated reference intervals.   10/12/2020 124 40 - 150 U/L Final     ALT   Date Value Ref Range Status   01/31/2024 16 0 - 50 U/L Final     Comment:     Reference intervals for this test were updated on 6/12/2023 to more accurately reflect our healthy population. There may be differences in the flagging of prior results with similar values performed with this method. Interpretation of those prior results can be made in the context of the updated reference intervals.     10/12/2020 40 0 - 50 U/L Final     AST   Date Value Ref Range Status   01/31/2024 19 0 - 45 U/L Final     Comment:     Reference intervals for this test were updated on 6/12/2023 to more accurately reflect our healthy population. There may be differences in the flagging of prior results with similar values performed with this method. Interpretation of those prior results can be made in the context of the updated reference intervals.   10/12/2020 35 0 - 45 U/L Final       PET CT 12/5/23  PET/CT FINDINGS: Decreased size and FDG avidity of a right calf soft tissue mass with SUV max of 7.1, previously 26.4. Mass is difficult to measure anatomically by noncontrast CT. Similar cortical destruction of the right fibular diaphysis. Mild   diffusely increased marrow uptake is likely treatment related. No convincing evidence of  FDG avid metastatic disease. Mildly enlarged bilateral iliac chain lymph nodes appear similar and do not demonstrate FDG avidity above that of blood pool, favored   reactive.     CT FINDINGS: Moderate coronary artery calcifications. Similar hepatosplenomegaly. Similar multifocal scarring and parenchymal atrophy of the right kidney with multiple nonobstructing renal calculi. Colonic diverticulosis. Large fat-containing lower   ventral abdominal wall hernia.                                                                      IMPRESSION:     Partial response to therapy with decreasing size and FDG avidity of the right calf mass. No evidence of new FDG avid disease.     PET scan 1/29/24  IMPRESSION: Although much of the uptake along the superior and middle aspects of the right calf mass has decreased or resolved, there are two foci of uptake along the more inferior portions of the mass which have increased compared to December 2023,   concerning for disease progression.    MRI Tibia/Fibula 1/29/24  IMPRESSION:  1.  Interval decrease in size in the heterogeneously enhancing mass within the posterior tibial muscle belly. This has decreased from 3.3 x 5.2 x 9.3 cm and now measures 2.5 x 3.5 x 5.0 cm. It remains consistent with sarcoma.  2.  There is some surrounding T2 signal abnormality within the adjacent musculature including along the apposing side of the interosseous membrane within the anterior compartment. This could be secondary to post radiation change or a component of   reactive edema. It is difficult to completely exclude microinvasion of tumor.  3.  Edema or cellulitis about the lower leg has minimally increased since the previous examination.  4.  Stable bony irregularity and remodeling of the fibula which is at the margin of the neoplasm.  5.  Focus of T2 signal abnormality within the diaphyseal shaft of the tibia is poorly marginated. This may represent a component of mild stress reaction. Again, it is  "difficult to completely exclude a new bone lesion. This is considered unlikely, but   follow-up examination is recommended.  6.  No evidence for pathologic fracture.  7.  Exam otherwise negative.     PATHOLOGY   10/2/23  Final Diagnosis   A.  Soft tissue, right calf mass, excision:  - High-grade pleomorphic sarcoma  - See comment    Electronically signed by Jonny Zhou MD on 10/4/2023 at  9:09 PM   Comment  [facility]   Immunohistochemical stains show the tumor is negative for CK AE1/AE3, S100, desmin and CD34 while SATB2 demonstrates patchy reactivity.  While the primary diagnostic consideration is an undifferentiated pleomorphic sarcoma, the patchy SATB2 reactivity raises the possibility of a poorly differentiated osteosarcoma.   Clinical Information  [facility]   The patient is a 68 year old female with a history of CLL and several months of sensitivity of the lateral right calf, and recent imaging showing: \"Lytic lesion in the right fibular proximal diaphysis, with complete resorption of the medial cortex and some periosteal reaction adjacent to the lesion\".            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 No evidence of metastatic disease.     In her case, the probability of recurrence with surgery alone is about 40% in the next 10 years. We discussed, that recurrence in this scenario is usually with distant  metastasis which then becomes uncurable disease. We discussed, that for this reason, I recommend treatment with neoadjuvant chemotherapy with the goal of preventing metastasis in the future. We discussed the potential side effects of chemotherapy and need for emergent treatment in order to improve her symptoms. The regimen will be doxil 40mg/m2 and ifosfamide 8g/m2 over 5 days. Slightly lower dose due to age and other medical conditions.   She is s/p C4 and tolerated " chemotherapy well.     I personally reviewed the most recent PET and MRI leg, she had significant decrease in size and metabolic activity compared to pre-treatment, however this is a partial response with 2 foci that appear more active.   She will see Dr. Armstrong and Dr. Barroso to discuss the future surgery and role of radiation therapy if decision is made for limb salvage.       Plan  -Proceed with Radiation therapy and Surgery plan  -PET scan 5/27/2024   -Follow up after PET 5/28 to discuss role of adjuvant therapy.    40 minutes spent on the date of the encounter doing chart review, review of outside records, review of test results, interpretation of tests, patient visit, documentation, and discussion with other provider(s)     Roney Nam MD   of Medicine  Hematology, Oncology and Transplantation

## 2024-01-31 NOTE — NURSING NOTE
"Oncology Rooming Note    January 31, 2024 11:43 AM   Mirta Cardenas is a 69 year old female who presents for:    Chief Complaint   Patient presents with    Oncology Clinic Visit     Sarcina of right lower extremity    Blood Draw     Labs drawn via  by RN. VS taken.     Initial Vitals: BP (!) 153/78 (BP Location: Right arm, Patient Position: Sitting, Cuff Size: Adult Regular)   Pulse 89   Temp 97.5  F (36.4  C) (Tympanic)   Resp 18   Wt 80.6 kg (177 lb 11.2 oz)   SpO2 100%   BMI 33.58 kg/m   Estimated body mass index is 33.58 kg/m  as calculated from the following:    Height as of 1/19/24: 1.549 m (5' 1\").    Weight as of this encounter: 80.6 kg (177 lb 11.2 oz). Body surface area is 1.86 meters squared.  No Pain (0) Comment: Data Unavailable   No LMP recorded. Patient has had a hysterectomy.  Allergies reviewed: Yes  Medications reviewed: Yes    Medications: Medication refills not needed today.  Pharmacy name entered into [facility]:    [facility] [address] [place] [address] - [address] [facility], [place] - [address] [place] [address]    Frailty Screening:   Is the patient here for a new oncology consult visit in cancer care? 2. No      Clinical concerns: none       Cassandra Huntley              "

## 2024-01-31 NOTE — NURSING NOTE
Chief Complaint   Patient presents with    Oncology Clinic Visit     Sarcina of right lower extremity    Blood Draw     Labs drawn via  by RN. VS taken.     Labs collected from venipuncture by RN. Vitals taken. Checked in for appointment(s).     Thelma Roblero RN

## 2024-01-31 NOTE — LETTER
1/31/2024         RE: Mirta Cardenas  [id number] [facility] [address]        Dear Colleague,    Thank you for referring your patient, Mirta Cardenas, to the [facility]. Please see a copy of my visit note below.    [facility]    FOLLOW UP VISIT NOTE    PATIENT NAME: Mirta Cardenas MRN # [id number]  DATE OF VISIT: January 31, 2023 YOB: 1955    REFERRING PROVIDER: Benny Barroso MD  [address]  [place] [address]    CANCER TYPE:  High grade pleomorphic sarcoma       CHIEF COMPLAIN   LE pain     HISTORY OF PRESENT ILLNESS   68-year-old female with PMH of CLL and recent Dx of High Grade Pleomorphic sarcoma, with intermittent right calf pain but then continuous calf pain beginning in March 2023.  She had a recent x-ray which shows a destructive lesion in the midportion R LE in between the fibula and the tibia. She is experiencing significant pain, she is restless during the visit due to pain. She is currently taking both Advil and Tylenol and oxycodone for pain, with minimal control, feels ibuprofen is what helps the most. She has been alternating all pain medications without optimizing doses of them.     C1D1 10/17/23  C2 11/14  C2 12/13  C4 1/10    Interval Hx  She reports that she tolerated chemotherapy very well, only mild fatigue, no significant nausea.        PAST ONCOLOGIC HISTORY   CLL - on monitoring     PAST MEDICAL HISTORY   Anal fisure - controlled with nitrobid  CLL - now being monitored - [place]  HTN  Cholesterol  Hx of severe COVID  Psoriasis    PAST SURGICAL HISTORY   3 c- sections  Hysterectomy  Ooforectomy     FAMILY HISTORY   Sister with MM     ALLERGIES      Allergies   Allergen Reactions    Allopurinol Itching    Amoxicillin Hives    Codeine Itching    Cymbalta [Duloxetine Hcl] Fatigue    Periactin Other (See Comments)     Sleepy.    11/15/23: patient not sure about this  allergy/intolerance - may be interested in removal    Tenormin [Atenolol] Fatigue     11/15/23: patient may be interested in removal of this from allergy list as it was only fatigue/sleepiness          SOCIAL HISTORY     Social History     Social History Narrative    Not on file     Lives with , Giancarlo, she retired last spring, denies alcohol, tobacco or other drugs.      CURRENT OUTPATIENT MEDICATIONS     Current Outpatient Medications   Medication Sig Dispense Refill    amLODIPine (NORVASC) 5 MG tablet Take 1.5 tablets (7.5 mg) by mouth daily 135 tablet 3    calcipotriene (DOVONOX) 0.005 % external cream Apply twice daily to areas of psoriasis on Saturday and Sunday. 60 g 6    clobetasol (TEMOVATE) 0.05 % external cream Apply twice daily to psoriasis on Monday through Friday. (Patient taking differently: Apply twice daily to psoriasis on Monday through Friday as needed) 60 g 5    docusate sodium (COLACE) 100 MG capsule Take 100 mg by mouth 2 times daily as needed      hydrochlorothiazide (HYDRODIURIL) 50 MG tablet Take 1 tablet (50 mg) by mouth daily 30 tablet 0    levofloxacin (LEVAQUIN) 500 MG tablet Take 1 tablet (500 mg) by mouth daily 10 tablet 0    losartan (COZAAR) 100 MG tablet Take 1 tablet (100 mg) by mouth daily (Patient taking differently: Take 100 mg by mouth every evening) 90 tablet 3    magnesium chloride 535 (64 Mg) MG TBEC CR tablet Take 1 tablet (535 mg) by mouth daily Starting Friday 1/26/24 14 tablet 0    Multiple Vitamins-Calcium (ONE-A-DAY WOMENS FORMULA PO) Take 1 tablet by mouth daily      omeprazole (PRILOSEC) 20 MG DR capsule Take 1 capsule by mouth every morning.      oxyCODONE (ROXICODONE) 5 MG tablet Take 1-2 tablets (5-10 mg) by mouth as needed for severe pain (Patient taking differently: Take 5-10 mg by mouth daily as needed for severe pain) 60 tablet 0    pravastatin (PRAVACHOL) 40 MG tablet Take 1 tablet (40 mg) by mouth daily (Patient taking differently: Take 40 mg by mouth  every evening) 90 tablet 3    pregabalin (LYRICA) 50 MG capsule Take 1 capsule (50 mg) by mouth 3 times daily 90 capsule 0    prochlorperazine (COMPAZINE) 5 MG tablet Take 1 tablet (5 mg) by mouth every 6 hours as needed (Breakthrough Nausea / Vomiting) 30 tablet 0          REVIEW OF SYSTEMS   As above in the HPI, o/w complete 12-point ROS was negative.     PHYSICAL EXAM   BP (!) 153/78 (BP Location: Right arm, Patient Position: Sitting, Cuff Size: Adult Regular)   Pulse 89   Temp 97.5  F (36.4  C) (Tympanic)   Resp 18   Wt 80.6 kg (177 lb 11.2 oz)   SpO2 100%   BMI 33.58 kg/m      EGO (due to pain related to the mass)  GEN: NAD  HEENT: PERRL, EOMI, no icterus, injection or pallor. Oropharynx is clear.  NECK: no cervical or supraclavicular lymphadenopathy  LUNGS: clear bilaterally  CV: regular, no murmurs, rubs, or gallops  ABDOMEN: soft, non-tender, non-distended, normal bowel sounds, no hepatosplenomegaly by percussion or palpation  EXT: warm, well perfused, no edema. R calf is more prominent and a deep mass appreciated on physical exam in between the tibia and fibular bone. Improved from presentation.   NEURO: alert  SKIN: no rashes     LABORATORY AND IMAGING STUDIES     Lab Results   Component Value Date    WBC 6.8 2024    WBC 15.6 05/10/2021     Lab Results   Component Value Date    RBC 2.96 2024    RBC 4.89 05/10/2021     Lab Results   Component Value Date    HGB 9.1 2024    HGB 13.7 05/10/2021     Lab Results   Component Value Date    HCT 25.8 2024    HCT 41.0 05/10/2021     Lab Results   Component Value Date    MCV 87 2024    MCV 84 05/10/2021     Lab Results   Component Value Date    MCH 30.7 2024    MCH 28.0 05/10/2021     Lab Results   Component Value Date    MCHC 35.3 2024    MCHC 33.4 05/10/2021     Lab Results   Component Value Date    RDW 15.9 2024    RDW 13.1 05/10/2021     Lab Results   Component Value Date     2024      05/10/2021     Last Comprehensive Metabolic Panel:  Sodium   Date Value Ref Range Status   01/31/2024 139 135 - 145 mmol/L Final     Comment:     Reference intervals for this test were updated on 09/26/2023 to more accurately reflect our healthy population. There may be differences in the flagging of prior results with similar values performed with this method. Interpretation of those prior results can be made in the context of the updated reference intervals.    05/10/2021 141 133 - 144 mmol/L Final     Potassium   Date Value Ref Range Status   01/31/2024 4.5 3.4 - 5.3 mmol/L Final   05/24/2022 4.7 3.4 - 5.3 mmol/L Final   05/10/2021 3.9 3.4 - 5.3 mmol/L Final     Chloride   Date Value Ref Range Status   01/31/2024 104 98 - 107 mmol/L Final   05/24/2022 108 94 - 109 mmol/L Final   05/10/2021 108 94 - 109 mmol/L Final     Carbon Dioxide   Date Value Ref Range Status   05/10/2021 27 20 - 32 mmol/L Final     Carbon Dioxide (CO2)   Date Value Ref Range Status   01/31/2024 25 22 - 29 mmol/L Final   05/24/2022 29 20 - 32 mmol/L Final     Anion Gap   Date Value Ref Range Status   01/31/2024 10 7 - 15 mmol/L Final   05/24/2022 2 (L) 3 - 14 mmol/L Final   05/10/2021 6 3 - 14 mmol/L Final     Glucose   Date Value Ref Range Status   01/31/2024 95 70 - 99 mg/dL Final   05/24/2022 108 (H) 70 - 99 mg/dL Final   05/10/2021 112 (H) 70 - 99 mg/dL Final     Comment:     Fasting specimen     GLUCOSE BY METER POCT   Date Value Ref Range Status   01/29/2024 94 70 - 99 mg/dL Final     Urea Nitrogen   Date Value Ref Range Status   01/31/2024 22.1 8.0 - 23.0 mg/dL Final   05/24/2022 14 7 - 30 mg/dL Final   05/10/2021 19 7 - 30 mg/dL Final     Creatinine   Date Value Ref Range Status   01/31/2024 0.94 0.51 - 0.95 mg/dL Final   05/10/2021 0.89 0.52 - 1.04 mg/dL Final     GFR Estimate   Date Value Ref Range Status   01/31/2024 66 >60 mL/min/1.73m2 Final   05/10/2021 69 >60 mL/min/[1.73_m2] Final     Comment:     Non  GFR  Calc  Starting 12/18/2018, serum creatinine based estimated GFR (eGFR) will be   calculated using the Chronic Kidney Disease Epidemiology Collaboration   (CKD-EPI) equation.       GFR, ESTIMATED POCT   Date Value Ref Range Status   10/10/2023 >60 >60 mL/min/1.73m2 Final     Calcium   Date Value Ref Range Status   01/31/2024 9.4 8.8 - 10.2 mg/dL Final   05/10/2021 8.6 8.5 - 10.1 mg/dL Final     Bilirubin Total   Date Value Ref Range Status   01/31/2024 0.3 <=1.2 mg/dL Final   10/12/2020 0.3 0.2 - 1.3 mg/dL Final     Alkaline Phosphatase   Date Value Ref Range Status   01/31/2024 114 40 - 150 U/L Final     Comment:     Reference intervals for this test were updated on 11/14/2023 to more accurately reflect our healthy population. There may be differences in the flagging of prior results with similar values performed with this method. Interpretation of those prior results can be made in the context of the updated reference intervals.   10/12/2020 124 40 - 150 U/L Final     ALT   Date Value Ref Range Status   01/31/2024 16 0 - 50 U/L Final     Comment:     Reference intervals for this test were updated on 6/12/2023 to more accurately reflect our healthy population. There may be differences in the flagging of prior results with similar values performed with this method. Interpretation of those prior results can be made in the context of the updated reference intervals.     10/12/2020 40 0 - 50 U/L Final     AST   Date Value Ref Range Status   01/31/2024 19 0 - 45 U/L Final     Comment:     Reference intervals for this test were updated on 6/12/2023 to more accurately reflect our healthy population. There may be differences in the flagging of prior results with similar values performed with this method. Interpretation of those prior results can be made in the context of the updated reference intervals.   10/12/2020 35 0 - 45 U/L Final       PET CT 12/5/23  PET/CT FINDINGS: Decreased size and FDG avidity of a right calf  soft tissue mass with SUV max of 7.1, previously 26.4. Mass is difficult to measure anatomically by noncontrast CT. Similar cortical destruction of the right fibular diaphysis. Mild   diffusely increased marrow uptake is likely treatment related. No convincing evidence of FDG avid metastatic disease. Mildly enlarged bilateral iliac chain lymph nodes appear similar and do not demonstrate FDG avidity above that of blood pool, favored   reactive.     CT FINDINGS: Moderate coronary artery calcifications. Similar hepatosplenomegaly. Similar multifocal scarring and parenchymal atrophy of the right kidney with multiple nonobstructing renal calculi. Colonic diverticulosis. Large fat-containing lower   ventral abdominal wall hernia.                                                                      IMPRESSION:     Partial response to therapy with decreasing size and FDG avidity of the right calf mass. No evidence of new FDG avid disease.     PET scan 1/29/24  IMPRESSION: Although much of the uptake along the superior and middle aspects of the right calf mass has decreased or resolved, there are two foci of uptake along the more inferior portions of the mass which have increased compared to December 2023,   concerning for disease progression.    MRI Tibia/Fibula 1/29/24  IMPRESSION:  1.  Interval decrease in size in the heterogeneously enhancing mass within the posterior tibial muscle belly. This has decreased from 3.3 x 5.2 x 9.3 cm and now measures 2.5 x 3.5 x 5.0 cm. It remains consistent with sarcoma.  2.  There is some surrounding T2 signal abnormality within the adjacent musculature including along the apposing side of the interosseous membrane within the anterior compartment. This could be secondary to post radiation change or a component of   reactive edema. It is difficult to completely exclude microinvasion of tumor.  3.  Edema or cellulitis about the lower leg has minimally increased since the previous  "examination.  4.  Stable bony irregularity and remodeling of the fibula which is at the margin of the neoplasm.  5.  Focus of T2 signal abnormality within the diaphyseal shaft of the tibia is poorly marginated. This may represent a component of mild stress reaction. Again, it is difficult to completely exclude a new bone lesion. This is considered unlikely, but   follow-up examination is recommended.  6.  No evidence for pathologic fracture.  7.  Exam otherwise negative.     PATHOLOGY   10/2/23  Final Diagnosis   A.  Soft tissue, right calf mass, excision:  - High-grade pleomorphic sarcoma  - See comment    Electronically signed by Jonny Zhou MD on 10/4/2023 at  9:09 PM   Comment  [facility]   Immunohistochemical stains show the tumor is negative for CK AE1/AE3, S100, desmin and CD34 while SATB2 demonstrates patchy reactivity.  While the primary diagnostic consideration is an undifferentiated pleomorphic sarcoma, the patchy SATB2 reactivity raises the possibility of a poorly differentiated osteosarcoma.   Clinical Information  [facility]   The patient is a 68 year old female with a history of CLL and several months of sensitivity of the lateral right calf, and recent imaging showing: \"Lytic lesion in the right fibular proximal diaphysis, with complete resorption of the medial cortex and some periosteal reaction adjacent to the lesion\".            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 No evidence of metastatic disease.     In her case, the probability of recurrence with surgery alone is about 40% in the next 10 years. We discussed, that recurrence in this scenario is usually with distant  metastasis which then becomes uncurable disease. We discussed, that for this reason, I recommend treatment with neoadjuvant chemotherapy with the goal of preventing metastasis in the future. We " discussed the potential side effects of chemotherapy and need for emergent treatment in order to improve her symptoms. The regimen will be doxil 40mg/m2 and ifosfamide 8g/m2 over 5 days. Slightly lower dose due to age and other medical conditions.   She is s/p C4 and tolerated chemotherapy well.     I personally reviewed the most recent PET and MRI leg, she had significant decrease in size and metabolic activity compared to pre-treatment, however this is a partial response with 2 foci that appear more active.   She will see Dr. Armstrong and Dr. Barroso to discuss the future surgery and role of radiation therapy if decision is made for limb salvage.       Plan  -Proceed with Radiation therapy and Surgery plan  -PET scan 5/27/2024   -Follow up after PET 5/28 to discuss role of adjuvant therapy.    40 minutes spent on the date of the encounter doing chart review, review of outside records, review of test results, interpretation of tests, patient visit, documentation, and discussion with other provider(s)     Roney Nam MD   of Medicine  Hematology, Oncology and Transplantation

## 2024-02-01 ENCOUNTER — OFFICE VISIT (OUTPATIENT)
Dept: ORTHOPEDICS | Facility: CLINIC | Age: 69
End: 2024-02-01
Payer: COMMERCIAL

## 2024-02-01 ENCOUNTER — OFFICE VISIT (OUTPATIENT)
Dept: RADIATION THERAPY | Facility: OUTPATIENT CENTER | Age: 69
End: 2024-02-01
Payer: COMMERCIAL

## 2024-02-01 VITALS
OXYGEN SATURATION: 99 % | WEIGHT: 177.2 LBS | RESPIRATION RATE: 16 BRPM | DIASTOLIC BLOOD PRESSURE: 83 MMHG | BODY MASS INDEX: 33.48 KG/M2 | SYSTOLIC BLOOD PRESSURE: 142 MMHG | HEART RATE: 81 BPM

## 2024-02-01 DIAGNOSIS — C49.21 SARCOMA OF RIGHT LOWER EXTREMITY (H): Primary | ICD-10-CM

## 2024-02-01 DIAGNOSIS — C49.9 UNDIFFERENTIATED PLEOMORPHIC SARCOMA (H): Primary | ICD-10-CM

## 2024-02-01 PROCEDURE — 99214 OFFICE O/P EST MOD 30 MIN: CPT | Performed by: ORTHOPAEDIC SURGERY

## 2024-02-01 NOTE — PROGRESS NOTES
Impression: Undifferentiated pleomorphic sarcoma right calf.  Partial response based on PET and tumor size from chemotherapy.    Plan: 1.  I recommend she proceed to receive radiation.  2.  We would like to be notified when she has 2 weeks of radiation remaining to schedule her follow-up imaging and surgical procedure.    Mirta is seen today with her  Giancarlo.  This is actually our first face-to-face encounter.  Since I met her virtually in September she had a biopsy performed by bradycardia.  She was subsequently received 4 cycles of chemotherapy from Dr. Jimenez she will.  And she saw Dr. Armstrong today.    I reviewed with Mirta what sarcomas are.  I reviewed the need for surgical treatment for survival as well as the goal of limb salvage surgery.  I described to her that when we initially met I thought she may require an amputation however given her response to chemotherapy and potentially radiation therapy I think at this point we should continue to provide treatment that may result in saving her limb.    I reviewed her MRI scan which shows a several centimeter reduction in size as well as her PET/CT scan and its report which also shows a reduction in PET activity.    From a surgical 6 planning perspective I think if we do entertain surgery following her radiation which I suspect we will we will take a lateral approach and resect the central portion of the fibula to gain access to the deep compartment of the calf.  I reviewed with her the high risk of postoperative complications following radiation.  She understands this.  All her questions were answered she would like to proceed with radiation.  I am in agreement.    This was an established patient visit.  The total length of the visit including the review of imaging and coordination of care was greater than 30 minutes.

## 2024-02-01 NOTE — PROGRESS NOTES
Radiation Oncology Progress Note    HPI: Ms. 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  Evaluate potential role for radiation therapy.     The patient presents for follow-up.    Patient has completed 4 cycles of systemic chemotherapy under the care of Dr. Nam.    Most recent imaging including PET scan and MRI demonstrated overall partial response without clear evidence of progression.     Patient states she has tolerated systemic treatment fairly well     She will also meet with Dr. Barroso today to discuss surgical management.    Plan:  I discussed the results of the patient's PET scan and MRI which demonstrated overall partial response without clear evidence of progression.    The patient will meet with Dr. Barroso today to discuss plans for surgery.  I tentatively discussed consideration of proceeding with preoperative radiation therapy with the goal improvement of local control.  In anticipation for proceeding with radiation therapy, we will schedule the patient for CT simulation next week.  Tentatively plan to treat with 5 weeks of radiation therapy.    Patrice Armstrong M.D.  Department of Radiation Oncology  [facility]

## 2024-02-01 NOTE — PATIENT INSTRUCTIONS
Impression: Undifferentiated pleomorphic sarcoma right calf.  Partial response based on PET and tumor size from chemotherapy.    Plan: 1.  I recommend she proceed to receive radiation.  2.  We would like to be notified when she has 2 weeks of radiation remaining to schedule her follow-up imaging and surgical procedure.

## 2024-02-01 NOTE — NURSING NOTE
Chief Complaint   Patient presents with    RECHECK     Review MRI and PET scan. Discuss treatment plan. Had finished chemotherapy and met with radiation oncology today        69 year old  1955    Primary MD: Joseline Fraga          [facility] [address] [place] [address] [address] [facility] [place], [place] - [address] [place] [address]        Allergies   Allergen Reactions    Allopurinol Itching    Amoxicillin Hives    Codeine Itching    Cymbalta [Duloxetine Hcl] Fatigue    Periactin Other (See Comments)     Sleepy.    11/15/23: patient not sure about this allergy/intolerance - may be interested in removal    Tenormin [Atenolol] Fatigue     11/15/23: patient may be interested in removal of this from allergy list as it was only fatigue/sleepiness           Current Outpatient Medications   Medication    amLODIPine (NORVASC) 5 MG tablet    calcipotriene (DOVONOX) 0.005 % external cream    clobetasol (TEMOVATE) 0.05 % external cream    hydrochlorothiazide (HYDRODIURIL) 50 MG tablet    losartan (COZAAR) 100 MG tablet    magnesium chloride 535 (64 Mg) MG TBEC CR tablet    Multiple Vitamins-Calcium (ONE-A-DAY WOMENS FORMULA PO)    omeprazole (PRILOSEC) 20 MG DR capsule    oxyCODONE (ROXICODONE) 5 MG tablet    pravastatin (PRAVACHOL) 40 MG tablet    pregabalin (LYRICA) 50 MG capsule    prochlorperazine (COMPAZINE) 5 MG tablet     No current facility-administered medications for this visit.

## 2024-02-01 NOTE — LETTER
2/1/2024         RE: Mirta Cardenas  [address] [facility] [address]        Dear Colleague,    Thank you for referring your patient, Mirta Cardenas, to the [facility]. Please see a copy of my visit note below.    Radiation Oncology Progress Note    HPI: Ms. 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  Evaluate potential role for radiation therapy.     The patient presents for follow-up.    Patient has completed 4 cycles of systemic chemotherapy under the care of Dr. Nam.    Most recent imaging including PET scan and MRI demonstrated overall partial response without clear evidence of progression.     Patient states she has tolerated systemic treatment fairly well     She will also meet with Dr. Barroso today to discuss surgical management.    Plan:  I discussed the results of the patient's PET scan and MRI which demonstrated overall partial response without clear evidence of progression.    The patient will meet with Dr. Barroso today to discuss plans for surgery.  I tentatively discussed consideration of proceeding with preoperative radiation therapy with the goal improvement of local control.  In anticipation for proceeding with radiation therapy, we will schedule the patient for CT simulation next week.  Tentatively plan to treat with 5 weeks of radiation therapy.    Patrice Armstrong M.D.  Department of Radiation Oncology  [facility]

## 2024-02-01 NOTE — NURSING NOTE
"Oncology Rooming Note    February 1, 2024 11:58 AM   Mirta Cardenas is a 69 year old female who presents for:    Chief Complaint   Patient presents with    Radiation Therapy     Return visit with Dr. Armstrong     Initial Vitals: BP (!) 142/83 (BP Location: Left arm, Cuff Size: Adult Large)   Pulse 81   Resp 16   Wt 80.4 kg (177 lb 3.2 oz)   SpO2 99%   BMI 33.48 kg/m   Estimated body mass index is 33.48 kg/m  as calculated from the following:    Height as of 1/19/24: 1.549 m (5' 1\").    Weight as of this encounter: 80.4 kg (177 lb 3.2 oz). Body surface area is 1.86 meters squared.  Data Unavailable Comment: Data Unavailable   No LMP recorded. Patient has had a hysterectomy.  Allergies reviewed: Yes  Medications reviewed: Yes    Medications: Medication refills not needed today.  Pharmacy name entered into [facility]:    [facility] [address] [place] [address] [address] [facility] [place] [id number] [place] [address]    Frailty Screening:   Is the patient here for a new oncology consult visit in cancer care? 2. No      Clinical concerns: Follow up today to discuss radiation therapy  S/p chemotherapy for RLL sarcoma, completed chemo 1/16/24, was seen with med onc provider yesterday, scans reviewed  Plan for RT then surgery  Pain much improved, no issues with ROM  Appetite good, energy level recovering      Margy Palmer RN              "

## 2024-02-01 NOTE — LETTER
2/1/2024         RE: Mirta Cardenas  [address] [facility] [address]        Dear Colleague,    Thank you for referring your patient, Mirta Cardenas, to the [facility] Check. Please see a copy of my visit note below.    Impression: Undifferentiated pleomorphic sarcoma right calf.  Partial response based on PET and tumor size from chemotherapy.    Plan: 1.  I recommend she proceed to receive radiation.  2.  We would like to be notified when she has 2 weeks of radiation remaining to schedule her follow-up imaging and surgical procedure.    Mirta is seen today with her  Giancarlo.  This is actually our first face-to-face encounter.  Since I met her virtually in September she had a biopsy performed by dylon.  She was subsequently received 4 cycles of chemotherapy from Dr. Jimenez she will.  And she saw Dr. Armstrong today.    I reviewed with Mirta what sarcomas are.  I reviewed the need for surgical treatment for survival as well as the goal of limb salvage surgery.  I described to her that when we initially met I thought she may require an amputation however given her response to chemotherapy and potentially radiation therapy I think at this point we should continue to provide treatment that may result in saving her limb.    I reviewed her MRI scan which shows a several centimeter reduction in size as well as her PET/CT scan and its report which also shows a reduction in PET activity.    From a surgical 6 planning perspective I think if we do entertain surgery following her radiation which I suspect we will we will take a lateral approach and resect the central portion of the fibula to gain access to the deep compartment of the calf.  I reviewed with her the high risk of postoperative complications following radiation.  She understands this.  All her questions were answered she would like to proceed with radiation.  I am in agreement.    This was an established  patient visit.  The total length of the visit including the review of imaging and coordination of care was greater than 30 minutes.    Sincerely,        Benny Barroso MD

## 2024-02-06 ENCOUNTER — OFFICE VISIT (OUTPATIENT)
Dept: RADIATION THERAPY | Facility: OUTPATIENT CENTER | Age: 69
End: 2024-02-06
Payer: COMMERCIAL

## 2024-02-06 DIAGNOSIS — C49.21 SARCOMA OF RIGHT LOWER EXTREMITY (H): Primary | ICD-10-CM

## 2024-02-06 NOTE — LETTER
2/6/2024         RE: Mirta Cardenas  [address] [facility] [address]        Dear Colleague,    Thank you for referring your patient, Mirta Cardenas, to the [facility] RADIATION [facility]ERAPY [facility]IN[facility]. Please see a copy of my visit note below.    The patient presents for CT simulation.    Side effects discussed.  Consent obtained.  Plan to proceed with preoperative radiation therapy followed by surgical reevaluation.    Patrice Armstrong M.D.  Department of Radiation Oncology  [facility]       Again, thank you for allowing me to participate in the care of your patient.        Sincerely,        Patrice Armstrong MD

## 2024-02-06 NOTE — PROGRESS NOTES
The patient presents for CT simulation.    Side effects discussed.  Consent obtained.  Plan to proceed with preoperative radiation therapy followed by surgical reevaluation.    Patrice Armstrong M.D.  Department of Radiation Oncology  [facility]

## 2024-02-19 ENCOUNTER — APPOINTMENT (OUTPATIENT)
Dept: RADIATION THERAPY | Facility: OUTPATIENT CENTER | Age: 69
End: 2024-02-19
Payer: COMMERCIAL

## 2024-02-20 ENCOUNTER — APPOINTMENT (OUTPATIENT)
Dept: RADIATION THERAPY | Facility: OUTPATIENT CENTER | Age: 69
End: 2024-02-20
Payer: COMMERCIAL

## 2024-02-21 ENCOUNTER — APPOINTMENT (OUTPATIENT)
Dept: RADIATION THERAPY | Facility: OUTPATIENT CENTER | Age: 69
End: 2024-02-21
Payer: COMMERCIAL

## 2024-02-21 ENCOUNTER — OFFICE VISIT (OUTPATIENT)
Dept: RADIATION THERAPY | Facility: OUTPATIENT CENTER | Age: 69
End: 2024-02-21
Payer: COMMERCIAL

## 2024-02-21 VITALS
HEART RATE: 90 BPM | SYSTOLIC BLOOD PRESSURE: 162 MMHG | WEIGHT: 181 LBS | DIASTOLIC BLOOD PRESSURE: 77 MMHG | BODY MASS INDEX: 34.2 KG/M2

## 2024-02-21 DIAGNOSIS — C49.21 SARCOMA OF RIGHT LOWER EXTREMITY (H): Primary | ICD-10-CM

## 2024-02-21 NOTE — PROGRESS NOTES
[facility]  SPECIALIZING IN BREAKTHROUGHS  Radiation Oncology    On Treatment Visit Note      Mirta Cardenas      Date: 2024   MRN: [id number]   : 1955  Diagnosis: Sarcoma RLE      Reason for Visit:  On Radiation Treatment Visit     Treatment Summary to Date  Treatment Site: right lower extremity Current Dose: 600/5000 cGy Fractions: 3/25      Chemotherapy  Chemo concurrent with radx?: No    Subjective:   Doing well.  No acute complaints.  Denies any current pain.  Energy is good.  Discussed wound care.    Nursing ROS:   Nutrition Alteration  Diet Type: Patient's Preference  Skin  Skin Reaction: 0 - No changes  Skin Note: dislikes aquaphor, using cereve 1-2x per day     ENT and Mouth Exam  ENT/Mouth Note: no oral issues  Cardiovascular  Respiratory effort: 1 - Normal - without distress  Gastrointestinal  GI Note: no gi issues        Pain Assessment  Pain Note: some extremity discomfort, has oxy but not needing, using lyrica TID      Objective:   BP (!) 162/77   Pulse 90   Wt 82.1 kg (181 lb)   BMI 34.20 kg/m    No apparent distress  Right lower extremity without erythema or desquamation    Labs:  CBC RESULTS:   Recent Labs   Lab Test 24  1136   WBC 6.8   RBC 2.96*   HGB 9.1*   HCT 25.8*   MCV 87   MCH 30.7   MCHC 35.3   RDW 15.9*   *     ELECTROLYTES:  Recent Labs   Lab Test 24  1136      POTASSIUM 4.5   CHLORIDE 104   MONIK 9.4   CO2 25   BUN 22.1   CR 0.94   GLC 95       Assessment:  Ms. 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  She underwent systemic therapy with partial response.  She is now undergoing preoperative radiation therapy.    Tolerating radiation therapy well.  All questions and concerns addressed.    Plan:   Continue current therapy.    Continue skin care.      Mosaiq chart and setup information reviewed  Ports checked    Medication Review  Med list reviewed with patient?: Yes           Patrice  MISBAH Armstrong MD         Not applicable

## 2024-02-21 NOTE — LETTER
2024         RE: Mirta Cardenas  [address] [facility] [address]        Dear Colleague,    Thank you for referring your patient, Mirta Cardenas, to the  [facility]. Please see a copy of my visit note below.    [facility]  SPECIALIZING IN BREAKTHROUGHS  Radiation Oncology    On Treatment Visit Note      Mirta Cardenas      Date: 2024   MRN: [id number]   : 1955  Diagnosis: Sarcoma RLE      Reason for Visit:  On Radiation Treatment Visit     Treatment Summary to Date  Treatment Site: right lower extremity Current Dose: 600/5000 cGy Fractions: 3/25      Chemotherapy  Chemo concurrent with radx?: No    Subjective:   Doing well.  No acute complaints.  Denies any current pain.  Energy is good.  Discussed wound care.    Nursing ROS:   Nutrition Alteration  Diet Type: Patient's Preference  Skin  Skin Reaction: 0 - No changes  Skin Note: dislikes aquaphor, using cereve 1-2x per day     ENT and Mouth Exam  ENT/Mouth Note: no oral issues  Cardiovascular  Respiratory effort: 1 - Normal - without distress  Gastrointestinal  GI Note: no gi issues        Pain Assessment  Pain Note: some extremity discomfort, has oxy but not needing, using lyrica TID      Objective:   BP (!) 162/77   Pulse 90   Wt 82.1 kg (181 lb)   BMI 34.20 kg/m    No apparent distress  Right lower extremity without erythema or desquamation    Labs:  CBC RESULTS:   Recent Labs   Lab Test 24  1136   WBC 6.8   RBC 2.96*   HGB 9.1*   HCT 25.8*   MCV 87   MCH 30.7   MCHC 35.3   RDW 15.9*   *     ELECTROLYTES:  Recent Labs   Lab Test 24  1136      POTASSIUM 4.5   CHLORIDE 104   MONIK 9.4   CO2 25   BUN 22.1   CR 0.94   GLC 95       Assessment:  Ms. 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  She underwent systemic therapy with partial response.  She is now undergoing preoperative radiation therapy.    Tolerating  radiation therapy well.  All questions and concerns addressed.    Plan:   Continue current therapy.    Continue skin care.      Mosaiq chart and setup information reviewed  Ports checked    Medication Review  Med list reviewed with patient?: Yes           Patrice Armstrong MD

## 2024-02-22 ENCOUNTER — APPOINTMENT (OUTPATIENT)
Dept: RADIATION THERAPY | Facility: OUTPATIENT CENTER | Age: 69
End: 2024-02-22
Payer: COMMERCIAL

## 2024-02-23 ENCOUNTER — APPOINTMENT (OUTPATIENT)
Dept: RADIATION THERAPY | Facility: OUTPATIENT CENTER | Age: 69
End: 2024-02-23
Payer: COMMERCIAL

## 2024-02-27 ENCOUNTER — APPOINTMENT (OUTPATIENT)
Dept: RADIATION THERAPY | Facility: OUTPATIENT CENTER | Age: 69
End: 2024-02-27
Payer: COMMERCIAL

## 2024-02-28 ENCOUNTER — OFFICE VISIT (OUTPATIENT)
Dept: RADIATION THERAPY | Facility: OUTPATIENT CENTER | Age: 69
End: 2024-02-28
Payer: COMMERCIAL

## 2024-02-28 ENCOUNTER — APPOINTMENT (OUTPATIENT)
Dept: RADIATION THERAPY | Facility: OUTPATIENT CENTER | Age: 69
End: 2024-02-28
Payer: COMMERCIAL

## 2024-02-28 VITALS
SYSTOLIC BLOOD PRESSURE: 163 MMHG | RESPIRATION RATE: 18 BRPM | BODY MASS INDEX: 33.63 KG/M2 | OXYGEN SATURATION: 100 % | WEIGHT: 178 LBS | DIASTOLIC BLOOD PRESSURE: 85 MMHG | HEART RATE: 82 BPM

## 2024-02-28 DIAGNOSIS — C49.21 SARCOMA OF RIGHT LOWER EXTREMITY (H): Primary | ICD-10-CM

## 2024-02-28 ASSESSMENT — PAIN SCALES - GENERAL: PAINLEVEL: MILD PAIN (3)

## 2024-02-28 NOTE — LETTER
2024         RE: Mirta Cardenas  [address] [facility] [address]        Dear Colleague,    Thank you for referring your patient, Mirta Cardenas, to the  [facility]. Please see a copy of my visit note below.    [facility]  SPECIALIZING IN BREAKTHROUGHS  Radiation Oncology    On Treatment Visit Note      Mirta Cardenas      Date: 2024   MRN: [id number]   : 1955  Diagnosis: Sarcoma RLE      Reason for Visit:  On Radiation Treatment Visit     Treatment Summary to Date  Treatment Site: right lower extremity Current Dose: 1600/5000 cGy Fractions:       Chemotherapy  Chemo concurrent with radx?: No    Subjective:   Doing well.  No acute complaints.  Has noted mild discomfort right lower extremity region.  Energy is good.      Nursing ROS:   Nutrition Alteration  Diet Type: Patient's Preference  Skin  Skin Reaction: 0 - No changes  Skin Note: dislikes aquaphor, using cereve 1-2x per day     ENT and Mouth Exam  ENT/Mouth Note: no oral issues  Cardiovascular  Respiratory effort: 1 - Normal - without distress  Gastrointestinal  GI Note: no gi issues        Pain Assessment  Pain Note: some extremity discomfort, has oxy but not needing, using lyrica TID      Objective:   There were no vitals taken for this visit.  No apparent distress  Right lower extremity with mild erythema without desquamation, mild edema noted    Labs:  CBC RESULTS:   Recent Labs   Lab Test 24  1136   WBC 6.8   RBC 2.96*   HGB 9.1*   HCT 25.8*   MCV 87   MCH 30.7   MCHC 35.3   RDW 15.9*   *     ELECTROLYTES:  Recent Labs   Lab Test 24  1136      POTASSIUM 4.5   CHLORIDE 104   MONIK 9.4   CO2 25   BUN 22.1   CR 0.94   GLC 95       Assessment:  Ms. 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  She underwent systemic therapy with partial response.  She is now undergoing preoperative radiation  therapy.    Tolerating radiation therapy well.  All questions and concerns addressed.    Plan:   Continue current therapy.    Continue skin care.   RLE edema.  Likely secondary to radiation therapy induced inflammation.  Discussed consideration of NSAID as needed.      Mosaiq chart and setup information reviewed  Ports checked    Medication Review  Med list reviewed with patient?: Yes           Patrice Armstrong MD

## 2024-02-28 NOTE — PROGRESS NOTES
[facility]  SPECIALIZING IN BREAKTHROUGHS  Radiation Oncology    On Treatment Visit Note      Mirta Cardenas      Date: 2024   MRN: [id number]   : 1955  Diagnosis: Sarcoma RLE      Reason for Visit:  On Radiation Treatment Visit     Treatment Summary to Date  Treatment Site: right lower extremity Current Dose: 1600/5000 cGy Fractions:       Chemotherapy  Chemo concurrent with radx?: No    Subjective:   Doing well.  No acute complaints.  Has noted mild discomfort right lower extremity region.  Energy is good.      Nursing ROS:   Nutrition Alteration  Diet Type: Patient's Preference  Skin  Skin Reaction: 0 - No changes  Skin Note: dislikes aquaphor, using cereve 1-2x per day     ENT and Mouth Exam  ENT/Mouth Note: no oral issues  Cardiovascular  Respiratory effort: 1 - Normal - without distress  Gastrointestinal  GI Note: no gi issues        Pain Assessment  Pain Note: some extremity discomfort, has oxy but not needing, using lyrica TID      Objective:   There were no vitals taken for this visit.  No apparent distress  Right lower extremity with mild erythema without desquamation, mild edema noted    Labs:  CBC RESULTS:   Recent Labs   Lab Test 24  1136   WBC 6.8   RBC 2.96*   HGB 9.1*   HCT 25.8*   MCV 87   MCH 30.7   MCHC 35.3   RDW 15.9*   *     ELECTROLYTES:  Recent Labs   Lab Test 24  1136      POTASSIUM 4.5   CHLORIDE 104   MONIK 9.4   CO2 25   BUN 22.1   CR 0.94   GLC 95       Assessment:  Ms. 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  She underwent systemic therapy with partial response.  She is now undergoing preoperative radiation therapy.    Tolerating radiation therapy well.  All questions and concerns addressed.    Plan:   Continue current therapy.    Continue skin care.   RLE edema.  Likely secondary to radiation therapy induced inflammation.  Discussed consideration of NSAID as needed.      Mosaiq  chart and setup information reviewed  Ports checked    Medication Review  Med list reviewed with patient?: Yes           Patrice Armstrong MD

## 2024-02-29 ENCOUNTER — APPOINTMENT (OUTPATIENT)
Dept: RADIATION THERAPY | Facility: OUTPATIENT CENTER | Age: 69
End: 2024-02-29
Payer: COMMERCIAL

## 2024-03-01 ENCOUNTER — APPOINTMENT (OUTPATIENT)
Dept: RADIATION THERAPY | Facility: OUTPATIENT CENTER | Age: 69
End: 2024-03-01
Payer: COMMERCIAL

## 2024-03-04 ENCOUNTER — APPOINTMENT (OUTPATIENT)
Dept: RADIATION THERAPY | Facility: OUTPATIENT CENTER | Age: 69
End: 2024-03-04
Payer: COMMERCIAL

## 2024-03-05 ENCOUNTER — APPOINTMENT (OUTPATIENT)
Dept: RADIATION THERAPY | Facility: OUTPATIENT CENTER | Age: 69
End: 2024-03-05
Payer: COMMERCIAL

## 2024-03-06 ENCOUNTER — OFFICE VISIT (OUTPATIENT)
Dept: RADIATION THERAPY | Facility: OUTPATIENT CENTER | Age: 69
End: 2024-03-06
Payer: COMMERCIAL

## 2024-03-06 ENCOUNTER — APPOINTMENT (OUTPATIENT)
Dept: RADIATION THERAPY | Facility: OUTPATIENT CENTER | Age: 69
End: 2024-03-06
Payer: COMMERCIAL

## 2024-03-06 VITALS
BODY MASS INDEX: 33.82 KG/M2 | WEIGHT: 179 LBS | OXYGEN SATURATION: 100 % | SYSTOLIC BLOOD PRESSURE: 143 MMHG | HEART RATE: 74 BPM | RESPIRATION RATE: 18 BRPM | DIASTOLIC BLOOD PRESSURE: 85 MMHG

## 2024-03-06 DIAGNOSIS — C49.21 SARCOMA OF RIGHT LOWER EXTREMITY (H): Primary | ICD-10-CM

## 2024-03-06 ASSESSMENT — PAIN SCALES - GENERAL: PAINLEVEL: MILD PAIN (3)

## 2024-03-06 NOTE — LETTER
3/6/2024         RE: Mirta Cardenas  [address] [facility] [address]        Dear Colleague,    Thank you for referring your patient, Mirta Cardenas, to the  [facility]. Please see a copy of my visit note below.    [facility]  SPECIALIZING IN BREAKTHROUGHS  Radiation Oncology    On Treatment Visit Note      Mirta Cardenas      Date: 3/6/2024   MRN: [id number]   : 1955  Diagnosis: Sarcoma RLE      Reason for Visit:  On Radiation Treatment Visit     Treatment Summary to Date  Treatment Site: right lower extremity Current Dose: 3200/5000 cGy Fractions:       Chemotherapy  Chemo concurrent with radx?: No    Subjective:   Doing well.  No acute complaints.  Has noted mild discomfort right lower extremity region.  Energy is good.      Nursing ROS:   Nutrition Alteration  Diet Type: Patient's Preference  Skin  Skin Reaction: 0 - No changes  Skin Note: dislikes aquaphor, using cereve 1-2x per day     ENT and Mouth Exam  ENT/Mouth Note: no oral issues  Cardiovascular  Respiratory effort: 1 - Normal - without distress  Gastrointestinal  GI Note: no gi issues        Pain Assessment  Pain Note: some extremity discomfort, has oxy but not needing, using lyrica TID      Objective:   BP (!) 143/85   Pulse 74   Resp 18   Wt 81.2 kg (179 lb)   SpO2 100%   BMI 33.82 kg/m    No apparent distress  Right lower extremity with mild erythema without desquamation, mild edema noted    Labs:  CBC RESULTS:   Recent Labs   Lab Test 24  1136   WBC 6.8   RBC 2.96*   HGB 9.1*   HCT 25.8*   MCV 87   MCH 30.7   MCHC 35.3   RDW 15.9*   *     ELECTROLYTES:  Recent Labs   Lab Test 24  1136      POTASSIUM 4.5   CHLORIDE 104   MONIK 9.4   CO2 25   BUN 22.1   CR 0.94   GLC 95       Assessment:  Ms. 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  She underwent systemic therapy with partial response.  She is now  undergoing preoperative radiation therapy.    Tolerating radiation therapy well.  All questions and concerns addressed.    Plan:   Continue current therapy.    Continue skin care.   RLE edema.  Likely secondary to radiation therapy induced inflammation.  Discussed consideration of NSAID as needed.      Mosaiq chart and setup information reviewed  Ports checked    Medication Review  Med list reviewed with patient?: Yes           Patrice Armstrong MD

## 2024-03-06 NOTE — PROGRESS NOTES
[facility]  SPECIALIZING IN BREAKTHROUGHS  Radiation Oncology    On Treatment Visit Note      Mirta Cardenas      Date: 3/6/2024   MRN: [id number]   : 1955  Diagnosis: Sarcoma RLE      Reason for Visit:  On Radiation Treatment Visit     Treatment Summary to Date  Treatment Site: right lower extremity Current Dose: 3200/5000 cGy Fractions:       Chemotherapy  Chemo concurrent with radx?: No    Subjective:   Doing well.  No acute complaints.  Has noted mild discomfort right lower extremity region.  Energy is good.      Nursing ROS:   Nutrition Alteration  Diet Type: Patient's Preference  Skin  Skin Reaction: 0 - No changes  Skin Note: dislikes aquaphor, using cereve 1-2x per day     ENT and Mouth Exam  ENT/Mouth Note: no oral issues  Cardiovascular  Respiratory effort: 1 - Normal - without distress  Gastrointestinal  GI Note: no gi issues        Pain Assessment  Pain Note: some extremity discomfort, has oxy but not needing, using lyrica TID      Objective:   BP (!) 143/85   Pulse 74   Resp 18   Wt 81.2 kg (179 lb)   SpO2 100%   BMI 33.82 kg/m    No apparent distress  Right lower extremity with mild erythema without desquamation, mild edema noted    Labs:  CBC RESULTS:   Recent Labs   Lab Test 24  1136   WBC 6.8   RBC 2.96*   HGB 9.1*   HCT 25.8*   MCV 87   MCH 30.7   MCHC 35.3   RDW 15.9*   *     ELECTROLYTES:  Recent Labs   Lab Test 24  1136      POTASSIUM 4.5   CHLORIDE 104   MONIK 9.4   CO2 25   BUN 22.1   CR 0.94   GLC 95       Assessment:  Ms. 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  She underwent systemic therapy with partial response.  She is now undergoing preoperative radiation therapy.    Tolerating radiation therapy well.  All questions and concerns addressed.    Plan:   Continue current therapy.    Continue skin care.   RLE edema.  Likely secondary to radiation therapy induced inflammation.   Discussed consideration of NSAID as needed.      Mosaiq chart and setup information reviewed  Ports checked    Medication Review  Med list reviewed with patient?: Yes           Patrice Armstrong MD

## 2024-03-06 NOTE — TELEPHONE ENCOUNTER
FUTURE VISIT INFORMATION      SURGERY INFORMATION:  right calf sarcoma / Dr. Barroso   Consult: ov 2/1/24    RECORDS REQUESTED FROM:       Primary Care Provider: Joseline Fraga MD - [facility]    Pertinent Medical History: hypertension    Most recent EKG+ Tracing: 10/12/20    Most recent ECHO: 10/13/23

## 2024-03-07 ENCOUNTER — APPOINTMENT (OUTPATIENT)
Dept: RADIATION THERAPY | Facility: OUTPATIENT CENTER | Age: 69
End: 2024-03-07
Payer: COMMERCIAL

## 2024-03-08 ENCOUNTER — PREP FOR PROCEDURE (OUTPATIENT)
Dept: ORTHOPEDICS | Facility: CLINIC | Age: 69
End: 2024-03-08
Payer: COMMERCIAL

## 2024-03-08 ENCOUNTER — APPOINTMENT (OUTPATIENT)
Dept: RADIATION THERAPY | Facility: OUTPATIENT CENTER | Age: 69
End: 2024-03-08
Payer: COMMERCIAL

## 2024-03-08 ENCOUNTER — TELEPHONE (OUTPATIENT)
Dept: ORTHOPEDICS | Facility: CLINIC | Age: 69
End: 2024-03-08
Payer: COMMERCIAL

## 2024-03-08 DIAGNOSIS — C49.9 SARCOMA (H): Primary | ICD-10-CM

## 2024-03-08 NOTE — TELEPHONE ENCOUNTER
Phoned patient to get her scheduled for surgery with Dr. Barroso     Call went to voicemail.  Provided call back number in voicemail:   [phone] & [phone] for care team.   Will try again.

## 2024-03-11 ENCOUNTER — APPOINTMENT (OUTPATIENT)
Dept: RADIATION THERAPY | Facility: OUTPATIENT CENTER | Age: 69
End: 2024-03-11
Payer: COMMERCIAL

## 2024-03-12 ENCOUNTER — APPOINTMENT (OUTPATIENT)
Dept: RADIATION THERAPY | Facility: OUTPATIENT CENTER | Age: 69
End: 2024-03-12
Payer: COMMERCIAL

## 2024-03-12 NOTE — TELEPHONE ENCOUNTER
Phoned patient to get her scheduled for surgery with Dr. Barroso.     Call went to voicemail.However, unable to provide voicemail due to mailbox being full. Unable to provided call back number in voicemail: [phone] & [phone] for care team.     Will try again.

## 2024-03-13 ENCOUNTER — OFFICE VISIT (OUTPATIENT)
Dept: RADIATION THERAPY | Facility: OUTPATIENT CENTER | Age: 69
End: 2024-03-13
Payer: COMMERCIAL

## 2024-03-13 ENCOUNTER — APPOINTMENT (OUTPATIENT)
Dept: RADIATION THERAPY | Facility: OUTPATIENT CENTER | Age: 69
End: 2024-03-13
Payer: COMMERCIAL

## 2024-03-13 VITALS
SYSTOLIC BLOOD PRESSURE: 169 MMHG | HEART RATE: 83 BPM | DIASTOLIC BLOOD PRESSURE: 83 MMHG | BODY MASS INDEX: 34.2 KG/M2 | WEIGHT: 181 LBS

## 2024-03-13 DIAGNOSIS — C49.21 SARCOMA OF RIGHT LOWER EXTREMITY (H): Primary | ICD-10-CM

## 2024-03-13 NOTE — PROGRESS NOTES
[facility]  SPECIALIZING IN BREAKTHROUGHS  Radiation Oncology    On Treatment Visit Note      Mirta Cardenas      Date: 3/13/2024   MRN: [id number]   : 1955  Diagnosis: Sarcoma RLE      Reason for Visit:  On Radiation Treatment Visit     Treatment Summary to Date  Treatment Site: right lower extremity Current Dose: 91862/5000 cGy Fractions:       Chemotherapy  Chemo concurrent with radx?: No    Subjective:   Doing okay. No acute complaints.  Has noted mild discomfort and some tightness in right lower extremity region.  Energy is good.      Nursing ROS:   Nutrition Alteration  Diet Type: Patient's Preference  Skin  Skin Reaction: 0 - No changes  Skin Note: dislikes aquaphor, using cereve 1-2x per day     ENT and Mouth Exam  ENT/Mouth Note: no oral issues  Cardiovascular  Respiratory effort: 1 - Normal - without distress  Gastrointestinal  GI Note: no gi issues        Pain Assessment  Pain Note: some extremity discomfort, has oxy but not needing, using lyrica TID      Objective:   BP (!) 169/83   Pulse 83   Wt 82.1 kg (181 lb)   BMI 34.20 kg/m    No apparent distress  Right lower extremity with mild erythema without desquamation, mild edema noted.     Labs:  CBC RESULTS:   Recent Labs   Lab Test 24  1136   WBC 6.8   RBC 2.96*   HGB 9.1*   HCT 25.8*   MCV 87   MCH 30.7   MCHC 35.3   RDW 15.9*   *     ELECTROLYTES:  Recent Labs   Lab Test 24  1136      POTASSIUM 4.5   CHLORIDE 104   MONIK 9.4   CO2 25   BUN 22.1   CR 0.94   GLC 95       Assessment:  Ms. 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  She underwent systemic therapy with partial response.  She is now undergoing preoperative radiation therapy.    Tolerating radiation therapy well.  All questions and concerns addressed.    Plan:   Continue current therapy.    Continue skin care.   RLE edema.  Likely secondary to radiation therapy induced inflammation.   Discussed consideration of NSAID as needed.      Mosaiq chart and setup information reviewed  Ports checked    Medication Review  Med list reviewed with patient?: Yes           Patrice Armstrong MD

## 2024-03-13 NOTE — LETTER
3/13/2024         RE: Mirta Cardenas  [address] [facility] [address]        Dear Colleague,    Thank you for referring your patient, Mirta Cardenas, to the  [facility]. Please see a copy of my visit note below.    [facility]  SPECIALIZING IN BREAKTHROUGHS  Radiation Oncology    On Treatment Visit Note      Mirta Cardenas      Date: 3/13/2024   MRN: [id number]   : 1955  Diagnosis: Sarcoma RLE      Reason for Visit:  On Radiation Treatment Visit     Treatment Summary to Date  Treatment Site: right lower extremity Current Dose: 69228/5000 cGy Fractions:       Chemotherapy  Chemo concurrent with radx?: No    Subjective:   Doing okay. No acute complaints.  Has noted mild discomfort and some tightness in right lower extremity region.  Energy is good.      Nursing ROS:   Nutrition Alteration  Diet Type: Patient's Preference  Skin  Skin Reaction: 0 - No changes  Skin Note: dislikes aquaphor, using cereve 1-2x per day     ENT and Mouth Exam  ENT/Mouth Note: no oral issues  Cardiovascular  Respiratory effort: 1 - Normal - without distress  Gastrointestinal  GI Note: no gi issues        Pain Assessment  Pain Note: some extremity discomfort, has oxy but not needing, using lyrica TID      Objective:   BP (!) 169/83   Pulse 83   Wt 82.1 kg (181 lb)   BMI 34.20 kg/m    No apparent distress  Right lower extremity with mild erythema without desquamation, mild edema noted.     Labs:  CBC RESULTS:   Recent Labs   Lab Test 24  1136   WBC 6.8   RBC 2.96*   HGB 9.1*   HCT 25.8*   MCV 87   MCH 30.7   MCHC 35.3   RDW 15.9*   *     ELECTROLYTES:  Recent Labs   Lab Test 24  1136      POTASSIUM 4.5   CHLORIDE 104   MONIK 9.4   CO2 25   BUN 22.1   CR 0.94   GLC 95       Assessment:  Ms. 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  She underwent systemic therapy with partial response.  She is now  undergoing preoperative radiation therapy.    Tolerating radiation therapy well.  All questions and concerns addressed.    Plan:   Continue current therapy.    Continue skin care.   RLE edema.  Likely secondary to radiation therapy induced inflammation.  Discussed consideration of NSAID as needed.      Mosaiq chart and setup information reviewed  Ports checked    Medication Review  Med list reviewed with patient?: Yes           Patrice Armstrong MD

## 2024-03-14 ENCOUNTER — APPOINTMENT (OUTPATIENT)
Dept: RADIATION THERAPY | Facility: OUTPATIENT CENTER | Age: 69
End: 2024-03-14
Payer: COMMERCIAL

## 2024-03-15 ENCOUNTER — APPOINTMENT (OUTPATIENT)
Dept: RADIATION THERAPY | Facility: OUTPATIENT CENTER | Age: 69
End: 2024-03-15
Payer: COMMERCIAL

## 2024-03-18 ENCOUNTER — DOCUMENTATION ONLY (OUTPATIENT)
Dept: RADIATION THERAPY | Facility: OUTPATIENT CENTER | Age: 69
End: 2024-03-18

## 2024-03-18 ENCOUNTER — APPOINTMENT (OUTPATIENT)
Dept: RADIATION THERAPY | Facility: OUTPATIENT CENTER | Age: 69
End: 2024-03-18
Payer: COMMERCIAL

## 2024-03-18 DIAGNOSIS — B02.9 HERPES ZOSTER WITHOUT COMPLICATION: Primary | ICD-10-CM

## 2024-03-18 RX ORDER — ACYCLOVIR 800 MG/1
800 TABLET ORAL
Qty: 35 TABLET | Refills: 0 | Status: SHIPPED | OUTPATIENT
Start: 2024-03-18 | End: 2024-04-01

## 2024-03-18 NOTE — PROGRESS NOTES
"Patient was seen by RN for skin check in radiation clinic. She is at day 21/24 for RLE sarcoma.  Skin changes do not quite appear like an expected radiation dermatitis.  Pictures taken.   Reviewed with Dr. Armstrong after patient left clinic.  Appears more like shingles pustular rash. Patient did describe \"it is just burning\"  Plan - acyclovir sent. Reviewed this with patient. Reviewed precautions to take. Patient agreeable to start medication.  MD will see patient tomorrow to assess skin.  "

## 2024-03-19 ENCOUNTER — APPOINTMENT (OUTPATIENT)
Dept: RADIATION THERAPY | Facility: OUTPATIENT CENTER | Age: 69
End: 2024-03-19
Payer: COMMERCIAL

## 2024-03-19 ENCOUNTER — TELEPHONE (OUTPATIENT)
Dept: ORTHOPEDICS | Facility: CLINIC | Age: 69
End: 2024-03-19
Payer: COMMERCIAL

## 2024-03-19 DIAGNOSIS — C49.21 SARCOMA OF RIGHT LOWER EXTREMITY (H): Primary | ICD-10-CM

## 2024-03-19 RX ORDER — DEXAMETHASONE 4 MG/1
4 TABLET ORAL DAILY
Qty: 5 TABLET | Refills: 0 | Status: SHIPPED | OUTPATIENT
Start: 2024-03-19 | End: 2024-04-01

## 2024-03-20 ENCOUNTER — OFFICE VISIT (OUTPATIENT)
Dept: RADIATION THERAPY | Facility: OUTPATIENT CENTER | Age: 69
End: 2024-03-20
Payer: COMMERCIAL

## 2024-03-20 ENCOUNTER — APPOINTMENT (OUTPATIENT)
Dept: RADIATION THERAPY | Facility: OUTPATIENT CENTER | Age: 69
End: 2024-03-20
Payer: COMMERCIAL

## 2024-03-20 VITALS
DIASTOLIC BLOOD PRESSURE: 82 MMHG | BODY MASS INDEX: 34.58 KG/M2 | HEART RATE: 80 BPM | WEIGHT: 183 LBS | SYSTOLIC BLOOD PRESSURE: 159 MMHG

## 2024-03-20 DIAGNOSIS — C49.21 SARCOMA OF RIGHT LOWER EXTREMITY (H): Primary | ICD-10-CM

## 2024-03-20 NOTE — LETTER
3/20/2024         RE: Mirta Cardenas  [address] [facility] [address]        Dear Colleague,    Thank you for referring your patient, Mirta Cardenas, to the  [facility]. Please see a copy of my visit note below.    [facility]  SPECIALIZING IN BREAKTHROUGHS  Radiation Oncology    On Treatment Visit Note      Mirta Cardenas      Date: 3/20/2024   MRN: [id number]   : 1955  Diagnosis: Sarcoma RLE      Reason for Visit:  On Radiation Treatment Visit     Treatment Summary to Date  Treatment Site: right lower extremity Current Dose: 4600/5000 cGy Fractions:       Chemotherapy  Chemo concurrent with radx?: No    Subjective:   Doing okay.   Has noted more discomfort and some tightness in right lower extremity region.   Had some associated nerve pain sensation, intermittent in nature.  Started on steroid to reduce inflammatory change  Had noted rash rash in right lower extremity, maculopapular with burning sensation.  Started on antiviral for potential shingles etiology.  Energy is okay.    Nursing ROS:   Nutrition Alteration  Diet Type: Patient's Preference  Skin  Skin Reaction: 0 - No changes  Skin Note: dislikes aquaphor, using cereve 1-2x per day     ENT and Mouth Exam  ENT/Mouth Note: no oral issues  Cardiovascular  Respiratory effort: 1 - Normal - without distress  Gastrointestinal  GI Note: no gi issues        Pain Assessment  Pain Note: some extremity discomfort, has oxy but not needing, using lyrica TID      Objective:   Wt 83 kg (183 lb)   BMI 34.58 kg/m    No apparent distress  Right lower extremity with moderate erythema without desquamation, mild edema noted.  Maculopapular rash noted as well, unclear if radiation therapy alone is contributing versus component of shingles is also present.    Labs:  CBC RESULTS:   Recent Labs   Lab Test 24  1136   WBC 6.8   RBC 2.96*   HGB 9.1*   HCT 25.8*   MCV 87   MCH 30.7   MCHC 35.3   RDW 15.9*   PLT  144*     ELECTROLYTES:  Recent Labs   Lab Test 01/31/24  1136      POTASSIUM 4.5   CHLORIDE 104   MONIK 9.4   CO2 25   BUN 22.1   CR 0.94   GLC 95       Assessment:  Ms. 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  She underwent systemic therapy with partial response.  She is now undergoing preoperative radiation therapy.    Tolerating radiation therapy well.  All questions and concerns addressed.    Plan:   Continue current therapy.  EOT this Friday, return to clinic in 1 week  Continue skin care.   RLE edema.  Likely secondary to radiation therapy induced inflammation.  Continue low-dose steroid x 5 days.  Rash.  Unclear if secondary to radiation therapy alone versus component of shingles.  Continue antiviral.      Mosaiq chart and setup information reviewed  Ports checked    Medication Review  Med list reviewed with patient?: Yes           Patrice Armstrong MD

## 2024-03-20 NOTE — TELEPHONE ENCOUNTER
FUTURE VISIT INFORMATION        SURGERY INFORMATION:  right calf sarcoma / Dr. Barroso   Consult: ov 2/1/24     RECORDS REQUESTED FROM:         Primary Care Provider: Joseline Fraga MD - [facility]     Pertinent Medical History: hypertension     Most recent EKG+ Tracing: 10/12/20     Most recent ECHO: 10/13/23

## 2024-03-20 NOTE — PROGRESS NOTES
[facility]  SPECIALIZING IN BREAKTHROUGHS  Radiation Oncology    On Treatment Visit Note      Mirta Cardenas      Date: 3/20/2024   MRN: [id number]   : 1955  Diagnosis: Sarcoma RLE      Reason for Visit:  On Radiation Treatment Visit     Treatment Summary to Date  Treatment Site: right lower extremity Current Dose: 4600/5000 cGy Fractions:       Chemotherapy  Chemo concurrent with radx?: No    Subjective:   Doing okay.   Has noted more discomfort and some tightness in right lower extremity region.   Had some associated nerve pain sensation, intermittent in nature.  Started on steroid to reduce inflammatory change  Had noted rash rash in right lower extremity, maculopapular with burning sensation.  Started on antiviral for potential shingles etiology.  Energy is okay.    Nursing ROS:   Nutrition Alteration  Diet Type: Patient's Preference  Skin  Skin Reaction: 0 - No changes  Skin Note: dislikes aquaphor, using cereve 1-2x per day     ENT and Mouth Exam  ENT/Mouth Note: no oral issues  Cardiovascular  Respiratory effort: 1 - Normal - without distress  Gastrointestinal  GI Note: no gi issues        Pain Assessment  Pain Note: some extremity discomfort, has oxy but not needing, using lyrica TID      Objective:   Wt 83 kg (183 lb)   BMI 34.58 kg/m    No apparent distress  Right lower extremity with moderate erythema without desquamation, mild edema noted.  Maculopapular rash noted as well, unclear if radiation therapy alone is contributing versus component of shingles is also present.    Labs:  CBC RESULTS:   Recent Labs   Lab Test 24  1136   WBC 6.8   RBC 2.96*   HGB 9.1*   HCT 25.8*   MCV 87   MCH 30.7   MCHC 35.3   RDW 15.9*   *     ELECTROLYTES:  Recent Labs   Lab Test 24  1136      POTASSIUM 4.5   CHLORIDE 104   MONIK 9.4   CO2 25   BUN 22.1   CR 0.94   GLC 95       Assessment:  Ms. Cardenas is a 69year old female with a diagnosis of high-grade sarcoma of the right  lower extremity.  She underwent systemic therapy with partial response.  She is now undergoing preoperative radiation therapy.    Tolerating radiation therapy well.  All questions and concerns addressed.    Plan:   Continue current therapy.  EOT this Friday, return to clinic in 1 week  Continue skin care.   RLE edema.  Likely secondary to radiation therapy induced inflammation.  Continue low-dose steroid x 5 days.  Rash.  Unclear if secondary to radiation therapy alone versus component of shingles.  Continue antiviral.      Mosaiq chart and setup information reviewed  Ports checked    Medication Review  Med list reviewed with patient?: Yes           Patrice Armstrong MD

## 2024-03-21 ENCOUNTER — APPOINTMENT (OUTPATIENT)
Dept: RADIATION THERAPY | Facility: OUTPATIENT CENTER | Age: 69
End: 2024-03-21
Payer: COMMERCIAL

## 2024-03-22 ENCOUNTER — APPOINTMENT (OUTPATIENT)
Dept: RADIATION THERAPY | Facility: OUTPATIENT CENTER | Age: 69
End: 2024-03-22
Payer: COMMERCIAL

## 2024-03-28 NOTE — TELEPHONE ENCOUNTER
Patient is scheduled for surgery with Dr. Barroso     Spoke with: Mirta    Date of Surgery: 4/22/24    Location: [facility] OR    Informed patient they will need an adult  Yes    Pre op with Provider PAC, 3/18/24 at 1PM    Additional imaging/appointments: PO Made    Surgery packet: Received     Additional comments: N/A        Julianna Mg on 3/28/2024 at 11:09 AM

## 2024-04-01 ENCOUNTER — OFFICE VISIT (OUTPATIENT)
Dept: RADIATION THERAPY | Facility: OUTPATIENT CENTER | Age: 69
End: 2024-04-01
Payer: COMMERCIAL

## 2024-04-01 VITALS
HEART RATE: 75 BPM | RESPIRATION RATE: 16 BRPM | WEIGHT: 184.8 LBS | DIASTOLIC BLOOD PRESSURE: 70 MMHG | OXYGEN SATURATION: 98 % | SYSTOLIC BLOOD PRESSURE: 137 MMHG | BODY MASS INDEX: 34.92 KG/M2

## 2024-04-01 DIAGNOSIS — C49.21 SARCOMA OF RIGHT LOWER EXTREMITY (H): ICD-10-CM

## 2024-04-01 DIAGNOSIS — L58.9 RADIATION DERMATITIS: Primary | ICD-10-CM

## 2024-04-01 RX ORDER — TRIAMCINOLONE ACETONIDE 5 MG/G
CREAM TOPICAL 2 TIMES DAILY
Qty: 15 G | Refills: 2 | Status: SHIPPED | OUTPATIENT
Start: 2024-04-01 | End: 2024-05-30

## 2024-04-01 NOTE — PROGRESS NOTES
Radiation Oncology Progress Note    HPI: Ms. 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  She underwent systemic therapy with partial response.  She underwent preoperative radiation therapy     Radiation Treatment  2/19/2024 to 3/22/2024: Right lower extremity, 5000 cGy in 25 fraction    The patient returns for follow-up.    Continues to have tightness in the right lower extremity although this is slightly improved since completion of radiation treatment.  He has noticed more maculopapular rash in the area of treatment.  Has intermittent pain at times and sensitivity.  Energy is slowly improving.  Continues to apply skin creams.    Follow with Dr. Barroso.  Tentative presurgical MRI scheduled for 4/18/2024.  Tentative surgery date scheduled for 4/22/2024.      Plan:  Acute toxicities are lingering but appear to be slowly improving.  Anticipate further recovery with time.  2.  RLE edema.  Likely secondary to radiation therapy induced inflammation.  Will continue to monitor, currently improving slowly.  3. Rash.  Likely secondary to radiation therapy.  Prescribed triamcinolone cream.  Continue skin care otherwise with Aquaphor.  4.  Continue follow-up with Dr. Barroso to determine next steps in surgical management.  5.  Return to clinic in 1 week.      Patrice Armstrong M.D.  Department of Radiation Oncology  [facility]

## 2024-04-01 NOTE — NURSING NOTE
"Oncology Rooming Note    April 1, 2024 2:54 PM   Mirta Cardenas is a 69 year old female who presents for:    Chief Complaint   Patient presents with    Radiation Therapy     Follow up appointment with Dr. Armstrong     Initial Vitals: /70 (BP Location: Right arm, Cuff Size: Adult Regular)   Pulse 75   Resp 16   Wt 83.8 kg (184 lb 12.8 oz)   SpO2 98%   BMI 34.92 kg/m   Estimated body mass index is 34.92 kg/m  as calculated from the following:    Height as of 1/19/24: 1.549 m (5' 1\").    Weight as of this encounter: 83.8 kg (184 lb 12.8 oz). Body surface area is 1.9 meters squared.  Data Unavailable Comment: Data Unavailable   No LMP recorded. Patient has had a hysterectomy.  Allergies reviewed: Yes  Medications reviewed: Yes    Medications: Medication refills not needed today.  Pharmacy name entered into [employer]:    [facility] [address] [place] [address] [address] [facility] [place] [id number] [address] [place] [address]    Frailty Screening:   Is the patient here for a new oncology consult visit in cancer care? 2. No      Clinical concerns: follow up with Dr. Armstrong  Patient's right lower leg still very sensitive to touch, takes ibuprofen at HS, states less swelling  Has completed both meds from Dr. Armstrong, rash may be spreading to medial side of leg, no itching  Using cereve and aquaphor at times  She wonders if it could again be her psoriasis acting up, will check in with dermatology  Upcoming MRI and Clohisy visit on 4/18  Surgery tentatively scheduled for 4/22      Margy Palmer RN              "

## 2024-04-01 NOTE — LETTER
4/1/2024         RE: Mirta Cardenas  [address] [facility] [address]        Dear Colleague,    Thank you for referring your patient, Mirta Cardenas, to the [facility]. Please see a copy of my visit note below.    Radiation Oncology Progress Note    HPI: Ms. 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  She underwent systemic therapy with partial response.  She underwent preoperative radiation therapy     Radiation Treatment  2/19/2024 to 3/22/2024: Right lower extremity, 5000 cGy in 25 fraction    The patient returns for follow-up.    Continues to have tightness in the right lower extremity although this is slightly improved since completion of radiation treatment.  He has noticed more maculopapular rash in the area of treatment.  Has intermittent pain at times and sensitivity.  Energy is slowly improving.  Continues to apply skin creams.    Follow with Dr. Barroso.  Tentative presurgical MRI scheduled for 4/18/2024.  Tentative surgery date scheduled for 4/22/2024.      Plan:  Acute toxicities are lingering but appear to be slowly improving.  Anticipate further recovery with time.  2.  RLE edema.  Likely secondary to radiation therapy induced inflammation.  Will continue to monitor, currently improving slowly.  3. Rash.  Likely secondary to radiation therapy.  Prescribed triamcinolone cream.  Continue skin care otherwise with Aquaphor.  4.  Continue follow-up with Dr. Barroso to determine next steps in surgical management.  5.  Return to clinic in 1 week.      Patrice Armstrong M.D.  Department of Radiation Oncology  [facility]

## 2024-04-05 ENCOUNTER — MYC REFILL (OUTPATIENT)
Dept: ONCOLOGY | Facility: CLINIC | Age: 69
End: 2024-04-05
Payer: COMMERCIAL

## 2024-04-05 ENCOUNTER — DOCUMENTATION ONLY (OUTPATIENT)
Dept: RADIATION THERAPY | Facility: OUTPATIENT CENTER | Age: 69
End: 2024-04-05

## 2024-04-05 DIAGNOSIS — C49.21 SARCOMA OF RIGHT LOWER EXTREMITY (H): ICD-10-CM

## 2024-04-05 NOTE — TELEPHONE ENCOUNTER
"Pending Prescriptions:                       Disp   Refills    pregabalin (LYRICA) 50 MG capsule         90 cap*1            Sig: Take 1 capsule (50 mg) by mouth 3 times daily    Last prescribing provider:     Last clinic visit date: 01/31/24 w/    Recommendations for requested medication (if none, N/A): N/A    Any other pertinent information (if none, N/A):    Per pt message, \"I am almost out of Pregabalin. I assume I should continue to take it until after the surgery. The pharmacy in [place] requested a refill prescription from you earlier this week, but has not received an answer yet.\"    Refilled: Y/N, if NO, why?    "

## 2024-04-05 NOTE — PROGRESS NOTES
Radiotherapy Treatment Summary              PATIENT: Mirta Cardenas  MEDICAL RECORD NO: [id number]   : 1955    DIAGNOSIS: High-grade sarcoma of the right lower extremity.   INTENT OF RADIOTHERAPY: Pre operative  PATHOLOGY:  10/2/2023 biopsy of the right calf mass. Pathology demonstrated high-grade pleomorphic sarcoma.                                CONCURRENT SYSTEMIC THERAPY:  No       ONCOLOGIC HISTORY:          Ms. Cardenas is a 69 year old female with a diagnosis of sarcoma of the right lower extremity.     Patient initially presented with symptoms of right calf pain that began in 2023.  X-ray obtained demonstrated a destructive lesion in the right lower extremity.  MRI obtained on 2023 of the right lower extremity demonstrated 9.3 cm soft tissue mass in the proximal/mid calf, centered in the posterior tibialis muscle.  There was noted bony invasion into the fibular shaft, thinning of the cortex of the tibial shaft without definite invasion.  There was also noted concern for abutment of the vasculature and nerves.  On 10/2/2023 the patient went biopsy of the right calf mass.  Pathology demonstrated high-grade pleomorphic sarcoma.  PET scan on 10/10/2023 did not demonstrate any clear evidence of distant disease.  Patient met with medical oncology team and surgery team who discussed consideration of starting with systemic treatment followed by preoperative radiation therapy. She completed pre operative radiation therapy.     SITE OF TREATMENT: Right lower extremity    DATES  OF TREATMENT: 24 to 3/22/24     TOTAL DOSE OF TREATMENT: 5,000 cGy    DOSE PER FRACTION OF TREATMENT: 200 cGy x 25 fractions       COMMENT/TOXICITY:                   RLE edema.  Likely secondary to radiation therapy induced inflammation.  Continue low-dose steroid x 5 days.  Rash.  Unclear if secondary to radiation therapy alone versus component of shingles.  Continue antiviral.    PAIN MANAGEMENT:   Cymbalta                     FOLLOW UP PLAN: RTO one week    CC  Patient Care Team:  Joseline Fraga MD as PCP - General (Family Medicine)  Giovany Acosta MD as MD (Hematology & Oncology)  Radha Matias PA-C as Physician Assistant (Dermatology)  Joseline Fraga MD as Assigned PCP  Cheng Vincent DPM as Assigned Surgical Provider  Patrice Armstrong MD as MD (Radiation Oncology)  Johnathan Bernstein RN as Specialty Care Coordinator (Hematology & Oncology)  Benny Barroso MD as Assigned Musculoskeletal Provider  Patrice Armstrong MD as Assigned Cancer Care Provider  Gemini Sanders APRN CNP as Nurse Practitioner (Anesthesiology)     RAYSHAWN Cuellar  Department of Radiation Oncology  [facility]

## 2024-04-11 ENCOUNTER — PRE VISIT (OUTPATIENT)
Dept: SURGERY | Facility: CLINIC | Age: 69
End: 2024-04-11

## 2024-04-11 ENCOUNTER — OFFICE VISIT (OUTPATIENT)
Dept: RADIATION THERAPY | Facility: OUTPATIENT CENTER | Age: 69
End: 2024-04-11
Payer: COMMERCIAL

## 2024-04-11 VITALS
BODY MASS INDEX: 34.99 KG/M2 | DIASTOLIC BLOOD PRESSURE: 80 MMHG | WEIGHT: 185.2 LBS | HEART RATE: 92 BPM | SYSTOLIC BLOOD PRESSURE: 136 MMHG | RESPIRATION RATE: 16 BRPM

## 2024-04-11 DIAGNOSIS — C49.21 SARCOMA OF RIGHT LOWER EXTREMITY (H): Primary | ICD-10-CM

## 2024-04-11 NOTE — LETTER
4/11/2024         RE: Mirta Cardenas  [address] [facility] [address]        Dear Colleague,    Thank you for referring your patient, Mirta Cardenas, to the [facility]. Please see a copy of my visit note below.    Radiation Oncology Progress Note    HPI: Ms. 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  She underwent systemic therapy with partial response.  She underwent preoperative radiation therapy     Radiation Treatment  2/19/2024 to 3/22/2024: Right lower extremity, 5000 cGy in 25 fraction    The patient returns for follow-up.    Overall she is doing fairly well.  Prior noticed tightness and pain and treated region has improved.  Extremity edema is improving.  Does have pruritus at the treatment site.  Applying CeraVe a as well as triamcinolone cream.  Energy is improving.  Prior noted maculopapular rash in the area of treatment is improving.      Follow with Dr. Barroso.  Tentative presurgical MRI scheduled for 4/18/2024.  Tentative surgery date scheduled for 4/22/2024.      Plan:  Acute toxicities are appropriately recovering with time.  2.  RLE edema.  Likely secondary to radiation therapy induced inflammation.  Currently improving.   3. Rash.  Likely secondary to radiation therapy.  Can continue triamcinolone cream PRN.  Continue skin care otherwise.  4.  Continue follow-up with Dr. Barroso to determine next steps in surgical management.  5.  Return to clinic in 6 weeks.      Patrice Armstrong M.D.  Department of Radiation Oncology  [facility]

## 2024-04-11 NOTE — NURSING NOTE
"Oncology Rooming Note    April 11, 2024 3:15 PM   Mirta Cardenas is a 69 year old female who presents for:    Chief Complaint   Patient presents with    Radiation Therapy     Follow up with Dr. Armstrong     Initial Vitals: /80 (BP Location: Right arm, Cuff Size: Adult Large)   Pulse 92   Resp 16   Wt 84 kg (185 lb 3.2 oz)   BMI 34.99 kg/m   Estimated body mass index is 34.99 kg/m  as calculated from the following:    Height as of 1/19/24: 1.549 m (5' 1\").    Weight as of this encounter: 84 kg (185 lb 3.2 oz). Body surface area is 1.9 meters squared.  Data Unavailable Comment: Data Unavailable   No LMP recorded. Patient has had a hysterectomy.  Allergies reviewed: Yes  Medications reviewed: Yes    Medications: Medication refills not needed today.  Pharmacy name entered into [employer]:    [facility] [address] [place] [address] [address] [facility] [address] [place] [address]    Frailty Screening:   Is the patient here for a new oncology consult visit in cancer care? 2. No      Clinical concerns: Follow up today with Dr Armstrong    Right lower leg healing well, dry with some itching, no bleeding - using triamcinolone cream and cereveo  Off steroids, off antivirals  Tentative plan for MRI and Clohisy visit on 4/18  Plan for surgery on 4/22  No new issues or concerns      Margy Palmer RN             "

## 2024-04-11 NOTE — PROGRESS NOTES
Radiation Oncology Progress Note    HPI: Ms. 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  She underwent systemic therapy with partial response.  She underwent preoperative radiation therapy     Radiation Treatment  2/19/2024 to 3/22/2024: Right lower extremity, 5000 cGy in 25 fraction    The patient returns for follow-up.    Overall she is doing fairly well.  Prior noticed tightness and pain and treated region has improved.  Extremity edema is improving.  Does have pruritus at the treatment site.  Applying CeraVe a as well as triamcinolone cream.  Energy is improving.  Prior noted maculopapular rash in the area of treatment is improving.      Follow with Dr. Barroso.  Tentative presurgical MRI scheduled for 4/18/2024.  Tentative surgery date scheduled for 4/22/2024.      Plan:  Acute toxicities are appropriately recovering with time.  2.  RLE edema.  Likely secondary to radiation therapy induced inflammation.  Currently improving.   3. Rash.  Likely secondary to radiation therapy.  Can continue triamcinolone cream PRN.  Continue skin care otherwise.  4.  Continue follow-up with Dr. Barroso to determine next steps in surgical management.  5.  Return to clinic in 6 weeks.      Patrice Armstrong M.D.  Department of Radiation Oncology  [facility]

## 2024-04-15 RX ORDER — PREGABALIN 50 MG/1
50 CAPSULE ORAL 3 TIMES DAILY
Qty: 90 CAPSULE | Refills: 1 | Status: ON HOLD | OUTPATIENT
Start: 2024-04-15 | End: 2024-04-24

## 2024-04-18 ENCOUNTER — LAB (OUTPATIENT)
Dept: LAB | Facility: CLINIC | Age: 69
End: 2024-04-18
Payer: COMMERCIAL

## 2024-04-18 ENCOUNTER — OFFICE VISIT (OUTPATIENT)
Dept: SURGERY | Facility: CLINIC | Age: 69
End: 2024-04-18
Payer: COMMERCIAL

## 2024-04-18 ENCOUNTER — ANCILLARY PROCEDURE (OUTPATIENT)
Dept: MRI IMAGING | Facility: CLINIC | Age: 69
End: 2024-04-18
Attending: ORTHOPAEDIC SURGERY
Payer: COMMERCIAL

## 2024-04-18 ENCOUNTER — ANESTHESIA EVENT (OUTPATIENT)
Dept: SURGERY | Facility: CLINIC | Age: 69
DRG: 493 | End: 2024-04-18
Payer: COMMERCIAL

## 2024-04-18 ENCOUNTER — PRE VISIT (OUTPATIENT)
Dept: SURGERY | Facility: CLINIC | Age: 69
End: 2024-04-18

## 2024-04-18 ENCOUNTER — OFFICE VISIT (OUTPATIENT)
Dept: ORTHOPEDICS | Facility: CLINIC | Age: 69
End: 2024-04-18
Payer: COMMERCIAL

## 2024-04-18 VITALS
DIASTOLIC BLOOD PRESSURE: 93 MMHG | HEART RATE: 86 BPM | SYSTOLIC BLOOD PRESSURE: 151 MMHG | TEMPERATURE: 98.4 F | RESPIRATION RATE: 16 BRPM | BODY MASS INDEX: 34.21 KG/M2 | OXYGEN SATURATION: 100 % | HEIGHT: 61 IN | WEIGHT: 181.2 LBS

## 2024-04-18 DIAGNOSIS — C49.9 SARCOMA OF SOFT TISSUE (H): Primary | ICD-10-CM

## 2024-04-18 DIAGNOSIS — C49.21 SARCOMA OF RIGHT LOWER EXTREMITY (H): ICD-10-CM

## 2024-04-18 DIAGNOSIS — N18.30 CKD (CHRONIC KIDNEY DISEASE) STAGE 3, GFR 30-59 ML/MIN (H): ICD-10-CM

## 2024-04-18 DIAGNOSIS — I25.10 CORONARY ARTERY CALCIFICATION SEEN ON CAT SCAN: ICD-10-CM

## 2024-04-18 DIAGNOSIS — Z01.818 PREOP EXAMINATION: ICD-10-CM

## 2024-04-18 DIAGNOSIS — E78.5 HYPERLIPIDEMIA LDL GOAL <100: Primary | ICD-10-CM

## 2024-04-18 DIAGNOSIS — Z01.818 PREOP EXAMINATION: Primary | ICD-10-CM

## 2024-04-18 LAB
ALBUMIN SERPL BCG-MCNC: 4.6 G/DL (ref 3.5–5.2)
ALP SERPL-CCNC: 118 U/L (ref 40–150)
ALT SERPL W P-5'-P-CCNC: 30 U/L (ref 0–50)
ANION GAP SERPL CALCULATED.3IONS-SCNC: 14 MMOL/L (ref 7–15)
AST SERPL W P-5'-P-CCNC: 29 U/L (ref 0–45)
BASOPHILS # BLD AUTO: 0 10E3/UL (ref 0–0.2)
BASOPHILS NFR BLD AUTO: 1 %
BILIRUB SERPL-MCNC: 0.4 MG/DL
BUN SERPL-MCNC: 27.6 MG/DL (ref 8–23)
CALCIUM SERPL-MCNC: 9.7 MG/DL (ref 8.8–10.2)
CHLORIDE SERPL-SCNC: 104 MMOL/L (ref 98–107)
CHOLEST SERPL-MCNC: 165 MG/DL
CREAT SERPL-MCNC: 1.19 MG/DL (ref 0.51–0.95)
DEPRECATED HCO3 PLAS-SCNC: 22 MMOL/L (ref 22–29)
EGFRCR SERPLBLD CKD-EPI 2021: 50 ML/MIN/1.73M2
EOSINOPHIL # BLD AUTO: 0.2 10E3/UL (ref 0–0.7)
EOSINOPHIL NFR BLD AUTO: 3 %
ERYTHROCYTE [DISTWIDTH] IN BLOOD BY AUTOMATED COUNT: 13.6 % (ref 10–15)
FASTING STATUS PATIENT QL REPORTED: NO
GLUCOSE SERPL-MCNC: 92 MG/DL (ref 70–99)
HCT VFR BLD AUTO: 30.1 % (ref 35–47)
HDLC SERPL-MCNC: 47 MG/DL
HGB BLD-MCNC: 10.6 G/DL (ref 11.7–15.7)
IMM GRANULOCYTES # BLD: 0 10E3/UL
IMM GRANULOCYTES NFR BLD: 0 %
LDLC SERPL CALC-MCNC: 64 MG/DL
LYMPHOCYTES # BLD AUTO: 1.5 10E3/UL (ref 0.8–5.3)
LYMPHOCYTES NFR BLD AUTO: 27 %
MAGNESIUM SERPL-MCNC: 1.9 MG/DL (ref 1.7–2.3)
MCH RBC QN AUTO: 30.5 PG (ref 26.5–33)
MCHC RBC AUTO-ENTMCNC: 35.2 G/DL (ref 31.5–36.5)
MCV RBC AUTO: 87 FL (ref 78–100)
MONOCYTES # BLD AUTO: 0.6 10E3/UL (ref 0–1.3)
MONOCYTES NFR BLD AUTO: 11 %
NEUTROPHILS # BLD AUTO: 3.1 10E3/UL (ref 1.6–8.3)
NEUTROPHILS NFR BLD AUTO: 58 %
NONHDLC SERPL-MCNC: 118 MG/DL
NRBC # BLD AUTO: 0 10E3/UL
NRBC BLD AUTO-RTO: 0 /100
PHOSPHATE SERPL-MCNC: 3.1 MG/DL (ref 2.5–4.5)
PLATELET # BLD AUTO: 197 10E3/UL (ref 150–450)
POTASSIUM SERPL-SCNC: 3.8 MMOL/L (ref 3.4–5.3)
PROT SERPL-MCNC: 7.6 G/DL (ref 6.4–8.3)
RBC # BLD AUTO: 3.48 10E6/UL (ref 3.8–5.2)
SODIUM SERPL-SCNC: 140 MMOL/L (ref 135–145)
TRIGL SERPL-MCNC: 269 MG/DL
WBC # BLD AUTO: 5.3 10E3/UL (ref 4–11)

## 2024-04-18 PROCEDURE — 99000 SPECIMEN HANDLING OFFICE-LAB: CPT | Performed by: PATHOLOGY

## 2024-04-18 PROCEDURE — 73720 MRI LWR EXTREMITY W/O&W/DYE: CPT | Mod: RT | Performed by: RADIOLOGY

## 2024-04-18 PROCEDURE — 83735 ASSAY OF MAGNESIUM: CPT | Performed by: PATHOLOGY

## 2024-04-18 PROCEDURE — 36415 COLL VENOUS BLD VENIPUNCTURE: CPT | Performed by: PATHOLOGY

## 2024-04-18 PROCEDURE — 99203 OFFICE O/P NEW LOW 30 MIN: CPT | Performed by: NURSE PRACTITIONER

## 2024-04-18 PROCEDURE — 80053 COMPREHEN METABOLIC PANEL: CPT | Performed by: PATHOLOGY

## 2024-04-18 PROCEDURE — 82570 ASSAY OF URINE CREATININE: CPT | Performed by: STUDENT IN AN ORGANIZED HEALTH CARE EDUCATION/TRAINING PROGRAM

## 2024-04-18 PROCEDURE — 99215 OFFICE O/P EST HI 40 MIN: CPT | Performed by: ORTHOPAEDIC SURGERY

## 2024-04-18 PROCEDURE — 84100 ASSAY OF PHOSPHORUS: CPT | Performed by: PATHOLOGY

## 2024-04-18 PROCEDURE — A9585 GADOBUTROL INJECTION: HCPCS | Performed by: RADIOLOGY

## 2024-04-18 PROCEDURE — 85025 COMPLETE CBC W/AUTO DIFF WBC: CPT | Performed by: PATHOLOGY

## 2024-04-18 PROCEDURE — 80061 LIPID PANEL: CPT | Performed by: PATHOLOGY

## 2024-04-18 RX ORDER — CALCIUM CARBONATE 500 MG/1
1 TABLET, CHEWABLE ORAL PRN
COMMUNITY
Start: 2024-04-18 | End: 2024-06-18

## 2024-04-18 RX ORDER — GADOBUTROL 604.72 MG/ML
10 INJECTION INTRAVENOUS ONCE
Status: COMPLETED | OUTPATIENT
Start: 2024-04-18 | End: 2024-04-18

## 2024-04-18 RX ADMIN — GADOBUTROL 8.5 ML: 604.72 INJECTION INTRAVENOUS at 08:29

## 2024-04-18 ASSESSMENT — PAIN SCALES - GENERAL: PAINLEVEL: NO PAIN (0)

## 2024-04-18 ASSESSMENT — ENCOUNTER SYMPTOMS: ORTHOPNEA: 0

## 2024-04-18 NOTE — PATIENT INSTRUCTIONS
Impression: Skin is tolerated radiation well.  Preoperative shows that the tumor could be resected with a very close margin.  Patient would like to proceed with limb salvage surgery.    Plan: Surgery on Monday with resection of the midportion of the fibula portions of the anterior compartment and deep compartment of the calf.

## 2024-04-18 NOTE — DISCHARGE INSTRUCTIONS
MRI Contrast Discharge Instructions    The IV contrast you received today will pass out of your body in your  urine. This will happen in the next 24 hours. You will not feel this process.  Your urine will not change color.    Drink at least 4 extra glasses of water or juice today (unless your doctor  has restricted your fluids). This reduces the stress on your kidneys.  You may take your regular medicines.    If you are on dialysis: It is best to have dialysis today.    If you have a reaction: Most reactions happen right away. If you have  any new symptoms after leaving the hospital (such as hives or swelling),  call your hospital at the correct number below. Or call your family doctor.  If you have breathing distress or wheezing, call 911.    Special instructions: ***    I have read and understand the above information.    Signature:______________________________________ Date:___________    Staff:__________________________________________ Date:___________     Time:__________    [place] [facility] Departments:    ___[place]: [phone]  ___[facility]: [phone]  ___[place]: [phone] ___[facility]: [phone]  ___[facility]: [phone]  ___[facility]: [phone]  ___[phone]  ___[facility]: [phone]  ___[phone]

## 2024-04-18 NOTE — PATIENT INSTRUCTIONS
Preparing for Your Surgery      Name:  Mirta Cardenas   MRN:  [id number]   :  1955   Today's Date:  2024       Arriving for surgery:  Surgery date:  24  Arrival time:  8.55AM    Please come to:     Please come to:       [facility] [place] [facility] [address]   [address] [place]  [address]     The Green Ramp for patients and visitors is beneath the [facility]. The parking facility entrance is at the intersection of [address] [place] [address] and [address]. Patients and visitors who self-park will receive the reduced hospital parking rate (no ticket validation needed).     [employer] parking, located at the [facility] main entrance on [address] [place] [address], is available Monday - Friday from 7 am to 3:30 pm.     Discounted parking pass options can be purchased from  attendants during business hours.     -Check in at the security desk in the [facility] ([facility])   Lobby. They will direct you to the correct elevators.   -Proceed to the 3rd floor, Adult Surgery Waiting Lounge. [phone]     If you need directions, a wheelchair or escort please stop at the Information Desk in the lobby.  Inform the information person you are here for surgery; a wheelchair and escort to [address] will be provided.   An escort to the Adult Surgery Waiting Lounge will be provided. .    What can I eat or drink?  -  You may eat and drink normally up to 8 hours prior to arrival time. (Until 12.55AM)  -  You may have clear liquids until 2 hours prior to arrival time. (Until 6.55AM)    Examples of clear liquids:  Water  Clear broth  Juices (apple, white grape, white cranberry  and cider) without pulp  Noncarbonated, powder based beverages  (lemonade and Anant-Aid)  Sodas (Sprite, 7-Up, ginger ale and seltzer)  Coffee or tea (without milk or cream)  Gatorade    -  No Alcohol or cannabis products for at  least 24 hours before surgery.     Which medicines can I take?    Hold Aspirin for 7 days before surgery.   Hold Multivitamins for 7 days before surgery.  Hold Supplements for 7 days before surgery.  Hold Ibuprofen (Advil, Motrin) for 3 day(s) before surgery--unless otherwise directed by surgeon.  Hold Naproxen (Aleve) for 4 days before surgery.    -  DO NOT take these medications the day of surgery:  Hydrochlorothiazide (Hydrodiuril),   Losartan (Cozaar - Okay to take in the evening, just not the morning of surgery)  Magnesium.     -  PLEASE TAKE these medications the day of surgery:  Morning medications per usual except for medications listed above.    How do I prepare myself?  - Please take 2 showers (one the night prior to surgery and one the morning of surgery) using Scrubcare or Hibiclens soap.    Use this soap only from the neck to your toes.     Leave the soap on your skin for one minute--then rinse thoroughly.      You may use your own shampoo and conditioner. No other hair products.   - Please remove all jewelry and body piercings.  - No lotions, deodorants or fragrance.  - No makeup or fingernail polish.   - Bring your ID and insurance card.    -If you use a CPAP machine, please bring the CPAP machine, tubing, and mask to hospital.    -If you have a Deep Brain Stimulator, Spinal Cord Stimulator, or any Neuro Stimulator device---you must bring the remote control to the hospital.      ALL PATIENTS GOING HOME THE SAME DAY OF SURGERY ARE REQUIRED TO HAVE A RESPONSIBLE ADULT TO DRIVE AND BE IN ATTENDANCE WITH THEM FOR 24 HOURS FOLLOWING SURGERY.    Covid testing policy as of 12/06/2022  Your surgeon will notify and schedule you for a COVID test if one is needed before surgery--please direct any questions or COVID symptoms to your surgeon      Questions or Concerns:    - For any questions regarding the day of surgery or your hospital stay, please contact the Pre Admission Nursing Office at [phone].       -  If you have health changes between today and your surgery, please call your surgeon.       - For questions after surgery, please call your surgeons office.           Current Visitor Guidelines    You may have 2 visitors in the pre op area.    Visiting hours: 8 a.m. to 8:30 p.m.    Patients confirmed or suspected to have symptoms of COVID 19 or flu:     No visitors allowed for adult patients.   Children (under age 18) can have 1 named visitor.     People who are sick or showing symptoms of COVID 19 or flu:    Are not allowed to visit patients--we can only make exceptions in special situations.       Please follow these guidelines for your visit:          Please maintain social distance          Masking is optional--however at times you may be asked to wear a mask for the safety of yourself and others     Clean your hands with alcohol hand . Do this when you arrive at and leave the building and patient room,    And again after you touch your mask or anything in the room.     Go directly to and from the room you are visiting.     Stay in the patient s room during your visit. Limit going to other places in the hospital as much as possible     Leave bags and jackets at home or in the car.     For everyone s health, please don t come and go during your visit. That includes for smoking   during your visit.

## 2024-04-18 NOTE — NURSING NOTE
Pre-Op Teaching was done in person at the clinic.    Teaching Flowsheet   Relevant Diagnosis: Right calf mass excision   Teaching Topic: Pre-Operative Teaching     Person(s) involved in teaching:   Patient     Motivation Level:  Asks Questions: Yes  Eager to Learn: Yes  Cooperative: Yes  Receptive (willing/able to accept information): Yes  Any cultural factors/Quaker beliefs that may influence understanding or compliance? No     Patient demonstrates understanding of the following:  Reason for the appointment, diagnosis and treatment plan: Yes  Knowledge of proper use of medications and conditions for which they are ordered (with special attention to potential side effects or drug interactions): Yes  Which situations necessitate calling provider and whom to contact: Yes- discussed the stoplight tool to help assist with this.      Teaching Concerns Addressed:      Proper use of surgical scrub explain and provided to patient: Yes  Nutritional needs and diet plan: Yes  Pain management techniques: Yes  Wound Care: Yes  How and/when to access community resources: Yes     Instructional Materials Used/Given: surgical packet and surgical soap  received in clinic.       - Important contact info/ phone numbers  - Map/ location of surgery  - Showering instructions  - Stop light tool    Additionally the following was discussed with patient:  - Patient informed they will be staying in the hospital for 2 nights after surgery.  - Authorization to Share Protected Health Information- Person to person communication signed by patient and sent to be scanned into chart. Patient authorized the following person or people:   Juanito Cardenas () [phone]  Tomas Cardenas (son) [phone]  Markus Mirandarault (son) [phone]  Temo Ronald (son) [phone]       -Next step: Surgery scheduled 4/22. Pre-op H&P scheduled 4/18 with PAC.    Time spent with patient: 15 minutes.     Tara Holter, RNCC

## 2024-04-18 NOTE — LETTER
4/18/2024         RE: Mirta Cardenas  [address] [facility] [id number]        Dear Colleague,    Thank you for referring your patient, Mirta Cardenas, to the [facility] [place]. Please see a copy of my visit note below.    Impression: Skin is tolerated radiation well.  Preoperative shows that the tumor could be resected with a very close margin.  Patient would like to proceed with limb salvage surgery.    Plan: 1.  Surgery on Monday with resection of the midportion of the fibula portions of the anterior compartment and deep compartment of the calf.  2.  Will likely need placement of tibial nail in several months.  Depending on the amount of periosteum mets resected on Monday.     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 Completed radiation April 2024        Mirta overall is feeling well.  She does report being somewhat anxious with prospect of amputation versus limb salvage.  She feels her skin is in good condition but does describe some blistering.  This has resolved.    Exam her skin shows the expected radiation changes.  Her calf is tight but healthy-appearing.  She has normal ankle dorsi plantarflexion foot inversion toe and great toe flexion extension.    I reviewed her MRI scan which shows that the tumor is not reduced further in size.  It is located in the deep compartment of the calf but in the tibial neurovascular bundle posteriorly and abutting the anterior tibial vessels and the deep peroneal nerve anteriorly.    I reported Mirta that I believe the tumor is resectable but the margins will be very close.  She understands this she also understands the alternative of an amputation.  We reviewed the risk of limb salvage including of 40% vaginal wound complication rate and the need to elevate her leg for up to 3 weeks.  We also reviewed the potential loss of movement of her ankle  and toes secondary to both muscle resection and resection of the nerves if indicated intraoperatively.    All her questions were answered we will proceed with surgical resection.  And limb salvage.    Total length of the visit today including reviewing imaging prior to visit the day of the visit as well as after the visit with surgical planning was greater than 45 minutes.      Sincerely,        Benny Barroso MD

## 2024-04-18 NOTE — PROGRESS NOTES
Impression: Skin is tolerated radiation well.  Preoperative shows that the tumor could be resected with a very close margin.  Patient would like to proceed with limb salvage surgery.    Plan: 1.  Surgery on Monday with resection of the midportion of the fibula portions of the anterior compartment and deep compartment of the calf.  2.  Will likely need placement of tibial nail in several months.  Depending on the amount of periosteum mets resected on Monday.     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 Completed radiation April 2024        Mirta overall is feeling well.  She does report being somewhat anxious with prospect of amputation versus limb salvage.  She feels her skin is in good condition but does describe some blistering.  This has resolved.    Exam her skin shows the expected radiation changes.  Her calf is tight but healthy-appearing.  She has normal ankle dorsi plantarflexion foot inversion toe and great toe flexion extension.    I reviewed her MRI scan which shows that the tumor is not reduced further in size.  It is located in the deep compartment of the calf but in the tibial neurovascular bundle posteriorly and abutting the anterior tibial vessels and the deep peroneal nerve anteriorly.    I reported Mirta that I believe the tumor is resectable but the margins will be very close.  She understands this she also understands the alternative of an amputation.  We reviewed the risk of limb salvage including of 40% vaginal wound complication rate and the need to elevate her leg for up to 3 weeks.  We also reviewed the potential loss of movement of her ankle and toes secondary to both muscle resection and resection of the nerves if indicated intraoperatively.    All her questions were answered we will proceed with surgical resection.  And limb salvage.    Total length of the visit today including reviewing imaging prior to  visit the day of the visit as well as after the visit with surgical planning was greater than 45 minutes.

## 2024-04-18 NOTE — NURSING NOTE
Reason For Visit:   Chief Complaint   Patient presents with    Follow Up     Review imaging and discuss surgery. MRI Right tib/fib completed today.     Removal of tumor right calf scheduled for 4/22/2023.        Pain Assessment  Patient Currently in Pain: Denies (Patient relates that her right calf is tingly.)        Allergies   Allergen Reactions    Allopurinol Itching    Amoxicillin Hives    Codeine Itching    Cymbalta [Duloxetine Hcl] Fatigue    Periactin Other (See Comments)     Sleepy.    11/15/23: patient not sure about this allergy/intolerance - may be interested in removal    Tenormin [Atenolol] Fatigue     11/15/23: patient may be interested in removal of this from allergy list as it was only fatigue/sleepiness           Michaela Gonzalez LPN

## 2024-04-18 NOTE — H&P
Pre-Operative H & P     CC:  Preoperative exam to assess for increased cardiopulmonary risk while undergoing surgery and anesthesia.    Date of Encounter: 2024  Primary Care Physician:  Joseline Fraga     Reason for visit:   Encounter Diagnoses   Name Primary?    Preop examination Yes    Sarcoma of right lower extremity (H)        ASHLEY Cardenas is a 69 year old female who presents for pre-operative H & P in preparation for  Procedure Information       Case: [id number] Date/Time: 24 1125    Procedures:       Removal of Tumor Right Calf (Right: Leg)      Including Bone Removal (Right: Leg)    Anesthesia type: General with Block    Diagnosis: Sarcoma (H) [C49.9]    Pre-op diagnosis: Sarcoma (H) [C49.9]    [facility]    Providers: Benny Barroso MD            Mirta Cardenas is a 69 year old female with hypertension, hyperlipidemia, CLL, history of blood transfusion, obesity, anal fissure, CKD and psoriasis that has right calf sarcoma.  She completed chemotherapy in 2024 and radiation in 2024.  The above listed procedure has now been recommended for further treatment.     History is obtained from the patient and chart review    Hx of abnormal bleeding or anti-platelet use: none    Menstrual history: No LMP recorded. Patient has had a hysterectomy.:      Past Medical History  Past Medical History:   Diagnosis Date    Asthma     Asthma     reactive airway disease, per patient    Blood transfusion     Cancer (H)     chronic lymphocytic leukemia    CINV (chemotherapy-induced nausea and vomiting) 10/12/2023    History of transfusion     Hypertension     Hypertension     Leukemia, chronic lymphocytic (H)     Malignant neoplasm (H)     CLL    Thyroid nodule 2008       Past Surgical History  Past Surgical History:   Procedure Laterality Date    ABDOMEN SURGERY      c section *3    APPENDECTOMY      C/SECTION, CLASSICAL  ,,    , Classical     COLONOSCOPY      HYSTERECTOMY      HYSTERECTOMY, PAP NO LONGER INDICATED  01/01/1998    PICC DOUBLE LUMEN PLACEMENT Left 10/17/2023    left basilic 4 fr dl power picc 41 cm    PICC DOUBLE LUMEN PLACEMENT Left 12/13/2023    Medial Brachial, 42 cm, 3 cm external length    PICC DOUBLE LUMEN PLACEMENT Left 01/10/2024    left medial brachial 5 fr dl power picc 41 cm    RELEASE CARPAL TUNNEL  06/05/2012    Procedure:RELEASE CARPAL TUNNEL; Right Carpal Tunnel Release & Right Ring A1 Pulley Release; Surgeon:SERGEY HEATON; Location:[facility]    RELEASE TRIGGER FINGER  06/05/2012    Procedure:RELEASE TRIGGER FINGER; Surgeon:SERGEY HEATON; Location:[facility]    RELEASE TRIGGER FINGER  10/12/2012    Procedure: RELEASE TRIGGER FINGER;  Right Thumb A1 Pulley Release;  Surgeon: Sergey Heaton MD;  Location: [facility]    SALPINGO OOPHORECTOMY,R/L/ULICES  05/02/2008    right       Prior to Admission Medications  Current Outpatient Medications   Medication Sig Dispense Refill    amLODIPine (NORVASC) 5 MG tablet Take 1.5 tablets (7.5 mg) by mouth daily (Patient taking differently: Take 7.5 mg by mouth every morning) 135 tablet 3    calcium carbonate (TUMS) 500 MG chewable tablet Take 1 tablet (500 mg) by mouth as needed      hydrochlorothiazide (HYDRODIURIL) 50 MG tablet Take 1 tablet (50 mg) by mouth daily for 30 days (Patient taking differently: Take 50 mg by mouth every morning) 30 tablet 3    losartan (COZAAR) 100 MG tablet Take 1 tablet (100 mg) by mouth daily (Patient taking differently: Take 100 mg by mouth every evening) 90 tablet 3    Multiple Vitamins-Calcium (ONE-A-DAY WOMENS FORMULA PO) Take 1 tablet by mouth daily      omeprazole (PRILOSEC) 20 MG DR capsule Take 1 capsule by mouth every morning.      oxyCODONE (ROXICODONE) 5 MG tablet Take 1-2 tablets (5-10 mg) by mouth as needed for severe pain (Patient taking differently: Take 5-10 mg by mouth daily as needed for severe pain) 60 tablet 0    pravastatin (PRAVACHOL) 40 MG  tablet Take 1 tablet (40 mg) by mouth daily (Patient taking differently: Take 40 mg by mouth every evening) 90 tablet 3    pregabalin (LYRICA) 50 MG capsule TAKE 1 CAPSULE (50 MG) BY MOUTH 3 TIMES DAILY FOR 30 DAYS (Patient not taking: Reported on 4/18/2024) 90 capsule 1    triamcinolone (ARISTOCORT HP) 0.5 % external cream Apply topically 2 times daily (Patient not taking: Reported on 4/18/2024) 15 g 2       Allergies  Allergies   Allergen Reactions    Allopurinol Itching    Amoxicillin Hives    Codeine Itching    Cymbalta [Duloxetine Hcl] Fatigue    Periactin Other (See Comments)     Sleepy.    11/15/23: patient not sure about this allergy/intolerance - may be interested in removal    Tenormin [Atenolol] Fatigue     11/15/23: patient may be interested in removal of this from allergy list as it was only fatigue/sleepiness       Social History  Social History     Socioeconomic History    Marital status:      Spouse name: Not on file    Number of children: 3    Years of education: Not on file    Highest education level: Not on file   Occupational History    Occupation: retired   Tobacco Use    Smoking status: Never     Passive exposure: Never    Smokeless tobacco: Never   Vaping Use    Vaping status: Never Used   Substance and Sexual Activity    Alcohol use: Yes     Comment: Occasionally    Drug use: No    Sexual activity: Not Currently     Partners: Male     Birth control/protection: Post-menopausal   Other Topics Concern    Parent/sibling w/ CABG, MI or angioplasty before 65F 55M? No   Social History Narrative    Not on file     Social Determinants of Health     Financial Resource Strain: Low Risk  (12/22/2023)    Financial Resource Strain     Within the past 12 months, have you or your family members you live with been unable to get utilities (heat, electricity) when it was really needed?: No   Food Insecurity: Low Risk  (12/22/2023)    Food Insecurity     Within the past 12 months, did you worry that your  food would run out before you got money to buy more?: No     Within the past 12 months, did the food you bought just not last and you didn t have money to get more?: No   Transportation Needs: Low Risk  (12/22/2023)    Transportation Needs     Within the past 12 months, has lack of transportation kept you from medical appointments, getting your medicines, non-medical meetings or appointments, work, or from getting things that you need?: No   Physical Activity: Not on file   Stress: Not on file   Social Connections: Not on file   Interpersonal Safety: Low Risk  (10/26/2023)    Interpersonal Safety     Do you feel physically and emotionally safe where you currently live?: Yes     Within the past 12 months, have you been hit, slapped, kicked or otherwise physically hurt by someone?: No     Within the past 12 months, have you been humiliated or emotionally abused in other ways by your partner or ex-partner?: No   Housing Stability: Low Risk  (12/22/2023)    Housing Stability     Do you have housing? : Yes     Are you worried about losing your housing?: No       Family History  Family History   Problem Relation Age of Onset    Osteoporosis Mother     Cancer Sister         multiple myloma    Hypertension Brother     Heart Disease Brother 65        bypass    Obesity Son     Obesity Son     Hypertension Son     Melanoma No family hx of     Anesthesia Reaction No family hx of     Thrombosis No family hx of        Review of Systems  The complete review of systems is negative other than noted in the HPI or here.   Anesthesia Evaluation   Pt has had prior anesthetic.         ROS/MED HX  ENT/Pulmonary:     (+)     DANIELLE risk factors, snores loudly,               Intermittent, asthma (reactive airway disease) Last exacerbation: >1 year,               (-) recent URI   Neurologic:     (+)    peripheral neuropathy,                            Cardiovascular:     (+) Dyslipidemia hypertension- -  CAD (coronary atherosclerosis noted on  "PET and CT scan) -  - -                                 Previous cardiac testing   Echo: Date: 10/2023 Results:  Echo  10/13/23  Interpretation Summary  Biplane LVEF is 60%.  Global peak LV longitudinal strain is averaged at -14.2%. This suggests  abnormal strain (normal <-18%).  Right ventricular function, chamber size, wall motion, and thickness are  normal.  The inferior vena cava is normal.  No pericardial effusion is present.  There is no prior study for direct comparison.  Stress Test:  Date: Results:    ECG Reviewed:  Date: 2020 Results:  Sinus tachycardia  Cath:  Date: Results:   (-) ANN and orthopnea/PND   METS/Exercise Tolerance: >4 METS Comment: Goes for a 0.5 mile walk a few times per week.  Cleans her home, goes grocery shopping and can ascend a flight of stairs while carrying laundry.    Hematologic:     (+)      anemia, history of blood transfusion, no previous transfusion reaction,     (-) history of blood clots   Musculoskeletal: Comment: Right calf sarcoma    Intermittent low back pain      GI/Hepatic: Comment: Anal fissure    (+) GERD, Asymptomatic on medication,           liver disease (fatty liver),       Renal/Genitourinary: Comment: Right kidney atrophy secondary to prior kidney stones      Endo:  - neg endo ROS     Psychiatric/Substance Use:  - neg psychiatric ROS     Infectious Disease:  - neg infectious disease ROS     Malignancy:   (+) Malignancy, History of Other and Lymphoma/Leukemia.Lymph CA Active status post.  Other CA right calf sarcoma Active status post Chemo and Radiation.    Other: Comment: psoriasis     (+)  , H/O Chronic Pain,         BP (!) 151/93 (BP Location: Right arm, Patient Position: Sitting, Cuff Size: Adult Regular)   Pulse 86   Temp 98.4  F (36.9  C) (Oral)   Resp 16   Ht 1.549 m (5' 1\")   Wt 82.2 kg (181 lb 3.2 oz)   SpO2 100%   Breastfeeding No   BMI 34.24 kg/m      Physical Exam   Constitutional: Awake, alert, cooperative, no apparent distress, and appears " stated age. obese  Eyes: Pupils equal, round and reactive to light, extra ocular muscles intact, sclera clear, conjunctiva normal.  HENT: Normocephalic, oral pharynx with moist mucus membranes, good dentition. No goiter appreciated.   Respiratory: Clear to auscultation bilaterally, no crackles or wheezing.  Cardiovascular: Regular rate and rhythm, normal S1 and S2, and no murmur noted.  Carotids +2, no bruits. 1-2+ RLe edema. Palpable pulses to radial  and diminished DP and PT arteries.   GI: Normal bowel sounds, soft, non-distended, non-tender, no masses palpated, no hepatosplenomegaly.   Lymph/Hematologic: No cervical lymphadenopathy and no supraclavicular lymphadenopathy.  Genitourinary:  deferred  Skin: Warm and dry.  No rashes at anticipated surgical site.   Musculoskeletal: Full ROM of neck. There is no redness, warmth, or swelling of the exposed joints. Gross motor strength is normal.    Neurologic: Awake, alert, oriented to name, place and time. Cranial nerves II-XII are grossly intact. Gait is normal.   Neuropsychiatric: Calm, cooperative. Normal affect.     Prior Labs/Diagnostic Studies   All labs and imaging personally reviewed     EKG/ stress test - if available please see in ROS above           The patient's records and results personally reviewed by this provider.     Outside records reviewed from: Care Everywhere    LAB/DIAGNOSTIC STUDIES TODAY:    Component      Latest Ref Rng 4/18/2024  1:50 PM   WBC      4.0 - 11.0 10e3/uL 5.3    RBC Count      3.80 - 5.20 10e6/uL 3.48 (L)    Hemoglobin      11.7 - 15.7 g/dL 10.6 (L)    Hematocrit      35.0 - 47.0 % 30.1 (L)    MCV      78 - 100 fL 87    MCH      26.5 - 33.0 pg 30.5    MCHC      31.5 - 36.5 g/dL 35.2    RDW      10.0 - 15.0 % 13.6    Platelet Count      150 - 450 10e3/uL 197    % Neutrophils      % 58    % Lymphocytes      % 27    % Monocytes      % 11    % Eosinophils      % 3    % Basophils      % 1    % Immature Granulocytes      % 0    NRBCs  per 100 WBC      <1 /100 0    Absolute Neutrophils      1.6 - 8.3 10e3/uL 3.1    Absolute Lymphocytes      0.8 - 5.3 10e3/uL 1.5    Absolute Monocytes      0.0 - 1.3 10e3/uL 0.6    Absolute Eosinophils      0.0 - 0.7 10e3/uL 0.2    Absolute Basophils      0.0 - 0.2 10e3/uL 0.0    Absolute Immature Granulocytes      <=0.4 10e3/uL 0.0    Absolute NRBCs      10e3/uL 0.0    Sodium      135 - 145 mmol/L 140    Potassium      3.4 - 5.3 mmol/L 3.8    Carbon Dioxide (CO2)      22 - 29 mmol/L 22    Anion Gap      7 - 15 mmol/L 14    Urea Nitrogen      8.0 - 23.0 mg/dL 27.6 (H)    Creatinine      0.51 - 0.95 mg/dL 1.19 (H)    GFR Estimate      >60 mL/min/1.73m2 50 (L)    Calcium      8.8 - 10.2 mg/dL 9.7    Chloride      98 - 107 mmol/L 104    Glucose      70 - 99 mg/dL 92    Alkaline Phosphatase      40 - 150 U/L 118    AST      0 - 45 U/L 29    ALT      0 - 50 U/L 30    Protein Total      6.4 - 8.3 g/dL 7.6    Albumin      3.5 - 5.2 g/dL 4.6    Bilirubin Total      <=1.2 mg/dL 0.4    Magnesium      1.7 - 2.3 mg/dL 1.9    Phosphorus      2.5 - 4.5 mg/dL 3.1       Legend:  (L) Low  (H) High    Assessment    Mirta Cardenas is a 69 year old female seen as a PAC referral for risk assessment and optimization for anesthesia.    Plan/Recommendations  Pt will be optimized for the proposed procedure.  See below for details on the assessment, risk, and preoperative recommendations    NEUROLOGY  - No history of TIA, CVA or seizure    -Post Op delirium risk factors:  No risk identified    ENT  - No current airway concerns.  Will need to be reassessed day of surgery.  Mallampati: I  TM: > 3    CARDIAC  - No history of Afib  - coronary artery calcifications noted on CT and PET scan.  METS >4 and denies any concerning cardiac symptoms.  Okay to proceed with surgery as planned on Monday.  Dr. Boone to notify [facility] [place].  Patient agreeable to consult with cardiology after surgery - referral placed.   - prior cardiac testing as above.  "  - hold losartan and hydrochlorothiazide DOS.     - METS (Metabolic Equivalents)  Patient performs 4 or more METS exercise without symptoms             Total Score: 0      RCRI-Very low risk: Class 1 0.4% complication rate             Total Score: 0        PULMONARY    DANIELLE Low Risk             Total Score: 2    DANIELLE: Hypertension    DANIELLE: Over 50 ys old      - Asthma  Well controlled - no inhalers prescribed currently  - Tobacco History    History   Smoking Status    Never   Smokeless Tobacco    Never       GI  - GERD is controlled with omeprazole and prn Tums.  Hold Tums DOS.   PONV High Risk  Total Score: 3           1 AN PONV: Pt is Female    1 AN PONV: Patient is not a current smoker    1 AN PONV: Intended Post Op Opioids            ENDOCRINE    - BMI: Estimated body mass index is 34.24 kg/m  as calculated from the following:    Height as of this encounter: 1.549 m (5' 1\").    Weight as of this encounter: 82.2 kg (181 lb 3.2 oz).  Obesity (BMI >30)  - No history of Diabetes Mellitus    HEME  VTE Medium Risk 1.8%             Total Score: 6    VTE: Greater than 59 yrs old    VTE: Current cancer      - No history of abnormal bleeding or antiplatelet use.        - following with heme/onc for CLL (monitoring only) .         MSK  - right calf sarcoma.  Surgery planned as above.  - intermittent back pain.  Consider cautious positioning.       Different anesthesia methods/types have been discussed with the patient, but they are aware that the final plan will be decided by the assigned anesthesia provider on the date of service.      The patient is optimized for their procedure. AVS with information on surgery time/arrival time, meds and NPO status given by nursing staff. No further diagnostic testing indicated.      On the day of service:     Prep time: 7 minutes  Visit time: 20 minutes  Documentation time: 16 minutes  ------------------------------------------  Total time: 43 minutes      BEBETO Dodge " CNP  Preoperative Assessment Center  [facility]  Phone: [phone]  Fax: [phone]

## 2024-04-19 LAB
CREAT UR-MCNC: 59.9 MG/DL
MICROALBUMIN UR-MCNC: 106 MG/L
MICROALBUMIN/CREAT UR: 176.96 MG/G CR (ref 0–25)

## 2024-04-19 NOTE — RESULT ENCOUNTER NOTE
Ling Kirby,    Your test results are attached.  Your labs are okay for surgery Monday.  You continue to have anemia, but it has improved some from prior.         Gemini Sanders DNP, RN, ANP-C

## 2024-04-21 ASSESSMENT — ENCOUNTER SYMPTOMS: ORTHOPNEA: 0

## 2024-04-22 ENCOUNTER — ANESTHESIA (OUTPATIENT)
Dept: SURGERY | Facility: CLINIC | Age: 69
DRG: 493 | End: 2024-04-22
Payer: COMMERCIAL

## 2024-04-22 ENCOUNTER — HOSPITAL ENCOUNTER (INPATIENT)
Facility: CLINIC | Age: 69
LOS: 2 days | Discharge: HOME | DRG: 493 | End: 2024-04-24
Attending: ORTHOPAEDIC SURGERY | Admitting: ORTHOPAEDIC SURGERY
Payer: COMMERCIAL

## 2024-04-22 DIAGNOSIS — C49.21 SARCOMA OF RIGHT LOWER EXTREMITY (H): ICD-10-CM

## 2024-04-22 DIAGNOSIS — Z98.890 STATUS POST SURGERY: Primary | ICD-10-CM

## 2024-04-22 LAB
ABO/RH(D): NORMAL
ANTIBODY SCREEN: NEGATIVE
BLD PROD TYP BPU: NORMAL
BLD PROD TYP BPU: NORMAL
BLOOD COMPONENT TYPE: NORMAL
BLOOD COMPONENT TYPE: NORMAL
CODING SYSTEM: NORMAL
CODING SYSTEM: NORMAL
CROSSMATCH: NORMAL
GLUCOSE BLDC GLUCOMTR-MCNC: 115 MG/DL (ref 70–99)
HOLD SPECIMEN: NORMAL
SPECIMEN EXPIRATION DATE: NORMAL
UNIT ABO/RH: NORMAL
UNIT ABO/RH: NORMAL
UNIT NUMBER: NORMAL
UNIT NUMBER: NORMAL
UNIT STATUS: NORMAL
UNIT STATUS: NORMAL
UNIT TYPE ISBT: 6200
UNIT TYPE ISBT: 8400

## 2024-04-22 PROCEDURE — 250N000009 HC RX 250: Performed by: ORTHOPAEDIC SURGERY

## 2024-04-22 PROCEDURE — 64445 NJX AA&/STRD SCIATIC NRV IMG: CPT | Mod: 59 | Performed by: ANESTHESIOLOGY

## 2024-04-22 PROCEDURE — 250N000011 HC RX IP 250 OP 636: Performed by: STUDENT IN AN ORGANIZED HEALTH CARE EDUCATION/TRAINING PROGRAM

## 2024-04-22 PROCEDURE — 86923 COMPATIBILITY TEST ELECTRIC: CPT | Performed by: STUDENT IN AN ORGANIZED HEALTH CARE EDUCATION/TRAINING PROGRAM

## 2024-04-22 PROCEDURE — P9045 ALBUMIN (HUMAN), 5%, 250 ML: HCPCS | Mod: JZ

## 2024-04-22 PROCEDURE — 250N000013 HC RX MED GY IP 250 OP 250 PS 637: Performed by: STUDENT IN AN ORGANIZED HEALTH CARE EDUCATION/TRAINING PROGRAM

## 2024-04-22 PROCEDURE — 258N000003 HC RX IP 258 OP 636: Performed by: NURSE ANESTHETIST, CERTIFIED REGISTERED

## 2024-04-22 PROCEDURE — 360N000084 HC SURGERY LEVEL 4 W/ FLUORO, PER MIN: Performed by: ORTHOPAEDIC SURGERY

## 2024-04-22 PROCEDURE — 250N000009 HC RX 250: Performed by: NURSE ANESTHETIST, CERTIFIED REGISTERED

## 2024-04-22 PROCEDURE — 250N000009 HC RX 250: Performed by: STUDENT IN AN ORGANIZED HEALTH CARE EDUCATION/TRAINING PROGRAM

## 2024-04-22 PROCEDURE — 88309 TISSUE EXAM BY PATHOLOGIST: CPT | Mod: 26 | Performed by: PATHOLOGY

## 2024-04-22 PROCEDURE — 0QBJ0ZZ EXCISION OF RIGHT FIBULA, OPEN APPROACH: ICD-10-PCS | Performed by: ORTHOPAEDIC SURGERY

## 2024-04-22 PROCEDURE — 250N000012 HC RX MED GY IP 250 OP 636 PS 637: Performed by: STUDENT IN AN ORGANIZED HEALTH CARE EDUCATION/TRAINING PROGRAM

## 2024-04-22 PROCEDURE — 258N000003 HC RX IP 258 OP 636: Performed by: STUDENT IN AN ORGANIZED HEALTH CARE EDUCATION/TRAINING PROGRAM

## 2024-04-22 PROCEDURE — 250N000025 HC SEVOFLURANE, PER MIN: Performed by: ORTHOPAEDIC SURGERY

## 2024-04-22 PROCEDURE — 99222 1ST HOSP IP/OBS MODERATE 55: CPT | Performed by: INTERNAL MEDICINE

## 2024-04-22 PROCEDURE — 82962 GLUCOSE BLOOD TEST: CPT

## 2024-04-22 PROCEDURE — 27616 RESECT LEG/ANKLE TUM 5 CM/>: CPT | Performed by: STUDENT IN AN ORGANIZED HEALTH CARE EDUCATION/TRAINING PROGRAM

## 2024-04-22 PROCEDURE — 710N000009 HC RECOVERY PHASE 1, LEVEL 1, PER MIN: Performed by: ORTHOPAEDIC SURGERY

## 2024-04-22 PROCEDURE — 258N000003 HC RX IP 258 OP 636: Performed by: INTERNAL MEDICINE

## 2024-04-22 PROCEDURE — 999N000141 HC STATISTIC PRE-PROCEDURE NURSING ASSESSMENT: Performed by: ORTHOPAEDIC SURGERY

## 2024-04-22 PROCEDURE — 370N000017 HC ANESTHESIA TECHNICAL FEE, PER MIN: Performed by: ORTHOPAEDIC SURGERY

## 2024-04-22 PROCEDURE — 250N000011 HC RX IP 250 OP 636: Mod: JZ

## 2024-04-22 PROCEDURE — 88311 DECALCIFY TISSUE: CPT | Mod: 26 | Performed by: PATHOLOGY

## 2024-04-22 PROCEDURE — 0KBS0ZZ EXCISION OF RIGHT LOWER LEG MUSCLE, OPEN APPROACH: ICD-10-PCS | Performed by: ORTHOPAEDIC SURGERY

## 2024-04-22 PROCEDURE — 88311 DECALCIFY TISSUE: CPT | Mod: TC | Performed by: ORTHOPAEDIC SURGERY

## 2024-04-22 PROCEDURE — 250N000011 HC RX IP 250 OP 636: Performed by: PHYSICIAN ASSISTANT

## 2024-04-22 PROCEDURE — 120N000002 HC R&B MED SURG/OB UMMC

## 2024-04-22 PROCEDURE — 272N000001 HC OR GENERAL SUPPLY STERILE: Performed by: ORTHOPAEDIC SURGERY

## 2024-04-22 PROCEDURE — 250N000011 HC RX IP 250 OP 636: Performed by: NURSE ANESTHETIST, CERTIFIED REGISTERED

## 2024-04-22 PROCEDURE — 27616 RESECT LEG/ANKLE TUM 5 CM/>: CPT

## 2024-04-22 PROCEDURE — 250N000013 HC RX MED GY IP 250 OP 250 PS 637: Performed by: INTERNAL MEDICINE

## 2024-04-22 PROCEDURE — 86900 BLOOD TYPING SEROLOGIC ABO: CPT | Performed by: STUDENT IN AN ORGANIZED HEALTH CARE EDUCATION/TRAINING PROGRAM

## 2024-04-22 RX ORDER — ONDANSETRON 2 MG/ML
4 INJECTION INTRAMUSCULAR; INTRAVENOUS EVERY 30 MIN PRN
Status: DISCONTINUED | OUTPATIENT
Start: 2024-04-22 | End: 2024-04-22

## 2024-04-22 RX ORDER — DIMENHYDRINATE 50 MG/ML
25 INJECTION, SOLUTION INTRAMUSCULAR; INTRAVENOUS
Status: DISCONTINUED | OUTPATIENT
Start: 2024-04-22 | End: 2024-04-22

## 2024-04-22 RX ORDER — DIPHENHYDRAMINE HCL 12.5MG/5ML
12.5 LIQUID (ML) ORAL EVERY 6 HOURS PRN
Status: DISCONTINUED | OUTPATIENT
Start: 2024-04-22 | End: 2024-04-22

## 2024-04-22 RX ORDER — DIPHENHYDRAMINE HYDROCHLORIDE 50 MG/ML
12.5 INJECTION INTRAMUSCULAR; INTRAVENOUS EVERY 6 HOURS PRN
Status: DISCONTINUED | OUTPATIENT
Start: 2024-04-22 | End: 2024-04-22

## 2024-04-22 RX ORDER — CEFAZOLIN SODIUM 2 G/100ML
2 INJECTION, SOLUTION INTRAVENOUS EVERY 8 HOURS
Qty: 200 ML | Refills: 0 | Status: COMPLETED | OUTPATIENT
Start: 2024-04-22 | End: 2024-04-23

## 2024-04-22 RX ORDER — ONDANSETRON 4 MG/1
4 TABLET, ORALLY DISINTEGRATING ORAL EVERY 6 HOURS PRN
Status: DISCONTINUED | OUTPATIENT
Start: 2024-04-22 | End: 2024-04-24 | Stop reason: HOSPADM

## 2024-04-22 RX ORDER — FENTANYL CITRATE 50 UG/ML
50 INJECTION, SOLUTION INTRAMUSCULAR; INTRAVENOUS EVERY 5 MIN PRN
Status: DISCONTINUED | OUTPATIENT
Start: 2024-04-22 | End: 2024-04-22

## 2024-04-22 RX ORDER — PREGABALIN 50 MG/1
50 CAPSULE ORAL 3 TIMES DAILY
Status: DISCONTINUED | OUTPATIENT
Start: 2024-04-22 | End: 2024-04-22

## 2024-04-22 RX ORDER — SODIUM CHLORIDE, SODIUM LACTATE, POTASSIUM CHLORIDE, CALCIUM CHLORIDE 600; 310; 30; 20 MG/100ML; MG/100ML; MG/100ML; MG/100ML
INJECTION, SOLUTION INTRAVENOUS CONTINUOUS
Status: DISCONTINUED | OUTPATIENT
Start: 2024-04-22 | End: 2024-04-24 | Stop reason: HOSPADM

## 2024-04-22 RX ORDER — LIDOCAINE 40 MG/G
CREAM TOPICAL
Status: DISCONTINUED | OUTPATIENT
Start: 2024-04-22 | End: 2024-04-24 | Stop reason: HOSPADM

## 2024-04-22 RX ORDER — MULTIPLE VITAMINS W/ MINERALS TAB 9MG-400MCG
1 TAB ORAL DAILY
Status: DISCONTINUED | OUTPATIENT
Start: 2024-04-23 | End: 2024-04-24 | Stop reason: HOSPADM

## 2024-04-22 RX ORDER — NALOXONE HYDROCHLORIDE 0.4 MG/ML
0.2 INJECTION, SOLUTION INTRAMUSCULAR; INTRAVENOUS; SUBCUTANEOUS
Status: DISCONTINUED | OUTPATIENT
Start: 2024-04-22 | End: 2024-04-22 | Stop reason: HOSPADM

## 2024-04-22 RX ORDER — HYDRALAZINE HYDROCHLORIDE 20 MG/ML
2.5-5 INJECTION INTRAMUSCULAR; INTRAVENOUS EVERY 10 MIN PRN
Status: DISCONTINUED | OUTPATIENT
Start: 2024-04-22 | End: 2024-04-22

## 2024-04-22 RX ORDER — POLYETHYLENE GLYCOL 3350 17 G/17G
17 POWDER, FOR SOLUTION ORAL DAILY
Status: DISCONTINUED | OUTPATIENT
Start: 2024-04-23 | End: 2024-04-24 | Stop reason: HOSPADM

## 2024-04-22 RX ORDER — BISACODYL 10 MG
10 SUPPOSITORY, RECTAL RECTAL DAILY PRN
Status: DISCONTINUED | OUTPATIENT
Start: 2024-04-25 | End: 2024-04-24 | Stop reason: HOSPADM

## 2024-04-22 RX ORDER — LIDOCAINE HYDROCHLORIDE 20 MG/ML
INJECTION, SOLUTION INFILTRATION; PERINEURAL PRN
Status: DISCONTINUED | OUTPATIENT
Start: 2024-04-22 | End: 2024-04-22

## 2024-04-22 RX ORDER — PANTOPRAZOLE SODIUM 40 MG/1
40 TABLET, DELAYED RELEASE ORAL EVERY MORNING
Status: DISCONTINUED | OUTPATIENT
Start: 2024-04-23 | End: 2024-04-24 | Stop reason: HOSPADM

## 2024-04-22 RX ORDER — BUPIVACAINE HYDROCHLORIDE 2.5 MG/ML
INJECTION, SOLUTION EPIDURAL; INFILTRATION; INTRACAUDAL
Status: COMPLETED | OUTPATIENT
Start: 2024-04-22 | End: 2024-04-22

## 2024-04-22 RX ORDER — ONDANSETRON 4 MG/1
4 TABLET, ORALLY DISINTEGRATING ORAL EVERY 30 MIN PRN
Status: DISCONTINUED | OUTPATIENT
Start: 2024-04-22 | End: 2024-04-22

## 2024-04-22 RX ORDER — PROCHLORPERAZINE MALEATE 5 MG
5 TABLET ORAL EVERY 6 HOURS PRN
Status: DISCONTINUED | OUTPATIENT
Start: 2024-04-22 | End: 2024-04-24 | Stop reason: HOSPADM

## 2024-04-22 RX ORDER — APREPITANT 40 MG/1
40 CAPSULE ORAL ONCE
Status: COMPLETED | OUTPATIENT
Start: 2024-04-22 | End: 2024-04-22

## 2024-04-22 RX ORDER — DEXAMETHASONE SODIUM PHOSPHATE 4 MG/ML
INJECTION, SOLUTION INTRA-ARTICULAR; INTRALESIONAL; INTRAMUSCULAR; INTRAVENOUS; SOFT TISSUE PRN
Status: DISCONTINUED | OUTPATIENT
Start: 2024-04-22 | End: 2024-04-22

## 2024-04-22 RX ORDER — NALOXONE HYDROCHLORIDE 0.4 MG/ML
0.1 INJECTION, SOLUTION INTRAMUSCULAR; INTRAVENOUS; SUBCUTANEOUS
Status: DISCONTINUED | OUTPATIENT
Start: 2024-04-22 | End: 2024-04-22

## 2024-04-22 RX ORDER — NALOXONE HYDROCHLORIDE 0.4 MG/ML
0.4 INJECTION, SOLUTION INTRAMUSCULAR; INTRAVENOUS; SUBCUTANEOUS
Status: DISCONTINUED | OUTPATIENT
Start: 2024-04-22 | End: 2024-04-22 | Stop reason: HOSPADM

## 2024-04-22 RX ORDER — NALOXONE HYDROCHLORIDE 0.4 MG/ML
0.4 INJECTION, SOLUTION INTRAMUSCULAR; INTRAVENOUS; SUBCUTANEOUS
Status: DISCONTINUED | OUTPATIENT
Start: 2024-04-22 | End: 2024-04-24 | Stop reason: HOSPADM

## 2024-04-22 RX ORDER — ACETAMINOPHEN 325 MG/1
975 TABLET ORAL EVERY 8 HOURS
Status: DISCONTINUED | OUTPATIENT
Start: 2024-04-22 | End: 2024-04-24 | Stop reason: HOSPADM

## 2024-04-22 RX ORDER — ACETAMINOPHEN 325 MG/1
650 TABLET ORAL EVERY 4 HOURS PRN
Status: DISCONTINUED | OUTPATIENT
Start: 2024-04-25 | End: 2024-04-24 | Stop reason: HOSPADM

## 2024-04-22 RX ORDER — DIAZEPAM 10 MG/2ML
2.5 INJECTION, SOLUTION INTRAMUSCULAR; INTRAVENOUS
Status: DISCONTINUED | OUTPATIENT
Start: 2024-04-22 | End: 2024-04-22

## 2024-04-22 RX ORDER — ONDANSETRON 2 MG/ML
4 INJECTION INTRAMUSCULAR; INTRAVENOUS EVERY 6 HOURS PRN
Status: DISCONTINUED | OUTPATIENT
Start: 2024-04-22 | End: 2024-04-24 | Stop reason: HOSPADM

## 2024-04-22 RX ORDER — OXYCODONE HYDROCHLORIDE 5 MG/1
5 TABLET ORAL EVERY 4 HOURS PRN
Status: DISCONTINUED | OUTPATIENT
Start: 2024-04-22 | End: 2024-04-23

## 2024-04-22 RX ORDER — HYDROMORPHONE HYDROCHLORIDE 1 MG/ML
0.2 INJECTION, SOLUTION INTRAMUSCULAR; INTRAVENOUS; SUBCUTANEOUS EVERY 5 MIN PRN
Status: DISCONTINUED | OUTPATIENT
Start: 2024-04-22 | End: 2024-04-22

## 2024-04-22 RX ORDER — HYDROMORPHONE HYDROCHLORIDE 1 MG/ML
0.1 INJECTION, SOLUTION INTRAMUSCULAR; INTRAVENOUS; SUBCUTANEOUS
Status: DISCONTINUED | OUTPATIENT
Start: 2024-04-22 | End: 2024-04-24 | Stop reason: HOSPADM

## 2024-04-22 RX ORDER — OXYCODONE HYDROCHLORIDE 5 MG/1
5 TABLET ORAL
Status: COMPLETED | OUTPATIENT
Start: 2024-04-22 | End: 2024-04-22

## 2024-04-22 RX ORDER — LABETALOL HYDROCHLORIDE 5 MG/ML
10 INJECTION, SOLUTION INTRAVENOUS
Status: DISCONTINUED | OUTPATIENT
Start: 2024-04-22 | End: 2024-04-22

## 2024-04-22 RX ORDER — ONDANSETRON 2 MG/ML
INJECTION INTRAMUSCULAR; INTRAVENOUS PRN
Status: DISCONTINUED | OUTPATIENT
Start: 2024-04-22 | End: 2024-04-22

## 2024-04-22 RX ORDER — NALOXONE HYDROCHLORIDE 0.4 MG/ML
0.2 INJECTION, SOLUTION INTRAMUSCULAR; INTRAVENOUS; SUBCUTANEOUS
Status: DISCONTINUED | OUTPATIENT
Start: 2024-04-22 | End: 2024-04-24 | Stop reason: HOSPADM

## 2024-04-22 RX ORDER — HALOPERIDOL 5 MG/ML
1 INJECTION INTRAMUSCULAR
Status: DISCONTINUED | OUTPATIENT
Start: 2024-04-22 | End: 2024-04-22

## 2024-04-22 RX ORDER — HYDROMORPHONE HYDROCHLORIDE 1 MG/ML
0.4 INJECTION, SOLUTION INTRAMUSCULAR; INTRAVENOUS; SUBCUTANEOUS EVERY 5 MIN PRN
Status: DISCONTINUED | OUTPATIENT
Start: 2024-04-22 | End: 2024-04-22

## 2024-04-22 RX ORDER — ACETAMINOPHEN 325 MG/1
975 TABLET ORAL ONCE
Status: COMPLETED | OUTPATIENT
Start: 2024-04-22 | End: 2024-04-22

## 2024-04-22 RX ORDER — AMOXICILLIN 250 MG
1 CAPSULE ORAL 2 TIMES DAILY
Status: DISCONTINUED | OUTPATIENT
Start: 2024-04-22 | End: 2024-04-24 | Stop reason: HOSPADM

## 2024-04-22 RX ORDER — ACETAMINOPHEN 325 MG/1
975 TABLET ORAL ONCE
Status: DISCONTINUED | OUTPATIENT
Start: 2024-04-22 | End: 2024-04-22

## 2024-04-22 RX ORDER — IPRATROPIUM BROMIDE AND ALBUTEROL SULFATE 2.5; .5 MG/3ML; MG/3ML
3 SOLUTION RESPIRATORY (INHALATION) ONCE
Status: COMPLETED | OUTPATIENT
Start: 2024-04-22 | End: 2024-04-22

## 2024-04-22 RX ORDER — CEFAZOLIN SODIUM/WATER 2 G/20 ML
2 SYRINGE (ML) INTRAVENOUS
Status: COMPLETED | OUTPATIENT
Start: 2024-04-22 | End: 2024-04-22

## 2024-04-22 RX ORDER — DEXAMETHASONE SODIUM PHOSPHATE 10 MG/ML
INJECTION, SOLUTION INTRAMUSCULAR; INTRAVENOUS
Status: COMPLETED | OUTPATIENT
Start: 2024-04-22 | End: 2024-04-22

## 2024-04-22 RX ORDER — FENTANYL CITRATE 50 UG/ML
25 INJECTION, SOLUTION INTRAMUSCULAR; INTRAVENOUS EVERY 5 MIN PRN
Status: DISCONTINUED | OUTPATIENT
Start: 2024-04-22 | End: 2024-04-22

## 2024-04-22 RX ORDER — FLUMAZENIL 0.1 MG/ML
0.2 INJECTION, SOLUTION INTRAVENOUS
Status: DISCONTINUED | OUTPATIENT
Start: 2024-04-22 | End: 2024-04-22 | Stop reason: HOSPADM

## 2024-04-22 RX ORDER — PROPOFOL 10 MG/ML
INJECTION, EMULSION INTRAVENOUS PRN
Status: DISCONTINUED | OUTPATIENT
Start: 2024-04-22 | End: 2024-04-22

## 2024-04-22 RX ORDER — SODIUM CHLORIDE, SODIUM LACTATE, POTASSIUM CHLORIDE, CALCIUM CHLORIDE 600; 310; 30; 20 MG/100ML; MG/100ML; MG/100ML; MG/100ML
INJECTION, SOLUTION INTRAVENOUS CONTINUOUS
Status: DISCONTINUED | OUTPATIENT
Start: 2024-04-22 | End: 2024-04-22

## 2024-04-22 RX ORDER — LIDOCAINE 40 MG/G
CREAM TOPICAL
Status: DISCONTINUED | OUTPATIENT
Start: 2024-04-22 | End: 2024-04-22 | Stop reason: HOSPADM

## 2024-04-22 RX ORDER — PROPOFOL 10 MG/ML
INJECTION, EMULSION INTRAVENOUS CONTINUOUS PRN
Status: DISCONTINUED | OUTPATIENT
Start: 2024-04-22 | End: 2024-04-22

## 2024-04-22 RX ORDER — PRAVASTATIN SODIUM 10 MG
40 TABLET ORAL EVERY EVENING
Status: DISCONTINUED | OUTPATIENT
Start: 2024-04-22 | End: 2024-04-24 | Stop reason: HOSPADM

## 2024-04-22 RX ORDER — DEXMEDETOMIDINE HYDROCHLORIDE 4 UG/ML
INJECTION, SOLUTION INTRAVENOUS
Status: COMPLETED | OUTPATIENT
Start: 2024-04-22 | End: 2024-04-22

## 2024-04-22 RX ORDER — CEFAZOLIN SODIUM/WATER 2 G/20 ML
2 SYRINGE (ML) INTRAVENOUS SEE ADMIN INSTRUCTIONS
Status: DISCONTINUED | OUTPATIENT
Start: 2024-04-22 | End: 2024-04-22 | Stop reason: HOSPADM

## 2024-04-22 RX ORDER — HYDROMORPHONE HYDROCHLORIDE 1 MG/ML
0.2 INJECTION, SOLUTION INTRAMUSCULAR; INTRAVENOUS; SUBCUTANEOUS
Status: DISCONTINUED | OUTPATIENT
Start: 2024-04-22 | End: 2024-04-24 | Stop reason: HOSPADM

## 2024-04-22 RX ORDER — SODIUM CHLORIDE, SODIUM LACTATE, POTASSIUM CHLORIDE, CALCIUM CHLORIDE 600; 310; 30; 20 MG/100ML; MG/100ML; MG/100ML; MG/100ML
INJECTION, SOLUTION INTRAVENOUS CONTINUOUS
Status: DISCONTINUED | OUTPATIENT
Start: 2024-04-22 | End: 2024-04-22 | Stop reason: HOSPADM

## 2024-04-22 RX ORDER — ALBUTEROL SULFATE 0.83 MG/ML
2.5 SOLUTION RESPIRATORY (INHALATION) EVERY 4 HOURS PRN
Status: DISCONTINUED | OUTPATIENT
Start: 2024-04-22 | End: 2024-04-22

## 2024-04-22 RX ORDER — FENTANYL CITRATE 50 UG/ML
25-50 INJECTION, SOLUTION INTRAMUSCULAR; INTRAVENOUS
Status: DISCONTINUED | OUTPATIENT
Start: 2024-04-22 | End: 2024-04-22 | Stop reason: HOSPADM

## 2024-04-22 RX ORDER — ASPIRIN 81 MG/1
81 TABLET ORAL DAILY
Status: DISCONTINUED | OUTPATIENT
Start: 2024-04-23 | End: 2024-04-24 | Stop reason: HOSPADM

## 2024-04-22 RX ORDER — CALCIUM CARBONATE 500 MG/1
500 TABLET, CHEWABLE ORAL DAILY PRN
Status: DISCONTINUED | OUTPATIENT
Start: 2024-04-22 | End: 2024-04-24 | Stop reason: HOSPADM

## 2024-04-22 RX ADMIN — HYDROMORPHONE HYDROCHLORIDE 0.1 MG: 1 INJECTION, SOLUTION INTRAMUSCULAR; INTRAVENOUS; SUBCUTANEOUS at 15:26

## 2024-04-22 RX ADMIN — FENTANYL CITRATE 25 MCG: 50 INJECTION INTRAMUSCULAR; INTRAVENOUS at 15:42

## 2024-04-22 RX ADMIN — PROPOFOL 20 MCG/KG/MIN: 10 INJECTION, EMULSION INTRAVENOUS at 12:23

## 2024-04-22 RX ADMIN — SENNOSIDES AND DOCUSATE SODIUM 1 TABLET: 8.6; 5 TABLET ORAL at 20:09

## 2024-04-22 RX ADMIN — FENTANYL CITRATE 25 MCG: 50 INJECTION INTRAMUSCULAR; INTRAVENOUS at 16:03

## 2024-04-22 RX ADMIN — PHENYLEPHRINE HYDROCHLORIDE 100 MCG: 10 INJECTION INTRAVENOUS at 12:00

## 2024-04-22 RX ADMIN — APREPITANT 40 MG: 40 CAPSULE ORAL at 09:57

## 2024-04-22 RX ADMIN — PHENYLEPHRINE HYDROCHLORIDE 100 MCG: 10 INJECTION INTRAVENOUS at 12:05

## 2024-04-22 RX ADMIN — HYDROMORPHONE HYDROCHLORIDE 0.2 MG: 1 INJECTION, SOLUTION INTRAMUSCULAR; INTRAVENOUS; SUBCUTANEOUS at 23:38

## 2024-04-22 RX ADMIN — ACETAMINOPHEN 975 MG: 325 TABLET, FILM COATED ORAL at 23:59

## 2024-04-22 RX ADMIN — PRAVASTATIN SODIUM 40 MG: 10 TABLET ORAL at 20:09

## 2024-04-22 RX ADMIN — SODIUM CHLORIDE, POTASSIUM CHLORIDE, SODIUM LACTATE AND CALCIUM CHLORIDE: 600; 310; 30; 20 INJECTION, SOLUTION INTRAVENOUS at 11:33

## 2024-04-22 RX ADMIN — Medication 2.5 MG: at 20:09

## 2024-04-22 RX ADMIN — Medication 2 G: at 11:53

## 2024-04-22 RX ADMIN — ACETAMINOPHEN 975 MG: 325 TABLET, FILM COATED ORAL at 16:31

## 2024-04-22 RX ADMIN — ONDANSETRON 4 MG: 2 INJECTION INTRAMUSCULAR; INTRAVENOUS at 14:48

## 2024-04-22 RX ADMIN — ACETAMINOPHEN 975 MG: 325 TABLET, FILM COATED ORAL at 09:57

## 2024-04-22 RX ADMIN — FENTANYL CITRATE 100 MCG: 50 INJECTION INTRAMUSCULAR; INTRAVENOUS at 11:40

## 2024-04-22 RX ADMIN — DEXAMETHASONE SODIUM PHOSPHATE 6 MG: 4 INJECTION, SOLUTION INTRA-ARTICULAR; INTRALESIONAL; INTRAMUSCULAR; INTRAVENOUS; SOFT TISSUE at 12:27

## 2024-04-22 RX ADMIN — PROPOFOL 200 MG: 10 INJECTION, EMULSION INTRAVENOUS at 11:42

## 2024-04-22 RX ADMIN — PROPOFOL 30 MG: 10 INJECTION, EMULSION INTRAVENOUS at 15:03

## 2024-04-22 RX ADMIN — PHENYLEPHRINE HYDROCHLORIDE 100 MCG: 10 INJECTION INTRAVENOUS at 11:51

## 2024-04-22 RX ADMIN — IPRATROPIUM BROMIDE AND ALBUTEROL SULFATE 3 ML: .5; 3 SOLUTION RESPIRATORY (INHALATION) at 09:56

## 2024-04-22 RX ADMIN — BUPIVACAINE HYDROCHLORIDE 20 ML: 2.5 INJECTION, SOLUTION EPIDURAL; INFILTRATION; INTRACAUDAL at 10:41

## 2024-04-22 RX ADMIN — ALBUMIN HUMAN: 0.05 INJECTION, SOLUTION INTRAVENOUS at 14:12

## 2024-04-22 RX ADMIN — SODIUM CHLORIDE, POTASSIUM CHLORIDE, SODIUM LACTATE AND CALCIUM CHLORIDE: 600; 310; 30; 20 INJECTION, SOLUTION INTRAVENOUS at 23:57

## 2024-04-22 RX ADMIN — HYDROMORPHONE HYDROCHLORIDE 0.2 MG: 1 INJECTION, SOLUTION INTRAMUSCULAR; INTRAVENOUS; SUBCUTANEOUS at 16:17

## 2024-04-22 RX ADMIN — SODIUM CHLORIDE, POTASSIUM CHLORIDE, SODIUM LACTATE AND CALCIUM CHLORIDE: 600; 310; 30; 20 INJECTION, SOLUTION INTRAVENOUS at 14:14

## 2024-04-22 RX ADMIN — HYDROMORPHONE HYDROCHLORIDE 0.1 MG: 1 INJECTION, SOLUTION INTRAMUSCULAR; INTRAVENOUS; SUBCUTANEOUS at 13:11

## 2024-04-22 RX ADMIN — HYDROMORPHONE HYDROCHLORIDE 0.2 MG: 1 INJECTION, SOLUTION INTRAMUSCULAR; INTRAVENOUS; SUBCUTANEOUS at 21:23

## 2024-04-22 RX ADMIN — OXYCODONE HYDROCHLORIDE 5 MG: 5 TABLET ORAL at 16:55

## 2024-04-22 RX ADMIN — FENTANYL CITRATE 50 MCG: 50 INJECTION INTRAMUSCULAR; INTRAVENOUS at 10:42

## 2024-04-22 RX ADMIN — PHENYLEPHRINE HYDROCHLORIDE 0.2 MCG/KG/MIN: 10 INJECTION INTRAVENOUS at 12:04

## 2024-04-22 RX ADMIN — DEXMEDETOMIDINE 40 MCG: 100 INJECTION, SOLUTION, CONCENTRATE INTRAVENOUS at 10:41

## 2024-04-22 RX ADMIN — MIDAZOLAM HYDROCHLORIDE 0.5 MG: 1 INJECTION, SOLUTION INTRAMUSCULAR; INTRAVENOUS at 10:43

## 2024-04-22 RX ADMIN — DEXAMETHASONE SODIUM PHOSPHATE 2 MG: 10 INJECTION, SOLUTION INTRAMUSCULAR; INTRAVENOUS at 10:41

## 2024-04-22 RX ADMIN — CEFAZOLIN SODIUM 2 G: 2 INJECTION, SOLUTION INTRAVENOUS at 20:13

## 2024-04-22 RX ADMIN — LIDOCAINE HYDROCHLORIDE 80 MG: 20 INJECTION, SOLUTION INFILTRATION; PERINEURAL at 11:40

## 2024-04-22 RX ADMIN — HYDROMORPHONE HYDROCHLORIDE 0.2 MG: 1 INJECTION, SOLUTION INTRAMUSCULAR; INTRAVENOUS; SUBCUTANEOUS at 16:47

## 2024-04-22 RX ADMIN — FENTANYL CITRATE 25 MCG: 50 INJECTION INTRAMUSCULAR; INTRAVENOUS at 15:51

## 2024-04-22 ASSESSMENT — ACTIVITIES OF DAILY LIVING (ADL)
ADLS_ACUITY_SCORE: 33
ADLS_ACUITY_SCORE: 34
ADLS_ACUITY_SCORE: 34
ADLS_ACUITY_SCORE: 33
ADLS_ACUITY_SCORE: 25
ADLS_ACUITY_SCORE: 34
ADLS_ACUITY_SCORE: 33
ADLS_ACUITY_SCORE: 33
ADLS_ACUITY_SCORE: 25
ADLS_ACUITY_SCORE: 33
ADLS_ACUITY_SCORE: 33
ADLS_ACUITY_SCORE: 34
ADLS_ACUITY_SCORE: 34
ADLS_ACUITY_SCORE: 31
ADLS_ACUITY_SCORE: 25

## 2024-04-22 NOTE — ANESTHESIA PREPROCEDURE EVALUATION
Anesthesia Pre-Procedure Evaluation    Patient: Mirta Cardenas   MRN: [id number] : 1955        Procedure : Procedure(s):  Removal of Tumor Right Calf  Including Bone Removal          Past Medical History:   Diagnosis Date    Asthma     Asthma     reactive airway disease, per patient    Blood transfusion     Cancer (H)     chronic lymphocytic leukemia    CINV (chemotherapy-induced nausea and vomiting) 10/12/2023    History of transfusion     Hypertension     Hypertension     Leukemia, chronic lymphocytic (H)     Malignant neoplasm (H)     CLL    Sarcoma of right lower extremity (H)     Thyroid nodule 2008      Past Surgical History:   Procedure Laterality Date    ABDOMEN SURGERY      c section *3    APPENDECTOMY      C/SECTION, CLASSICAL  ,,    , Classical    COLONOSCOPY      HYSTERECTOMY      HYSTERECTOMY, PAP NO LONGER INDICATED  1998    PICC DOUBLE LUMEN PLACEMENT Left 10/17/2023    left basilic 4 fr dl power picc 41 cm    PICC DOUBLE LUMEN PLACEMENT Left 2023    Medial Brachial, 42 cm, 3 cm external length    PICC DOUBLE LUMEN PLACEMENT Left 01/10/2024    left medial brachial 5 fr dl power picc 41 cm    RELEASE CARPAL TUNNEL  2012    Procedure:RELEASE CARPAL TUNNEL; Right Carpal Tunnel Release & Right Ring A1 Pulley Release; Surgeon:SERGEY HEATON; Location:[facility]    RELEASE TRIGGER FINGER  2012    Procedure:RELEASE TRIGGER FINGER; Surgeon:SERGEY HEATON; Location:[facility]    RELEASE TRIGGER FINGER  10/12/2012    Procedure: RELEASE TRIGGER FINGER;  Right Thumb A1 Pulley Release;  Surgeon: Sergey Heaton MD;  Location: [facility]    SALPINGO OOPHORECTOMY,R/L/ULICES  2008    right      Allergies   Allergen Reactions    Allopurinol Itching    Amoxicillin Hives    Codeine Itching    Cymbalta [Duloxetine Hcl] Fatigue    Periactin Other (See Comments)     Sleepy.    11/15/23: patient not sure about this allergy/intolerance - may be interested in  removal    Tenormin [Atenolol] Fatigue     11/15/23: patient may be interested in removal of this from allergy list as it was only fatigue/sleepiness      Social History     Tobacco Use    Smoking status: Never     Passive exposure: Never    Smokeless tobacco: Never   Substance Use Topics    Alcohol use: Yes     Comment: Occasionally      Wt Readings from Last 1 Encounters:   04/18/24 82.2 kg (181 lb 3.2 oz)        Anesthesia Evaluation   Pt has had prior anesthetic. Type: General and MAC.    No history of anesthetic complications       ROS/MED HX  ENT/Pulmonary: Comment: Reactive airway disease    (+)     DANIELLE risk factors, snores loudly,               Intermittent, asthma (reactive airway disease) Last exacerbation: >1 year,               (-) recent URI   Neurologic:     (+)    peripheral neuropathy,                            Cardiovascular: Comment:   # Chronic HTN, HLD  # Current meds: Amlodipine, hydrochlorothiazide, Losartan, Pravastatin    (+) Dyslipidemia hypertension- -  CAD (coronary atherosclerosis noted on PET and CT scan) -  - -                                 Previous cardiac testing   Echo: Date: 10/2023 Results:  Echo 10/13/23: Biplane LVEF 60%. Global peak LV longitudinal strain is averaged at -14.2%. This suggests abnormal strain (normal <-18%). RV function, chamber size, wall motion, and thickness are normal. Trace tricuspid insufficiency. Normal aortic and mitral valve. The inferior vena cava is normal. No pericardial effusion is present. No prior study for direct comparison.  Stress Test:  Date: Results:    ECG Reviewed:  Date: 2020 Results:  Sinus tachycardia  Cath:  Date: Results:   (-) ANN and orthopnea/PND   METS/Exercise Tolerance: >4 METS Comment: Goes for a 0.5 mile walk a few times per week.  Cleans her home, goes grocery shopping and can ascend a flight of stairs while carrying laundry.    Hematologic: Comments: Hgb 10.6 on 4/2024 -- Max allowable blood loss ~1.4L.   -- T&S ordered.      (+)      anemia, history of blood transfusion, no previous transfusion reaction,     (-) history of blood clots   Musculoskeletal: Comment:   # Right calf sarcoma -- here for limb salvage surgery on 4/22/2024  # Intermittent low back pain  # Current meds:   -- Oxycodone 5mg -- takes 5-10mg as needed (prescription for 60tabs/month)   -- Pregabalin 50mg TID  # Prior Sx: Carpal tunnel and release trigger finger.       GI/Hepatic: Comment:   # Hx/o Anal fissure  # GERD  # Takes Omeprazole daily  # Liver steatosis    (+) GERD, Asymptomatic on medication,           liver disease (fatty liver),       Renal/Genitourinary: Comment: Right kidney atrophy secondary to prior kidney stones  Labs 4/2024: Creat 1.19, estimated GFR ~50    (+) renal disease, type: CRI, Pt does not require dialysis,           Endo:  - neg endo ROS   (+)          thyroid problem, thyroid nodule,    Obesity,       Psychiatric/Substance Use:  - neg psychiatric ROS     Infectious Disease:  - neg infectious disease ROS     Malignancy: Comment:   # Undifferentiated pleomorphic sarcoma right calf.    -- Location: deep compartment of the calf but in the tibial neurovascular bundle posteriorly and abutting the anterior tibial vessels and the deep peroneal nerve anteriorly  -- Partial response based on PET and tumor size from chemotherapy (not reduced further in size per MRI scan)  -- Completed radiation April 2024   -- Scheduled for limb salvage surgery 4/22/2024    # Hx/o chronic lymphocytic leukemia  (+) Malignancy, History of Other and Lymphoma/Leukemia.Lymph CA Active status post.  Other CA right calf sarcoma Active status post Chemo and Radiation.    Other: Comment: Psoriasis     (+)  , H/O Chronic Pain,         Physical Exam    Airway  airway exam normal      Mallampati: II   TM distance: > 3 FB   Neck ROM: full   Mouth opening: > 3 cm    Respiratory Devices and Support         Dental       (+) Minor Abnormalities - some fillings, tiny  "chips      Cardiovascular   cardiovascular exam normal          Pulmonary   pulmonary exam normal                OUTSIDE LABS:  CBC:   Lab Results   Component Value Date    WBC 5.3 04/18/2024    WBC 6.8 01/31/2024    HGB 10.6 (L) 04/18/2024    HGB 9.1 (L) 01/31/2024    HCT 30.1 (L) 04/18/2024    HCT 25.8 (L) 01/31/2024     04/18/2024     (L) 01/31/2024     BMP:   Lab Results   Component Value Date     04/18/2024     01/31/2024    POTASSIUM 3.8 04/18/2024    POTASSIUM 4.5 01/31/2024    CHLORIDE 104 04/18/2024    CHLORIDE 104 01/31/2024    CO2 22 04/18/2024    CO2 25 01/31/2024    BUN 27.6 (H) 04/18/2024    BUN 22.1 01/31/2024    CR 1.19 (H) 04/18/2024    CR 0.94 01/31/2024    GLC 92 04/18/2024    GLC 95 01/31/2024     COAGS:   Lab Results   Component Value Date    PTT 32 10/13/2020    INR 1.08 01/16/2024    FIBR 309 01/16/2024     POC: No results found for: \"BGM\", \"HCG\", \"HCGS\"  HEPATIC:   Lab Results   Component Value Date    ALBUMIN 4.6 04/18/2024    PROTTOTAL 7.6 04/18/2024    ALT 30 04/18/2024    AST 29 04/18/2024    ALKPHOS 118 04/18/2024    BILITOTAL 0.4 04/18/2024     OTHER:   Lab Results   Component Value Date    PH 7.45 (H) 10/16/2020    LACT 0.5 (L) 01/15/2024    A1C 5.4 04/24/2023    MONIK 9.7 04/18/2024    PHOS 3.1 04/18/2024    MAG 1.9 04/18/2024    TSH 1.27 11/14/2013    CRP 5.7 (H) 10/17/2020    SED 17 05/23/2007       Anesthesia Plan    ASA Status:  3    NPO Status:  NPO Appropriate    Anesthesia Type: General.     - Airway: LMA   Induction: Intravenous.   Maintenance: Balanced.   Techniques and Equipment:     - Lines/Monitors: 2nd IV     Consents    Anesthesia Plan(s) and associated risks, benefits, and realistic alternatives discussed. Questions answered and patient/representative(s) expressed understanding.     - Discussed:     - Discussed with:  Patient      - Extended Intubation/Ventilatory Support Discussed: No.      - Patient is DNR/DNI Status: No     Use of blood " "products discussed: Yes.     - Discussed with: Patient.     - Consented: consented to blood products     Postoperative Care    Pain management: IV analgesics, Oral pain medications, Peripheral nerve block (Single Shot).   PONV prophylaxis: Ondansetron (or other 5HT-3), Dexamethasone or Solumedrol     Comments:               Mariza Arredondo MD    I have reviewed the pertinent notes and labs in the chart from the past 30 days and (re)examined the patient.  Any updates or changes from those notes are reflected in this note.              # Obesity: Estimated body mass index is 34.24 kg/m  as calculated from the following:    Height as of 4/18/24: 1.549 m (5' 1\").    Weight as of 4/18/24: 82.2 kg (181 lb 3.2 oz).      "

## 2024-04-22 NOTE — PROGRESS NOTES
PACU to Inpatient Nursing Handoff    Patient Mirta Cardenas is a 69 year old female who speaks English.   Procedure Procedure(s):  Removal of Tumor Right Calf  Including Bone Removal   Surgeon(s) Primary: Benny Barroso MD     Allergies   Allergen Reactions    Allopurinol Itching    Amoxicillin Hives    Codeine Itching    Cymbalta [Duloxetine Hcl] Fatigue    Periactin Other (See Comments)     Sleepy.    11/15/23: patient not sure about this allergy/intolerance - may be interested in removal    Tenormin [Atenolol] Fatigue     11/15/23: patient may be interested in removal of this from allergy list as it was only fatigue/sleepiness       Isolation  No active isolations     Past Medical History   has a past medical history of Asthma, Asthma, Blood transfusion, Cancer (H), CINV (chemotherapy-induced nausea and vomiting) (10/12/2023), History of transfusion, Hypertension, Hypertension, Leukemia, chronic lymphocytic (H), Malignant neoplasm (H), Sarcoma of right lower extremity (H), and Thyroid nodule (04/28/2008).    Anesthesia General with Block   Dermatome Level     Preop Meds acetaminophen (Tylenol) - time given: 0957  Emend 40 mg  - time given: 0957  Duoneb at 0956   Nerve block Paravertebral.  Location:right. Med:bupivacaine. Time given: 1041   Intraop Meds dexamethasone (Decadron)  fentanyl (Sublimaze): 100 mcg total  hydromorphone (Dilaudid): 0.1 mg total  ondansetron (Zofran): last given at 1448   Local Meds No   Antibiotics cefazolin (Ancef) - last given at 1153     Pain Patient Currently in Pain: yes   PACU meds  acetaminophen (Tylenol): 975 mg (total dose) last given at 1631   fentanyl (Sublimaze): 75 mcg (total dose) last given at 1603   hydromorphone (Dilaudid): 0.4 mg (total dose) last given at 1647   oxycodone (Roxicodone): 5 mg (total dose) last given at 1655    PCA / epidural No   Capnography     Telemetry ECG Rhythm: Sinus rhythm   Inpatient Telemetry Monitor Ordered? Yes        Labs Glucose Lab  Results   Component Value Date     04/22/2024     05/24/2022     05/10/2021       Hgb Lab Results   Component Value Date    HGB 10.6 04/18/2024    HGB 13.7 05/10/2021       INR Lab Results   Component Value Date    INR 1.08 01/16/2024    INR 0.99 06/13/2005      PACU Imaging Not applicable     Wound/Incision Incision/Surgical Site 04/22/24 Lateral;Right Calf (Active)   Incision Assessment UTV 04/22/24 1523   Closure JOANN 04/22/24 1523   Incision Drainage Amount UTV 04/22/24 1523   Dressing Intervention Clean, dry, intact 04/22/24 1523   Number of days: 0      CMS        Equipment ice pack   Other LDA       IV Access Peripheral IV Anterior;Distal;Right Lower forearm (Active)   Site Assessment [facility] 04/22/24 1523   Line Status Infusing 04/22/24 1523   Dressing Transparent 04/22/24 1523   Dressing Status clean;dry;intact 04/22/24 1523   Dressing Intervention New dressing  04/22/24 1047   Phlebitis Scale 0-->no symptoms 04/22/24 1523   Infiltration? no 04/22/24 1523   Number of days:        Peripheral IV 04/22/24 Left Lower forearm (Active)   Site Assessment [facility] 04/22/24 1523   Line Status Saline locked 04/22/24 1523   Dressing Transparent 04/22/24 1523   Dressing Status clean;dry;intact 04/22/24 1523   Phlebitis Scale 0-->no symptoms 04/22/24 1523   Infiltration? no 04/22/24 1523   Number of days: 0      Blood Products Not applicable  mL   Intake/Output Date 04/22/24 0700 - 04/23/24 0659   Shift 2477-4389 4185-9019 1983-3025 24 Hour Total   INTAKE   I.V. 1000 100  1100   Colloid 250   250   Shift Total(mL/kg) 1250(15.49) 100(1.24)  1350(16.73)   OUTPUT   Drains  25  25   Blood 500   500   Shift Total(mL/kg) 500(6.2) 25(0.31)  525(6.51)   Weight (kg) 80.7 80.7 80.7 80.7      Drains / [place] Closed/Suction Drain 1 Distal;Lateral;Right Leg Bulb 7 Bulgarian (Active)   Site Description WDL 04/22/24 1523   Dressing Status Normal: Clean, Dry & Intact 04/22/24 1523   Drainage Appearance Serosanguenous  04/22/24 1523   Output (ml) 25 ml 04/22/24 1523   Number of days: 0      Time of void PreOp Time of Void Prior to Procedure: 0930 (04/22/24 1100)    PostOp      Diapered? No   Bladder Scan Bladder Scan Volume (mL): 190 ml (04/22/24 1630)    mL (Water) (04/22/24 1630)  crackers and water     Vitals    B/P: 126/70  T: 98.1  F (36.7  C)    Temp src: Oral  P:  Pulse: 88 (04/22/24 1600)          R: 13  O2:  SpO2: 100 %    O2 Device: Nasal cannula (04/22/24 1600)    Oxygen Delivery: 2 LPM (04/22/24 1600)         Family/support present significant other   Patient belongings Returned to patient in PACU   Patient transported on cart   DC meds/scripts (obs/outpt) Not applicable   Inpatient Pain Meds Released? Yes       Special needs/considerations None   Tasks needing completion None       Gunjan Reid RN

## 2024-04-22 NOTE — ANESTHESIA CARE TRANSFER NOTE
Patient: Mirta KOENIG Ronald    Procedure: Procedure(s):  Removal of Tumor Right Calf  Including Bone Removal       Diagnosis: Sarcoma (H) [C49.9]  Diagnosis Additional Information: No value filed.    Anesthesia Type:   General     Note:    Oropharynx: oropharynx clear of all foreign objects and spontaneously breathing  Level of Consciousness: awake  Oxygen Supplementation: face mask  Level of Supplemental Oxygen (L/min / FiO2): 5  Independent Airway: airway patency satisfactory and stable  Dentition: dentition unchanged  Vital Signs Stable: post-procedure vital signs reviewed and stable  Report to RN Given: handoff report given  Patient transferred to: PACU    Handoff Report: Identifed the Patient, Identified the Reponsible Provider, Reviewed the pertinent medical history, Discussed the surgical course, Reviewed Intra-OP anesthesia mangement and issues during anesthesia, Set expectations for post-procedure period and Allowed opportunity for questions and acknowledgement of understanding      Vitals:  Vitals Value Taken Time   /90 04/22/24 1523   Temp 36.7  C (98.1  F) 04/22/24 1523   Pulse 90 04/22/24 1530   Resp 11 04/22/24 1530   SpO2 100 % 04/22/24 1530   Vitals shown include unfiled device data.    Electronically Signed By: BEBETO Claros CRNA  April 22, 2024  3:32 PM

## 2024-04-22 NOTE — ANESTHESIA POSTPROCEDURE EVALUATION
Patient: Mirta KOENIG Ronald    Procedure: Procedure(s):  Removal of Tumor Right Calf  Including Bone Removal       Anesthesia Type:  General    Note:  Disposition: Inpatient   Postop Pain Control: Uneventful            Sign Out: Well controlled pain   PONV: No   Neuro/Psych: Uneventful            Sign Out: Acceptable/Baseline neuro status   Airway/Respiratory: Uneventful            Sign Out: Acceptable/Baseline resp. status   CV/Hemodynamics: Uneventful            Sign Out: Acceptable CV status; No obvious hypovolemia; No obvious fluid overload   Other NRE:    DID A NON-ROUTINE EVENT OCCUR?            Last vitals:  Vitals Value Taken Time   /74 04/22/24 1645   Temp 36.7  C (98.1  F) 04/22/24 1523   Pulse 88 04/22/24 1656   Resp 9 04/22/24 1656   SpO2 100 % 04/22/24 1656   Vitals shown include unfiled device data.    Electronically Signed By: Roldan Alcocer MD  April 22, 2024  4:58 PM

## 2024-04-22 NOTE — CONSULTS
HOSPITALIST CONSULTATION     REQUESTING PHYSICIAN: Benny Barroso MD    REASON FOR CONSULTATION: Evaluation, Recommendations and Co-management of Medical Comorbidities.     ASSESSMENT & PLAN :     Mirta Cardenas is a 69 year old female with a Undifferentiated pleomorphic sarcoma right calf  s/p tumor removal and removal of adjacent fibula on 4/22 with Dr. Barroso.   Estimated Blood Loss: 500 ml     PMH, Pre Op eval reviewed.   Currently doing well.          # Orthopedic Surgery:   POD 0    Post Op Management per Primary team.   Hemodynamics: stable currently.   Continue on gentle IV fluids, until adequate PO.    Post op 24 hr capnography per protocol.   Pain control- per Primary team   Judicious use of narcotics.  Consider bowel regimen with narcotics.   Encourage Incentive spirometry to prevent atelectasis.  DVT prophylaxis- per Primary team. ASA 81 mg daily starting pod1 and mechanical while in the hospital, discharge on ASA 81 mg daily x 4 weeks.   Activity, antibiotics, wound and/or drain care - per Primary team. periop Ancef x 2 doses postop.     # Anemia of acute blood loss:  Monitor hgb for anemia of acute blood loss. Transfuse for hgb <7.0.      CARDIAC  - Coronary artery calcifications noted on CT and PET scan.  Denies any concerning cardiac symptoms.    - HTN: resume amlodipine, hold losartan and hydrochlorothiazide DOS. Resume POD 1 as tolerated by BP, renal function.   - Pain control. Monitor BP. PRN Hydralazine for sbp>160    Hem/Onc:   CLL   High-grade pleomorphic sarcoma    Cancer related pain.     - follows Hem Onc.   - on neoadjuvant chemotherapy with Doxil/ifosfmaide plus radiation therapy.   - No longer taking Lyrica    HLD: resume pta statin.     Asthma: stable. Inhalers prn    GERD: Controlled. PTA PPI.         Rest per primary.     Riky patient, RN.     Thank you for the consultation. Please page with any question. Hospitalist team to follow.      Blas  MD Samuel   Hospitalist, Internal Medicine      CHIEF COMPLAINT: Post OP, doing well.     HISTORY OF PRESENT ILLNESS: Obtained from the patient and chart review including Pre op evaluation, procedure note.    69 year old year old female  with above discussed medical problems s/p above procedure admitted on 2024  for post op care and monitoring  (for further details for indication of surgery and operative note, please refer to Benny Barroso MD note).   No documented hypotension, hypoxemia or other significant complications intra or post operative.   Medicine consulted to evaluate, recommend and/or co manage medical co morbidities.  Currently: Hemodynamics stable. Incisional Pain controlled. Denies any chest pain, cough, shortness of breath or LH or palpitations. Denies any nausea, vomiting or pain abdomen. No fever or chills.  No new focal neuro deficit. No other concern.   Medical issues as discussed above.     PAST MEDICAL HISTORY:   Past Medical History:   Diagnosis Date    Asthma     Asthma     reactive airway disease, per patient    Blood transfusion     Cancer (H)     chronic lymphocytic leukemia    CINV (chemotherapy-induced nausea and vomiting) 10/12/2023    History of transfusion     Hypertension     Hypertension     Leukemia, chronic lymphocytic (H)     Malignant neoplasm (H)     CLL    Sarcoma of right lower extremity (H)     Thyroid nodule 2008       PAST SURGICAL HISTORY:   Past Surgical History:   Procedure Laterality Date    ABDOMEN SURGERY      c section *3    APPENDECTOMY      C/SECTION, CLASSICAL  ,,    , Classical    COLONOSCOPY      HYSTERECTOMY      HYSTERECTOMY, PAP NO LONGER INDICATED  1998    PICC DOUBLE LUMEN PLACEMENT Left 10/17/2023    left basilic 4 fr dl power picc 41 cm    PICC DOUBLE LUMEN PLACEMENT Left 2023    Medial Brachial, 42 cm, 3 cm external length    PICC DOUBLE LUMEN PLACEMENT Left 01/10/2024    left medial brachial 5 fr dl  power picc 41 cm    RELEASE CARPAL TUNNEL  06/05/2012    Procedure:RELEASE CARPAL TUNNEL; Right Carpal Tunnel Release & Right Ring A1 Pulley Release; Surgeon:SERGEY HEATON; Location:[facility] OR    RELEASE TRIGGER FINGER  06/05/2012    Procedure:RELEASE TRIGGER FINGER; Surgeon:SERGEY HEATON; Location:[facility]    RELEASE TRIGGER FINGER  10/12/2012    Procedure: RELEASE TRIGGER FINGER;  Right Thumb A1 Pulley Release;  Surgeon: Sergey Heaton MD;  Location: [facility] OR    SALPINGO OOPHORECTOMY,R/L/ULICES  05/02/2008    right       FH: reviewed.     Family History   Problem Relation Age of Onset    Osteoporosis Mother     Cancer Sister         multiple myloma    Hypertension Brother     Heart Disease Brother 65        bypass    Obesity Son     Obesity Son     Hypertension Son     Melanoma No family hx of     Anesthesia Reaction No family hx of     Thrombosis No family hx of         SH: reviewed.     Social History     Socioeconomic History    Marital status:      Spouse name: None    Number of children: 3    Years of education: None    Highest education level: None   Occupational History    Occupation: retired   Tobacco Use    Smoking status: Never     Passive exposure: Never    Smokeless tobacco: Never   Vaping Use    Vaping status: Never Used   Substance and Sexual Activity    Alcohol use: Yes     Comment: Occasionally    Drug use: No    Sexual activity: Not Currently     Partners: Male     Birth control/protection: Post-menopausal   Other Topics Concern    Parent/sibling w/ CABG, MI or angioplasty before 65F 55M? No     Social Determinants of Health     Financial Resource Strain: Low Risk  (12/22/2023)    Financial Resource Strain     Within the past 12 months, have you or your family members you live with been unable to get utilities (heat, electricity) when it was really needed?: No   Food Insecurity: Low Risk  (12/22/2023)    Food Insecurity     Within the past 12 months, did you worry that your food would run  out before you got money to buy more?: No     Within the past 12 months, did the food you bought just not last and you didn t have money to get more?: No   Transportation Needs: Low Risk  (12/22/2023)    Transportation Needs     Within the past 12 months, has lack of transportation kept you from medical appointments, getting your medicines, non-medical meetings or appointments, work, or from getting things that you need?: No   Interpersonal Safety: Low Risk  (10/26/2023)    Interpersonal Safety     Do you feel physically and emotionally safe where you currently live?: Yes     Within the past 12 months, have you been hit, slapped, kicked or otherwise physically hurt by someone?: No     Within the past 12 months, have you been humiliated or emotionally abused in other ways by your partner or ex-partner?: No   Housing Stability: Low Risk  (12/22/2023)    Housing Stability     Do you have housing? : Yes     Are you worried about losing your housing?: No       ALLERGIES:   Allergies   Allergen Reactions    Allopurinol Itching    Amoxicillin Hives    Codeine Itching    Cymbalta [Duloxetine Hcl] Fatigue    Periactin Other (See Comments)     Sleepy.    11/15/23: patient not sure about this allergy/intolerance - may be interested in removal    Tenormin [Atenolol] Fatigue     11/15/23: patient may be interested in removal of this from allergy list as it was only fatigue/sleepiness         HOME MEDICATIONS:     Prior to Admission medications    Medication Sig Start Date End Date Taking? Authorizing Provider   amLODIPine (NORVASC) 5 MG tablet Take 1.5 tablets (7.5 mg) by mouth daily  Patient taking differently: Take 7.5 mg by mouth every morning 12/26/23  Yes Joseline Fraga MD   calcium carbonate (TUMS) 500 MG chewable tablet Take 1 tablet (500 mg) by mouth as needed 4/18/24  Yes Gemini Sanders APRN CNP   losartan (COZAAR) 100 MG tablet Take 1 tablet (100 mg) by mouth daily  Patient taking differently: Take 100 mg  by mouth every evening 4/24/23  Yes Joseline Fraga MD   Multiple Vitamins-Calcium (ONE-A-DAY WOMENS FORMULA PO) Take 1 tablet by mouth daily   Yes Reported, Patient   omeprazole (PRILOSEC) 20 MG DR capsule Take 1 capsule by mouth every morning.   Yes Reported, Patient   oxyCODONE (ROXICODONE) 5 MG tablet Take 1-2 tablets (5-10 mg) by mouth as needed for severe pain  Patient taking differently: Take 5-10 mg by mouth daily as needed for severe pain 12/6/23  Yes Roney Nam MD   pravastatin (PRAVACHOL) 40 MG tablet Take 1 tablet (40 mg) by mouth daily  Patient taking differently: Take 40 mg by mouth every evening 4/24/23  Yes Joseline Fraga MD   pregabalin (LYRICA) 50 MG capsule TAKE 1 CAPSULE (50 MG) BY MOUTH 3 TIMES DAILY FOR 30 DAYS 4/15/24 5/15/24 Yes Roney Nam MD   triamcinolone (ARISTOCORT HP) 0.5 % external cream Apply topically 2 times daily 4/1/24  Yes Patrice Armstrong MD   hydrochlorothiazide (HYDRODIURIL) 50 MG tablet Take 1 tablet (50 mg) by mouth daily for 30 days  Patient taking differently: Take 50 mg by mouth every morning 1/31/24 4/18/24  Roney Nam MD       CURRENT MEDICATIONS:    Current Facility-Administered Medications   Medication Dose Route Frequency Provider Last Rate Last Admin    [START ON 4/25/2024] acetaminophen (TYLENOL) tablet 650 mg  650 mg Oral Q4H PRN Yola Haile MD        acetaminophen (TYLENOL) tablet 975 mg  975 mg Oral Q8H Yola Haile MD   975 mg at 04/22/24 1631    [START ON 4/23/2024] amLODIPine (NORVASC) tablet 7.5 mg  7.5 mg Oral KAY Blas Baker MD        [START ON 4/23/2024] aspirin EC tablet 81 mg  81 mg Oral Daily Yola Haile MD        benzocaine-menthol (CHLORASEPTIC) 6-10 MG lozenge 1 lozenge  1 lozenge Buccal Q1H PRN Yola Haile MD        [START ON 4/25/2024] bisacodyl (DULCOLAX) suppository 10 mg  10 mg Rectal Daily PRN Yola Haile MD        calcium carbonate (TUMS) chewable tablet 500 mg  500 mg  Oral Daily PRN Yola Haile MD        ceFAZolin (ANCEF) 2 g in 100 mL D5W intermittent infusion  2 g Intravenous Q8H Yola Haile MD        HYDROmorphone (PF) (DILAUDID) injection 0.1 mg  0.1 mg Intravenous Q2H PRN Yola Haile MD        Or    HYDROmorphone (PF) (DILAUDID) injection 0.2 mg  0.2 mg Intravenous Q2H PRN Yola Haile MD        lactated ringers infusion   Intravenous Continuous Yola Haile MD 75 mL/hr at 04/22/24 1737 Rate Verify at 04/22/24 1737    lidocaine (LMX4) cream   Topical Q1H PRN Yola Haile MD        lidocaine 1 % 0.1-1 mL  0.1-1 mL Other Q1H PRN Yola Haile MD        [START ON 4/24/2024] magnesium hydroxide (MILK OF MAGNESIA) suspension 30 mL  30 mL Oral Daily PRN Yola Haile MD        [START ON 4/23/2024] multivitamin w/minerals (THERA-VIT-M) tablet 1 tablet  1 tablet Oral Daily Yola Haile MD        naloxone (NARCAN) injection 0.2 mg  0.2 mg Intravenous Q2 Min PRN Benny Barroso MD        Or    naloxone (NARCAN) injection 0.4 mg  0.4 mg Intravenous Q2 Min PRN Benny Barroso MD        Or    naloxone (NARCAN) injection 0.2 mg  0.2 mg Intramuscular Q2 Min PRN Benny Barroso MD        Or    naloxone (NARCAN) injection 0.4 mg  0.4 mg Intramuscular Q2 Min PRN Benny Barroso MD        ondansetron (ZOFRAN ODT) ODT tab 4 mg  4 mg Oral Q6H PRN Yola Haile MD        Or    ondansetron (ZOFRAN) injection 4 mg  4 mg Intravenous Q6H PRN Yola Haile MD        oxyCODONE IR (ROXICODONE) half-tab 2.5 mg  2.5 mg Oral Q4H PRN Yola Haile MD        Or    oxyCODONE (ROXICODONE) tablet 5 mg  5 mg Oral Q4H PRN Yola Haile MD        [START ON 4/23/2024] pantoprazole (PROTONIX) EC tablet 40 mg  40 mg Oral QAM Yola Haile MD        [START ON 4/23/2024] polyethylene glycol (MIRALAX) Packet 17 g  17 g Oral Daily Yola Haile MD        pravastatin (PRAVACHOL) tablet 40 mg  40 mg Oral QPM Blas Baker MD        pregabalin  "(LYRICA) capsule 50 mg  50 mg Oral TID Yola Haile MD        prochlorperazine (COMPAZINE) injection 5 mg  5 mg Intravenous Q6H PRN Yola Haile MD        Or    prochlorperazine (COMPAZINE) tablet 5 mg  5 mg Oral Q6H PRN Yola Haile MD        senna-docusate (SENOKOT-S/PERICOLACE) 8.6-50 MG per tablet 1 tablet  1 tablet Oral BID Yola Haile MD        sodium chloride (PF) 0.9% PF flush 3 mL  3 mL Intracatheter Q8H Yola Haile MD        sodium chloride (PF) 0.9% PF flush 3 mL  3 mL Intracatheter q1 min prn Yola Haile MD             ROS: 10 point ROS neg other than the symptoms noted above in the HPI.    PHYSICAL EXAMINATION:     /75 (Cuff Size: Adult Regular)   Pulse 89   Temp 97.5  F (36.4  C) (Oral)   Resp 14   Ht 1.549 m (5' 1\")   Wt 80.7 kg (177 lb 14.6 oz)   SpO2 94%   BMI 33.62 kg/m    Temp (24hrs), Av.9  F (36.6  C), Min:97.5  F (36.4  C), Max:98.1  F (36.7  C)      BMI= Body mass index is 33.62 kg/m .      Intake/Output Summary (Last 24 hours) at 2024 1835  Last data filed at 2024 1700  Gross per 24 hour   Intake 1715 ml   Output 555 ml   Net 1160 ml       General: Awake, interactive, NAD.   HEENT: AT/NC. No icterus. Moist MM.    Neck: Supple.    Heart/CVS: Normal S1 and S2. Regular.   Chest/Respi: Normal work of breathing. CTA BL.   Abdomen/GI: Soft, non distended, non tender. No g/r.  Extremities/MSK: Distal pulses 2+, well perfused. Rest per ortho.   Neuro: Alert and oriented x4. Grossly non focal.   Skin:  No new rash over exposed areas.       LABORATORY DATA: reviewed.     Recent Results (from the past 24 hour(s))   Extra Purple Top Tube    Collection Time: 24 10:18 AM   Result Value Ref Range    Hold Specimen JIC    Glucose by meter    Collection Time: 24 11:18 AM   Result Value Ref Range    GLUCOSE BY METER POCT 115 (H) 70 - 99 mg/dL   Adult Type and Screen    Collection Time: 24 12:15 PM   Result Value Ref Range    ABO/RH(D) A POS "     Antibody Screen Negative Negative    SPECIMEN EXPIRATION DATE [id number]    Prepare red blood cells (unit)    Collection Time: 04/22/24  2:19 PM   Result Value Ref Range    Blood Component Type Red Blood Cells     Product Code H4046W08     Unit Status Ready for issue     Unit Number [id number]     CROSSMATCH Compatible     CODING SYSTEM [id number]    Prepare plasma (unit)    Collection Time: 04/22/24  2:22 PM   Result Value Ref Range    Blood Component Type Plasma     Product Code R4725O90     Unit Status Ready for issue     Unit Number [id number]     CODING SYSTEM [id number]        Recent Results (from the past 24 hour(s))   POC US Guidance Needle Placement    Impression    Right popliteal sciatic nerve block           Blas Baker MD        This note was in part completed with Dragon, a speech recognition software. Some grammatical and transcription errors may have occurred. If you have any concern, please contact me for clarification.

## 2024-04-22 NOTE — ANESTHESIA PROCEDURE NOTES
Sciatic Procedure Note    Pre-Procedure   Staff -        Anesthesiologist:  Florentino Salinas MD       Performed By: anesthesiologist       Location: pre-op       Procedure Start/Stop Times: 4/22/2024 10:41 AM       Pre-Anesthestic Checklist: patient identified, IV checked, site marked, risks and benefits discussed, informed consent, monitors and equipment checked, pre-op evaluation, at physician/surgeon's request and post-op pain management  Timeout:       Correct Patient: Yes        Correct Procedure: Yes        Correct Site: Yes        Correct Position: Yes        Correct Laterality: Yes        Site Marked: Yes  Procedure Documentation  Procedure: Sciatic       Laterality: right       Patient Position: LLD       Skin prep: Chloraprep       Local skin infiltrated with 3 mL of 1% lidocaine.  (popliteal approach).       Needle Type: short bevel       Needle Gauge: 21.        Needle Length (Inches): 4        Ultrasound guided       1. Ultrasound was used to identify targeted nerve, plexus, vascular marker, or fascial plane and place a needle adjacent to it in real-time.       2. Ultrasound was used to visualize the spread of anesthetic in close proximity to the above referenced structure.       3. A permanent image is entered into the patient's record.       4. The visualized anatomic structures appeared normal.    Assessment/Narrative         The placement was negative for: blood aspirated, painful injection and site bleeding       Paresthesias: No.       Bolus given via needle..        Secured via.        Insertion/Infusion Method: Single Shot       Complications: none    Medication(s) Administered   Bupivacaine 0.25% PF (Infiltration) - Infiltration   20 mL - 4/22/2024 10:41:00 AM  Dexamethasone 10 mg/mL PF (Perineural) - Perineural   2 mg - 4/22/2024 10:41:00 AM  Dexmedetomidine 4 mcg/mL (Perineural) - Perineural   40 mcg - 4/22/2024 10:41:00 AM  Medication Administration Time: 4/22/2024 10:41  "AM      FOR [facility]) ONLY:   Pain Team Contact information: please page the Pain Team Via [facility]. Search \"Pain\". During daytime hours, please page the attending first. At night please page the resident first.      "

## 2024-04-22 NOTE — ANESTHESIA PROCEDURE NOTES
Airway       Patient location during procedure: OR  Staff -        CRNA: Cris Medina APRN CRNA       Performed By: CRNA  Consent for Airway        Urgency: elective  Indications and Patient Condition       Indications for airway management: alejo-procedural       Induction type:intravenous       Mask difficulty assessment: 1 - vent by mask    Final Airway Details       Final airway type: supraglottic airway    Supraglottic Airway Details        Type: LMA       Brand: I-Gel       LMA size: 4    Post intubation assessment        Placement verified by: capnometry, equal breath sounds and chest rise        Number of attempts at approach: 1       Secured with: tape       Ease of procedure: easy       Dentition: Intact and Unchanged

## 2024-04-22 NOTE — BRIEF OP NOTE
[facility]    Brief Operative Note    Pre-operative diagnosis: Sarcoma (H) [C49.9]  Post-operative diagnosis Same as pre-operative diagnosis    Procedure: Removal of Tumor Right Calf, Right - Leg  Including Bone Removal, Right - Leg    Surgeon: Surgeons and Role:     * Benny Barroso MD - Primary     * Yola Haile MD  Anesthesia: General with Block   Estimated Blood Loss: 500 ml    Drains: Cheng-Rust  Specimens:   ID Type Source Tests Collected by Time Destination   1 : Tumor Right Leg, Bone is Lateral, Short stitch-proximal, Long stitch- deep, please ink for margins Tissue Leg, Below Knee, Right SURGICAL PATHOLOGY EXAM Benny Barroso MD 4/22/2024  1:55 PM      Findings:   See full op note  Complications: None.  Implants: * No implants in log *    Assessment: Mirta Cardenas is a 69 year old female with a Undifferentiated pleomorphic sarcoma right calf  s/p tumor removal and removal of adjacent fibula on 4/22 with Dr. Barroso.      Plan:  Ortho Primary  Activity: Up with assist.  Weight bearing status: WBAT RLE.  Antibiotics: continue periop Ancef x 2 doses postop.  Diet: Begin with clear fluids and progress diet as tolerated.  DVT prophylaxis: ASA 81 mg daily starting pod1 and mechanical while in the hospital, discharge on ASA 81 mg daily x 4 weeks.  Bracing/Splinting: AFO post op while in bed   Elevation: Elevate RLE on pillows to keep elevated as much as possible.  Wound Care: keep dressing on for 5 days.   Drains: MARRY RLE. Document output per shift.  Pain management: utilize all oral meds first, utilize IV meds for severe breakthrough pain after PO meds given adequate time to take effect  Physical Therapy:  ROM, ADL's.  Occupational Therapy: ADL's.  Labs: Trend Hgb on POD #1.   Pathology sent   Consults: PT, OT. Orthotics for AFO, appreciate assistance in caring for this patient.  Disposition: Pending progress with therapies, pain control on  orals, and medical stability, anticipate discharge to home on POD #1-2.  Follow-up: Clinic with Becky Cardenas on 5/1/24 as scheduled.     Yola Haile MD, PGY4  Orthopedic Surgery

## 2024-04-22 NOTE — OP NOTE
Preop diagnosis: Sarcoma right calf, deep compartment    Postoperative diagnosis: Same    Procedure performed: Removal of sarcoma right calf, deep compartment including anterior tibial muscle and 8 cm of the fibula    Surgeon: Benny Haile    Estimated blood loss: 500 cc    Submitted: Tumor right calf.  Fibula lateral.  Short stitch proximal.  Long stitch deep    Surgical findings.  There was tissue in the most proximal portion of the wound that was suspicious for tumor.  There was no additional surgery that could have been done to remove the tumor at the time that the wound was closed.    Mirta was interviewed in the preoperative area.  Risk and benefits have been reviewed.  Consent was signed.  The surgical site was marked with my initials and line of intended incision.  Preoperative brief was performed.  She was taken the operating room.  Received a general anesthetic and supine position the right leg was prepped and draped sterilely.    A crescent shaped incision was made beginning approximately 6 cm distal to the tip of the proximal fibula and extending distally and anteriorly within 1/2 cm of the prominence of the tibial shaft and then extending distally and posteriorly back to the fibula approximately 24 cm distal to the fibular head.    The anterior and lateral crescent flap was back to the fibula 6 cm distal to the tip and 18 cm distal to the tip.    The interval between the posterior and anterior compartments was exploited to expose the tibia it was freed of its posterior and anterior soft tissue attachments and osteotomies were made 6 cm and 18 cm distal to the tip of the fibula.  We then turned our section to the anterior compartment.  It was obvious with dissection that the anterior tibialis muscle needed to be incised proximal and distal to the tumor.  The extensor houses muscle was saved.  Dissection was then taken with the use of electrocautery removing the periosteum from the  tibia along the length of approximately 8 cm.  This dissection was taken all the way to the posterior aspect and medial aspect of the tibia in the region of the tumor.  The deep posterior compartment was then entered.  The intermuscular membrane was identified and incised in the distal to proximal fashion.  The anterior tibial vessel was excised distally and proximally artery and vein.  The superficial peroneal nerve was cut.    This then allowed exposure to the most proximal aspect of the the calf.  We dissected deep along the anterior tib all the way to the knee arc of the tibia to the point of the exiting anterior tibial artery through the interosseous membrane.  This tissue looked irregular which could have represented an radiation changes.  Nonetheless the tissue was incised the wound was irrigated and the specimen was lifted from the wound.  At this time there was significant venous bleeding the tourniquet had been up approximately 2 hours.  Initial efforts to control the bleeding in the region of the posterior tibial vein were not successful so the tourniquet was let down and the wound was packed with pressure being held.  Over the next 45 minutes the tourniquet was let down twice and intermittently used to then gain hemostasis by identifying proximal and distal portion of the posterior tibial vein and its branches and ligating these.  At all times the posterior tibial artery was palpable.  We then dopplered the ankle which had a good posterior tibial pulse as well as a dorsal pedal pulse that was present.    We packed the wound with thrombin-soaked Gelfoam and closed with fascial subcutaneous and skin layers after placement of a 7 flat drain.    Postoperative debrief was performed.    Postoperative plan: 1.  Admit to the hospital for pain management and and physical therapy.  2.  After discharge from the hospital would like the patient to mainly stay sedentary with her leg elevated.  3.  Will order a AFO for  her while in the hospital.  4.  The patient is scheduled to see Becky Cardenas on May 1.

## 2024-04-22 NOTE — PROGRESS NOTES
VSS, Sat above 90% on 2L  Plantarflexion R foot absent  Voided 400cc on bedpan  IV infusion 75cc Right  Scant drainage on surgical bandage  Ton bloody drainage  Numbness present on Right side  Pain adequate with Pacu meds and block  No Nausea  Patient could probably discharge home tomorrow.

## 2024-04-22 NOTE — PROGRESS NOTES
"Orthopaedic Surgery Post op check  04/22/2024    Subjective: Patient seen on the floor.  Endorses 6/10 pain in the leg.  Also endorsing numbness throughout the entire foot.  Unable to move at the ankle or toes.  Denies any nausea or vomiting.    Objective: /75 (Cuff Size: Adult Regular)   Pulse 89   Temp 97.5  F (36.4  C) (Oral)   Resp 14   Ht 1.549 m (5' 1\")   Wt 80.7 kg (177 lb 14.6 oz)   SpO2 94%   BMI 33.62 kg/m      General: NAD, alert and oriented, cooperative with exam.   Cardio: extremities wwp.   Respiratory: Non-labored breathing.  MSK: Focused examination of     RLE: Toes wwp, both DP and PT pulses are faintly palpable.  bcr in all toes.  0/5 EHL/FHL/GSC/TA strength.  Absent sensation in SP/DP/Sa/Ho/T. Dressings intact with 2 small areas of drainage noted.  Leg compartments are soft and compressible.  Mild pain with passive stretch.    MARRY drain right lower extremity holding suction, bloody drainage present.      Assessment and Plan: Mirta Cardenas is a 69 year old female with a Undifferentiated pleomorphic sarcoma right calf  s/p tumor removal and removal of adjacent fibula on 4/22 with Dr. Barroso.    Doing well postoperatively.  Continue pain control overnight.  Will monitor motor and sensory function as block wears off.      Plan:  Ortho Primary  Activity: Up with assist.  Weight bearing status: WBAT RLE.  Antibiotics: continue periop Ancef x 2 doses postop.  Diet: Begin with clear fluids and progress diet as tolerated.  DVT prophylaxis: ASA 81 mg daily starting pod1 and mechanical while in the hospital, discharge on ASA 81 mg daily x 4 weeks.  Bracing/Splinting: AFO post op while in bed   Elevation: Elevate RLE on pillows to keep elevated as much as possible.  Wound Care: keep dressing on for 5 days.   Drains: MARRY RLE. Document output per shift.  Pain management: utilize all oral meds first, utilize IV meds for severe breakthrough pain after PO meds given adequate time to take " effect  Physical Therapy:  ROM, ADL's.  Occupational Therapy: ADL's.  Labs: Trend Hgb on POD #1.   Pathology sent   Consults: PT, OT. Orthotics for AFO, appreciate assistance in caring for this patient.  Disposition: Pending progress with therapies, pain control on orals, and medical stability, anticipate discharge to home on POD #1-2.  Follow-up: Clinic with Becky Cardenas on 5/1/24 as scheduled.     Yola Haile MD   Orthopedic Surgery, PGY-4  P: [phone]

## 2024-04-23 ENCOUNTER — APPOINTMENT (OUTPATIENT)
Dept: PHYSICAL THERAPY | Facility: CLINIC | Age: 69
DRG: 493 | End: 2024-04-23
Attending: ORTHOPAEDIC SURGERY
Payer: COMMERCIAL

## 2024-04-23 LAB
ANION GAP SERPL CALCULATED.3IONS-SCNC: 10 MMOL/L (ref 7–15)
BUN SERPL-MCNC: 18.9 MG/DL (ref 8–23)
CALCIUM SERPL-MCNC: 8.6 MG/DL (ref 8.8–10.2)
CHLORIDE SERPL-SCNC: 104 MMOL/L (ref 98–107)
CREAT SERPL-MCNC: 0.9 MG/DL (ref 0.51–0.95)
DEPRECATED HCO3 PLAS-SCNC: 24 MMOL/L (ref 22–29)
EGFRCR SERPLBLD CKD-EPI 2021: 70 ML/MIN/1.73M2
GLUCOSE BLDC GLUCOMTR-MCNC: 130 MG/DL (ref 70–99)
GLUCOSE SERPL-MCNC: 125 MG/DL (ref 70–99)
HGB BLD-MCNC: 9.4 G/DL (ref 11.7–15.7)
POTASSIUM SERPL-SCNC: 3.8 MMOL/L (ref 3.4–5.3)
SODIUM SERPL-SCNC: 138 MMOL/L (ref 135–145)

## 2024-04-23 PROCEDURE — 250N000013 HC RX MED GY IP 250 OP 250 PS 637: Performed by: STUDENT IN AN ORGANIZED HEALTH CARE EDUCATION/TRAINING PROGRAM

## 2024-04-23 PROCEDURE — L4396 STATIC OR DYNAMI AFO PRE CST: HCPCS

## 2024-04-23 PROCEDURE — 250N000013 HC RX MED GY IP 250 OP 250 PS 637: Performed by: INTERNAL MEDICINE

## 2024-04-23 PROCEDURE — 36415 COLL VENOUS BLD VENIPUNCTURE: CPT | Performed by: INTERNAL MEDICINE

## 2024-04-23 PROCEDURE — 120N000002 HC R&B MED SURG/OB UMMC

## 2024-04-23 PROCEDURE — 250N000011 HC RX IP 250 OP 636: Performed by: STUDENT IN AN ORGANIZED HEALTH CARE EDUCATION/TRAINING PROGRAM

## 2024-04-23 PROCEDURE — 80048 BASIC METABOLIC PNL TOTAL CA: CPT | Performed by: INTERNAL MEDICINE

## 2024-04-23 PROCEDURE — 97530 THERAPEUTIC ACTIVITIES: CPT | Mod: GP

## 2024-04-23 PROCEDURE — 97116 GAIT TRAINING THERAPY: CPT | Mod: GP

## 2024-04-23 PROCEDURE — 250N000013 HC RX MED GY IP 250 OP 250 PS 637: Performed by: ORTHOPAEDIC SURGERY

## 2024-04-23 PROCEDURE — 258N000003 HC RX IP 258 OP 636: Performed by: INTERNAL MEDICINE

## 2024-04-23 PROCEDURE — 85018 HEMOGLOBIN: CPT | Performed by: STUDENT IN AN ORGANIZED HEALTH CARE EDUCATION/TRAINING PROGRAM

## 2024-04-23 PROCEDURE — 99232 SBSQ HOSP IP/OBS MODERATE 35: CPT | Performed by: INTERNAL MEDICINE

## 2024-04-23 PROCEDURE — 97161 PT EVAL LOW COMPLEX 20 MIN: CPT | Mod: GP

## 2024-04-23 RX ORDER — LOSARTAN POTASSIUM 100 MG/1
100 TABLET ORAL EVERY EVENING
Status: DISCONTINUED | OUTPATIENT
Start: 2024-04-23 | End: 2024-04-24 | Stop reason: HOSPADM

## 2024-04-23 RX ORDER — OXYCODONE HYDROCHLORIDE 5 MG/1
5 TABLET ORAL EVERY 4 HOURS PRN
Status: DISCONTINUED | OUTPATIENT
Start: 2024-04-23 | End: 2024-04-24 | Stop reason: HOSPADM

## 2024-04-23 RX ORDER — METHOCARBAMOL 500 MG/1
500 TABLET, FILM COATED ORAL EVERY 6 HOURS PRN
Status: DISCONTINUED | OUTPATIENT
Start: 2024-04-23 | End: 2024-04-24 | Stop reason: HOSPADM

## 2024-04-23 RX ORDER — OXYCODONE HYDROCHLORIDE 10 MG/1
10 TABLET ORAL EVERY 4 HOURS PRN
Status: DISCONTINUED | OUTPATIENT
Start: 2024-04-23 | End: 2024-04-24 | Stop reason: HOSPADM

## 2024-04-23 RX ORDER — HYDROCHLOROTHIAZIDE 25 MG/1
50 TABLET ORAL EVERY MORNING
Status: DISCONTINUED | OUTPATIENT
Start: 2024-04-24 | End: 2024-04-24 | Stop reason: HOSPADM

## 2024-04-23 RX ADMIN — Medication 1 TABLET: at 08:14

## 2024-04-23 RX ADMIN — SODIUM CHLORIDE, POTASSIUM CHLORIDE, SODIUM LACTATE AND CALCIUM CHLORIDE: 600; 310; 30; 20 INJECTION, SOLUTION INTRAVENOUS at 08:12

## 2024-04-23 RX ADMIN — HYDROMORPHONE HYDROCHLORIDE 0.2 MG: 1 INJECTION, SOLUTION INTRAMUSCULAR; INTRAVENOUS; SUBCUTANEOUS at 08:11

## 2024-04-23 RX ADMIN — PANTOPRAZOLE SODIUM 40 MG: 40 TABLET, DELAYED RELEASE ORAL at 08:14

## 2024-04-23 RX ADMIN — OXYCODONE HYDROCHLORIDE 5 MG: 5 TABLET ORAL at 17:46

## 2024-04-23 RX ADMIN — ACETAMINOPHEN 975 MG: 325 TABLET, FILM COATED ORAL at 16:09

## 2024-04-23 RX ADMIN — SENNOSIDES AND DOCUSATE SODIUM 1 TABLET: 8.6; 5 TABLET ORAL at 20:25

## 2024-04-23 RX ADMIN — METHOCARBAMOL 500 MG: 500 TABLET ORAL at 20:27

## 2024-04-23 RX ADMIN — LOSARTAN POTASSIUM 100 MG: 100 TABLET, FILM COATED ORAL at 20:25

## 2024-04-23 RX ADMIN — POLYETHYLENE GLYCOL 3350 17 G: 17 POWDER, FOR SOLUTION ORAL at 08:14

## 2024-04-23 RX ADMIN — ACETAMINOPHEN 975 MG: 325 TABLET, FILM COATED ORAL at 08:13

## 2024-04-23 RX ADMIN — OXYCODONE HYDROCHLORIDE 5 MG: 5 TABLET ORAL at 10:12

## 2024-04-23 RX ADMIN — SENNOSIDES AND DOCUSATE SODIUM 1 TABLET: 8.6; 5 TABLET ORAL at 08:14

## 2024-04-23 RX ADMIN — OXYCODONE HYDROCHLORIDE 10 MG: 10 TABLET ORAL at 22:39

## 2024-04-23 RX ADMIN — ACETAMINOPHEN 975 MG: 325 TABLET, FILM COATED ORAL at 23:55

## 2024-04-23 RX ADMIN — Medication 2.5 MG: at 06:09

## 2024-04-23 RX ADMIN — ASPIRIN 81 MG: 81 TABLET, COATED ORAL at 08:14

## 2024-04-23 RX ADMIN — PRAVASTATIN SODIUM 40 MG: 10 TABLET ORAL at 20:25

## 2024-04-23 RX ADMIN — CEFAZOLIN SODIUM 2 G: 2 INJECTION, SOLUTION INTRAVENOUS at 03:49

## 2024-04-23 RX ADMIN — AMLODIPINE BESYLATE 7.5 MG: 5 TABLET ORAL at 08:14

## 2024-04-23 RX ADMIN — HYDROMORPHONE HYDROCHLORIDE 0.2 MG: 1 INJECTION, SOLUTION INTRAMUSCULAR; INTRAVENOUS; SUBCUTANEOUS at 13:56

## 2024-04-23 ASSESSMENT — ACTIVITIES OF DAILY LIVING (ADL)
ADLS_ACUITY_SCORE: 27

## 2024-04-23 NOTE — PLAN OF CARE
"  VS: /63 (BP Location: Left arm)   Pulse 75   Temp 98  F (36.7  C) (Oral)   Resp 13   Ht 1.549 m (5' 1\")   Wt 80.7 kg (177 lb 14.6 oz)   SpO2 100%   BMI 33.62 kg/m       O2:   POD #0. Capno machine on with NC at 1LPM    Output: Voided x3 this shift in bed pan    Last BM: 4/21   Activity: Continues on bed rest for safety as sensation has not returned to anterior RLE.    Skin: Surgical aquacel dressing on RLE with MARRY.  L PIV. R PIV,    Pain: Pt reports pain in the range of 4-6. Controlled with PRN oxycodone and IV dilaudid.    Neuro: A/O x4    Dressing: Scant drainage noted on dressing. Dressing is intact    Diet: Reg   LDA: MARRY.  L PIV. R PIV.    Equipment:    Plan: Possible discharge home 4/23 pending therapy eval   Additional Info: LLE elevated on 2 pillows. Prefers to use bedside commode for BM.     Goal Outcome Evaluation:  Please view flowsheets for assessment data        ADD @0700 Pt requested assistant to sit on bedside commode for possible BM. Pt transferred well with assistance of 1 with stand and pivot. Pt reported it felt weird to stand and ut weight on foot as she cannot feel it.                "

## 2024-04-23 NOTE — PROVIDER NOTIFICATION
5Ortho  she is having lots of pain, can we increase the oxy to 5-10 mg?  or add a muscle relaxer?   [id number]  [id number]  [phone]

## 2024-04-23 NOTE — PLAN OF CARE
Ortho Update    RN paged regarding postoperative pain.   Added oxy 5-10 q4h PRN and robaxin 500 q6h PRN.    Please page if pain continues to be poorly managed.    Dariana Shannon, PGY-4

## 2024-04-23 NOTE — PROGRESS NOTES
"Orthopaedic Surgery Progress Note  04/23/2024    Subjective: no acute issues overnight. Says pain is stable. Still endorsing numbness and weakness in foot/ankle.  No additional concerns.     Objective: /63 (BP Location: Left arm)   Pulse 75   Temp 98  F (36.7  C) (Oral)   Resp 13   Ht 1.549 m (5' 1\")   Wt 80.7 kg (177 lb 14.6 oz)   SpO2 100%   BMI 33.62 kg/m      General: NAD, alert and oriented, cooperative with exam.   Cardio: extremities wwp.   Respiratory: Non-labored breathing.  MSK: Focused examination of     RLE: Toes wwp, both DP and PT pulses are faintly palpable.  bcr in all toes.  0/5 EHL/FHL/GSC/TA strength.  Absent sensation in SP/DP/Ho. SILT in tibial and saphenous distributions. Dressings intact with 2 small areas of drainage noted.  Leg compartments are soft and compressible.  Mild pain with passive stretch.    MARRY drain right lower extremity: 105 and 60 mL last two shifts.     Assessment and Plan: Mirta Cardenas is a 69 year old female with a Undifferentiated pleomorphic sarcoma right calf  s/p tumor removal and removal of adjacent fibula on 4/22 with Dr. Barroso.    Doing well postoperatively.  Will monitor motor and sensory function as block wears off. sensation improving in tibial and saphenous distributions.     Patient should primarily be in bed with the leg elevated. AFO to get up to the bathroom and back. No ROM of ankle/leg to help with wound healing at this time. Activity orders updated.      Plan:  Ortho Primary  Activity: bedrest with bathroom privileges   Weight bearing status: WBAT RLE.  Antibiotics: continue periop Ancef x 2 doses postop.  Diet: Begin with clear fluids and progress diet as tolerated.  DVT prophylaxis: ASA 81 mg daily starting pod1 and mechanical while in the hospital, discharge on ASA 81 mg daily x 4 weeks.  Bracing/Splinting: AFO post op when up to bathroom   Elevation: Elevate RLE on pillows to keep elevated as much as possible.  Wound Care: keep " dressing on for 5 days.   Drains: MARRY RLE. Document output per shift.  Pain management: utilize all oral meds first, utilize IV meds for severe breakthrough pain after PO meds given adequate time to take effect  Physical Therapy:  ROM, ADL's.  Occupational Therapy: ADL's.  Labs: Trend Hgb on POD #1.   Pathology sent   Consults: PT, OT. Orthotics for AFO, appreciate assistance in caring for this patient.  Disposition: Pending progress with therapies, pain control on orals, and medical stability, anticipate discharge to home on POD #1-2.  Follow-up: Clinic with Becky Cardenas on 5/1/24 as scheduled.     Yola Haile MD   Orthopedic Surgery, PGY-4  P: [phone]

## 2024-04-23 NOTE — PROGRESS NOTES
"  VS: BP (!) 157/71 (BP Location: Left arm)   Pulse 92   Temp 99.1  F (37.3  C) (Oral)   Resp 16   Ht 1.549 m (5' 1\")   Wt 80.7 kg (177 lb 14.6 oz)   SpO2 97%   BMI 33.62 kg/m      O2: RA   Output: Voids in toilet or BSC   Last BM: 4/22   Activity: 1 assist w/ GB & walker, orthotic brace on R foot/ankle in place at all times even while sleeping.     Skin: Intact except for R shin incisions   Pain: Complains of constant pain in R shin area   CMS: No sensation in R foot   Dressing: R shin dressings, CDI   Diet: reg   LDA: PIVs in LFA & RFA   Equipment: GB, walker, orthotic brace on R foot   Plan: Discharge home tomorrow with her    Additional Info:       Pain meds were increased and added Robaxin  "

## 2024-04-23 NOTE — PROGRESS NOTES
[facility]    Medicine Progress Note - Hospitalist Service, GOLD TEAM 16    Date of Admission:  4/22/2024    Assessment & Plan   A: Patient is a 68 y/o woman who has a history of CLL. Patient underwent removal of sarcoma right calf, deep compartment including anterior tibial muscle and 8 cm of fibula on 22-Apr-2024 for right calf sarcoma, deep compartment.     P:  1.) Right calf sarcoma, deep compartment; s/p surgical intervention:  - Patient receiving post-operative care.  - Patient on scheduled acetaminophen. Patient on acetaminophen, IV hydromorphone, methocarbamol and oxycodone as needed.  - Patient on aspirin for DVT prophylaxis. Patient to be on aspirin 81 mg daily for 4 weeks per Orthopaedic Surgery.  - Patient had been on doxil and ifosfamide for neoadjuvant chemotherapy.   - Patient to have PET scan on 27-May-2024 per Oncology. Patient to f/u with Oncology on 28-May-2024 for discussion of role of adjuvant chemotherapy.     2.) Post-operative acute blood loss anemia:  - Monitoring as needed.  - Transfusing for Hb < 7.0.    3.) Abnormal PET/CT - severe coronary calcifications on PET/CT performed on 29-Jan-2024:  - Patient asymptomatic.  - Further evaluation as outpatient.    4.) History of CLL:  - Further surveillance as outpatient.    5.) Hyperlipidemia:  - Patient on pravastatin.    6.) Asthma, uncomplicated:  - Monitoring for symptoms.    7.) GERD:  - Patient on PPI.    8.) Hypertension:  - Patient to resume norvasc, losartan and hydrochlorothiazide with hold parameters. Monitoring response,          Diet: Advance Diet as Tolerated: Regular Diet Adult    Branham Catheter: Not present  Lines: None     Cardiac Monitoring: None  Code Status: Full Code      Clinically Significant Risk Factors                  # Hypertension: Noted on problem list        # Obesity: Estimated body mass index is 33.62 kg/m  as calculated from the following:    Height as of this  "encounter: 1.549 m (5' 1\").    Weight as of this encounter: 80.7 kg (177 lb 14.6 oz)., PRESENT ON ADMISSION     # Financial/Environmental Concerns:    # Asthma: noted on problem list        Disposition Plan     Medically Ready for Discharge:              Skinny Myers MD  Hospitalist Service, GOLD TEAM 16  [facility]  Securely message with [employer] (more info)  Text page via [employer] Paging/Directory   See signed in provider for up to date coverage information  ______________________________________________________________________    Interval History   Patient noted pain in right lower leg after block wore off. Patient noted tolerating therapy today.    Physical Exam   Vital Signs: Temp: 99.1  F (37.3  C) Temp src: Oral BP: (!) 157/71 Pulse: 92   Resp: 16 SpO2: 97 % O2 Device: None (Room air) Oxygen Delivery: 2 LPM  Weight: 177 lbs 14.58 oz    General: Patient comfortable, NAD.  Heart: RRR, S1 S2 w/o murmurs  Lungs: Breath sounds present. No crackles/wheezes heard.  Abdomen: Soft, nontender.    Medical Decision Making           "

## 2024-04-23 NOTE — PROGRESS NOTES
04/23/24 1443   Appointment Info   Signing Clinician's Name / Credentials (PT) Hailee Diehl DPT       Present no   Language English   Living Environment   People in Home spouse   Current Living Arrangements house   Home Accessibility stairs to enter home;stairs within home   Number of Stairs, Main Entrance 2   Stair Railings, Main Entrance none   Number of Stairs, Within Home, Primary seven   Stair Railings, Within Home, Primary railings safe and in good condition;railing on left side (ascending)   Transportation Anticipated family or friend will provide   Self-Care   Usual Activity Tolerance good   Current Activity Tolerance fair   Regular Exercise No   Equipment Currently Used at Home none  (has access to cane, walker, shower chair)   Fall history within last six months no   General Information   Onset of Illness/Injury or Date of Surgery 04/22/24   Referring Physician Vipin Modi APRN CNP   Patient/Family Therapy Goals Statement (PT) Did not endorse   Pertinent History of Current Problem (include personal factors and/or comorbidities that impact the POC) 69 year old female with a Undifferentiated pleomorphic sarcoma right calf  s/p tumor removal and removal of adjacent fibula on 4/22 with Dr. Barroso   Existing Precautions/Restrictions fall;other (see comments)  (bedrest with bathroom priviledges, R LE elevation)   Weight-Bearing Status - LUE full weight-bearing   Weight-Bearing Status - RUE full weight-bearing   Weight-Bearing Status - LLE full weight-bearing   Weight-Bearing Status - RLE weight-bearing as tolerated   General Observations Supine in bed upon arrival, pleasant and agreeable   Cognition   Affect/Mental Status (Cognition) WNL   Orientation Status (Cognition) oriented x 3   Follows Commands (Cognition) WNL   Pain Assessment   Patient Currently in Pain Yes, see Vital Sign flowsheet   Integumentary/Edema   Integumentary/Edema Comments Patient with normal post-surgical LE  swelling present   Posture    Posture Forward head position;Protracted shoulders;Kyphosis   Posture Comments Mild sitting EOB and standing   Range of Motion (ROM)   ROM Comment Did not formally asess, demonstrates functional ROM with mobility   Strength (Manual Muscle Testing)   Strength Comments Did not formally assess, demonstrates functional strength with mobility   Bed Mobility   Comment, (Bed Mobility) Supervision supine<>sit transfer   Transfers   Comment, (Transfers) CGA for sit<>stand transfer with use of walker   Gait/Stairs (Locomotion)   Comment, (Gait/Stairs) Ambulates short distance (bed<>bathroom distance) with CGA using walker   Balance   Balance Comments Independent sitting balance, CGA for standing balance with UE support from walker   Sensory Examination   Sensory Perception WNL   Clinical Impression   Criteria for Skilled Therapeutic Intervention Yes, treatment indicated   PT Diagnosis (PT) impaired functional mobility   Influenced by the following impairments increased post-op pain, precautions, decreased R LE strength and ROM   Functional limitations due to impairments impaired bed mobility, transfers and ambulation   Clinical Presentation (PT Evaluation Complexity) stable   Clinical Presentation Rationale per clinical judgment   Clinical Decision Making (Complexity) low complexity   Planned Therapy Interventions (PT) balance training;bed mobility training;gait training;home exercise program;stair training;strengthening;transfer training;progressive activity/exercise;risk factor education;home program guidelines   Risk & Benefits of therapy have been explained evaluation/treatment results reviewed;care plan/treatment goals reviewed;risks/benefits reviewed;current/potential barriers reviewed   PT Total Evaluation Time   PT Eval, Low Complexity Minutes (71868) 6   Physical Therapy Goals   PT Frequency Daily   PT Predicted Duration/Target Date for Goal Attainment 04/25/24   PT Goals Bed  Mobility;Transfers;Gait;Stairs   PT: Bed Mobility Modified independent;Supine to/from sit   PT: Transfers Supervision/stand-by assist;Sit to/from stand;Bed to/from chair;Assistive device;Within precautions   PT: Gait Supervision/stand-by assist;Assistive device;25 feet;Within precautions  (bed<>bathroom distance)   PT: Stairs Supervision/stand-by assist;Assistive device;6 stairs;Rail on left  (2 stairs no rail with walker)   Therapeutic Activity   Therapeutic Activities: dynamic activities to improve functional performance Minutes (90393) 15   Symptoms Noted During/After Treatment Fatigue;Increased pain   Treatment Detail/Skilled Intervention PT: Supine in bed upon arrival, agreeable to PT. Significantly increased time problem solving with ortho, orthotics and patient regarding precautions and discharge plan/safety. Obtained limb elevator for in bed. Education on WBAT status, use of PRAFO, elevation, precautions and general safety with mobility. Completes supine<>sit transfer with supervision. Sat EOB with good tolerance. Completes sit<>stand transfer with CGA and use of walker x 3 times- safe throughout. Ended in bed with needs in reach   Gait Training   Gait Training Minutes (40674) 10   Symptoms Noted During/After Treatment (Gait Training) fatigue;increased pain   Treatment Detail/Skilled Intervention PT: Educated on PRAFO for ambulation bed<>bathroom, verbalized understanding. Educated on how to properly use walker. Patient then ambulates to/from bed<>w/c (approximately 20' at home for bathroom). Overall fairly steady. Utilized w/c with elevating leg rest to get to stairs. Educated and demonstrated safety with stairs. Completes 4 stairs with bilateral rails and SBA. Education on how to use walker on platform to enter the home. Decision to complete all this tomorrow as to avoid prolonged periods of time with R LE in dependent position.   PT Discharge Planning   PT Plan PT: Functional mobility, stairs per home set  up. Ensure goals met for safe discharge   PT Discharge Recommendation (DC Rec) home with assist   PT Rationale for DC Rec PT: Mobilizing well overall and will be on bedrest with bathroom priviledges so no current PT follow up needs   PT Brief overview of current status PT: Supervision bed mobility, CGA for transfers and ambulation with walker   Total Session Time   Timed Code Treatment Minutes 25   Total Session Time (sum of timed and untimed services) 31

## 2024-04-24 ENCOUNTER — APPOINTMENT (OUTPATIENT)
Dept: PHYSICAL THERAPY | Facility: CLINIC | Age: 69
DRG: 493 | End: 2024-04-24
Attending: ORTHOPAEDIC SURGERY
Payer: COMMERCIAL

## 2024-04-24 ENCOUNTER — APPOINTMENT (OUTPATIENT)
Dept: OCCUPATIONAL THERAPY | Facility: CLINIC | Age: 69
DRG: 493 | End: 2024-04-24
Attending: ORTHOPAEDIC SURGERY
Payer: COMMERCIAL

## 2024-04-24 VITALS
HEART RATE: 82 BPM | DIASTOLIC BLOOD PRESSURE: 84 MMHG | TEMPERATURE: 99 F | SYSTOLIC BLOOD PRESSURE: 145 MMHG | WEIGHT: 177.91 LBS | BODY MASS INDEX: 33.59 KG/M2 | OXYGEN SATURATION: 96 % | RESPIRATION RATE: 16 BRPM | HEIGHT: 61 IN

## 2024-04-24 LAB — HGB BLD-MCNC: 8.5 G/DL (ref 11.7–15.7)

## 2024-04-24 PROCEDURE — 97535 SELF CARE MNGMENT TRAINING: CPT | Mod: GO

## 2024-04-24 PROCEDURE — 97116 GAIT TRAINING THERAPY: CPT | Mod: GP

## 2024-04-24 PROCEDURE — 85018 HEMOGLOBIN: CPT | Performed by: STUDENT IN AN ORGANIZED HEALTH CARE EDUCATION/TRAINING PROGRAM

## 2024-04-24 PROCEDURE — 250N000013 HC RX MED GY IP 250 OP 250 PS 637: Performed by: STUDENT IN AN ORGANIZED HEALTH CARE EDUCATION/TRAINING PROGRAM

## 2024-04-24 PROCEDURE — 250N000013 HC RX MED GY IP 250 OP 250 PS 637: Performed by: INTERNAL MEDICINE

## 2024-04-24 PROCEDURE — 36415 COLL VENOUS BLD VENIPUNCTURE: CPT | Performed by: STUDENT IN AN ORGANIZED HEALTH CARE EDUCATION/TRAINING PROGRAM

## 2024-04-24 PROCEDURE — 97530 THERAPEUTIC ACTIVITIES: CPT | Mod: GP

## 2024-04-24 PROCEDURE — 97165 OT EVAL LOW COMPLEX 30 MIN: CPT | Mod: GO

## 2024-04-24 PROCEDURE — 99232 SBSQ HOSP IP/OBS MODERATE 35: CPT | Performed by: INTERNAL MEDICINE

## 2024-04-24 PROCEDURE — 250N000013 HC RX MED GY IP 250 OP 250 PS 637: Performed by: ORTHOPAEDIC SURGERY

## 2024-04-24 RX ORDER — METHOCARBAMOL 500 MG/1
500 TABLET, FILM COATED ORAL EVERY 6 HOURS PRN
Qty: 20 TABLET | Refills: 0 | Status: SHIPPED | OUTPATIENT
Start: 2024-04-24 | End: 2024-04-24

## 2024-04-24 RX ORDER — OXYCODONE HYDROCHLORIDE 5 MG/1
5-10 TABLET ORAL EVERY 4 HOURS PRN
Qty: 30 TABLET | Refills: 0 | Status: SHIPPED | OUTPATIENT
Start: 2024-04-24 | End: 2024-06-18

## 2024-04-24 RX ORDER — METHOCARBAMOL 500 MG/1
500 TABLET, FILM COATED ORAL EVERY 6 HOURS PRN
Qty: 20 TABLET | Refills: 0 | Status: SHIPPED | OUTPATIENT
Start: 2024-04-24 | End: 2024-05-30

## 2024-04-24 RX ORDER — OXYCODONE HYDROCHLORIDE 5 MG/1
5-10 TABLET ORAL EVERY 4 HOURS PRN
Qty: 30 TABLET | Refills: 0 | Status: SHIPPED | OUTPATIENT
Start: 2024-04-24 | End: 2024-04-24

## 2024-04-24 RX ADMIN — Medication 1 TABLET: at 08:18

## 2024-04-24 RX ADMIN — SENNOSIDES AND DOCUSATE SODIUM 1 TABLET: 8.6; 5 TABLET ORAL at 08:18

## 2024-04-24 RX ADMIN — AMLODIPINE BESYLATE 7.5 MG: 5 TABLET ORAL at 08:18

## 2024-04-24 RX ADMIN — PANTOPRAZOLE SODIUM 40 MG: 40 TABLET, DELAYED RELEASE ORAL at 08:18

## 2024-04-24 RX ADMIN — HYDROCHLOROTHIAZIDE 50 MG: 25 TABLET ORAL at 08:18

## 2024-04-24 RX ADMIN — METHOCARBAMOL 500 MG: 500 TABLET ORAL at 08:26

## 2024-04-24 RX ADMIN — POLYETHYLENE GLYCOL 3350 17 G: 17 POWDER, FOR SOLUTION ORAL at 08:18

## 2024-04-24 RX ADMIN — ACETAMINOPHEN 975 MG: 325 TABLET, FILM COATED ORAL at 08:19

## 2024-04-24 RX ADMIN — OXYCODONE HYDROCHLORIDE 10 MG: 10 TABLET ORAL at 04:04

## 2024-04-24 RX ADMIN — ASPIRIN 81 MG: 81 TABLET, COATED ORAL at 08:18

## 2024-04-24 RX ADMIN — OXYCODONE HYDROCHLORIDE 5 MG: 5 TABLET ORAL at 11:58

## 2024-04-24 ASSESSMENT — ACTIVITIES OF DAILY LIVING (ADL)
ADLS_ACUITY_SCORE: 27
ADLS_ACUITY_SCORE: 26
ADLS_ACUITY_SCORE: 26
ADLS_ACUITY_SCORE: 27
ADLS_ACUITY_SCORE: 26
ADLS_ACUITY_SCORE: 27
ADLS_ACUITY_SCORE: 26
ADLS_ACUITY_SCORE: 27
ADLS_ACUITY_SCORE: 27
IADL_COMMENTS: WILL HAVE ASSIST WITH IADLS
ADLS_ACUITY_SCORE: 26
ADLS_ACUITY_SCORE: 27

## 2024-04-24 NOTE — PROGRESS NOTES
"[facility]    Medicine Progress Note - Hospitalist Service, GOLD TEAM 16    Date of Admission:  4/22/2024    Assessment & Plan   A: Patient is a 70 y/o woman who has a history of CLL. Patient underwent removal of sarcoma right calf, deep compartment including anterior tibial muscle and 8 cm of fibula on 22-Apr-2024 for right calf sarcoma, deep compartment.      P:  1.) Right calf sarcoma, deep compartment; s/p surgical intervention:  - Patient receiving post-operative care.  - Patient on scheduled acetaminophen. Patient on acetaminophen, IV hydromorphone, methocarbamol and oxycodone as needed.  - Patient on aspirin for DVT prophylaxis. Patient to be on aspirin 81 mg daily for 4 weeks per Orthopaedic Surgery.  - Patient had been on doxil and ifosfamide for neoadjuvant chemotherapy.   - Patient to have PET scan on 27-May-2024 per Oncology. Patient to f/u with Oncology on 28-May-2024 for discussion of role of adjuvant chemotherapy.      2.) Post-operative acute blood loss anemia:  - Monitoring as needed.  - Transfusing for Hb < 7.0.     3.) Abnormal PET/CT - severe coronary calcifications on PET/CT performed on 29-Jan-2024:  - Patient asymptomatic.  - Further evaluation as outpatient.     4.) History of CLL:  - Further surveillance as outpatient.     5.) Hyperlipidemia:  - Patient on pravastatin.     6.) Asthma, uncomplicated:  - Monitoring for symptoms.     7.) GERD:  - Patient on PPI.     8.) Hypertension:  - Patient to continue norvasc, losartan and hydrochlorothiazide.          Diet: Advance Diet as Tolerated: Regular Diet Adult    Branham Catheter: Not present  Lines: None     Cardiac Monitoring: None  Code Status: Full Code      Clinically Significant Risk Factors                  # Hypertension: Noted on problem list        # Obesity: Estimated body mass index is 33.62 kg/m  as calculated from the following:    Height as of this encounter: 1.549 m (5' 1\").    Weight as " of this encounter: 80.7 kg (177 lb 14.6 oz)., PRESENT ON ADMISSION     # Financial/Environmental Concerns:    # Asthma: noted on problem list            Skinny Myers MD  Hospitalist Service, GOLD TEAM [address] [place] [address]  Securely message with [facility] (more info)  Text page via [employer] Paging/Directory   See signed in provider for up to date coverage information  ______________________________________________________________________    Interval History   Patient noted feeling well, noted pain controlled and noted no new problems.    Patient reportedly is no longer on lyrica.    Physical Exam   Vital Signs: Temp: 99  F (37.2  C) Temp src: Oral BP: (!) 145/84 Pulse: 82   Resp: 16 SpO2: 96 % O2 Device: None (Room air)    Weight: 177 lbs 14.58 oz    General: Patient comfortable, NAD.  Heart: RRR, S1 S2 w/o murmurs.  Lungs: Breath sounds present. No crackles/wheezes heard.  Abdomen: Soft, nontender.     Medical Decision Making

## 2024-04-24 NOTE — PROGRESS NOTES
"  VS: BP (!) 145/84 (BP Location: Left arm)   Pulse 82   Temp 99  F (37.2  C) (Oral)   Resp 16   Ht 1.549 m (5' 1\")   Wt 80.7 kg (177 lb 14.6 oz)   SpO2 96%   BMI 33.62 kg/m      O2: RA   Output: Voids in toilet   Last BM: 4/23   Activity: SBA w/ GB & walker   Skin: Intact except for R lower shin incisions   Pain: Complains of intermittent pain in R shin   CMS: Baseline numbness in R foot   Dressing: R shin dressing, CDI   Diet: Reg   LDA: PIV in LFA   Equipment: walker   Plan: Discharge home today with    Additional Info:       "

## 2024-04-24 NOTE — PLAN OF CARE
Physical Therapy Discharge Summary    Reason for therapy discharge:    All goals and outcomes met, no further needs identified.    Progress towards therapy goal(s). See goals on Care Plan in [facility] electronic health record for goal details.  Goals met    Therapy recommendation(s):    No further therapy is recommended.

## 2024-04-24 NOTE — PROGRESS NOTES
S: Pt was fit at UR with right  Pressure Relief Ankle Foot Orthosis (PRAFO) for the lower extremity as ordered by Dr Barroso    O/g: braces have been recommended to help reduce foot drop and off-weight heel from pressure.      A: The patient's knee was flexed to relax the gastroc muscle.  Once the foot was positioned, the soft liner was closed over the top of foot and checked that surface is smooth and consistent.  The middle Velcro strap was secured next.  The positioning of posterior heel was re-evaluated then all dorsal straps and calf strap was secured.  The foot position was re-evaluated so that the sole of foot met the plantar surface of the orthosis, including calcaneus and that the heel clearance was visible through the posterior opening.  The dorsi/plantar flexion bar was adjusted by hand to achieve desired degree.  The PRAFO toe extension/protection was adjusted by positioning extension plate under toes to desired length.     P: patient/nursing staff instructed to contact our office with any problems or questions.    FERMIN Jenkins.

## 2024-04-24 NOTE — PROGRESS NOTES
"Orthopaedic Surgery Progress Note  04/24/2024    Subjective: no acute issues overnight. Says pain worsened last evening but improved overnight. Says she can now wiggle toes and more of her sensation has returned. With with therapies yesterday.  No additional concerns.     Objective: BP (!) 148/72 (BP Location: Left arm)   Pulse 86   Temp 98.6  F (37  C) (Oral)   Resp 16   Ht 1.549 m (5' 1\")   Wt 80.7 kg (177 lb 14.6 oz)   SpO2 96%   BMI 33.62 kg/m      General: NAD, alert and oriented, cooperative with exam.   Cardio: extremities wwp.   Respiratory: Non-labored breathing.  MSK: Focused examination of     RLE: Toes wwp, both DP and PT pulses are faintly palpable.  bcr in all toes.  Not firing EHL/TA.  Able to fire FHL/gastrocs and wiggle lesser toes. Absent sensation in DP. SILT in SP, sural, tibial and saphenous distributions. Dressings intact with 2 small areas of drainage noted.  Leg compartments are soft and compressible.     MARRY drain right lower extremity: 45 and 35 mL last two shifts.     Assessment and Plan: Mirta Cardenas is a 69 year old female with a Undifferentiated pleomorphic sarcoma right calf  s/p tumor removal and removal of adjacent fibula on 4/22 with Dr. Barroso.    Doing well postoperatively.  Will monitor motor and sensory function.     Patient should primarily be in bed with the leg elevated. PRAFO to get up to the bathroom and back. No aggressive ROM of ankle/leg to help with wound healing at this time. Ok to work with therapies on mobilization.      Plan:  Ortho Primary  Activity: bedrest with bathroom privileges, ok to work with PT   Weight bearing status: WBAT RLE.  Antibiotics: continue periop Ancef x 2 doses postop.  Diet: Begin with clear fluids and progress diet as tolerated.  DVT prophylaxis: ASA 81 mg daily starting pod1 and mechanical while in the hospital, discharge on ASA 81 mg daily x 4 weeks.  Bracing/Splinting: AFO post op when up to bathroom   Elevation: Elevate RLE on " pillows to keep elevated as much as possible.  Wound Care: keep dressing on for 5 days.   Drains: MARRY RLE. Document output per shift. Will remove at clinic follow up next week   Pain management: utilize all oral meds first, utilize IV meds for severe breakthrough pain after PO meds given adequate time to take effect  Physical Therapy:  ROM, ADL's.  Occupational Therapy: ADL's.  Labs: Trend Hgb on POD #1.   Pathology sent   Consults: PT, OT. Orthotics for AFO, appreciate assistance in caring for this patient.  Disposition: Pending progress with therapies, pain control on orals, and medical stability, anticipate discharge to home on POD #1-2.  Follow-up: Clinic with Becky Cardenas on 5/1/24 as scheduled.     Yola Haile MD   Orthopedic Surgery, PGY-4  P: [phone]

## 2024-04-24 NOTE — DISCHARGE SUMMARY
ORTHOPAEDIC SURGERY DISCHARGE SUMMARY     Date of Admission: 4/22/2024  Date of Discharge: 4/24/2024  2:13 PM  Disposition: Home  Staff Physician: No att. 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  She underwent preoperative radiation and was indicated for limb salvage surgery.  Following the discussion of the risks, benefits and alternatives she elected to proceed.    HOSPITAL COURSE:    The patient was admitted following the above listed procedures for pain control and rehabilitation. Mirta Cardenas did well post-operatively. Medicine was consulted post operatively to aid in management of medical co-morbidities. The patient received routine nursing cares and at the time of discharge was medically stable. Vital signs were stable throughout admission. The patient is tolerating a regular diet and is voiding spontaneously. All PT/OT goals have been met for safe mobility. Pain is now controlled on oral medications which will be available on discharge. Stool softeners have been used while taking pain medications to help prevent constipation. Mirta Cardenas is deemed medically safe to discharge.     Antibiotics:  ancef given periop and 24 hours postop.   DVT prophylaxis:  ASA 81 mg daily initiated after surgery and will be continued for 4 weeks.   PT Progress:  Has met PT/OT goals for safe mobility.    Pain Meds:  Weaned off all IV pain meds by discharge.  Inpatient Events: No significant events or complications.     PHYSICAL EXAM:    General: NAD, alert and oriented, cooperative with exam.   Cardio: extremities wwp.   Respiratory: Non-labored breathing.  MSK: Focused examination of      RLE: Toes wwp, both DP and PT pulses are faintly palpable.   bcr in all toes.  Not firing EHL/TA.  Able to fire FHL/gastrocs and wiggle lesser toes. Absent sensation in DP. SILT in SP, sural, tibial and saphenous distributions. Dressings intact with 2 small areas of drainage noted.  Leg compartments are soft and compressible.     FOLLOWUP:      Future Appointments   Date Time Provider Department Center   5/1/2024  3:00 PM Jodi Cardenas PA-C [facility]   5/2/2024  9:45 AM Radha Matias PA-C [facility]   5/23/2024 10:00 [id number]   5/29/2024 10:30 AM Roney Nam MD [facility]   5/30/2024  3:30 PM Patrice Armstrong MD [facility] RAD THER   6/13/2024  1:30 PM  M[facility]IC LAB DRAW [facility]   6/13/2024  2:00 PM Griffin Duckworth MD [facility]   6/18/2024  9:00 AM Joseline Fraga MD [facility]       Orthopaedic Surgery appointments are at the [facility] ([address] [place] [address]). Call [phone] to schedule a follow-up appointment at this location with your provider.     PLANNED DISCHARGE ORDERS:      Discharge Medication List as of 4/24/2024  1:38 PM        CONTINUE these medications which have CHANGED    Details   methocarbamol (ROBAXIN) 500 MG tablet Take 1 tablet (500 mg) by mouth every 6 hours as needed for muscle spasms, Disp-20 tablet, R-0, E-Prescribe      oxyCODONE (ROXICODONE) 5 MG tablet Take 1-2 tablets (5-10 mg) by mouth every 4 hours as needed for moderate pain, Disp-30 tablet, R-0, E-Prescribe           CONTINUE these medications which have NOT CHANGED    Details   amLODIPine (NORVASC) 5 MG tablet Take 1.5 tablets (7.5 mg) by mouth daily, Disp-135 tablet, R-3, E-Prescribe      calcium carbonate (TUMS) 500 MG chewable tablet Take 1 tablet (500 mg) by mouth as needed, Historical      losartan (COZAAR) 100 MG tablet Take 1 tablet (100 mg) by mouth daily, Disp-90 tablet, R-3, E-Prescribe      Multiple Vitamins-Calcium (ONE-A-DAY WOMENS FORMULA PO) Take 1  "tablet by mouth daily, Historical      omeprazole (PRILOSEC) 20 MG DR capsule Take 1 capsule by mouth every morning., Historical      pravastatin (PRAVACHOL) 40 MG tablet Take 1 tablet (40 mg) by mouth daily, Disp-90 tablet, R-3, E-Prescribe      triamcinolone (ARISTOCORT HP) 0.5 % external cream Apply topically 2 times dailyDisp-15 g, [id number]      hydrochlorothiazide (HYDRODIURIL) 50 MG tablet Take 1 tablet (50 mg) by mouth daily for 30 days, Disp-30 tablet, R-3, E-Prescribe           STOP taking these medications       pregabalin (LYRICA) 50 MG capsule Comments:   Reason for Stopping:                 Discharge Procedure Orders   Reason for your hospital stay   Order Comments: Tumor removal right leg, partial fibula     When to call - Contact Surgeon Team   Order Comments: You may experience symptoms that require follow-up before your scheduled appointment. Refer to the \"Stoplight Tool\" for instructions on when to contact your Surgeon Team if you are concerned about pain control, blood clots, constipation, or if you are unable to urinate.     When to call - Reach out to Urgent Care   Order Comments: If you are not able to reach your Surgeon Team and you need immediate care, go to the Orthopedic Walk-in Clinic or Urgent Care at your Surgeon's office.  Do NOT go to the Emergency Room unless you have shortness of breath, chest pain, or other signs of a medical emergency.     When to call - Reasons to Call 911   Order Comments: Call 911 immediately if you experience sudden-onset chest pain, arm weakness/numbness, slurred speech, or shortness of breath     Discharge Instruction - Breathing exercises   Order Comments: Perform breathing exercises using your Incentive Spirometer 10 times per hour while awake for 2 weeks.     Symptoms - Fever Management   Order Comments: A low grade fever can be expected after surgery.  Use acetaminophen (TYLENOL) as needed for fever management.  Contact your Surgeon Team if you have " a fever greater than 101.5 F, chills, and/or night sweats.     Symptoms - Constipation management   Order Comments: Constipation (hard, dry bowel movements) is expected after surgery due to the combination of being less active, the anesthetic, and the opioid pain medication.  You can do the following to help reduce constipation:  ~  FLUIDS:  Drink clear liquids (water or Gatorade), or juice (apple/prune).  ~  DIET:  Eat a fiber rich diet.    ~  ACTIVITY:  Get up and move around several times a day.  Increase your activity as you are able.  MEDICATIONS:  Reduce the risk of constipation by starting medications before you are constipated.  You can take Miralax   (1 packet as directed) and/or a stool softener (Senokot 1-2 tablets 1-2 times a day).  If you already have constipation and these medications are not working, you can get magnesium citrate and use as directed.  If you continue to have constipation you can try an over the counter suppository or enema.  Call your Surgeon Team if it has been greater than 3 days since your last bowel movement.     Symptoms - Reduced Urine Output   Order Comments: Changes in the amount of fluids you drank before and after surgery may result in problems urinating.  It is important to stay well-hydrated after surgery and drink plenty of water. If it has been greater than 8 hours since you have urinated despite drinking plenty of water, call your Surgeon Team.     Activity - Exercises to prevent blood clots   Order Comments: Unless otherwise directed by your Surgeon team, perform the following exercises at least three times per day for the first four weeks after surgery to prevent blood clots in your legs: 1) Point and flex your feet (Ankle Pumps), 2) Move your ankle around in big circles, 3) Wiggle your toes, 4) Walk, even for short distances, several times a day, will help decrease the risk of blood clots.     Order Specific Question Answer Comments   Is discharge order? Yes       Comfort and Pain Management - Pain after Surgery   Order Comments: Pain after surgery is normal and expected.  You will have some amount of pain for several weeks after surgery.  Your pain will improve with time.  There are several things you can do to help reduce your pain including: rest, ice, elevation, and using pain medications as needed. Contact your Surgeon Team if you have pain that persists or worsens after surgery despite rest, ice, elevation, and taking your medication(s) as prescribed. Contact your Surgeon Team if you have new numbness, tingling, or weakness in your operative extremity.     Comfort and Pain Management - Swelling after Surgery   Order Comments: Swelling and/or bruising of the surgical extremity is common and may persist for several months after surgery. In addition to frequent icing and elevation, gentle compressive support with an ACE wrap or tubigrip may help with swelling. Apply compression regularly, removing at least twice daily to perform skin checks. Contact your Surgeon Team if your swelling increases and is NOT associated with an increase in your activity level, or if your swelling increases and is associated with redness and pain.     Comfort and Pain Management - Cold therapy   Order Comments: Ice can be used to control swelling and discomfort after surgery. Place a thin towel over your operative site and apply the ice pack overtop. Leave ice pack in place for 20 minutes, then remove for 20 minutes. Repeat this 20 minutes on/20 minutes off routine as often as tolerated.     Medication Instructions - Acetaminophen (TYLENOL) Instructions   Order Comments: You were discharged with acetaminophen (TYLENOL) for pain management after surgery. Acetaminophen most effectively manages pain symptoms when it is taken on a schedule without missing doses (every four, six, or eight hours). Your Provider will prescribe a safe daily dose between 3000 - 4000 mg.  Do NOT exceed this daily  "dose. Most patients use acetaminophen for pain control for the first four weeks after surgery.  You can wean from this medication as your pain decreases.     Medication Instructions - NSAID Instructions   Order Comments: You were discharged with an anti-inflammatory medication for pain management to use in combination with acetaminophen (TYLENOL) and the narcotic pain medication.  Take this medication exactly as directed.  You should only take one anti-inflammatory at a time.  Some common anti-inflammatories include: ibuprofen (ADVIL, MOTRIN), naproxen (ALEVE, NAPROSYN), celecoxib (CELEBREX), meloxicam (MOBIC), ketorolac (TORADOL).  Take this medication with food and water.     Medication Instructions - Opioids - Tapering Instructions   Order Comments: In the first three days following surgery, your symptoms may warrant use of the narcotic pain medication every four to six hours as prescribed. This is normal. As your pain symptoms improve, focus your efforts on decreasing (tapering) use of narcotic medications. The most successful tapering strategy is to first, decrease the number of tablets you take every 4-6 hours to the minimum prescribed. Then, increase the amount of time between doses.  For example:  First, taper to   or 1 tablet every 4-6 hours.  Then, taper to   or 1 tablet every 6-8 hours.  Then, taper to   or 1 tablet every 8-10 hours.  Then, taper to   or 1 tablet every 10-12 hours.  Then, taper to   or 1 tablet at bedtime.  The bedtime dose can help with comfort during sleep and is typically the last dose to be discontinued after surgery.     Comfort and Pain Management - LOWER Extremity Elevation   Order Comments: Swelling is expected for several months after surgery. This type of swelling is usually associated with gravity and activity, and can be improved with elevation.   The best way to do this is to get your \"toes above your nose\" by laying down and placing several pillows lengthwise under your calf " and heel. When elevating your leg keep your knee completely straight. Perform this elevation as often as possible especially for the first two weeks after surgery.     Medication Instructions - Opioid Instructions (1 - 2 tablets Q 4-6 hours, MAX 6 tablets)   Order Comments: You were discharged with an opioid medication (hydromorphone, oxycodone, hydrocodone, or tramadol). This medication should only be taken for breakthrough pain that is not controlled with acetaminophen (TYLENOL). If you rate your pain less than 3 you do not need this medication.  Pain rating 0-3:  You do not need this medication.  Pain rating 4-6:  Take 1 tablet every 4-6 hours as needed  Pain rating 7-10:  Take 2 tablets every 4-6 hours as needed.  Do not exceed 6 tablets per day     Drain care   Order Comments: Leave drain in place and keep clean dry and intact. Drain will be removed at your follow up appointment.   Monitor drainage and record output. Notify the surgeon team if drainage is over 500 in 8 hours     Medication instructions -  Anticoagulation - aspirin   Order Comments: Take the aspirin as prescribed for a total of four weeks after surgery.  This is given to help minimize your risk of blood clot.  Aspirin EC 81 mg tablet by mouth daily for 4 weeks.   Patient indicated she has medication at home and declined prescription. Instructions given on aspirin dosage, administration and duration.     Weight bearing as tolerated   Order Comments: Weight bearing as tolerated on your right lower extremity.  You should primarily be in bed with the leg elevated. PRAFO to get up to the bathroom and back. No aggressive ROM of ankle/leg to help with wound healing at this time. Ok to work with therapies on mobilization.     Order Specific Question Answer Comments   Is discharge order? Yes      Follow Up Care   Order Comments: Follow-up with your Surgeon Team in May 1st, 2024 for wound check.     Walker DME   Order Comments: DME Documentation: Describe  the reason for need to support medical necessity: Impaired gait due to Foot/Ankle surgery. Anticipated length of need: 3 months     Order Specific Question Answer Comments   Medical Equipment (DME) Supplier: [employer]    PATIENT INSTRUCTIONS: If you did not receive this ordered item today, please contact [employer] for availability ([facility] Locations: [phone], Central: [phone]).    Walker Type: Standard (2 Wheel)    Accessories: N/A      Cane DME   Order Comments: DME Documentation: Describe the reason for need to support medical necessity: Impaired gait due to Foot/Ankle surgery. Anticipated length of need: 3 months     Order Specific Question Answer Comments   Medical Equipment (DME) Supplier: [employer]    PATIENT INSTRUCTIONS: If you did not receive this ordered item today, please contact [employer] for availability ([facility] Locations: [phone], Central: [phone]).    Cane Type: Single Tip    Reminder: Patient can typically get 1 every 5 years      Crutches DME   Order Comments: DME Documentation: Describe the reason for need to support medical necessity: Impaired gait due to Foot/Ankle surgery. Anticipated length of need: 3 months     Order Specific Question Answer Comments   Medical Equipment (DME) Supplier: [employer]    PATIENT INSTRUCTIONS: If you did not receive this ordered item today, please contact [employer] for availability ([facility] Locations: [phone], Central: [phone]).    Crutch Type: Standard    Crutches Add On: NA    Length of Need: Lifetime      Discharge Instruction - Regular Diet Adult   Order Comments: Return to your pre-surgery diet unless instructed otherwise     Order Specific Question Answer Comments   Is discharge order? Yes        Yola Haile MD   Orthopedic Surgery, PGY4

## 2024-04-24 NOTE — PROGRESS NOTES
DISCHARGE SUMMARY    Pt discharging to: home  Transportation: private vehicle  AVS given and discussed: yes  Stoplight Tool given and discussed:  yes   Medications given: prescriptions sent to her home pharmacy  Belongings returned yes   Comments:     Mirta received her belongings.  She understood her discharge instructions.  She received MARRY drain supplies and understood how to manage her drain.  She left the unit by wheelchair and her  drove her home.

## 2024-04-24 NOTE — PROGRESS NOTES
"   04/24/24 0852   Appointment Info   Signing Clinician's Name / Credentials (OT) Sabra Chahal MA OTR/L   Living Environment   People in Home spouse   Current Living Arrangements house   Home Accessibility stairs to enter home;stairs within home   Transportation Anticipated family or friend will provide   Living Environment Comments Standard toilet, tub/shower with shower chair and HHSH.   Self-Care   Usual Activity Tolerance good   Current Activity Tolerance fair   Regular Exercise No   Equipment Currently Used at Home none  (has shower chair, HHSH)   Fall history within last six months no   Instrumental Activities of Daily Living (IADL)   IADL Comments will have assist with IADLs   General Information   Onset of Illness/Injury or Date of Surgery 04/22/24   Referring Physician Dr. Barroso   Patient/Family Therapy Goal Statement (OT) Home today   Additional Occupational Profile Info/Pertinent History of Current Problem Mirta Cardenas is a 69 year old female with a Undifferentiated pleomorphic sarcoma right calf  s/p tumor removal and removal of adjacent fibula on 4/22 with Dr. Barroso.   Existing Precautions/Restrictions fall;other (see comments)  ((bedrest with bathroom priviledges with PRAFO, R LE elevation))   Right Lower Extremity (Weight-bearing Status) weight-bearing as tolerated (WBAT)   General Observations and Info \"Patient should primarily be in bed with the leg elevated. PRAFO to get up to the bathroom and back. No aggressive ROM of ankle/leg to help with wound healing at this time. Ok to work with therapies on mobilization\"   Cognitive Status Examination   Orientation Status orientation to person, place and time   Visual Perception   Visual Impairment/Limitations WNL   Pain Assessment   Patient Currently in Pain Yes, see Vital Sign flowsheet   Range of Motion Comprehensive   Comment, General Range of Motion B UE's WFL   Strength Comprehensive (MMT)   Comment, General Manual Muscle Testing (MMT) " Assessment B UE's WFL   Bed Mobility   Comment (Bed Mobility) IND   Sit-Stand Transfer   Sit-Stand [place] (Transfers) supervision;verbal cues   Bathing Assessment/Intervention   [place] Level (Bathing) minimum assist (75% patient effort)   Lower Body Dressing Assessment/Training   [place] Level (Lower Body Dressing) minimum assist (75% patient effort)   Clinical Impression   Criteria for Skilled Therapeutic Interventions Met (OT) Yes, treatment indicated   OT Diagnosis Decreased functional mobility and ADLs   OT Problem List-Impairments impacting ADL problems related to;activity tolerance impaired;pain;post-surgical precautions   Assessment of Occupational Performance 1-3 Performance Deficits   Identified Performance Deficits dressing, bathing, home mgmt   Planned Therapy Interventions (OT) ADL retraining   Clinical Decision Making Complexity (OT) problem focused assessment/low complexity   Risk & Benefits of therapy have been explained evaluation/treatment results reviewed;care plan/treatment goals reviewed;patient;spouse/significant other   OT Total Evaluation Time   OT Eval, Low Complexity Minutes (27382) 7   OT Goals   Therapy Frequency (OT) One time eval and treatment   OT Predicted Duration/Target Date for Goal Attainment 04/24/24   OT Goals Lower Body Dressing;OT Goal 1   OT: Lower Body Dressing Supervision/stand-by assist;Goal Met   OT: Goal 1 Pt will complete tub/shower transfer using DME with CGA, goal met   Self-Care/Home Management   Self-Care/Home Mgmt/ADL, Compensatory, Meal Prep Minutes (65377) 40   Symptoms Noted During/After Treatment (Meal Preparation/Planning Training) fatigue;increased pain   Treatment Detail/Skilled Intervention Pt educated on post op precautions and implications for ADL. Pt with PRAFO on throughout session. Pt IND with bed mobility. Pt completed multiple sit<>stand transfers throughout session using FWW with SBA. Pt ambulated ~10' using FWW with SBA to .  Provided wc ride down to tub room for tub transfer training. Pt and spouse educated on safe tub transfer strategies, pt then able to complete with CGA,  will be present to assist for safety at home. Pt has shower chair in place and [facility]. Discussed covering PRAFO and bandages with garbage bag and discussed process of completing while sitting on shower chair for safety once in shower. Pt declined concerns with toilet transfer and task, reports has been doing just fine here. Pt educated on LB dressing and clothing types for increased ease with PRAFO. Pt able to don undewear and baggy pants with SBA-IND, needing assist only to thread drain through leg hole.   OT Discharge Planning   OT Plan dc from OT   OT Discharge Recommendation (DC Rec) home with assist   OT Rationale for DC Rec Pt has good support at home, has DME in place for ADLs.   OT Brief overview of current status SBA using FWW for short distances   Total Session Time   Timed Code Treatment Minutes 40   Total Session Time (sum of timed and untimed services) 47

## 2024-04-24 NOTE — PLAN OF CARE
"  VS: BP (!) 148/72 (BP Location: Left arm)   Pulse 86   Temp 98.6  F (37  C) (Oral)   Resp 16   Ht 1.549 m (5' 1\")   Wt 80.7 kg (177 lb 14.6 oz)   SpO2 96%   BMI 33.62 kg/m        O2: Upper 90's on room air. Lung sounds clear, even and non labored.    Output: Adequately voiding in toilet with assistance of 1.    Last BM: 4/23   Activity: Assistance of 1 with gait belt and walker. `   Skin: Surgical dressings on RLE. MARRY drain on RLE. Orthotic boot on at all times on RLE.    Pain: Reports pain on LLE 5-6/10. Managed with PRN robaxin and oxycodone    Neuro: A/O x4    Dressing: Scant drainage noted on surgical dressings.    Diet: Reg   LDA: R PIV and L PIV. Both saline locked.    Equipment: Orthotic boot on RLE, walker, gait belt    Plan: Discharge home today possibly    Additional Info:      Goal Outcome Evaluation:  Please view flowsheets for assessment data                       "

## 2024-04-25 RX ORDER — PREGABALIN 50 MG/1
50 CAPSULE ORAL 3 TIMES DAILY
Qty: 90 CAPSULE | Refills: 1 | Status: SHIPPED | OUTPATIENT
Start: 2024-04-25 | End: 2024-06-18

## 2024-04-26 ENCOUNTER — PATIENT OUTREACH (OUTPATIENT)
Dept: CARE COORDINATION | Facility: CLINIC | Age: 69
End: 2024-04-26
Payer: COMMERCIAL

## 2024-04-26 NOTE — PROGRESS NOTES
[facility]:   [facility] Contact  [facility]/Voicemail     Clinical Data: Post-Discharge Outreach     Outreach attempted x 2.  Left message on patient's voicemail, providing [facility]'s central phone number of [phone] ([phone]) for questions/concerns and/or to schedule an appt with an [facility] provider, if they do not have a PCP.      Plan:  [facility] will do no further outreaches at this time.       Daphne Keating  Community Health Worker  [facility], [facility]  [phone]      *Connected Care Resource Team does NOT follow patient ongoing. Referrals are identified based on internal discharge reports and the outreach is to ensure patient has an understanding of their discharge instructions.

## 2024-05-01 ENCOUNTER — OFFICE VISIT (OUTPATIENT)
Dept: ORTHOPEDICS | Facility: CLINIC | Age: 69
End: 2024-05-01
Payer: COMMERCIAL

## 2024-05-01 DIAGNOSIS — C49.9 SARCOMA OF SOFT TISSUE (H): Primary | ICD-10-CM

## 2024-05-01 LAB
PATH REPORT.COMMENTS IMP SPEC: ABNORMAL
PATH REPORT.COMMENTS IMP SPEC: YES
PATH REPORT.FINAL DX SPEC: ABNORMAL
PATH REPORT.GROSS SPEC: ABNORMAL
PATH REPORT.MICROSCOPIC SPEC OTHER STN: ABNORMAL
PATH REPORT.RELEVANT HX SPEC: ABNORMAL
PATHOLOGY SYNOPTIC REPORT: ABNORMAL
PHOTO IMAGE: ABNORMAL

## 2024-05-01 PROCEDURE — 99024 POSTOP FOLLOW-UP VISIT: CPT | Performed by: PHYSICIAN ASSISTANT

## 2024-05-01 RX ORDER — PREGABALIN 50 MG/1
50 CAPSULE ORAL 2 TIMES DAILY
Qty: 60 CAPSULE | Refills: 0 | Status: SHIPPED | OUTPATIENT
Start: 2024-05-01 | End: 2024-05-16

## 2024-05-01 NOTE — NURSING NOTE
Reason For Visit:   Chief Complaint   Patient presents with    RECHECK     DOS: 4/22/24 // removal of tumor right calf including bone removal         Pain Assessment  Patient Currently in Pain: Yes  Patient's Stated Pain Goal: 5    Tonya Xiong

## 2024-05-01 NOTE — PROGRESS NOTES
Chief Complaint: wound check    Preop diagnosis: Sarcoma right calf, deep compartment  4/22/24 Procedure performed: Removal of sarcoma right calf, deep compartment including anterior tibial muscle and 8 cm of the fibula        HPI: Mirta is a 69 year old female who is here with her  8 days s/p above procedure.  Patient reports that she is still pretty sore, but it is improving.  She is elevating regularly.  She has been wearing the resting boot.  She has not been putting much weight on the leg at all.  Her drain has been putting out about 10-15 mL's per day over the last several days.  She reports numbness over the top of her foot.  She has difficulty moving her toes.  She is feeling electric shock sensation in her big toe occasionally.  She takes pain pills and tylenol prn.  No other concerns.    Physical Exam: Mirta is a 69 year old female who is alert and oriented and in no distress.  She arrives via wheelchair today with her .  Her dressings show some bloody drainage.  Her dressings and drain were removed today without incident.  Her incision is intact with some small darkened areas along the incision line, but no significant necrosis and no dehiscence or drainage.  Moderate edema and ecchymosis.  No erythema.  Mild right foot swelling.  She is able to initiate a small amount of extension of toes 2-5, but not the great toe.  She is able to maintain the toe in a neutral position.  She can initiate flexion of all the toes.  She is not able to dorsiflex the ankle, but has a small amount of plantarflexion.  She has numbness over the dorsum of the entire foot, and some decreased sensation over parts of the plantar surface of the foot, but is able to feel light touch.      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  Dressings can be removed in 4 days and if the drain site is closed, patient can shower over the wound.  No soaking in a tub or pool.  Begin to initiate weight bearing on right foot around the house.  Beware of possible drop foot and potential for tripping.  Avoid standing or walking for greater than 20 minutes.  Continue to elevate regularly for swelling control.  Use resting boot at night.  Work on toe and ankle motion, both passive and active.  Keep leg covered and wrapped to avoid any trauma to the wound.  Follow-up in 2-3 weeks for a wound check.  Follow-up with Oncology to discuss if more Chemo is needed.  Call with any concerns.  Will restart pregabalin for nerve symptoms.

## 2024-05-01 NOTE — LETTER
5/1/2024         RE: Mirta Cardenas  [address] [facility] [address]        Dear Colleague,    Thank you for referring your patient, Mirta Cardenas, to the [facility] [place]. Please see a copy of my visit note below.    Chief Complaint: wound check    Preop diagnosis: Sarcoma right calf, deep compartment  4/22/24 Procedure performed: Removal of sarcoma right calf, deep compartment including anterior tibial muscle and 8 cm of the fibula        HPI: Mirta is a 69 year old female who is here with her  8 days s/p above procedure.  Patient reports that she is still pretty sore, but it is improving.  She is elevating regularly.  She has been wearing the resting boot.  She has not been putting much weight on the leg at all.  Her drain has been putting out about 10-15 mL's per day over the last several days.  She reports numbness over the top of her foot.  She has difficulty moving her toes.  She is feeling electric shock sensation in her big toe occasionally.  She takes pain pills and tylenol prn.  No other concerns.    Physical Exam: Mirta is a 69 year old female who is alert and oriented and in no distress.  She arrives via wheelchair today with her .  Her dressings show some bloody drainage.  Her dressings and drain were removed today without incident.  Her incision is intact with some small darkened areas along the incision line, but no significant necrosis and no dehiscence or drainage.  Moderate edema and ecchymosis.  No erythema.  Mild right foot swelling.  She is able to initiate a small amount of extension of toes 2-5, but not the great toe.  She is able to maintain the toe in a neutral position.  She can initiate flexion of all the toes.  She is not able to dorsiflex the ankle, but has a small amount of plantarflexion.  She has numbness over the dorsum of the entire foot, and some decreased sensation over parts of the plantar surface of the foot, but  is able to feel light touch.      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  Dressings can be removed in 4 days and if the drain site is closed, patient can shower over the wound.  No soaking in a tub or pool.  Begin to initiate weight bearing on right foot around the house.  Beware of possible drop foot and potential for tripping.  Avoid standing or walking for greater than 20 minutes.  Continue to elevate regularly for swelling control.  Use resting boot at night.  Work on toe and ankle motion, both passive and active.  Keep leg covered and wrapped to avoid any trauma to the wound.  Follow-up in 2-3 weeks for a wound check.  Follow-up with Oncology to discuss if more Chemo is needed.  Call with any concerns.  Will restart pregabalin for nerve symptoms.          Jodi Cardenas PA-C

## 2024-05-02 ENCOUNTER — TELEPHONE (OUTPATIENT)
Dept: ORTHOPEDICS | Facility: CLINIC | Age: 69
End: 2024-05-02

## 2024-05-16 ENCOUNTER — OFFICE VISIT (OUTPATIENT)
Dept: ORTHOPEDICS | Facility: CLINIC | Age: 69
End: 2024-05-16
Payer: COMMERCIAL

## 2024-05-16 DIAGNOSIS — C49.9 SARCOMA OF SOFT TISSUE (H): Primary | ICD-10-CM

## 2024-05-16 PROCEDURE — 99024 POSTOP FOLLOW-UP VISIT: CPT | Performed by: PHYSICIAN ASSISTANT

## 2024-05-16 RX ORDER — PREGABALIN 50 MG/1
50 CAPSULE ORAL 2 TIMES DAILY
Qty: 60 CAPSULE | Refills: 0 | Status: SHIPPED | OUTPATIENT
Start: 2024-05-16 | End: 2024-06-25

## 2024-05-16 NOTE — PROGRESS NOTES
Reason For Visit:   Chief Complaint   Patient presents with    Surgical Followup     DOS 4/22/2024 Removal of Tumor Right Calf - Right. Including Bone Removal - Right            Primary MD: Joseline Fraga  Ref. MD: Est       There were no vitals taken for this visit.    Pain Assessment  Patient Currently in Pain: Yes  Patient's Stated Pain Goal: 2  0-10 Pain Scale: 2  Primary Pain Location: Leg (Right)                   Rebecca Hannah CMA

## 2024-05-16 NOTE — PROGRESS NOTES
Chief Complaint: wound check  Preop diagnosis: Sarcoma right calf, deep compartment  4/22/24 Procedure performed: Removal of sarcoma right calf, deep compartment including anterior tibial muscle and 8 cm of the fibula    HPI: Mirta is a 69 year old female here with her  today for follow-up just over 3 weeks s/p above procedure.  Patient reports that overall she is doing well. She has some soreness and occasional nerve pain down the leg into the foot.  She has stopped wearing the night splint.  She is working on stretching the Achilles.  She has difficulty lifting the ankle and extending the great toe.  She has not had any issues with tripping or falling, but she is very careful about her gait.  She is not using a walker or cane.  She is taking Lyrical 50mg BID and runs out next week.  She finds it helpful.  She is sleeping ok, but does sometime take Tylenol PM.  No other concerns. 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    Physical Exam: Mirta is a 69 year old female who is alert and oriented and in no distress.  She has a mildly antalgic gait with mild drop foot and no gait assistance.  Her right lower leg incision is healing very well with no erythema and scant bloody drainage distally.  There is some scabbing.  No sign of skin necrosis.  Mild swelling of the lower leg and foot.  Decreased sensation to light touch of the dorsum of the foot to the base of the toes.  Some decreased sensation on the bottom of the foot, but this is patchy.  She has a flicker of active great toe extension and 2/5 toe extension of the other toes.  Passive ankle ROM is 5 to 30 degrees.      Impression: 69 year old female healing very well s/p wide excision of high grade right lower extremity sarcoma including part of the fibula and anterior tibialis muscle, s/p chemotherapy and neoadjuvant radiation    Plan: Mirta's wound looks excellent.  I  did place a mepilex dressing over her distal wound that was bleeding periodically to protect it for the next 5 days, and then she can remove the dressing.  Shower over the wound.  Use lotion to the skin.  Elevate for swelling control.  Cover the leg in the sun at all times.  Continue to work on stretching the foot and ankle and AROM.  I placed a referral for PT to get a home exercise program.  I refilled her Lyrica for one more month.  She has an upcoming PET/CT and sees Oncology in a few weeks to discuss the need for more chemo.  From our standpoint, if she does, her wound is healed enough for that.  We also discussed with Mirta that she will likely need an IM nailing of her tibia because of her increased risk of fracture over time because of the radiation to the tibia and the loss of periosteum with the surgery.  We will discuss the plan for this at her next visit in July, when we do a same day visit and MRI scan of her right lower leg w/wo as part of her staging scans.  She agrees with the plan and all question have been answered.  Patient was also examined by Dr. Barroso, and he agrees with the plan of care.    This visit was 30 minutes in length.

## 2024-05-16 NOTE — LETTER
5/16/2024         RE: Mirta Cardenas  [id number] [facility] [address]        Dear Colleague,    Thank you for referring your patient, Mirta Cardenas, to the [facility] [place]. Please see a copy of my visit note below.    Chief Complaint: wound check  Preop diagnosis: Sarcoma right calf, deep compartment  4/22/24 Procedure performed: Removal of sarcoma right calf, deep compartment including anterior tibial muscle and 8 cm of the fibula    HPI: Mirta is a 69 year old female here with her  today for follow-up just over 3 weeks s/p above procedure.  Patient reports that overall she is doing well. She has some soreness and occasional nerve pain down the leg into the foot.  She has stopped wearing the night splint.  She is working on stretching the Achilles.  She has difficulty lifting the ankle and extending the great toe.  She has not had any issues with tripping or falling, but she is very careful about her gait.  She is not using a walker or cane.  She is taking Lyrical 50mg BID and runs out next week.  She finds it helpful.  She is sleeping ok, but does sometime take Tylenol PM.  No other concerns. 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    Physical Exam: Mirta is a 69 year old female who is alert and oriented and in no distress.  She has a mildly antalgic gait with mild drop foot and no gait assistance.  Her right lower leg incision is healing very well with no erythema and scant bloody drainage distally.  There is some scabbing.  No sign of skin necrosis.  Mild swelling of the lower leg and foot.  Decreased sensation to light touch of the dorsum of the foot to the base of the toes.  Some decreased sensation on the bottom of the foot, but this is patchy.  She has a flicker of active great toe extension and 2/5 toe extension of the other toes.  Passive ankle ROM is 5 to 30  degrees.      Impression: 69 year old female healing very well s/p wide excision of high grade right lower extremity sarcoma including part of the fibula and anterior tibialis muscle, s/p chemotherapy and neoadjuvant radiation    Plan: Mirta's wound looks excellent.  I did place a mepilex dressing over her distal wound that was bleeding periodically to protect it for the next 5 days, and then she can remove the dressing.  Shower over the wound.  Use lotion to the skin.  Elevate for swelling control.  Cover the leg in the sun at all times.  Continue to work on stretching the foot and ankle and AROM.  I placed a referral for PT to get a home exercise program.  I refilled her Lyrica for one more month.  She has an upcoming PET/CT and sees Oncology in a few weeks to discuss the need for more chemo.  From our standpoint, if she does, her wound is healed enough for that.  We also discussed with Mirta that she will likely need an IM nailing of her tibia because of her increased risk of fracture over time because of the radiation to the tibia and the loss of periosteum with the surgery.  We will discuss the plan for this at her next visit in July, when we do a same day visit and MRI scan of her right lower leg w/wo as part of her staging scans.  She agrees with the plan and all question have been answered.  Patient was also examined by Dr. Barroso, and he agrees with the plan of care.    This visit was 30 minutes in length.    Reason For Visit:   Chief Complaint   Patient presents with    Surgical Followup     DOS 4/22/2024 Removal of Tumor Right Calf - Right. Including Bone Removal - Right            Primary MD: Joseline Fraga  Ref. MD: Est       There were no vitals taken for this visit.    Pain Assessment  Patient Currently in Pain: Yes  Patient's Stated Pain Goal: 2  0-10 Pain Scale: 2  Primary Pain Location: Leg (Right)       Rebecca Hannah CMA      Sincerely,        Benny Barroso MD

## 2024-05-23 ENCOUNTER — HOSPITAL ENCOUNTER (OUTPATIENT)
Dept: PET IMAGING | Facility: CLINIC | Age: 69
Discharge: HOME | End: 2024-05-23
Attending: STUDENT IN AN ORGANIZED HEALTH CARE EDUCATION/TRAINING PROGRAM | Admitting: STUDENT IN AN ORGANIZED HEALTH CARE EDUCATION/TRAINING PROGRAM
Payer: COMMERCIAL

## 2024-05-23 ENCOUNTER — THERAPY VISIT (OUTPATIENT)
Dept: PHYSICAL THERAPY | Facility: CLINIC | Age: 69
End: 2024-05-23
Attending: PHYSICIAN ASSISTANT
Payer: COMMERCIAL

## 2024-05-23 DIAGNOSIS — C49.9 SARCOMA OF SOFT TISSUE (H): Primary | ICD-10-CM

## 2024-05-23 DIAGNOSIS — C49.21 SARCOMA OF RIGHT LOWER EXTREMITY (H): ICD-10-CM

## 2024-05-23 PROCEDURE — 343N000001 HC RX 343: Performed by: STUDENT IN AN ORGANIZED HEALTH CARE EDUCATION/TRAINING PROGRAM

## 2024-05-23 PROCEDURE — A9552 F18 FDG: HCPCS | Performed by: STUDENT IN AN ORGANIZED HEALTH CARE EDUCATION/TRAINING PROGRAM

## 2024-05-23 PROCEDURE — 97161 PT EVAL LOW COMPLEX 20 MIN: CPT | Mod: GP | Performed by: PHYSICAL MEDICINE & REHABILITATION

## 2024-05-23 PROCEDURE — 97110 THERAPEUTIC EXERCISES: CPT | Mod: GP | Performed by: PHYSICAL MEDICINE & REHABILITATION

## 2024-05-23 PROCEDURE — 78816 PET IMAGE W/CT FULL BODY: CPT | Mod: PS

## 2024-05-23 RX ORDER — FLUDEOXYGLUCOSE F 18 200 MCI/ML
10-18 INJECTION, SOLUTION INTRAVENOUS ONCE
Status: COMPLETED | OUTPATIENT
Start: 2024-05-23 | End: 2024-05-23

## 2024-05-23 RX ADMIN — FLUDEOXYGLUCOSE F 18 13.67 MILLICURIE: 200 INJECTION, SOLUTION INTRAVENOUS at 09:35

## 2024-05-23 NOTE — PROGRESS NOTES
PHYSICAL THERAPY EVALUATION  Type of Visit: Evaluation    See electronic medical record for Abuse and Falls Screening details.    Subjective   Pt arrived to PT today for R LE pain s/p sarcoma tumor removed 4/22/24. Use a walker and SPC for the first couple weeks. Reports the more she does the more painful and tired leg feels. Wore boot for 2 weeks to hold ankle in 90* angle.  Presenting condition or subjective complaint: On April 22, 2024 had surgery to remove a sarcoma tumor from my right calf.  Date of onset: 04/22/24  Relevant medical history: Anemia; Asthma; Cancer; High blood pressure; Overweight; Radiation treatment   Dates & types of surgery: 3 C-sections, Hysterectomy, Carpel Tunnel (both wrists), Lymph node removed    Prior diagnostic imaging/testing results: MRI; CT scan; Other PET scan   Prior therapy history for the same diagnosis, illness or injury: No      Prior Level of Function  Transfers: Independent  Ambulation: Independent  ADL: Independent    Living Environment  Social support: With a significant other or spouse   Type of home: House   Stairs to enter the home: Yes 2 Is there a railing: No   Ramp: No   Stairs inside the home: Yes 7 Is there a railing: Yes   Help at home: None  Equipment owned: Straight Cane; Walker     Employment: No    Hobbies/Interests:  none listed in paperwork    Patient goals for therapy: I would like to get back to walking as normally as possible.    Pain assessment: See objective evaluation for additional pain details     Objective   FOOT/ANKLE EVALUATION  INTEGUMENTARY (edema, incisions):  incision mostly healed with slight opening at drain site and distal scar  POSTURE: Standing Posture: Rounded shoulders  GAIT:   Weightbearing Status: WBAT  Assistive Device(s): None  Gait Deviations: Stance time decreased  Stride length decreased  Susan decreased  Flat foot - limited heel strike and toe off  BALANCE/PROPRIOCEPTION: NT today due to weakness, will assess at upcoming  appts as appropriate  WEIGHT BEARING ALIGNMENT: increased pes planus  NON-WEIGHTBEARING ALIGNMENT: WNL   ROM: DF: lacking 5* from 90*, PF: 10*, Inversion: 6*, Eversion: 4*  STRENGTH: DF: 0, PF: 4/5, Inversion: 3-/5, Eversion: 3-/5  FLEXIBILITY:  limietd gastroc and soleus  SPECIAL TESTS: difficulty with ligament testing due to limited ROM  FUNCTIONAL TESTS:  requires UE for transfers  PALPATION: pain along talocrual jt   JOINT MOBILITY: hypomobility of talocrual and subtalar jts    Assessment & Plan   CLINICAL IMPRESSIONS  Medical Diagnosis: Sarcoma of soft tissue   Treatment Diagnosis: R leg pain; impaired gait   Impression/Assessment: Patient is a 69 year old female with acute R ankle pain, impaired gait and LE weakness complaints.  The following significant findings have been identified: Pain, Decreased ROM/flexibility, Decreased joint mobility, Decreased strength, Impaired balance, Decreased proprioception, Impaired sensation, Inflammation, Edema, Impaired gait, Impaired muscle performance, Decreased activity tolerance, and Impaired posture. These impairments interfere with their ability to perform self care tasks, recreational activities, household chores, driving , household mobility, and community mobility as compared to previous level of function.     Clinical Decision Making (Complexity):  Clinical Presentation: Stable/Uncomplicated  Clinical Presentation Rationale: based on medical and personal factors listed in PT evaluation  Clinical Decision Making (Complexity): Low complexity    PLAN OF CARE  Treatment Interventions:  Modalities: Cryotherapy, Hot Pack  Interventions: Gait Training, Manual Therapy, Neuromuscular Re-education, Therapeutic Activity, Therapeutic Exercise, Self-Care/Home Management    Long Term Goals     PT Goal 1  Goal Identifier: 1  Goal Description: Pt will be able to demonstrate 10* ankle DF passively in order to decrease difficulty with amb  Target Date: 07/04/24  PT Goal 2  Goal  Identifier: 2  Goal Description: Pt will be able to demonstrate reciprocal stairs in order to decrease difficulty with home navigation  Target Date: 07/18/24  PT Goal 3  Goal Identifier: 3  Goal Description: Pt will be independent with HEP in order to self manage symptoms  Target Date: 08/01/24      Frequency of Treatment: 1x/week  Duration of Treatment: 10 weeks    Recommended Referrals to Other Professionals:  none  Education Assessment:   Learner/Method: Patient;Listening;Reading;Demonstration;Pictures/Video;No Barriers to Learning  Education Comments: pt demonstrated and verbalized good understanding    Risks and benefits of evaluation/treatment have been explained.   Patient/Family/caregiver agrees with Plan of Care.     Evaluation Time:     PT Laura Low Complexity Minutes (07019): 13   Present: Not applicable     Signing Clinician: Radha Valente PT    Please contact me with any questions or concerns.    Thank you for your referral,     Radha Valente PT, DPT  Physical Therapist  [facility]  [address] [place] [id number]  [phone]      [facility]                                                                                   OUTPATIENT PHYSICAL THERAPY      PLAN OF TREATMENT FOR OUTPATIENT REHABILITATION   Patient's Last Name, First Name, BERNABELARRY  RonaldMirta YOB: 1955   Provider's Name   [facility]   Medical Record No.  [id number]     Onset Date: 04/22/24  Start of Care Date: 05/23/24     Medical Diagnosis:  Sarcoma of soft tissue      PT Treatment Diagnosis:  R leg pain; impaired gait Plan of Treatment  Frequency/Duration: 1x/week/ 10 weeks    Certification date from 05/23/24 to 08/01/24         See note for plan of treatment details and functional goals     Radha Valente PT                         I CERTIFY THE NEED FOR THESE SERVICES FURNISHED UNDER        THIS PLAN  OF TREATMENT AND WHILE UNDER MY CARE     (Physician attestation of this document indicates review and certification of the therapy plan).              Referring Provider:  Jodi Cardenas    Initial Assessment  See Epic Evaluation- Start of Care Date: 05/23/24

## 2024-05-29 ENCOUNTER — ONCOLOGY VISIT (OUTPATIENT)
Dept: ONCOLOGY | Facility: CLINIC | Age: 69
End: 2024-05-29
Attending: STUDENT IN AN ORGANIZED HEALTH CARE EDUCATION/TRAINING PROGRAM
Payer: COMMERCIAL

## 2024-05-29 VITALS
DIASTOLIC BLOOD PRESSURE: 88 MMHG | WEIGHT: 181 LBS | SYSTOLIC BLOOD PRESSURE: 137 MMHG | HEART RATE: 82 BPM | RESPIRATION RATE: 18 BRPM | TEMPERATURE: 98.2 F | BODY MASS INDEX: 34.2 KG/M2 | OXYGEN SATURATION: 100 %

## 2024-05-29 DIAGNOSIS — C49.21 SARCOMA OF RIGHT LOWER EXTREMITY (H): ICD-10-CM

## 2024-05-29 DIAGNOSIS — Z76.89 PREVENTION OF CHEMOTHERAPY-INDUCED NEUTROPENIA: Primary | ICD-10-CM

## 2024-05-29 DIAGNOSIS — I10 BENIGN ESSENTIAL HYPERTENSION: ICD-10-CM

## 2024-05-29 PROCEDURE — 99215 OFFICE O/P EST HI 40 MIN: CPT | Performed by: STUDENT IN AN ORGANIZED HEALTH CARE EDUCATION/TRAINING PROGRAM

## 2024-05-29 PROCEDURE — G0463 HOSPITAL OUTPT CLINIC VISIT: HCPCS | Performed by: STUDENT IN AN ORGANIZED HEALTH CARE EDUCATION/TRAINING PROGRAM

## 2024-05-29 RX ORDER — METHYLPREDNISOLONE SODIUM SUCCINATE 125 MG/2ML
125 INJECTION, POWDER, LYOPHILIZED, FOR SOLUTION INTRAMUSCULAR; INTRAVENOUS
Status: CANCELLED
Start: 2024-08-05

## 2024-05-29 RX ORDER — HEPARIN SODIUM,PORCINE 10 UNIT/ML
5-20 VIAL (ML) INTRAVENOUS DAILY PRN
Status: CANCELLED | OUTPATIENT
Start: 2024-06-10

## 2024-05-29 RX ORDER — MEPERIDINE HYDROCHLORIDE 25 MG/ML
25 INJECTION INTRAMUSCULAR; INTRAVENOUS; SUBCUTANEOUS EVERY 30 MIN PRN
Status: CANCELLED | OUTPATIENT
Start: 2024-07-08

## 2024-05-29 RX ORDER — HEPARIN SODIUM (PORCINE) LOCK FLUSH IV SOLN 100 UNIT/ML 100 UNIT/ML
5 SOLUTION INTRAVENOUS
Status: CANCELLED | OUTPATIENT
Start: 2024-08-05

## 2024-05-29 RX ORDER — METHYLPREDNISOLONE SODIUM SUCCINATE 125 MG/2ML
125 INJECTION, POWDER, LYOPHILIZED, FOR SOLUTION INTRAMUSCULAR; INTRAVENOUS
Status: CANCELLED
Start: 2024-07-08

## 2024-05-29 RX ORDER — HEPARIN SODIUM,PORCINE 10 UNIT/ML
5-20 VIAL (ML) INTRAVENOUS DAILY PRN
Status: CANCELLED | OUTPATIENT
Start: 2024-07-08

## 2024-05-29 RX ORDER — DIPHENHYDRAMINE HYDROCHLORIDE 50 MG/ML
50 INJECTION INTRAMUSCULAR; INTRAVENOUS
Status: CANCELLED
Start: 2024-08-05

## 2024-05-29 RX ORDER — MEPERIDINE HYDROCHLORIDE 25 MG/ML
25 INJECTION INTRAMUSCULAR; INTRAVENOUS; SUBCUTANEOUS EVERY 30 MIN PRN
Status: CANCELLED | OUTPATIENT
Start: 2024-08-05

## 2024-05-29 RX ORDER — ALBUTEROL SULFATE 0.83 MG/ML
2.5 SOLUTION RESPIRATORY (INHALATION)
Status: CANCELLED | OUTPATIENT
Start: 2024-06-10

## 2024-05-29 RX ORDER — MEPERIDINE HYDROCHLORIDE 25 MG/ML
25 INJECTION INTRAMUSCULAR; INTRAVENOUS; SUBCUTANEOUS EVERY 30 MIN PRN
Status: CANCELLED | OUTPATIENT
Start: 2024-06-10

## 2024-05-29 RX ORDER — METHYLPREDNISOLONE SODIUM SUCCINATE 125 MG/2ML
125 INJECTION, POWDER, LYOPHILIZED, FOR SOLUTION INTRAMUSCULAR; INTRAVENOUS
Status: CANCELLED
Start: 2024-06-10

## 2024-05-29 RX ORDER — DIPHENHYDRAMINE HYDROCHLORIDE 50 MG/ML
50 INJECTION INTRAMUSCULAR; INTRAVENOUS
Status: CANCELLED
Start: 2024-07-08

## 2024-05-29 RX ORDER — HYDROCHLOROTHIAZIDE 50 MG/1
50 TABLET ORAL DAILY
Qty: 30 TABLET | Refills: 3 | Status: SHIPPED | OUTPATIENT
Start: 2024-05-29 | End: 2024-06-18

## 2024-05-29 RX ORDER — ONDANSETRON 2 MG/ML
8 INJECTION INTRAMUSCULAR; INTRAVENOUS ONCE
Status: CANCELLED | OUTPATIENT
Start: 2024-07-08

## 2024-05-29 RX ORDER — ALBUTEROL SULFATE 90 UG/1
1-2 AEROSOL, METERED RESPIRATORY (INHALATION)
Status: CANCELLED
Start: 2024-07-08

## 2024-05-29 RX ORDER — EPINEPHRINE 1 MG/ML
0.3 INJECTION, SOLUTION INTRAMUSCULAR; SUBCUTANEOUS EVERY 5 MIN PRN
Status: CANCELLED | OUTPATIENT
Start: 2024-06-10

## 2024-05-29 RX ORDER — DIPHENHYDRAMINE HYDROCHLORIDE 50 MG/ML
50 INJECTION INTRAMUSCULAR; INTRAVENOUS
Status: CANCELLED
Start: 2024-06-10

## 2024-05-29 RX ORDER — HEPARIN SODIUM,PORCINE 10 UNIT/ML
5-20 VIAL (ML) INTRAVENOUS DAILY PRN
Status: CANCELLED | OUTPATIENT
Start: 2024-08-05

## 2024-05-29 RX ORDER — EPINEPHRINE 1 MG/ML
0.3 INJECTION, SOLUTION INTRAMUSCULAR; SUBCUTANEOUS EVERY 5 MIN PRN
Status: CANCELLED | OUTPATIENT
Start: 2024-08-05

## 2024-05-29 RX ORDER — LORAZEPAM 2 MG/ML
0.5 INJECTION INTRAMUSCULAR EVERY 4 HOURS PRN
Status: CANCELLED | OUTPATIENT
Start: 2024-07-08

## 2024-05-29 RX ORDER — ALBUTEROL SULFATE 90 UG/1
1-2 AEROSOL, METERED RESPIRATORY (INHALATION)
Status: CANCELLED
Start: 2024-06-10

## 2024-05-29 RX ORDER — ALBUTEROL SULFATE 90 UG/1
1-2 AEROSOL, METERED RESPIRATORY (INHALATION)
Status: CANCELLED
Start: 2024-08-05

## 2024-05-29 RX ORDER — HEPARIN SODIUM (PORCINE) LOCK FLUSH IV SOLN 100 UNIT/ML 100 UNIT/ML
5 SOLUTION INTRAVENOUS
Status: CANCELLED | OUTPATIENT
Start: 2024-06-10

## 2024-05-29 RX ORDER — ONDANSETRON 2 MG/ML
8 INJECTION INTRAMUSCULAR; INTRAVENOUS ONCE
Status: CANCELLED | OUTPATIENT
Start: 2024-06-10

## 2024-05-29 RX ORDER — HEPARIN SODIUM (PORCINE) LOCK FLUSH IV SOLN 100 UNIT/ML 100 UNIT/ML
5 SOLUTION INTRAVENOUS
Status: CANCELLED | OUTPATIENT
Start: 2024-07-08

## 2024-05-29 RX ORDER — EPINEPHRINE 1 MG/ML
0.3 INJECTION, SOLUTION INTRAMUSCULAR; SUBCUTANEOUS EVERY 5 MIN PRN
Status: CANCELLED | OUTPATIENT
Start: 2024-07-08

## 2024-05-29 RX ORDER — ALBUTEROL SULFATE 0.83 MG/ML
2.5 SOLUTION RESPIRATORY (INHALATION)
Status: CANCELLED | OUTPATIENT
Start: 2024-08-05

## 2024-05-29 RX ORDER — ONDANSETRON 2 MG/ML
8 INJECTION INTRAMUSCULAR; INTRAVENOUS ONCE
Status: CANCELLED | OUTPATIENT
Start: 2024-08-05

## 2024-05-29 RX ORDER — LORAZEPAM 2 MG/ML
0.5 INJECTION INTRAMUSCULAR EVERY 4 HOURS PRN
Status: CANCELLED | OUTPATIENT
Start: 2024-08-05

## 2024-05-29 RX ORDER — ALBUTEROL SULFATE 0.83 MG/ML
2.5 SOLUTION RESPIRATORY (INHALATION)
Status: CANCELLED | OUTPATIENT
Start: 2024-07-08

## 2024-05-29 RX ORDER — LORAZEPAM 2 MG/ML
0.5 INJECTION INTRAMUSCULAR EVERY 4 HOURS PRN
Status: CANCELLED | OUTPATIENT
Start: 2024-06-10

## 2024-05-29 ASSESSMENT — PAIN SCALES - GENERAL: PAINLEVEL: MILD PAIN (2)

## 2024-05-29 NOTE — PROGRESS NOTES
[facility] CANCER CLINIC    FOLLOW UP VISIT NOTE    PATIENT NAME: Mirta Cardenas MRN # [id number]  DATE OF VISIT: May 29, 2024 YOB: 1955    REFERRING PROVIDER: Benny Barroso MD  [id number]  [place] [address]    CANCER TYPE:  High grade pleomorphic sarcoma       CHIEF COMPLAIN   LE pain     HISTORY OF PRESENT ILLNESS   68-year-old female with PMH of CLL and recent Dx of High Grade Pleomorphic sarcoma, with intermittent right calf pain but then continuous calf pain beginning in March 2023.  She had a recent x-ray which shows a destructive lesion in the midportion R LE in between the fibula and the tibia. She is experiencing significant pain, she is restless during the visit due to pain. She is currently taking both Advil and Tylenol and oxycodone for pain, with minimal control, feels ibuprofen is what helps the most. She has been alternating all pain medications without optimizing doses of them.     C1D1 10/17/23  C2 11/14  C2 12/13  C4 1/10    2/19/2024 to 3/22/2024: Right lower extremity, 5000 cGy in 25 fraction     Surgical resection by Dr. Barroso 4/22/24: Final pathology demonstrated undifferentiated sarcoma, 5.8 cm, there was noted treatment effect with 6% viable tumor, negative margin, ypT2 NX.       Interval Hx  She reports that she tolerated chemotherapy very well, only mild fatigue, no significant nausea.        PAST ONCOLOGIC HISTORY   CLL - on monitoring     PAST MEDICAL HISTORY   Anal fisure - controlled with nitrobid  CLL - now being monitored - [place]  HTN  Cholesterol  Hx of severe COVID  Psoriasis    PAST SURGICAL HISTORY   3 c- sections  Hysterectomy  Ooforectomy     FAMILY HISTORY   Sister with MM     ALLERGIES      Allergies   Allergen Reactions    Allopurinol Itching    Amoxicillin Hives    Codeine Itching    Cymbalta [Duloxetine Hcl] Fatigue    Periactin Other (See Comments)     Sleepy.    11/15/23: patient not sure about this  allergy/intolerance - may be interested in removal    Tenormin [Atenolol] Fatigue     11/15/23: patient may be interested in removal of this from allergy list as it was only fatigue/sleepiness          SOCIAL HISTORY     Social History     Social History Narrative    Not on file     Lives with , Giancarlo, she retired last spring, denies alcohol, tobacco or other drugs.      CURRENT OUTPATIENT MEDICATIONS     Current Outpatient Medications   Medication Sig Dispense Refill    amLODIPine (NORVASC) 5 MG tablet Take 1.5 tablets (7.5 mg) by mouth daily (Patient taking differently: Take 7.5 mg by mouth every morning) 135 tablet 3    calcium carbonate (TUMS) 500 MG chewable tablet Take 1 tablet (500 mg) by mouth as needed      hydrochlorothiazide (HYDRODIURIL) 50 MG tablet Take 1 tablet (50 mg) by mouth daily for 30 days (Patient taking differently: Take 50 mg by mouth every morning) 30 tablet 3    losartan (COZAAR) 100 MG tablet Take 1 tablet (100 mg) by mouth daily (Patient taking differently: Take 100 mg by mouth every evening) 90 tablet 3    Multiple Vitamins-Calcium (ONE-A-DAY WOMENS FORMULA PO) Take 1 tablet by mouth daily      omeprazole (PRILOSEC) 20 MG DR capsule Take 1 capsule by mouth every morning.      pravastatin (PRAVACHOL) 40 MG tablet Take 1 tablet (40 mg) by mouth daily (Patient taking differently: Take 40 mg by mouth every evening) 90 tablet 3    pregabalin (LYRICA) 50 MG capsule Take 1 capsule (50 mg) by mouth 2 times daily 60 capsule 0    methocarbamol (ROBAXIN) 500 MG tablet Take 1 tablet (500 mg) by mouth every 6 hours as needed for muscle spasms 20 tablet 0    oxyCODONE (ROXICODONE) 5 MG tablet Take 1-2 tablets (5-10 mg) by mouth every 4 hours as needed for moderate pain (Patient not taking: Reported on 5/29/2024) 30 tablet 0    pregabalin (LYRICA) 50 MG capsule Take 1 capsule (50 mg) by mouth 3 times daily 90 capsule 1    triamcinolone (ARISTOCORT HP) 0.5 % external cream Apply topically 2 times  daily (Patient not taking: Reported on 2024) 15 g 2          REVIEW OF SYSTEMS   As above in the HPI, o/w complete 12-point ROS was negative.     PHYSICAL EXAM   /88 (BP Location: Right arm, Patient Position: Sitting, Cuff Size: Adult Large)   Pulse 82   Temp 98.2  F (36.8  C) (Oral)   Resp 18   Wt 82.1 kg (181 lb)   SpO2 100%   BMI 34.20 kg/m      EGO (due to pain related to the mass)  GEN: NAD  HEENT: PERRL, EOMI, no icterus, injection or pallor. Oropharynx is clear.  NECK: no cervical or supraclavicular lymphadenopathy  LUNGS: clear bilaterally  CV: regular, no murmurs, rubs, or gallops  ABDOMEN: soft, non-tender, non-distended, normal bowel sounds, no hepatosplenomegaly by percussion or palpation  EXT: warm, well perfused, no edema. Surgical scar healing well, only small areas of increase erythema, the scans appears dry and scaly.   NEURO: alert  SKIN: no rashes     LABORATORY AND IMAGING STUDIES     Lab Results   Component Value Date    WBC 5.3 2024    WBC 15.6 05/10/2021     Lab Results   Component Value Date    RBC 3.48 2024    RBC 4.89 05/10/2021     Lab Results   Component Value Date    HGB 8.5 2024    HGB 13.7 05/10/2021     Lab Results   Component Value Date    HCT 30.1 2024    HCT 41.0 05/10/2021     Lab Results   Component Value Date    MCV 87 2024    MCV 84 05/10/2021     Lab Results   Component Value Date    MCH 30.5 2024    MCH 28.0 05/10/2021     Lab Results   Component Value Date    MCHC 35.2 2024    MCHC 33.4 05/10/2021     Lab Results   Component Value Date    RDW 13.6 2024    RDW 13.1 05/10/2021     Lab Results   Component Value Date     2024     05/10/2021     Last Comprehensive Metabolic Panel:  Sodium   Date Value Ref Range Status   2024 138 135 - 145 mmol/L Final     Comment:     Reference intervals for this test were updated on 2023 to more accurately reflect our healthy population. There  may be differences in the flagging of prior results with similar values performed with this method. Interpretation of those prior results can be made in the context of the updated reference intervals.    05/10/2021 141 133 - 144 mmol/L Final     Potassium   Date Value Ref Range Status   04/23/2024 3.8 3.4 - 5.3 mmol/L Final   05/24/2022 4.7 3.4 - 5.3 mmol/L Final   05/10/2021 3.9 3.4 - 5.3 mmol/L Final     Chloride   Date Value Ref Range Status   04/23/2024 104 98 - 107 mmol/L Final   05/24/2022 108 94 - 109 mmol/L Final   05/10/2021 108 94 - 109 mmol/L Final     Carbon Dioxide   Date Value Ref Range Status   05/10/2021 27 20 - 32 mmol/L Final     Carbon Dioxide (CO2)   Date Value Ref Range Status   04/23/2024 24 22 - 29 mmol/L Final   05/24/2022 29 20 - 32 mmol/L Final     Anion Gap   Date Value Ref Range Status   04/23/2024 10 7 - 15 mmol/L Final   05/24/2022 2 (L) 3 - 14 mmol/L Final   05/10/2021 6 3 - 14 mmol/L Final     Glucose   Date Value Ref Range Status   04/23/2024 125 (H) 70 - 99 mg/dL Final   05/24/2022 108 (H) 70 - 99 mg/dL Final   05/10/2021 112 (H) 70 - 99 mg/dL Final     Comment:     Fasting specimen     GLUCOSE BY METER POCT   Date Value Ref Range Status   04/23/2024 130 (H) 70 - 99 mg/dL Final     Urea Nitrogen   Date Value Ref Range Status   04/23/2024 18.9 8.0 - 23.0 mg/dL Final   05/24/2022 14 7 - 30 mg/dL Final   05/10/2021 19 7 - 30 mg/dL Final     Creatinine   Date Value Ref Range Status   04/23/2024 0.90 0.51 - 0.95 mg/dL Final   05/10/2021 0.89 0.52 - 1.04 mg/dL Final     GFR Estimate   Date Value Ref Range Status   04/23/2024 70 >60 mL/min/1.73m2 Final   05/10/2021 69 >60 mL/min/[1.73_m2] Final     Comment:     Non  GFR Calc  Starting 12/18/2018, serum creatinine based estimated GFR (eGFR) will be   calculated using the Chronic Kidney Disease Epidemiology Collaboration   (CKD-EPI) equation.       GFR, ESTIMATED POCT   Date Value Ref Range Status   10/10/2023 >60 >60  mL/min/1.73m2 Final     Calcium   Date Value Ref Range Status   04/23/2024 8.6 (L) 8.8 - 10.2 mg/dL Final   05/10/2021 8.6 8.5 - 10.1 mg/dL Final     Bilirubin Total   Date Value Ref Range Status   04/18/2024 0.4 <=1.2 mg/dL Final   10/12/2020 0.3 0.2 - 1.3 mg/dL Final     Alkaline Phosphatase   Date Value Ref Range Status   04/18/2024 118 40 - 150 U/L Final     Comment:     Reference intervals for this test were updated on 11/14/2023 to more accurately reflect our healthy population. There may be differences in the flagging of prior results with similar values performed with this method. Interpretation of those prior results can be made in the context of the updated reference intervals.   10/12/2020 124 40 - 150 U/L Final     ALT   Date Value Ref Range Status   04/18/2024 30 0 - 50 U/L Final     Comment:     Reference intervals for this test were updated on 6/12/2023 to more accurately reflect our healthy population. There may be differences in the flagging of prior results with similar values performed with this method. Interpretation of those prior results can be made in the context of the updated reference intervals.     10/12/2020 40 0 - 50 U/L Final     AST   Date Value Ref Range Status   04/18/2024 29 0 - 45 U/L Final     Comment:     Reference intervals for this test were updated on 6/12/2023 to more accurately reflect our healthy population. There may be differences in the flagging of prior results with similar values performed with this method. Interpretation of those prior results can be made in the context of the updated reference intervals.   10/12/2020 35 0 - 45 U/L Final       PET scan 1/29/24  IMPRESSION: Although much of the uptake along the superior and middle aspects of the right calf mass has decreased or resolved, there are two foci of uptake along the more inferior portions of the mass which have increased compared to December 2023,   concerning for disease progression.    MRI Tibia/Fibula  "1/29/24  IMPRESSION:  1.  Interval decrease in size in the heterogeneously enhancing mass within the posterior tibial muscle belly. This has decreased from 3.3 x 5.2 x 9.3 cm and now measures 2.5 x 3.5 x 5.0 cm. It remains consistent with sarcoma.  2.  There is some surrounding T2 signal abnormality within the adjacent musculature including along the apposing side of the interosseous membrane within the anterior compartment. This could be secondary to post radiation change or a component of   reactive edema. It is difficult to completely exclude microinvasion of tumor.  3.  Edema or cellulitis about the lower leg has minimally increased since the previous examination.  4.  Stable bony irregularity and remodeling of the fibula which is at the margin of the neoplasm.  5.  Focus of T2 signal abnormality within the diaphyseal shaft of the tibia is poorly marginated. This may represent a component of mild stress reaction. Again, it is difficult to completely exclude a new bone lesion. This is considered unlikely, but   follow-up examination is recommended.  6.  No evidence for pathologic fracture.  7.  Exam otherwise negative.       PATHOLOGY   10/2/23  Final Diagnosis   A.  Soft tissue, right calf mass, excision:  - High-grade pleomorphic sarcoma  - See comment    Electronically signed by Jonny Zhou MD on 10/4/2023 at  9:09 PM   Comment  [facility]   Immunohistochemical stains show the tumor is negative for CK AE1/AE3, S100, desmin and CD34 while SATB2 demonstrates patchy reactivity.  While the primary diagnostic consideration is an undifferentiated pleomorphic sarcoma, the patchy SATB2 reactivity raises the possibility of a poorly differentiated osteosarcoma.   Clinical Information  [facility]   The patient is a 68 year old female with a history of CLL and several months of sensitivity of the lateral right calf, and recent imaging showing: \"Lytic lesion in the right fibular proximal diaphysis, with complete resorption of " "the medial cortex and some periosteal reaction adjacent to the lesion\".      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 No evidence of metastatic disease.     In her case, the probability of recurrence with surgery alone is about 40% in the next 10 years. We discussed, that recurrence in this scenario is usually with distant  metastasis which then becomes uncurable disease. We discussed, that for this reason, I recommend treatment with neoadjuvant chemotherapy with the goal of preventing metastasis in the future. We discussed the potential side effects of chemotherapy and need for emergent treatment in order to improve her symptoms. The regimen will be doxil 40mg/m2 and ifosfamide 8g/m2 over 5 days. Slightly lower dose due to age and other medical conditions.   She is s/p C4 and tolerated chemotherapy well.     I personally reviewed the most recent PET and MRI leg, she had significant decrease in size and metabolic activity compared to pre-treatment, however this is a partial response with 2 foci that appear more active.     2/19/2024 to 3/22/2024: Right lower extremity, 5000 cGy in 25 fraction     Surgical resection by Dr. Barroso 4/22/24: Final pathology demonstrated undifferentiated sarcoma, 5.8 cm, there was noted treatment effect with 6% viable tumor, negative margin, ypT2 NX.     I explained that we saw a good therapy effect from initial chemotherapy upon imaging and pathology encouraging with only 6% of viable tumor. Because of this and her initial " high risk, I recommend to continuous single agent doxil, with the goal of 1 year of adjuvant therapy.    Plan  -ISIDORO visit prior to chemo on 6/7 - virtual   -C1 Doxil single agent on 6/10   -ISIDORO visit prior to chemo on 7/5 - virtual   -C2 on 7/8   -ISIDORO visit prior to chemo on 8/2 - virtual   C3 on 8/5   -MRI R leg and PET on 8/23   -Follow Dr. Jimenez 8/27     40 minutes spent on the date of the encounter doing chart review, review of outside records, review of test results, interpretation of tests, patient visit, documentation, and discussion with other provider(s)     Roney Nam MD   of Medicine  Hematology, Oncology and Transplantation

## 2024-05-29 NOTE — LETTER
5/29/2024         RE: Mirta Cardenas  [id number] [facility] [address]        Dear Colleague,    Thank you for referring your patient, Mirta Cardenas, to the [facility]. Please see a copy of my visit note below.    [facility]    FOLLOW UP VISIT NOTE    PATIENT NAME: Mirta Cardenas MRN # [id number]  DATE OF VISIT: May 29, 2024 YOB: 1955    REFERRING PROVIDER: Benny Barroso MD  [address]  [place] [address]    CANCER TYPE:  High grade pleomorphic sarcoma       CHIEF COMPLAIN   LE pain     HISTORY OF PRESENT ILLNESS   68-year-old female with PMH of CLL and recent Dx of High Grade Pleomorphic sarcoma, with intermittent right calf pain but then continuous calf pain beginning in March 2023.  She had a recent x-ray which shows a destructive lesion in the midportion R LE in between the fibula and the tibia. She is experiencing significant pain, she is restless during the visit due to pain. She is currently taking both Advil and Tylenol and oxycodone for pain, with minimal control, feels ibuprofen is what helps the most. She has been alternating all pain medications without optimizing doses of them.     C1D1 10/17/23  C2 11/14  C2 12/13  C4 1/10    2/19/2024 to 3/22/2024: Right lower extremity, 5000 cGy in 25 fraction     Surgical resection by Dr. Barroso 4/22/24: Final pathology demonstrated undifferentiated sarcoma, 5.8 cm, there was noted treatment effect with 6% viable tumor, negative margin, ypT2 NX.       Interval Hx  She reports that she tolerated chemotherapy very well, only mild fatigue, no significant nausea.        PAST ONCOLOGIC HISTORY   CLL - on monitoring     PAST MEDICAL HISTORY   Anal fisure - controlled with nitrobid  CLL - now being monitored - [place]  HTN  Cholesterol  Hx of severe COVID  Psoriasis    PAST SURGICAL HISTORY   3 c- sections  Hysterectomy  Ooforectomy     FAMILY HISTORY    Sister with MM     ALLERGIES      Allergies   Allergen Reactions    Allopurinol Itching    Amoxicillin Hives    Codeine Itching    Cymbalta [Duloxetine Hcl] Fatigue    Periactin Other (See Comments)     Sleepy.    11/15/23: patient not sure about this allergy/intolerance - may be interested in removal    Tenormin [Atenolol] Fatigue     11/15/23: patient may be interested in removal of this from allergy list as it was only fatigue/sleepiness          SOCIAL HISTORY     Social History     Social History Narrative    Not on file     Lives with , Giancarlo, she retired last spring, denies alcohol, tobacco or other drugs.      CURRENT OUTPATIENT MEDICATIONS     Current Outpatient Medications   Medication Sig Dispense Refill    amLODIPine (NORVASC) 5 MG tablet Take 1.5 tablets (7.5 mg) by mouth daily (Patient taking differently: Take 7.5 mg by mouth every morning) 135 tablet 3    calcium carbonate (TUMS) 500 MG chewable tablet Take 1 tablet (500 mg) by mouth as needed      hydrochlorothiazide (HYDRODIURIL) 50 MG tablet Take 1 tablet (50 mg) by mouth daily for 30 days (Patient taking differently: Take 50 mg by mouth every morning) 30 tablet 3    losartan (COZAAR) 100 MG tablet Take 1 tablet (100 mg) by mouth daily (Patient taking differently: Take 100 mg by mouth every evening) 90 tablet 3    Multiple Vitamins-Calcium (ONE-A-DAY WOMENS FORMULA PO) Take 1 tablet by mouth daily      omeprazole (PRILOSEC) 20 MG DR capsule Take 1 capsule by mouth every morning.      pravastatin (PRAVACHOL) 40 MG tablet Take 1 tablet (40 mg) by mouth daily (Patient taking differently: Take 40 mg by mouth every evening) 90 tablet 3    pregabalin (LYRICA) 50 MG capsule Take 1 capsule (50 mg) by mouth 2 times daily 60 capsule 0    methocarbamol (ROBAXIN) 500 MG tablet Take 1 tablet (500 mg) by mouth every 6 hours as needed for muscle spasms 20 tablet 0    oxyCODONE (ROXICODONE) 5 MG tablet Take 1-2 tablets (5-10 mg) by mouth every 4 hours as  needed for moderate pain (Patient not taking: Reported on 2024) 30 tablet 0    pregabalin (LYRICA) 50 MG capsule Take 1 capsule (50 mg) by mouth 3 times daily 90 capsule 1    triamcinolone (ARISTOCORT HP) 0.5 % external cream Apply topically 2 times daily (Patient not taking: Reported on 2024) 15 g 2          REVIEW OF SYSTEMS   As above in the HPI, o/w complete 12-point ROS was negative.     PHYSICAL EXAM   /88 (BP Location: Right arm, Patient Position: Sitting, Cuff Size: Adult Large)   Pulse 82   Temp 98.2  F (36.8  C) (Oral)   Resp 18   Wt 82.1 kg (181 lb)   SpO2 100%   BMI 34.20 kg/m      EGO (due to pain related to the mass)  GEN: NAD  HEENT: PERRL, EOMI, no icterus, injection or pallor. Oropharynx is clear.  NECK: no cervical or supraclavicular lymphadenopathy  LUNGS: clear bilaterally  CV: regular, no murmurs, rubs, or gallops  ABDOMEN: soft, non-tender, non-distended, normal bowel sounds, no hepatosplenomegaly by percussion or palpation  EXT: warm, well perfused, no edema. Surgical scar healing well, only small areas of increase erythema, the scans appears dry and scaly.   NEURO: alert  SKIN: no rashes     LABORATORY AND IMAGING STUDIES     Lab Results   Component Value Date    WBC 5.3 2024    WBC 15.6 05/10/2021     Lab Results   Component Value Date    RBC 3.48 2024    RBC 4.89 05/10/2021     Lab Results   Component Value Date    HGB 8.5 2024    HGB 13.7 05/10/2021     Lab Results   Component Value Date    HCT 30.1 2024    HCT 41.0 05/10/2021     Lab Results   Component Value Date    MCV 87 2024    MCV 84 05/10/2021     Lab Results   Component Value Date    MCH 30.5 2024    MCH 28.0 05/10/2021     Lab Results   Component Value Date    MCHC 35.2 2024    MCHC 33.4 05/10/2021     Lab Results   Component Value Date    RDW 13.6 2024    RDW 13.1 05/10/2021     Lab Results   Component Value Date     2024     05/10/2021      Last Comprehensive Metabolic Panel:  Sodium   Date Value Ref Range Status   04/23/2024 138 135 - 145 mmol/L Final     Comment:     Reference intervals for this test were updated on 09/26/2023 to more accurately reflect our healthy population. There may be differences in the flagging of prior results with similar values performed with this method. Interpretation of those prior results can be made in the context of the updated reference intervals.    05/10/2021 141 133 - 144 mmol/L Final     Potassium   Date Value Ref Range Status   04/23/2024 3.8 3.4 - 5.3 mmol/L Final   05/24/2022 4.7 3.4 - 5.3 mmol/L Final   05/10/2021 3.9 3.4 - 5.3 mmol/L Final     Chloride   Date Value Ref Range Status   04/23/2024 104 98 - 107 mmol/L Final   05/24/2022 108 94 - 109 mmol/L Final   05/10/2021 108 94 - 109 mmol/L Final     Carbon Dioxide   Date Value Ref Range Status   05/10/2021 27 20 - 32 mmol/L Final     Carbon Dioxide (CO2)   Date Value Ref Range Status   04/23/2024 24 22 - 29 mmol/L Final   05/24/2022 29 20 - 32 mmol/L Final     Anion Gap   Date Value Ref Range Status   04/23/2024 10 7 - 15 mmol/L Final   05/24/2022 2 (L) 3 - 14 mmol/L Final   05/10/2021 6 3 - 14 mmol/L Final     Glucose   Date Value Ref Range Status   04/23/2024 125 (H) 70 - 99 mg/dL Final   05/24/2022 108 (H) 70 - 99 mg/dL Final   05/10/2021 112 (H) 70 - 99 mg/dL Final     Comment:     Fasting specimen     GLUCOSE BY METER POCT   Date Value Ref Range Status   04/23/2024 130 (H) 70 - 99 mg/dL Final     Urea Nitrogen   Date Value Ref Range Status   04/23/2024 18.9 8.0 - 23.0 mg/dL Final   05/24/2022 14 7 - 30 mg/dL Final   05/10/2021 19 7 - 30 mg/dL Final     Creatinine   Date Value Ref Range Status   04/23/2024 0.90 0.51 - 0.95 mg/dL Final   05/10/2021 0.89 0.52 - 1.04 mg/dL Final     GFR Estimate   Date Value Ref Range Status   04/23/2024 70 >60 mL/min/1.73m2 Final   05/10/2021 69 >60 mL/min/[1.73_m2] Final     Comment:     Non  GFR  Calc  Starting 12/18/2018, serum creatinine based estimated GFR (eGFR) will be   calculated using the Chronic Kidney Disease Epidemiology Collaboration   (CKD-EPI) equation.       GFR, ESTIMATED POCT   Date Value Ref Range Status   10/10/2023 >60 >60 mL/min/1.73m2 Final     Calcium   Date Value Ref Range Status   04/23/2024 8.6 (L) 8.8 - 10.2 mg/dL Final   05/10/2021 8.6 8.5 - 10.1 mg/dL Final     Bilirubin Total   Date Value Ref Range Status   04/18/2024 0.4 <=1.2 mg/dL Final   10/12/2020 0.3 0.2 - 1.3 mg/dL Final     Alkaline Phosphatase   Date Value Ref Range Status   04/18/2024 118 40 - 150 U/L Final     Comment:     Reference intervals for this test were updated on 11/14/2023 to more accurately reflect our healthy population. There may be differences in the flagging of prior results with similar values performed with this method. Interpretation of those prior results can be made in the context of the updated reference intervals.   10/12/2020 124 40 - 150 U/L Final     ALT   Date Value Ref Range Status   04/18/2024 30 0 - 50 U/L Final     Comment:     Reference intervals for this test were updated on 6/12/2023 to more accurately reflect our healthy population. There may be differences in the flagging of prior results with similar values performed with this method. Interpretation of those prior results can be made in the context of the updated reference intervals.     10/12/2020 40 0 - 50 U/L Final     AST   Date Value Ref Range Status   04/18/2024 29 0 - 45 U/L Final     Comment:     Reference intervals for this test were updated on 6/12/2023 to more accurately reflect our healthy population. There may be differences in the flagging of prior results with similar values performed with this method. Interpretation of those prior results can be made in the context of the updated reference intervals.   10/12/2020 35 0 - 45 U/L Final       PET scan 1/29/24  IMPRESSION: Although much of the uptake along the superior  and middle aspects of the right calf mass has decreased or resolved, there are two foci of uptake along the more inferior portions of the mass which have increased compared to December 2023,   concerning for disease progression.    MRI Tibia/Fibula 1/29/24  IMPRESSION:  1.  Interval decrease in size in the heterogeneously enhancing mass within the posterior tibial muscle belly. This has decreased from 3.3 x 5.2 x 9.3 cm and now measures 2.5 x 3.5 x 5.0 cm. It remains consistent with sarcoma.  2.  There is some surrounding T2 signal abnormality within the adjacent musculature including along the apposing side of the interosseous membrane within the anterior compartment. This could be secondary to post radiation change or a component of   reactive edema. It is difficult to completely exclude microinvasion of tumor.  3.  Edema or cellulitis about the lower leg has minimally increased since the previous examination.  4.  Stable bony irregularity and remodeling of the fibula which is at the margin of the neoplasm.  5.  Focus of T2 signal abnormality within the diaphyseal shaft of the tibia is poorly marginated. This may represent a component of mild stress reaction. Again, it is difficult to completely exclude a new bone lesion. This is considered unlikely, but   follow-up examination is recommended.  6.  No evidence for pathologic fracture.  7.  Exam otherwise negative.       PATHOLOGY   10/2/23  Final Diagnosis   A.  Soft tissue, right calf mass, excision:  - High-grade pleomorphic sarcoma  - See comment    Electronically signed by Jonny Zhou MD on 10/4/2023 at  9:09 PM   Comment  [facility]   Immunohistochemical stains show the tumor is negative for CK AE1/AE3, S100, desmin and CD34 while SATB2 demonstrates patchy reactivity.  While the primary diagnostic consideration is an undifferentiated pleomorphic sarcoma, the patchy SATB2 reactivity raises the possibility of a poorly differentiated osteosarcoma.   Clinical  "Information  [facility]   The patient is a 68 year old female with a history of CLL and several months of sensitivity of the lateral right calf, and recent imaging showing: \"Lytic lesion in the right fibular proximal diaphysis, with complete resorption of the medial cortex and some periosteal reaction adjacent to the lesion\".      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 No evidence of metastatic disease.     In her case, the probability of recurrence with surgery alone is about 40% in the next 10 years. We discussed, that recurrence in this scenario is usually with distant  metastasis which then becomes uncurable disease. We discussed, that for this reason, I recommend treatment with neoadjuvant chemotherapy with the goal of preventing metastasis in the future. We discussed the potential side effects of chemotherapy and need for emergent treatment in order to improve her symptoms. The regimen will be doxil 40mg/m2 and ifosfamide 8g/m2 over 5 days. Slightly lower dose due to age and other medical conditions.   She is s/p C4 and tolerated chemotherapy well.     I personally reviewed the most recent PET and MRI leg, she had significant decrease in size and metabolic activity compared to pre-treatment, however this is a partial response with 2 foci that appear more active.     2/19/2024 to 3/22/2024: Right lower extremity, 5000 cGy in 25 fraction     Surgical resection by Dr. Barroso 4/22/24: Final pathology demonstrated undifferentiated sarcoma, 5.8 cm, there " was noted treatment effect with 6% viable tumor, negative margin, ypT2 NX.     I explained that we saw a good therapy effect from initial chemotherapy upon imaging and pathology encouraging with only 6% of viable tumor. Because of this and her initial high risk, I recommend to continuous single agent doxil, with the goal of 1 year of adjuvant therapy.    Plan  -ISIDORO visit prior to chemo on 6/7 - virtual   -C1 Doxil single agent on 6/10   -ISIDORO visit prior to chemo on 7/5 - virtual   -C2 on 7/8   -ISIDORO visit prior to chemo on 8/2 - virtual   C3 on 8/5   -MRI R leg and PET on 8/23   -Follow Dr. Jimenez 8/27     40 minutes spent on the date of the encounter doing chart review, review of outside records, review of test results, interpretation of tests, patient visit, documentation, and discussion with other provider(s)     Roney Nam MD   of Medicine  Hematology, Oncology and Transplantation

## 2024-05-29 NOTE — NURSING NOTE
"Oncology Rooming Note    May 29, 2024 10:26 AM   Mirta Cardenas is a 69 year old female who presents for:    Chief Complaint   Patient presents with    Oncology Clinic Visit     Sarcoma     Initial Vitals: /88 (BP Location: Right arm, Patient Position: Sitting, Cuff Size: Adult Large)   Pulse 82   Temp 98.2  F (36.8  C) (Oral)   Resp 18   Wt 82.1 kg (181 lb)   SpO2 100%   BMI 34.20 kg/m   Estimated body mass index is 34.2 kg/m  as calculated from the following:    Height as of 4/22/24: 1.549 m (5' 1\").    Weight as of this encounter: 82.1 kg (181 lb). Body surface area is 1.88 meters squared.  Mild Pain (2) Comment: Data Unavailable   No LMP recorded. Patient has had a hysterectomy.  Allergies reviewed: Yes  Medications reviewed: Yes    Medications: Medication refills not needed today.  Pharmacy name entered into Bior[employer]:    [facility] [address] [place] [address] [id number] [facility] [address] [place] [address]    Frailty Screening:   Is the patient here for a new oncology consult visit in cancer care? 2. No      Clinical concerns:        Lizbet Polo CMA              "

## 2024-05-30 ENCOUNTER — OFFICE VISIT (OUTPATIENT)
Dept: RADIATION THERAPY | Facility: OUTPATIENT CENTER | Age: 69
End: 2024-05-30
Payer: COMMERCIAL

## 2024-05-30 VITALS
HEART RATE: 81 BPM | SYSTOLIC BLOOD PRESSURE: 138 MMHG | RESPIRATION RATE: 18 BRPM | DIASTOLIC BLOOD PRESSURE: 80 MMHG | OXYGEN SATURATION: 98 %

## 2024-05-30 DIAGNOSIS — I10 BENIGN ESSENTIAL HYPERTENSION: ICD-10-CM

## 2024-05-30 DIAGNOSIS — C49.21 SARCOMA OF RIGHT LOWER EXTREMITY (H): Primary | ICD-10-CM

## 2024-05-30 RX ORDER — LOSARTAN POTASSIUM 100 MG/1
100 TABLET ORAL DAILY
Qty: 90 TABLET | Refills: 0 | Status: SHIPPED | OUTPATIENT
Start: 2024-05-30 | End: 2024-06-18

## 2024-05-30 NOTE — LETTER
5/30/2024         RE: Mirta Cardenas  [id number] [facility] [address]        Dear Colleague,    Thank you for referring your patient, Mirta Cardenas, to the [facility]. Please see a copy of my visit note below.    Radiation Oncology Progress Note    HPI: Ms. 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  She underwent systemic therapy with partial response.  She underwent preoperative radiation therapy     Radiation Treatment  2/19/2024 to 3/22/2024: Right lower extremity, 5000 cGy in 25 fraction    The patient returns for follow-up.    Posttreatment PET scan on 5/23/2024 demonstrated excellent partial response without clear evidence of active neoplasm.  No clear sites of progressive disease were noted.  On 4/22/2024 the patient underwent resection.  Final pathology demonstrated undifferentiated sarcoma, 5.8 cm, there was noted treatment effect with 6% viable tumor, negative margin, ypT2 NX.    The patient overall is recovering well.  Denies any acute complaints.  Has noted overall good wound healing.  Working with physical therapy.    Continues to follow with Dr. Amanda Wray.  Plan is to consider adjuvant systemic treatment    Following with Dr. Barroso.     Plan:  Appropriate recovery thus far post neoadjuvant treatment and surgical resection.  Continue physical therapy.  Continue oncologic surveillance and systemic therapy per medical oncology team (Dr. Amanda Wray).  Continue follow-up with orthopedic team (Dr. Barroso)   RTC in 3 months.       Patrice Armstrong M.D.  Department of Radiation Oncology  [facility]           Again, thank you for allowing me to participate in the care of your patient.        Sincerely,        Patrice Armstrong MD

## 2024-05-30 NOTE — NURSING NOTE
FOLLOW-UP VISIT    Patient Name: Mirta Cardenas      : 1955     Age: 69 year old        ______________________________________________________________________________     Chief Complaint   Patient presents with    Radiation Therapy     return     /80   Pulse 81   Resp 18   SpO2 98%      Date Radiation Completed: 3/22/24  R lower leg sarcoma    Pain  Current history of pain associated with this visit: pain in R ankle due to recovering from surgery - stiffness  - doing PT      Meds  Current Med List Reviewed: Yes  Medication Note:     Labs  Other Labs: No    Imaging  None    Skin: Warm  Dry  Surgical site healing - no s/s of infection  Energy Level: improving    Other Appointments:     Date  MD Name:  Dr. Barbara Yoon      Other Notes:

## 2024-05-30 NOTE — PROGRESS NOTES
Radiation Oncology Progress Note    HPI: Ms. 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  She underwent systemic therapy with partial response.  She underwent preoperative radiation therapy     Radiation Treatment  2/19/2024 to 3/22/2024: Right lower extremity, 5000 cGy in 25 fraction    The patient returns for follow-up.    Posttreatment PET scan on 5/23/2024 demonstrated excellent partial response without clear evidence of active neoplasm.  No clear sites of progressive disease were noted.  On 4/22/2024 the patient underwent resection.  Final pathology demonstrated undifferentiated sarcoma, 5.8 cm, there was noted treatment effect with 6% viable tumor, negative margin, ypT2 NX.    The patient overall is recovering well.  Denies any acute complaints.  Has noted overall good wound healing.  Working with physical therapy.    Continues to follow with Dr. Amanda Wray.  Plan is to consider adjuvant systemic treatment    Following with Dr. Barroso.     Plan:  Appropriate recovery thus far post neoadjuvant treatment and surgical resection.  Continue physical therapy.  Continue oncologic surveillance and systemic therapy per medical oncology team (Dr. Amanda Wray).  Continue follow-up with orthopedic team (Dr. Barroso)   RTC in 3 months.       Patrice Armstrong M.D.  Department of Radiation Oncology  [facility]

## 2024-06-06 ENCOUNTER — THERAPY VISIT (OUTPATIENT)
Dept: PHYSICAL THERAPY | Facility: CLINIC | Age: 69
End: 2024-06-06
Attending: PHYSICIAN ASSISTANT
Payer: COMMERCIAL

## 2024-06-06 DIAGNOSIS — C49.9 SARCOMA OF SOFT TISSUE (H): ICD-10-CM

## 2024-06-06 PROCEDURE — 97110 THERAPEUTIC EXERCISES: CPT | Mod: GP | Performed by: PHYSICAL THERAPIST

## 2024-06-07 DIAGNOSIS — E78.5 HYPERLIPIDEMIA LDL GOAL <100: ICD-10-CM

## 2024-06-07 RX ORDER — PRAVASTATIN SODIUM 40 MG
40 TABLET ORAL DAILY
Qty: 90 TABLET | Refills: 1 | Status: SHIPPED | OUTPATIENT
Start: 2024-06-07

## 2024-06-07 NOTE — TELEPHONE ENCOUNTER
Prescription approved per [facility] Refill Protocol.  Jessa Saez, RN  [facility] Triage Nurse

## 2024-06-10 ENCOUNTER — VIRTUAL VISIT (OUTPATIENT)
Dept: ONCOLOGY | Facility: CLINIC | Age: 69
End: 2024-06-10
Attending: STUDENT IN AN ORGANIZED HEALTH CARE EDUCATION/TRAINING PROGRAM
Payer: COMMERCIAL

## 2024-06-10 ENCOUNTER — TELEPHONE (OUTPATIENT)
Dept: VASCULAR SURGERY | Facility: CLINIC | Age: 69
End: 2024-06-10

## 2024-06-10 VITALS — WEIGHT: 181 LBS | BODY MASS INDEX: 34.17 KG/M2 | HEIGHT: 61 IN

## 2024-06-10 DIAGNOSIS — C49.21 SARCOMA OF RIGHT LOWER EXTREMITY (H): Primary | ICD-10-CM

## 2024-06-10 DIAGNOSIS — T45.1X5A TOXIC EFFECT OF CHEMOTHERAPY: ICD-10-CM

## 2024-06-10 PROCEDURE — 99214 OFFICE O/P EST MOD 30 MIN: CPT | Mod: 95 | Performed by: STUDENT IN AN ORGANIZED HEALTH CARE EDUCATION/TRAINING PROGRAM

## 2024-06-10 ASSESSMENT — PAIN SCALES - GENERAL: PAINLEVEL: NO PAIN (0)

## 2024-06-10 NOTE — LETTER
6/10/2024      Mirta Cardenas  [address] [facility] [address]      Dear Colleague,    Thank you for referring your patient, Mirta Cardenas, to the [facility]. Please see a copy of my visit note below.    Virtual Visit Details    Type of service:  Video Visit   Video Start Time:  9:50AM  Video End Time: 10:08AM    Originating Location (pt. Location): Home    Distant Location (provider location):  On-site  Platform used for Video Visit: [facility]  FOLLOW UP VISIT NOTE    PATIENT NAME: Mirta Cardenas MRN # [id number]  DATE OF VISIT: Bigg 10, 2024  YOB: 1955    CANCER TYPE:  High grade pleomorphic sarcoma, right calf       HISTORY OF PRESENT ILLNESS/ONCOLOGY HISTORY   Mirta is a 69 year old female with PMH of CLL and recent Dx of High Grade Pleomorphic sarcoma, with intermittent right calf pain but then continuous calf pain beginning in March 2023.  She had a recent x-ray which shows a destructive lesion in the midportion R LE in between the fibula and the tibia.     -10/2/23, Biopsy  Final Diagnosis   A.  Soft tissue, right calf mass, excision:  - High-grade pleomorphic sarcoma  - See comment      -10/10/23, PET/CT with no evidence of mets (mildly enlarged righ iliac chain LN with no hypermetabolic uptake; thought to be reactive).     -10/17/23-10/24/23, Inpatient Cycle 1 of Doxil/ifosfamide  -11/4/23, Inpatient cycle 2 Doxil/ifosfamide  -12/13/23, Cycle 3 inpatient Doxil/ifosfamide  -1/10/24, Cycle 4 inpatient Doxil/ifosfamide    2/19/2024 to 3/22/2024: Right lower extremity, 5000 cGy in 25 fraction      Surgical resection by Dr. Barroso 4/22/24: Final pathology demonstrated undifferentiated sarcoma, 5.8 cm, there was noted treatment effect with 6% viable tumor, negative margin, ypT2 NX.      PAST ONCOLOGIC HISTORY   CLL - on monitoring--Follows with Dr. Griffin Duckworth      INTERVAL HISTORY   -Maria Esther presents  "today via video visit for evaluation prior to single agent adjuvant cycle 1 Doxil tomorrow. She reports feeling well overall. She does report that she was at PT last week and therapy noted some concerns on the lower part of her surgical incision. 3 team members looked at the site and advised wound cleanser. She reports scabbing at the lower part of the incision (the upper part is healed). There is surround redness but she states its always had that appearance and its not new or worsening. No warmth to touch. No fever. Sometimes the site oozes clear/yellow fluid if standing for prolonged time or after PT. She generally keeps the site open to air unless wearing a compression wrap. She is looking for more guidance on care instructions.  -pain on RLE is manageable; mostly just tightness  -Swelling of RLE waxes as wanes with activity. She tries to elevate as able  -Otherwise feeling well with no concerns.      PAST MEDICAL HISTORY   Anal fisure - controlled with nitrobid  CLL - now being monitored   HTN  Cholesterol  Hx of severe COVID  Psoriasis       PHYSICAL EXAM   Ht 1.549 m (5' 1\")   Wt 82.1 kg (181 lb)   BMI 34.20 kg/m    Wt Readings from Last 4 Encounters:   06/10/24 82.1 kg (181 lb)   05/29/24 82.1 kg (181 lb)   04/22/24 80.7 kg (177 lb 14.6 oz)   04/18/24 82.2 kg (181 lb 3.2 oz)       5/29/2024  10:20 AM 5/30/2024  4:05 PM   Vital Signs     Systolic 137  138    Diastolic 88  80    Pulse 82  81    Temperature 98.2  F (36.8  C)     Respirations 18  18    Weight (LB) 181 lb     Height     BMI (Calculated)     Pain Score 2 (Mild)     O2 100 %  98 %      Video physical exam  General: Patient appears well in no acute distress.   Skin: Poorly visualized scabbing of inferior port of incision with some surround erythema; no purulent drainage.    Eyes: EOMI, no erythema, sclera icterus or discharge noted  Resp: Appears to be breathing comfortably without accessory muscle usage, speaking in full sentences, no cough  MSK: " Appears to have normal range of motion based on visualized movements  Neurologic: No apparent tremors, facial movements symmetric. Hearing intact. No dysarthria  Psych: affect appropriate, alert and oriented. Pleasant       LABORATORY AND IMAGING STUDIES     Most Recent 3 CBC's:  Recent Labs   Lab Test 04/24/24  0819 04/23/24  0633 04/18/24  1350 01/31/24  1136 01/25/24  1058   WBC  --   --  5.3 6.8 7.4   HGB 8.5* 9.4* 10.6* 9.1* 8.3*   MCV  --   --  87 87 87   PLT  --   --  197 144* 105*   ANEUTAUTO  --   --  3.1 5.1 5.7     Most Recent 3 BMP's:  Recent Labs   Lab Test 04/23/24  0633 04/23/24  0619 04/22/24  1118 04/18/24  1350 01/31/24  1136 01/29/24  0928 01/25/24  1058     --   --  140 139   < > 138   POTASSIUM 3.8  --   --  3.8 4.5   < > 4.1   CHLORIDE 104  --   --  104 104   < > 104   CO2 24  --   --  22 25   < > 23   BUN 18.9  --   --  27.6* 22.1   < > 24.1*   CR 0.90  --   --  1.19* 0.94   < > 1.16*   ANIONGAP 10  --   --  14 10   < > 11   MONIK 8.6*  --   --  9.7 9.4   < > 9.2   * 130* 115* 92 95   < > 92   PROTTOTAL  --   --   --  7.6 6.7  --  6.4   ALBUMIN  --   --   --  4.6 4.3  --  4.1    < > = values in this interval not displayed.    Most Recent 3 LFT's:  Recent Labs   Lab Test 04/18/24  1350 01/31/24  1136 01/25/24  1058   AST 29 19 19   ALT 30 16 17   ALKPHOS 118 114 122   BILITOTAL 0.4 0.3 0.2     Phosphorus   Date Value Ref Range Status   04/18/2024 3.1 2.5 - 4.5 mg/dL Final   01/31/2024 3.7 2.5 - 4.5 mg/dL Final   01/25/2024 3.3 2.5 - 4.5 mg/dL Final   01/22/2024 2.8 2.5 - 4.5 mg/dL Final   04/23/2008 3.4 2.5 - 4.5 mg/dL Final   08/17/2005 3.6 2.5 - 4.5 mg/dL Final     Magnesium   Date Value Ref Range Status   04/18/2024 1.9 1.7 - 2.3 mg/dL Final   01/31/2024 2.0 1.7 - 2.3 mg/dL Final   01/29/2024 1.8 1.7 - 2.3 mg/dL Final   01/25/2024 1.6 (L) 1.7 - 2.3 mg/dL Final   10/12/2020 1.9 1.6 - 2.3 mg/dL Final   08/17/2005 2.0 1.6 - 2.3 mg/dL Final     I reviewed the above labs today.        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 No evidence of metastatic disease.     She began neoadjuvant chemotherapy with Doxil/ifosfmaide on 10/17/23. She is now s/p 4 cycles of Doxil/ifosfamide. She received these at a slightly lower dose due to age and other medical conditions (doxil 40mg/m2 and ifosfamide 8g/m2 over 5 days). Last cycle was 1/10/24.     She completed neoadjuvant RT (3/22/24) followed by surgical resection (Removal of sarcoma right calf, deep compartment including anterior tibial muscle and 8 cm of the fibula) by Dr. Barroso 4/22/24.    She has recovered from surgery. However, she is having some scabbing and occasional oozing at inferior port of incision. This is challenging to assess over video. Though I dont see clear signs of infection or need for antibiotics. I suspect the periodic \"oozing\" is s/t edema. The plan was to move to single agent Doxil tomorrow for adjuvant therapy. However, I do not want to cause complication of incision healing. I will place an urgent C consult for guidance in cares of site. I will reach out to ortho surgery and Dr Jimenez to help determine whether we need to delay Doxil a few weeks or proceed with close monitoring. I let Mirta know we would update her via [employer] later today or phone call tomorrow morning  with final plan. She agrees with this. She would like to start adjuvant therapy but understands the need to be cautious.   ADDENDUM: I was in touch with Dr. Jimenez, we will proceed on 6/11 with Doxil (pending labs prior) but at a reduced dose of 35mg/m2 which has been signed. I sent Mirta a message on what to monitor for.  I will see if I can see her in about 2 weeks (between cycles) for close follow up    Once single agent Doxil is started, the plan will be " for for 1 year of adjuvant therapy (~through end of June 2025). She has received Doxil previously with neoadjuvant regimen but we again reviewed most common side effects specific to Doxil alone including mucositis, HFS, pruritic rash as well as management strategies.     Plan  -WOC consult for cares of surgical incision  -Consider starting cycle 1 adjuvant Doxil (40mg/m2) tomorrow pending labs prior and team consensus on management of incision  -CBC 2 weeks after Doxil trend brian with first cycle; if no concerning trend then forgo mid cycle labs with future cycles  -Labs and infusion q4 weeks in [place] with virtual ISIDORO prior  -Repeat MRI right leg and PET in August    #Anemia  S/t chemo and RT?  Repeat CBC with monthly infusions    #Elevated creatinine  -Intermittently elevated  -normal on last check  -CMP with monthly infusions  -continue to push po fluids    #Hypertension--not discussed today  -Losartan 100 mg daily  -Amlodipine 7.5 mg once daily.  -Hydrochlorothiazine 50mg once daily   -Chlorthalidone previously discontinued d/t electrolyte disturbance    #Right Leg Swelling  - US RLE on 10/21/23 was negative  - S/t resection.  Can continue compression stockings and leg elevation prn.     #CLL  Follow with Dr. Duckworth --next visit 6/13/24     35 minutes spent on the date of the encounter doing chart review, review of test results, interpretation of tests, patient visit, and documentation     Amber Scheierl, CNP  [facility]  [address] [place] [id number]  [phone]

## 2024-06-10 NOTE — TELEPHONE ENCOUNTER
Vascular Referral Intake    Referred by: Amber Scheierl APRN CNP for right lower extremity sarcoma with non-healing surgical wound    Specialty: Wound Clinic    Specific Provider if Necessary:  MEDARDO Mahajan or MD Loyd Clancy or Dr Marie    Visit Type: Wound Clinic    Time Frame: Within 1 Week    Testing/Imaging Needed Before Consult: none    Appt Note: (to be pasted into appt comments, also add where additional information is located, ie, outside images pushed to PACS, in Epic, sent to [facility], etc)

## 2024-06-10 NOTE — NURSING NOTE
Is the patient currently in the state of [place]? YES    Visit mode:VIDEO    If the visit is dropped, the patient can be reconnected by: VIDEO VISIT: Send to e-mail at: [email or web address]    Will anyone else be joining the visit? NO  (If patient encounters technical issues they should call [phone]    How would you like to obtain your AVS? MyChart    Are changes needed to the allergy or medication list? No  Reason for visit: Video Visit (Follow UP )    Shy HERRON

## 2024-06-10 NOTE — PROGRESS NOTES
Virtual Visit Details    Type of service:  Video Visit   Video Start Time:  9:50AM  Video End Time: 10:08AM    Originating Location (pt. Location): Home    Distant Location (provider location):  On-site  Platform used for Video Visit: [employer]    [facility]  FOLLOW UP VISIT NOTE    PATIENT NAME: Mirta Cardenas MRN # [id number]  DATE OF VISIT: Bigg 10, 2024  YOB: 1955    CANCER TYPE:  High grade pleomorphic sarcoma, right calf       HISTORY OF PRESENT ILLNESS/ONCOLOGY HISTORY   Mirta is a 69 year old female with PMH of CLL and recent Dx of High Grade Pleomorphic sarcoma, with intermittent right calf pain but then continuous calf pain beginning in March 2023.  She had a recent x-ray which shows a destructive lesion in the midportion R LE in between the fibula and the tibia.     -10/2/23, Biopsy  Final Diagnosis   A.  Soft tissue, right calf mass, excision:  - High-grade pleomorphic sarcoma  - See comment      -10/10/23, PET/CT with no evidence of mets (mildly enlarged righ iliac chain LN with no hypermetabolic uptake; thought to be reactive).     -10/17/23-10/24/23, Inpatient Cycle 1 of Doxil/ifosfamide  -11/4/23, Inpatient cycle 2 Doxil/ifosfamide  -12/13/23, Cycle 3 inpatient Doxil/ifosfamide  -1/10/24, Cycle 4 inpatient Doxil/ifosfamide    2/19/2024 to 3/22/2024: Right lower extremity, 5000 cGy in 25 fraction      Surgical resection by Dr. Barroso 4/22/24: Final pathology demonstrated undifferentiated sarcoma, 5.8 cm, there was noted treatment effect with 6% viable tumor, negative margin, ypT2 NX.      PAST ONCOLOGIC HISTORY   CLL - on monitoring--Follows with Dr. Griffin Duckworth      INTERVAL HISTORY   -Maria Esther presents today via video visit for evaluation prior to single agent adjuvant cycle 1 Doxil tomorrow. She reports feeling well overall. She does report that she was at PT last week and therapy noted some concerns on the lower part of her surgical incision. 3 team  "members looked at the site and advised wound cleanser. She reports scabbing at the lower part of the incision (the upper part is healed). There is surround redness but she states its always had that appearance and its not new or worsening. No warmth to touch. No fever. Sometimes the site oozes clear/yellow fluid if standing for prolonged time or after PT. She generally keeps the site open to air unless wearing a compression wrap. She is looking for more guidance on care instructions.  -pain on RLE is manageable; mostly just tightness  -Swelling of RLE waxes as wanes with activity. She tries to elevate as able  -Otherwise feeling well with no concerns.      PAST MEDICAL HISTORY   Anal fisure - controlled with nitrobid  CLL - now being monitored   HTN  Cholesterol  Hx of severe COVID  Psoriasis       PHYSICAL EXAM   Ht 1.549 m (5' 1\")   Wt 82.1 kg (181 lb)   BMI 34.20 kg/m    Wt Readings from Last 4 Encounters:   06/10/24 82.1 kg (181 lb)   05/29/24 82.1 kg (181 lb)   04/22/24 80.7 kg (177 lb 14.6 oz)   04/18/24 82.2 kg (181 lb 3.2 oz)       5/29/2024  10:20 AM 5/30/2024  4:05 PM   Vital Signs     Systolic 137  138    Diastolic 88  80    Pulse 82  81    Temperature 98.2  F (36.8  C)     Respirations 18  18    Weight (LB) 181 lb     Height     BMI (Calculated)     Pain Score 2 (Mild)     O2 100 %  98 %      Video physical exam  General: Patient appears well in no acute distress.   Skin: Poorly visualized scabbing of inferior port of incision with some surround erythema; no purulent drainage.    Eyes: EOMI, no erythema, sclera icterus or discharge noted  Resp: Appears to be breathing comfortably without accessory muscle usage, speaking in full sentences, no cough  MSK: Appears to have normal range of motion based on visualized movements  Neurologic: No apparent tremors, facial movements symmetric. Hearing intact. No dysarthria  Psych: affect appropriate, alert and oriented. Pleasant       LABORATORY AND IMAGING " STUDIES     Most Recent 3 CBC's:  Recent Labs   Lab Test 04/24/24  0819 04/23/24  0633 04/18/24  1350 01/31/24  1136 01/25/24  1058   WBC  --   --  5.3 6.8 7.4   HGB 8.5* 9.4* 10.6* 9.1* 8.3*   MCV  --   --  87 87 87   PLT  --   --  197 144* 105*   ANEUTAUTO  --   --  3.1 5.1 5.7     Most Recent 3 BMP's:  Recent Labs   Lab Test 04/23/24  0633 04/23/24  0619 04/22/24  1118 04/18/24  1350 01/31/24  1136 01/29/24  0928 01/25/24  1058     --   --  140 139   < > 138   POTASSIUM 3.8  --   --  3.8 4.5   < > 4.1   CHLORIDE 104  --   --  104 104   < > 104   CO2 24  --   --  22 25   < > 23   BUN 18.9  --   --  27.6* 22.1   < > 24.1*   CR 0.90  --   --  1.19* 0.94   < > 1.16*   ANIONGAP 10  --   --  14 10   < > 11   MONIK 8.6*  --   --  9.7 9.4   < > 9.2   * 130* 115* 92 95   < > 92   PROTTOTAL  --   --   --  7.6 6.7  --  6.4   ALBUMIN  --   --   --  4.6 4.3  --  4.1    < > = values in this interval not displayed.    Most Recent 3 LFT's:  Recent Labs   Lab Test 04/18/24  1350 01/31/24  1136 01/25/24  1058   AST 29 19 19   ALT 30 16 17   ALKPHOS 118 114 122   BILITOTAL 0.4 0.3 0.2     Phosphorus   Date Value Ref Range Status   04/18/2024 3.1 2.5 - 4.5 mg/dL Final   01/31/2024 3.7 2.5 - 4.5 mg/dL Final   01/25/2024 3.3 2.5 - 4.5 mg/dL Final   01/22/2024 2.8 2.5 - 4.5 mg/dL Final   04/23/2008 3.4 2.5 - 4.5 mg/dL Final   08/17/2005 3.6 2.5 - 4.5 mg/dL Final     Magnesium   Date Value Ref Range Status   04/18/2024 1.9 1.7 - 2.3 mg/dL Final   01/31/2024 2.0 1.7 - 2.3 mg/dL Final   01/29/2024 1.8 1.7 - 2.3 mg/dL Final   01/25/2024 1.6 (L) 1.7 - 2.3 mg/dL Final   10/12/2020 1.9 1.6 - 2.3 mg/dL Final   08/17/2005 2.0 1.6 - 2.3 mg/dL Final     I reviewed the above labs today.       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  "No evidence of metastatic disease.     She began neoadjuvant chemotherapy with Doxil/ifosfmaide on 10/17/23. She is now s/p 4 cycles of Doxil/ifosfamide. She received these at a slightly lower dose due to age and other medical conditions (doxil 40mg/m2 and ifosfamide 8g/m2 over 5 days). Last cycle was 1/10/24.     She completed neoadjuvant RT (3/22/24) followed by surgical resection (Removal of sarcoma right calf, deep compartment including anterior tibial muscle and 8 cm of the fibula) by Dr. Barroso 4/22/24.    She has recovered from surgery. However, she is having some scabbing and occasional oozing at inferior port of incision. This is challenging to assess over video. Though I dont see clear signs of infection or need for antibiotics. I suspect the periodic \"oozing\" is s/t edema. The plan was to move to single agent Doxil tomorrow for adjuvant therapy. However, I do not want to cause complication of incision healing. I will place an urgent [facility] consult for guidance in cares of site. I will reach out to ortho surgery and Dr Jimenez to help determine whether we need to delay Doxil a few weeks or proceed with close monitoring. I let Mirta know we would update her via [employer] later today or phone call tomorrow morning  with final plan. She agrees with this. She would like to start adjuvant therapy but understands the need to be cautious.   ADDENDUM: I was in touch with Dr. Jimenez, we will proceed on 6/11 with Doxil (pending labs prior) but at a reduced dose of 35mg/m2 which has been signed. I sent Mirta a message on what to monitor for.  I will see if I can see her in about 2 weeks (between cycles) for close follow up    Once single agent Doxil is started, the plan will be for for 1 year of adjuvant therapy (~through end of June 2025). She has received Doxil previously with neoadjuvant regimen but we again reviewed most common side effects specific to Doxil alone including mucositis, HFS, pruritic rash as well as " management strategies.     Plan  -WOC consult for cares of surgical incision  -Consider starting cycle 1 adjuvant Doxil (40mg/m2) tomorrow pending labs prior and team consensus on management of incision  -CBC 2 weeks after Doxil trend brian with first cycle; if no concerning trend then forgo mid cycle labs with future cycles  -Labs and infusion q4 weeks in [place] with virtual ISIDORO prior  -Repeat MRI right leg and PET in August    #Anemia  S/t chemo and RT?  Repeat CBC with monthly infusions    #Elevated creatinine  -Intermittently elevated  -normal on last check  -CMP with monthly infusions  -continue to push po fluids    #Hypertension--not discussed today  -Losartan 100 mg daily  -Amlodipine 7.5 mg once daily.  -Hydrochlorothiazine 50mg once daily   -Chlorthalidone previously discontinued d/t electrolyte disturbance    #Right Leg Swelling  - US RLE on 10/21/23 was negative  - S/t resection.  Can continue compression stockings and leg elevation prn.     #CLL  Follow with Dr. Duckworth --next visit 6/13/24     35 minutes spent on the date of the encounter doing chart review, review of test results, interpretation of tests, patient visit, and documentation     Amber Scheierl, CNP  [facility]  [address] [place] [id number]  [phone]

## 2024-06-11 ENCOUNTER — LAB (OUTPATIENT)
Dept: LAB | Facility: CLINIC | Age: 69
End: 2024-06-11
Payer: COMMERCIAL

## 2024-06-11 ENCOUNTER — INFUSION THERAPY VISIT (OUTPATIENT)
Dept: INFUSION THERAPY | Facility: CLINIC | Age: 69
End: 2024-06-11
Attending: STUDENT IN AN ORGANIZED HEALTH CARE EDUCATION/TRAINING PROGRAM
Payer: COMMERCIAL

## 2024-06-11 VITALS
BODY MASS INDEX: 34.73 KG/M2 | SYSTOLIC BLOOD PRESSURE: 136 MMHG | TEMPERATURE: 98.4 F | DIASTOLIC BLOOD PRESSURE: 82 MMHG | RESPIRATION RATE: 16 BRPM | HEART RATE: 78 BPM | WEIGHT: 183.8 LBS

## 2024-06-11 DIAGNOSIS — Z76.89 PREVENTION OF CHEMOTHERAPY-INDUCED NEUTROPENIA: Primary | ICD-10-CM

## 2024-06-11 DIAGNOSIS — C49.21 SARCOMA OF RIGHT LOWER EXTREMITY (H): ICD-10-CM

## 2024-06-11 LAB
ALBUMIN SERPL BCG-MCNC: 4.7 G/DL (ref 3.5–5.2)
ALP SERPL-CCNC: 104 U/L (ref 40–150)
ALT SERPL W P-5'-P-CCNC: 24 U/L (ref 0–50)
ANION GAP SERPL CALCULATED.3IONS-SCNC: 17 MMOL/L (ref 7–15)
AST SERPL W P-5'-P-CCNC: 31 U/L (ref 0–45)
BASOPHILS # BLD AUTO: 0.1 10E3/UL (ref 0–0.2)
BASOPHILS NFR BLD AUTO: 1 %
BILIRUB SERPL-MCNC: 0.2 MG/DL
BUN SERPL-MCNC: 24.3 MG/DL (ref 8–23)
CALCIUM SERPL-MCNC: 10 MG/DL (ref 8.8–10.2)
CHLORIDE SERPL-SCNC: 104 MMOL/L (ref 98–107)
CREAT SERPL-MCNC: 1.13 MG/DL (ref 0.51–0.95)
DEPRECATED HCO3 PLAS-SCNC: 21 MMOL/L (ref 22–29)
EGFRCR SERPLBLD CKD-EPI 2021: 53 ML/MIN/1.73M2
EOSINOPHIL # BLD AUTO: 0.2 10E3/UL (ref 0–0.7)
EOSINOPHIL NFR BLD AUTO: 3 %
ERYTHROCYTE [DISTWIDTH] IN BLOOD BY AUTOMATED COUNT: 12.8 % (ref 10–15)
GLUCOSE SERPL-MCNC: 115 MG/DL (ref 70–99)
HCT VFR BLD AUTO: 33.9 % (ref 35–47)
HGB BLD-MCNC: 11.8 G/DL (ref 11.7–15.7)
IMM GRANULOCYTES # BLD: 0 10E3/UL
IMM GRANULOCYTES NFR BLD: 0 %
LYMPHOCYTES # BLD AUTO: 2.1 10E3/UL (ref 0.8–5.3)
LYMPHOCYTES NFR BLD AUTO: 31 %
MAGNESIUM SERPL-MCNC: 1.7 MG/DL (ref 1.7–2.3)
MCH RBC QN AUTO: 29.5 PG (ref 26.5–33)
MCHC RBC AUTO-ENTMCNC: 34.8 G/DL (ref 31.5–36.5)
MCV RBC AUTO: 85 FL (ref 78–100)
MONOCYTES # BLD AUTO: 0.4 10E3/UL (ref 0–1.3)
MONOCYTES NFR BLD AUTO: 7 %
NEUTROPHILS # BLD AUTO: 3.9 10E3/UL (ref 1.6–8.3)
NEUTROPHILS NFR BLD AUTO: 58 %
NRBC # BLD AUTO: 0 10E3/UL
NRBC BLD AUTO-RTO: 0 /100
PHOSPHATE SERPL-MCNC: 3.4 MG/DL (ref 2.5–4.5)
PLATELET # BLD AUTO: 230 10E3/UL (ref 150–450)
POTASSIUM SERPL-SCNC: 3.9 MMOL/L (ref 3.4–5.3)
PROT SERPL-MCNC: 7.9 G/DL (ref 6.4–8.3)
RBC # BLD AUTO: 4 10E6/UL (ref 3.8–5.2)
SODIUM SERPL-SCNC: 142 MMOL/L (ref 135–145)
WBC # BLD AUTO: 6.7 10E3/UL (ref 4–11)

## 2024-06-11 PROCEDURE — 80053 COMPREHEN METABOLIC PANEL: CPT | Performed by: STUDENT IN AN ORGANIZED HEALTH CARE EDUCATION/TRAINING PROGRAM

## 2024-06-11 PROCEDURE — 96413 CHEMO IV INFUSION 1 HR: CPT

## 2024-06-11 PROCEDURE — 85025 COMPLETE CBC W/AUTO DIFF WBC: CPT | Performed by: STUDENT IN AN ORGANIZED HEALTH CARE EDUCATION/TRAINING PROGRAM

## 2024-06-11 PROCEDURE — 96375 TX/PRO/DX INJ NEW DRUG ADDON: CPT

## 2024-06-11 PROCEDURE — 250N000011 HC RX IP 250 OP 636: Mod: JZ | Performed by: STUDENT IN AN ORGANIZED HEALTH CARE EDUCATION/TRAINING PROGRAM

## 2024-06-11 PROCEDURE — 258N000003 HC RX IP 258 OP 636: Mod: JZ | Performed by: STUDENT IN AN ORGANIZED HEALTH CARE EDUCATION/TRAINING PROGRAM

## 2024-06-11 PROCEDURE — 84100 ASSAY OF PHOSPHORUS: CPT | Performed by: STUDENT IN AN ORGANIZED HEALTH CARE EDUCATION/TRAINING PROGRAM

## 2024-06-11 PROCEDURE — 36415 COLL VENOUS BLD VENIPUNCTURE: CPT | Performed by: STUDENT IN AN ORGANIZED HEALTH CARE EDUCATION/TRAINING PROGRAM

## 2024-06-11 PROCEDURE — 83735 ASSAY OF MAGNESIUM: CPT | Performed by: STUDENT IN AN ORGANIZED HEALTH CARE EDUCATION/TRAINING PROGRAM

## 2024-06-11 RX ORDER — PROCHLORPERAZINE MALEATE 10 MG
10 TABLET ORAL EVERY 6 HOURS PRN
Qty: 30 TABLET | Refills: 2 | Status: SHIPPED | OUTPATIENT
Start: 2024-06-11 | End: 2024-06-18

## 2024-06-11 RX ORDER — ONDANSETRON 2 MG/ML
8 INJECTION INTRAMUSCULAR; INTRAVENOUS ONCE
Status: COMPLETED | OUTPATIENT
Start: 2024-06-11 | End: 2024-06-11

## 2024-06-11 RX ADMIN — DEXTROSE MONOHYDRATE 250 ML: 50 INJECTION, SOLUTION INTRAVENOUS at 13:57

## 2024-06-11 RX ADMIN — DOXORUBICIN HYDROCHLORIDE 66 MG: 2 INJECTABLE, LIPOSOMAL INTRAVENOUS at 14:17

## 2024-06-11 RX ADMIN — ONDANSETRON 8 MG: 2 INJECTION INTRAMUSCULAR; INTRAVENOUS at 13:57

## 2024-06-11 NOTE — PROGRESS NOTES
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 She has never had an indication for treatment. The same holds true today.  We reviewed what these would be.  They are anemia, rapidly rising white count, thrombocytopenia, unsightly or symptomatic adenopathy, or weight loss/night sweats.  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 Barroso 4/22/24: Final pathology demonstrated undifferentiated sarcoma, 5.8 cm, there was noted treatment effect with 6% viable tumor, negative margin, ypT2 NX. She will be receiving adjuvant Doxil for one year, under the guidance of Dr. Marino.  Status post recent resection.  I did examine her lower extremity today and things are healing fine.  There is no sign of infection as her physical therapist thought there might be.  Otherwise healthy and dynamic woman.    Plan:     No intervention by me  RTC with me in one year with a CBC just prior.  Continue to follow-up with Dr. Marino.    Griffin Duckworth MD, MSc  Associate Professor of Medicine  [employer]  [facility]  [address] [place] [address]  [phone]    __________________________________________________________________    Diagnosis     High grade pleomorphihc sarcoma of the right leg.  Hunter Stage 0/1 CLL, diagnosed at submandibular biopsy 12/2004. Most recent CT/PET shows a few inguinal and axillary sub cm nodes that are not hypermetabolic, managed with observation.  Minimal hepatosplenomegaly seen on CT/PET.      CLL Therapy to Date:     Observation without intervention for 19 years, beginning with Dr. Acosta.    Interval history:     Feeling really well.  Not happy about the fact she is gaining weight.  It turns out food is delicious.  She loves making  bread.  Recently tried making bread sticks.  Her favorite thing to make is caramel rolls.  Does not have a lot of pain at the site of surgery.  Physical therapist have been telling her it is infected.  She would like me to take a look at it today.  Denies fevers.  Not a lot of pain.  Just got her first cycle of liposomal doxorubicin and is tolerating it well.  Wants to know why her white count has dropped (likely from the chemotherapy)  No adenopathy  No bleeding  Energy level adequate  No chest pain or shortness of breath.      On ROS in addition to the above      Denies  fevers, chills, NS, HA, dysphagia/odynophagia, change in weight, change in appetite, cough, SOB, CP, n/v, abd pain, constipation/diarrhea, hematochezia, dysuria, hematuria, swelling, rashes, lymphadenopathy      Past Medical History:   I have reviewed this patient's past medical history   Past Medical History:   Diagnosis Date    Asthma     Asthma     reactive airway disease, per patient    Blood transfusion     Cancer (H)     chronic lymphocytic leukemia    CINV (chemotherapy-induced nausea and vomiting) 10/12/2023    History of transfusion     Hypertension     Hypertension     Leukemia, chronic lymphocytic (H)     Malignant neoplasm (H)     CLL    Sarcoma of right lower extremity (H)     Thyroid nodule 04/28/2008          Past Surgical History:    I have reviewed this patient's past surgical history       Social History:   Tobacco, ETOH, and rec drugs reviewed and as noted below with the following exceptions:   to Giancarlo. Lives in [place]          Family History:     Family History   Problem Relation Age of Onset    Osteoporosis Mother     Cancer Sister         multiple myloma    Hypertension Brother     Heart Disease Brother 65        bypass    Obesity Son     Obesity Son     Hypertension Son     Melanoma No family hx of     Anesthesia Reaction No family hx of     Thrombosis No family hx of             Medications:     Current  Outpatient Medications   Medication Sig Dispense Refill    amLODIPine (NORVASC) 5 MG tablet Take 1.5 tablets (7.5 mg) by mouth daily (Patient taking differently: Take 7.5 mg by mouth every morning) 135 tablet 3    calcium carbonate (TUMS) 500 MG chewable tablet Take 1 tablet (500 mg) by mouth as needed      hydrochlorothiazide (HYDRODIURIL) 50 MG tablet Take 1 tablet (50 mg) by mouth daily 30 tablet 3    losartan (COZAAR) 100 MG tablet TAKE 1 TABLET (100 MG) BY MOUTH DAILY 90 tablet 0    Multiple Vitamins-Calcium (ONE-A-DAY WOMENS FORMULA PO) Take 1 tablet by mouth daily      omeprazole (PRILOSEC) 20 MG DR capsule Take 1 capsule by mouth every morning.      oxyCODONE (ROXICODONE) 5 MG tablet Take 1-2 tablets (5-10 mg) by mouth every 4 hours as needed for moderate pain (Patient not taking: Reported on 5/29/2024) 30 tablet 0    pravastatin (PRAVACHOL) 40 MG tablet TAKE 1 TABLET (40 MG) BY MOUTH DAILY 90 tablet 1    pregabalin (LYRICA) 50 MG capsule Take 1 capsule (50 mg) by mouth 2 times daily 60 capsule 0    pregabalin (LYRICA) 50 MG capsule Take 1 capsule (50 mg) by mouth 3 times daily 90 capsule 1    prochlorperazine (COMPAZINE) 10 MG tablet Take 1 tablet (10 mg) by mouth every 6 hours as needed for nausea or vomiting 30 tablet 2              Physical Exam:   not currently breastfeeding.    ECOG PS: 0  Constitutional: WDWN female in NAD, pleasant and appropriate  HEENT:  NC/AT, no icterus, OP clear, MMM  Skin: No jaundice nor ecchymoses.  The lower extremity lesion is healing well.  There is some crusting tissue but no erythema or exudate.  It is all consistent with healing.  Lungs: CTAB, no w/r/r, nonlabored breathing  Cardiovascular: RRR, S1, S2, no m/r/g  Abdomen: +BS, soft, nontender, nondistended, no organomegaly nor masses  MSK/Extremities: Warm, well perfused. No edema  LN: no concerning palpable cervical, supraclavicular, axillary, nor inguinal lymphadenopathy (rechecked again today)  Neurologic: alert,  answering questions appropriately, moving all extremities spontaneously. CN 2-12 grossly intact.  Psych: appropriate affect  Data:     Today's CBC and CMP reviewed.  ALC is normal at 2.1, hgb 11.8, platelet count 230K.     Latest Reference Range & Units 06/11/24 12:56 06/13/24 13:47   WBC 4.0 - 11.0 10e3/uL 6.7 6.4   Hemoglobin 11.7 - 15.7 g/dL 11.8 11.7   Hematocrit 35.0 - 47.0 % 33.9 (L) 33.6 (L)   Platelet Count 150 - 450 10e3/uL 230 208   (L): Data is abnormally low      No results found for this or any previous visit (from the past 24 hour(s)).      Other data           Labs, imaging and treatment plan reviewed with patient. All questions answered.        30 minutes spent on the date of the encounter doing chart review, review of outside records, review of test results, interpretation of tests, patient visit, documentation, discussion with other provider(s), and discussion with family

## 2024-06-11 NOTE — PROGRESS NOTES
Infusion Nursing Note:  Mirta Cardenas presents today for C1D1 Doxil (Mirta has received Doxil inpatient without incident).  Patient seen by provider today: No   present during visit today: Not Applicable.    Note: Labs drawn peripherally.      Intravenous Access:  Peripheral IV placed.    Treatment Conditions:  Lab Results   Component Value Date    HGB 11.8 06/11/2024    WBC 6.7 06/11/2024    ANEU 3.9 01/22/2024    ANEUTAUTO 3.9 06/11/2024     06/11/2024        Lab Results   Component Value Date     06/11/2024    POTASSIUM 3.9 06/11/2024    MAG 1.7 06/11/2024    CR 1.13 (H) 06/11/2024    MONIK 10.0 06/11/2024    BILITOTAL 0.2 06/11/2024    ALBUMIN 4.7 06/11/2024    ALT 24 06/11/2024    AST 31 06/11/2024       Results reviewed, labs MET treatment parameters, ok to proceed with treatment.      Post Infusion Assessment:  Patient tolerated infusion without incident.  Blood return noted pre and post infusion.  Site patent and intact, free from redness, edema or discomfort.  No evidence of extravasations.  Access discontinued per protocol.       Discharge Plan:   Patient discharged in stable condition accompanied by: .  Departure Mode: Ambulatory.      Faviola Padilla RN

## 2024-06-13 ENCOUNTER — THERAPY VISIT (OUTPATIENT)
Dept: PHYSICAL THERAPY | Facility: CLINIC | Age: 69
End: 2024-06-13
Attending: PHYSICIAN ASSISTANT
Payer: COMMERCIAL

## 2024-06-13 ENCOUNTER — APPOINTMENT (OUTPATIENT)
Dept: LAB | Facility: CLINIC | Age: 69
End: 2024-06-13
Attending: INTERNAL MEDICINE
Payer: COMMERCIAL

## 2024-06-13 ENCOUNTER — ONCOLOGY VISIT (OUTPATIENT)
Dept: ONCOLOGY | Facility: CLINIC | Age: 69
End: 2024-06-13
Attending: INTERNAL MEDICINE
Payer: COMMERCIAL

## 2024-06-13 VITALS
OXYGEN SATURATION: 98 % | SYSTOLIC BLOOD PRESSURE: 160 MMHG | HEART RATE: 89 BPM | TEMPERATURE: 98 F | DIASTOLIC BLOOD PRESSURE: 78 MMHG | BODY MASS INDEX: 34.58 KG/M2 | WEIGHT: 183 LBS | RESPIRATION RATE: 16 BRPM

## 2024-06-13 DIAGNOSIS — C49.9 SARCOMA OF SOFT TISSUE (H): Primary | ICD-10-CM

## 2024-06-13 DIAGNOSIS — C91.10 CLL (CHRONIC LYMPHOCYTIC LEUKEMIA) (H): Primary | ICD-10-CM

## 2024-06-13 DIAGNOSIS — R73.9 HYPERGLYCEMIA: ICD-10-CM

## 2024-06-13 DIAGNOSIS — C49.21 SARCOMA OF RIGHT LOWER EXTREMITY (H): ICD-10-CM

## 2024-06-13 LAB
ALBUMIN SERPL BCG-MCNC: 4.5 G/DL (ref 3.5–5.2)
ALP SERPL-CCNC: 100 U/L (ref 40–150)
ALT SERPL W P-5'-P-CCNC: 21 U/L (ref 0–50)
ANION GAP SERPL CALCULATED.3IONS-SCNC: 14 MMOL/L (ref 7–15)
AST SERPL W P-5'-P-CCNC: 26 U/L (ref 0–45)
BASOPHILS # BLD AUTO: 0 10E3/UL (ref 0–0.2)
BASOPHILS NFR BLD AUTO: 1 %
BILIRUB SERPL-MCNC: 0.2 MG/DL
BUN SERPL-MCNC: 22.7 MG/DL (ref 8–23)
CALCIUM SERPL-MCNC: 9.8 MG/DL (ref 8.8–10.2)
CHLORIDE SERPL-SCNC: 102 MMOL/L (ref 98–107)
CREAT SERPL-MCNC: 1.05 MG/DL (ref 0.51–0.95)
DEPRECATED HCO3 PLAS-SCNC: 23 MMOL/L (ref 22–29)
EGFRCR SERPLBLD CKD-EPI 2021: 58 ML/MIN/1.73M2
EOSINOPHIL # BLD AUTO: 0.2 10E3/UL (ref 0–0.7)
EOSINOPHIL NFR BLD AUTO: 3 %
ERYTHROCYTE [DISTWIDTH] IN BLOOD BY AUTOMATED COUNT: 12.7 % (ref 10–15)
GLUCOSE SERPL-MCNC: 102 MG/DL (ref 70–99)
HCT VFR BLD AUTO: 33.6 % (ref 35–47)
HGB BLD-MCNC: 11.7 G/DL (ref 11.7–15.7)
IMM GRANULOCYTES # BLD: 0 10E3/UL
IMM GRANULOCYTES NFR BLD: 0 %
LYMPHOCYTES # BLD AUTO: 1.9 10E3/UL (ref 0.8–5.3)
LYMPHOCYTES NFR BLD AUTO: 30 %
MAGNESIUM SERPL-MCNC: 1.8 MG/DL (ref 1.7–2.3)
MCH RBC QN AUTO: 28.7 PG (ref 26.5–33)
MCHC RBC AUTO-ENTMCNC: 34.8 G/DL (ref 31.5–36.5)
MCV RBC AUTO: 82 FL (ref 78–100)
MONOCYTES # BLD AUTO: 0.5 10E3/UL (ref 0–1.3)
MONOCYTES NFR BLD AUTO: 8 %
NEUTROPHILS # BLD AUTO: 3.8 10E3/UL (ref 1.6–8.3)
NEUTROPHILS NFR BLD AUTO: 58 %
NRBC # BLD AUTO: 0 10E3/UL
NRBC BLD AUTO-RTO: 0 /100
PHOSPHATE SERPL-MCNC: 2.9 MG/DL (ref 2.5–4.5)
PLATELET # BLD AUTO: 208 10E3/UL (ref 150–450)
POTASSIUM SERPL-SCNC: 3.7 MMOL/L (ref 3.4–5.3)
PROT SERPL-MCNC: 7.4 G/DL (ref 6.4–8.3)
RBC # BLD AUTO: 4.08 10E6/UL (ref 3.8–5.2)
SODIUM SERPL-SCNC: 139 MMOL/L (ref 135–145)
WBC # BLD AUTO: 6.4 10E3/UL (ref 4–11)

## 2024-06-13 PROCEDURE — 83735 ASSAY OF MAGNESIUM: CPT | Performed by: INTERNAL MEDICINE

## 2024-06-13 PROCEDURE — 84100 ASSAY OF PHOSPHORUS: CPT | Performed by: INTERNAL MEDICINE

## 2024-06-13 PROCEDURE — G0463 HOSPITAL OUTPT CLINIC VISIT: HCPCS | Performed by: INTERNAL MEDICINE

## 2024-06-13 PROCEDURE — 36415 COLL VENOUS BLD VENIPUNCTURE: CPT | Performed by: INTERNAL MEDICINE

## 2024-06-13 PROCEDURE — 85025 COMPLETE CBC W/AUTO DIFF WBC: CPT | Performed by: INTERNAL MEDICINE

## 2024-06-13 PROCEDURE — 82565 ASSAY OF CREATININE: CPT | Performed by: INTERNAL MEDICINE

## 2024-06-13 PROCEDURE — 99214 OFFICE O/P EST MOD 30 MIN: CPT | Performed by: INTERNAL MEDICINE

## 2024-06-13 PROCEDURE — 97112 NEUROMUSCULAR REEDUCATION: CPT | Mod: GP | Performed by: PHYSICAL THERAPIST

## 2024-06-13 PROCEDURE — 83036 HEMOGLOBIN GLYCOSYLATED A1C: CPT | Performed by: INTERNAL MEDICINE

## 2024-06-13 PROCEDURE — 97110 THERAPEUTIC EXERCISES: CPT | Mod: GP | Performed by: PHYSICAL THERAPIST

## 2024-06-13 ASSESSMENT — PAIN SCALES - GENERAL: PAINLEVEL: NO PAIN (0)

## 2024-06-13 NOTE — LETTER
6/13/2024      Mirta Cardenas  [address] [facility] [address]      Dear Colleague,    Thank you for referring your patient, Mirta Cardenas, to the [facility]. Please see a copy of my visit note below.      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 She has never had an indication for treatment. The same holds true today.  We reviewed what these would be.  They are anemia, rapidly rising white count, thrombocytopenia, unsightly or symptomatic adenopathy, or weight loss/night sweats.  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 Barroso 4/22/24: Final pathology demonstrated undifferentiated sarcoma, 5.8 cm, there was noted treatment effect with 6% viable tumor, negative margin, ypT2 NX. She will be receiving adjuvant Doxil for one year, under the guidance of Dr. Marino.  Status post recent resection.  I did examine her lower extremity today and things are healing fine.  There is no sign of infection as her physical therapist thought there might be.  Otherwise healthy and dynamic woman.    Plan:     No intervention by me  RTC with me in one year with a CBC just prior.  Continue to follow-up with Dr. Marino.    Griffin Duckworth MD, MSc  Associate Professor of Medicine  [employer]  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  Hunter Stage 0/1 CLL, diagnosed at submandibular biopsy 12/2004. Most recent CT/PET shows a few inguinal and axillary sub cm nodes that are not hypermetabolic, managed with observation.  Minimal hepatosplenomegaly seen on  CT/PET.      CLL Therapy to Date:     Observation without intervention for 19 years, beginning with Dr. Acosta.    Interval history:     Feeling really well.  Not happy about the fact she is gaining weight.  It turns out food is delicious.  She loves making bread.  Recently tried making bread sticks.  Her favorite thing to make is caramel rolls.  Does not have a lot of pain at the site of surgery.  Physical therapist have been telling her it is infected.  She would like me to take a look at it today.  Denies fevers.  Not a lot of pain.  Just got her first cycle of liposomal doxorubicin and is tolerating it well.  Wants to know why her white count has dropped (likely from the chemotherapy)  No adenopathy  No bleeding  Energy level adequate  No chest pain or shortness of breath.      On ROS in addition to the above      Denies  fevers, chills, NS, HA, dysphagia/odynophagia, change in weight, change in appetite, cough, SOB, CP, n/v, abd pain, constipation/diarrhea, hematochezia, dysuria, hematuria, swelling, rashes, lymphadenopathy      Past Medical History:   I have reviewed this patient's past medical history   Past Medical History:   Diagnosis Date    Asthma     Asthma     reactive airway disease, per patient    Blood transfusion     Cancer (H)     chronic lymphocytic leukemia    CINV (chemotherapy-induced nausea and vomiting) 10/12/2023    History of transfusion     Hypertension     Hypertension     Leukemia, chronic lymphocytic (H)     Malignant neoplasm (H)     CLL    Sarcoma of right lower extremity (H)     Thyroid nodule 04/28/2008          Past Surgical History:    I have reviewed this patient's past surgical history       Social History:   Tobacco, ETOH, and rec drugs reviewed and as noted below with the following exceptions:   to Giancarlo. Lives in [place]          Family History:     Family History   Problem Relation Age of Onset    Osteoporosis Mother     Cancer Sister         multiple myloma     Hypertension Brother     Heart Disease Brother 65        bypass    Obesity Son     Obesity Son     Hypertension Son     Melanoma No family hx of     Anesthesia Reaction No family hx of     Thrombosis No family hx of             Medications:     Current Outpatient Medications   Medication Sig Dispense Refill    amLODIPine (NORVASC) 5 MG tablet Take 1.5 tablets (7.5 mg) by mouth daily (Patient taking differently: Take 7.5 mg by mouth every morning) 135 tablet 3    calcium carbonate (TUMS) 500 MG chewable tablet Take 1 tablet (500 mg) by mouth as needed      hydrochlorothiazide (HYDRODIURIL) 50 MG tablet Take 1 tablet (50 mg) by mouth daily 30 tablet 3    losartan (COZAAR) 100 MG tablet TAKE 1 TABLET (100 MG) BY MOUTH DAILY 90 tablet 0    Multiple Vitamins-Calcium (ONE-A-DAY WOMENS FORMULA PO) Take 1 tablet by mouth daily      omeprazole (PRILOSEC) 20 MG DR capsule Take 1 capsule by mouth every morning.      oxyCODONE (ROXICODONE) 5 MG tablet Take 1-2 tablets (5-10 mg) by mouth every 4 hours as needed for moderate pain (Patient not taking: Reported on 5/29/2024) 30 tablet 0    pravastatin (PRAVACHOL) 40 MG tablet TAKE 1 TABLET (40 MG) BY MOUTH DAILY 90 tablet 1    pregabalin (LYRICA) 50 MG capsule Take 1 capsule (50 mg) by mouth 2 times daily 60 capsule 0    pregabalin (LYRICA) 50 MG capsule Take 1 capsule (50 mg) by mouth 3 times daily 90 capsule 1    prochlorperazine (COMPAZINE) 10 MG tablet Take 1 tablet (10 mg) by mouth every 6 hours as needed for nausea or vomiting 30 tablet 2              Physical Exam:   not currently breastfeeding.    ECOG PS: 0  Constitutional: WDWN female in NAD, pleasant and appropriate  HEENT:  NC/AT, no icterus, OP clear, MMM  Skin: No jaundice nor ecchymoses.  The lower extremity lesion is healing well.  There is some crusting tissue but no erythema or exudate.  It is all consistent with healing.  Lungs: CTAB, no w/r/r, nonlabored breathing  Cardiovascular: RRR, S1, S2, no m/r/g  Abdomen:  +BS, soft, nontender, nondistended, no organomegaly nor masses  MSK/Extremities: Warm, well perfused. No edema  LN: no concerning palpable cervical, supraclavicular, axillary, nor inguinal lymphadenopathy (rechecked again today)  Neurologic: alert, answering questions appropriately, moving all extremities spontaneously. CN 2-12 grossly intact.  Psych: appropriate affect  Data:     Today's CBC and CMP reviewed.  ALC is normal at 2.1, hgb 11.8, platelet count 230K.     Latest Reference Range & Units 06/11/24 12:56 06/13/24 13:47   WBC 4.0 - 11.0 10e3/uL 6.7 6.4   Hemoglobin 11.7 - 15.7 g/dL 11.8 11.7   Hematocrit 35.0 - 47.0 % 33.9 (L) 33.6 (L)   Platelet Count 150 - 450 10e3/uL 230 208   (L): Data is abnormally low      No results found for this or any previous visit (from the past 24 hour(s)).      Other data           Labs, imaging and treatment plan reviewed with patient. All questions answered.        30 minutes spent on the date of the encounter doing chart review, review of outside records, review of test results, interpretation of tests, patient visit, documentation, discussion with other provider(s), and discussion with family

## 2024-06-13 NOTE — NURSING NOTE
"Oncology Rooming Note    June 13, 2024 1:52 PM   Mirta Cardenas is a 69 year old female who presents for:    Chief Complaint   Patient presents with    Oncology Clinic Visit     RTN for sarcoma of right lower extremity    Blood Draw     Labs collected from venipuncture by RN. Vitals taken. Checked in for appointment(s).      Initial Vitals: BP (!) 160/78   Pulse 89   Temp 98  F (36.7  C)   Resp 16   Wt 83 kg (183 lb)   SpO2 98%   BMI 34.58 kg/m   Estimated body mass index is 34.58 kg/m  as calculated from the following:    Height as of 6/10/24: 1.549 m (5' 1\").    Weight as of this encounter: 83 kg (183 lb). Body surface area is 1.89 meters squared.  No Pain (0) Comment: Data Unavailable   No LMP recorded. Patient has had a hysterectomy.  Allergies reviewed: Yes  Medications reviewed: Yes    Medications: Medication refills not needed today.  Pharmacy name entered into [facility]:    [facility] [place], [place] - [address] [place] [address] [address] [place] [address] [address] [facility]    Frailty Screening:   Is the patient here for a new oncology consult visit in cancer care? 2. No      Clinical concerns:  None      Donato Sanders"

## 2024-06-13 NOTE — NURSING NOTE
Chief Complaint   Patient presents with    Oncology Clinic Visit     RTN for sarcoma of right lower extremity    Blood Draw     Labs collected from venipuncture by RN. Vitals taken. Checked in for appointment(s).      Labs collected from venipuncture by RN. Vitals taken. Checked in for appointment(s).     Monisha Leon RN

## 2024-06-14 ENCOUNTER — PATIENT OUTREACH (OUTPATIENT)
Dept: CARE COORDINATION | Facility: CLINIC | Age: 69
End: 2024-06-14
Payer: COMMERCIAL

## 2024-06-18 ENCOUNTER — OFFICE VISIT (OUTPATIENT)
Dept: FAMILY MEDICINE | Facility: CLINIC | Age: 69
End: 2024-06-18
Payer: COMMERCIAL

## 2024-06-18 ENCOUNTER — MYC MEDICAL ADVICE (OUTPATIENT)
Dept: FAMILY MEDICINE | Facility: CLINIC | Age: 69
End: 2024-06-18

## 2024-06-18 VITALS
RESPIRATION RATE: 16 BRPM | BODY MASS INDEX: 34.74 KG/M2 | WEIGHT: 184 LBS | DIASTOLIC BLOOD PRESSURE: 74 MMHG | HEART RATE: 90 BPM | HEIGHT: 61 IN | SYSTOLIC BLOOD PRESSURE: 130 MMHG | OXYGEN SATURATION: 99 % | TEMPERATURE: 97.4 F

## 2024-06-18 DIAGNOSIS — I10 BENIGN ESSENTIAL HYPERTENSION: ICD-10-CM

## 2024-06-18 DIAGNOSIS — N18.31 STAGE 3A CHRONIC KIDNEY DISEASE (H): ICD-10-CM

## 2024-06-18 DIAGNOSIS — L30.4 INTERTRIGO: Primary | ICD-10-CM

## 2024-06-18 DIAGNOSIS — T81.31XD POSTOPERATIVE WOUND DEHISCENCE, SUBSEQUENT ENCOUNTER: ICD-10-CM

## 2024-06-18 DIAGNOSIS — Z12.31 VISIT FOR SCREENING MAMMOGRAM: ICD-10-CM

## 2024-06-18 DIAGNOSIS — R73.9 HYPERGLYCEMIA: ICD-10-CM

## 2024-06-18 DIAGNOSIS — Z78.0 POSTMENOPAUSAL STATUS: ICD-10-CM

## 2024-06-18 DIAGNOSIS — C49.21 SARCOMA OF RIGHT LOWER EXTREMITY (H): ICD-10-CM

## 2024-06-18 DIAGNOSIS — E21.2 OTHER HYPERPARATHYROIDISM (H): ICD-10-CM

## 2024-06-18 DIAGNOSIS — Z00.00 ENCOUNTER FOR MEDICARE ANNUAL WELLNESS EXAM: Primary | ICD-10-CM

## 2024-06-18 LAB — HBA1C MFR BLD: 5.6 %

## 2024-06-18 PROCEDURE — 99214 OFFICE O/P EST MOD 30 MIN: CPT | Mod: 25 | Performed by: STUDENT IN AN ORGANIZED HEALTH CARE EDUCATION/TRAINING PROGRAM

## 2024-06-18 PROCEDURE — G0439 PPPS, SUBSEQ VISIT: HCPCS | Performed by: STUDENT IN AN ORGANIZED HEALTH CARE EDUCATION/TRAINING PROGRAM

## 2024-06-18 RX ORDER — RESPIRATORY SYNCYTIAL VIRUS VACCINE 120MCG/0.5
0.5 KIT INTRAMUSCULAR ONCE
Qty: 1 EACH | Refills: 0 | Status: CANCELLED | OUTPATIENT
Start: 2024-06-18 | End: 2024-06-18

## 2024-06-18 RX ORDER — BACITRACIN ZINC 500 [USP'U]/G
OINTMENT TOPICAL 2 TIMES DAILY
Qty: 425 G | Refills: 3 | Status: SHIPPED | OUTPATIENT
Start: 2024-06-18 | End: 2024-09-05

## 2024-06-18 RX ORDER — AMLODIPINE BESYLATE 5 MG/1
5 TABLET ORAL DAILY
Qty: 90 TABLET | Refills: 3 | Status: SHIPPED | OUTPATIENT
Start: 2024-06-18 | End: 2024-07-25 | Stop reason: DRUGHIGH

## 2024-06-18 RX ORDER — NYSTATIN 100000 U/G
OINTMENT TOPICAL 2 TIMES DAILY
Qty: 60 G | Refills: 3 | Status: SHIPPED | OUTPATIENT
Start: 2024-06-18 | End: 2024-08-05

## 2024-06-18 RX ORDER — HYDROCHLOROTHIAZIDE 50 MG/1
50 TABLET ORAL DAILY
Qty: 90 TABLET | Refills: 3 | Status: SHIPPED | OUTPATIENT
Start: 2024-06-18

## 2024-06-18 RX ORDER — LOSARTAN POTASSIUM 100 MG/1
100 TABLET ORAL DAILY
Qty: 90 TABLET | Refills: 3 | Status: SHIPPED | OUTPATIENT
Start: 2024-06-18

## 2024-06-18 SDOH — HEALTH STABILITY: PHYSICAL HEALTH: ON AVERAGE, HOW MANY MINUTES DO YOU ENGAGE IN EXERCISE AT THIS LEVEL?: 0 MIN

## 2024-06-18 SDOH — HEALTH STABILITY: PHYSICAL HEALTH: ON AVERAGE, HOW MANY DAYS PER WEEK DO YOU ENGAGE IN MODERATE TO STRENUOUS EXERCISE (LIKE A BRISK WALK)?: 0 DAYS

## 2024-06-18 ASSESSMENT — SOCIAL DETERMINANTS OF HEALTH (SDOH): HOW OFTEN DO YOU GET TOGETHER WITH FRIENDS OR RELATIVES?: PATIENT DECLINED

## 2024-06-18 ASSESSMENT — PAIN SCALES - GENERAL: PAINLEVEL: MILD PAIN (2)

## 2024-06-18 NOTE — TELEPHONE ENCOUNTER
Dr. Fraga,    Please see Viewstert message below and advise. Last OV today.    Thank you  Gerry KIDD RN  [facility]

## 2024-06-18 NOTE — TELEPHONE ENCOUNTER
2nd referral received today, she is already scheduled with Dr. Clancy in July.    Pt called and asked for appt with wound care RN sooner. Informed her of clinic process of needing to be seen by one of our providers before seeing wound nurse. She will keep her appt for 7/11.

## 2024-06-18 NOTE — PATIENT INSTRUCTIONS
"Blood pressure goal <140/90. If that's the case at home, then stick with the 5 mg amlodipine dose. If it isn't at goal, then we will discuss some alternative options.     Schedule [facility] nurse visit in [place]  If that's before the 7/11 appointment, call and reschedule with Dr. Clancy for early August (after you see your surgeon)   That way, if your surgeon says, yes you need to see the general surgeon for debridement, then you have something scheduled. If they don't need that, then cancel.     Patient Education   Preventive Care Advice   This is general advice we often give to help people stay healthy. Your care team may have specific advice just for you. Please talk to your care team about your own preventive care needs.  Lifestyle  Exercise at least 150 minutes each week (30 minutes a day, 5 days a week).  Do muscle strengthening activities 2 days a week. These help control your weight and prevent disease.  No smoking.  Wear sunscreen to prevent skin cancer.  Have your home tested for radon every 2 to 5 years. Radon is a colorless, odorless gas that can harm your lungs. To learn more, go to www.health.[facility].[place].us and search for \"Radon in Homes.\"  Keep guns unloaded and locked up in a safe place like a safe or gun vault, or, use a gun lock and hide the keys. Always lock away bullets separately. To learn more, visit [employer].gov and search for \"safe gun storage.\"  Nutrition  Eat 5 or more servings of fruits and vegetables each day.  Try wheat bread, brown rice and whole grain pasta (instead of white bread, rice, and pasta).  Get enough calcium and vitamin D. Check the label on foods and aim for 100% of the RDA (recommended daily allowance).  Regular exams  Have a dental exam and cleaning every 6 months.  See your health care team every year to talk about:  Any changes in your health.  Any medicines your care team has prescribed.  Preventive care, family planning, and ways to prevent chronic diseases.  Shots " (vaccines)   HPV shots (up to age 26), if you've never had them before.  Hepatitis B shots (up to age 59), if you've never had them before.  COVID-19 shot: Get this shot when it's due.  Flu shot: Get a flu shot every year.  Tetanus shot: Get a tetanus shot every 10 years.  Pneumococcal, hepatitis A, and RSV shots: Ask your care team if you need these based on your risk.  Shingles shot (for age 50 and up).  General health tests  Diabetes screening:  Starting at age 35, Get screened for diabetes at least every 3 years.  If you are younger than age 35, ask your care team if you should be screened for diabetes.  Cholesterol test: At age 39, start having a cholesterol test every 5 years, or more often if advised.  Bone density scan (DEXA): At age 50, ask your care team if you should have this scan for osteoporosis (brittle bones).  Hepatitis C: Get tested at least once in your life.  Abdominal aortic aneurysm screening: Talk to your doctor about having this screening if you:  Have ever smoked; and  Are biologically male; and  Are between the ages of 65 and 75.  STIs (sexually transmitted infections)  Before age 24: Ask your care team if you should be screened for STIs.  After age 24: Get screened for STIs if you're at risk. You are at risk for STIs (including HIV) if:  You are sexually active with more than one person.  You don't use condoms every time.  You or a partner was diagnosed with a sexually transmitted infection.  If you are at risk for HIV, ask about PrEP medicine to prevent HIV.  Get tested for HIV at least once in your life, whether you are at risk for HIV or not.  Cancer screening tests  Cervical cancer screening: If you have a cervix, begin getting regular cervical cancer screening tests at age 21. Most people who have regular screenings with normal results can stop after age 65. Talk about this with your provider.  Breast cancer scan (mammogram): If you've ever had breasts, begin having regular mammograms  starting at age 40. This is a scan to check for breast cancer.  Colon cancer screening: It is important to start screening for colon cancer at age 45.  Have a colonoscopy test every 10 years (or more often if you're at risk) Or, ask your provider about stool tests like a FIT test every year or Cologuard test every 3 years.  To learn more about your testing options, visit: www.[employer].com/381073.pdf.  For help making a decision, visit: tulio/ov72829.  Prostate cancer screening test: If you have a prostate and are age 55 to 69, ask your provider if you would benefit from a yearly prostate cancer screening test.  Lung cancer screening: If you are a current or former smoker age 50 to 80, ask your care team if ongoing lung cancer screenings are right for you.  For informational purposes only. Not to replace the advice of your health care provider. Copyright   2023 [facility] [employer]. All rights reserved. Clinically reviewed by the [employer] [place] [facility]. [employer] [id number] - REV 04/24.  Preventing Falls: Care Instructions  Injuries and health problems such as trouble walking or poor eyesight can increase your risk of falling. So can some medicines. But there are things you can do to help prevent falls. You can exercise to get stronger. You can also arrange your home to make it safer.    Talk to your doctor about the medicines you take. Ask if any of them increase the risk of falls and whether they can be changed or stopped.   Try to exercise regularly. It can help improve your strength and balance. This can help lower your risk of falling.     Practice fall safety and prevention.    Wear low-heeled shoes that fit well and give your feet good support. Talk to your doctor if you have foot problems that make this hard.  Carry a cellphone or wear a medical alert device that you can use to call for help.  Use stepladders instead of chairs to reach high objects. Don't climb if you're at risk for falls. Ask  "for help, if needed.  Wear the correct eyeglasses, if you need them.    Make your home safer.    Remove rugs, cords, clutter, and furniture from walkways.  Keep your house well lit. Use night-lights in hallways and bathrooms.  Install and use sturdy handrails on stairways.  Wear nonskid footwear, even inside. Don't walk barefoot or in socks without shoes.    Be safe outside.    Use handrails, curb cuts, and ramps whenever possible.  Keep your hands free by using a shoulder bag or backpack.  Try to walk in well-lit areas. Watch out for uneven ground, changes in pavement, and debris.  Be careful in the winter. Walk on the grass or gravel when sidewalks are slippery. Use de-icer on steps and walkways. Add non-slip devices to shoes.    Put grab bars and nonskid mats in your shower or tub and near the toilet. Try to use a shower chair or bath bench when bathing.   Get into a tub or shower by putting in your weaker leg first. Get out with your strong side first. Have a phone or medical alert device in the bathroom with you.   Where can you learn more?  Go to https://www.[employer].net/patiented  Enter G117 in the search box to learn more about \"Preventing Falls: Care Instructions.\"  Current as of: July 17, 2023               Content Version: 14.0    1808-6066 [employer].   Care instructions adapted under license by your healthcare professional. If you have questions about a medical condition or this instruction, always ask your healthcare professional. [employer] disclaims any warranty or liability for your use of this information.      Bladder Training: Care Instructions  Your Care Instructions     Bladder training is used to treat urge incontinence and stress incontinence. Urge incontinence means that the need to urinate comes on so fast that you can't get to a toilet in time. Stress incontinence means that you leak urine because of pressure on your bladder. For example, it may happen when " you laugh, cough, or lift something heavy.  Bladder training can increase how long you can wait before you have to urinate. It can also help your bladder hold more urine. And it can give you better control over the urge to urinate.  It is important to remember that bladder training takes a few weeks to a few months to make a difference. You may not see results right away, but don't give up.  Follow-up care is a key part of your treatment and safety. Be sure to make and go to all appointments, and call your doctor if you are having problems. It's also a good idea to know your test results and keep a list of the medicines you take.  How can you care for yourself at home?  Work with your doctor to come up with a bladder training program that is right for you. You may use one or more of the following methods.  Delayed urination  In the beginning, try to keep from urinating for 5 minutes after you first feel the need to go.  While you wait, take deep, slow breaths to relax. Kegel exercises can also help you delay the need to go to the bathroom.  After some practice, when you can easily wait 5 minutes to urinate, try to wait 10 minutes before you urinate.  Slowly increase the waiting period until you are able to control when you have to urinate.  Scheduled urination  Empty your bladder when you first wake up in the morning.  Schedule times throughout the day when you will urinate.  Start by going to the bathroom every hour, even if you don't need to go.  Slowly increase the time between trips to the bathroom.  When you have found a schedule that works well for you, keep doing it.  If you wake up during the night and have to urinate, do it. Apply your schedule to waking hours only.  Kegel exercises  These tighten and strengthen pelvic muscles, which can help you control the flow of urine. (If doing these exercises causes pain, stop doing them and talk with your doctor.) To do Kegel exercises:  Squeeze your muscles as if  "you were trying not to pass gas. Or squeeze your muscles as if you were stopping the flow of urine. Your belly, legs, and buttocks shouldn't move.  Hold the squeeze for 3 seconds, then relax for 5 to 10 seconds.  Start with 3 seconds, then add 1 second each week until you are able to squeeze for 10 seconds.  Repeat the exercise 10 times a session. Do 3 to 8 sessions a day.  When should you call for help?  Watch closely for changes in your health, and be sure to contact your doctor if:    Your incontinence is getting worse.     You do not get better as expected.   Where can you learn more?  Go to https://www.[employer].net/patiented  Enter V684 in the search box to learn more about \"Bladder Training: Care Instructions.\"  Current as of: November 15, 2023               Content Version: 14.0    6053-6158 [employer].   Care instructions adapted under license by your healthcare professional. If you have questions about a medical condition or this instruction, always ask your healthcare professional. [employer] disclaims any warranty or liability for your use of this information.         "

## 2024-06-18 NOTE — PROGRESS NOTES
Preventive Care Visit  [facility]  Joseline Fraga MD, Family Medicine  Jun 18, 2024      Assessment & Plan     Encounter for Medicare annual wellness exam  Patient is a very pleasant 69-year-old female presents today for Medicare annual wellness visit.  While here, we also discussed chronic conditions.    Benign essential hypertension  Patient had been started on amlodipine, having difficulty with taking 1-1/2 tablets due to difficulty cutting tablets in half.  Blood pressure is well-controlled today.  Will cut her back to 5 mg of amlodipine, continue with other medications as below.  She will monitor blood pressures at home.  Oftentimes, blood pressure is elevated when she is seen by the specialty clinics, likely in large part due to the amount of walking that she is required to get to these appointments, likely checking too early in her appointments.  - amLODIPine (NORVASC) 5 MG tablet  Dispense: 90 tablet; Refill: 3  - hydrochlorothiazide (HYDRODIURIL) 50 MG tablet  Dispense: 90 tablet; Refill: 3  - losartan (COZAAR) 100 MG tablet  Dispense: 90 tablet; Refill: 3    Sarcoma of right lower extremity (H)  Postoperative wound dehiscence, subsequent encounter  Patient has a history of sarcoma of the right lower extremity status post surgical debridement.  She has been having difficulty with wound healing since surgery.  Has had sloughing of scabs frequently.  Wound does appear dry today.  Recommended trial of bacitracin topically to help soften this tissue, allow for secondary healing.  Referral to wound nurse is placed today.  She does have a appointment with the general surgeon through the vascular clinic, recommended that she delay that visit until after she has follow-up with her surgeon.  - bacitracin 500 UNIT/GM external ointment  Dispense: 425 g; Refill: 3  - Wound Care Referral    Postmenopausal status  Due for DEXA scan, ordered now.  Okay to delay this given her multiple other  "medical appointments.  - DEXA HIP/PELVIS/SPINE - Future    Hyperglycemia  History of hyperglycemia, recheck A1c today remains in the normal range, however upper level of normal.  Recommend follow-up next year for possibility of developing prediabetes.   - Hemoglobin A1c    Stage 3a chronic kidney disease (H)  Stable on recent labs.    Visit for screening mammogram   Due for mammogram, ordered today.  No findings on PET scan to indicate active breast cancer, however did recommend that she complete mammogram at her earliest convenience.  - MA Screening Bilateral w/ [place]    Other hyperparathyroidism (H24)  History of parathyroid hormone level.  Last year recheck was normal.  Recommend rechecking every other year or so, monitor calcium levels for indication for sooner recheck.    Patient has been advised of split billing requirements and indicates understanding: Yes    BMI  Estimated body mass index is 34.77 kg/m  as calculated from the following:    Height as of this encounter: 1.549 m (5' 1\").    Weight as of this encounter: 83.5 kg (184 lb).     Counseling  Appropriate preventive services were discussed with this patient, including applicable screening as appropriate for fall prevention, nutrition, physical activity, Tobacco-use cessation, weight loss and cognition.  Checklist reviewing preventive services available has been given to the patient.  Reviewed patient's diet, addressing concerns and/or questions.   Information on urinary incontinence and treatment options given to patient.       Makayla Kirby is a 69 year old, presenting for the following:  Wellness Visit        6/18/2024     8:47 AM   Additional Questions   Roomed by Leia KURTZ   Accompanied by Self         Health Care Directive  Patient does not have a Health Care Directive or Living Will: Discussed advance care planning with patient; however, patient declined at this time. Was given papers at a previous appointment, will work with kids on this. "     HPI    Bloodp ressure had been higher in the hospital so was started on bp meds. Bp is up and down.     For example, met with oncology for CLL    Just had blood draws last week.     Always high bp down there, but walks far to get into the building and then over the rooms     Recommended a wound spray.         6/18/2024   General Health   How would you rate your overall physical health? (!) FAIR   Feel stress (tense, anxious, or unable to sleep) Not at all         6/18/2024   Nutrition   Diet: Regular (no restrictions)         6/18/2024   Exercise   Days per week of moderate/strenous exercise 0 days   Average minutes spent exercising at this level 0 min   (!) EXERCISE CONCERN      6/18/2024   Social Factors   Frequency of gathering with friends or relatives Patient declined   Worry food won't last until get money to buy more Patient declined   Food not last or not have enough money for food? Patient declined   Do you have housing?  Patient declined   Are you worried about losing your housing? Patient declined   Lack of transportation? Patient declined   Unable to get utilities (heat,electricity)? Patient declined         6/18/2024   Fall Risk   Fallen 2 or more times in the past year? No    No   Trouble with walking or balance? Yes    Yes   Gait Speed Test (Document in seconds) 5   Gait Speed Test Interpretation Less than or equal to 5.00 seconds - PASS     Working with PT after surgery for cancer on the shin      6/18/2024   Activities of Daily Living- Home Safety   Needs help with the following daily activites None of the above   Safety concerns in the home None of the above         6/18/2024   Dental   Dentist two times every year? Yes         6/18/2024   Hearing Screening   Hearing concerns? None of the above         6/18/2024   Driving Risk Screening   Patient/family members have concerns about driving No         6/18/2024   General Alertness/Fatigue Screening   Have you been more tired than usual lately? No          6/18/2024   Urinary Incontinence Screening   Bothered by leaking urine in past 6 months Yes         6/18/2024   TB Screening   Were you born outside of the [place]? No         Today's PHQ-2 Score:       6/18/2024     8:35 AM   PHQ-2 ( 1999 Pfizer)   Q1: Little interest or pleasure in doing things 0   Q2: Feeling down, depressed or hopeless 0   PHQ-2 Score 0   Q1: Little interest or pleasure in doing things Not at all   Q2: Feeling down, depressed or hopeless Not at all   PHQ-2 Score 0           6/18/2024   Substance Use   Alcohol more than 3/day or more than 7/wk No   Do you have a current opioid prescription? No   How severe/bad is pain from 1 to 10? 2/10   Do you use any other substances recreationally? No     Social History     Tobacco Use    Smoking status: Never     Passive exposure: Past    Smokeless tobacco: Never   Vaping Use    Vaping status: Never Used   Substance Use Topics    Alcohol use: Yes     Comment: Occasionally    Drug use: No           6/3/2023   LAST FHS-7 RESULTS   1st degree relative breast or ovarian cancer No   Any relative bilateral breast cancer No   Any male have breast cancer No   Any ONE woman have BOTH breast AND ovarian cancer No   Any woman with breast cancer before 50yrs No   2 or more relatives with breast AND/OR ovarian cancer No   2 or more relatives with breast AND/OR bowel cancer No        Mammogram Screening - Mammogram every 1-2 years updated in Health Maintenance based on mutual decision making      History of abnormal Pap smear: No - age 65 or older with adequate negative prior screening test results (3 consecutive negative cytology results, 2 consecutive negative cotesting results, or 2 consecutive negative HrHPV test results within 10 years, with the most recent test occurring within the recommended screening interval for the test used)       ASCVD Risk   The 10-year ASCVD risk score (Zurdo MATHEWS, et al., 2019) is: 9.2%    Values used to calculate the score:       Age: 69 years      Sex: Female      Is Non- : No      Diabetic: No      Tobacco smoker: No      Systolic Blood Pressure: 130 mmHg      Is BP treated: Yes      HDL Cholesterol: 47 mg/dL      Total Cholesterol: 165 mg/dL          Reviewed and updated as needed this visit by Provider                    Current providers sharing in care for this patient include:  Patient Care Team:  Joseline Fraga MD as PCP - General (Family Medicine)  Giovany Acosta MD as MD (Hematology & Oncology)  Radha Matias PA-C as Physician Assistant (Dermatology)  Joseline Fraga MD as Assigned PCP  Cheng Vincent DPM as Assigned Surgical Provider  Patrice Armstrong MD as MD (Radiation Oncology)  Johnathan Bernstein RN as Specialty Care Coordinator (Hematology & Oncology)  Benny Barroso MD as Assigned Musculoskeletal Provider  Patrice Armstrong MD as Assigned Cancer Care Provider  Gemini Sanders APRN CNP as Nurse Practitioner (Anesthesiology)    The following health maintenance items are reviewed in Epic and correct as of today:  Health Maintenance   Topic Date Due    RSV VACCINE (Pregnancy & 60+) (1 - 1-dose 60+ series) Never done    ASTHMA ACTION PLAN  05/15/2020    DEXA  03/26/2023    COVID-19 Vaccine (7 - 2023-24 season) 12/07/2023    ASTHMA CONTROL TEST  06/26/2024    MAMMO SCREENING  07/14/2024    LIPID  04/18/2025    MICROALBUMIN  04/18/2025    BMP  06/13/2025    HEMOGLOBIN  06/13/2025    MEDICARE ANNUAL WELLNESS VISIT  06/18/2025    ANNUAL REVIEW OF HM ORDERS  06/18/2025    FALL RISK ASSESSMENT  06/18/2025    COLORECTAL CANCER SCREENING  10/20/2025    GLUCOSE  06/13/2027    ADVANCE CARE PLANNING  06/18/2029    DTAP/TDAP/TD IMMUNIZATION (3 - Td or Tdap) 09/21/2030    HEPATITIS C SCREENING  Completed    PHQ-2 (once per calendar year)  Completed    INFLUENZA VACCINE  Completed    Pneumococcal Vaccine: 65+ Years  Completed    URINALYSIS  Completed    ZOSTER IMMUNIZATION   "Completed    IPV IMMUNIZATION  Aged Out    HPV IMMUNIZATION  Aged Out    MENINGITIS IMMUNIZATION  Aged Out    RSV MONOCLONAL ANTIBODY  Aged Out         Review of Systems  Constitutional, HEENT, cardiovascular, pulmonary, gi and gu systems are negative, except as otherwise noted.     Objective    Exam  /74 (BP Location: Right arm, Patient Position: Sitting, Cuff Size: Adult Regular)   Pulse 90   Temp 97.4  F (36.3  C) (Tympanic)   Resp 16   Ht 1.549 m (5' 1\")   Wt 83.5 kg (184 lb)   SpO2 99%   BMI 34.77 kg/m     Estimated body mass index is 34.77 kg/m  as calculated from the following:    Height as of this encounter: 1.549 m (5' 1\").    Weight as of this encounter: 83.5 kg (184 lb).    Physical Exam  GENERAL: alert and no distress  EYES: Eyes grossly normal to inspection, and conjunctivae and sclerae normal  HENT: ear canals and TM's normal, nose and mouth without ulcers or lesions  NECK: no adenopathy, no asymmetry, masses, or scars  RESP: lungs clear to auscultation - no rales, rhonchi or wheezes  CV: regular rate and rhythm, normal S1 S2, no S3 or S4, no murmur, click or rub, no peripheral edema  ABDOMEN: soft, nontender, no hepatosplenomegaly, no masses and bowel sounds normal  MS: no gross musculoskeletal defects noted, no edema  SKIN: postop lesion below, no other suspicious lesions or rashes  NEURO: Normal strength and tone, mentation intact and speech normal  PSYCH: mentation appears normal, affect normal/bright            6/18/2024   Mini Cog   Clock Draw Score 2 Normal   3 Item Recall 3 objects recalled   Mini Cog Total Score 5      Results from this visitNo results found for any visits on 06/18/24.               Signed Electronically by: Joseline Fraga MD    "

## 2024-06-24 ENCOUNTER — LAB (OUTPATIENT)
Dept: LAB | Facility: CLINIC | Age: 69
End: 2024-06-24
Payer: COMMERCIAL

## 2024-06-24 DIAGNOSIS — T45.1X5A TOXIC EFFECT OF CHEMOTHERAPY: ICD-10-CM

## 2024-06-24 LAB
BASOPHILS # BLD AUTO: 0.1 10E3/UL (ref 0–0.2)
BASOPHILS NFR BLD AUTO: 1 %
EOSINOPHIL # BLD AUTO: 0.1 10E3/UL (ref 0–0.7)
EOSINOPHIL NFR BLD AUTO: 2 %
ERYTHROCYTE [DISTWIDTH] IN BLOOD BY AUTOMATED COUNT: 13 % (ref 10–15)
HCT VFR BLD AUTO: 32.6 % (ref 35–47)
HGB BLD-MCNC: 11.2 G/DL (ref 11.7–15.7)
IMM GRANULOCYTES # BLD: 0 10E3/UL
IMM GRANULOCYTES NFR BLD: 0 %
LYMPHOCYTES # BLD AUTO: 2.2 10E3/UL (ref 0.8–5.3)
LYMPHOCYTES NFR BLD AUTO: 31 %
MCH RBC QN AUTO: 28.6 PG (ref 26.5–33)
MCHC RBC AUTO-ENTMCNC: 34.4 G/DL (ref 31.5–36.5)
MCV RBC AUTO: 83 FL (ref 78–100)
MONOCYTES # BLD AUTO: 0.5 10E3/UL (ref 0–1.3)
MONOCYTES NFR BLD AUTO: 7 %
NEUTROPHILS # BLD AUTO: 4.2 10E3/UL (ref 1.6–8.3)
NEUTROPHILS NFR BLD AUTO: 59 %
PLATELET # BLD AUTO: 245 10E3/UL (ref 150–450)
RBC # BLD AUTO: 3.92 10E6/UL (ref 3.8–5.2)
WBC # BLD AUTO: 7.2 10E3/UL (ref 4–11)

## 2024-06-24 PROCEDURE — 36415 COLL VENOUS BLD VENIPUNCTURE: CPT

## 2024-06-24 PROCEDURE — 85025 COMPLETE CBC W/AUTO DIFF WBC: CPT

## 2024-06-25 ENCOUNTER — VIRTUAL VISIT (OUTPATIENT)
Dept: ONCOLOGY | Facility: CLINIC | Age: 69
End: 2024-06-25
Attending: PHYSICIAN ASSISTANT
Payer: COMMERCIAL

## 2024-06-25 ENCOUNTER — THERAPY VISIT (OUTPATIENT)
Dept: PHYSICAL THERAPY | Facility: CLINIC | Age: 69
End: 2024-06-25
Attending: PHYSICIAN ASSISTANT
Payer: COMMERCIAL

## 2024-06-25 VITALS — WEIGHT: 184 LBS | BODY MASS INDEX: 34.74 KG/M2 | HEIGHT: 61 IN

## 2024-06-25 DIAGNOSIS — C49.9 SARCOMA OF SOFT TISSUE (H): Primary | ICD-10-CM

## 2024-06-25 DIAGNOSIS — C49.9 SARCOMA OF SOFT TISSUE (H): ICD-10-CM

## 2024-06-25 PROCEDURE — 99215 OFFICE O/P EST HI 40 MIN: CPT | Mod: 95 | Performed by: PHYSICIAN ASSISTANT

## 2024-06-25 PROCEDURE — 97116 GAIT TRAINING THERAPY: CPT | Mod: GP | Performed by: PHYSICAL THERAPIST

## 2024-06-25 PROCEDURE — 97110 THERAPEUTIC EXERCISES: CPT | Mod: GP | Performed by: PHYSICAL THERAPIST

## 2024-06-25 PROCEDURE — G2211 COMPLEX E/M VISIT ADD ON: HCPCS | Mod: 95 | Performed by: PHYSICIAN ASSISTANT

## 2024-06-25 RX ORDER — PREGABALIN 50 MG/1
50 CAPSULE ORAL 2 TIMES DAILY
Qty: 60 CAPSULE | Refills: 3 | Status: SHIPPED | OUTPATIENT
Start: 2024-06-25 | End: 2024-07-08

## 2024-06-25 ASSESSMENT — PAIN SCALES - GENERAL: PAINLEVEL: MILD PAIN (3)

## 2024-06-25 NOTE — NURSING NOTE
Is the patient currently in the state of [place]? YES    Visit mode:VIDEO    If the visit is dropped, the patient can be reconnected by: VIDEO VISIT: Text to cell phone:   Telephone Information:   Mobile [phone]       Will anyone else be joining the visit? NO  (If patient encounters technical issues they should call [phone]    How would you like to obtain your AVS? MyChart    Are changes needed to the allergy or medication list? No    Are refills needed on medications prescribed by this physician? NO    Reason for visit: MIKE HERRON

## 2024-06-25 NOTE — PROGRESS NOTES
Virtual Visit Details    Type of service:  Video Visit   Video Start Time: 9:14 AM  Video End Time:9:30 AM    Originating Location (pt. Location): Home  Distant Location (provider location):  On-site  Platform used for Video Visit: [facility]  FOLLOW UP VISIT NOTE    PATIENT NAME: Mirta Cardenas MRN # [id number]  DATE OF VISIT: Jun 25, 2024  YOB: 1955    CANCER TYPE:  High grade pleomorphic sarcoma, right calf       HISTORY OF PRESENT ILLNESS/ONCOLOGY HISTORY   Mirta is a 69 year old female with PMH of CLL and recent Dx of High Grade Pleomorphic sarcoma, with intermittent right calf pain but then continuous calf pain beginning in March 2023.  She had a recent x-ray which shows a destructive lesion in the midportion R LE in between the fibula and the tibia.     -10/2/23, Biopsy  Final Diagnosis   A.  Soft tissue, right calf mass, excision:  - High-grade pleomorphic sarcoma  - See comment      -10/10/23, PET/CT with no evidence of mets (mildly enlarged righ iliac chain LN with no hypermetabolic uptake; thought to be reactive).     -10/17/23-10/24/23, Inpatient Cycle 1 of Doxil/ifosfamide  -11/4/23, Inpatient cycle 2 Doxil/ifosfamide  -12/13/23, Cycle 3 inpatient Doxil/ifosfamide  -1/10/24, Cycle 4 inpatient Doxil/ifosfamide    2/19/2024 to 3/22/2024: Right lower extremity, 5000 cGy in 25 fraction      Surgical resection by Dr. Barroso 4/22/24: Final pathology demonstrated undifferentiated sarcoma, 5.8 cm, there was noted treatment effect with 6% viable tumor, negative margin, ypT2 NX.      PAST ONCOLOGIC HISTORY   CLL - on monitoring--Follows with Dr. Griffin Duckworth    INTERVAL HISTORY   -Has ongoing leg wound issues with open areas. Using wound . Tried Bacitracin x 1, per her PCP's recommendation and found it was not helpful.   -Had some fatigue from chemotherapy.   -Had very mild nausea. Has still been able to eat. Did not need antiemetics.   -Had  mouth sensitivity, but no open sores. Used salt/soda swishes.  -Denies any rashes.  -Reports normal bowel movements.  -Continues to have jolts of pain in right leg.     PAST MEDICAL HISTORY   Anal fisure - controlled with nitrobid  CLL - now being monitored   HTN  Cholesterol  Hx of severe COVID  Psoriasis     PHYSICAL EXAM   Video physical exam  General: Patient appears well in no acute distress.   Skin: Poorly visualized scabbing of inferior part of incision on right shin without erythema; no purulent drainage.    Eyes: EOMI, no erythema, sclera icterus or discharge noted  Resp: Appears to be breathing comfortably without accessory muscle usage, speaking in full sentences, no cough  MSK: Appears to have normal range of motion based on visualized movements  Neurologic: No apparent tremors, facial movements symmetric. Hearing intact. No dysarthria  Psych: affect appropriate, alert and oriented. Pleasant       LABORATORY AND IMAGING STUDIES     Most Recent 3 CBC's:  Recent Labs   Lab Test 06/24/24  1504 06/13/24  1347 06/11/24  1256   WBC 7.2 6.4 6.7   HGB 11.2* 11.7 11.8   MCV 83 82 85    208 230   ANEUTAUTO 4.2 3.8 3.9     Most Recent 3 BMP's:  Recent Labs   Lab Test 06/13/24  1347 06/11/24  1256 04/23/24  0633 04/22/24  1118 04/18/24  1350    142 138  --  140   POTASSIUM 3.7 3.9 3.8  --  3.8   CHLORIDE 102 104 104  --  104   CO2 23 21* 24  --  22   BUN 22.7 24.3* 18.9  --  27.6*   CR 1.05* 1.13* 0.90  --  1.19*   ANIONGAP 14 17* 10  --  14   MONIK 9.8 10.0 8.6*  --  9.7   * 115* 125*   < > 92   PROTTOTAL 7.4 7.9  --   --  7.6   ALBUMIN 4.5 4.7  --   --  4.6    < > = values in this interval not displayed.    Most Recent 3 LFT's:  Recent Labs   Lab Test 06/13/24  1347 06/11/24  1256 04/18/24  1350   AST 26 31 29   ALT 21 24 30   ALKPHOS 100 104 118   BILITOTAL 0.2 0.2 0.4     Phosphorus   Date Value Ref Range Status   06/13/2024 2.9 2.5 - 4.5 mg/dL Final   06/11/2024 3.4 2.5 - 4.5 mg/dL Final    04/18/2024 3.1 2.5 - 4.5 mg/dL Final   01/31/2024 3.7 2.5 - 4.5 mg/dL Final   04/23/2008 3.4 2.5 - 4.5 mg/dL Final   08/17/2005 3.6 2.5 - 4.5 mg/dL Final     Magnesium   Date Value Ref Range Status   06/13/2024 1.8 1.7 - 2.3 mg/dL Final   06/11/2024 1.7 1.7 - 2.3 mg/dL Final   04/18/2024 1.9 1.7 - 2.3 mg/dL Final   01/31/2024 2.0 1.7 - 2.3 mg/dL Final   10/12/2020 1.9 1.6 - 2.3 mg/dL Final   08/17/2005 2.0 1.6 - 2.3 mg/dL Final     I reviewed the above labs today.       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 No evidence of metastatic disease.     She began neoadjuvant chemotherapy with Doxil/ifosfamide on 10/17/23. She is now s/p 4 cycles of Doxil/ifosfamide. She received these at a slightly lower dose due to age and other medical conditions (doxil 40mg/m2 and ifosfamide 8g/m2 over 5 days). Last cycle was 1/10/24.     She completed neoadjuvant RT (3/22/24) followed by surgical resection (Removal of sarcoma right calf, deep compartment including anterior tibial muscle and 8 cm of the fibula) by Dr. Barroso 4/22/24.    She started on adjuvant Doxil at 35 mg/m2 on 6/11/24. She tolerated this well, though continues to have wound healing concerns at her surgical incision site. She will follow-up with Amber Scheierl, CNP prior to cycle 2. She will call sooner for concerns. The overall plan is for 1 year of adjuvant therapy (~through end of June 2025). Will repeat MRI right leg and PET in August    # Delayed wound healing  Will see if she can be seen by our wound care nurse this week. Patient is out of town starting 6/28 in the morning and through next week. She is currently scheduled to see wound care on 7/11 in [place].    #Anemia  S/t chemo and RT?  Repeat CBC with monthly infusions    #Elevated  creatinine  -Intermittently elevated  -CMP with monthly infusions  -continue to push po fluids    #Hypertension--not discussed today  -Losartan 100 mg daily  -Amlodipine 5 mg once daily, decreased from 7.5 mg recently due to patient request to PCP to not cut pills.  -Hydrochlorothiazine 50 mg once daily   -Chlorthalidone previously discontinued d/t electrolyte disturbance  -Recommend checking BP at home periodically.     #Right Leg Swelling  - US RLE on 10/21/23 was negative  - S/t resection.  Can continue compression stockings and leg elevation prn.     #CLL  Follow with Dr. Duckworth --next due in June 2025.     Gunjan Garza PA-C  [facility]  [address] [place] [address]  [phone]    30 minutes spent on the date of the encounter doing chart review, review of test results, interpretation of tests, patient visit, and documentation     The longitudinal plan of care for the diagnosis(es)/condition(s) as documented were addressed during this visit. Due to the added complexity in care, I will continue to support Mirta in the subsequent management and with ongoing continuity of care.

## 2024-06-25 NOTE — LETTER
6/25/2024      Mirta Cardenas  [address] [facility] [id number]      Dear Colleague,    Thank you for referring your patient, Mirta Cardenas, to the [facility]. Please see a copy of my visit note below.    Virtual Visit Details    Type of service:  Video Visit   Video Start Time: 9:14 AM  Video End Time:9:30 AM    Originating Location (pt. Location): Home  Distant Location (provider location):  On-site  Platform used for Video Visit: [facility]  FOLLOW UP VISIT NOTE    PATIENT NAME: Mirta Cardenas MRN # [id number]  DATE OF VISIT: Jun 25, 2024  YOB: 1955    CANCER TYPE:  High grade pleomorphic sarcoma, right calf       HISTORY OF PRESENT ILLNESS/ONCOLOGY HISTORY   Mirta is a 69 year old female with PMH of CLL and recent Dx of High Grade Pleomorphic sarcoma, with intermittent right calf pain but then continuous calf pain beginning in March 2023.  She had a recent x-ray which shows a destructive lesion in the midportion R LE in between the fibula and the tibia.     -10/2/23, Biopsy  Final Diagnosis   A.  Soft tissue, right calf mass, excision:  - High-grade pleomorphic sarcoma  - See comment      -10/10/23, PET/CT with no evidence of mets (mildly enlarged righ iliac chain LN with no hypermetabolic uptake; thought to be reactive).     -10/17/23-10/24/23, Inpatient Cycle 1 of Doxil/ifosfamide  -11/4/23, Inpatient cycle 2 Doxil/ifosfamide  -12/13/23, Cycle 3 inpatient Doxil/ifosfamide  -1/10/24, Cycle 4 inpatient Doxil/ifosfamide    2/19/2024 to 3/22/2024: Right lower extremity, 5000 cGy in 25 fraction      Surgical resection by Dr. Barroso 4/22/24: Final pathology demonstrated undifferentiated sarcoma, 5.8 cm, there was noted treatment effect with 6% viable tumor, negative margin, ypT2 NX.      PAST ONCOLOGIC HISTORY   CLL - on monitoring--Follows with Dr. Griffin Duckworth    INTERVAL HISTORY   -Has ongoing leg wound  PT CALLED STATING SHE RECEIVED HER TEST RESULTS FROM HER HEART AND DOES NOT UNDERSTAND THEM AND IS REQUESTING A CALL BACK     PLEASE ADVISE     PT CALL BACK   [phone]   issues with open areas. Using wound . Tried Bacitracin x 1, per her PCP's recommendation and found it was not helpful.   -Had some fatigue from chemotherapy.   -Had very mild nausea. Has still been able to eat. Did not need antiemetics.   -Had mouth sensitivity, but no open sores. Used salt/soda swishes.  -Denies any rashes.  -Reports normal bowel movements.  -Continues to have jolts of pain in right leg.     PAST MEDICAL HISTORY   Anal fisure - controlled with nitrobid  CLL - now being monitored   HTN  Cholesterol  Hx of severe COVID  Psoriasis     PHYSICAL EXAM   Video physical exam  General: Patient appears well in no acute distress.   Skin: Poorly visualized scabbing of inferior part of incision on right shin without erythema; no purulent drainage.    Eyes: EOMI, no erythema, sclera icterus or discharge noted  Resp: Appears to be breathing comfortably without accessory muscle usage, speaking in full sentences, no cough  MSK: Appears to have normal range of motion based on visualized movements  Neurologic: No apparent tremors, facial movements symmetric. Hearing intact. No dysarthria  Psych: affect appropriate, alert and oriented. Pleasant       LABORATORY AND IMAGING STUDIES     Most Recent 3 CBC's:  Recent Labs   Lab Test 06/24/24  1504 06/13/24  1347 06/11/24  1256   WBC 7.2 6.4 6.7   HGB 11.2* 11.7 11.8   MCV 83 82 85    208 230   ANEUTAUTO 4.2 3.8 3.9     Most Recent 3 BMP's:  Recent Labs   Lab Test 06/13/24  1347 06/11/24  1256 04/23/24  0633 04/22/24  1118 04/18/24  1350    142 138  --  140   POTASSIUM 3.7 3.9 3.8  --  3.8   CHLORIDE 102 104 104  --  104   CO2 23 21* 24  --  22   BUN 22.7 24.3* 18.9  --  27.6*   CR 1.05* 1.13* 0.90  --  1.19*   ANIONGAP 14 17* 10  --  14   MONIK 9.8 10.0 8.6*  --  9.7   * 115* 125*   < > 92   PROTTOTAL 7.4 7.9  --   --  7.6   ALBUMIN 4.5 4.7  --   --  4.6    < > = values in this interval not displayed.    Most Recent 3 LFT's:  Recent Labs   Lab  Test 06/13/24  1347 06/11/24  1256 04/18/24  1350   AST 26 31 29   ALT 21 24 30   ALKPHOS 100 104 118   BILITOTAL 0.2 0.2 0.4     Phosphorus   Date Value Ref Range Status   06/13/2024 2.9 2.5 - 4.5 mg/dL Final   06/11/2024 3.4 2.5 - 4.5 mg/dL Final   04/18/2024 3.1 2.5 - 4.5 mg/dL Final   01/31/2024 3.7 2.5 - 4.5 mg/dL Final   04/23/2008 3.4 2.5 - 4.5 mg/dL Final   08/17/2005 3.6 2.5 - 4.5 mg/dL Final     Magnesium   Date Value Ref Range Status   06/13/2024 1.8 1.7 - 2.3 mg/dL Final   06/11/2024 1.7 1.7 - 2.3 mg/dL Final   04/18/2024 1.9 1.7 - 2.3 mg/dL Final   01/31/2024 2.0 1.7 - 2.3 mg/dL Final   10/12/2020 1.9 1.6 - 2.3 mg/dL Final   08/17/2005 2.0 1.6 - 2.3 mg/dL Final     I reviewed the above labs today.       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 No evidence of metastatic disease.     She began neoadjuvant chemotherapy with Doxil/ifosfamide on 10/17/23. She is now s/p 4 cycles of Doxil/ifosfamide. She received these at a slightly lower dose due to age and other medical conditions (doxil 40mg/m2 and ifosfamide 8g/m2 over 5 days). Last cycle was 1/10/24.     She completed neoadjuvant RT (3/22/24) followed by surgical resection (Removal of sarcoma right calf, deep compartment including anterior tibial muscle and 8 cm of the fibula) by Dr. Barroso 4/22/24.    She started on adjuvant Doxil at 35 mg/m2 on 6/11/24. She tolerated this well, though continues to have wound healing concerns at her surgical incision site. She will follow-up with Amber Scheierl, CNP prior to cycle 2. She will call sooner for concerns. The overall plan is for 1 year of adjuvant therapy (~through end of June 2025). Will repeat MRI right leg and PET in August    # Delayed wound healing  Will see if she can be seen by our wound care nurse  this week. Patient is out of town starting 6/28 in the morning and through next week. She is currently scheduled to see wound care on 7/11 in [place].    #Anemia  S/t chemo and RT?  Repeat CBC with monthly infusions    #Elevated creatinine  -Intermittently elevated  -CMP with monthly infusions  -continue to push po fluids    #Hypertension--not discussed today  -Losartan 100 mg daily  -Amlodipine 5 mg once daily, decreased from 7.5 mg recently due to patient request to PCP to not cut pills.  -Hydrochlorothiazine 50 mg once daily   -Chlorthalidone previously discontinued d/t electrolyte disturbance  -Recommend checking BP at home periodically.     #Right Leg Swelling  - US RLE on 10/21/23 was negative  - S/t resection.  Can continue compression stockings and leg elevation prn.     #CLL  Follow with Dr. Duckworth --next due in June 2025.     Gunjan Garza PA-C  [facility]  [address] [place] [id number]  [phone]    30 minutes spent on the date of the encounter doing chart review, review of test results, interpretation of tests, patient visit, and documentation     The longitudinal plan of care for the diagnosis(es)/condition(s) as documented were addressed during this visit. Due to the added complexity in care, I will continue to support Mirta in the subsequent management and with ongoing continuity of care.    Again, thank you for allowing me to participate in the care of your patient.        Sincerely,        Gunjan Garza PA-C

## 2024-07-02 NOTE — PATIENT INSTRUCTIONS
"Wound care supplies were not ordered or needed today.    You can purchase the betadine and the rest of the supplies at [facility]. Betadine comes in a large bottle- you can apply with a q-tip or a cotton swab    Wound Care Instructions    DAILY and as needed    Pat Dry with non-sterile gauze    Primary Dressing: Paint betadine onto your wound, allow to air dry     Secondary dressing: Cover with ABD or dry gauze    Secure with non-sterile roll gauze (4\" x 75\" roll) and tape (1\" roll tape) as needed; avoid adhesive directly on the skin    Compression: tubigrip    It is ok to get your wound wet in the bath or shower    Please wear your compression to each clinic visit.    It is recommended that you elevate your legs throughout the day: approx 2-3 times each day  Elevate them above the level of your heart for 30 min.   Ways to do this:   Lay on the couch or your bed and prop your legs up on pillows   Recline back as far as you can go in your recliner and prop your legs on pillows.     Doing these things will help reduce the edema in your legs.    High Protein Foods  When you have an open ulcer, your bodies protein needs are much higher, so it is recommended eat good sources of protein or take a protein supplement!    Protein Supplements  -Premier Protein  -Ensure  -Boost  -Glucerna, if diabetic    Chicken  -Chicken breast, 3.5oz.-30 grams protein  -Chicken meat, cooked, 4 oz.    Beef  -Hamburger linsey, 4 oz-28 grams protein  -Steak, 6 oz-42 grams  -Most cuts of beef- 7 grams of protein per ounce    Fish  -Most fish fillets or steaks are about 22 grams of protein for 3 1/2 oz(100 grams) of cooked fish, or 6 grams per ounce  -Tuna, 6 oz can-40 grams of protein    Pork  -Pork chop, average-22 grams protein  -Pork loin or tenderloin, 4 oz.-29 grams  -Ham, 3oz serving- 19 grams  -Jordan, 1 slice-3 grams    Eggs and Dairy  -Egg, large-7 grams  -Milk, 1 cup-8 grams  -Cottage cheese, 1/2 cup-15 grams  -Greek yogurt, 1 cup-usually " 8-12 grams, check label    Beans  -Soy milk, 1 cup-6-10 grams  -Most beans(black, rowe, lentils, etc.) about 7-10 grams protein per half cup of cooked beans    Nuts and Seeds  -Peanut butter, 2 Tablespoons- 8 grams protein  -Almonds, 1/4 cup- 8 grams  -Peanuts, 1/4 cup-9 grams  -Sunflower seeds, 1/4 cup- 6 grams        If for some reason you are not able to get your dressing(s) changed as outlined above (due to illness, lack of supplies, lack of help) please do the following: remove old, soiled dressings; wash the wounds with saline; pat dry; apply ABD pad or other absorbant pad and secure with rolled gauze; avoid tape directly on your skin; Call the clinic as soon as possible to let us know what the current issues are in receiving wound care [phone].      SEEK MEDICAL CARE IF:  You have an increase in swelling, pain, or redness around the wound.  You have an increase in the amount of pus coming from the wound.  There is a bad smell coming from the wound.  The wound appears to be worsening/enlarging  You have a fever greater than 101.5 F      It is ok to continue current wound care treatment/products for the next 2-3 days until new wound care supplies are ordered and arrive. If longer than this please contact our office at [phone].    If you have a 2 layer or 4 layer compression wrap on these are safe to have on for ONLY 7 days. If for some reason you are not able to get the wrap(s) changed (due to illness; lack of supplies, lack of help, lack of transportation) please do the following: unwrap the old 2 or 4 layer compression wrap; avoid using scissors as you could cut your skin and cause wounds; use tubular compression when available. Call to reschedule your home care or clinic visit appointment as soon as possible.    Please NOTE: if you are 15 minutes late to your clinic appointment you will have to be rescheduled. Please call our clinic as soon as possible if you know you will not be able to get to  your appointment at [phone].    If you fail to show up to 3 scheduled clinic appointments you will be dismissed from our clinic.              We want to hear from you!  In the next few weeks, you should receive a call or email to complete a survey about your visit at [facility]. Please help us improve your appointment experience by letting us know how we did today. We strive to make your experience good and value any ways in which we could do better.      We value your input and suggestions.    Thank you for choosing the [facility]!

## 2024-07-08 ENCOUNTER — VIRTUAL VISIT (OUTPATIENT)
Dept: ONCOLOGY | Facility: CLINIC | Age: 69
End: 2024-07-08
Attending: STUDENT IN AN ORGANIZED HEALTH CARE EDUCATION/TRAINING PROGRAM
Payer: COMMERCIAL

## 2024-07-08 DIAGNOSIS — C49.9 SARCOMA OF SOFT TISSUE (H): Primary | ICD-10-CM

## 2024-07-08 DIAGNOSIS — C49.21 SARCOMA OF RIGHT LOWER EXTREMITY (H): ICD-10-CM

## 2024-07-08 DIAGNOSIS — Z76.89 PREVENTION OF CHEMOTHERAPY-INDUCED NEUTROPENIA: ICD-10-CM

## 2024-07-08 PROCEDURE — 99215 OFFICE O/P EST HI 40 MIN: CPT | Mod: 24 | Performed by: STUDENT IN AN ORGANIZED HEALTH CARE EDUCATION/TRAINING PROGRAM

## 2024-07-08 RX ORDER — PREGABALIN 50 MG/1
50 CAPSULE ORAL 3 TIMES DAILY
Qty: 90 CAPSULE | Refills: 0 | Status: SHIPPED | OUTPATIENT
Start: 2024-07-25 | End: 2024-08-23

## 2024-07-08 NOTE — NURSING NOTE
Current patient location: Patient declined to provide     Is the patient currently in the state of [place]? YES    Visit mode:VIDEO    If the visit is dropped, the patient can be reconnected by: VIDEO VISIT: Text to cell phone:   Telephone Information:   Mobile [phone]       Will anyone else be joining the visit? NO  (If patient encounters technical issues they should call [phone]    How would you like to obtain your AVS? MyChart    Are changes needed to the allergy or medication list? Pt stated no changes to allergies and Pt stated no med changes    Are refills needed on medications prescribed by this physician? NO    Reason for visit: RECHECK    Alex HERRON

## 2024-07-08 NOTE — PROGRESS NOTES
Virtual Visit Details    Type of service:  Video Visit   Video Start Time:  11:41am  Video End Time: 12:03PM    Originating Location (pt. Location): Home    Distant Location (provider location):  On-site  Platform used for Video Visit: [employer]    [facility]ER CLINIC  FOLLOW UP VISIT NOTE    PATIENT NAME: Mirta Cardenas MRN # [id number]  DATE OF VISIT: Jul 8, 2024  YOB: 1955    CANCER TYPE:  High grade pleomorphic sarcoma, right calf       HISTORY OF PRESENT ILLNESS/ONCOLOGY HISTORY   Mirta is a 69 year old female with PMH of CLL and recent Dx of High Grade Pleomorphic sarcoma, with intermittent right calf pain but then continuous calf pain beginning in March 2023.  She had a recent x-ray which shows a destructive lesion in the midportion R LE in between the fibula and the tibia.     -10/2/23, Biopsy  Final Diagnosis   A.  Soft tissue, right calf mass, excision:  - High-grade pleomorphic sarcoma  - See comment      -10/10/23, PET/CT with no evidence of mets (mildly enlarged righ iliac chain LN with no hypermetabolic uptake; thought to be reactive).     -10/17/23-10/24/23, Inpatient Cycle 1 of Doxil/ifosfamide  -11/4/23, Inpatient cycle 2 Doxil/ifosfamide  -12/13/23, Cycle 3 inpatient Doxil/ifosfamide  -1/10/24, Cycle 4 inpatient Doxil/ifosfamide    2/19/2024 to 3/22/2024: Right lower extremity, 5000 cGy in 25 fraction      Surgical resection by Dr. Barroso 4/22/24: Final pathology demonstrated undifferentiated sarcoma, 5.8 cm, there was noted treatment effect with 6% viable tumor, negative margin, ypT2 NX.     6/11/24, Adjuvant Cycle 1 Doxil at 35mg/m2 (dose reduced for wound healing)     PAST ONCOLOGIC HISTORY   CLL - on monitoring--Follows with Dr. Griffin Duckworth    INTERVAL HISTORY   -Mirta presents via video visit for follow up/evaluation prior to cycle 2 Doxil.  -I reviewed Dr. Duckworth's noted from 6/13.  -Mirta reports feeling ok. [place]  -She feels the infusion of Doxil went fairly well. She did have some fatigue the first week which was manageable.   -She developed a few mouth sores which responded to s/s rinses and did not impede oral intake  -She had some stomach upset but again did not impair appetite. She already takes omeprazole daily  -The right lower leg remains intermittently painful. Usually a pins and needles feeling or occasional stabbing pain. She is taking Lyrica BID; used to take TID. Leg is more painful after PT. Any leg swelling occurs after being on feet.   -Incision on right leg remains scabbed; occasionally oozes clear/tan liquid after PT. No notable odor. No spreading redness around incision. No fevers. She has been cleansing with wound cleanser BID. She sees wound care this week. She hasn't noticed worsening appearance like increased redness at wound since Doxil but hasn't noticed improvement either.       PAST MEDICAL HISTORY   Anal fisure - controlled with nitrobid  CLL - now being monitored   HTN  Cholesterol  Hx of severe COVID  Psoriasis     PHYSICAL EXAM   There were no vitals taken for this visit.    6/13/2024  1:28 PM 6/18/2024  8:49 AM   Vital Signs     Systolic 160 !  130    Diastolic 78 !  74    Pulse 89  90    Temperature 98  F (36.7  C)  97.4  F (36.3  C)    Respirations 16  16    Weight (LB) 183 lb  184 lb    Height  5' 1\"    BMI (Calculated)  34.77    Pain Score 0 (None)  2 (Mild)    O2 98 %  99 %    Systolic (Patient Reported)          Video physical exam  General: Patient appears well in no acute distress.   Skin: RLE incision with scabbing at incision without marked surrounding erythema; no purulent drainage. No visualized rash or lesions on visualized skin  Eyes: EOMI, no erythema, sclera icterus or discharge noted  Resp: Appears to be breathing comfortably without accessory muscle usage, speaking in full sentences, no cough  MSK: Appears to have normal range of motion based on visualized " movements  Neurologic: No apparent tremors, facial movements symmetric. Hearing intact. No dysarthria  Psych: affect bright. alert and oriented. Pleasant       LABORATORY AND IMAGING STUDIES     Most Recent 3 CBC's:  Recent Labs   Lab Test 06/24/24  1504 06/13/24  1347 06/11/24  1256   WBC 7.2 6.4 6.7   HGB 11.2* 11.7 11.8   MCV 83 82 85    208 230   ANEUTAUTO 4.2 3.8 3.9     Most Recent 3 BMP's:  Recent Labs   Lab Test 06/13/24  1347 06/11/24  1256 04/23/24  0633 04/22/24  1118 04/18/24  1350    142 138  --  140   POTASSIUM 3.7 3.9 3.8  --  3.8   CHLORIDE 102 104 104  --  104   CO2 23 21* 24  --  22   BUN 22.7 24.3* 18.9  --  27.6*   CR 1.05* 1.13* 0.90  --  1.19*   ANIONGAP 14 17* 10  --  14   MONIK 9.8 10.0 8.6*  --  9.7   * 115* 125*   < > 92   PROTTOTAL 7.4 7.9  --   --  7.6   ALBUMIN 4.5 4.7  --   --  4.6    < > = values in this interval not displayed.    Most Recent 3 LFT's:  Recent Labs   Lab Test 06/13/24  1347 06/11/24  1256 04/18/24  1350   AST 26 31 29   ALT 21 24 30   ALKPHOS 100 104 118   BILITOTAL 0.2 0.2 0.4       I reviewed the above labs today.       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 No evidence of metastatic disease.     She began neoadjuvant chemotherapy with Doxil/ifosfamide on 10/17/23. She is now s/p 4 cycles of Doxil/ifosfamide. She received these at a slightly lower dose due to age and other medical conditions (doxil 40mg/m2 and ifosfamide 8g/m2 over 5 days). Last cycle was 1/10/24.     She completed neoadjuvant RT (3/22/24) followed by surgical resection (Removal of sarcoma right calf, deep compartment including anterior tibial muscle and 8 cm of the fibula) by Dr. Barroso 4/22/24.    She began adjuvant Doxil at 35 mg/m2 on 6/11/24. She is tolerating this fairly well  with manageable fatigue and mucositis. She does have ongoing slow/delayed wound healing of surgical incision. She will see wound care this week. We will keep Doxil at 35mg/m2 to prevent complications at this site for now. She knows to monitor site for any signs of infection.    The overall plan is for 1 year of adjuvant therapy (~through end of June 2025). Will repeat MRI right leg and PET in August    Oncology Follow-Up/Plan:  -Proceed with Cycle 2 Doxil at 35mg/m2 on 7/9 pending labs prior  -Monthly virtual ISIDORO prior to labs and Doxil infusions in [place]  -repeat imaging in August     # Delayed wound healing at surgical site  No infectious concerns per in person visit on 6/13 or over video today  Continue BID wound cleanser for now  Wound care consult on 7/11 in [place] for additional recs to expedite healing    #Neuropathic pain  Increase Lyrica to 50mg TID    #Mucositis  During chemo cycle  Cool mouth during Doxil infusions  S/s for 1 week TID after chemo and then TID prn for sores    #Anemia  S/t chemo and RT?  Repeat CBC with monthly infusions    #Elevated creatinine  -Intermittently elevated  -CMP with monthly infusions  -continue to push po fluids    #Hypertension--not explicitly discussed  -Losartan 100 mg daily  -Amlodipine 5 mg once daily  -Hydrochlorothiazine 50 mg once daily   -Chlorthalidone previously discontinued d/t electrolyte disturbance    #Right Leg Swelling  - US RLE on 10/21/23 was negative  -Varies with activity  - S/t resection.  Can continue compression stockings and leg elevation prn.     #CLL  Follow with Dr. Duckworth --next due in June 2025.     40 minutes spent on the date of the encounter doing chart review, review of test results, interpretation of tests, patient visit, and documentation     Amber Scheierl, CNP  [facility]  [address] [place] [id number]  [phone]

## 2024-07-09 ENCOUNTER — LAB (OUTPATIENT)
Dept: LAB | Facility: CLINIC | Age: 69
End: 2024-07-09
Payer: COMMERCIAL

## 2024-07-09 ENCOUNTER — INFUSION THERAPY VISIT (OUTPATIENT)
Dept: INFUSION THERAPY | Facility: CLINIC | Age: 69
End: 2024-07-09
Attending: STUDENT IN AN ORGANIZED HEALTH CARE EDUCATION/TRAINING PROGRAM
Payer: COMMERCIAL

## 2024-07-09 VITALS
RESPIRATION RATE: 16 BRPM | BODY MASS INDEX: 35.3 KG/M2 | WEIGHT: 186.8 LBS | HEART RATE: 80 BPM | DIASTOLIC BLOOD PRESSURE: 77 MMHG | SYSTOLIC BLOOD PRESSURE: 125 MMHG | TEMPERATURE: 98.3 F

## 2024-07-09 DIAGNOSIS — C49.21 SARCOMA OF RIGHT LOWER EXTREMITY (H): ICD-10-CM

## 2024-07-09 DIAGNOSIS — Z76.89 PREVENTION OF CHEMOTHERAPY-INDUCED NEUTROPENIA: Primary | ICD-10-CM

## 2024-07-09 LAB
ALBUMIN SERPL BCG-MCNC: 4.3 G/DL (ref 3.5–5.2)
ALP SERPL-CCNC: 91 U/L (ref 40–150)
ALT SERPL W P-5'-P-CCNC: 24 U/L (ref 0–50)
ANION GAP SERPL CALCULATED.3IONS-SCNC: 11 MMOL/L (ref 7–15)
AST SERPL W P-5'-P-CCNC: 26 U/L (ref 0–45)
BASOPHILS # BLD AUTO: 0 10E3/UL (ref 0–0.2)
BASOPHILS NFR BLD AUTO: 1 %
BILIRUB SERPL-MCNC: 0.2 MG/DL
BUN SERPL-MCNC: 24.8 MG/DL (ref 8–23)
CALCIUM SERPL-MCNC: 9.3 MG/DL (ref 8.8–10.2)
CHLORIDE SERPL-SCNC: 107 MMOL/L (ref 98–107)
CREAT SERPL-MCNC: 1.16 MG/DL (ref 0.51–0.95)
DEPRECATED HCO3 PLAS-SCNC: 23 MMOL/L (ref 22–29)
EGFRCR SERPLBLD CKD-EPI 2021: 51 ML/MIN/1.73M2
EOSINOPHIL # BLD AUTO: 0.1 10E3/UL (ref 0–0.7)
EOSINOPHIL NFR BLD AUTO: 2 %
ERYTHROCYTE [DISTWIDTH] IN BLOOD BY AUTOMATED COUNT: 13.3 % (ref 10–15)
GLUCOSE SERPL-MCNC: 103 MG/DL (ref 70–99)
HCT VFR BLD AUTO: 31 % (ref 35–47)
HGB BLD-MCNC: 11.2 G/DL (ref 11.7–15.7)
IMM GRANULOCYTES # BLD: 0 10E3/UL
IMM GRANULOCYTES NFR BLD: 0 %
LYMPHOCYTES # BLD AUTO: 1.9 10E3/UL (ref 0.8–5.3)
LYMPHOCYTES NFR BLD AUTO: 27 %
MCH RBC QN AUTO: 29.6 PG (ref 26.5–33)
MCHC RBC AUTO-ENTMCNC: 36.1 G/DL (ref 31.5–36.5)
MCV RBC AUTO: 82 FL (ref 78–100)
MONOCYTES # BLD AUTO: 0.6 10E3/UL (ref 0–1.3)
MONOCYTES NFR BLD AUTO: 9 %
NEUTROPHILS # BLD AUTO: 4.2 10E3/UL (ref 1.6–8.3)
NEUTROPHILS NFR BLD AUTO: 61 %
NRBC # BLD AUTO: 0 10E3/UL
NRBC BLD AUTO-RTO: 0 /100
PLATELET # BLD AUTO: 206 10E3/UL (ref 150–450)
POTASSIUM SERPL-SCNC: 3.9 MMOL/L (ref 3.4–5.3)
PROT SERPL-MCNC: 7.2 G/DL (ref 6.4–8.3)
RBC # BLD AUTO: 3.78 10E6/UL (ref 3.8–5.2)
SODIUM SERPL-SCNC: 141 MMOL/L (ref 135–145)
WBC # BLD AUTO: 6.9 10E3/UL (ref 4–11)

## 2024-07-09 PROCEDURE — 36415 COLL VENOUS BLD VENIPUNCTURE: CPT | Performed by: STUDENT IN AN ORGANIZED HEALTH CARE EDUCATION/TRAINING PROGRAM

## 2024-07-09 PROCEDURE — 250N000011 HC RX IP 250 OP 636: Performed by: STUDENT IN AN ORGANIZED HEALTH CARE EDUCATION/TRAINING PROGRAM

## 2024-07-09 PROCEDURE — 85004 AUTOMATED DIFF WBC COUNT: CPT | Performed by: STUDENT IN AN ORGANIZED HEALTH CARE EDUCATION/TRAINING PROGRAM

## 2024-07-09 PROCEDURE — 96413 CHEMO IV INFUSION 1 HR: CPT

## 2024-07-09 PROCEDURE — 84075 ASSAY ALKALINE PHOSPHATASE: CPT | Performed by: STUDENT IN AN ORGANIZED HEALTH CARE EDUCATION/TRAINING PROGRAM

## 2024-07-09 PROCEDURE — 96375 TX/PRO/DX INJ NEW DRUG ADDON: CPT

## 2024-07-09 PROCEDURE — 258N000003 HC RX IP 258 OP 636: Performed by: STUDENT IN AN ORGANIZED HEALTH CARE EDUCATION/TRAINING PROGRAM

## 2024-07-09 RX ORDER — ONDANSETRON 2 MG/ML
8 INJECTION INTRAMUSCULAR; INTRAVENOUS ONCE
Status: COMPLETED | OUTPATIENT
Start: 2024-07-09 | End: 2024-07-09

## 2024-07-09 RX ADMIN — DOXORUBICIN HYDROCHLORIDE 66 MG: 2 INJECTABLE, LIPOSOMAL INTRAVENOUS at 13:29

## 2024-07-09 RX ADMIN — FAMOTIDINE 20 MG: 10 INJECTION, SOLUTION INTRAVENOUS at 13:06

## 2024-07-09 RX ADMIN — ONDANSETRON 8 MG: 2 INJECTION INTRAMUSCULAR; INTRAVENOUS at 13:08

## 2024-07-09 RX ADMIN — DEXTROSE MONOHYDRATE 250 ML: 50 INJECTION, SOLUTION INTRAVENOUS at 13:05

## 2024-07-09 ASSESSMENT — PAIN SCALES - GENERAL: PAINLEVEL: MILD PAIN (2)

## 2024-07-09 NOTE — PROGRESS NOTES
Infusion Nursing Note:  Mirta Cardenas presents today for C2D1 Doxil.    Patient seen by provider today: No   present during visit today: Not Applicable.    Note: Labs drawn peripherally.      Intravenous Access:  Peripheral IV placed.    Treatment Conditions:  Lab Results   Component Value Date    HGB 11.2 (L) 07/09/2024    WBC 6.9 07/09/2024    ANEU 3.9 01/22/2024    ANEUTAUTO 4.2 07/09/2024     07/09/2024        Lab Results   Component Value Date     07/09/2024    POTASSIUM 3.9 07/09/2024    MAG 1.8 06/13/2024    CR 1.16 (H) 07/09/2024    MONIK 9.3 07/09/2024    BILITOTAL 0.2 07/09/2024    ALBUMIN 4.3 07/09/2024    ALT 24 07/09/2024    AST 26 07/09/2024       Results reviewed, labs MET treatment parameters, ok to proceed with treatment.      Post Infusion Assessment:  Patient tolerated infusion without incident.  Site patent and intact, free from redness, edema or discomfort.  No evidence of extravasations.  Access discontinued per protocol.       Discharge Plan:   Patient discharged in stable condition accompanied by: .  Departure Mode: Ambulatory.      Faviola Padilla RN

## 2024-07-10 ENCOUNTER — OFFICE VISIT (OUTPATIENT)
Dept: CARDIOLOGY | Facility: CLINIC | Age: 69
End: 2024-07-10
Attending: NURSE PRACTITIONER
Payer: COMMERCIAL

## 2024-07-10 VITALS
HEART RATE: 85 BPM | WEIGHT: 186 LBS | OXYGEN SATURATION: 98 % | HEIGHT: 61 IN | SYSTOLIC BLOOD PRESSURE: 147 MMHG | BODY MASS INDEX: 35.12 KG/M2 | DIASTOLIC BLOOD PRESSURE: 91 MMHG

## 2024-07-10 DIAGNOSIS — I25.10 CORONARY ARTERY CALCIFICATION SEEN ON CAT SCAN: ICD-10-CM

## 2024-07-10 DIAGNOSIS — C49.21 SARCOMA OF RIGHT LOWER EXTREMITY (H): Primary | ICD-10-CM

## 2024-07-10 PROCEDURE — 93000 ELECTROCARDIOGRAM COMPLETE: CPT | Performed by: INTERNAL MEDICINE

## 2024-07-10 PROCEDURE — 99204 OFFICE O/P NEW MOD 45 MIN: CPT | Mod: 24 | Performed by: INTERNAL MEDICINE

## 2024-07-10 NOTE — PROGRESS NOTES
CARDIOLOGY CLINIC CONSULTATION    PRIMARY CARE PHYSICIAN:  Joseline Fraga    Tests reviewed/interpreted independently in clinic today:   EKG, Echocardiogram, Blood work.     The level of medical decision making during this visit was of moderate complexity.     The longitudinal plan of care for the diagnosis(es)/condition(s) as documented were addressed during this visit. Due to the added complexity in care, I will continue to support Mirta in the subsequent management and with ongoing continuity of care.     HISTORY OF PRESENT ILLNESS:  Today, I had the pleasure of connecting with Mirta Cardenas.                Assessment and Impression:      Pertinent issues addressed/ reviewed during this cardiology visit     ***         Recommendations and Plan:         It was a pleasure to see Mirta Cardenas in clinic today. ***        Raymon LINDO, FACC, [facility]  Cardiology - [facility]  July 10, 2024    Orders Placed This Encounter   Procedures    EKG 12-lead complete w/read - Clinics (performed today)       PAST MEDICAL HISTORY:  Past Medical History:   Diagnosis Date    Asthma     Asthma     reactive airway disease, per patient    Blood transfusion     Cancer (H)     chronic lymphocytic leukemia    CINV (chemotherapy-induced nausea and vomiting) 10/12/2023    History of transfusion     Hypertension     Hypertension     Leukemia, chronic lymphocytic (H)     Malignant neoplasm (H)     CLL    Sarcoma of right lower extremity (H)     Thyroid nodule 04/28/2008       MEDICATIONS:  Current Outpatient Medications   Medication Sig Dispense Refill    amLODIPine (NORVASC) 5 MG tablet Take 1 tablet (5 mg) by mouth daily 90 tablet 3    bacitracin 500 UNIT/GM external ointment Apply topically 2 times daily To wounds on the right shin. 4 g daily 425 g 3    hydrochlorothiazide (HYDRODIURIL) 50 MG tablet Take 1 tablet (50 mg) by mouth daily 90 tablet 3    losartan (COZAAR) 100 MG tablet Take 1 tablet (100 mg) by mouth daily 90  tablet 3    Multiple Vitamins-Calcium (ONE-A-DAY WOMENS FORMULA PO) Take 1 tablet by mouth daily      nystatin (MYCOSTATIN) 002100 UNIT/GM external ointment Apply topically 2 times daily 30g to last 2 weeks. 60 g 3    omeprazole (PRILOSEC) 20 MG DR capsule Take 1 capsule by mouth every morning.      pravastatin (PRAVACHOL) 40 MG tablet TAKE 1 TABLET (40 MG) BY MOUTH DAILY 90 tablet 1    [START ON 7/25/2024] pregabalin (LYRICA) 50 MG capsule Take 1 capsule (50 mg) by mouth 3 times daily 90 capsule 0     No current facility-administered medications for this visit.       ALLERGIES:  Allergies   Allergen Reactions    Allopurinol Itching    Amoxicillin Hives    Codeine Itching    Cymbalta [Duloxetine Hcl] Fatigue    Periactin Other (See Comments)     Sleepy.    11/15/23: patient not sure about this allergy/intolerance - may be interested in removal    Tenormin [Atenolol] Fatigue     11/15/23: patient may be interested in removal of this from allergy list as it was only fatigue/sleepiness       SOCIAL HISTORY:  I have reviewed this patient's social history and updated it with pertinent information if needed. Mirta Guardadoult  reports that she has never smoked. She has been exposed to tobacco smoke. She has never used smokeless tobacco. She reports current alcohol use. She reports that she does not use drugs.    FAMILY HISTORY:  I have reviewed this patient's family history and updated it with pertinent information if needed.   Family History   Problem Relation Age of Onset    Osteoporosis Mother     Cancer Sister         multiple myloma    Hypertension Brother     Heart Disease Brother 65        bypass    Obesity Son     Obesity Son     Hypertension Son     Melanoma No family hx of     Anesthesia Reaction No family hx of     Thrombosis No family hx of        REVIEW OF SYSTEMS:  Skin:        Eyes:       ENT:       Respiratory:  Negative shortness of breath;cough;wheezing;sleep apnea;CPAP;dyspnea at rest  Cardiovascular:   "Negative;palpitations;chest pain;syncope or near-syncope;lightheadedness;dizziness Positive for;edema;fatigue  Gastroenterology:      Genitourinary:       Musculoskeletal:       Neurologic:       Psychiatric:       Heme/Lymph/Imm:       Endocrine:           PHYSICAL EXAM:  BP (!) 147/91   Pulse 85   Ht 1.549 m (5' 1\")   Wt 84.4 kg (186 lb)   SpO2 98%   BMI 35.14 kg/m    Constitutional: alert, no distress  Respiratory: Good bilateral air entry  Cardiovascular: s1 s2 normal, no murmurs  GI: nondistended  Neuropsychiatric: appropriate affact  Edema: none    This note was completed in part using dictation via the Dragon voice recognition software. Some word and grammatical errors may occur and must be interpreted in the appropriate clinical context.  If there are any questions pertaining to this issue, please contact me for further clarification.  "

## 2024-07-10 NOTE — LETTER
7/10/2024    Joseline Fraga MD  [address] [place] [address]    RE: Mirta Cardenas       Dear Colleague,     I had the pleasure of seeing Mirta Cardenas in the [facility].      CARDIOLOGY CLINIC CONSULTATION    PRIMARY CARE PHYSICIAN:  Joseline Fraga    Tests reviewed/interpreted independently in clinic today:   EKG, Echocardiogram, Blood work.     The level of medical decision making during this visit was of moderate complexity.     The longitudinal plan of care for the diagnosis(es)/condition(s) as documented were addressed during this visit. Due to the added complexity in care, I will continue to support Mirta in the subsequent management and with ongoing continuity of care.     HISTORY OF PRESENT ILLNESS:  Today, I had the pleasure of connecting with Mirta Cardenas.  She is a pleasant 69-year-old who presents to the clinic in initial consultation for incidental finding of coronary calcification noted on CT and PET scan.  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  The patient is otherwise asymptomatic and does not report chest pain.  She occasionally gets shortness of breath on climbing more than a flight of stairs.  Ambulation has been difficult because of surgery on the right lower extremity  for sarcoma.  She is on a statin.  She also has hypertension and is on 3 medications.  Home blood pressures have been 140s/80s.  She had an echocardiogram sometime ago which showed normal LV systolic function but low global longitudinal strain.            Assessment and Impression:      Pertinent issues addressed/ reviewed during this cardiology visit    Coronary calcification-asymptomatic  History of sarcoma-on chemotherapy  Essential hypertension-elevated blood pressures at home  Decreased global longitudinal strain on echo         Recommendations and Plan:       It was a pleasure to see Mirta L Ronald in clinic today.  I will to perform an echocardiogram to  reassess LV systolic function and global longitudinal strain.  I have asked her to increase the dose of Norvasc to 10 mg for at least 2 weeks and see how she feels.  She is going to continue to check her blood pressure at home and report back with the numbers.  I am not going to change the statin because her LDL is less than 70 mg/dL.  We did talk about eating healthy and losing weight.  If echocardiogram is normal I am going to see her on a yearly basis going forward.      Raymon LINDO, FACC, [facility]  [facility] - [facility]  July 10, 2024    Orders Placed This Encounter   Procedures    EKG 12-lead complete w/read - Clinics (performed today)    Echocardiogram Complete       PAST MEDICAL HISTORY:  Past Medical History:   Diagnosis Date    Asthma     Asthma     reactive airway disease, per patient    Blood transfusion     Cancer (H)     chronic lymphocytic leukemia    CINV (chemotherapy-induced nausea and vomiting) 10/12/2023    History of transfusion     Hypertension     Hypertension     Leukemia, chronic lymphocytic (H)     Malignant neoplasm (H)     CLL    Sarcoma of right lower extremity (H)     Thyroid nodule 04/28/2008       MEDICATIONS:  Current Outpatient Medications   Medication Sig Dispense Refill    amLODIPine (NORVASC) 5 MG tablet Take 1 tablet (5 mg) by mouth daily 90 tablet 3    bacitracin 500 UNIT/GM external ointment Apply topically 2 times daily To wounds on the right shin. 4 g daily 425 g 3    hydrochlorothiazide (HYDRODIURIL) 50 MG tablet Take 1 tablet (50 mg) by mouth daily 90 tablet 3    losartan (COZAAR) 100 MG tablet Take 1 tablet (100 mg) by mouth daily 90 tablet 3    Multiple Vitamins-Calcium (ONE-A-DAY WOMENS FORMULA PO) Take 1 tablet by mouth daily      nystatin (MYCOSTATIN) 704626 UNIT/GM external ointment Apply topically 2 times daily 30g to last 2 weeks. 60 g 3    omeprazole (PRILOSEC) 20 MG DR capsule Take 1 capsule by mouth every morning.      pravastatin (PRAVACHOL) 40 MG tablet  "TAKE 1 TABLET (40 MG) BY MOUTH DAILY 90 tablet 1    [START ON 7/25/2024] pregabalin (LYRICA) 50 MG capsule Take 1 capsule (50 mg) by mouth 3 times daily 90 capsule 0     No current facility-administered medications for this visit.       ALLERGIES:  Allergies   Allergen Reactions    Allopurinol Itching    Amoxicillin Hives    Codeine Itching    Cymbalta [Duloxetine Hcl] Fatigue    Periactin Other (See Comments)     Sleepy.    11/15/23: patient not sure about this allergy/intolerance - may be interested in removal    Tenormin [Atenolol] Fatigue     11/15/23: patient may be interested in removal of this from allergy list as it was only fatigue/sleepiness       SOCIAL HISTORY:  I have reviewed this patient's social history and updated it with pertinent information if needed. Mirta Cardenas  reports that she has never smoked. She has been exposed to tobacco smoke. She has never used smokeless tobacco. She reports current alcohol use. She reports that she does not use drugs.    FAMILY HISTORY:  I have reviewed this patient's family history and updated it with pertinent information if needed.   Family History   Problem Relation Age of Onset    Osteoporosis Mother     Cancer Sister         multiple myloma    Hypertension Brother     Heart Disease Brother 65        bypass    Obesity Son     Obesity Son     Hypertension Son     Melanoma No family hx of     Anesthesia Reaction No family hx of     Thrombosis No family hx of        REVIEW OF SYSTEMS:  Skin:        Eyes:       ENT:       Respiratory:  Negative shortness of breath;cough;wheezing;sleep apnea;CPAP;dyspnea at rest  Cardiovascular:  Negative;palpitations;chest pain;syncope or near-syncope;lightheadedness;dizziness Positive for;edema;fatigue  Gastroenterology:      Genitourinary:       Musculoskeletal:       Neurologic:       Psychiatric:       Heme/Lymph/Imm:       Endocrine:           PHYSICAL EXAM:  BP (!) 147/91   Pulse 85   Ht 1.549 m (5' 1\")   Wt 84.4 kg " (186 lb)   SpO2 98%   BMI 35.14 kg/m    Constitutional: alert, no distress  Respiratory: Good bilateral air entry  Cardiovascular: s1 s2 normal, no murmurs  GI: nondistended  Neuropsychiatric: appropriate affact  Edema: none    This note was completed in part using dictation via the Dragon voice recognition software. Some word and grammatical errors may occur and must be interpreted in the appropriate clinical context.  If there are any questions pertaining to this issue, please contact me for further clarification.      Thank you for allowing me to participate in the care of your patient.      Sincerely,     Raymon Lancaster MD     [facility]  cc:   BEBETO Dodge CNP  [address] [place] [address]

## 2024-07-10 NOTE — PROGRESS NOTES
CARDIOLOGY CLINIC CONSULTATION    PRIMARY CARE PHYSICIAN:  Joseline Fraga    Tests reviewed/interpreted independently in clinic today:   EKG, Echocardiogram, Blood work.     The level of medical decision making during this visit was of moderate complexity.     The longitudinal plan of care for the diagnosis(es)/condition(s) as documented were addressed during this visit. Due to the added complexity in care, I will continue to support Mirta in the subsequent management and with ongoing continuity of care.     HISTORY OF PRESENT ILLNESS:  Today, I had the pleasure of connecting with Mirta Cardenas.  She is a pleasant 69-year-old who presents to the clinic in initial consultation for incidental finding of coronary calcification noted on CT and PET scan.  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  The patient is otherwise asymptomatic and does not report chest pain.  She occasionally gets shortness of breath on climbing more than a flight of stairs.  Ambulation has been difficult because of surgery on the right lower extremity  for sarcoma.  She is on a statin.  She also has hypertension and is on 3 medications.  Home blood pressures have been 140s/80s.  She had an echocardiogram sometime ago which showed normal LV systolic function but low global longitudinal strain.            Assessment and Impression:      Pertinent issues addressed/ reviewed during this cardiology visit    Coronary calcification-asymptomatic  History of sarcoma-on chemotherapy  Essential hypertension-elevated blood pressures at home  Decreased global longitudinal strain on echo         Recommendations and Plan:       It was a pleasure to see Mirta Cardenas in clinic today.  I will to perform an echocardiogram to reassess LV systolic function and global longitudinal strain.  I have asked her to increase the dose of Norvasc to 10 mg for at least 2 weeks and see how she feels.  She is going to continue to check her  blood pressure at home and report back with the numbers.  I am not going to change the statin because her LDL is less than 70 mg/dL.  We did talk about eating healthy and losing weight.  If echocardiogram is normal I am going to see her on a yearly basis going forward.      Raymon LINDO, FACC, FASE  Cardiology - [facility]  July 10, 2024    Orders Placed This Encounter   Procedures    EKG 12-lead complete w/read - Clinics (performed today)    Echocardiogram Complete       PAST MEDICAL HISTORY:  Past Medical History:   Diagnosis Date    Asthma     Asthma     reactive airway disease, per patient    Blood transfusion     Cancer (H)     chronic lymphocytic leukemia    CINV (chemotherapy-induced nausea and vomiting) 10/12/2023    History of transfusion     Hypertension     Hypertension     Leukemia, chronic lymphocytic (H)     Malignant neoplasm (H)     CLL    Sarcoma of right lower extremity (H)     Thyroid nodule 04/28/2008       MEDICATIONS:  Current Outpatient Medications   Medication Sig Dispense Refill    amLODIPine (NORVASC) 5 MG tablet Take 1 tablet (5 mg) by mouth daily 90 tablet 3    bacitracin 500 UNIT/GM external ointment Apply topically 2 times daily To wounds on the right shin. 4 g daily 425 g 3    hydrochlorothiazide (HYDRODIURIL) 50 MG tablet Take 1 tablet (50 mg) by mouth daily 90 tablet 3    losartan (COZAAR) 100 MG tablet Take 1 tablet (100 mg) by mouth daily 90 tablet 3    Multiple Vitamins-Calcium (ONE-A-DAY WOMENS FORMULA PO) Take 1 tablet by mouth daily      nystatin (MYCOSTATIN) 688268 UNIT/GM external ointment Apply topically 2 times daily 30g to last 2 weeks. 60 g 3    omeprazole (PRILOSEC) 20 MG DR capsule Take 1 capsule by mouth every morning.      pravastatin (PRAVACHOL) 40 MG tablet TAKE 1 TABLET (40 MG) BY MOUTH DAILY 90 tablet 1    [START ON 7/25/2024] pregabalin (LYRICA) 50 MG capsule Take 1 capsule (50 mg) by mouth 3 times daily 90 capsule 0     No current facility-administered  "medications for this visit.       ALLERGIES:  Allergies   Allergen Reactions    Allopurinol Itching    Amoxicillin Hives    Codeine Itching    Cymbalta [Duloxetine Hcl] Fatigue    Periactin Other (See Comments)     Sleepy.    11/15/23: patient not sure about this allergy/intolerance - may be interested in removal    Tenormin [Atenolol] Fatigue     11/15/23: patient may be interested in removal of this from allergy list as it was only fatigue/sleepiness       SOCIAL HISTORY:  I have reviewed this patient's social history and updated it with pertinent information if needed. Mirta Cardenas  reports that she has never smoked. She has been exposed to tobacco smoke. She has never used smokeless tobacco. She reports current alcohol use. She reports that she does not use drugs.    FAMILY HISTORY:  I have reviewed this patient's family history and updated it with pertinent information if needed.   Family History   Problem Relation Age of Onset    Osteoporosis Mother     Cancer Sister         multiple myloma    Hypertension Brother     Heart Disease Brother 65        bypass    Obesity Son     Obesity Son     Hypertension Son     Melanoma No family hx of     Anesthesia Reaction No family hx of     Thrombosis No family hx of        REVIEW OF SYSTEMS:  Skin:        Eyes:       ENT:       Respiratory:  Negative shortness of breath;cough;wheezing;sleep apnea;CPAP;dyspnea at rest  Cardiovascular:  Negative;palpitations;chest pain;syncope or near-syncope;lightheadedness;dizziness Positive for;edema;fatigue  Gastroenterology:      Genitourinary:       Musculoskeletal:       Neurologic:       Psychiatric:       Heme/Lymph/Imm:       Endocrine:           PHYSICAL EXAM:  BP (!) 147/91   Pulse 85   Ht 1.549 m (5' 1\")   Wt 84.4 kg (186 lb)   SpO2 98%   BMI 35.14 kg/m    Constitutional: alert, no distress  Respiratory: Good bilateral air entry  Cardiovascular: s1 s2 normal, no murmurs  GI: nondistended  Neuropsychiatric: appropriate " affact  Edema: none    This note was completed in part using dictation via the Dragon voice recognition software. Some word and grammatical errors may occur and must be interpreted in the appropriate clinical context.  If there are any questions pertaining to this issue, please contact me for further clarification.

## 2024-07-11 ENCOUNTER — OFFICE VISIT (OUTPATIENT)
Dept: VASCULAR SURGERY | Facility: CLINIC | Age: 69
End: 2024-07-11
Attending: STUDENT IN AN ORGANIZED HEALTH CARE EDUCATION/TRAINING PROGRAM
Payer: COMMERCIAL

## 2024-07-11 DIAGNOSIS — C49.21 SARCOMA OF RIGHT LOWER EXTREMITY (H): ICD-10-CM

## 2024-07-11 PROCEDURE — 99204 OFFICE O/P NEW MOD 45 MIN: CPT | Performed by: FAMILY MEDICINE

## 2024-07-11 PROCEDURE — G0463 HOSPITAL OUTPT CLINIC VISIT: HCPCS | Performed by: FAMILY MEDICINE

## 2024-07-11 RX ORDER — LIDOCAINE 50 MG/G
OINTMENT TOPICAL ONCE
Status: COMPLETED | OUTPATIENT
Start: 2024-07-11 | End: 2024-08-08

## 2024-07-11 NOTE — PROGRESS NOTES
Wound Clinic Note         Visit date: 07/11/2024       Cheif Complaint:     Mirta Cardenas is a 69 year old  female had concerns including Wound Check.  The patient has lower extremity edema and a right leg ulcer         HISTORY OF PRESENT ILLNESS:    Mirta Cardenas reports the wound has been present since early June 2024.  The wound began after a recent surgery and the incision did not heal normally.   She had a sarcoma resection from the right leg performed on April 22, 2024.  The margins were negative.  She continues on chemotherapy now.  The wound initially healed after surgery but then broke open again a few weeks later due to swelling in her leg.  She has compression stockings and has been wearing them occasionally but at 1 point the compression stockings stuck to the scab and cause to the wound to open up further so she has not been wearing her compression stockings recently.  For the most part she leaves the wound open to the air but occasionally covers with a Telfa pad and a Tubigrip stocking.  There is been little to no drainage from the area.          The pateint denies fevers or chills.  They report the pain from the wound has been 0/10 and has remained about the same recently.      The patient reports they currently do not have any routine for elevating their legs.  The patient confirms they do sleep in a bed.     Today the patient reports maintaining a regular diet without special attention to protein.        The patient denies a history of diabetes, smoking or chronic steroid use.         The patient has not had any symptoms of infection relating to the wound recently and is not currently on antibiotics.       Problem List:   Past Medical History:   Diagnosis Date    Asthma     Asthma     reactive airway disease, per patient    Blood transfusion     Cancer (H)     chronic lymphocytic leukemia    CINV (chemotherapy-induced nausea and vomiting) 10/12/2023    History of transfusion      Hypertension     Hypertension     Leukemia, chronic lymphocytic (H)     Malignant neoplasm (H)     CLL    Sarcoma of right lower extremity (H)     Thyroid nodule 2008             Family Hx: family history includes Cancer in her sister; Heart Disease (age of onset: 65) in her brother; Hypertension in her brother and son; Obesity in her son and son; Osteoporosis in her mother.       Surgical Hx:   Past Surgical History:   Procedure Laterality Date    ABDOMEN SURGERY      c section *3    APPENDECTOMY      C/SECTION, CLASSICAL  ,,    , Classical    COLONOSCOPY      EXCISE SOFT TISSUE TUMOR CALF Right 2024    Procedure: Removal of Tumor Right Calf;  Surgeon: Benny Barroso MD;  Location: [facility]    HYSTERECTOMY      HYSTERECTOMY, PAP NO LONGER INDICATED  1998    PICC DOUBLE LUMEN PLACEMENT Left 10/17/2023    left basilic 4 fr dl power picc 41 cm    PICC DOUBLE LUMEN PLACEMENT Left 2023    Medial Brachial, 42 cm, 3 cm external length    PICC DOUBLE LUMEN PLACEMENT Left 01/10/2024    left medial brachial 5 fr dl power picc 41 cm    RELEASE CARPAL TUNNEL  2012    Procedure:RELEASE CARPAL TUNNEL; Right Carpal Tunnel Release & Right Ring A1 Pulley Release; Surgeon:SERGEY HEATON; Location:[facility]    RELEASE TRIGGER FINGER  2012    Procedure:RELEASE TRIGGER FINGER; Surgeon:SERGEY HEATON; Location:[facility]    RELEASE TRIGGER FINGER  10/12/2012    Procedure: RELEASE TRIGGER FINGER;  Right Thumb A1 Pulley Release;  Surgeon: Sergey Heaton MD;  Location: [facility]    RESECT BONE LOWER EXTREMITY Right 2024    Procedure: Including Bone Removal;  Surgeon: Benny Barroso MD;  Location: [facility]    SALPINGO OOPHORECTOMY,R/L/ULICES  2008    right          Allergies:    Allergies   Allergen Reactions    Allopurinol Itching    Amoxicillin Hives    Codeine Itching    Cymbalta [Duloxetine Hcl] Fatigue    Periactin Other (See Comments)     Sleepy.    11/15/23:  patient not sure about this allergy/intolerance - may be interested in removal    Tenormin [Atenolol] Fatigue     11/15/23: patient may be interested in removal of this from allergy list as it was only fatigue/sleepiness              Medication History:    Current Outpatient Medications   Medication Sig Dispense Refill    amLODIPine (NORVASC) 5 MG tablet Take 1 tablet (5 mg) by mouth daily 90 tablet 3    bacitracin 500 UNIT/GM external ointment Apply topically 2 times daily To wounds on the right shin. 4 g daily 425 g 3    hydrochlorothiazide (HYDRODIURIL) 50 MG tablet Take 1 tablet (50 mg) by mouth daily 90 tablet 3    losartan (COZAAR) 100 MG tablet Take 1 tablet (100 mg) by mouth daily 90 tablet 3    Multiple Vitamins-Calcium (ONE-A-DAY WOMENS FORMULA PO) Take 1 tablet by mouth daily      nystatin (MYCOSTATIN) 901649 UNIT/GM external ointment Apply topically 2 times daily 30g to last 2 weeks. 60 g 3    omeprazole (PRILOSEC) 20 MG DR capsule Take 1 capsule by mouth every morning.      pravastatin (PRAVACHOL) 40 MG tablet TAKE 1 TABLET (40 MG) BY MOUTH DAILY 90 tablet 1    [START ON 7/25/2024] pregabalin (LYRICA) 50 MG capsule Take 1 capsule (50 mg) by mouth 3 times daily 90 capsule 0     Current Facility-Administered Medications   Medication Dose Route Frequency Provider Last Rate Last Admin    lidocaine (XYLOCAINE) 5 % ointment   Topical Once Loyd Clancy MD             Tobacco History:  reports that she has never smoked. She has been exposed to tobacco smoke. She has never used smokeless tobacco.       REVIEW OF SYMPTOMS:   The review of systems was negative except as noted in the HPI.           PHYSICAL EXAMINATION:     There were no vitals taken for this visit.           GENERAL: The patient overall appears well and is no acute distress.   HEAD: normocephalic   EYES: Sclera and conjunctiva clear   NECK: no obvious masses   LUNGS: breathing is unlabored.   EXTREMITIES: No clubbing, cyanosis or edema    SKIN: No rashes or other abnormalities except as noted under the Wound section below.   NEUROLOGICAL: normal motor and sensory function   EDEMA: Moderate       WOUND: The wound is scabbed over with no sign of infection and no drainage even with palpation.      Also see below for wound details:       Circumferential volume measures:             No data to display                Ulceration(s)/Wound(s):   Please see the media tab under the chart review for pictures of the wounds.  Nursing staff removed dressings and cleansed wound.    VASC Wound right leg (Active)   Pre Size Length 8 07/11/24 0900   Pre Size Width 4 07/11/24 0900   Pre Size Depth 0.2 07/11/24 0900   Pre Total Sq cm 32 07/11/24 0900   Description diagonal 07/11/24 0900             Recent Labs   Lab Test 06/13/24  1347 04/24/23  0747   A1C 5.6 5.4          Recent Labs   Lab Test 07/09/24  1220 06/13/24  1347 06/11/24  1256   ALBUMIN 4.3 4.5 4.7         WBC   Date Value Ref Range Status   05/10/2021 15.6 (H) 4.0 - 11.0 10e9/L Final     WBC Count   Date Value Ref Range Status   07/09/2024 6.9 4.0 - 11.0 10e3/uL Final     Albumin   Date Value Ref Range Status   07/09/2024 4.3 3.5 - 5.2 g/dL Final   10/12/2020 3.6 3.4 - 5.0 g/dL Final           No sharp debridement performed today.                  ASSESSMENT:   This is a 69 year old  female with a right leg ulcer, the patient also has lower extremity edema which was also managed during today's clinic visit.          PLAN:   We will paint the area with Betadine followed by an ABD pad and a Tubigrip stocking changed with bathing.  I explained that a scab is like a bandage the body makes for itself and as long as there is not consistent drainage around the scab causing the scab to become soft and wet the scab does have very good job of protecting the area and helping the area to heal.  However if the scab does become consistently soft and wet she should call us and we will need to remove the  scab.    Separate from the wound care instructions we then discussed management strategies for lower extremity edema.  I explained the keys for managing lower extremity edema are compression and elevation.  I explained to the patient today that controlling the edema is probably the most important thing we can do to help heal the wound.  I have specifically recommended that they lay down with their legs above the level of the heart for 30 minutes at least twice a day.     I have explained to the patient the importance of protein intake to wound healing.  I have explained that increasing protein intake will speed wound healing.  We discussed several types of food that are high in protein and the wound care nurse gave the patient a handout that summarizes this information.  In addition to further speed wound healing I have encouraged the patient to take a protein supplement.   The patient will return to the wound clinic in 4 weeks to see me again.        45 minutes spent on the date of the encounter doing chart review, history and exam, documentation and further activities per the note, this time excludes any procedure time      Loyd Clancy MD  07/11/2024   9:35 AM   [facility]  [phone]    This note was electronically signed by Loyd Clancy MD

## 2024-07-12 ENCOUNTER — PATIENT OUTREACH (OUTPATIENT)
Dept: CARE COORDINATION | Facility: CLINIC | Age: 69
End: 2024-07-12
Payer: COMMERCIAL

## 2024-07-22 ENCOUNTER — MYC MEDICAL ADVICE (OUTPATIENT)
Dept: OTHER | Age: 69
End: 2024-07-22

## 2024-07-25 ENCOUNTER — OFFICE VISIT (OUTPATIENT)
Dept: ORTHOPEDICS | Facility: CLINIC | Age: 69
End: 2024-07-25
Payer: COMMERCIAL

## 2024-07-25 ENCOUNTER — ANCILLARY PROCEDURE (OUTPATIENT)
Dept: MRI IMAGING | Facility: CLINIC | Age: 69
End: 2024-07-25
Attending: PHYSICIAN ASSISTANT
Payer: COMMERCIAL

## 2024-07-25 ENCOUNTER — HOSPITAL ENCOUNTER (OUTPATIENT)
Dept: CARDIOLOGY | Facility: CLINIC | Age: 69
Discharge: HOME | End: 2024-07-25
Attending: INTERNAL MEDICINE | Admitting: INTERNAL MEDICINE
Payer: COMMERCIAL

## 2024-07-25 DIAGNOSIS — T81.49XA WOUND INFECTION AFTER SURGERY: Primary | ICD-10-CM

## 2024-07-25 DIAGNOSIS — C49.21 SARCOMA OF RIGHT LOWER EXTREMITY (H): ICD-10-CM

## 2024-07-25 DIAGNOSIS — C49.9 SARCOMA OF SOFT TISSUE (H): ICD-10-CM

## 2024-07-25 DIAGNOSIS — I25.10 CORONARY ARTERY CALCIFICATION SEEN ON CAT SCAN: ICD-10-CM

## 2024-07-25 LAB — LVEF ECHO: NORMAL

## 2024-07-25 PROCEDURE — 93306 TTE W/DOPPLER COMPLETE: CPT | Mod: 26 | Performed by: STUDENT IN AN ORGANIZED HEALTH CARE EDUCATION/TRAINING PROGRAM

## 2024-07-25 PROCEDURE — 93306 TTE W/DOPPLER COMPLETE: CPT

## 2024-07-25 PROCEDURE — 99214 OFFICE O/P EST MOD 30 MIN: CPT | Performed by: PHYSICIAN ASSISTANT

## 2024-07-25 PROCEDURE — A9585 GADOBUTROL INJECTION: HCPCS | Performed by: RADIOLOGY

## 2024-07-25 PROCEDURE — 73720 MRI LWR EXTREMITY W/O&W/DYE: CPT | Mod: RT | Performed by: RADIOLOGY

## 2024-07-25 RX ORDER — SULFAMETHOXAZOLE/TRIMETHOPRIM 800-160 MG
1 TABLET ORAL 2 TIMES DAILY
Qty: 28 TABLET | Refills: 0 | Status: SHIPPED | OUTPATIENT
Start: 2024-07-25 | End: 2024-08-08

## 2024-07-25 RX ORDER — GADOBUTROL 604.72 MG/ML
10 INJECTION INTRAVENOUS ONCE
Status: COMPLETED | OUTPATIENT
Start: 2024-07-25 | End: 2024-07-25

## 2024-07-25 RX ADMIN — GADOBUTROL 8.5 ML: 604.72 INJECTION INTRAVENOUS at 11:31

## 2024-07-25 NOTE — NURSING NOTE
Chief Complaint   Patient presents with    RECHECK     Follow up right leg. Review MRI results and discuss plan. Been seeing wound provider for right leg wound        69 year old  1955    Primary MD: Joseline Fraga          Pain Assessment  Patient Currently in Pain: Yes  Primary Pain Location: Leg (right)  Pain Descriptors: Throbbing             [facility], [place] - [address] [place] [address] [address] [place] [id number] [facility]        Allergies   Allergen Reactions    Allopurinol Itching    Amoxicillin Hives    Codeine Itching    Cymbalta [Duloxetine Hcl] Fatigue    Periactin Other (See Comments)     Sleepy.    11/15/23: patient not sure about this allergy/intolerance - may be interested in removal    Tenormin [Atenolol] Fatigue     11/15/23: patient may be interested in removal of this from allergy list as it was only fatigue/sleepiness           Current Outpatient Medications   Medication Sig Dispense Refill    amLODIPine (NORVASC) 10 MG tablet Take 1 tablet (10 mg) by mouth daily 90 tablet 1    hydrochlorothiazide (HYDRODIURIL) 50 MG tablet Take 1 tablet (50 mg) by mouth daily 90 tablet 3    losartan (COZAAR) 100 MG tablet Take 1 tablet (100 mg) by mouth daily 90 tablet 3    Multiple Vitamins-Calcium (ONE-A-DAY WOMENS FORMULA PO) Take 1 tablet by mouth daily      nystatin (MYCOSTATIN) 174165 UNIT/GM external ointment Apply topically 2 times daily 30g to last 2 weeks. 60 g 3    omeprazole (PRILOSEC) 20 MG DR capsule Take 1 capsule by mouth every morning.      pravastatin (PRAVACHOL) 40 MG tablet TAKE 1 TABLET (40 MG) BY MOUTH DAILY 90 tablet 1    pregabalin (LYRICA) 50 MG capsule Take 1 capsule (50 mg) by mouth 3 times daily 90 capsule 0    bacitracin 500 UNIT/GM external ointment Apply topically 2 times daily To wounds on the right shin. 4 g daily (Patient not taking: Reported on 7/25/2024) 425 g 3     Current Facility-Administered  Medications   Medication Dose Route Frequency Provider Last Rate Last Admin    lidocaine (XYLOCAINE) 5 % ointment   Topical Once Loyd Clancy MD

## 2024-07-25 NOTE — PROGRESS NOTES
Chief Complaint: wound check, imaging review  Preop diagnosis: Sarcoma right calf, deep compartment  4/22/24 Procedure performed: Removal of sarcoma right calf, deep compartment including anterior tibial muscle and 8 cm of the fibula    HPI: Mirta is 3 months status post above procedure by Dr. Barroso.  Patient reports overall she has been doing fairly well, but she developed a scab and subsequent wound around June 2 of the bottom of her right lower leg incision.  She had some serous drainage, but that seems to be better now.  She has had tenderness of it that is been persistent for several weeks.  She denies any significant erythema or odor.  She was seen by physical therapy for her exercises, and then they referred her to a wound clinic.  She could not get in for a month.  They saw her and recommended daily iodine to the wound after showering, followed by ABD pad and compression.  She has been doing this for a few weeks and it seems to be slightly improved.  She has been on Doxil since June 11.  She does 1 dose a month.  Her next dose is August 6.  She sees wound care again on August 8.  She is not taking any antibiotics.  Physical therapy has been on hold.  She is walking without gait assistance.  No issues with tripping or falling due to her foot drop.  No other concerns.  She had an MRI today.  She reports that MRI did cause pain in her lower leg.    Physical Exam: Mirta is a pleasant 69-year-old female who is alert and oriented no apparent distress.  She has a mildly antalgic gait with mild foot drop on the right.  No gait assistance.  Her right lower extremity incision is barely visible proximally, but she does have a mild area of wound dehiscence and hyperkeratotic crusting around the wound at the lower one third of her leg.  There is scant serous drainage.  Minimal erythema.  Mild swelling.  Diffuse tenderness around this wound.  Some of the hyperkeratotic skin was debrided today, but that was very painful  for her.  She has minimal active dorsiflexion, but is able to actively plantarflex.  No active extension of the toes.    Imaging: MRI w/wo contrast of the right lower extremity shows:  1. In this patient with interim high grade sarcoma resection with  negative surgical margin, presumed postoperative collection, measuring  approximately 11 cm in maximal dimension. Superimposed infection of  the collection cannot be entirely excluded. No evidence of mass-like  area to suggest recurrent tumor.   2. Anterolateral subcutaneous hyperemic change and apparent area of  soft tissue/skin defect at mid/distal leg with possible 9 x 7 x 17 mm  early abscess (no definite wall on current imaging yet). Please  correlate clinically.   *  Extensive regional muscle edema maybe due to therapy, altered  biomechanics and/or reactive to infection.  3. Previously noted focal marrow signal alteration in the mid/distal  tibial shaft has slightly decreased since comparison, measuring  approximately 2 cm craniocaudal dimension, previously 2.4 cm using  similar measurement technique, may be resolving stress reaction versus  red marrow island.    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  Continue current wound care regimen.  We should see her back in 2 weeks for wound check.  Continue to elevate and use compression.  Notify us immediately of any change with the wound including redness, increased drainage, increased pain or odor.  We would not recommend putting hardware in the tibia until this wound was completely healed.  We informed the patient that her radiation and chemotherapy can slow wound healing.  We will continue to monitor.  She understands.   She does not need a repeat MRI in 1 month.  She does need the PET/CT in 1 month.  We will monitor those results.  All questions have been answered today.  Patient was also examined by Dr. Barroso, and he agrees with the plan of care.

## 2024-07-25 NOTE — LETTER
7/25/2024      Mirta Cardenas  [id number] [facility] [address]      Dear Colleague,    Thank you for referring your patient, Mirta Cardenas, to the [facility] [place]. Please see a copy of my visit note below.    Chief Complaint: wound check, imaging review  Preop diagnosis: Sarcoma right calf, deep compartment  4/22/24 Procedure performed: Removal of sarcoma right calf, deep compartment including anterior tibial muscle and 8 cm of the fibula    HPI: Mirta is 3 months status post above procedure by Dr. Barroso.  Patient reports overall she has been doing fairly well, but she developed a scab and subsequent wound around June 2 of the bottom of her right lower leg incision.  She had some serous drainage, but that seems to be better now.  She has had tenderness of it that is been persistent for several weeks.  She denies any significant erythema or odor.  She was seen by physical therapy for her exercises, and then they referred her to a wound clinic.  She could not get in for a month.  They saw her and recommended daily iodine to the wound after showering, followed by ABD pad and compression.  She has been doing this for a few weeks and it seems to be slightly improved.  She has been on Doxil since June 11.  She does 1 dose a month.  Her next dose is August 6.  She sees wound care again on August 8.  She is not taking any antibiotics.  Physical therapy has been on hold.  She is walking without gait assistance.  No issues with tripping or falling due to her foot drop.  No other concerns.  She had an MRI today.  She reports that MRI did cause pain in her lower leg.    Physical Exam: Mirta is a pleasant 69-year-old female who is alert and oriented no apparent distress.  She has a mildly antalgic gait with mild foot drop on the right.  No gait assistance.  Her right lower extremity incision is barely visible proximally, but she does have a mild area of wound dehiscence and  hyperkeratotic crusting around the wound at the lower one third of her leg.  There is scant serous drainage.  Minimal erythema.  Mild swelling.  Diffuse tenderness around this wound.  Some of the hyperkeratotic skin was debrided today, but that was very painful for her.  She has minimal active dorsiflexion, but is able to actively plantarflex.  No active extension of the toes.    Imaging: MRI w/wo contrast of the right lower extremity shows:  1. In this patient with interim high grade sarcoma resection with  negative surgical margin, presumed postoperative collection, measuring  approximately 11 cm in maximal dimension. Superimposed infection of  the collection cannot be entirely excluded. No evidence of mass-like  area to suggest recurrent tumor.   2. Anterolateral subcutaneous hyperemic change and apparent area of  soft tissue/skin defect at mid/distal leg with possible 9 x 7 x 17 mm  early abscess (no definite wall on current imaging yet). Please  correlate clinically.   *  Extensive regional muscle edema maybe due to therapy, altered  biomechanics and/or reactive to infection.  3. Previously noted focal marrow signal alteration in the mid/distal  tibial shaft has slightly decreased since comparison, measuring  approximately 2 cm craniocaudal dimension, previously 2.4 cm using  similar measurement technique, may be resolving stress reaction versus  red marrow island.    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  Continue current wound care regimen.  We should see her back in 2 weeks for wound check.  Continue to elevate and use compression.  Notify us immediately of any change with the wound including redness, increased  drainage, increased pain or odor.  We would not recommend putting hardware in the tibia until this wound was completely healed.  We informed the patient that her radiation and chemotherapy can slow wound healing.  We will continue to monitor.  She understands.  She does not need a repeat MRI in 1 month.  She does need the PET/CT in 1 month.  We will monitor those results.  All questions have been answered today.  Patient was also examined by Dr. Barroso, and he agrees with the plan of care.      Again, thank you for allowing me to participate in the care of your patient.        Sincerely,        Benny Barroso MD

## 2024-07-31 NOTE — PATIENT INSTRUCTIONS
"      Wound Care Instructions    DAILY and as needed    Pat Dry with non-sterile gauze    Primary Dressing: apply aquacel AG to the wound     Secondary dressing: Cover with ABD or dry gauze    Secure with non-sterile roll gauze (4\" x 75\" roll) and tape (1\" roll tape) as needed; avoid adhesive directly on the skin    Compression: tubigrip    It is ok to get your wound wet in the bath or shower    Please wear your compression to each clinic visit.    It is recommended that you elevate your legs throughout the day: approx 2-3 times each day  Elevate them above the level of your heart for 30 min.   Ways to do this:   Lay on the couch or your bed and prop your legs up on pillows   Recline back as far as you can go in your recliner and prop your legs on pillows.     Doing these things will help reduce the edema in your legs.    If for some reason you are not able to get your dressing(s) changed as outlined above (due to illness, lack of supplies, lack of help) please do the following: remove old, soiled dressings; wash the wounds with saline; pat dry; apply ABD pad or other absorbant pad and secure with rolled gauze; avoid tape directly on your skin; Call the clinic as soon as possible to let us know what the current issues are in receiving wound care [phone].      SEEK MEDICAL CARE IF:  You have an increase in swelling, pain, or redness around the wound.  You have an increase in the amount of pus coming from the wound.  There is a bad smell coming from the wound.  The wound appears to be worsening/enlarging  You have a fever greater than 101.5 F      It is ok to continue current wound care treatment/products for the next 2-3 days until new wound care supplies are ordered and arrive. If longer than this please contact our office at [phone].    If you have a 2 layer or 4 layer compression wrap on these are safe to have on for ONLY 7 days. If for some reason you are not able to get the wrap(s) changed (due to " illness; lack of supplies, lack of help, lack of transportation) please do the following: unwrap the old 2 or 4 layer compression wrap; avoid using scissors as you could cut your skin and cause wounds; use tubular compression when available. Call to reschedule your home care or clinic visit appointment as soon as possible.    Please NOTE: if you are 15 minutes late to your clinic appointment you will have to be rescheduled. Please call our clinic as soon as possible if you know you will not be able to get to your appointment at [phone].    If you fail to show up to 3 scheduled clinic appointments you will be dismissed from our clinic.              We want to hear from you!  In the next few weeks, you should receive a call or email to complete a survey about your visit at [facility]. Please help us improve your appointment experience by letting us know how we did today. We strive to make your experience good and value any ways in which we could do better.      We value your input and suggestions.    Thank you for choosing the [facility]!

## 2024-08-05 ENCOUNTER — VIRTUAL VISIT (OUTPATIENT)
Dept: ONCOLOGY | Facility: CLINIC | Age: 69
End: 2024-08-05
Attending: STUDENT IN AN ORGANIZED HEALTH CARE EDUCATION/TRAINING PROGRAM
Payer: COMMERCIAL

## 2024-08-05 VITALS — BODY MASS INDEX: 35.14 KG/M2 | HEIGHT: 61 IN

## 2024-08-05 DIAGNOSIS — C49.9 SARCOMA OF SOFT TISSUE (H): Primary | ICD-10-CM

## 2024-08-05 PROCEDURE — 99214 OFFICE O/P EST MOD 30 MIN: CPT | Mod: 95 | Performed by: STUDENT IN AN ORGANIZED HEALTH CARE EDUCATION/TRAINING PROGRAM

## 2024-08-05 ASSESSMENT — PAIN SCALES - GENERAL: PAINLEVEL: MILD PAIN (3)

## 2024-08-05 NOTE — NURSING NOTE
Patient confirms medications and allergies are accurate via patients echeck in completion, and or denies any changes since last reviewed/verified.       Current patient location: [address] [facility] [address]    Is the patient currently in the state of [place]? YES    Visit mode:VIDEO    If the visit is dropped, the patient can be reconnected by: VIDEO VISIT: Text to cell phone:   Telephone Information:   Mobile [phone]       Will anyone else be joining the visit? NO  (If patient encounters technical issues they should call [phone]    How would you like to obtain your AVS? MyChart    Are changes needed to the allergy or medication list? No    Are refills needed on medications prescribed by this physician? NO    Rooming Documentation:  Assigned questionnaire(s) completed      Reason for visit: RECHECK    Margy HERRON

## 2024-08-05 NOTE — LETTER
8/5/2024      Mirta Cardenas  [id number] [facility] [address]      Dear Colleague,    Thank you for referring your patient, Mirta Cardenas, to the [facility]. Please see a copy of my visit note below.    Virtual Visit Details    Type of service:  Video Visit   Video Start Time:  1:01PM  Video End Time: 1:19PM    Originating Location (pt. Location): Home    Distant Location (provider location):  On-site  Platform used for Video Visit: [facility]  FOLLOW UP VISIT NOTE    PATIENT NAME: Mirta Cardenas MRN # [id number]  DATE OF VISIT: Aug 5, 2024  YOB: 1955    CANCER TYPE:  High grade pleomorphic sarcoma, right calf       HISTORY OF PRESENT ILLNESS/ONCOLOGY HISTORY   Mirta is a 69 year old female with PMH of CLL and recent Dx of High Grade Pleomorphic sarcoma, with intermittent right calf pain but then continuous calf pain beginning in March 2023.  She had a recent x-ray which shows a destructive lesion in the midportion R LE in between the fibula and the tibia.     -10/2/23, Biopsy  Final Diagnosis   A.  Soft tissue, right calf mass, excision:  - High-grade pleomorphic sarcoma  - See comment      -10/10/23, PET/CT with no evidence of mets (mildly enlarged righ iliac chain LN with no hypermetabolic uptake; thought to be reactive).     -10/17/23-10/24/23, Inpatient Cycle 1 of Doxil/ifosfamide  -11/4/23, Inpatient cycle 2 Doxil/ifosfamide  -12/13/23, Cycle 3 inpatient Doxil/ifosfamide  -1/10/24, Cycle 4 inpatient Doxil/ifosfamide    2/19/2024 to 3/22/2024: Right lower extremity, 5000 cGy in 25 fraction      Surgical resection by Dr. Barroso 4/22/24: Final pathology demonstrated undifferentiated sarcoma, 5.8 cm, there was noted treatment effect with 6% viable tumor, negative margin, ypT2 NX.     6/11/24, Starts adjuvant Cycle 1 Doxil at 35mg/m2 (dose reduced for wound healing)  7/9/24 Cycle 2     PAST ONCOLOGIC  "HISTORY   CLL - on monitoring--Follows with Dr. Griffin Duckworth    INTERVAL HISTORY   -Mirta presents via video visit for follow up/evaluation prior to cycle 3 Doxil.  -I reviewed recent ortho note where they started Bactrim for delayed healing on right leg and recent photo from wound care on 7/11/24.  -Mirta reports feeling well overall. [place]   -Her energy has been pretty good. She does feel tired after Doxil infusion   -She continues on Bactrim for the leg and completes this on Thursday. No big change in wound appearance. Has some drainage on bandage when she removes it. Per wound care instructions, she's cleansing daily and using iodine and then applying ABD, wrapping and putting on compression. Right leg swelling is about the same.   -Only short of breath with activity like carrying a laundry basket. No orthopnea  -No fever or chills  -No mouth sores after most recent cycle of Doxil.   -She sees ortho in person on Thursday       PAST MEDICAL HISTORY   Anal fisure - controlled with nitrobid  CLL - now being monitored   HTN  Cholesterol  Hx of severe COVID  Psoriasis     PHYSICAL EXAM   Ht 1.549 m (5' 1\")   BMI 35.14 kg/m      7/9/2024  12:49 PM 7/9/2024  1:29 PM 7/9/2024  2:34 PM 7/10/2024  9:22 AM   Vital Signs       Systolic 152 !   125  147 !    Diastolic 80 !   77  91 (H)    Pulse 88   80  85    Temperature 98.3  F (36.8  C)       Respirations 16       Weight (LB) 186 lb 12.8 oz    186 lb    Height    5' 1\"    BMI (Calculated)    35.14    Pain Score 2 (Mild)       O2    98 %    Systolic (Patient Reported)          Wt Readings from Last 4 Encounters:   07/10/24 84.4 kg (186 lb)   07/09/24 84.7 kg (186 lb 12.8 oz)   06/25/24 83.5 kg (184 lb)   06/18/24 83.5 kg (184 lb)      Video physical exam  General: Patient appears well in no acute distress.   Skin: No visualized rash or lesions on visualized skin. Leg is wrapped so not visualized.   Eyes: EOMI, no erythema, sclera " icterus or discharge noted  Resp: Appears to be breathing comfortably without accessory muscle usage, speaking in full sentences, no cough  MSK: Appears to have normal range of motion based on visualized movements  Neurologic: No apparent tremors, facial movements symmetric. Hearing intact. No dysarthria  Psych: affect appropriate, alert and oriented. Pleasant       LABORATORY AND IMAGING STUDIES     Most Recent 3 CBC's:  Recent Labs   Lab Test 07/09/24  1220 06/24/24  1504 06/13/24  1347   WBC 6.9 7.2 6.4   HGB 11.2* 11.2* 11.7   MCV 82 83 82    245 208   ANEUTAUTO 4.2 4.2 3.8     Most Recent 3 BMP's:  Recent Labs   Lab Test 07/09/24  1220 06/13/24  1347 06/11/24  1256    139 142   POTASSIUM 3.9 3.7 3.9   CHLORIDE 107 102 104   CO2 23 23 21*   BUN 24.8* 22.7 24.3*   CR 1.16* 1.05* 1.13*   ANIONGAP 11 14 17*   MONIK 9.3 9.8 10.0   * 102* 115*   PROTTOTAL 7.2 7.4 7.9   ALBUMIN 4.3 4.5 4.7    Most Recent 3 LFT's:  Recent Labs   Lab Test 07/09/24  1220 06/13/24  1347 06/11/24  1256   AST 26 26 31   ALT 24 21 24   ALKPHOS 91 100 104   BILITOTAL 0.2 0.2 0.2       I reviewed the above labs today.       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 No evidence of metastatic disease.     She began neoadjuvant chemotherapy with Doxil/ifosfamide on 10/17/23. She is now s/p 4 cycles of Doxil/ifosfamide. She received these at a slightly lower dose due to age and other medical conditions (doxil 40mg/m2 and ifosfamide 8g/m2 over 5 days). Last cycle was 1/10/24.     She completed neoadjuvant RT (3/22/24) followed by surgical resection (Removal of sarcoma right calf, deep compartment including anterior tibial muscle and 8 cm of the fibula) by Dr. Barroso 4/22/24.    She began adjuvant Doxil at 35 mg/m2 on 6/11/24. She  has been tolerating this well symptomatically. However, she does have ongoing slow/delayed wound healing of surgical incision. Given ongoing delayed healing and unlikelihood that delaying Doxil by 1-2 weeks would likely have any substantial benefit to wound healing, I reviewed wound images with Dr Jimenez and our recommendation would be to hold cycle 3 of adjuvant Doxil and continue with PET in August and likely resume chemo with planned cycle 4. Mirta agrees to this plan.    The overall plan is for 1 year of adjuvant therapy (~through end of June 2025). Will repeat MRI right leg and PET in August    Oncology Follow-Up/Plan:  -Hold cycle 3 of Doxil  -repeat imaging at end of August  -Plan to resume Doxil with Cycle 4 in September  -Monthly virtual ISIDORO prior to labs and Doxil infusions in [place]    # Delayed wound healing at surgical site  Following with wound care  Continue Bactrim per ortho  Hold cycle 3 Doxil   Follow up with ortho as samir din 3 days    #Neuropathic pain  Continue Lyrica to 50mg TID; noting improvement with this    #Mucositis, resolved  Cool mouth during Doxil infusions  S/s for 1 week TID after chemo and then TID prn for sores  Did not recur with most recent cycle    #Anemia  S/t chemo and RT?  Repeat CBC with Doxil infusions    #Elevated creatinine  -Intermittently elevated  -CMP with monthly infusions  -continue to push po fluids    #Hypertension  Follows with cardiology; seen on 7/10/24; note reviewed and echo on 7/25/24 with EF 60-65%  -Losartan 100 mg daily  -Amlodipine increased to 10mg daily by cards in July 2024  -Hydrochlorothiazine 50 mg once daily   -Chlorthalidone previously discontinued d/t electrolyte disturbance    #Right Leg Swelling, stable  - US RLE on 10/21/23 was negative  -Varies with activity  - S/t resection.  Can continue compression stockings and leg elevation prn. -Increasing protein per wound team instructions    #CLL  Follow with Dr. Duckworth --next due in June 2025.      30 minutes spent on the date of the encounter doing chart review, review of test results, interpretation of tests, patient visit, and documentation     Amber Scheierl, CNP  [facility] [address] [address]  [phone]          Again, thank you for allowing me to participate in the care of your patient.        Sincerely,        Amber J. Scheierl, APRN CNP

## 2024-08-05 NOTE — PROGRESS NOTES
Virtual Visit Details    Type of service:  Video Visit   Video Start Time:  1:01PM  Video End Time: 1:19PM    Originating Location (pt. Location): Home    Distant Location (provider location):  On-site  Platform used for Video Visit: Well      [facility]  FOLLOW UP VISIT NOTE    PATIENT NAME: Mirta Cardenas MRN # [id number]  DATE OF VISIT: Aug 5, 2024  YOB: 1955    CANCER TYPE:  High grade pleomorphic sarcoma, right calf       HISTORY OF PRESENT ILLNESS/ONCOLOGY HISTORY   Mirta is a 69 year old female with PMH of CLL and recent Dx of High Grade Pleomorphic sarcoma, with intermittent right calf pain but then continuous calf pain beginning in March 2023.  She had a recent x-ray which shows a destructive lesion in the midportion R LE in between the fibula and the tibia.     -10/2/23, Biopsy  Final Diagnosis   A.  Soft tissue, right calf mass, excision:  - High-grade pleomorphic sarcoma  - See comment      -10/10/23, PET/CT with no evidence of mets (mildly enlarged righ iliac chain LN with no hypermetabolic uptake; thought to be reactive).     -10/17/23-10/24/23, Inpatient Cycle 1 of Doxil/ifosfamide  -11/4/23, Inpatient cycle 2 Doxil/ifosfamide  -12/13/23, Cycle 3 inpatient Doxil/ifosfamide  -1/10/24, Cycle 4 inpatient Doxil/ifosfamide    2/19/2024 to 3/22/2024: Right lower extremity, 5000 cGy in 25 fraction      Surgical resection by Dr. Barroso 4/22/24: Final pathology demonstrated undifferentiated sarcoma, 5.8 cm, there was noted treatment effect with 6% viable tumor, negative margin, ypT2 NX.     6/11/24, Starts adjuvant Cycle 1 Doxil at 35mg/m2 (dose reduced for wound healing)  7/9/24 Cycle 2     PAST ONCOLOGIC HISTORY   CLL - on monitoring--Follows with Dr. Griffin Duckworth    INTERVAL HISTORY   -Mirta presents via video visit for follow up/evaluation prior to cycle 3 Doxil.  -I reviewed recent ortho note where they started Bactrim for delayed healing on right  "leg and recent photo from wound care on 7/11/24.  -Mirta reports feeling well overall. [place]   -Her energy has been pretty good. She does feel tired after Doxil infusion   -She continues on Bactrim for the leg and completes this on Thursday. No big change in wound appearance. Has some drainage on bandage when she removes it. Per wound care instructions, she's cleansing daily and using iodine and then applying ABD, wrapping and putting on compression. Right leg swelling is about the same.   -Only short of breath with activity like carrying a laundry basket. No orthopnea  -No fever or chills  -No mouth sores after most recent cycle of Doxil.   -She sees ortho in person on Thursday       PAST MEDICAL HISTORY   Anal fisure - controlled with nitrobid  CLL - now being monitored   HTN  Cholesterol  Hx of severe COVID  Psoriasis     PHYSICAL EXAM   Ht 1.549 m (5' 1\")   BMI 35.14 kg/m      7/9/2024  12:49 PM 7/9/2024  1:29 PM 7/9/2024  2:34 PM 7/10/2024  9:22 AM   Vital Signs       Systolic 152 !   125  147 !    Diastolic 80 !   77  91 (H)    Pulse 88   80  85    Temperature 98.3  F (36.8  C)       Respirations 16       Weight (LB) 186 lb 12.8 oz    186 lb    Height    5' 1\"    BMI (Calculated)    35.14    Pain Score 2 (Mild)       O2    98 %    Systolic (Patient Reported)          Wt Readings from Last 4 Encounters:   07/10/24 84.4 kg (186 lb)   07/09/24 84.7 kg (186 lb 12.8 oz)   06/25/24 83.5 kg (184 lb)   06/18/24 83.5 kg (184 lb)      Video physical exam  General: Patient appears well in no acute distress.   Skin: No visualized rash or lesions on visualized skin. Leg is wrapped so not visualized.   Eyes: EOMI, no erythema, sclera icterus or discharge noted  Resp: Appears to be breathing comfortably without accessory muscle usage, speaking in full sentences, no cough  MSK: Appears to have normal range of motion based on visualized movements  Neurologic: No apparent tremors, facial " movements symmetric. Hearing intact. No dysarthria  Psych: affect appropriate, alert and oriented. Pleasant       LABORATORY AND IMAGING STUDIES     Most Recent 3 CBC's:  Recent Labs   Lab Test 07/09/24  1220 06/24/24  1504 06/13/24  1347   WBC 6.9 7.2 6.4   HGB 11.2* 11.2* 11.7   MCV 82 83 82    245 208   ANEUTAUTO 4.2 4.2 3.8     Most Recent 3 BMP's:  Recent Labs   Lab Test 07/09/24  1220 06/13/24  1347 06/11/24  1256    139 142   POTASSIUM 3.9 3.7 3.9   CHLORIDE 107 102 104   CO2 23 23 21*   BUN 24.8* 22.7 24.3*   CR 1.16* 1.05* 1.13*   ANIONGAP 11 14 17*   MONIK 9.3 9.8 10.0   * 102* 115*   PROTTOTAL 7.2 7.4 7.9   ALBUMIN 4.3 4.5 4.7    Most Recent 3 LFT's:  Recent Labs   Lab Test 07/09/24  1220 06/13/24  1347 06/11/24  1256   AST 26 26 31   ALT 24 21 24   ALKPHOS 91 100 104   BILITOTAL 0.2 0.2 0.2       I reviewed the above labs today.       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 No evidence of metastatic disease.     She began neoadjuvant chemotherapy with Doxil/ifosfamide on 10/17/23. She is now s/p 4 cycles of Doxil/ifosfamide. She received these at a slightly lower dose due to age and other medical conditions (doxil 40mg/m2 and ifosfamide 8g/m2 over 5 days). Last cycle was 1/10/24.     She completed neoadjuvant RT (3/22/24) followed by surgical resection (Removal of sarcoma right calf, deep compartment including anterior tibial muscle and 8 cm of the fibula) by Dr. Barroso 4/22/24.    She began adjuvant Doxil at 35 mg/m2 on 6/11/24. She has been tolerating this well symptomatically. However, she does have ongoing slow/delayed wound healing of surgical incision. Given ongoing delayed healing and unlikelihood that delaying Doxil by 1-2 weeks would likely have any substantial benefit to wound  healing, I reviewed wound images with Dr Jimenez and our recommendation would be to hold cycle 3 of adjuvant Doxil and continue with PET in August and likely resume chemo with planned cycle 4. Mirta agrees to this plan.    The overall plan is for 1 year of adjuvant therapy (~through end of June 2025). Will repeat MRI right leg and PET in August    Oncology Follow-Up/Plan:  -Hold cycle 3 of Doxil  -repeat imaging at end of August  -Plan to resume Doxil with Cycle 4 in September  -Monthly virtual ISIDORO prior to labs and Doxil infusions in [place]    # Delayed wound healing at surgical site  Following with wound care  Continue Bactrim per ortho  Hold cycle 3 Doxil   Follow up with ortho as samir din 3 days    #Neuropathic pain  Continue Lyrica to 50mg TID; noting improvement with this    #Mucositis, resolved  Cool mouth during Doxil infusions  S/s for 1 week TID after chemo and then TID prn for sores  Did not recur with most recent cycle    #Anemia  S/t chemo and RT?  Repeat CBC with Doxil infusions    #Elevated creatinine  -Intermittently elevated  -CMP with monthly infusions  -continue to push po fluids    #Hypertension  Follows with cardiology; seen on 7/10/24; note reviewed and echo on 7/25/24 with EF 60-65%  -Losartan 100 mg daily  -Amlodipine increased to 10mg daily by cards in July 2024  -Hydrochlorothiazine 50 mg once daily   -Chlorthalidone previously discontinued d/t electrolyte disturbance    #Right Leg Swelling, stable  - US RLE on 10/21/23 was negative  -Varies with activity  - S/t resection.  Can continue compression stockings and leg elevation prn. -Increasing protein per wound team instructions    #CLL  Follow with Dr. Duckworth --next due in June 2025.     30 minutes spent on the date of the encounter doing chart review, review of test results, interpretation of tests, patient visit, and documentation     Amber Scheierl, CNP  [facility] [address]  [address]  [phone]

## 2024-08-08 ENCOUNTER — OFFICE VISIT (OUTPATIENT)
Dept: ORTHOPEDICS | Facility: CLINIC | Age: 69
End: 2024-08-08
Payer: COMMERCIAL

## 2024-08-08 ENCOUNTER — OFFICE VISIT (OUTPATIENT)
Dept: VASCULAR SURGERY | Facility: CLINIC | Age: 69
End: 2024-08-08
Attending: FAMILY MEDICINE
Payer: COMMERCIAL

## 2024-08-08 VITALS
DIASTOLIC BLOOD PRESSURE: 83 MMHG | RESPIRATION RATE: 12 BRPM | OXYGEN SATURATION: 99 % | TEMPERATURE: 99.2 F | SYSTOLIC BLOOD PRESSURE: 147 MMHG | HEART RATE: 86 BPM

## 2024-08-08 DIAGNOSIS — T81.49XA WOUND INFECTION AFTER SURGERY: Primary | ICD-10-CM

## 2024-08-08 DIAGNOSIS — R60.0 LOWER EXTREMITY EDEMA: ICD-10-CM

## 2024-08-08 DIAGNOSIS — T81.89XD NON-HEALING SURGICAL WOUND, SUBSEQUENT ENCOUNTER: Primary | ICD-10-CM

## 2024-08-08 PROBLEM — T81.89XA NONHEALING SURGICAL WOUND: Status: RESOLVED | Noted: 2024-08-08 | Resolved: 2024-08-08

## 2024-08-08 PROBLEM — T81.89XA NONHEALING SURGICAL WOUND: Status: ACTIVE | Noted: 2024-08-08

## 2024-08-08 PROCEDURE — G0463 HOSPITAL OUTPT CLINIC VISIT: HCPCS | Mod: 25 | Performed by: FAMILY MEDICINE

## 2024-08-08 PROCEDURE — 99213 OFFICE O/P EST LOW 20 MIN: CPT | Performed by: ORTHOPAEDIC SURGERY

## 2024-08-08 PROCEDURE — 99214 OFFICE O/P EST MOD 30 MIN: CPT | Mod: 25 | Performed by: FAMILY MEDICINE

## 2024-08-08 PROCEDURE — 11042 DBRDMT SUBQ TIS 1ST 20SQCM/<: CPT | Performed by: FAMILY MEDICINE

## 2024-08-08 RX ADMIN — LIDOCAINE: 50 OINTMENT TOPICAL at 08:58

## 2024-08-08 ASSESSMENT — PAIN SCALES - GENERAL: PAINLEVEL: MILD PAIN (3)

## 2024-08-08 NOTE — LETTER
8/8/2024      Mirta Cardenas  [address] [facility] [address]      Dear Colleague,    Thank you for referring your patient, Mirta Cardneas, to the [facility] [place]. Please see a copy of my visit note below.    Impression: Mirta continues to be followed by Dr. Clancy through the wound clinic in [place].  He has changed her dressing tomorrow.  Type dressing to be changed daily or as needed.  I agree with that plan.    Plan: 1.  Follow-up to see us in 2 weeks to reassess her wound.  She should see Becky at that time.  2.  Her staging studies are due for August 22.    Diagnosis: Undifferentiated pleomorphic sarcoma right calf.      Treatment: Partial response based on PET and tumor size from chemotherapy. Completed radiation April 2024.  Tumor resection in May 2024.  Expected place prophylactic nail of the right tibia after her wound healing problem is resolved in several months.    Mirta is seen for wound check today.  She saw her wound clinic specialist in [place] today they changed her dressing to more of a wick type of dressing covered by an ABD and gauze.  Inspection of the wound shows the cellulitis has resolved on the Bactrim antibiotics and that the wick is appears to be effect    She had a series of questions which we responded to.  These mainly related to should she follow-up with Dr. Armstrong in the [facility] radiation team or with her wound clinic or with us.  She feels it is difficult to make so many appointments.  I reported to her that we be happy to take charge of coordinating her care and would be happy to see her back in 2 weeks to receive an update for her on her wound response to therapy in the management.    This was an established patient visit.  Total length of the visit was greater than 20 minutes.      Again, thank you for allowing me to participate in the care of your patient.        Sincerely,        Benny Barroso MD

## 2024-08-08 NOTE — PROGRESS NOTES
Clinic Administered Medication Documentation    Patient was given Lidocaine 5% ointment. Prior to medication administration, verified patient's identity using patient's name and date of birth.    CAL MURILLO RN

## 2024-08-08 NOTE — PROGRESS NOTES
Wound Clinic Note         Visit date: 08/08/2024       Cheif Complaint:     Mirta Cardenas is a 69 year old  female had concerns including Wound Check (Right leg wound).  The patient has lower extremity edema and a right leg ulcer         HISTORY OF PRESENT ILLNESS:    Mirta Cardenas reports the wound has been present since early June 2024.  The wound began after a recent surgery and the incision did not heal normally.   She had a sarcoma resection from the right leg performed on April 22, 2024.  The margins were negative.  She continues on chemotherapy now.  The wound initially healed after surgery but then broke open again a few weeks later due to swelling in her leg.    Since her last clinic visit with me she has been bandaging the area with Betadine, an ABD pad and a Tubigrip stocking change once a day.  There has still been light serous drainage from the wound.  Also since her last clinic visit with me she saw the surgeon that performed the original surgery and they prescribed Bactrim which she continues to take now with no symptoms of an adverse reaction.      The pateint denies fevers or chills.  They report the pain from the wound has been 3/10 and has remained about the same recently.      The patient reports laying down to elevate their legs above the level of their heart at least twice a day.  The patient confirms they do sleep in a bed.     Today the patient reports maintaining a high protein diet, but has not been taking protein supplements lately.        The patient denies a history of diabetes, smoking or chronic steroid use.         The patient has not had any symptoms of infection relating to the wound recently and is not currently on antibiotics.       Problem List:   Past Medical History:   Diagnosis Date    Asthma     Asthma     reactive airway disease, per patient    Blood transfusion     Cancer (H)     chronic lymphocytic leukemia    CINV (chemotherapy-induced nausea and vomiting)  10/12/2023    History of transfusion     Hypertension     Hypertension     Leukemia, chronic lymphocytic (H)     Malignant neoplasm (H)     CLL    Sarcoma of right lower extremity (H)     Thyroid nodule 2008             Family Hx: family history includes Cancer in her sister; Heart Disease (age of onset: 65) in her brother; Hypertension in her brother and son; Obesity in her son and son; Osteoporosis in her mother.       Surgical Hx:   Past Surgical History:   Procedure Laterality Date    ABDOMEN SURGERY      c section *3    APPENDECTOMY      C/SECTION, CLASSICAL  ,,    , Classical    COLONOSCOPY      EXCISE SOFT TISSUE TUMOR CALF Right 2024    Procedure: Removal of Tumor Right Calf;  Surgeon: Benny Barroso MD;  Location: [facility] OR    HYSTERECTOMY      HYSTERECTOMY, PAP NO LONGER INDICATED  1998    PICC DOUBLE LUMEN PLACEMENT Left 10/17/2023    left basilic 4 fr dl power picc 41 cm    PICC DOUBLE LUMEN PLACEMENT Left 2023    Medial Brachial, 42 cm, 3 cm external length    PICC DOUBLE LUMEN PLACEMENT Left 01/10/2024    left medial brachial 5 fr dl power picc 41 cm    RELEASE CARPAL TUNNEL  2012    Procedure:RELEASE CARPAL TUNNEL; Right Carpal Tunnel Release & Right Ring A1 Pulley Release; Surgeon:SERGEY HEATON; Location:[facility]    RELEASE TRIGGER FINGER  2012    Procedure:RELEASE TRIGGER FINGER; Surgeon:SERGEY HEATON; Location:[facility]    RELEASE TRIGGER FINGER  10/12/2012    Procedure: RELEASE TRIGGER FINGER;  Right Thumb A1 Pulley Release;  Surgeon: Sergey Heaton MD;  Location: [facility]    RESECT BONE LOWER EXTREMITY Right 2024    Procedure: Including Bone Removal;  Surgeon: Benny Barroso MD;  Location: [facility] OR    SALPINGO OOPHORECTOMY,R/L/ULICES  2008    right          Allergies:    Allergies   Allergen Reactions    Allopurinol Itching    Amoxicillin Hives    Codeine Itching    Cymbalta [Duloxetine Hcl] Fatigue    Periactin Other  (See Comments)     Sleepy.    11/15/23: patient not sure about this allergy/intolerance - may be interested in removal    Tenormin [Atenolol] Fatigue     11/15/23: patient may be interested in removal of this from allergy list as it was only fatigue/sleepiness              Medication History:    Current Outpatient Medications   Medication Sig Dispense Refill    amLODIPine (NORVASC) 10 MG tablet Take 1 tablet (10 mg) by mouth daily 90 tablet 1    hydrochlorothiazide (HYDRODIURIL) 50 MG tablet Take 1 tablet (50 mg) by mouth daily 90 tablet 3    losartan (COZAAR) 100 MG tablet Take 1 tablet (100 mg) by mouth daily 90 tablet 3    Multiple Vitamins-Calcium (ONE-A-DAY WOMENS FORMULA PO) Take 1 tablet by mouth daily      omeprazole (PRILOSEC) 20 MG DR capsule Take 1 capsule by mouth every morning.      pravastatin (PRAVACHOL) 40 MG tablet TAKE 1 TABLET (40 MG) BY MOUTH DAILY 90 tablet 1    pregabalin (LYRICA) 50 MG capsule Take 1 capsule (50 mg) by mouth 3 times daily 90 capsule 0    sulfamethoxazole-trimethoprim (BACTRIM DS) 800-160 MG tablet Take 1 tablet by mouth 2 times daily for 14 days 28 tablet 0    bacitracin 500 UNIT/GM external ointment Apply topically 2 times daily To wounds on the right shin. 4 g daily (Patient not taking: Reported on 7/25/2024) 425 g 3     No current facility-administered medications for this visit.         Tobacco History:  reports that she has never smoked. She has been exposed to tobacco smoke. She has never used smokeless tobacco.       REVIEW OF SYMPTOMS:   The review of systems was negative except as noted in the HPI.           PHYSICAL EXAMINATION:     BP (!) 147/83   Pulse 86   Temp 99.2  F (37.3  C) (Oral)   Resp 12   SpO2 99%            GENERAL: The patient overall appears well and is no acute distress.   HEAD: normocephalic   EYES: Sclera and conjunctiva clear   NECK: no obvious masses   LUNGS: breathing is unlabored.   EXTREMITIES: No clubbing, cyanosis or edema   SKIN: No  rashes or other abnormalities except as noted under the Wound section below.   NEUROLOGICAL: normal motor and sensory function   EDEMA: Moderate       WOUND: Today most of the wound is still scabbed over however there is a small area at the inferior aspect that is wet.  There is no sign of infection and no areas of periwound maceration.      Also see below for wound details:       Circumferential volume measures:             No data to display                Ulceration(s)/Wound(s):   Please see the media tab under the chart review for pictures of the wounds.  Nursing staff removed dressings and cleansed wound.    VASC Wound right leg (Active)   Pre Size Length 2.5 08/08/24 0800   Pre Size Width 1 08/08/24 0800   Pre Size Depth 0.6 08/08/24 0800   Pre Total Sq cm 2.5 08/08/24 0800               Recent Labs   Lab Test 06/13/24  1347 04/24/23  0747   A1C 5.6 5.4          Recent Labs   Lab Test 07/09/24  1220 06/13/24  1347 06/11/24  1256   ALBUMIN 4.3 4.5 4.7         WBC   Date Value Ref Range Status   05/10/2021 15.6 (H) 4.0 - 11.0 10e9/L Final     WBC Count   Date Value Ref Range Status   07/09/2024 6.9 4.0 - 11.0 10e3/uL Final     Albumin   Date Value Ref Range Status   07/09/2024 4.3 3.5 - 5.2 g/dL Final   10/12/2020 3.6 3.4 - 5.0 g/dL Final           Procedure note:  Informed Consent:  Patient acknowledges that I have explained the patient's general medical condition to him/her.  Patient has been informed and acknowledges that I have explained the risks or complications of wound debridement including, but not limited to, scarring, damage to blood vessels or surrounding areas such as nerves and organs, allergic reactions to topical and injected anaesthetic and/or skin prep solutions.  Other risks include excessive bleeding, removal of healthy tissue, infection, pain and inflammation, and prolonged or failure to heal.  Patient acknowledges that bleeding after debridement and pain may worsen after debridement and  that dead/necrotic tissue may cause bacteria and toxins to be released into the bloodstream and cause sepsis or shock.  Patient acknowledges that I have explained that the wound may be larger after debridement.  Patient acknowledges that they may need serial debridements while under care in the wound department.  Patient acknowledges that they were given an opportunity to ask questions about treatment and I have answered patient's questions.    Anesthetized as needed with lidocaine.  Using a curette and/or a scalpel I performed an excisional debridement removing all necrotic material at the right leg wound in to the level of viable subcutaneous tissue.  I obtained hemostasis with direct pressure.  The patient tolerated the procedure well.  EBL: <5 ml  Total debridement surface area: Less than 20 cm                  ASSESSMENT:   This is a 69 year old  female with a right leg ulcer, the patient also has lower extremity edema which was also managed during today's clinic visit.          PLAN:   We will bandage the area with alginate, an ABD pad and a Tubigrip stocking changed once a day.    Separate from the wound care instructions we then discussed management strategies for lower extremity edema.  I explained the keys for managing lower extremity edema are compression and elevation.  I have encouraged the patient to continue to elevate the legs as they have been doing, including laying down with their legs above the level of the heart for 30 minutes at least twice a day.     I have encouraged the patient to continue on their high protein diet to aid in wound healing.   The patient will return to the wound clinic in 3-4 weeks to see me again.        30 minutes spent on the date of the encounter doing chart review, history and exam, documentation and further activities per the note, this time excludes any procedure time      Loyd Clancy MD  08/08/2024   9:21 AM   [facility]  Vascular/Wound  [phone]    This note was electronically signed by Loyd Clancy MD

## 2024-08-08 NOTE — NURSING NOTE
Reason For Visit:   Chief Complaint   Patient presents with    RECHECK     Follow up wound        There were no vitals taken for this visit.    Pain Assessment  Patient Currently in Pain: Yes  Patient's Stated Pain Goal: 3  0-10 Pain Scale: 3  Primary Pain Location: Leg (Lower extremity)  Pain Descriptors: Elisa Hannah, NORBERT

## 2024-08-08 NOTE — LETTER
[facility]  [address] [place] [address] [address]  [place], [place] [address]  [phone]      Fax [phone]    [facility]           Fax: [phone]            Customer Service: [phone]        Account #: [id number]    Wound Dressing Rx and Order Form  Order Status: new   Verbal: Radha  Date: 2024     Mirta KOENIG Ronald  Gender: female  : 1955  [address] [facility] [id number] [phone] (home)     Medical Record: [id number]  Primary Care Provider: Joseline Fraga      ICD-10-CM    1. Non-healing surgical wound, subsequent encounter  T81.89XD DEBRIDE SKIN/SUBQ TISSUE      2. Lower extremity edema  R60.0             Insurance Info:  INSURER: Payor: [employer] / Plan: [employer] MEDICARE ADVANTAGE / Product Type: Medicare /   Policy ID#:  [id number]  SECONDARY INSURANCE:    Secondary Policy ID#:  N/A        Physician Info:   Name:  XIOMARA ALMEIDA     Dept Address/Phones:   [address] [place] [address], [address]  [facility] [address] [phone]  Fax: [phone]    Lymphedema circumferential measurements (in cm):       No data to display                  Wound info:  VASC Wound right leg (Active)   Pre Size Length 2.5 24 0800   Pre Size Width 1 24 0800   Pre Size Depth 0.6 24 0800   Pre Total Sq cm 2.5 24 0800   Description diagonal 24 0900   Number of days: 43       Incision/Surgical Site 24 Lateral;Right Calf (Active)   Number of days: 123        Drainage: moderate  Thickness:  full  Duration of Need: 30 DAYS  Days Supply: 30 DAYS  Start Date: 2024  Starter Kit, Ancillary Kit (saline, gloves, gauze): Yes   Qualifying wound/Debridement: Yes     NO SUBSTITUTIONS. Call [phone].      Dressing Type Brand Size Frequency of change  Quantity   Primary Hydrofiber Ag Aquacel Advantage Ag 4x5 DAILY and as needed 4     NO SUBSTITUTIONS. Call [phone] with questions.       OK to forward to covered  supplier.    Electronically Signed Physician:  XIOMARA ALMEIDA             Date: August 23, 2024

## 2024-08-09 NOTE — PATIENT INSTRUCTIONS
Impression: Mirta continues to be followed by Dr. Clancy through the wound clinic in [place].  He has changed her dressing tomorrow.  Type dressing to be changed daily or as needed.  I agree with that plan.    Plan: 1.  Follow-up to see us in 2 weeks to reassess her wound.  She should see Becky at that time.  2.  Her staging studies are due for August 22.

## 2024-08-09 NOTE — PROGRESS NOTES
Impression: Mirta continues to be followed by Dr. Clancy through the wound clinic in [place].  He has changed her dressing tomorrow.  Type dressing to be changed daily or as needed.  I agree with that plan.    Plan: 1.  Follow-up to see us in 2 weeks to reassess her wound.  She should see Becky at that time.  2.  Her staging studies are due for August 22.    Diagnosis: Undifferentiated pleomorphic sarcoma right calf.      Treatment: Partial response based on PET and tumor size from chemotherapy. Completed radiation April 2024.  Tumor resection in May 2024.  Expected place prophylactic nail of the right tibia after her wound healing problem is resolved in several months.    Mirta is seen for wound check today.  She saw her wound clinic specialist in [place] today they changed her dressing to more of a wick type of dressing covered by an ABD and gauze.  Inspection of the wound shows the cellulitis has resolved on the Bactrim antibiotics and that the wick is appears to be effect    She had a series of questions which we responded to.  These mainly related to should she follow-up with Dr. Armstrong in the [facility] radiation team or with her wound clinic or with us.  She feels it is difficult to make so many appointments.  I reported to her that we be happy to take charge of coordinating her care and would be happy to see her back in 2 weeks to receive an update for her on her wound response to therapy in the management.    This was an established patient visit.  Total length of the visit was greater than 20 minutes.

## 2024-08-22 ENCOUNTER — HOSPITAL ENCOUNTER (OUTPATIENT)
Dept: PET IMAGING | Facility: CLINIC | Age: 69
Discharge: HOME | End: 2024-08-22
Attending: STUDENT IN AN ORGANIZED HEALTH CARE EDUCATION/TRAINING PROGRAM | Admitting: STUDENT IN AN ORGANIZED HEALTH CARE EDUCATION/TRAINING PROGRAM
Payer: COMMERCIAL

## 2024-08-22 DIAGNOSIS — C49.21 SARCOMA OF RIGHT LOWER EXTREMITY (H): ICD-10-CM

## 2024-08-22 PROCEDURE — 78816 PET IMAGE W/CT FULL BODY: CPT | Mod: PS

## 2024-08-22 PROCEDURE — A9552 F18 FDG: HCPCS | Performed by: STUDENT IN AN ORGANIZED HEALTH CARE EDUCATION/TRAINING PROGRAM

## 2024-08-22 PROCEDURE — 343N000001 HC RX 343: Performed by: STUDENT IN AN ORGANIZED HEALTH CARE EDUCATION/TRAINING PROGRAM

## 2024-08-22 RX ORDER — FLUDEOXYGLUCOSE F 18 200 MCI/ML
10-18 INJECTION, SOLUTION INTRAVENOUS ONCE
Status: COMPLETED | OUTPATIENT
Start: 2024-08-22 | End: 2024-08-22

## 2024-08-22 RX ADMIN — FLUDEOXYGLUCOSE F 18 12.8 MILLICURIE: 200 INJECTION, SOLUTION INTRAVENOUS at 09:40

## 2024-08-23 ENCOUNTER — TELEPHONE (OUTPATIENT)
Dept: VASCULAR SURGERY | Facility: CLINIC | Age: 69
End: 2024-08-23

## 2024-08-23 ENCOUNTER — OFFICE VISIT (OUTPATIENT)
Dept: ORTHOPEDICS | Facility: CLINIC | Age: 69
End: 2024-08-23
Payer: COMMERCIAL

## 2024-08-23 DIAGNOSIS — C49.9 SARCOMA OF SOFT TISSUE (H): ICD-10-CM

## 2024-08-23 PROCEDURE — 99024 POSTOP FOLLOW-UP VISIT: CPT | Performed by: PHYSICIAN ASSISTANT

## 2024-08-23 RX ORDER — PREGABALIN 50 MG/1
50 CAPSULE ORAL 3 TIMES DAILY
Qty: 90 CAPSULE | Refills: 0 | Status: SHIPPED | OUTPATIENT
Start: 2024-08-23 | End: 2024-09-18

## 2024-08-23 NOTE — LETTER
8/23/2024      Mirta Cardenas  [id number] [facility] [address]      Dear Colleague,    Thank you for referring your patient, Mirta Cardenas, to the [facility] [place]. Please see a copy of my visit note below.    Chief Complaint: wound check  Preop diagnosis: Sarcoma right calf, deep compartment  4/22/24 Procedure performed: Removal of sarcoma right calf, deep compartment including anterior tibial muscle and 8 cm of the fibula    HPI: Mirta is a 69-year-old female here with her  today for follow-up of the above procedure and ongoing wound management to the right lower extremity.  She has been seeing wound provider and doing daily wound care to her right leg.  She is using Aquacel felt and a gauze dressing daily.  She uses Betadine on the wound.  She is using a compression sleeve daily and elevating as able.  She has a lot of tenderness of the wound and lower leg.  It also feels very itchy.  She also has a small pinpoint area proximally that has been a small amount of drainage.  She has questions about follow-up with the radiation oncology and wound care.  No other concerns.  She does need a refill on her Lyrica    Physical Exam: Mirta is a pleasant 69-year-old female who is alert and oriented no apparent distress.  She has a mildly antalgic gait with mild foot drop on the right.  She has moderate edema of the right lower leg and foot.  She is a pinpoint drop of drainage from the proximal wound with no surrounding erythema.  See picture below of the lower leg wound which has exudate and eschar present.  The skin is immobile.  The skin is tender.          Imaging: Whole body PET/CT done yesterdays shows:  Development of FDG avid soft tissue thickening throughout the right lower extremity with areas of in-situ ulceration (Max SUV 5.8).     Degenerative shoulder, hip, and knee synovitis.     CT FINDINGS: Mild senescent intracranial changes. Mild carotid artery  bifurcation calcification. Mild to moderate coronary artery calcium. Mild atrophy of the right kidney. Fat-containing paraumbilical hernia. Sigmoid diverticulosis. Pelvic phleboliths.   Multilevel degenerative changes of the spine. Right lower extremity resection changes.    Impression: 69-year-old female with nonhealing ulceration of the right lower leg after excision of sarcoma and neoadjuvant radiation and chemotherapy    Plan: I explained to Mirta that I do not think her current wound regimen is working.  I tried to debride a small portion of the wound, but she is very tender.  I recommend that we try to peel in place incisional wound VAC to see if we can get some blood flow and healing to the area.  The areas were cleaned with wound cleanser.  A silicone derma tack dressing with GranuFoam sponge belt and was placed over the wound.  This was attached to a Prevena incisional VAC unit.  The unit was turned on at 125 mmHg continuous suction.  There were no leaks.  The patient tolerated the procedure well.  She was instructed in the use of the unit.  I would like her to minimize standing and walking.  Frequent elevation of the leg above the level of the heart to prevent swelling.  I recommend she try a daily Zyrtec or Claritin to decrease the itching of the right lower extremity.  She can continue physical therapy.  I did place an order for plastic surgery consult, because I do have concerns that this wound may not heal and she may require debridement and a graft.  I would like to see her back in 1 week for dressing removal and wound check.  She agrees with the plan and all questions were answered.  I did refill her Lyrica.      Again, thank you for allowing me to participate in the care of your patient.        Sincerely,        Jodi Cardenas PA-C

## 2024-08-23 NOTE — NURSING NOTE
Reason For Visit:   Chief Complaint   Patient presents with    RECHECK     2 week follow up right leg wound check       There were no vitals taken for this visit.    Pain Assessment  Patient Currently in Pain: Yes  0-10 Pain Scale: 4  Primary Pain Location: Leg (Right)  Pain Descriptors: Intermittent, Tightness, Aching, Constant, Stabbing  Alleviating Factors: NSAIDS, Pain medication    CAN HOU, ATC

## 2024-08-23 NOTE — PROGRESS NOTES
Chief Complaint: wound check  Preop diagnosis: Sarcoma right calf, deep compartment  4/22/24 Procedure performed: Removal of sarcoma right calf, deep compartment including anterior tibial muscle and 8 cm of the fibula    HPI: Mirta is a 69-year-old female here with her  today for follow-up of the above procedure and ongoing wound management to the right lower extremity.  She has been seeing wound provider and doing daily wound care to her right leg.  She is using Aquacel felt and a gauze dressing daily.  She uses Betadine on the wound.  She is using a compression sleeve daily and elevating as able.  She has a lot of tenderness of the wound and lower leg.  It also feels very itchy.  She also has a small pinpoint area proximally that has been a small amount of drainage.  She has questions about follow-up with the radiation oncology and wound care.  No other concerns.  She does need a refill on her Lyrica    Physical Exam: Mirta is a pleasant 69-year-old female who is alert and oriented no apparent distress.  She has a mildly antalgic gait with mild foot drop on the right.  She has moderate edema of the right lower leg and foot.  She is a pinpoint drop of drainage from the proximal wound with no surrounding erythema.  See picture below of the lower leg wound which has exudate and eschar present.  The skin is immobile.  The skin is tender.          Imaging: Whole body PET/CT done yesterdays shows:  Development of FDG avid soft tissue thickening throughout the right lower extremity with areas of in-situ ulceration (Max SUV 5.8).     Degenerative shoulder, hip, and knee synovitis.     CT FINDINGS: Mild senescent intracranial changes. Mild carotid artery bifurcation calcification. Mild to moderate coronary artery calcium. Mild atrophy of the right kidney. Fat-containing paraumbilical hernia. Sigmoid diverticulosis. Pelvic phleboliths.   Multilevel degenerative changes of the spine. Right lower extremity resection  changes.    Impression: 69-year-old female with nonhealing ulceration of the right lower leg after excision of sarcoma and neoadjuvant radiation and chemotherapy    Plan: I explained to Mirta that I do not think her current wound regimen is working.  I tried to debride a small portion of the wound, but she is very tender.  I recommend that we try to peel in place incisional wound VAC to see if we can get some blood flow and healing to the area.  The areas were cleaned with wound cleanser.  A silicone derma tack dressing with GranuFoam sponge belt and was placed over the wound.  This was attached to a Prevena incisional VAC unit.  The unit was turned on at 125 mmHg continuous suction.  There were no leaks.  The patient tolerated the procedure well.  She was instructed in the use of the unit.  I would like her to minimize standing and walking.  Frequent elevation of the leg above the level of the heart to prevent swelling.  I recommend she try a daily Zyrtec or Claritin to decrease the itching of the right lower extremity.  She can continue physical therapy.  I did place an order for plastic surgery consult, because I do have concerns that this wound may not heal and she may require debridement and a graft.  I would like to see her back in 1 week for dressing removal and wound check.  She agrees with the plan and all questions were answered.  I did refill her Lyrica.

## 2024-08-23 NOTE — TELEPHONE ENCOUNTER
Caller: Patient    Provider: MD Loyd Clancy    Detailed reason for call: Patient requesting orders for Aquacel; she has enough to make it through Tuesday.     Best phone number to contact: [phone]     Best time to contact: Any time    Ok to leave a detailed message: Yes    Ok to speak to authorized person if needed: No      (Noted to patient if reason is related to wound or incision, to please send a photo via email or [employer].)

## 2024-08-23 NOTE — TELEPHONE ENCOUNTER
Called and spoke with pt. She states she only needs Aquacel as she's been getting other supplies through [facility] and has been rewashing the tubigrip. Orders faxed to [facility] and email sent to Janice Randall requesting that order be expedited since pt will be out of supplies as of Tuesday. Instructed pt that if she hasn't received supplies by Tuesday to call the clinic so alternate plan of care can be discussed. She voiced understanding.

## 2024-08-26 NOTE — PROGRESS NOTES
[facility] CANCER CLINIC    FOLLOW UP VISIT NOTE    PATIENT NAME: Mirta Cardenas MRN # [id number]  DATE OF VISIT: May 29, 2024 YOB: 1955    REFERRING PROVIDER: Benny Barroso MD  [address]  [place] [address]    CANCER TYPE:  High grade pleomorphic sarcoma       CHIEF COMPLAIN   LE pain     HISTORY OF PRESENT ILLNESS   68-year-old female with PMH of CLL and recent Dx of High Grade Pleomorphic sarcoma, with intermittent right calf pain but then continuous calf pain beginning in March 2023.  She had a recent x-ray which shows a destructive lesion in the midportion R LE in between the fibula and the tibia. She is experiencing significant pain, she is restless during the visit due to pain. She is currently taking both Advil and Tylenol and oxycodone for pain, with minimal control, feels ibuprofen is what helps the most. She has been alternating all pain medications without optimizing doses of them.     C1D1 10/17/23  C2 11/14  C2 12/13  C4 1/10    2/19/2024 to 3/22/2024: Right lower extremity, 5000 cGy in 25 fraction     Surgical resection by Dr. Barroso 4/22/24: Final pathology demonstrated undifferentiated sarcoma, 5.8 cm, there was noted treatment effect with 6% viable tumor, negative margin, ypT2 NX.     C1 of adjuvant doxil on 6/11  C2 on 7/10      Interval Hx  She reports that she tolerated chemotherapy very well, only mild fatigue, no significant nausea. However, she has open surgical wound now with vacuum in place, non healing.      PAST ONCOLOGIC HISTORY   CLL - on monitoring     PAST MEDICAL HISTORY   Anal fisure - controlled with nitrobid  CLL - now being monitored - Ivan  HTN  Cholesterol  Hx of severe COVID  Psoriasis    PAST SURGICAL HISTORY   3 c- sections  Hysterectomy  Ooforectomy     FAMILY HISTORY   Sister with MM     ALLERGIES      Allergies   Allergen Reactions    Allopurinol Itching    Amoxicillin Hives    Codeine Itching    Cymbalta  [Duloxetine Hcl] Fatigue    Periactin Other (See Comments)     Sleepy.    11/15/23: patient not sure about this allergy/intolerance - may be interested in removal    Tenormin [Atenolol] Fatigue     11/15/23: patient may be interested in removal of this from allergy list as it was only fatigue/sleepiness          SOCIAL HISTORY     Social History     Social History Narrative    Not on file     Lives with , Giancarlo, she retired last spring, denies alcohol, tobacco or other drugs.      CURRENT OUTPATIENT MEDICATIONS     Current Outpatient Medications   Medication Sig Dispense Refill    amLODIPine (NORVASC) 10 MG tablet Take 1 tablet (10 mg) by mouth daily 90 tablet 1    bacitracin 500 UNIT/GM external ointment Apply topically 2 times daily To wounds on the right shin. 4 g daily (Patient not taking: Reported on 2024) 425 g 3    hydrochlorothiazide (HYDRODIURIL) 50 MG tablet Take 1 tablet (50 mg) by mouth daily 90 tablet 3    losartan (COZAAR) 100 MG tablet Take 1 tablet (100 mg) by mouth daily 90 tablet 3    Multiple Vitamins-Calcium (ONE-A-DAY WOMENS FORMULA PO) Take 1 tablet by mouth daily      omeprazole (PRILOSEC) 20 MG DR capsule Take 1 capsule by mouth every morning.      pravastatin (PRAVACHOL) 40 MG tablet TAKE 1 TABLET (40 MG) BY MOUTH DAILY 90 tablet 1    pregabalin (LYRICA) 50 MG capsule Take 1 capsule (50 mg) by mouth 3 times daily. 90 capsule 0          REVIEW OF SYSTEMS   As above in the HPI, o/w complete 12-point ROS was negative.     PHYSICAL EXAM   BP (!) 147/82 (BP Location: Right arm, Patient Position: Sitting, Cuff Size: Adult Large)   Pulse 84   Temp 98.2  F (36.8  C) (Oral)   Resp 16   Wt 86.4 kg (190 lb 6.4 oz)   SpO2 100%   BMI 35.98 kg/m      EGO (due to pain related to the mass)  GEN: NAD  HEENT: PERRL, EOMI, no icterus, injection or pallor. Oropharynx is clear.  NECK: no cervical or supraclavicular lymphadenopathy  LUNGS: clear bilaterally  CV: regular, no murmurs, rubs, or  gallops  ABDOMEN: soft, non-tender, non-distended, normal bowel sounds, no hepatosplenomegaly by percussion or palpation  EXT: warm, well perfused, no edema. Surgical scar with wound van in place in the lower edge.   NEURO: alert  SKIN: no rashes     LABORATORY AND IMAGING STUDIES     Lab Results   Component Value Date    WBC 6.9 07/09/2024    WBC 15.6 05/10/2021     Lab Results   Component Value Date    RBC 3.78 07/09/2024    RBC 4.89 05/10/2021     Lab Results   Component Value Date    HGB 11.2 07/09/2024    HGB 13.7 05/10/2021     Lab Results   Component Value Date    HCT 31.0 07/09/2024    HCT 41.0 05/10/2021     Lab Results   Component Value Date    MCV 82 07/09/2024    MCV 84 05/10/2021     Lab Results   Component Value Date    MCH 29.6 07/09/2024    MCH 28.0 05/10/2021     Lab Results   Component Value Date    MCHC 36.1 07/09/2024    MCHC 33.4 05/10/2021     Lab Results   Component Value Date    RDW 13.3 07/09/2024    RDW 13.1 05/10/2021     Lab Results   Component Value Date     07/09/2024     05/10/2021       Last Comprehensive Metabolic Panel:  Sodium   Date Value Ref Range Status   07/09/2024 141 135 - 145 mmol/L Final   05/10/2021 141 133 - 144 mmol/L Final     Potassium   Date Value Ref Range Status   07/09/2024 3.9 3.4 - 5.3 mmol/L Final   05/24/2022 4.7 3.4 - 5.3 mmol/L Final   05/10/2021 3.9 3.4 - 5.3 mmol/L Final     Chloride   Date Value Ref Range Status   07/09/2024 107 98 - 107 mmol/L Final   05/24/2022 108 94 - 109 mmol/L Final   05/10/2021 108 94 - 109 mmol/L Final     Carbon Dioxide   Date Value Ref Range Status   05/10/2021 27 20 - 32 mmol/L Final     Carbon Dioxide (CO2)   Date Value Ref Range Status   07/09/2024 23 22 - 29 mmol/L Final   05/24/2022 29 20 - 32 mmol/L Final     Anion Gap   Date Value Ref Range Status   07/09/2024 11 7 - 15 mmol/L Final   05/24/2022 2 (L) 3 - 14 mmol/L Final   05/10/2021 6 3 - 14 mmol/L Final     Glucose   Date Value Ref Range Status   07/09/2024  103 (H) 70 - 99 mg/dL Final   05/24/2022 108 (H) 70 - 99 mg/dL Final   05/10/2021 112 (H) 70 - 99 mg/dL Final     Comment:     Fasting specimen     GLUCOSE BY METER POCT   Date Value Ref Range Status   04/23/2024 130 (H) 70 - 99 mg/dL Final     Urea Nitrogen   Date Value Ref Range Status   07/09/2024 24.8 (H) 8.0 - 23.0 mg/dL Final   05/24/2022 14 7 - 30 mg/dL Final   05/10/2021 19 7 - 30 mg/dL Final     Creatinine   Date Value Ref Range Status   07/09/2024 1.16 (H) 0.51 - 0.95 mg/dL Final   05/10/2021 0.89 0.52 - 1.04 mg/dL Final     GFR Estimate   Date Value Ref Range Status   07/09/2024 51 (L) >60 mL/min/1.73m2 Final     Comment:     eGFR calculated using 2021 CKD-EPI equation.   05/10/2021 69 >60 mL/min/[1.73_m2] Final     Comment:     Non  GFR Calc  Starting 12/18/2018, serum creatinine based estimated GFR (eGFR) will be   calculated using the Chronic Kidney Disease Epidemiology Collaboration   (CKD-EPI) equation.       GFR, ESTIMATED POCT   Date Value Ref Range Status   10/10/2023 >60 >60 mL/min/1.73m2 Final     Calcium   Date Value Ref Range Status   07/09/2024 9.3 8.8 - 10.2 mg/dL Final   05/10/2021 8.6 8.5 - 10.1 mg/dL Final     Bilirubin Total   Date Value Ref Range Status   07/09/2024 0.2 <=1.2 mg/dL Final   10/12/2020 0.3 0.2 - 1.3 mg/dL Final     Alkaline Phosphatase   Date Value Ref Range Status   07/09/2024 91 40 - 150 U/L Final   10/12/2020 124 40 - 150 U/L Final     ALT   Date Value Ref Range Status   07/09/2024 24 0 - 50 U/L Final     Comment:     Reference intervals for this test were updated on 6/12/2023 to more accurately reflect our healthy population. There may be differences in the flagging of prior results with similar values performed with this method. Interpretation of those prior results can be made in the context of the updated reference intervals.     10/12/2020 40 0 - 50 U/L Final     AST   Date Value Ref Range Status   07/09/2024 26 0 - 45 U/L Final     Comment:      Reference intervals for this test were updated on 6/12/2023 to more accurately reflect our healthy population. There may be differences in the flagging of prior results with similar values performed with this method. Interpretation of those prior results can be made in the context of the updated reference intervals.   10/12/2020 35 0 - 45 U/L Final       PET scan 1/29/24  IMPRESSION: Although much of the uptake along the superior and middle aspects of the right calf mass has decreased or resolved, there are two foci of uptake along the more inferior portions of the mass which have increased compared to December 2023,   concerning for disease progression.    MRI Tibia/Fibula 1/29/24  IMPRESSION:  1.  Interval decrease in size in the heterogeneously enhancing mass within the posterior tibial muscle belly. This has decreased from 3.3 x 5.2 x 9.3 cm and now measures 2.5 x 3.5 x 5.0 cm. It remains consistent with sarcoma.  2.  There is some surrounding T2 signal abnormality within the adjacent musculature including along the apposing side of the interosseous membrane within the anterior compartment. This could be secondary to post radiation change or a component of   reactive edema. It is difficult to completely exclude microinvasion of tumor.  3.  Edema or cellulitis about the lower leg has minimally increased since the previous examination.  4.  Stable bony irregularity and remodeling of the fibula which is at the margin of the neoplasm.  5.  Focus of T2 signal abnormality within the diaphyseal shaft of the tibia is poorly marginated. This may represent a component of mild stress reaction. Again, it is difficult to completely exclude a new bone lesion. This is considered unlikely, but   follow-up examination is recommended.  6.  No evidence for pathologic fracture.  7.  Exam otherwise negative.    8/22/24  IMPRESSION:     Development of FDG avid soft tissue thickening throughout the right lower extremity with areas of  "in-situ ulceration. While these findings may simply represent a worsening inflammatory/infectious process, the exact etiology remains indeterminate.    7/25/24 MRI L tibia/fibula  Impression:  1. In this patient with interim high grade sarcoma resection with  negative surgical margin, presumed postoperative collection, measuring  approximately 11 cm in maximal dimension. Superimposed infection of  the collection cannot be entirely excluded. No evidence of mass-like  area to suggest recurrent tumor.   2. Anterolateral subcutaneous hyperemic change and apparent area of  soft tissue/skin defect at mid/distal leg with possible 9 x 7 x 17 mm  early abscess (no definite wall on current imaging yet). Please  correlate clinically.   *  Extensive regional muscle edema maybe due to therapy, altered  biomechanics and/or reactive to infection.  3. Previously noted focal marrow signal alteration in the mid/distal  tibial shaft has slightly decreased since comparison, measuring  approximately 2 cm craniocaudal dimension, previously 2.4 cm using  similar measurement technique, may be resolving stress reaction versus  red marrow island.     PATHOLOGY   10/2/23  Final Diagnosis   A.  Soft tissue, right calf mass, excision:  - High-grade pleomorphic sarcoma  - See comment    Electronically signed by Jonny Zhou MD on 10/4/2023 at  9:09 PM   Comment  [facility]   Immunohistochemical stains show the tumor is negative for CK AE1/AE3, S100, desmin and CD34 while SATB2 demonstrates patchy reactivity.  While the primary diagnostic consideration is an undifferentiated pleomorphic sarcoma, the patchy SATB2 reactivity raises the possibility of a poorly differentiated osteosarcoma.   Clinical Information  [facility]   The patient is a 68 year old female with a history of CLL and several months of sensitivity of the lateral right calf, and recent imaging showing: \"Lytic lesion in the right fibular proximal diaphysis, with complete resorption of the " "medial cortex and some periosteal reaction adjacent to the lesion\".      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 No evidence of metastatic disease.     In her case, the probability of recurrence with surgery alone is about 40% in the next 10 years. We discussed, that recurrence in this scenario is usually with distant  metastasis which then becomes uncurable disease. We discussed, that for this reason, I recommend treatment with neoadjuvant chemotherapy with the goal of preventing metastasis in the future. We discussed the potential side effects of chemotherapy and need for emergent treatment in order to improve her symptoms. The regimen will be doxil 40mg/m2 and ifosfamide 8g/m2 over 5 days. Slightly lower dose due to age and other medical conditions.   She is s/p C4 and tolerated chemotherapy well.     I personally reviewed the most recent PET and MRI leg, she had significant decrease in size and metabolic activity compared to pre-treatment, however this is a partial response with 2 foci that appear more active.     2/19/2024 to 3/22/2024: Right lower extremity, 5000 cGy in 25 fraction     Surgical resection by Dr. Barroso 4/22/24: Final pathology demonstrated undifferentiated sarcoma, 5.8 cm, there was noted treatment effect with 6% viable tumor, negative margin, ypT2 NX.     I explained that we saw a good therapy effect from initial chemotherapy upon imaging and pathology encouraging with only 6% of viable tumor. Because of this and her initial high " risk, I recommend to continuous single agent doxil, with the goal of 1 year of adjuvant therapy.    She is s/p for C2 doxil on 7/10, no significant side effects but she has developed a non healing ulcer in the surgical wound. Adjuvant doxil is likely contributing to non healing.  I recommend to discontinue adjuvant doxil and continue surveillance only.     Plan  -MRI R leg and CT CAP and labs CBC, CMP on 11/22   -Follow up Dr. Jimenez on 11/24     40 minutes spent on the date of the encounter doing chart review, review of outside records, review of test results, interpretation of tests, patient visit, documentation, and discussion with other provider(s)     Roney Nam MD   of Medicine  Hematology, Oncology and Transplantation

## 2024-08-27 ENCOUNTER — ONCOLOGY VISIT (OUTPATIENT)
Dept: ONCOLOGY | Facility: CLINIC | Age: 69
End: 2024-08-27
Attending: STUDENT IN AN ORGANIZED HEALTH CARE EDUCATION/TRAINING PROGRAM
Payer: COMMERCIAL

## 2024-08-27 VITALS
RESPIRATION RATE: 16 BRPM | BODY MASS INDEX: 35.98 KG/M2 | WEIGHT: 190.4 LBS | TEMPERATURE: 98.2 F | DIASTOLIC BLOOD PRESSURE: 82 MMHG | SYSTOLIC BLOOD PRESSURE: 147 MMHG | OXYGEN SATURATION: 100 % | HEART RATE: 84 BPM

## 2024-08-27 DIAGNOSIS — C49.9 UNDIFFERENTIATED PLEOMORPHIC SARCOMA (H): Primary | ICD-10-CM

## 2024-08-27 PROCEDURE — G0463 HOSPITAL OUTPT CLINIC VISIT: HCPCS | Performed by: STUDENT IN AN ORGANIZED HEALTH CARE EDUCATION/TRAINING PROGRAM

## 2024-08-27 PROCEDURE — 99215 OFFICE O/P EST HI 40 MIN: CPT | Performed by: STUDENT IN AN ORGANIZED HEALTH CARE EDUCATION/TRAINING PROGRAM

## 2024-08-27 ASSESSMENT — PAIN SCALES - GENERAL: PAINLEVEL: MILD PAIN (3)

## 2024-08-27 NOTE — NURSING NOTE
"Oncology Rooming Note    August 27, 2024 10:34 AM   Mirta Cardenas is a 69 year old female who presents for:    Chief Complaint   Patient presents with    Oncology Clinic Visit     Sarcoma      Initial Vitals: BP (!) 147/82 (BP Location: Right arm, Patient Position: Sitting, Cuff Size: Adult Large)   Pulse 84   Temp 98.2  F (36.8  C) (Oral)   Resp 16   Wt 86.4 kg (190 lb 6.4 oz)   SpO2 100%   BMI 35.98 kg/m   Estimated body mass index is 35.98 kg/m  as calculated from the following:    Height as of 8/5/24: 1.549 m (5' 1\").    Weight as of this encounter: 86.4 kg (190 lb 6.4 oz). Body surface area is 1.93 meters squared.  Mild Pain (3) Comment: Data Unavailable   No LMP recorded. Patient has had a hysterectomy.  Allergies reviewed: Yes  Medications reviewed: Yes    Medications: Medication refills not needed today.  Pharmacy name entered into [facility]:    [facility] [place], [place] - [address] [place] [address] PHARM[address] [place] [address] [address] [facility]    Frailty Screening:   Is the patient here for a new oncology consult visit in cancer care? 2. No      Clinical concerns:  none      Jaida Mon              "

## 2024-08-27 NOTE — LETTER
8/27/2024      Mirta Cardenas  [address] [facility] [id number]      Dear Colleague,    Thank you for referring your patient, Mirta Cardenas, to the [facility]. Please see a copy of my visit note below.    [facility]    FOLLOW UP VISIT NOTE    PATIENT NAME: Mirta Cardenas MRN # [id number]  DATE OF VISIT: May 29, 2024 YOB: 1955    REFERRING PROVIDER: Benny Barroso MD  [address]  [place] [address]    CANCER TYPE:  High grade pleomorphic sarcoma       CHIEF COMPLAIN   LE pain     HISTORY OF PRESENT ILLNESS   68-year-old female with PMH of CLL and recent Dx of High Grade Pleomorphic sarcoma, with intermittent right calf pain but then continuous calf pain beginning in March 2023.  She had a recent x-ray which shows a destructive lesion in the midportion R LE in between the fibula and the tibia. She is experiencing significant pain, she is restless during the visit due to pain. She is currently taking both Advil and Tylenol and oxycodone for pain, with minimal control, feels ibuprofen is what helps the most. She has been alternating all pain medications without optimizing doses of them.     C1D1 10/17/23  C2 11/14  C2 12/13  C4 1/10    2/19/2024 to 3/22/2024: Right lower extremity, 5000 cGy in 25 fraction     Surgical resection by Dr. Barroso 4/22/24: Final pathology demonstrated undifferentiated sarcoma, 5.8 cm, there was noted treatment effect with 6% viable tumor, negative margin, ypT2 NX.     C1 of adjuvant doxil on 6/11  C2 on 7/10      Interval Hx  She reports that she tolerated chemotherapy very well, only mild fatigue, no significant nausea. However, she has open surgical wound now with vacuum in place, non healing.      PAST ONCOLOGIC HISTORY   CLL - on monitoring     PAST MEDICAL HISTORY   Anal fisure - controlled with nitrobid  CLL - now being monitored - [place]  HTN  Cholesterol  Hx of severe  COVID  Psoriasis    PAST SURGICAL HISTORY   3 c- sections  Hysterectomy  Ooforectomy     FAMILY HISTORY   Sister with MM     ALLERGIES      Allergies   Allergen Reactions     Allopurinol Itching     Amoxicillin Hives     Codeine Itching     Cymbalta [Duloxetine Hcl] Fatigue     Periactin Other (See Comments)     Sleepy.    11/15/23: patient not sure about this allergy/intolerance - may be interested in removal     Tenormin [Atenolol] Fatigue     11/15/23: patient may be interested in removal of this from allergy list as it was only fatigue/sleepiness          SOCIAL HISTORY     Social History     Social History Narrative     Not on file     Lives with , Giancarlo, she retired last spring, denies alcohol, tobacco or other drugs.      CURRENT OUTPATIENT MEDICATIONS     Current Outpatient Medications   Medication Sig Dispense Refill     amLODIPine (NORVASC) 10 MG tablet Take 1 tablet (10 mg) by mouth daily 90 tablet 1     bacitracin 500 UNIT/GM external ointment Apply topically 2 times daily To wounds on the right shin. 4 g daily (Patient not taking: Reported on 7/25/2024) 425 g 3     hydrochlorothiazide (HYDRODIURIL) 50 MG tablet Take 1 tablet (50 mg) by mouth daily 90 tablet 3     losartan (COZAAR) 100 MG tablet Take 1 tablet (100 mg) by mouth daily 90 tablet 3     Multiple Vitamins-Calcium (ONE-A-DAY WOMENS FORMULA PO) Take 1 tablet by mouth daily       omeprazole (PRILOSEC) 20 MG DR capsule Take 1 capsule by mouth every morning.       pravastatin (PRAVACHOL) 40 MG tablet TAKE 1 TABLET (40 MG) BY MOUTH DAILY 90 tablet 1     pregabalin (LYRICA) 50 MG capsule Take 1 capsule (50 mg) by mouth 3 times daily. 90 capsule 0          REVIEW OF SYSTEMS   As above in the HPI, o/w complete 12-point ROS was negative.     PHYSICAL EXAM   BP (!) 147/82 (BP Location: Right arm, Patient Position: Sitting, Cuff Size: Adult Large)   Pulse 84   Temp 98.2  F (36.8  C) (Oral)   Resp 16   Wt 86.4 kg (190 lb 6.4 oz)   SpO2 100%    BMI 35.98 kg/m      EGO (due to pain related to the mass)  GEN: NAD  HEENT: PERRL, EOMI, no icterus, injection or pallor. Oropharynx is clear.  NECK: no cervical or supraclavicular lymphadenopathy  LUNGS: clear bilaterally  CV: regular, no murmurs, rubs, or gallops  ABDOMEN: soft, non-tender, non-distended, normal bowel sounds, no hepatosplenomegaly by percussion or palpation  EXT: warm, well perfused, no edema. Surgical scar with wound van in place in the lower edge.   NEURO: alert  SKIN: no rashes     LABORATORY AND IMAGING STUDIES     Lab Results   Component Value Date    WBC 6.9 2024    WBC 15.6 05/10/2021     Lab Results   Component Value Date    RBC 3.78 2024    RBC 4.89 05/10/2021     Lab Results   Component Value Date    HGB 11.2 2024    HGB 13.7 05/10/2021     Lab Results   Component Value Date    HCT 31.0 2024    HCT 41.0 05/10/2021     Lab Results   Component Value Date    MCV 82 2024    MCV 84 05/10/2021     Lab Results   Component Value Date    MCH 29.6 2024    MCH 28.0 05/10/2021     Lab Results   Component Value Date    MCHC 36.1 2024    MCHC 33.4 05/10/2021     Lab Results   Component Value Date    RDW 13.3 2024    RDW 13.1 05/10/2021     Lab Results   Component Value Date     2024     05/10/2021       Last Comprehensive Metabolic Panel:  Sodium   Date Value Ref Range Status   2024 141 135 - 145 mmol/L Final   05/10/2021 141 133 - 144 mmol/L Final     Potassium   Date Value Ref Range Status   2024 3.9 3.4 - 5.3 mmol/L Final   2022 4.7 3.4 - 5.3 mmol/L Final   05/10/2021 3.9 3.4 - 5.3 mmol/L Final     Chloride   Date Value Ref Range Status   2024 107 98 - 107 mmol/L Final   2022 108 94 - 109 mmol/L Final   05/10/2021 108 94 - 109 mmol/L Final     Carbon Dioxide   Date Value Ref Range Status   05/10/2021 27 20 - 32 mmol/L Final     Carbon Dioxide (CO2)   Date Value Ref Range Status   2024 23 22 -  29 mmol/L Final   05/24/2022 29 20 - 32 mmol/L Final     Anion Gap   Date Value Ref Range Status   07/09/2024 11 7 - 15 mmol/L Final   05/24/2022 2 (L) 3 - 14 mmol/L Final   05/10/2021 6 3 - 14 mmol/L Final     Glucose   Date Value Ref Range Status   07/09/2024 103 (H) 70 - 99 mg/dL Final   05/24/2022 108 (H) 70 - 99 mg/dL Final   05/10/2021 112 (H) 70 - 99 mg/dL Final     Comment:     Fasting specimen     GLUCOSE BY METER POCT   Date Value Ref Range Status   04/23/2024 130 (H) 70 - 99 mg/dL Final     Urea Nitrogen   Date Value Ref Range Status   07/09/2024 24.8 (H) 8.0 - 23.0 mg/dL Final   05/24/2022 14 7 - 30 mg/dL Final   05/10/2021 19 7 - 30 mg/dL Final     Creatinine   Date Value Ref Range Status   07/09/2024 1.16 (H) 0.51 - 0.95 mg/dL Final   05/10/2021 0.89 0.52 - 1.04 mg/dL Final     GFR Estimate   Date Value Ref Range Status   07/09/2024 51 (L) >60 mL/min/1.73m2 Final     Comment:     eGFR calculated using 2021 CKD-EPI equation.   05/10/2021 69 >60 mL/min/[1.73_m2] Final     Comment:     Non  GFR Calc  Starting 12/18/2018, serum creatinine based estimated GFR (eGFR) will be   calculated using the Chronic Kidney Disease Epidemiology Collaboration   (CKD-EPI) equation.       GFR, ESTIMATED POCT   Date Value Ref Range Status   10/10/2023 >60 >60 mL/min/1.73m2 Final     Calcium   Date Value Ref Range Status   07/09/2024 9.3 8.8 - 10.2 mg/dL Final   05/10/2021 8.6 8.5 - 10.1 mg/dL Final     Bilirubin Total   Date Value Ref Range Status   07/09/2024 0.2 <=1.2 mg/dL Final   10/12/2020 0.3 0.2 - 1.3 mg/dL Final     Alkaline Phosphatase   Date Value Ref Range Status   07/09/2024 91 40 - 150 U/L Final   10/12/2020 124 40 - 150 U/L Final     ALT   Date Value Ref Range Status   07/09/2024 24 0 - 50 U/L Final     Comment:     Reference intervals for this test were updated on 6/12/2023 to more accurately reflect our healthy population. There may be differences in the flagging of prior results with similar  values performed with this method. Interpretation of those prior results can be made in the context of the updated reference intervals.     10/12/2020 40 0 - 50 U/L Final     AST   Date Value Ref Range Status   07/09/2024 26 0 - 45 U/L Final     Comment:     Reference intervals for this test were updated on 6/12/2023 to more accurately reflect our healthy population. There may be differences in the flagging of prior results with similar values performed with this method. Interpretation of those prior results can be made in the context of the updated reference intervals.   10/12/2020 35 0 - 45 U/L Final       PET scan 1/29/24  IMPRESSION: Although much of the uptake along the superior and middle aspects of the right calf mass has decreased or resolved, there are two foci of uptake along the more inferior portions of the mass which have increased compared to December 2023,   concerning for disease progression.    MRI Tibia/Fibula 1/29/24  IMPRESSION:  1.  Interval decrease in size in the heterogeneously enhancing mass within the posterior tibial muscle belly. This has decreased from 3.3 x 5.2 x 9.3 cm and now measures 2.5 x 3.5 x 5.0 cm. It remains consistent with sarcoma.  2.  There is some surrounding T2 signal abnormality within the adjacent musculature including along the apposing side of the interosseous membrane within the anterior compartment. This could be secondary to post radiation change or a component of   reactive edema. It is difficult to completely exclude microinvasion of tumor.  3.  Edema or cellulitis about the lower leg has minimally increased since the previous examination.  4.  Stable bony irregularity and remodeling of the fibula which is at the margin of the neoplasm.  5.  Focus of T2 signal abnormality within the diaphyseal shaft of the tibia is poorly marginated. This may represent a component of mild stress reaction. Again, it is difficult to completely exclude a new bone lesion. This is  considered unlikely, but   follow-up examination is recommended.  6.  No evidence for pathologic fracture.  7.  Exam otherwise negative.    8/22/24  IMPRESSION:     Development of FDG avid soft tissue thickening throughout the right lower extremity with areas of in-situ ulceration. While these findings may simply represent a worsening inflammatory/infectious process, the exact etiology remains indeterminate.    7/25/24 MRI L tibia/fibula  Impression:  1. In this patient with interim high grade sarcoma resection with  negative surgical margin, presumed postoperative collection, measuring  approximately 11 cm in maximal dimension. Superimposed infection of  the collection cannot be entirely excluded. No evidence of mass-like  area to suggest recurrent tumor.   2. Anterolateral subcutaneous hyperemic change and apparent area of  soft tissue/skin defect at mid/distal leg with possible 9 x 7 x 17 mm  early abscess (no definite wall on current imaging yet). Please  correlate clinically.   *  Extensive regional muscle edema maybe due to therapy, altered  biomechanics and/or reactive to infection.  3. Previously noted focal marrow signal alteration in the mid/distal  tibial shaft has slightly decreased since comparison, measuring  approximately 2 cm craniocaudal dimension, previously 2.4 cm using  similar measurement technique, may be resolving stress reaction versus  red marrow island.     PATHOLOGY   10/2/23  Final Diagnosis   A.  Soft tissue, right calf mass, excision:  - High-grade pleomorphic sarcoma  - See comment    Electronically signed by Jonny Zhou MD on 10/4/2023 at  9:09 PM   Comment  UUMAYO   Immunohistochemical stains show the tumor is negative for CK AE1/AE3, S100, desmin and CD34 while SATB2 demonstrates patchy reactivity.  While the primary diagnostic consideration is an undifferentiated pleomorphic sarcoma, the patchy SATB2 reactivity raises the possibility of a poorly differentiated osteosarcoma.  "  Clinical Information  UJHOAN   The patient is a 68 year old female with a history of CLL and several months of sensitivity of the lateral right calf, and recent imaging showing: \"Lytic lesion in the right fibular proximal diaphysis, with complete resorption of the medial cortex and some periosteal reaction adjacent to the lesion\".      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 No evidence of metastatic disease.     In her case, the probability of recurrence with surgery alone is about 40% in the next 10 years. We discussed, that recurrence in this scenario is usually with distant  metastasis which then becomes uncurable disease. We discussed, that for this reason, I recommend treatment with neoadjuvant chemotherapy with the goal of preventing metastasis in the future. We discussed the potential side effects of chemotherapy and need for emergent treatment in order to improve her symptoms. The regimen will be doxil 40mg/m2 and ifosfamide 8g/m2 over 5 days. Slightly lower dose due to age and other medical conditions.   She is s/p C4 and tolerated chemotherapy well.     I personally reviewed the most recent PET and MRI leg, she had significant decrease in size and metabolic activity compared to pre-treatment, however this is a partial response with 2 foci that appear more active.     2/19/2024 to 3/22/2024: Right lower extremity, 5000 cGy in 25 fraction     Surgical resection by Dr. Barroso 4/22/24: Final pathology demonstrated undifferentiated sarcoma, 5.8 " cm, there was noted treatment effect with 6% viable tumor, negative margin, ypT2 NX.     I explained that we saw a good therapy effect from initial chemotherapy upon imaging and pathology encouraging with only 6% of viable tumor. Because of this and her initial high risk, I recommend to continuous single agent doxil, with the goal of 1 year of adjuvant therapy.    She is s/p for C2 doxil on 7/10, no significant side effects but she has developed a non healing ulcer in the surgical wound. Adjuvant doxil is likely contributing to non healing.  I recommend to discontinue adjuvant doxil and continue surveillance only.     Plan  -MRI R leg and CT CAP and labs CBC, CMP on 11/22   -Follow up Dr. Jimenez on 11/24     40 minutes spent on the date of the encounter doing chart review, review of outside records, review of test results, interpretation of tests, patient visit, documentation, and discussion with other provider(s)     Roney Nam MD   of Medicine  Hematology, Oncology and Transplantation        Again, thank you for allowing me to participate in the care of your patient.        Sincerely,        Roney Nam MD

## 2024-08-29 ENCOUNTER — OFFICE VISIT (OUTPATIENT)
Dept: ORTHOPEDICS | Facility: CLINIC | Age: 69
End: 2024-08-29
Payer: COMMERCIAL

## 2024-08-29 ENCOUNTER — TELEPHONE (OUTPATIENT)
Dept: WOUND CARE | Facility: CLINIC | Age: 69
End: 2024-08-29

## 2024-08-29 ENCOUNTER — THERAPY VISIT (OUTPATIENT)
Dept: PHYSICAL THERAPY | Facility: CLINIC | Age: 69
End: 2024-08-29
Attending: PHYSICIAN ASSISTANT
Payer: COMMERCIAL

## 2024-08-29 DIAGNOSIS — C49.9 SARCOMA OF SOFT TISSUE (H): Primary | ICD-10-CM

## 2024-08-29 DIAGNOSIS — T81.49XA WOUND INFECTION AFTER SURGERY: ICD-10-CM

## 2024-08-29 PROCEDURE — 99214 OFFICE O/P EST MOD 30 MIN: CPT | Performed by: PHYSICIAN ASSISTANT

## 2024-08-29 PROCEDURE — 97110 THERAPEUTIC EXERCISES: CPT | Mod: GP | Performed by: PHYSICAL THERAPIST

## 2024-08-29 RX ORDER — SULFAMETHOXAZOLE/TRIMETHOPRIM 800-160 MG
1 TABLET ORAL 2 TIMES DAILY
Qty: 28 TABLET | Refills: 0 | Status: SHIPPED | OUTPATIENT
Start: 2024-08-29 | End: 2024-09-12

## 2024-08-29 NOTE — NURSING NOTE
Reason For Visit:   Chief Complaint   Patient presents with    RECHECK     Right leg wound check          69 year old  1955      Primary MD: Joseline Fraga        There were no vitals taken for this visit.    Pain Assessment  Patient Currently in Pain: Yes  0-10 Pain Scale: 3  Primary Pain Location: Leg (right)            Jace Kamara ATC

## 2024-08-29 NOTE — NURSING NOTE
Pre-op teaching performed in clinic    Teaching Flowsheet   Relevant Diagnosis: Right leg I&D  Teaching Topic: Pre-Operative Teaching     Person(s) involved in teaching:   Patient     Motivation Level:  Asks Questions: Yes  Eager to Learn: Yes  Cooperative: Yes  Receptive (willing/able to accept information): Yes  Any cultural factors/Worship beliefs that may influence understanding or compliance? No     Patient demonstrates understanding of the following:  Reason for the appointment, diagnosis and treatment plan: Yes  Knowledge of proper use of medications and conditions for which they are ordered (with special attention to potential side effects or drug interactions): Yes  Which situations necessitate calling provider and whom to contact: Yes- discussed the stoplight tool to help assist with this.      Teaching Concerns Addressed:      Proper use of surgical scrub explain and provided to patient.    Nutritional needs and diet plan: Yes  Pain management techniques: Yes  Wound Care: Yes  How and/when to access community resources: Yes     Instructional Materials Used/Given: Surgery packet received in clinic.  Surgical soap received in clinic.      - Important contact info/ phone numbers  - Map/ location of surgery  - Showering instructions  - Stop light tool    Additionally the following was discussed with patient:  - Patient informed responsible adult is required to drive her home and stay with her for 24 hours after surgery.        -Next step: Perioperative  to contact patient and schedule surgery/post ops., Patient to be evaluated by PAC.    Time spent with patient: 15 minutes.     Tara Holter, RNCC

## 2024-08-29 NOTE — PROGRESS NOTES
"    [facility]                                                                                   OUTPATIENT PHYSICAL THERAPY    PLAN OF TREATMENT FOR OUTPATIENT REHABILITATION   Patient's Last Name, First Name, Mirta Serrato YOB: 1955   Provider's Name   [facility]   Medical Record No.  [id number]     Onset Date: 04/22/24  Start of Care Date: 05/23/24     Medical Diagnosis:  Sarcoma of soft tissue      PT Treatment Diagnosis:  R leg pain; impaired gait Plan of Treatment  Frequency/Duration: monthly/ 12 weeks    Certification date from 08/01/24 to 11/21/24         See note for plan of treatment details and functional goals     Uyen Castro PT                         I CERTIFY THE NEED FOR THESE SERVICES FURNISHED UNDER        THIS PLAN OF TREATMENT AND WHILE UNDER MY CARE     (Physician attestation of this document indicates review and certification of the therapy plan).              Referring Provider:  Jodi Cardenas    Initial Assessment  See Epic Evaluation- Start of Care Date: 05/23/24 08/29/24 0500   Appointment Info   Signing clinician's name / credentials Uyen Castro PT, DPT   Total/Authorized Visits 8   Visits Used 5   Medical Diagnosis Sarcoma of soft tissue   PT Tx Diagnosis R leg pain; impaired gait   Precautions/Limitations per MD: \"s/p right anterior lower leg sarcoma excision with loss of peroneal nerve and anterior tibialis - work on gait training, strength, ROM, stretching, HEP\"   Other pertinent information BCBS    Progress Note/Certification   Start of Care Date 05/23/24   Onset of illness/injury or Date of Surgery 04/22/24   Therapy Frequency monthly   Predicted Duration 12 weeks   Certification date from 08/01/24   Certification date to 11/21/24   Progress Note Due Date 11/21/24   GOALS   PT Goals 2;3   PT Goal 1   Goal Identifier 1   Goal Description Pt will be able to demonstrate " 10* ankle DF passively in order to decrease difficulty with amb   Goal Progress lacking 10 deg from neutral   Target Date 07/04/24   PT Goal 2   Goal Identifier 2   Goal Description Pt will be able to demonstrate reciprocal stairs in order to decrease difficulty with home navigation   Goal Progress stairs alt reciprocal but stiffness anterior foot on descent   Target Date 07/18/24   PT Goal 3   Goal Identifier 3   Goal Description Pt will be independent with HEP in order to self manage symptoms   Target Date 08/01/24   Subjective Report   Subjective Report Returns to PT after 2 months. Her wound has not been healing over the past 2 months so hasn't been able to be present. Has wound VAC now present. Action that really hurts are INV/EV. Her foot does feel stiffer.   Objective Measures   Objective Measures Objective Measure 1;Objective Measure 2   Objective Measure 1   Objective Measure R ankle ROM   Details PF: 30 DF: 10 deg from neutral INV: 8 EV: 8   Treatment Interventions (PT)   Interventions Therapeutic Procedure/Exercise;Neuromuscular Re-education;Gait Training   Therapeutic Procedure/Exercise   Therapeutic Procedures: strength, endurance, ROM, flexibility minutes (35468) 25   Ther Proc 1 Stairs alt reciprocal but stiffness along anterior foot on descent   Ther Proc 2 Gait fairly normalized with compensations of hip flexion present due to lack of DF strength   Ther Proc 3 Passive motion of DF/PF/INV/EV 5x5 sec each   Ther Proc 4 VR of rest of exercises and instructed to perform as tolerated   Ther Proc 5 Discussed continuation of PT, instructed pt will plan   Skilled Intervention reasssessment of RLE and gentle stretching   Patient Response/Progress tolerated well, felt stretch with PF along anterior surface of leg   Eval/Assessments   Assessments   (Similar ROM noted. Main limitation of lacking DF. Discussed PT exercises and instructed to continue. Informed patient regarding treatment techniques for PT will  need to have wound completely healed to perform. Will leave chart open 60 days)   Education   Learner/Method Patient;Listening;Reading;Demonstration;Pictures/Video;No Barriers to Learning   Plan   Home program [id number]   Plan for next session will leave chart open at this time   Comments   Comments 4/22/24: s/p right anterior lower leg sarcoma excision with loss of peroneal nerve and anterior tibialis   Total Session Time   Timed Code Treatment Minutes 25   Total Treatment Time (sum of timed and untimed services) 25

## 2024-08-29 NOTE — PROGRESS NOTES
Chief Complaint: wound check  Preop diagnosis: Sarcoma right calf, deep compartment  4/22/24 Procedure performed: Removal of sarcoma right calf, deep compartment including anterior tibial muscle and 8 cm of the fibula      HPI: Mirta is a 69-year-old female here for follow-up of her right lower extremity wound.  She had a 7-day peel in place Prevena wound VAC.  She reported some tenderness with the VAC, but otherwise no problems.  Her chemotherapy was discontinued by oncology due to the fact that she has an ongoing wound.  She is little nervous about that.  Otherwise doing well.  No other concerns.    Physical Exam: Mirta is a pleasant 69-year-old female who is alert and oriented no apparent distress.  She has a mildly antalgic gait with mild foot drop on the right.  She has moderate edema of the right lower leg and foot.  The VAC dressing was removed.  She does have a mild odor.  There is some exudate.  This was irrigated with saline and gentle debridement with gauze, which was very tender for her.  I was able to use a Q-tip in the wound, which again was very tender for her.  There is a lot of exudate.  She has some new granulation tissue around the periphery of the wound.  Skin is otherwise intact with mild edema.  The skin is immobile.  The skin is tender.  There is no active drainage other sites, but there are small superficial areas of wound dehiscence above and below the larger wound with mild yellowish exudate. I tried to debride a small portion of the wound, but she is very tender.       Impression: 69-year-old female with nonhealing ulceration of the right lower leg after excision of sarcoma and neoadjuvant radiation and chemotherapy     Plan: I explained to Mirta that I do not think her current wound regimen is working.  I would not recommend another VAC dressing.  She can resume her previous wound care.  I did place a Mepilex on the wound.  She can shower as needed over the wound, no soaking it.  We  think she would benefit from incision and irrigation and debridement of the right lower extremity wound in the operating room.  She may require a wound VAC after surgery, depending on the tissue underneath the exudate.  This would be an outpatient procedure.  Her weightbearing would not be restricted.  We would still like her to be seen by plastic surgery in a few weeks to see if there is any need for flap or graft.  She does not need to see her previous wound care provider.  She does not need to see radiation therapy at this time.  She has follow-up with oncology in November.  We will get her scheduled in the next couple weeks for this procedure.  She will send updated pictures weekly of her wound.  I did place her on Bactrim DS 1 tab twice daily x 14 days.  Call with any concerns.  She agrees with the plan.

## 2024-08-29 NOTE — TELEPHONE ENCOUNTER
Checking with clinic manager on where the plastic surgery referral should go to. Awaiting response.

## 2024-08-29 NOTE — LETTER
8/29/2024      Mirta Cardenas  [address] [facility] [id number]      Dear Colleague,    Thank you for referring your patient, Mirta Cardenas, to the [facility] [place]. Please see a copy of my visit note below.      Chief Complaint: wound check  Preop diagnosis: Sarcoma right calf, deep compartment  4/22/24 Procedure performed: Removal of sarcoma right calf, deep compartment including anterior tibial muscle and 8 cm of the fibula      HPI: Mirta is a 69-year-old female here for follow-up of her right lower extremity wound.  She had a 7-day peel in place Prevena wound VAC.  She reported some tenderness with the VAC, but otherwise no problems.  Her chemotherapy was discontinued by oncology due to the fact that she has an ongoing wound.  She is little nervous about that.  Otherwise doing well.  No other concerns.    Physical Exam: Mirta is a pleasant 69-year-old female who is alert and oriented no apparent distress.  She has a mildly antalgic gait with mild foot drop on the right.  She has moderate edema of the right lower leg and foot.  The VAC dressing was removed.  She does have a mild odor.  There is some exudate.  This was irrigated with saline and gentle debridement with gauze, which was very tender for her.  I was able to use a Q-tip in the wound, which again was very tender for her.  There is a lot of exudate.  She has some new granulation tissue around the periphery of the wound.  Skin is otherwise intact with mild edema.  The skin is immobile.  The skin is tender.  There is no active drainage other sites, but there are small superficial areas of wound dehiscence above and below the larger wound with mild yellowish exudate. I tried to debride a small portion of the wound, but she is very tender.       Impression: 69-year-old female with nonhealing ulceration of the right lower leg after excision of sarcoma and neoadjuvant radiation and chemotherapy     Plan: I  explained to Mirta that I do not think her current wound regimen is working.  I would not recommend another VAC dressing.  She can resume her previous wound care.  I did place a Mepilex on the wound.  She can shower as needed over the wound, no soaking it.  We think she would benefit from incision and irrigation and debridement of the right lower extremity wound in the operating room.  She may require a wound VAC after surgery, depending on the tissue underneath the exudate.  This would be an outpatient procedure.  Her weightbearing would not be restricted.  We would still like her to be seen by plastic surgery in a few weeks to see if there is any need for flap or graft.  She does not need to see her previous wound care provider.  She does not need to see radiation therapy at this time.  She has follow-up with oncology in November.  We will get her scheduled in the next couple weeks for this procedure.  She will send updated pictures weekly of her wound.  I did place her on Bactrim DS 1 tab twice daily x 14 days.  Call with any concerns.  She agrees with the plan.      Again, thank you for allowing me to participate in the care of your patient.        Sincerely,        Benny Barroso MD

## 2024-08-30 ENCOUNTER — TELEPHONE (OUTPATIENT)
Dept: ORTHOPEDICS | Facility: CLINIC | Age: 69
End: 2024-08-30
Payer: COMMERCIAL

## 2024-08-30 NOTE — TELEPHONE ENCOUNTER
Patient is scheduled for surgery with Dr. Barroso     Spoke with: Mirta     Date of Surgery: 9/9/24    [facility]    Informed patient they will need an adult  Yes    Pre op with Provider PAC, 9/5/24 at 11AM    Additional imaging/appointments: PO Made    Surgery packet: Received     Additional comments: N/A        Julianna Mg on 8/30/2024 at 10:23 AM

## 2024-08-30 NOTE — TELEPHONE ENCOUNTER
Clinic manager contacted plastics team and CSC to contact patient to get them scheduled for skin graft referral. Referral was placed to plastic surgery.

## 2024-08-30 NOTE — TELEPHONE ENCOUNTER
FUTURE VISIT INFORMATION      SURGERY INFORMATION:  Date: 24  [facility]  Surgeon:  Benny Barroso MD   Anesthesia Type:  choice  Procedure: IRRIGATION AND DEBRIDEMENT, LOWER EXTREMITY   Consult: ov 24    RECORDS REQUESTED FROM:       Primary Care Provider: Joseline Fraga MD     Pertinent Medical History: hypertension    Most recent EKG+ Tracin/10/24    Most recent ECHO: 24

## 2024-09-03 ENCOUNTER — TELEPHONE (OUTPATIENT)
Dept: ORTHOPEDICS | Facility: CLINIC | Age: 69
End: 2024-09-03
Payer: COMMERCIAL

## 2024-09-03 DIAGNOSIS — T81.49XA WOUND INFECTION AFTER SURGERY: Primary | ICD-10-CM

## 2024-09-03 NOTE — TELEPHONE ENCOUNTER
Other: Rocio with [facility] calling had to decline the order for non healing wound care for patient they would have to use another agency. Any questions can call [phone]     Could we send this information to you in [employer] or would you prefer to receive a phone call?:   No preference   Okay to leave a detailed message?: No at Other phone number:  [phone]

## 2024-09-05 ENCOUNTER — OFFICE VISIT (OUTPATIENT)
Dept: SURGERY | Facility: CLINIC | Age: 69
End: 2024-09-05
Payer: COMMERCIAL

## 2024-09-05 ENCOUNTER — DOCUMENTATION ONLY (OUTPATIENT)
Dept: ORTHOPEDICS | Facility: CLINIC | Age: 69
End: 2024-09-05

## 2024-09-05 ENCOUNTER — LAB (OUTPATIENT)
Dept: LAB | Facility: CLINIC | Age: 69
End: 2024-09-05
Payer: COMMERCIAL

## 2024-09-05 ENCOUNTER — PRE VISIT (OUTPATIENT)
Dept: SURGERY | Facility: CLINIC | Age: 69
End: 2024-09-05

## 2024-09-05 ENCOUNTER — ANESTHESIA EVENT (OUTPATIENT)
Dept: SURGERY | Facility: CLINIC | Age: 69
End: 2024-09-05
Payer: COMMERCIAL

## 2024-09-05 VITALS
WEIGHT: 189.6 LBS | HEIGHT: 61 IN | OXYGEN SATURATION: 100 % | BODY MASS INDEX: 35.8 KG/M2 | DIASTOLIC BLOOD PRESSURE: 84 MMHG | TEMPERATURE: 97.9 F | RESPIRATION RATE: 16 BRPM | HEART RATE: 84 BPM | SYSTOLIC BLOOD PRESSURE: 138 MMHG

## 2024-09-05 DIAGNOSIS — Z01.818 PREOP EXAMINATION: Primary | ICD-10-CM

## 2024-09-05 DIAGNOSIS — T81.49XA WOUND INFECTION AFTER SURGERY: ICD-10-CM

## 2024-09-05 DIAGNOSIS — Z01.818 PREOP EXAMINATION: ICD-10-CM

## 2024-09-05 DIAGNOSIS — C49.9 SARCOMA OF SOFT TISSUE (H): ICD-10-CM

## 2024-09-05 LAB
ANION GAP SERPL CALCULATED.3IONS-SCNC: 13 MMOL/L (ref 7–15)
BUN SERPL-MCNC: 30.8 MG/DL (ref 8–23)
CALCIUM SERPL-MCNC: 10 MG/DL (ref 8.8–10.4)
CHLORIDE SERPL-SCNC: 100 MMOL/L (ref 98–107)
CREAT SERPL-MCNC: 1.49 MG/DL (ref 0.51–0.95)
EGFRCR SERPLBLD CKD-EPI 2021: 38 ML/MIN/1.73M2
ERYTHROCYTE [DISTWIDTH] IN BLOOD BY AUTOMATED COUNT: 14.5 % (ref 10–15)
GLUCOSE SERPL-MCNC: 93 MG/DL (ref 70–99)
HCO3 SERPL-SCNC: 24 MMOL/L (ref 22–29)
HCT VFR BLD AUTO: 29.2 % (ref 35–47)
HGB BLD-MCNC: 9.9 G/DL (ref 11.7–15.7)
MCH RBC QN AUTO: 29.5 PG (ref 26.5–33)
MCHC RBC AUTO-ENTMCNC: 33.9 G/DL (ref 31.5–36.5)
MCV RBC AUTO: 87 FL (ref 78–100)
PLATELET # BLD AUTO: 180 10E3/UL (ref 150–450)
POTASSIUM SERPL-SCNC: 5.1 MMOL/L (ref 3.4–5.3)
RBC # BLD AUTO: 3.36 10E6/UL (ref 3.8–5.2)
SODIUM SERPL-SCNC: 137 MMOL/L (ref 135–145)
WBC # BLD AUTO: 5.1 10E3/UL (ref 4–11)

## 2024-09-05 PROCEDURE — 85027 COMPLETE CBC AUTOMATED: CPT | Performed by: PATHOLOGY

## 2024-09-05 PROCEDURE — 99204 OFFICE O/P NEW MOD 45 MIN: CPT | Performed by: PHYSICIAN ASSISTANT

## 2024-09-05 PROCEDURE — 36415 COLL VENOUS BLD VENIPUNCTURE: CPT | Performed by: PATHOLOGY

## 2024-09-05 PROCEDURE — 80048 BASIC METABOLIC PNL TOTAL CA: CPT | Performed by: PATHOLOGY

## 2024-09-05 ASSESSMENT — PAIN SCALES - GENERAL: PAINLEVEL: MILD PAIN (3)

## 2024-09-05 ASSESSMENT — ENCOUNTER SYMPTOMS
ORTHOPNEA: 0
SEIZURES: 0

## 2024-09-05 NOTE — H&P
Pre-Operative H & P     CC:  Preoperative exam to assess for increased cardiopulmonary risk while undergoing surgery and anesthesia.    Date of Encounter: 9/5/2024  Primary Care Physician:  Joseline Fraga     Reason for visit:   Encounter Diagnoses   Name Primary?    Sarcoma of soft tissue (H) Yes    Wound infection after surgery     Preop examination        HPI  Mirta Cardenas is a 69 year old female who presents for pre-operative H & P in preparation for  Procedure Information       Case: [id number] Date/Time: 09/09/24 1250    Procedure: IRRIGATION AND DEBRIDEMENT, LOWER EXTREMITY (Right: Leg)    Anesthesia type: Choice    Diagnosis:       Sarcoma of soft tissue (H) [C49.9]      Wound infection after surgery [T81.49XA]    Pre-op diagnosis:       Sarcoma of soft tissue (H) [C49.9]      Wound infection after surgery [T81.49XA]    [facility]    Providers: Benny Barroso MD            Patient is being evaluated for comorbid conditions of HTN, HLD, CKD, right kidney atrophy, obesity, fatty liver, CLL, psoriasis, peripheral neuropathy.    Ms. Cardenas has a history of a right calf sarcoma, s/p resection on 4/22/24. She completed chemotherapy in January 2024 and radiation in March 2024 . This has been complicated by an ongoing wound requiring a wound vac. She is followed closely by the surgery team and now presents for the above procedure.      History is obtained from the patient and chart review    Hx of abnormal bleeding or anti-platelet use: denies    Menstrual history: No LMP recorded. Patient has had a hysterectomy.:       Past Medical History  Past Medical History:   Diagnosis Date    Asthma     reactive airway disease, per patient    Blood transfusion     Cancer (H)     chronic lymphocytic leukemia    CINV (chemotherapy-induced nausea and vomiting) 10/12/2023    History of transfusion     Hypertension     Leukemia, chronic lymphocytic (H)     Malignant neoplasm (H)     CLL    Sarcoma of  right lower extremity (H)     Thyroid nodule 2008       Past Surgical History  Past Surgical History:   Procedure Laterality Date    ABDOMEN SURGERY      c section *3    APPENDECTOMY      C/SECTION, CLASSICAL  ,,    , Classical    COLONOSCOPY      EXCISE SOFT TISSUE TUMOR CALF Right 2024    Procedure: Removal of Tumor Right Calf;  Surgeon: Benny Barroso MD;  [facility]    HYSTERECTOMY      HYSTERECTOMY, PAP NO LONGER INDICATED  1998    PICC DOUBLE LUMEN PLACEMENT Left 10/17/2023    left basilic 4 fr dl power picc 41 cm    PICC DOUBLE LUMEN PLACEMENT Left 2023    Medial Brachial, 42 cm, 3 cm external length    PICC DOUBLE LUMEN PLACEMENT Left 01/10/2024    left medial brachial 5 fr dl power picc 41 cm    RELEASE CARPAL TUNNEL  2012    Procedure:RELEASE CARPAL TUNNEL; Right Carpal Tunnel Release & Right Ring A1 Pulley Release; Surgeon:SERGEY HEATON; Location:[facility]    RELEASE TRIGGER FINGER  2012    Procedure:RELEASE TRIGGER FINGER; Surgeon:SERGEY HEATON; Location:[facility]    RELEASE TRIGGER FINGER  10/12/2012    Procedure: RELEASE TRIGGER FINGER;  Right Thumb A1 Pulley Release;  Surgeon: Sergey Heaton MD;  Location: [facility]    RESECT BONE LOWER EXTREMITY Right 2024    Procedure: Including Bone Removal;  Surgeon: Benny Barroso MD;  [facility]    SALPINGO OOPHORECTOMY,R/L/ULICES  2008    right       Prior to Admission Medications  Current Outpatient Medications   Medication Sig Dispense Refill    amLODIPine (NORVASC) 10 MG tablet Take 1 tablet (10 mg) by mouth daily (Patient taking differently: Take 10 mg by mouth every morning.) 90 tablet 1    hydrochlorothiazide (HYDRODIURIL) 50 MG tablet Take 1 tablet (50 mg) by mouth daily (Patient taking differently: Take 50 mg by mouth every morning.) 90 tablet 3    losartan (COZAAR) 100 MG tablet Take 1 tablet (100 mg) by mouth daily (Patient taking differently: Take 100 mg by  mouth every evening.) 90 tablet 3    Multiple Vitamins-Calcium (ONE-A-DAY WOMENS FORMULA PO) Take 1 tablet by mouth every morning.      omeprazole (PRILOSEC) 20 MG DR capsule Take 1 capsule by mouth every morning.      pravastatin (PRAVACHOL) 40 MG tablet TAKE 1 TABLET (40 MG) BY MOUTH DAILY (Patient taking differently: Take 40 mg by mouth at bedtime.) 90 tablet 1    pregabalin (LYRICA) 50 MG capsule Take 1 capsule (50 mg) by mouth 3 times daily. 90 capsule 0    sulfamethoxazole-trimethoprim (BACTRIM DS) 800-160 MG tablet Take 1 tablet by mouth 2 times daily for 14 days. 28 tablet 0    bacitracin 500 UNIT/GM external ointment Apply topically 2 times daily To wounds on the right shin. 4 g daily (Patient not taking: Reported on 7/25/2024) 425 g 3       Allergies  Allergies   Allergen Reactions    Allopurinol Itching    Amoxicillin Hives    Codeine Itching    Cymbalta [Duloxetine Hcl] Fatigue    Periactin Other (See Comments)     Sleepy.    11/15/23: patient not sure about this allergy/intolerance - may be interested in removal    Tenormin [Atenolol] Fatigue     11/15/23: patient may be interested in removal of this from allergy list as it was only fatigue/sleepiness       Social History  Social History     Socioeconomic History    Marital status:      Spouse name: Not on file    Number of children: 3    Years of education: Not on file    Highest education level: Not on file   Occupational History    Occupation: retired   Tobacco Use    Smoking status: Never     Passive exposure: Past    Smokeless tobacco: Never   Vaping Use    Vaping status: Never Used   Substance and Sexual Activity    Alcohol use: Yes     Comment: Occasionally    Drug use: No    Sexual activity: Not Currently     Partners: Male     Birth control/protection: Post-menopausal   Other Topics Concern    Parent/sibling w/ CABG, MI or angioplasty before 65F 55M? No   Social History Narrative    Not on file     Social Determinants of Health      Financial Resource Strain: Unknown (6/18/2024)    Financial Resource Strain     Within the past 12 months, have you or your family members you live with been unable to get utilities (heat, electricity) when it was really needed?: Patient declined   Food Insecurity: Unknown (6/18/2024)    Food Insecurity     Within the past 12 months, did you worry that your food would run out before you got money to buy more?: Patient declined     Within the past 12 months, did the food you bought just not last and you didn t have money to get more?: Patient declined   Transportation Needs: Unknown (6/18/2024)    Transportation Needs     Within the past 12 months, has lack of transportation kept you from medical appointments, getting your medicines, non-medical meetings or appointments, work, or from getting things that you need?: Patient declined   Physical Activity: Inactive (6/18/2024)    Exercise Vital Sign     Days of Exercise per Week: 0 days     Minutes of Exercise per Session: 0 min   Stress: No Stress Concern Present (6/18/2024)    [employer] - Occupational Stress Questionnaire     Feeling of Stress : Not at all   Social Connections: Unknown (6/18/2024)    Social Connection and Isolation Panel [NHANES]     Frequency of Communication with Friends and Family: Not on file     Frequency of Social Gatherings with Friends and Family: Patient declined     Attends Orthodox Services: Not on file     Active Member of Clubs or Organizations: Not on file     Attends Club or Organization Meetings: Not on file     Marital Status: Not on file   Interpersonal Safety: Low Risk  (6/18/2024)    Interpersonal Safety     Do you feel physically and emotionally safe where you currently live?: Yes     Within the past 12 months, have you been hit, slapped, kicked or otherwise physically hurt by someone?: No     Within the past 12 months, have you been humiliated or emotionally abused in other ways by your partner  or ex-partner?: No   Housing Stability: Unknown (6/18/2024)    Housing Stability     Do you have housing? : Patient declined     Are you worried about losing your housing?: Patient declined       Family History  Family History   Problem Relation Age of Onset    Osteoporosis Mother     Cancer Sister         multiple myloma    Hypertension Brother     Heart Disease Brother 65        bypass    Obesity Son     Obesity Son     Hypertension Son     Melanoma No family hx of     Anesthesia Reaction No family hx of     Thrombosis No family hx of        Review of Systems  The complete review of systems is negative other than noted in the HPI or here.     Anesthesia Evaluation   Pt has had prior anesthetic.     No history of anesthetic complications       ROS/MED HX  ENT/Pulmonary:     (+)     DANIELLE risk factors, snores loudly,               Intermittent, asthma (triggered by URIs) Last exacerbation: >1 year,               (-) recent URI   Neurologic:     (+)    peripheral neuropathy,                         (-) no seizures and no CVA   Cardiovascular: Comment: Seen by cardiology 7/10/24, no current concerns. Follow up in 1 year.    (+) Dyslipidemia hypertension- -  CAD (coronary atherosclerosis noted on PET and CT scan) -  - -                                 Previous cardiac testing   Echo: Date: 7/25/24 Results:  See H&P  Stress Test:  Date: Results:    ECG Reviewed:  Date: 2020 Results:  Sinus tachycardia  Cath:  Date: Results:   (-) ANN and orthopnea/PND   METS/Exercise Tolerance: 4 - Raking leaves, gardening Comment: Cleans her home, cooks, goes grocery shopping and can ascend a flight of stairs, does laundry. Walks at slower pace since RLE sarcoma resection in April.   Hematologic:     (+)      anemia, history of blood transfusion, no previous transfusion reaction,     (-) history of blood clots   Musculoskeletal: Comment: Right foot drop        GI/Hepatic: Comment: Anal fissure    (+) GERD, Asymptomatic on medication,      "      liver disease (fattly liver),       Renal/Genitourinary: Comment: Baseline creatinine 0.9-1.1    Right kidney atrophy      (+) renal disease, type: CRI, Pt does not require dialysis,           Endo:  - neg endo ROS   (+)               Obesity,    (-) Type II DM   Psychiatric/Substance Use:  - neg psychiatric ROS     Infectious Disease:  - neg infectious disease ROS     Malignancy:   (+) Malignancy, History of Lymphoma/Leukemia and Other.Lymph CA Active status post.  Other CA right calf sarcoma Active status post Surgery, Chemo and Radiation.    Other: Comment: Psoriasis       (+)  , H/O Chronic Pain,         /84 (BP Location: Right arm, Patient Position: Sitting, Cuff Size: Adult Regular)   Pulse 84   Temp 97.9  F (36.6  C) (Oral)   Resp 16   Ht 1.549 m (5' 1\")   Wt 86 kg (189 lb 9.6 oz)   SpO2 100%   Breastfeeding No   BMI 35.82 kg/m      Physical Exam  Constitutional: Awake, alert, cooperative, no apparent distress, and appears stated age.  Eyes: Pupils equal, round and reactive to light, extra ocular muscles intact, sclera clear, conjunctiva normal.  HENT: Normocephalic, oral pharynx with moist mucus membranes, good dentition. No goiter appreciated. No removable dental hardware.  Respiratory: Clear to auscultation bilaterally, no crackles or wheezing. No SOB when supine.  Cardiovascular: Regular rate and rhythm, normal S1 and S2, and no murmur noted.  Carotids +2, no bruits. No edema. Palpable pulses to radial, DP and PT arteries.   GI: Normal bowel sounds, soft, obese, non-tender, no masses palpated.   Lymph/Hematologic: No cervical lymphadenopathy and no supraclavicular lymphadenopathy.  Genitourinary:  deferred  Skin: Warm and dry.  No rashes.   Musculoskeletal: full ROM of neck. There is no redness, warmth, or swelling of the joints. Gross motor strength is normal. Dry dressing on RLE. Right foot drop.   Neurologic: Awake, alert, oriented to name, place and time. Cranial nerves II-XII are " grossly intact. Gait is normal. Ambulates from chair to exam table, seats self, lies supine and sits back up w/o assistance.  Neuropsychiatric: Calm, cooperative. Normal affect. Answers questions appropriately, follows commands w/o difficulty.        PRIOR LABS/DIAGNOSTIC STUDIES:    All labs and imaging personally reviewed        Echo result w/o MOPS: Interpretation SummaryGlobal and regional left ventricular function is normal with an EF of 60-65%.Right ventricular function, chamber size, wall motion, and thickness arenormal.No significant valvular abnormalities present.No pericardial effusion is present.IVC diameter <2.1 cm collapsing >50% with sniff suggests a normal RA pressureof 3 mmHg. This study was compared with the study from 10/13/2023. No significant changesnoted.        The patient's records and results personally reviewed by this provider.       LAB/DIAGNOSTIC STUDIES TODAY:  BMP, CBC    Assessment    Mirta Cardenas is a 69 year old female seen as a PAC referral for risk assessment and optimization for anesthesia.    Plan/Recommendations  Pt will be optimized for the proposed procedure.  See below for details on the assessment, risk, and preoperative recommendations    NEUROLOGY  - No history of TIA, CVA or seizure    -Post Op delirium risk factors:  No risk identified    ENT  - No current airway concerns.  Will need to be reassessed day of surgery.  Mallampati: IV  TM: > 3    CARDIAC  - HTN, hold losartan & hydrochlorothiazide DOS, continue norvasc  - Seen by cardiology 7/10/24 for coronary atherosclerosis noted on PET and CT scan; no current concerns. Follow up in 1 year.    - METS (Metabolic Equivalents)  Cleans her home, cooks, goes grocery shopping and can ascend a flight of stairs, does laundry. Walks at slower pace since RLE sarcoma resection in April.    Patient performs 4 or more METS exercise without symptoms             Total Score: 0      RCRI-Very low risk: Class 1 0.4% complication rate  "            Total Score: 0        PULMONARY  DANIELLE Medium Risk             Total Score: 3    DANIELLE: Hypertension    DANIELLE: BMI over 35 kg/m2    DANIELLE: Over 50 ys old      - Mild intermittent asthma, triggered by URIs     - Tobacco History    History   Smoking Status    Never   Smokeless Tobacco    Never       GI  - GERD  Controlled on medications: Proton Pump Inhibitor  PONV High Risk  Total Score: 3           1 AN PONV: Pt is Female    1 AN PONV: Patient is not a current smoker    1 AN PONV: Intended Post Op Opioids        /RENAL  - Baseline Creatinine  0.9-1.1 (1.49 today). Patient reports minimal fluid intake this morning prior to appt. Will follow up w/ PCP.  - Right kidney atrophy    ENDOCRINE    - BMI: Estimated body mass index is 35.82 kg/m  as calculated from the following:    Height as of this encounter: 1.549 m (5' 1\").    Weight as of this encounter: 86 kg (189 lb 9.6 oz).  Obesity (BMI >30)  - No history of Diabetes Mellitus    HEME/ ONC  VTE Medium Risk 1.8%             Total Score: 6    VTE: Greater than 59 yrs old    VTE: Current cancer      - No history of abnormal bleeding or antiplatelet use.  - Right calf sarcoma, s/p chemoradiation & resection. C/B ongoing wound requiring a wound vac.  - CLL, stable. Followed by Dr. Duckworth.  - Anemia, baseline Hgb 9-11.8 (9.9 today)    MSK  Patient is NOT Frail             Total Score: 0          The patient is aware that the final anesthesia plan will be decided by the assigned anesthesia provider on the date of service.      The patient is optimized for their procedure. AVS with information on surgery time/arrival time, meds and NPO status given by nursing staff. No further diagnostic testing indicated.      On the day of service:     Prep time: 14 minutes  Visit time: 24 minutes  Documentation time: 12 minutes  ------------------------------------------  Total time: 50 minutes      Jolanat Harris PA-C  Preoperative Assessment Center  [facility] [place]  [facility]  Phone: [phone]  Fax: [phone]

## 2024-09-05 NOTE — PROGRESS NOTES
Face sheet, progress notes, and H&P faxed to /[employer] ([phone]).    Tara Holter, RNCC

## 2024-09-05 NOTE — TELEPHONE ENCOUNTER
FUTURE VISIT INFORMATION      FUTURE VISIT INFORMATION:  Date: 9/25/24  Time: 8:15am  Location: [facility]  REFERRAL INFORMATION:  Referring provider:  Benny Barroso MD   Referring providers clinic:  [facility]  Reason for visit/diagnosis  Sarcoma of soft tissue     RECORDS REQUESTED FROM:       Clinic name Comments Records Status Imaging Status   [facility] OV/referral 8/23/24 epic

## 2024-09-06 ENCOUNTER — TELEPHONE (OUTPATIENT)
Dept: ORTHOPEDICS | Facility: CLINIC | Age: 69
End: 2024-09-06
Payer: COMMERCIAL

## 2024-09-06 NOTE — CONFIDENTIAL NOTE
Call placed to patient. Reviewed post op appointments with Becky Cardenas PA-C and Dr. Dana Vogel. Appointment with plastics will be cancelled if not needed. She appreciated callback.    Tara Holter, RNCC

## 2024-09-06 NOTE — CONFIDENTIAL NOTE
[facility] referral faxed to the following agencies:    [facility] [phone]  [facility] [phone]  [facility] [phone]  [facility] [phone]  [facility] [phone]    Tara Holter, RNCC

## 2024-09-06 NOTE — CONFIDENTIAL NOTE
Received call from [facility] at they are at capacity for skilled nursing in patient's area.     Tara Holter, RNCC

## 2024-09-06 NOTE — CONFIDENTIAL NOTE
received on direct extension from Meli at [facility]. [facility] referral received and she is requesting callback with further questions. Call placed to M[facility] ([phone]). She requested call Monday after surgery to determine if wound vac is placed and get final approval from their director of nursing. Will need orders for frequency of wound vac changes and suction/type of sponge used. Orders can be faxed to [phone].    Tara Holter, RNCC

## 2024-09-09 ENCOUNTER — HOSPITAL ENCOUNTER (OUTPATIENT)
Facility: CLINIC | Age: 69
Discharge: HOME | End: 2024-09-09
Attending: ORTHOPAEDIC SURGERY | Admitting: ORTHOPAEDIC SURGERY
Payer: COMMERCIAL

## 2024-09-09 ENCOUNTER — ANESTHESIA (OUTPATIENT)
Dept: SURGERY | Facility: CLINIC | Age: 69
End: 2024-09-09
Payer: COMMERCIAL

## 2024-09-09 VITALS
DIASTOLIC BLOOD PRESSURE: 81 MMHG | SYSTOLIC BLOOD PRESSURE: 147 MMHG | OXYGEN SATURATION: 93 % | WEIGHT: 187.39 LBS | TEMPERATURE: 98.6 F | HEIGHT: 61 IN | HEART RATE: 93 BPM | BODY MASS INDEX: 35.38 KG/M2 | RESPIRATION RATE: 14 BRPM

## 2024-09-09 DIAGNOSIS — C49.9 SARCOMA OF SOFT TISSUE (H): Primary | ICD-10-CM

## 2024-09-09 DIAGNOSIS — T81.89XS NON-HEALING SURGICAL WOUND, SEQUELA: ICD-10-CM

## 2024-09-09 PROCEDURE — 250N000009 HC RX 250: Performed by: NURSE ANESTHETIST, CERTIFIED REGISTERED

## 2024-09-09 PROCEDURE — 250N000011 HC RX IP 250 OP 636: Performed by: ANESTHESIOLOGY

## 2024-09-09 PROCEDURE — 370N000017 HC ANESTHESIA TECHNICAL FEE, PER MIN: Performed by: ORTHOPAEDIC SURGERY

## 2024-09-09 PROCEDURE — 710N000012 HC RECOVERY PHASE 2, PER MINUTE: Performed by: ORTHOPAEDIC SURGERY

## 2024-09-09 PROCEDURE — 999N000141 HC STATISTIC PRE-PROCEDURE NURSING ASSESSMENT: Performed by: ORTHOPAEDIC SURGERY

## 2024-09-09 PROCEDURE — 272N000001 HC OR GENERAL SUPPLY STERILE: Performed by: ORTHOPAEDIC SURGERY

## 2024-09-09 PROCEDURE — 710N000009 HC RECOVERY PHASE 1, LEVEL 1, PER MIN: Performed by: ORTHOPAEDIC SURGERY

## 2024-09-09 PROCEDURE — 258N000003 HC RX IP 258 OP 636: Performed by: NURSE ANESTHETIST, CERTIFIED REGISTERED

## 2024-09-09 PROCEDURE — 250N000011 HC RX IP 250 OP 636: Performed by: NURSE ANESTHETIST, CERTIFIED REGISTERED

## 2024-09-09 PROCEDURE — 11042 DBRDMT SUBQ TIS 1ST 20SQCM/<: CPT | Performed by: ANESTHESIOLOGY

## 2024-09-09 PROCEDURE — 250N000025 HC SEVOFLURANE, PER MIN: Performed by: ORTHOPAEDIC SURGERY

## 2024-09-09 PROCEDURE — 11042 DBRDMT SUBQ TIS 1ST 20SQCM/<: CPT | Performed by: NURSE ANESTHETIST, CERTIFIED REGISTERED

## 2024-09-09 PROCEDURE — 360N000082 HC SURGERY LEVEL 2 W/ FLUORO, PER MIN: Performed by: ORTHOPAEDIC SURGERY

## 2024-09-09 PROCEDURE — 250N000009 HC RX 250: Performed by: ORTHOPAEDIC SURGERY

## 2024-09-09 PROCEDURE — 250N000011 HC RX IP 250 OP 636: Performed by: PHYSICIAN ASSISTANT

## 2024-09-09 PROCEDURE — 250N000013 HC RX MED GY IP 250 OP 250 PS 637: Performed by: PHYSICIAN ASSISTANT

## 2024-09-09 PROCEDURE — 250N000013 HC RX MED GY IP 250 OP 250 PS 637: Performed by: ANESTHESIOLOGY

## 2024-09-09 RX ORDER — AMOXICILLIN 250 MG
1-2 CAPSULE ORAL 2 TIMES DAILY
Qty: 30 TABLET | Refills: 0 | Status: SHIPPED | OUTPATIENT
Start: 2024-09-09

## 2024-09-09 RX ORDER — HYDROMORPHONE HYDROCHLORIDE 1 MG/ML
0.2 INJECTION, SOLUTION INTRAMUSCULAR; INTRAVENOUS; SUBCUTANEOUS EVERY 5 MIN PRN
Status: DISCONTINUED | OUTPATIENT
Start: 2024-09-09 | End: 2024-09-09 | Stop reason: HOSPADM

## 2024-09-09 RX ORDER — ACETAMINOPHEN 325 MG/1
650 TABLET ORAL EVERY 4 HOURS PRN
Qty: 50 TABLET | Refills: 0 | Status: SHIPPED | OUTPATIENT
Start: 2024-09-09

## 2024-09-09 RX ORDER — OXYCODONE HYDROCHLORIDE 5 MG/1
5 TABLET ORAL
Status: COMPLETED | OUTPATIENT
Start: 2024-09-09 | End: 2024-09-09

## 2024-09-09 RX ORDER — NALOXONE HYDROCHLORIDE 0.4 MG/ML
0.1 INJECTION, SOLUTION INTRAMUSCULAR; INTRAVENOUS; SUBCUTANEOUS
Status: DISCONTINUED | OUTPATIENT
Start: 2024-09-09 | End: 2024-09-09 | Stop reason: HOSPADM

## 2024-09-09 RX ORDER — MAGNESIUM HYDROXIDE 1200 MG/15ML
LIQUID ORAL PRN
Status: DISCONTINUED | OUTPATIENT
Start: 2024-09-09 | End: 2024-09-09 | Stop reason: HOSPADM

## 2024-09-09 RX ORDER — FENTANYL CITRATE 50 UG/ML
25 INJECTION, SOLUTION INTRAMUSCULAR; INTRAVENOUS EVERY 5 MIN PRN
Status: DISCONTINUED | OUTPATIENT
Start: 2024-09-09 | End: 2024-09-09 | Stop reason: HOSPADM

## 2024-09-09 RX ORDER — DEXAMETHASONE SODIUM PHOSPHATE 4 MG/ML
4 INJECTION, SOLUTION INTRA-ARTICULAR; INTRALESIONAL; INTRAMUSCULAR; INTRAVENOUS; SOFT TISSUE
Status: DISCONTINUED | OUTPATIENT
Start: 2024-09-09 | End: 2024-09-09 | Stop reason: HOSPADM

## 2024-09-09 RX ORDER — ONDANSETRON 4 MG/1
4 TABLET, ORALLY DISINTEGRATING ORAL EVERY 30 MIN PRN
Status: DISCONTINUED | OUTPATIENT
Start: 2024-09-09 | End: 2024-09-09 | Stop reason: HOSPADM

## 2024-09-09 RX ORDER — PROPOFOL 10 MG/ML
INJECTION, EMULSION INTRAVENOUS PRN
Status: DISCONTINUED | OUTPATIENT
Start: 2024-09-09 | End: 2024-09-09

## 2024-09-09 RX ORDER — CEFAZOLIN SODIUM/WATER 2 G/20 ML
2 SYRINGE (ML) INTRAVENOUS
Status: COMPLETED | OUTPATIENT
Start: 2024-09-09 | End: 2024-09-09

## 2024-09-09 RX ORDER — FENTANYL CITRATE 50 UG/ML
INJECTION, SOLUTION INTRAMUSCULAR; INTRAVENOUS PRN
Status: DISCONTINUED | OUTPATIENT
Start: 2024-09-09 | End: 2024-09-09

## 2024-09-09 RX ORDER — HYDROMORPHONE HYDROCHLORIDE 1 MG/ML
0.4 INJECTION, SOLUTION INTRAMUSCULAR; INTRAVENOUS; SUBCUTANEOUS EVERY 5 MIN PRN
Status: DISCONTINUED | OUTPATIENT
Start: 2024-09-09 | End: 2024-09-09 | Stop reason: HOSPADM

## 2024-09-09 RX ORDER — ONDANSETRON 2 MG/ML
INJECTION INTRAMUSCULAR; INTRAVENOUS PRN
Status: DISCONTINUED | OUTPATIENT
Start: 2024-09-09 | End: 2024-09-09

## 2024-09-09 RX ORDER — HYDROXYZINE HYDROCHLORIDE 25 MG/1
25 TABLET, FILM COATED ORAL
Status: COMPLETED | OUTPATIENT
Start: 2024-09-09 | End: 2024-09-09

## 2024-09-09 RX ORDER — FENTANYL CITRATE 50 UG/ML
50 INJECTION, SOLUTION INTRAMUSCULAR; INTRAVENOUS EVERY 5 MIN PRN
Status: DISCONTINUED | OUTPATIENT
Start: 2024-09-09 | End: 2024-09-09 | Stop reason: HOSPADM

## 2024-09-09 RX ORDER — SODIUM CHLORIDE, SODIUM LACTATE, POTASSIUM CHLORIDE, CALCIUM CHLORIDE 600; 310; 30; 20 MG/100ML; MG/100ML; MG/100ML; MG/100ML
INJECTION, SOLUTION INTRAVENOUS CONTINUOUS
Status: DISCONTINUED | OUTPATIENT
Start: 2024-09-09 | End: 2024-09-09 | Stop reason: HOSPADM

## 2024-09-09 RX ORDER — LIDOCAINE HYDROCHLORIDE 20 MG/ML
INJECTION, SOLUTION INFILTRATION; PERINEURAL PRN
Status: DISCONTINUED | OUTPATIENT
Start: 2024-09-09 | End: 2024-09-09

## 2024-09-09 RX ORDER — ONDANSETRON 2 MG/ML
4 INJECTION INTRAMUSCULAR; INTRAVENOUS EVERY 30 MIN PRN
Status: DISCONTINUED | OUTPATIENT
Start: 2024-09-09 | End: 2024-09-09 | Stop reason: HOSPADM

## 2024-09-09 RX ORDER — DEXAMETHASONE SODIUM PHOSPHATE 4 MG/ML
INJECTION, SOLUTION INTRA-ARTICULAR; INTRALESIONAL; INTRAMUSCULAR; INTRAVENOUS; SOFT TISSUE PRN
Status: DISCONTINUED | OUTPATIENT
Start: 2024-09-09 | End: 2024-09-09

## 2024-09-09 RX ORDER — ACETAMINOPHEN 325 MG/1
650 TABLET ORAL
Status: DISCONTINUED | OUTPATIENT
Start: 2024-09-09 | End: 2024-09-09 | Stop reason: HOSPADM

## 2024-09-09 RX ORDER — SODIUM CHLORIDE, SODIUM LACTATE, POTASSIUM CHLORIDE, CALCIUM CHLORIDE 600; 310; 30; 20 MG/100ML; MG/100ML; MG/100ML; MG/100ML
INJECTION, SOLUTION INTRAVENOUS CONTINUOUS PRN
Status: DISCONTINUED | OUTPATIENT
Start: 2024-09-09 | End: 2024-09-09

## 2024-09-09 RX ORDER — CEFAZOLIN SODIUM/WATER 2 G/20 ML
2 SYRINGE (ML) INTRAVENOUS SEE ADMIN INSTRUCTIONS
Status: DISCONTINUED | OUTPATIENT
Start: 2024-09-09 | End: 2024-09-09 | Stop reason: HOSPADM

## 2024-09-09 RX ORDER — OXYCODONE HYDROCHLORIDE 5 MG/1
5 TABLET ORAL EVERY 6 HOURS PRN
Qty: 12 TABLET | Refills: 0 | Status: SHIPPED | OUTPATIENT
Start: 2024-09-09

## 2024-09-09 RX ORDER — HYDROXYZINE HYDROCHLORIDE 10 MG/1
10 TABLET, FILM COATED ORAL 3 TIMES DAILY PRN
Status: DISCONTINUED | OUTPATIENT
Start: 2024-09-09 | End: 2024-09-09

## 2024-09-09 RX ADMIN — LIDOCAINE HYDROCHLORIDE 60 MG: 20 INJECTION, SOLUTION INFILTRATION; PERINEURAL at 12:56

## 2024-09-09 RX ADMIN — Medication 2 G: at 13:03

## 2024-09-09 RX ADMIN — DEXAMETHASONE SODIUM PHOSPHATE 8 MG: 4 INJECTION, SOLUTION INTRAMUSCULAR; INTRAVENOUS at 13:03

## 2024-09-09 RX ADMIN — SODIUM CHLORIDE, POTASSIUM CHLORIDE, SODIUM LACTATE AND CALCIUM CHLORIDE: 600; 310; 30; 20 INJECTION, SOLUTION INTRAVENOUS at 12:49

## 2024-09-09 RX ADMIN — PHENYLEPHRINE HYDROCHLORIDE 50 MCG: 10 INJECTION INTRAVENOUS at 13:10

## 2024-09-09 RX ADMIN — MIDAZOLAM 2 MG: 1 INJECTION INTRAMUSCULAR; INTRAVENOUS at 12:49

## 2024-09-09 RX ADMIN — PHENYLEPHRINE HYDROCHLORIDE 50 MCG: 10 INJECTION INTRAVENOUS at 13:12

## 2024-09-09 RX ADMIN — FENTANYL CITRATE 50 MCG: 50 INJECTION INTRAMUSCULAR; INTRAVENOUS at 12:56

## 2024-09-09 RX ADMIN — OXYCODONE HYDROCHLORIDE 5 MG: 5 TABLET ORAL at 14:13

## 2024-09-09 RX ADMIN — HYDROMORPHONE HYDROCHLORIDE 0.2 MG: 1 INJECTION, SOLUTION INTRAMUSCULAR; INTRAVENOUS; SUBCUTANEOUS at 14:29

## 2024-09-09 RX ADMIN — FENTANYL CITRATE 50 MCG: 50 INJECTION INTRAMUSCULAR; INTRAVENOUS at 13:13

## 2024-09-09 RX ADMIN — HYDROMORPHONE HYDROCHLORIDE 0.2 MG: 1 INJECTION, SOLUTION INTRAMUSCULAR; INTRAVENOUS; SUBCUTANEOUS at 14:06

## 2024-09-09 RX ADMIN — OXYCODONE HYDROCHLORIDE 5 MG: 5 TABLET ORAL at 14:08

## 2024-09-09 RX ADMIN — ACETAMINOPHEN 650 MG: 325 TABLET ORAL at 14:23

## 2024-09-09 RX ADMIN — PROPOFOL 170 MG: 10 INJECTION, EMULSION INTRAVENOUS at 12:56

## 2024-09-09 RX ADMIN — HYDROXYZINE HYDROCHLORIDE 25 MG: 25 TABLET, FILM COATED ORAL at 14:19

## 2024-09-09 RX ADMIN — HYDROMORPHONE HYDROCHLORIDE 0.5 MG: 1 INJECTION, SOLUTION INTRAMUSCULAR; INTRAVENOUS; SUBCUTANEOUS at 13:46

## 2024-09-09 RX ADMIN — ONDANSETRON 4 MG: 2 INJECTION INTRAMUSCULAR; INTRAVENOUS at 12:49

## 2024-09-09 ASSESSMENT — ACTIVITIES OF DAILY LIVING (ADL)
ADLS_ACUITY_SCORE: 33
ADLS_ACUITY_SCORE: 33
ADLS_ACUITY_SCORE: 34
ADLS_ACUITY_SCORE: 33
ADLS_ACUITY_SCORE: 33

## 2024-09-09 NOTE — ANESTHESIA PREPROCEDURE EVALUATION
Anesthesia Pre-Procedure Evaluation    Patient: Mirta Cardenas   MRN: [id number] : 1955        Procedure : Procedure(s):  IRRIGATION AND DEBRIDEMENT, LOWER EXTREMITY          Past Medical History:   Diagnosis Date    Asthma     reactive airway disease, per patient    Blood transfusion     Cancer (H)     chronic lymphocytic leukemia    CINV (chemotherapy-induced nausea and vomiting) 10/12/2023    History of transfusion     Hypertension     Leukemia, chronic lymphocytic (H)     Malignant neoplasm (H)     CLL    Sarcoma of right lower extremity (H)     Thyroid nodule 2008      Past Surgical History:   Procedure Laterality Date    ABDOMEN SURGERY      c section *3    APPENDECTOMY      C/SECTION, CLASSICAL  ,,    , Classical    COLONOSCOPY      EXCISE SOFT TISSUE TUMOR CALF Right 2024    Procedure: Removal of Tumor Right Calf;  Surgeon: Benny Barroso MD;  Location: [facility]    HYSTERECTOMY      HYSTERECTOMY, PAP NO LONGER INDICATED  1998    PICC DOUBLE LUMEN PLACEMENT Left 10/17/2023    left basilic 4 fr dl power picc 41 cm    PICC DOUBLE LUMEN PLACEMENT Left 2023    Medial Brachial, 42 cm, 3 cm external length    PICC DOUBLE LUMEN PLACEMENT Left 01/10/2024    left medial brachial 5 fr dl power picc 41 cm    RELEASE CARPAL TUNNEL  2012    Procedure:RELEASE CARPAL TUNNEL; Right Carpal Tunnel Release & Right Ring A1 Pulley Release; Surgeon:JUANITO HEATON; Location:[facility]    RELEASE TRIGGER FINGER  2012    Procedure:RELEASE TRIGGER FINGER; Surgeon:JUANITO HEATON; Location:[facility]    RELEASE TRIGGER FINGER  10/12/2012    Procedure: RELEASE TRIGGER FINGER;  Right Thumb A1 Pulley Release;  Surgeon: Juanito Heaton MD;  Location: [facility]    RESECT BONE LOWER EXTREMITY Right 2024    Procedure: Including Bone Removal;  Surgeon: Benny Barroso MD;  Location: [facility]    SALPINGO OOPHORECTOMY,R/L/ULICES  2008    right      Allergies   Allergen  Reactions    Allopurinol Itching    Amoxicillin Hives    Codeine Itching    Cymbalta [Duloxetine Hcl] Fatigue    Periactin Other (See Comments)     Sleepy.    11/15/23: patient not sure about this allergy/intolerance - may be interested in removal    Tenormin [Atenolol] Fatigue     11/15/23: patient may be interested in removal of this from allergy list as it was only fatigue/sleepiness      Social History     Tobacco Use    Smoking status: Never     Passive exposure: Past    Smokeless tobacco: Never   Substance Use Topics    Alcohol use: Yes     Comment: Occasionally      Wt Readings from Last 1 Encounters:   09/09/24 85 kg (187 lb 6.3 oz)        Anesthesia Evaluation   Pt has had prior anesthetic. Type: General.        ROS/MED HX  ENT/Pulmonary:     (+)                      asthma                  Neurologic:  - neg neurologic ROS     Cardiovascular:  - neg cardiovascular ROS     METS/Exercise Tolerance:     Hematologic:  - neg hematologic  ROS     Musculoskeletal:  - neg musculoskeletal ROS     GI/Hepatic:     (+) GERD,            liver disease,       Renal/Genitourinary:     (+) renal disease,             Endo:     (+)          thyroid problem,            Psychiatric/Substance Use:  - neg psychiatric ROS     Infectious Disease:       Malignancy:       Other:  - neg other ROS          Physical Exam    Airway        Mallampati: II   TM distance: > 3 FB   Neck ROM: full   Mouth opening: > 3 cm    Respiratory Devices and Support         Dental           Cardiovascular   cardiovascular exam normal          Pulmonary   pulmonary exam normal                OUTSIDE LABS:  CBC:   Lab Results   Component Value Date    WBC 5.1 09/05/2024    WBC 6.9 07/09/2024    HGB 9.9 (L) 09/05/2024    HGB 11.2 (L) 07/09/2024    HCT 29.2 (L) 09/05/2024    HCT 31.0 (L) 07/09/2024     09/05/2024     07/09/2024     BMP:   Lab Results   Component Value Date     09/05/2024     07/09/2024    POTASSIUM 5.1 09/05/2024  "   POTASSIUM 3.9 07/09/2024    CHLORIDE 100 09/05/2024    CHLORIDE 107 07/09/2024    CO2 24 09/05/2024    CO2 23 07/09/2024    BUN 30.8 (H) 09/05/2024    BUN 24.8 (H) 07/09/2024    CR 1.49 (H) 09/05/2024    CR 1.16 (H) 07/09/2024    GLC 93 09/05/2024     (H) 07/09/2024     COAGS:   Lab Results   Component Value Date    PTT 32 10/13/2020    INR 1.08 01/16/2024    FIBR 309 01/16/2024     POC: No results found for: \"BGM\", \"HCG\", \"HCGS\"  HEPATIC:   Lab Results   Component Value Date    ALBUMIN 4.3 07/09/2024    PROTTOTAL 7.2 07/09/2024    ALT 24 07/09/2024    AST 26 07/09/2024    ALKPHOS 91 07/09/2024    BILITOTAL 0.2 07/09/2024     OTHER:   Lab Results   Component Value Date    PH 7.45 (H) 10/16/2020    LACT 0.5 (L) 01/15/2024    A1C 5.6 06/13/2024    MONIK 10.0 09/05/2024    PHOS 2.9 06/13/2024    MAG 1.8 06/13/2024    TSH 1.27 11/14/2013    CRP 5.7 (H) 10/17/2020    SED 17 05/23/2007       Anesthesia Plan    ASA Status:  2    NPO Status:  NPO Appropriate    Anesthesia Type: General.     - Airway: LMA      Maintenance: Balanced.        Consents    Anesthesia Plan(s) and associated risks, benefits, and realistic alternatives discussed. Questions answered and patient/representative(s) expressed understanding.     - Discussed: Risks, Benefits and Alternatives for the PROCEDURE were discussed     - Discussed with:  Patient      - Extended Intubation/Ventilatory Support Discussed: No.      - Patient is DNR/DNI Status: No     Use of blood products discussed: No .     Postoperative Care    Pain management: Multi-modal analgesia.   PONV prophylaxis: Ondansetron (or other 5HT-3), Dexamethasone or Solumedrol     Comments:               Taylor Danielle MD    I have reviewed the pertinent notes and labs in the chart from the past 30 days and (re)examined the patient.  Any updates or changes from those notes are reflected in this note.              # Obesity: Estimated body mass index is 35.41 kg/m  as calculated from the " "following:    Height as of this encounter: 1.549 m (5' 1\").    Weight as of this encounter: 85 kg (187 lb 6.3 oz).      "

## 2024-09-09 NOTE — ANESTHESIA POSTPROCEDURE EVALUATION
Patient: Mirta KOENIG Ronald    Procedure: Procedure(s):  IRRIGATION AND DEBRIDEMENT, LOWER EXTREMITY and application of wound VAC       Anesthesia Type:  General    Note:  Disposition: Outpatient   Postop Pain Control: Uneventful            Sign Out: Well controlled pain   PONV: No   Neuro/Psych: Uneventful            Sign Out: Acceptable/Baseline neuro status   Airway/Respiratory: Uneventful            Sign Out: Acceptable/Baseline resp. status   CV/Hemodynamics: Uneventful            Sign Out: Acceptable CV status; No obvious hypovolemia; No obvious fluid overload   Other NRE:    DID A NON-ROUTINE EVENT OCCUR?            Last vitals:  Vitals Value Taken Time   /82 09/09/24 1445   Temp 37  C (98.6  F) 09/09/24 1345   Pulse 89 09/09/24 1459   Resp 18 09/09/24 1459   SpO2 94 % 09/09/24 1459   Vitals shown include unfiled device data.    Electronically Signed By: Taylor Danielle MD  September 9, 2024  3:00 PM

## 2024-09-09 NOTE — BRIEF OP NOTE
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  Complications: None.  Implants: * No implants in log *      Post-op Plan: Vac dressing to RLE  WB status: FWB  Device:  wound vac to RLE at 125 mmHg continuous suction  DVT Prophylaxis:  Not needed   Follow-up:  2-3 weeks with Dr. Barroso/BENJA for wound check    Wound measurements:  3cm long x 1.5 cm x 0.5 cm deep, with 3 smaller wounds that are superficial, one distal to the large wound, and two proximal.  Most prox is a pinhole, other two are approx 4mm x 2 mm

## 2024-09-09 NOTE — OP NOTE
Preop diagnosis right distal calf wound dehiscence    Postoperative gnosis: Same    Procedure performed: 1.  Debridement of skin and subcutaneous tissue right distal calf wound, 3 x 2 x 1 cm.  2.  Placement of vacuum dressing right calf measuring 3 x 2 x 1 cm    Surgeons: Benny Barroso and Becky TABARES.  There was no resident available to assist.    Pathology submitted: None    Estimated blood loss: 2 cc    Patient was interviewed in the preoperative area.  Risk and benefits have been reviewed.  The surgical site was marked with my initials and line of intended incision.  Consent was signed.    Preoperative brief was performed.  Patient was taken the operating room received a general anesthetic in the supine position the right leg was prepped and draped sterilely.  Surgical timeout was performed.    Sharp dissection with a knife was used to debride skin and subcutaneous tissue.  Likewise a curette was used to remove devitalized subcutaneous tissue.  The final wound measured 3 x 2 x 1 cm.  This was irrigated with 500 cc of saline and a vacuum dressing was applied.    In addition there was a smaller wound distally measuring 1 cm in the pinhole approximately these were included into the vacuum dressing over a protective skin layer.    The postoperative debrief was performed.    Postoperative plan.  Ms. Cardenas will arrange for vacuum dressing changes.

## 2024-09-09 NOTE — ANESTHESIA CARE TRANSFER NOTE
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    Anesthesia Type:   No value filed.     Note:    Oropharynx: oropharynx clear of all foreign objects  Level of Consciousness: awake  Oxygen Supplementation: face mask  Level of Supplemental Oxygen (L/min / FiO2): 6  Independent Airway: airway patency satisfactory and stable  Dentition: dentition unchanged  Vital Signs Stable: post-procedure vital signs reviewed and stable  Report to RN Given: handoff report given  Patient transferred to: PACU    Handoff Report: Identifed the Patient, Identified the Reponsible Provider, Reviewed the pertinent medical history, Discussed the surgical course, Reviewed Intra-OP anesthesia mangement and issues during anesthesia, Set expectations for post-procedure period and Allowed opportunity for questions and acknowledgement of understanding  Vitals:  Vitals Value Taken Time   BP     Temp     Pulse 90 09/09/24 1349   Resp 13 09/09/24 1349   SpO2 100 % 09/09/24 1349   Vitals shown include unfiled device data.    Electronically Signed By: BEBETO Hernandez CRNA  September 9, 2024  1:49 PM

## 2024-09-09 NOTE — ANESTHESIA PROCEDURE NOTES
Airway       Patient location during procedure: OR       Procedure Start/Stop Times: 9/9/2024 12:56 PM and 9/9/2024 12:59 PM  Staff -        CRNA: Carol Minor APRN CRNA       Performed By: CRNA  Consent for Airway        Urgency: elective  Indications and Patient Condition       Indications for airway management: alejo-procedural       Induction type:intravenous       Mask difficulty assessment: 0 - not attempted    Final Airway Details       Final airway type: supraglottic airway    Supraglottic Airway Details        Type: LMA       Brand: I-Gel       LMA size: 4    Post intubation assessment        Placement verified by: capnometry, equal breath sounds and chest rise        Number of attempts at approach: 1       Number of other approaches attempted: 0       Ease of procedure: easy       Dentition: Intact and Unchanged    Medication(s) Administered   Medication Administration Time: 9/9/2024 12:56 PM

## 2024-09-09 NOTE — CONFIDENTIAL NOTE
Attempted to call Meli at [facility]. Lvm informing her wound vac placed during surgery and will need MWF wound vac changes. Operative report and wound care order faxed ([phone]). Direct extension provided if they she has further questions.     Tara Holter, RNCC

## 2024-09-10 ENCOUNTER — TELEPHONE (OUTPATIENT)
Dept: ORTHOPEDICS | Facility: CLINIC | Age: 69
End: 2024-09-10
Payer: COMMERCIAL

## 2024-09-10 ENCOUNTER — TELEPHONE (OUTPATIENT)
Dept: FAMILY MEDICINE | Facility: CLINIC | Age: 69
End: 2024-09-10
Payer: COMMERCIAL

## 2024-09-10 NOTE — TELEPHONE ENCOUNTER
[facility] Call Center    Phone Message    May a detailed message be left on voicemail: yes     Reason for Call: Other: Patient would like a call back from Marita. She stated that there are some things conflicting with her home health visits and she would like to discuss that with Marita. Do not leave voicemail however she can receive text.     Action Taken: Message routed to:  [facility] ([facility] Orthopedics    Travel Screening: Not Applicable     Date of Service:

## 2024-09-10 NOTE — TELEPHONE ENCOUNTER
Home Health Care    Reason for call:  Radha calling for orders. Requesting they be marked as high priority     Orders are needed for this patient.  Skilled Nursing: Delay of care due to staffing. Asking for start of care to take place on 9/11     Pt Provider: Dr. Fraga    Phone Number Homecare Nurse can be reached at: [phone] ok to leave message

## 2024-09-10 NOTE — TELEPHONE ENCOUNTER
Home Care is calling regarding an established patient with [facility].       Requesting orders from: Joseline Fraga  Provider is following patient: Yes  Is this a 60-day recertification request?  No    Orders Requested    Skilled Nursing  Request for delay in care, service is not able to be provided within same scheduled day due staffing issues, requesting to reschedule assessment to 9/11/24.         Information was gathered and will be sent to provider for review.  RN will contact Home Care with information after provider review.  Confirmed ok to leave a detailed message with call back.  Contact information confirmed and updated as needed.    Julie Behrendt RN

## 2024-09-10 NOTE — CONFIDENTIAL NOTE
Returned call to patient. Informed her [facility] is the agency that the orders for wound vac changes were sent to.  She received a call this morning that they will come out between 9:00-1:00 PM tomorrow. She will get a call later today or tomorrow morning with a smaller time frame. She appreciated callback and will call with further questions or concerns.     Tara Holter, RNCC

## 2024-09-16 ENCOUNTER — TELEPHONE (OUTPATIENT)
Dept: ORTHOPEDICS | Facility: CLINIC | Age: 69
End: 2024-09-16
Payer: COMMERCIAL

## 2024-09-16 NOTE — TELEPHONE ENCOUNTER
Spoke with [facility] about options for wound care.  WE will try peel and place to incorporate 3 distal wounds only.  Top wound was a pinhole site, so let's re-evaluate to see if that is still actively draining or is healed at this point.  Patient having pain, will incorporate OTC lidocaine spray for dressing changes, and she is taking pain pill.  All questions answered.

## 2024-09-16 NOTE — CONFIDENTIAL NOTE
Home care RN, Marisol, returning call. Patient has surgical wound and 3 smaller wounds,  1 distal and 2 proximal. She is questioning if all wounds need be under negative pressures. If it is recommended negative pressure to all wounds she asked if provider would be open to alternative dressing offered by [facility] that would not entail cutting any foam. Will route to provider to advise.     Tara Holter, RNCC

## 2024-09-16 NOTE — TELEPHONE ENCOUNTER
Other: Patient's home care RN is calling in because 2 of her wounds are not having any drainage, RN has several questions on wound care including placing wound vac in area to cover all 3 wounds and on new peel and place wound dressing that may better address wound needs. Please call Marisol as soon as possible. Patient due to see Becky Cardenas PA-C on Wednesday, but RN would like to discuss before appointment.       Could we send this information to you in [facility] or would you prefer to receive a phone call?:   RN would prefer a phone call   Okay to leave a detailed message?: Yes at [phone]- Marisol

## 2024-09-16 NOTE — CONFIDENTIAL NOTE
Attempted to return call to Marisol ETIENNE LVM requesting callback to discuss further. Direct extension provided.    Tara Holter, RNCC

## 2024-09-18 ENCOUNTER — OFFICE VISIT (OUTPATIENT)
Dept: ORTHOPEDICS | Facility: CLINIC | Age: 69
End: 2024-09-18
Payer: COMMERCIAL

## 2024-09-18 DIAGNOSIS — C49.9 SARCOMA OF SOFT TISSUE (H): ICD-10-CM

## 2024-09-18 PROCEDURE — 99024 POSTOP FOLLOW-UP VISIT: CPT | Performed by: PHYSICIAN ASSISTANT

## 2024-09-18 RX ORDER — PREGABALIN 50 MG/1
50 CAPSULE ORAL 3 TIMES DAILY
Qty: 90 CAPSULE | Refills: 0 | Status: SHIPPED | OUTPATIENT
Start: 2024-09-18

## 2024-09-18 NOTE — PROGRESS NOTES
Chief Complaint: wound check, vac dressing change  Preop diagnosis right distal calf wound dehiscence  9/9/24 Procedure performed: 1.  Debridement of skin and subcutaneous tissue right distal calf wound, 3 x 2 x 1 cm.  2.  Placement of vacuum dressing right calf measuring 3 x 2 x 1 cm  Surgeons: Benny Barroso    HPI: Mirta is a 69 year old female here for follow-up of wound debridement and placement of wound vac after wound dehiscence.  She has a h/o sarcoma excision and neoadjuvant radiation.  Patient reports severe tenderness with initial vac changes and moderate pain with continued vac changes, which are done every 2-3 days by home nursing.  She is using pain medicine as needed and will probably begin using a topical lidocaine as suggested.  She is not on antibiotics.  She otherwise feels well.  Her ankle feels stiff.  She is trying to elevate often.  No other concerns.    Physical Exam: 69 year old female who is alert and oriented and in no distress.  Antalgic gait with mild drop foot without gait assistance.  Vac dressing removed today. This shows the largest wound measuring 3cm x 2cm x 1cm deep with some early surrounding granulation tissue and minimal exudate.  Mild odor.  Mild surrounding edema.  Too smaller superficial ulcerations above and below the big wound measuring just a few millimeters long.  No active drainage.      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 See procedure note below.  Patient will continue with vac and home wound care.  Consult with plastic surgery to understand treatment options for this wound.  Follow up with us in 3 weeks for another wound check.  Call if any increased redness or pain.  I did refill her lyrica.  She agrees with the plan and all questions were answered.    PROCEDURE:  Patient's dressings were removed.  Wound  cleanser was sprayed on the wounds and surrounding skin.  Skin was dried.  Cavillon skin prep was used on the surrounding skin.  An adhesive drape was window-paned to accommodate the 3 wounds.  2 black granufoam sponges were used - one placed directly in the larger wound and the other laid on top to incorporate all 3 wounds together.  A hole was cut in the drape for the kelly pad.  The hose was attached and the vac was gradually increased over a few minutes to 125 mmHg continuous suction.  Patient tolerated the procedure fairly well.

## 2024-09-18 NOTE — NURSING NOTE
Reason For Visit:   Chief Complaint   Patient presents with    RECHECK     I&D Right lower extremity with vac placement        There were no vitals taken for this visit.    Pain Assessment  Patient Currently in Pain: Yes  0-10 Pain Scale: 4  Primary Pain Location: Leg  Pain Descriptors: Discomfort    Naheed Whipple LPN

## 2024-09-18 NOTE — LETTER
9/18/2024      Mirta Cardenas  [address] [facility] [address]      Dear Colleague,    Thank you for referring your patient, Mirta Cardenas, to the [facility] [place]. Please see a copy of my visit note below.    Chief Complaint: wound check, vac dressing change  Preop diagnosis right distal calf wound dehiscence  9/9/24 Procedure performed: 1.  Debridement of skin and subcutaneous tissue right distal calf wound, 3 x 2 x 1 cm.  2.  Placement of vacuum dressing right calf measuring 3 x 2 x 1 cm  Surgeons: Benny Barroso    HPI: Mirta is a 69 year old female here for follow-up of wound debridement and placement of wound vac after wound dehiscence.  She has a h/o sarcoma excision and neoadjuvant radiation.  Patient reports severe tenderness with initial vac changes and moderate pain with continued vac changes, which are done every 2-3 days by home nursing.  She is using pain medicine as needed and will probably begin using a topical lidocaine as suggested.  She is not on antibiotics.  She otherwise feels well.  Her ankle feels stiff.  She is trying to elevate often.  No other concerns.    Physical Exam: 69 year old female who is alert and oriented and in no distress.  Antalgic gait with mild drop foot without gait assistance.  Vac dressing removed today. This shows the largest wound measuring 3cm x 2cm x 1cm deep with some early surrounding granulation tissue and minimal exudate.  Mild odor.  Mild surrounding edema.  Too smaller superficial ulcerations above and below the big wound measuring just a few millimeters long.  No active drainage.      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 See procedure note below.  Patient will continue with vac and home wound care.  Consult with plastic surgery to understand treatment  options for this wound.  Follow up with us in 3 weeks for another wound check.  Call if any increased redness or pain.  I did refill her lyrica.  She agrees with the plan and all questions were answered.    PROCEDURE:  Patient's dressings were removed.  Wound cleanser was sprayed on the wounds and surrounding skin.  Skin was dried.  Cavillon skin prep was used on the surrounding skin.  An adhesive drape was window-paned to accommodate the 3 wounds.  2 black granufoam sponges were used - one placed directly in the larger wound and the other laid on top to incorporate all 3 wounds together.  A hole was cut in the drape for the kelly pad.  The hose was attached and the vac was gradually increased over a few minutes to 125 mmHg continuous suction.  Patient tolerated the procedure fairly well.        Again, thank you for allowing me to participate in the care of your patient.        Sincerely,        Jodi Cardenas PA-C

## 2024-09-23 ENCOUNTER — TELEPHONE (OUTPATIENT)
Dept: ORTHOPEDICS | Facility: CLINIC | Age: 69
End: 2024-09-23
Payer: COMMERCIAL

## 2024-09-23 NOTE — TELEPHONE ENCOUNTER
Other: [facility] called to update on the wounds. Pt has wound vac, noticed a little bit of foul odor on dressing that was removed. Other concern was the wound bed to nurse is getting a little more fluff on it that it previously had. Wondering on how to send photo to care team? Please call back.      Could we send this information to you in [employer] or would you prefer to receive a phone call?:   Patient would prefer a phone call   Okay to leave a detailed message?: Yes at Other phone number:  [phone]*

## 2024-09-23 NOTE — TELEPHONE ENCOUNTER
ATC called Marisol. She can send the pictures to ATC. Will wait for pictures.      -Jace, ATC- [facility]

## 2024-09-24 NOTE — CONFIDENTIAL NOTE
Call placed to Marisol MUELLER. Relayed recommendations to continue wound vac and gentle debridement. Notify of any increased redness around incision. Update new picture in 1 week. She verbalized understanding.    Tara Holter, RNCC

## 2024-09-25 ENCOUNTER — OFFICE VISIT (OUTPATIENT)
Dept: PLASTIC SURGERY | Facility: CLINIC | Age: 69
End: 2024-09-25
Payer: COMMERCIAL

## 2024-09-25 ENCOUNTER — PRE VISIT (OUTPATIENT)
Dept: PLASTIC SURGERY | Facility: CLINIC | Age: 69
End: 2024-09-25

## 2024-09-25 VITALS
DIASTOLIC BLOOD PRESSURE: 85 MMHG | HEART RATE: 87 BPM | BODY MASS INDEX: 35.41 KG/M2 | SYSTOLIC BLOOD PRESSURE: 150 MMHG | HEIGHT: 61 IN | OXYGEN SATURATION: 100 %

## 2024-09-25 DIAGNOSIS — T81.89XS NON-HEALING SURGICAL WOUND, SEQUELA: Primary | ICD-10-CM

## 2024-09-25 PROBLEM — E66.812 CLASS 2 SEVERE OBESITY DUE TO EXCESS CALORIES WITH SERIOUS COMORBIDITY IN ADULT (H): Status: ACTIVE | Noted: 2024-09-25

## 2024-09-25 PROBLEM — E66.01 CLASS 2 SEVERE OBESITY DUE TO EXCESS CALORIES WITH SERIOUS COMORBIDITY IN ADULT (H): Status: ACTIVE | Noted: 2024-09-25

## 2024-09-25 PROCEDURE — 99205 OFFICE O/P NEW HI 60 MIN: CPT | Performed by: PLASTIC SURGERY

## 2024-09-25 ASSESSMENT — PAIN SCALES - GENERAL: PAINLEVEL: MILD PAIN (3)

## 2024-09-25 NOTE — LETTER
2024       RE: Mirta Cardenas  [address] [facility] [id number]     Dear Colleague,    Thank you for referring your patient, Mirta Cardenas, to the [facility] [place] [facility]. Please see a copy of my visit note below.    Referring Provider:  Benny Barroso MD  [address]  [place] [address]     Primary Care Provider:  Joseline Fraga      RE: Mirta Cardenas.  : 1955.  DOREEN: 2024.    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  The distal end of the wound which was in the distal aspect of the leg in the pretibial region failed to heal and has developed a chronic nonhealing wound.  It has been debrided a couple of times last 1 in 2024.  It is stagnant and not healing in.  The final pathology of the excision was negative margins.  The final debridement did not have any pathology.  The patient is getting VAC therapy.  Patient has minimal pain and is ambulant.  Here to discuss options for treating this chronic nonhealing wound.    Medical history:  Past Medical History:   Diagnosis Date     Asthma     reactive airway disease, per patient     Blood transfusion      Cancer (H)     chronic lymphocytic leukemia     CINV (chemotherapy-induced nausea and vomiting) 10/12/2023     History of transfusion      Hypertension      Leukemia, chronic lymphocytic (H)      Malignant neoplasm (H)     CLL     Sarcoma of right lower extremity (H)      Thyroid nodule 2008       Surgical history:  Past Surgical History:   Procedure Laterality Date     ABDOMEN SURGERY      c section *3     APPENDECTOMY       C/SECTION, CLASSICAL  ,,    , Classical      COLONOSCOPY       EXCISE SOFT TISSUE TUMOR CALF Right 4/22/2024    Procedure: Removal of Tumor Right Calf;  Surgeon: Benny Barroso MD;  [facility]     HYSTERECTOMY       HYSTERECTOMY, PAP NO LONGER INDICATED  01/01/1998     IRRIGATION AND DEBRIDEMENT LOWER EXTREMITY, COMBINED Right 9/9/2024    Procedure: IRRIGATION AND DEBRIDEMENT, LOWER EXTREMITY and application of wound VAC;  Surgeon: Benny Barroso MD;  [facility]     PICC DOUBLE LUMEN PLACEMENT Left 10/17/2023    left basilic 4 fr dl power picc 41 cm     PICC DOUBLE LUMEN PLACEMENT Left 12/13/2023    Medial Brachial, 42 cm, 3 cm external length     PICC DOUBLE LUMEN PLACEMENT Left 01/10/2024    left medial brachial 5 fr dl power picc 41 cm     RELEASE CARPAL TUNNEL  06/05/2012    Procedure:RELEASE CARPAL TUNNEL; Right Carpal Tunnel Release & Right Ring A1 Pulley Release; Surgeon:SERGEY HEATON; [facility]     RELEASE TRIGGER FINGER  06/05/2012    Procedure:RELEASE TRIGGER FINGER; Surgeon:SERGEY HEATON; [facility]     RELEASE TRIGGER FINGER  10/12/2012    Procedure: RELEASE TRIGGER FINGER;  Right Thumb A1 Pulley Release;  Surgeon: Sergey Heaton MD;  [facility]: [facility]     RESECT BONE LOWER EXTREMITY Right 4/22/2024    Procedure: Including Bone Removal;  Surgeon: Benny Barroso MD;  [facility]     SALPINGO OOPHORECTOMY,R/L/ULICES  05/02/2008    right       Family history:  Family History   Problem Relation Age of Onset     Osteoporosis Mother      Cancer Sister         multiple myloma     Hypertension Brother      Heart Disease Brother 65        bypass     Obesity Son      Obesity Son      Hypertension Son      Melanoma No family hx of      Anesthesia Reaction No family hx of      Thrombosis No family hx of        Medications:  Current Outpatient Medications   Medication Sig Dispense Refill     acetaminophen (TYLENOL) 325 MG tablet Take 2 tablets (650 mg) by mouth every 4 hours as needed for mild pain. 50  "tablet 0     Multiple Vitamins-Calcium (ONE-A-DAY WOMENS FORMULA PO) Take 1 tablet by mouth every morning.       omeprazole (PRILOSEC) 20 MG DR capsule Take 1 capsule by mouth every morning.       oxyCODONE (ROXICODONE) 5 MG tablet Take 1 tablet (5 mg) by mouth every 6 hours as needed for moderate to severe pain. 12 tablet 0     pravastatin (PRAVACHOL) 40 MG tablet TAKE 1 TABLET (40 MG) BY MOUTH DAILY (Patient taking differently: Take 40 mg by mouth at bedtime.) 90 tablet 1     pregabalin (LYRICA) 50 MG capsule Take 1 capsule (50 mg) by mouth 3 times daily. 90 capsule 0     senna-docusate (SENOKOT-S/PERICOLACE) 8.6-50 MG tablet Take 1-2 tablets by mouth 2 times daily. 30 tablet 0     amLODIPine (NORVASC) 10 MG tablet Take 1 tablet (10 mg) by mouth daily (Patient not taking: Reported on 9/25/2024) 90 tablet 1     hydrochlorothiazide (HYDRODIURIL) 50 MG tablet Take 1 tablet (50 mg) by mouth daily (Patient not taking: Reported on 9/25/2024) 90 tablet 3     losartan (COZAAR) 100 MG tablet Take 1 tablet (100 mg) by mouth daily (Patient not taking: Reported on 9/25/2024) 90 tablet 3       Allergies:  Allergies   Allergen Reactions     Allopurinol Itching     Amoxicillin Hives     Codeine Itching     Cymbalta [Duloxetine Hcl] Fatigue     Periactin Other (See Comments)     Sleepy.    11/15/23: patient not sure about this allergy/intolerance - may be interested in removal     Tenormin [Atenolol] Fatigue     11/15/23: patient may be interested in removal of this from allergy list as it was only fatigue/sleepiness       Social history:   Social History     Tobacco Use     Smoking status: Never     Passive exposure: Past     Smokeless tobacco: Never   Substance Use Topics     Alcohol use: Yes     Comment: Occasionally         Physical Examination:  BP (!) 150/85 (BP Location: Left arm, Patient Position: Chair, Cuff Size: Adult Regular)   Pulse 87   Ht 1.549 m (5' 1\")   SpO2 100%   BMI 35.41 kg/m    Body mass index is 35.41 " kg/m .    General: No acute distress.    Right leg: There is a 4 x 3 cm full-thickness defect in the distal anterior leg with exposed underlying tibia.  It is clean.  No evidence for infection.  It encompasses the distal end of the excisional scar.  There are radiation changes in the entire leg.  There is edema in the leg as well.  She has palpable dorsalis pedis and posterior tibial pulses.  Her deep peroneal nerve distribution is numb.  She has a suspicious looking skin nodule midway down the incision on the anterior leg.        ASSESMENT and PLAN:     Based upon the above findings, a diagnosis of nonhealing radiated wound to right distal anterior leg s/p sarcoma excision was made.  I had a lucia, detailed discussion with the patient, in the presence of my nurse, who was present from beginning to end.  I discussed with the patient the pathophysiology behind the problem, the concept behind the procedure/treatment proposed, expectations of the planned procedure(s), and all alejo-operative steps.     I discussed the pathophysiology behind the patient's problem.  Currently the wound is many weeks out from her surgery and is failing to heal in and is stagnant in terms of healing.  There is no active infection.  It is chronic due to the history of radiation, poor circulation in the tissues, edema, distal leg positioning, exposed bone, and medical comorbidities.  Given her lack of symptoms of pain and no infection, 1 option is to continue conservative management to prevent surgery however the chance of this will successfully leading to heal wound is negligible.  Another option is to go to the operating room debride the wound and use Integra in the hopes that the Integra takes of the ensuing 3 to 6 weeks and then place a skin graft if it does successfully take.  Again the chance of this working is low given the radiation and quality of tissues.  The last option would be to proceed with a flap closure.  Local flaps in this  area from the radiation, would be surrounding tissues, and radiated underlying muscles are not ideal.  They could be tried but it could end up with further wounds.  The most definitive closure would be a free flap closure of some kind.  These concepts were explained to the patient in detail.  I think it is reasonable to start simple given the fact the wound is small and the symptoms are few.    The 1 thing I am concerned about is the nodule in the pretibial area of the mid leg.  My advice is to biopsy that nodule and at the same time biopsy the edges of the chronic wound just to ensure that malignancy is not the source of the problem.  If it does show malignancy then I think a more radical excision will be required which will necessitate a free flap reconstruction.  If these come back as benign processes, then I think any of the above steps are her options.    My plan is to let Dr. Barroso and his team know of the above plan and then proceed as the patient sees fit.    All risks, benefits and alternatives, including but not limited to (what applies), pain, infection, bleeding, scarring, asymmetry, seromas, hematomas, wound breakdown, wound dehiscence, loss of the implants/flaps, abdominal wall-healing issues, abdominal wall weakness, bulges, hernias, sensation loss, requirement of further staged procedures, Implant specific issues and complications as discussed above, removal of infected or exposed implants, pneumothoraces, contour abnormalities, cannula injuries to deeper structures, hernias, fat necrosis, lumps and bumps, loss of grafted material, DVT, PE, MI, CVA, pneumonia, renal failure and death, were explained. They were understood and agreed upon by the patient, they were acknowledged by the patient, all the patient's questions were answered in detail to the patient's fullest understanding that they acknowledged, the team approach for treatment in the operating room was agreed upon by the patient, and  proceeding with surgery was agreed upon by the patient.      All questions were answered. The patient was happy with the visit. I look forward to helping the patient out in the near future as indicated.       Total time spent in the encounter today including chart review, visit itself, and post-visit paperwork was 60 minutes.       Dana Vogel MD    Chief, Division of Plastic Surgery  Department of Surgery  [facility]      CC: Benny Barroso MD  [address] [address]  CC: Joseline Fraga      Again, thank you for allowing me to participate in the care of your patient.      Sincerely,    BERNABE Vogel MD

## 2024-09-25 NOTE — NURSING NOTE
"Chief Complaint   Patient presents with    Consult     Mirta, is being seen today for a consult per Dr Barroso       Vitals:    09/25/24 0807   BP: (!) 150/85   BP Location: Left arm   Patient Position: Chair   Cuff Size: Adult Regular   Pulse: 87   SpO2: 100%   Height: 1.549 m (5' 1\")       Body mass index is 35.41 kg/m .      Jimena Dunn LPN    " 69 y/o pmh afib 71 y/o pmh afib not on meds since 11/17, No blood thinners (ever) followed by [facility], p/w SOB x2d.  + non rad cp. No fever, cough, leg swelling.  + unclear uterine ca hx.  PE as noted NAD; CTAB; irreg irreg 120-150; no pedal edema.  IMP: afib, consider PE.  ChadVasc: 3 Labs, ekg, cxr, ct chest, rate control. ivf, anticoag, reassess, admit.

## 2024-09-25 NOTE — PROGRESS NOTES
Referring Provider:  Benny Barroso MD  [address]  [place] [address]     Primary Care Provider:  Joseline Fraga      RE: Mirta Cardenas.  : 1955.  DOREEN: 2024.    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  The distal end of the wound which was in the distal aspect of the leg in the pretibial region failed to heal and has developed a chronic nonhealing wound.  It has been debrided a couple of times last 1 in 2024.  It is stagnant and not healing in.  The final pathology of the excision was negative margins.  The final debridement did not have any pathology.  The patient is getting VAC therapy.  Patient has minimal pain and is ambulant.  Here to discuss options for treating this chronic nonhealing wound.    Medical history:  Past Medical History:   Diagnosis Date    Asthma     reactive airway disease, per patient    Blood transfusion     Cancer (H)     chronic lymphocytic leukemia    CINV (chemotherapy-induced nausea and vomiting) 10/12/2023    History of transfusion     Hypertension     Leukemia, chronic lymphocytic (H)     Malignant neoplasm (H)     CLL    Sarcoma of right lower extremity (H)     Thyroid nodule 2008       Surgical history:  Past Surgical History:   Procedure Laterality Date    ABDOMEN SURGERY      c section *3    APPENDECTOMY      C/SECTION, CLASSICAL  ,,    , Classical    COLONOSCOPY      EXCISE SOFT TISSUE TUMOR CALF Right 2024    Procedure: Removal of Tumor Right Calf;  Surgeon: Benny Barroso MD;  [facility]    HYSTERECTOMY      HYSTERECTOMY, PAP NO LONGER INDICATED  1998    IRRIGATION AND DEBRIDEMENT LOWER EXTREMITY, COMBINED Right 2024    Procedure: IRRIGATION AND DEBRIDEMENT, LOWER  EXTREMITY and application of wound VAC;  Surgeon: Benny Barroso MD;  [facility]    PICC DOUBLE LUMEN PLACEMENT Left 10/17/2023    left basilic 4 fr dl power picc 41 cm    PICC DOUBLE LUMEN PLACEMENT Left 12/13/2023    Medial Brachial, 42 cm, 3 cm external length    PICC DOUBLE LUMEN PLACEMENT Left 01/10/2024    left medial brachial 5 fr dl power picc 41 cm    RELEASE CARPAL TUNNEL  06/05/2012    Procedure:RELEASE CARPAL TUNNEL; Right Carpal Tunnel Release & Right Ring A1 Pulley Release; Surgeon:SERGEY HEATON; [facility]    RELEASE TRIGGER FINGER  06/05/2012    Procedure:RELEASE TRIGGER FINGER; Surgeon:SERGEY HEATON; [facility]    RELEASE TRIGGER FINGER  10/12/2012    Procedure: RELEASE TRIGGER FINGER;  Right Thumb A1 Pulley Release;  Surgeon: Sergey Heaton MD;  [facility]    RESECT BONE LOWER EXTREMITY Right 4/22/2024    Procedure: Including Bone Removal;  Surgeon: Benny Barroso MD;  [facility]    SALPINGO OOPHORECTOMY,R/L/ULICES  05/02/2008    right       Family history:  Family History   Problem Relation Age of Onset    Osteoporosis Mother     Cancer Sister         multiple myloma    Hypertension Brother     Heart Disease Brother 65        bypass    Obesity Son     Obesity Son     Hypertension Son     Melanoma No family hx of     Anesthesia Reaction No family hx of     Thrombosis No family hx of        Medications:  Current Outpatient Medications   Medication Sig Dispense Refill    acetaminophen (TYLENOL) 325 MG tablet Take 2 tablets (650 mg) by mouth every 4 hours as needed for mild pain. 50 tablet 0    Multiple Vitamins-Calcium (ONE-A-DAY WOMENS FORMULA PO) Take 1 tablet by mouth every morning.      omeprazole (PRILOSEC) 20 MG DR capsule Take 1 capsule by mouth every morning.      oxyCODONE (ROXICODONE) 5 MG tablet Take 1 tablet (5 mg) by mouth every 6 hours as needed for moderate to severe pain. 12 tablet 0    pravastatin (PRAVACHOL) 40 MG tablet TAKE 1 TABLET  "(40 MG) BY MOUTH DAILY (Patient taking differently: Take 40 mg by mouth at bedtime.) 90 tablet 1    pregabalin (LYRICA) 50 MG capsule Take 1 capsule (50 mg) by mouth 3 times daily. 90 capsule 0    senna-docusate (SENOKOT-S/PERICOLACE) 8.6-50 MG tablet Take 1-2 tablets by mouth 2 times daily. 30 tablet 0    amLODIPine (NORVASC) 10 MG tablet Take 1 tablet (10 mg) by mouth daily (Patient not taking: Reported on 9/25/2024) 90 tablet 1    hydrochlorothiazide (HYDRODIURIL) 50 MG tablet Take 1 tablet (50 mg) by mouth daily (Patient not taking: Reported on 9/25/2024) 90 tablet 3    losartan (COZAAR) 100 MG tablet Take 1 tablet (100 mg) by mouth daily (Patient not taking: Reported on 9/25/2024) 90 tablet 3       Allergies:  Allergies   Allergen Reactions    Allopurinol Itching    Amoxicillin Hives    Codeine Itching    Cymbalta [Duloxetine Hcl] Fatigue    Periactin Other (See Comments)     Sleepy.    11/15/23: patient not sure about this allergy/intolerance - may be interested in removal    Tenormin [Atenolol] Fatigue     11/15/23: patient may be interested in removal of this from allergy list as it was only fatigue/sleepiness       Social history:   Social History     Tobacco Use    Smoking status: Never     Passive exposure: Past    Smokeless tobacco: Never   Substance Use Topics    Alcohol use: Yes     Comment: Occasionally         Physical Examination:  BP (!) 150/85 (BP Location: Left arm, Patient Position: Chair, Cuff Size: Adult Regular)   Pulse 87   Ht 1.549 m (5' 1\")   SpO2 100%   BMI 35.41 kg/m    Body mass index is 35.41 kg/m .    General: No acute distress.    Right leg: There is a 4 x 3 cm full-thickness defect in the distal anterior leg with exposed underlying tibia.  It is clean.  No evidence for infection.  It encompasses the distal end of the excisional scar.  There are radiation changes in the entire leg.  There is edema in the leg as well.  She has palpable dorsalis pedis and posterior tibial pulses.  " Her deep peroneal nerve distribution is numb.  She has a suspicious looking skin nodule midway down the incision on the anterior leg.        ASSESMENT and PLAN:     Based upon the above findings, a diagnosis of nonhealing radiated wound to right distal anterior leg s/p sarcoma excision was made.  I had a lucia, detailed discussion with the patient, in the presence of my nurse, who was present from beginning to end.  I discussed with the patient the pathophysiology behind the problem, the concept behind the procedure/treatment proposed, expectations of the planned procedure(s), and all alejo-operative steps.     I discussed the pathophysiology behind the patient's problem.  Currently the wound is many weeks out from her surgery and is failing to heal in and is stagnant in terms of healing.  There is no active infection.  It is chronic due to the history of radiation, poor circulation in the tissues, edema, distal leg positioning, exposed bone, and medical comorbidities.  Given her lack of symptoms of pain and no infection, 1 option is to continue conservative management to prevent surgery however the chance of this will successfully leading to heal wound is negligible.  Another option is to go to the operating room debride the wound and use Integra in the hopes that the Integra takes of the ensuing 3 to 6 weeks and then place a skin graft if it does successfully take.  Again the chance of this working is low given the radiation and quality of tissues.  The last option would be to proceed with a flap closure.  Local flaps in this area from the radiation, would be surrounding tissues, and radiated underlying muscles are not ideal.  They could be tried but it could end up with further wounds.  The most definitive closure would be a free flap closure of some kind.  These concepts were explained to the patient in detail.  I think it is reasonable to start simple given the fact the wound is small and the symptoms are  few.    The 1 thing I am concerned about is the nodule in the pretibial area of the mid leg.  My advice is to biopsy that nodule and at the same time biopsy the edges of the chronic wound just to ensure that malignancy is not the source of the problem.  If it does show malignancy then I think a more radical excision will be required which will necessitate a free flap reconstruction.  If these come back as benign processes, then I think any of the above steps are her options.    My plan is to let Dr. Barroso and his team know of the above plan and then proceed as the patient sees fit.    All risks, benefits and alternatives, including but not limited to (what applies), pain, infection, bleeding, scarring, asymmetry, seromas, hematomas, wound breakdown, wound dehiscence, loss of the implants/flaps, abdominal wall-healing issues, abdominal wall weakness, bulges, hernias, sensation loss, requirement of further staged procedures, Implant specific issues and complications as discussed above, removal of infected or exposed implants, pneumothoraces, contour abnormalities, cannula injuries to deeper structures, hernias, fat necrosis, lumps and bumps, loss of grafted material, DVT, PE, MI, CVA, pneumonia, renal failure and death, were explained. They were understood and agreed upon by the patient, they were acknowledged by the patient, all the patient's questions were answered in detail to the patient's fullest understanding that they acknowledged, the team approach for treatment in the operating room was agreed upon by the patient, and proceeding with surgery was agreed upon by the patient.      All questions were answered. The patient was happy with the visit. I look forward to helping the patient out in the near future as indicated.       Total time spent in the encounter today including chart review, visit itself, and post-visit paperwork was 60 minutes.       Dana Vogel MD    Chief, Division of  Plastic Surgery  Department of Surgery  [facility]      CC: Benny Barroso MD  [address] [facility] [address]  [place] [address]  CC: Joseline Fraga

## 2024-09-30 ENCOUNTER — TELEPHONE (OUTPATIENT)
Dept: ORTHOPEDICS | Facility: CLINIC | Age: 69
End: 2024-09-30

## 2024-09-30 NOTE — CONFIDENTIAL NOTE
Reviewed by Lakhwinder Juarez PA-C as provider unavailable in the OR. Call placed to Marisol MUELLER. Instructed to cover with adaptic, alginate and gauze. Will notify her of long term plan.     Tara Holter, RNCC

## 2024-09-30 NOTE — CONFIDENTIAL NOTE
Returned call to Marisol MUELLER. She has concerns regarding wound bed with more devitalized tissue. She reached out to the WOC nurse at [facility] and they recommended not reapplying wound vac. She will send picture of wound for provider to review.     Tara Holter, RNCC

## 2024-09-30 NOTE — TELEPHONE ENCOUNTER
Other: Requesting c/b- is with the patient now- Concerns with the wound-has a wound vac, consulted w/one of the wound vac nurses and the are recommending not putting the wound vac on today. Is w/patient now would like c/b ASAP

## 2024-10-01 NOTE — CONFIDENTIAL NOTE
Call placed to patient. Appointment scheduled October 3rd at 2:00 PM with Dr. Barroso.    Call placed to Marisol MUELLER at [facility]. Updated her on appointment. She will plan to see patient tomorrow and change dressing. Will follow up after appointment Thursday to relay long term plan.     Tara Holter, RNCC

## 2024-10-03 ENCOUNTER — OFFICE VISIT (OUTPATIENT)
Dept: ORTHOPEDICS | Facility: CLINIC | Age: 69
End: 2024-10-03
Payer: COMMERCIAL

## 2024-10-03 DIAGNOSIS — T81.31XS WOUND DEHISCENCE, SURGICAL, SEQUELA: ICD-10-CM

## 2024-10-03 DIAGNOSIS — C49.21 SARCOMA OF RIGHT LOWER EXTREMITY (H): Primary | ICD-10-CM

## 2024-10-03 PROCEDURE — 99024 POSTOP FOLLOW-UP VISIT: CPT | Performed by: PHYSICIAN ASSISTANT

## 2024-10-03 NOTE — LETTER
10/3/2024      Mirta Cardenas  [address] [facility] [address]      Dear Colleague,    Thank you for referring your patient, Mirta Cardenas, to the [facility] [place]. Please see a copy of my visit note below.    Chief Complaint: right lower leg wound check    Preop diagnosis right distal calf wound dehiscence  9/9/24 Procedure performed: 1.  Debridement of skin and subcutaneous tissue right distal calf wound, 3 x 2 x 1 cm.  2.  Placement of vacuum dressing right calf measuring 3 x 2 x 1 cm  Surgeons: Benny Barroso     HPI: Mirta is a 69 year old female here for follow-up of wound debridement and is here for wound check.  She was having a home wound care nurse placed a Prevena incisional VAC over her wounds 2 to 3 days a week.  They noticed some changes in the wound and wanted her wound to be checked.  She has now been using Adaptic, alginate, and Mepilex dressing and changing that every 1 to 2 days.  Patient reports decreased pain since discontinuing the wound VAC.  She denies any fever or chills.  She was also seen by plastic surgery to discuss further treatment for her right lower leg wound.  There was some concerns that she had developed a new nodule in her leg and they had wanted her to be seen for possible biopsy.  She reports that nodule came up with use of the wound VAC but since that has been discontinued, it is no longer there.  This area had developed into a blister and then a small erosion, which is now healing.  She is otherwise participating in all activities as tolerated.  No other concerns.    Physical Exam: 69 year old female who is alert and oriented and in no distress.  Antalgic gait with mild drop foot without gait assistance.  Mepilex dressing removed today. This shows the largest wound measuring 3cm x 2cm x 8,m deep with some early surrounding granulation tissue and moderate exudate.  Mild surrounding edema.  Too smaller superficial ulcerations above  and below the big wound measuring just a few millimeters long.  No active drainage.  There are no palpable nodules or masses concerning for recurrent sarcoma today.     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  Essentially she was given 3 options by plastic surgery.  She could continue with local wound care, try a skin graft in 2 stages, or a flap, all of which may or may not be successful.  I do not think she has any active infection.  I think we can certainly keep going with regular dressing changes as she has been doing and not do the wound VAC.  If this gets to be too much for her, she can decide if she wants to proceed with the smaller surgery with Integra and skin grafting.  This can be done at any point.  She will to continue with home wound care and dressing changes every 2 days or so.  Follow-up in 3 to 4 weeks for wound check.  Call if any concerns.  We do not feel she had any palpable mass that needed to be biopsied.  The mass that was seen during her visit with plastic surgery was likely related to the Sarah pad imparting suction to the soft tissues, and once that has been discontinued, the swelling went down and the tissue is all within normal limits now.  Patient agrees and all questions have been answered today.  Patient was also examined by Dr. Barroso, and he agrees with the plan of care.      Again, thank you for allowing me to participate in the care of your patient.        Sincerely,        Benny Barroso MD

## 2024-10-03 NOTE — NURSING NOTE
Reason For Visit:   Chief Complaint   Patient presents with    RECHECK     Right leg wound check with possible biopsy. Home nurse stopped wound vac a couple days ago and currently has dressing on wound          69 year old  1955      Primary MD: Joseline Fraga        There were no vitals taken for this visit.      Pain Assessment  Patient Currently in Pain: Yes  0-10 Pain Scale: 2  Primary Pain Location: Leg (right)            Jace Kamara [facility]

## 2024-10-03 NOTE — PROGRESS NOTES
Chief Complaint: right lower leg wound check    Preop diagnosis right distal calf wound dehiscence  9/9/24 Procedure performed: 1.  Debridement of skin and subcutaneous tissue right distal calf wound, 3 x 2 x 1 cm.  2.  Placement of vacuum dressing right calf measuring 3 x 2 x 1 cm  Surgeons: Benny Barroso     HPI: Mirta is a 69 year old female here for follow-up of wound debridement and is here for wound check.  She was having a home wound care nurse placed a Prevena incisional VAC over her wounds 2 to 3 days a week.  They noticed some changes in the wound and wanted her wound to be checked.  She has now been using Adaptic, alginate, and Mepilex dressing and changing that every 1 to 2 days.  Patient reports decreased pain since discontinuing the wound VAC.  She denies any fever or chills.  She was also seen by plastic surgery to discuss further treatment for her right lower leg wound.  There was some concerns that she had developed a new nodule in her leg and they had wanted her to be seen for possible biopsy.  She reports that nodule came up with use of the wound VAC but since that has been discontinued, it is no longer there.  This area had developed into a blister and then a small erosion, which is now healing.  She is otherwise participating in all activities as tolerated.  No other concerns.    Physical Exam: 69 year old female who is alert and oriented and in no distress.  Antalgic gait with mild drop foot without gait assistance.  Mepilex dressing removed today. This shows the largest wound measuring 3cm x 2cm x 8,m deep with some early surrounding granulation tissue and moderate exudate.  Mild surrounding edema.  Too smaller superficial ulcerations above and below the big wound measuring just a few millimeters long.  No active drainage.  There are no palpable nodules or masses concerning for recurrent sarcoma today.     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  Essentially she was given 3 options by plastic surgery.  She could continue with local wound care, try a skin graft in 2 stages, or a flap, all of which may or may not be successful.  I do not think she has any active infection.  I think we can certainly keep going with regular dressing changes as she has been doing and not do the wound VAC.  If this gets to be too much for her, she can decide if she wants to proceed with the smaller surgery with Integra and skin grafting.  This can be done at any point.  She will to continue with home wound care and dressing changes every 2 days or so.  Follow-up in 3 to 4 weeks for wound check.  Call if any concerns.  We do not feel she had any palpable mass that needed to be biopsied.  The mass that was seen during her visit with plastic surgery was likely related to the Sarah pad imparting suction to the soft tissues, and once that has been discontinued, the swelling went down and the tissue is all within normal limits now.  Patient agrees and all questions have been answered today.  Patient was also examined by Dr. Barroso, and he agrees with the plan of care.

## 2024-10-04 NOTE — CONFIDENTIAL NOTE
Calixto Hernandez is a 27 y.o. female 18w1d        OB History    Para Term  AB Living   2 1 1     1   SAB TAB Ectopic Molar Multiple Live Births             1      # Outcome Date GA Lbr Sohail/2nd Weight Sex Delivery Anes PTL Lv   2 Current            1 Term 16 38w0d  5 lb 11 oz (2.58 kg) F CS-LTranv   GLADIS                Vitals  BP: 108/78  Weight: 115 lb (52.2 kg)  Pulse: 82  Patient Position: Sitting  Albumin: Negative  Glucose: Negative      The patient was seen and evaluated. There was Positive fetal movements. No contractions or leakage of fluid. Signs and symptoms of  labor as well as labor were reviewed. The Nuchal Translucency testing was reviewed and found to be normal. A single marker MSAFP was ordered for a 15-20 week gestational age window. TOP ST OH Reviewed. Dates were reviewed with the patient. An 18-22 week anatomy ultrasound has been ordered. The patient will return to the office for her next visit in 4 weeks. See antepartum flow sheet. 12/29/15 GBS (+), TREAT IN LABOR PER PROTOCOL  17 repeat rubella 28 weeks        Assessment:  1. Calixto Hernandez is a 27 y.o. female  2.   3. 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 Maternal and fetal risks were reviewed. The patient was instructed to stop smoking. Morbidity, mortality, and cessation programs were reviewed. Risks reviewed include but are not limited to  labor,  delivery, premature rupture of membranes, intrauterine growth restriction, intrauterine fetal demise, and abruptio placenta. Secondary smoke risks were reviewed.  Increased risks of respiratory problems, asthma,cancer and sudden infant death Returned call to Marisol MUELLER. She recommended alginate and foam dressing for wound care. Change twice a week and as needed. [facility] works with mobile wound clinic and can order supplies for patient. A wound care NP can accompany RN to visit if needed. Will notify provider and if they do not agree with plan will call Marisol back.     Tara Holter, RNCC     syndrome were reviewed. Discussed smoking cessation      Fetal drug exposure ( subutex, zoloft, and buspar) 2017     Priority: High    H/O:  2016 LTC 2016     Priority: High    History of gestational diabetes 10/09/2017     Priority: Medium     10/9/2017 early one hour GTT ordered      Low weight gain in pregnancy 10/05/2017    Antepartum drug dependence ([address] 10/05/2017     Interval fetal growth scans every 3-4 weeks from 24 weeks gestation  32 week  testing  10/16/2017 Currently on subutex 12mg PO Daily.  Suspected damage to fetus from other disease in mother, affecting management of mother, antepartum condition or complication     Hydronephrosis 06/10/2017    Acute cystitis without hematuria 06/10/2017       1. HRP (high risk pregnancy), second trimester     2. H/O mixed drug abuse     3. Fetal drug exposure     4. History of gestational diabetes     5. Antepartum drug dependence ([address]     6. Tobacco smoking affecting pregnancy in first trimester     7.  H/O:      8. 18 weeks gestation of pregnancy  Alpha Fetoprotein, Maternal         Plan: AFP ordered

## 2024-10-04 NOTE — CONFIDENTIAL NOTE
Attempted to call Marisol MUELLER to update on plan. LVM informing her we will continue with dressing changes every 2 days with adaptic, alginate, and mepilex. Follow up is scheduled 10/24. Encouraged her to callback if she has questions. Clinic number provided.     Tara Holter, RNCC

## 2024-10-04 NOTE — TELEPHONE ENCOUNTER
Other: MAI Damon calling AMI Kirkland back. Please call her      Could we send this information to you in [employer] or would you prefer to receive a phone call?:   Patient would prefer a phone call   Okay to leave a detailed message?: Yes at Other phone number:  [phone]*

## 2024-10-15 ENCOUNTER — TELEPHONE (OUTPATIENT)
Dept: FAMILY MEDICINE | Facility: CLINIC | Age: 69
End: 2024-10-15

## 2024-10-15 NOTE — TELEPHONE ENCOUNTER
Patient Quality Outreach    Patient is due for the following:   Breast Cancer Screening - Mammogram    Next Steps:   Apt 12/13/2024    Type of outreach:    Chart review performed, no outreach needed.      Questions for provider review:    None           Leia Gutiérrez MA

## 2024-10-24 ENCOUNTER — MEDICAL CORRESPONDENCE (OUTPATIENT)
Dept: HEALTH INFORMATION MANAGEMENT | Facility: CLINIC | Age: 69
End: 2024-10-24

## 2024-10-24 ENCOUNTER — OFFICE VISIT (OUTPATIENT)
Dept: ORTHOPEDICS | Facility: CLINIC | Age: 69
End: 2024-10-24
Payer: COMMERCIAL

## 2024-10-24 DIAGNOSIS — T81.89XS NON-HEALING SURGICAL WOUND, SEQUELA: ICD-10-CM

## 2024-10-24 DIAGNOSIS — C49.21 SARCOMA OF RIGHT LOWER EXTREMITY (H): Primary | ICD-10-CM

## 2024-10-24 DIAGNOSIS — C49.9 SARCOMA OF SOFT TISSUE (H): ICD-10-CM

## 2024-10-24 PROCEDURE — 99024 POSTOP FOLLOW-UP VISIT: CPT | Performed by: PHYSICIAN ASSISTANT

## 2024-10-24 RX ORDER — PREGABALIN 50 MG/1
50 CAPSULE ORAL 3 TIMES DAILY
Qty: 90 CAPSULE | Refills: 0 | Status: SHIPPED | OUTPATIENT
Start: 2024-10-24

## 2024-10-24 NOTE — PROGRESS NOTES
Chief Complaint: wound check  Preop diagnosis right distal calf wound dehiscence  9/9/24 Procedure performed: 1.  Debridement of skin and subcutaneous tissue right distal calf wound, 3 x 2 x 1 cm.  2.  Placement of vacuum dressing right calf measuring 3 x 2 x 1 cm  Surgeons: Benny Barroso    Preop diagnosis: Sarcoma right calf, deep compartment  4/22/24 Procedure performed: Removal of sarcoma right calf, deep compartment including anterior tibial muscle and 8 cm of the fibula    HPI: Mirta is a 69-year-old female is here for follow-up and wound check of her right lower leg.  Patient has been getting wound care twice a week to the right leg.  They would like to have a nurse practitioner wound specialist also look at her leg.  They are using alginate and Mepilex style dressing.  Patient feels like there is some new granulation tissue forming.  She has no increased pain or redness.  No other concerns.    Physical Exam: Mirta is a pleasant 69-year-old female who is alert and oriented no apparent distress.  She has a minimally antalgic gait without gait assistance today.  Mild foot drop.  Dressings were removed.  She has mild exudate over the more distal larger wound which is approximately 3 cm x 2 cm x 8 mm.  There is some granulation tissue present.  Mild periwound edema.  Minimal erythema.  There is a smaller more superficial wound that is approximately 5 mm in length that is healing with no exudate.  There is an approximately 1 cm superficial ulceration just above the larger wound which also is healing with minimal drainage.  More proximately up the leg, there continues to be a small pinhole wound that has occasional serous drainage.  There is no erythema or swelling and no tenderness.    Impression: 69-year-old female with history of excision of right lower leg sarcoma with wound dehiscence, being treated with daily dressing changes.    Plan: Mirta will continue with the current plan of care and dressing changes as  instructed by wound care.  She has part of her staging MRI scans coming up in November.  We will see her at the end of November beginning of December to see how her wounds are looking and discuss her scans.  She also follows up with oncology towards the end of November.  She will call if there is any increased pain or redness.  All questions answered.  Patient was also examined by Dr. Barroso, and he agrees with the plan of care.

## 2024-10-24 NOTE — NURSING NOTE
Reason For Visit:   Chief Complaint   Patient presents with    Wound Check     S/p I&D Right lower extremity with possible wound vac placement- DOS: 9/9/2024       Pain Assessment  Patient Currently in Pain: Yes  0-10 Pain Scale: 2  Primary Pain Location: Leg (Right)        Allergies   Allergen Reactions    Allopurinol Itching    Amoxicillin Hives    Codeine Itching    Cymbalta [Duloxetine Hcl] Fatigue    Periactin Other (See Comments)     Sleepy.    11/15/23: patient not sure about this allergy/intolerance - may be interested in removal    Tenormin [Atenolol] Fatigue     11/15/23: patient may be interested in removal of this from allergy list as it was only fatigue/sleepiness           Michaela Gonzalez LPN

## 2024-10-24 NOTE — LETTER
10/24/2024      Mirta Cardenas  [address] [facility] [id number]      Dear Colleague,    Thank you for referring your patient, Mirta Cardenas, to the [facility] [place]. Please see a copy of my visit note below.    Chief Complaint: wound check  Preop diagnosis right distal calf wound dehiscence  9/9/24 Procedure performed: 1.  Debridement of skin and subcutaneous tissue right distal calf wound, 3 x 2 x 1 cm.  2.  Placement of vacuum dressing right calf measuring 3 x 2 x 1 cm  Surgeons: Benny Barroso    Preop diagnosis: Sarcoma right calf, deep compartment  4/22/24 Procedure performed: Removal of sarcoma right calf, deep compartment including anterior tibial muscle and 8 cm of the fibula    HPI: Mirta is a 69-year-old female is here for follow-up and wound check of her right lower leg.  Patient has been getting wound care twice a week to the right leg.  They would like to have a nurse practitioner wound specialist also look at her leg.  They are using alginate and Mepilex style dressing.  Patient feels like there is some new granulation tissue forming.  She has no increased pain or redness.  No other concerns.    Physical Exam: Mirta is a pleasant 69-year-old female who is alert and oriented no apparent distress.  She has a minimally antalgic gait without gait assistance today.  Mild foot drop.  Dressings were removed.  She has mild exudate over the more distal larger wound which is approximately 3 cm x 2 cm x 8 mm.  There is some granulation tissue present.  Mild periwound edema.  Minimal erythema.  There is a smaller more superficial wound that is approximately 5 mm in length that is healing with no exudate.  There is an approximately 1 cm superficial ulceration just above the larger wound which also is healing with minimal drainage.  More proximately up the leg, there continues to be a small pinhole wound that has occasional serous drainage.  There is no erythema or  swelling and no tenderness.    Impression: 69-year-old female with history of excision of right lower leg sarcoma with wound dehiscence, being treated with daily dressing changes.    Plan: Mirta will continue with the current plan of care and dressing changes as instructed by wound care.  She has part of her staging MRI scans coming up in November.  We will see her at the end of November beginning of December to see how her wounds are looking and discuss her scans.  She also follows up with oncology towards the end of November.  She will call if there is any increased pain or redness.  All questions answered.  Patient was also examined by Dr. Barroso, and he agrees with the plan of care.    Again, thank you for allowing me to participate in the care of your patient.        Sincerely,        Benny Barroso MD

## 2024-11-12 ENCOUNTER — TRANSFERRED RECORDS (OUTPATIENT)
Dept: HEALTH INFORMATION MANAGEMENT | Facility: CLINIC | Age: 69
End: 2024-11-12
Payer: COMMERCIAL

## 2024-11-20 ENCOUNTER — MYC REFILL (OUTPATIENT)
Dept: ORTHOPEDICS | Facility: CLINIC | Age: 69
End: 2024-11-20
Payer: COMMERCIAL

## 2024-11-20 DIAGNOSIS — C49.9 SARCOMA OF SOFT TISSUE (H): ICD-10-CM

## 2024-11-20 RX ORDER — PREGABALIN 50 MG/1
50 CAPSULE ORAL 3 TIMES DAILY
Qty: 90 CAPSULE | Refills: 0 | Status: CANCELLED | OUTPATIENT
Start: 2024-11-20

## 2024-11-20 RX ORDER — PREGABALIN 50 MG/1
50 CAPSULE ORAL 3 TIMES DAILY
Qty: 90 CAPSULE | Refills: 0 | Status: SHIPPED | OUTPATIENT
Start: 2024-11-20

## 2024-11-22 ENCOUNTER — HOSPITAL ENCOUNTER (OUTPATIENT)
Dept: CT IMAGING | Facility: CLINIC | Age: 69
Discharge: HOME | End: 2024-11-22
Attending: STUDENT IN AN ORGANIZED HEALTH CARE EDUCATION/TRAINING PROGRAM
Payer: COMMERCIAL

## 2024-11-22 ENCOUNTER — HOSPITAL ENCOUNTER (OUTPATIENT)
Dept: MRI IMAGING | Facility: CLINIC | Age: 69
Discharge: HOME | End: 2024-11-22
Attending: STUDENT IN AN ORGANIZED HEALTH CARE EDUCATION/TRAINING PROGRAM
Payer: COMMERCIAL

## 2024-11-22 DIAGNOSIS — C49.9 UNDIFFERENTIATED PLEOMORPHIC SARCOMA (H): ICD-10-CM

## 2024-11-22 PROCEDURE — 73720 MRI LWR EXTREMITY W/O&W/DYE: CPT | Mod: 26 | Performed by: RADIOLOGY

## 2024-11-22 PROCEDURE — 71260 CT THORAX DX C+: CPT

## 2024-11-22 PROCEDURE — 250N000009 HC RX 250: Performed by: RADIOLOGY

## 2024-11-22 PROCEDURE — 74177 CT ABD & PELVIS W/CONTRAST: CPT

## 2024-11-22 PROCEDURE — A9585 GADOBUTROL INJECTION: HCPCS | Performed by: RADIOLOGY

## 2024-11-22 PROCEDURE — 250N000011 HC RX IP 250 OP 636: Performed by: RADIOLOGY

## 2024-11-22 PROCEDURE — 255N000002 HC RX 255 OP 636: Performed by: RADIOLOGY

## 2024-11-22 PROCEDURE — 73720 MRI LWR EXTREMITY W/O&W/DYE: CPT | Mod: RT

## 2024-11-22 RX ORDER — GADOBUTROL 604.72 MG/ML
8.5 INJECTION INTRAVENOUS ONCE
Status: COMPLETED | OUTPATIENT
Start: 2024-11-22 | End: 2024-11-22

## 2024-11-22 RX ORDER — IOPAMIDOL 755 MG/ML
92 INJECTION, SOLUTION INTRAVASCULAR ONCE
Status: COMPLETED | OUTPATIENT
Start: 2024-11-22 | End: 2024-11-22

## 2024-11-22 RX ADMIN — GADOBUTROL 8.5 ML: 604.72 INJECTION INTRAVENOUS at 11:45

## 2024-11-22 RX ADMIN — SODIUM CHLORIDE 63 ML: 9 INJECTION, SOLUTION INTRAVENOUS at 11:03

## 2024-11-22 RX ADMIN — IOPAMIDOL 92 ML: 755 INJECTION, SOLUTION INTRAVENOUS at 11:03

## 2024-11-25 ENCOUNTER — TRANSFERRED RECORDS (OUTPATIENT)
Dept: HEALTH INFORMATION MANAGEMENT | Facility: CLINIC | Age: 69
End: 2024-11-25
Payer: COMMERCIAL

## 2024-11-25 NOTE — NURSING NOTE
Mr. Suarez, age 73, was seen today for a hearing evaluation during his ENT visit with Dr. Antonio.  The patient has a history of tinnitus in his right ear which he states fluctuates in intensity overall.  He also has a history of bilateral high frequency hearing loss.  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  He also notes asking his wife for repetition on occasion but denies significant changes or new concerns.    Results:  Otoscopy revealed a clear left ear canal with visualized tympanic membrane and moderate cerumen in his right ear.  Tympanometry revealed a normal, Type A tympanogram in his left ear, indicating normal ear canal volume, peak pressure and compliance and a Type A sub d tympanogram in his right ear, indicating normal ear canal volume and peak pressure with increased compliance/mobility.  Audiometric thresholds revealed normal hearing sensitivity 250-1000 Hz sloping to a moderate high frequency sensorineural hearing loss in his left ear and a mild sloping to moderate sensorineural hearing loss in his right ear.  Word recognition scores were excellent bilaterally.  Hearing levels have declined mildly 9772-6854 Hz bilaterally as well as at 250 Hz in his right ear.    Recommendations:  Follow-up with PCP, Dr. Russo, as medically directed.  Follow-up with ENT, Dr. Antonio, as medically directed.  Retest hearing annually to monitor.   REASON FOR APPOINTMENT   Newly diagnosed sarcoma right lower leg, starting chemo tomorrow  Her to discuss radiation therapy    PERSONAL HISTORY OF CANCER   Previous Cancer ? CLL dx 2004, just lab monitoring yearly by PCP   Prior Radiation ? no   Prior Chemotherapy ? no   Prior Hormonal Therapy ? no     REFERRALS NEEDED  Not at this time:     VITALS  BP (!) 80/40 (BP Location: Left arm, Cuff Size: Adult Large)   Pulse 105   Resp 16   Wt 75.3 kg (166 lb)   SpO2 100%   BMI 31.37 kg/m    She feels the hypotension may be due to the cymbalta. Patient encouraged to report to PCP and medical oncology office  PACEMAKER/IMPLANTED CARDIAC DEVICE : No    PAIN  Yes, rates right leg pain 7/10  Taking duloxetine and oxycodone    PSYCHOSOCIAL  Marital Status:   Patient lives in [place] with her  Juanito (present today).  Number of children: 3  Working status: retired school para  Do you feel safe in your home? Yes    REVIEW OF SYSTEMS  Skin: negative  Eyes: glasses  Ears/Nose/Throat: negative  Respiratory: No shortness of breath, dyspnea on exertion, cough, or hemoptysis  Cardiovascular: negative  Gastrointestinal: constipation due to pain meds, managed with stool softeners  Genitourinary: nocturia x 2 and incontinence  Musculoskeletal: chronic back pain, work related  Neurologic: negative  Psychiatric: negative  Hematologic/Lymphatic/Immunologic: negative  Endocrine: negative    WOMEN ONLY  Any chance you may be pregnant: No    Radiation Oncology Patient Teaching    Current Concern: having some s/e from her cymbalta, will discuss with PCP and medical oncology    Person involved with teaching: Patient and   Patient asked Questions: Yes  Patient was cooperative: Yes  Patient was receptive (willing to accept information given): Yes    Education Assessment  Comprehension ability: High  Knowledge level: Medium  Factors affecting teaching: None    Response To Teaching  Verbalizes understanding    Do you  have an advanced directive or living will? No  Are you DNR/DNI? No

## 2024-11-27 ENCOUNTER — ONCOLOGY VISIT (OUTPATIENT)
Dept: ONCOLOGY | Facility: CLINIC | Age: 69
End: 2024-11-27
Attending: STUDENT IN AN ORGANIZED HEALTH CARE EDUCATION/TRAINING PROGRAM
Payer: COMMERCIAL

## 2024-11-27 VITALS
TEMPERATURE: 98.7 F | SYSTOLIC BLOOD PRESSURE: 143 MMHG | BODY MASS INDEX: 36.35 KG/M2 | RESPIRATION RATE: 12 BRPM | HEART RATE: 83 BPM | DIASTOLIC BLOOD PRESSURE: 88 MMHG | WEIGHT: 192.4 LBS | OXYGEN SATURATION: 98 %

## 2024-11-27 DIAGNOSIS — C49.9 SARCOMA OF SOFT TISSUE (H): Primary | ICD-10-CM

## 2024-11-27 PROCEDURE — G0463 HOSPITAL OUTPT CLINIC VISIT: HCPCS | Performed by: STUDENT IN AN ORGANIZED HEALTH CARE EDUCATION/TRAINING PROGRAM

## 2024-11-27 PROCEDURE — 99215 OFFICE O/P EST HI 40 MIN: CPT | Performed by: STUDENT IN AN ORGANIZED HEALTH CARE EDUCATION/TRAINING PROGRAM

## 2024-11-27 ASSESSMENT — PAIN SCALES - GENERAL: PAINLEVEL_OUTOF10: MILD PAIN (2)

## 2024-11-27 NOTE — NURSING NOTE
"Oncology Rooming Note    November 27, 2024 10:25 AM   Mirta Cardenas is a 69 year old female who presents for:    Chief Complaint   Patient presents with    Oncology Clinic Visit     Sarcoma     Initial Vitals: BP (!) 143/88 (BP Location: Right arm, Patient Position: Sitting, Cuff Size: Adult Large)   Pulse 83   Temp 98.7  F (37.1  C) (Oral)   Resp 12   Wt 87.3 kg (192 lb 6.4 oz)   SpO2 98%   BMI 36.35 kg/m   Estimated body mass index is 36.35 kg/m  as calculated from the following:    Height as of 9/25/24: 1.549 m (5' 1\").    Weight as of this encounter: 87.3 kg (192 lb 6.4 oz). Body surface area is 1.94 meters squared.  Mild Pain (2) Comment: Data Unavailable   No LMP recorded. Patient has had a hysterectomy.  Allergies reviewed: Yes  Medications reviewed: Yes    Medications: Medication refills not needed today.  Pharmacy name entered into [facility]:    [facility], [place] - [address] [place] [address]    Frailty Screening:   Is the patient here for a new oncology consult visit in cancer care? 2. No      Clinical concerns: none    Jaida Mon"

## 2024-11-27 NOTE — LETTER
11/27/2024      Mirta Cardenas  [address] [facility] [id number]      Dear Colleague,    Thank you for referring your patient, Mirta Cardenas, to the [facility]. Please see a copy of my visit note below.    [facility]    FOLLOW UP VISIT NOTE    PATIENT NAME: Mirta Cardenas MRN # [id number]  DATE OF VISIT: November 27, 2024 YOB: 1955    REFERRING PROVIDER: Benny Barroso MD  [id number]  [place] [address]    CANCER TYPE:  High grade pleomorphic sarcoma       CHIEF COMPLAIN   LE pain     HISTORY OF PRESENT ILLNESS   68-year-old female with PMH of CLL and recent Dx of High Grade Pleomorphic sarcoma, with intermittent right calf pain but then continuous calf pain beginning in March 2023.  She had a recent x-ray which shows a destructive lesion in the midportion R LE in between the fibula and the tibia. She is experiencing significant pain, she is restless during the visit due to pain. She is currently taking both Advil and Tylenol and oxycodone for pain, with minimal control, feels ibuprofen is what helps the most. She has been alternating all pain medications without optimizing doses of them.     C1D1 10/17/23  C2 11/14  C2 12/13  C4 1/10    2/19/2024 to 3/22/2024: Right lower extremity, 5000 cGy in 25 fraction     Surgical resection by Dr. Barroso 4/22/24: Final pathology demonstrated undifferentiated sarcoma, 5.8 cm, there was noted treatment effect with 6% viable tumor, negative margin, ypT2 NX.     C1 of adjuvant doxil on 6/11  C2 on 7/10    September 2024 - surgical debridement    Interval Hx  She reports that overall she feels well, but she is still dealing with an open draining wound at the surgical site. She feels only mild improvement but want to continue conservative wound care for now and but also she is considering the possibility of surgical reconstruction by Plastic Surgery in the future.      PAST ONCOLOGIC HISTORY   CLL - on monitoring     PAST MEDICAL HISTORY   Anal fisure - controlled with nitrobid  CLL - now being monitored - Zebulon  HTN  Cholesterol  Hx of severe COVID  Psoriasis    PAST SURGICAL HISTORY   3 c- sections  Hysterectomy  Ooforectomy     FAMILY HISTORY   Sister with MM     ALLERGIES      Allergies   Allergen Reactions     Allopurinol Itching     Amoxicillin Hives     Codeine Itching     Cymbalta [Duloxetine Hcl] Fatigue     Periactin Other (See Comments)     Sleepy.    11/15/23: patient not sure about this allergy/intolerance - may be interested in removal     Tenormin [Atenolol] Fatigue     11/15/23: patient may be interested in removal of this from allergy list as it was only fatigue/sleepiness          SOCIAL HISTORY     Social History     Social History Narrative     Not on file     Lives with , Giancarlo, she retired last spring, denies alcohol, tobacco or other drugs.      CURRENT OUTPATIENT MEDICATIONS     Current Outpatient Medications   Medication Sig Dispense Refill     acetaminophen (TYLENOL) 325 MG tablet Take 2 tablets (650 mg) by mouth every 4 hours as needed for mild pain. 50 tablet 0     amLODIPine (NORVASC) 10 MG tablet Take 1 tablet (10 mg) by mouth daily 90 tablet 1     hydrochlorothiazide (HYDRODIURIL) 50 MG tablet Take 1 tablet (50 mg) by mouth daily 90 tablet 3     losartan (COZAAR) 100 MG tablet Take 1 tablet (100 mg) by mouth daily 90 tablet 3     Multiple Vitamins-Calcium (ONE-A-DAY WOMENS FORMULA PO) Take 1 tablet by mouth every morning.       omeprazole (PRILOSEC) 20 MG DR capsule Take 1 capsule by mouth every morning.       oxyCODONE (ROXICODONE) 5 MG tablet Take 1 tablet (5 mg) by mouth every 6 hours as needed for moderate to severe pain. (Patient not taking: Reported on 10/3/2024) 12 tablet 0     pravastatin (PRAVACHOL) 40 MG tablet TAKE 1 TABLET (40 MG) BY MOUTH DAILY (Patient taking differently: Take 40 mg by mouth at bedtime.) 90 tablet 1      pregabalin (LYRICA) 50 MG capsule Take 1 capsule (50 mg) by mouth 3 times daily. 90 capsule 0     senna-docusate (SENOKOT-S/PERICOLACE) 8.6-50 MG tablet Take 1-2 tablets by mouth 2 times daily. 30 tablet 0          REVIEW OF SYSTEMS   As above in the HPI, o/w complete 12-point ROS was negative.     PHYSICAL EXAM   There were no vitals taken for this visit.    EGO (due to pain related to the mass)  GEN: NAD  HEENT: PERRL, EOMI, no icterus, injection or pallor. Oropharynx is clear.  NECK: no cervical or supraclavicular lymphadenopathy  LUNGS: clear bilaterally  CV: regular, no murmurs, rubs, or gallops  ABDOMEN: soft, non-tender, non-distended, normal bowel sounds, no hepatosplenomegaly by percussion or palpation  EXT: warm, well perfused, no edema. Surgical scar with open wound covered.   SKIN: no rashes     LABORATORY AND IMAGING STUDIES     Lab Results   Component Value Date    WBC 5.3 2024    WBC 15.6 05/10/2021     Lab Results   Component Value Date    RBC 3.92 2024    RBC 4.89 05/10/2021     Lab Results   Component Value Date    HGB 11.4 2024    HGB 13.7 05/10/2021     Lab Results   Component Value Date    HCT 33.6 2024    HCT 41.0 05/10/2021     Lab Results   Component Value Date    MCV 86 2024    MCV 84 05/10/2021     Lab Results   Component Value Date    MCH 29.1 2024    MCH 28.0 05/10/2021     Lab Results   Component Value Date    MCHC 33.9 2024    MCHC 33.4 05/10/2021     Lab Results   Component Value Date    RDW 13.2 2024    RDW 13.1 05/10/2021     Lab Results   Component Value Date     2024     05/10/2021         Last Comprehensive Metabolic Panel:  Sodium   Date Value Ref Range Status   2024 142 135 - 145 mmol/L Final   05/10/2021 141 133 - 144 mmol/L Final     Potassium   Date Value Ref Range Status   2024 4.0 3.4 - 5.3 mmol/L Final   2022 4.7 3.4 - 5.3 mmol/L Final   05/10/2021 3.9 3.4 - 5.3 mmol/L Final      Chloride   Date Value Ref Range Status   11/22/2024 104 98 - 107 mmol/L Final   05/24/2022 108 94 - 109 mmol/L Final   05/10/2021 108 94 - 109 mmol/L Final     Carbon Dioxide   Date Value Ref Range Status   05/10/2021 27 20 - 32 mmol/L Final     Carbon Dioxide (CO2)   Date Value Ref Range Status   11/22/2024 27 22 - 29 mmol/L Final   05/24/2022 29 20 - 32 mmol/L Final     Anion Gap   Date Value Ref Range Status   11/22/2024 11 7 - 15 mmol/L Final   05/24/2022 2 (L) 3 - 14 mmol/L Final   05/10/2021 6 3 - 14 mmol/L Final     Glucose   Date Value Ref Range Status   11/22/2024 109 (H) 70 - 99 mg/dL Final   05/24/2022 108 (H) 70 - 99 mg/dL Final   05/10/2021 112 (H) 70 - 99 mg/dL Final     Comment:     Fasting specimen     GLUCOSE BY METER POCT   Date Value Ref Range Status   04/23/2024 130 (H) 70 - 99 mg/dL Final     Urea Nitrogen   Date Value Ref Range Status   11/22/2024 26.1 (H) 8.0 - 23.0 mg/dL Final   05/24/2022 14 7 - 30 mg/dL Final   05/10/2021 19 7 - 30 mg/dL Final     Creatinine   Date Value Ref Range Status   11/22/2024 1.05 (H) 0.51 - 0.95 mg/dL Final   05/10/2021 0.89 0.52 - 1.04 mg/dL Final     GFR Estimate   Date Value Ref Range Status   11/22/2024 58 (L) >60 mL/min/1.73m2 Final     Comment:     eGFR calculated using 2021 CKD-EPI equation.   05/10/2021 69 >60 mL/min/[1.73_m2] Final     Comment:     Non  GFR Calc  Starting 12/18/2018, serum creatinine based estimated GFR (eGFR) will be   calculated using the Chronic Kidney Disease Epidemiology Collaboration   (CKD-EPI) equation.       GFR, ESTIMATED POCT   Date Value Ref Range Status   10/10/2023 >60 >60 mL/min/1.73m2 Final     Calcium   Date Value Ref Range Status   11/22/2024 9.8 8.8 - 10.4 mg/dL Final     Comment:     Reference intervals for this test were updated on 7/16/2024 to reflect our healthy population more accurately. There may be differences in the flagging of prior results with similar values performed with this method.  Those prior results can be interpreted in the context of the updated reference intervals.   05/10/2021 8.6 8.5 - 10.1 mg/dL Final     Bilirubin Total   Date Value Ref Range Status   11/22/2024 0.3 <=1.2 mg/dL Final   10/12/2020 0.3 0.2 - 1.3 mg/dL Final     Alkaline Phosphatase   Date Value Ref Range Status   11/22/2024 89 40 - 150 U/L Final   10/12/2020 124 40 - 150 U/L Final     ALT   Date Value Ref Range Status   11/22/2024 30 0 - 50 U/L Final   10/12/2020 40 0 - 50 U/L Final     AST   Date Value Ref Range Status   11/22/2024 25 0 - 45 U/L Final   10/12/2020 35 0 - 45 U/L Final       PET scan 1/29/24  IMPRESSION: Although much of the uptake along the superior and middle aspects of the right calf mass has decreased or resolved, there are two foci of uptake along the more inferior portions of the mass which have increased compared to December 2023,   concerning for disease progression.    MRI Tibia/Fibula 1/29/24  IMPRESSION:  1.  Interval decrease in size in the heterogeneously enhancing mass within the posterior tibial muscle belly. This has decreased from 3.3 x 5.2 x 9.3 cm and now measures 2.5 x 3.5 x 5.0 cm. It remains consistent with sarcoma.  2.  There is some surrounding T2 signal abnormality within the adjacent musculature including along the apposing side of the interosseous membrane within the anterior compartment. This could be secondary to post radiation change or a component of   reactive edema. It is difficult to completely exclude microinvasion of tumor.  3.  Edema or cellulitis about the lower leg has minimally increased since the previous examination.  4.  Stable bony irregularity and remodeling of the fibula which is at the margin of the neoplasm.  5.  Focus of T2 signal abnormality within the diaphyseal shaft of the tibia is poorly marginated. This may represent a component of mild stress reaction. Again, it is difficult to completely exclude a new bone lesion. This is considered unlikely, but    follow-up examination is recommended.  6.  No evidence for pathologic fracture.  7.  Exam otherwise negative.    8/22/24  IMPRESSION:     Development of FDG avid soft tissue thickening throughout the right lower extremity with areas of in-situ ulceration. While these findings may simply represent a worsening inflammatory/infectious process, the exact etiology remains indeterminate.    7/25/24 MRI L tibia/fibula  Impression:  1. In this patient with interim high grade sarcoma resection with  negative surgical margin, presumed postoperative collection, measuring  approximately 11 cm in maximal dimension. Superimposed infection of  the collection cannot be entirely excluded. No evidence of mass-like  area to suggest recurrent tumor.   2. Anterolateral subcutaneous hyperemic change and apparent area of  soft tissue/skin defect at mid/distal leg with possible 9 x 7 x 17 mm  early abscess (no definite wall on current imaging yet). Please  correlate clinically.   *  Extensive regional muscle edema maybe due to therapy, altered  biomechanics and/or reactive to infection.  3. Previously noted focal marrow signal alteration in the mid/distal  tibial shaft has slightly decreased since comparison, measuring  approximately 2 cm craniocaudal dimension, previously 2.4 cm using  similar measurement technique, may be resolving stress reaction versus  red marrow island.    MRI Tib/carito 11/22/24  Impression:     Stable postsurgical changes of the right leg without evidence of  residual or recurrent malignancy. Similar to mildly enlarged  postsurgical fluid collection.    CT CAP 11/22/24  IMPRESSION:  1.  No evidence for metastatic disease through the chest, abdomen, and  pelvis.  2.  Stable large fat-containing anterior abdominal wall hernia.  3.  Diffuse hepatic steatosis.  4.  Borderline splenomegaly.     PATHOLOGY   10/2/23  Final Diagnosis   A.  Soft tissue, right calf mass, excision:  - High-grade pleomorphic sarcoma  - See  "comment    Electronically signed by Jonny Zhou MD on 10/4/2023 at  9:09 PM   Comment  [facility]   Immunohistochemical stains show the tumor is negative for CK AE1/AE3, S100, desmin and CD34 while SATB2 demonstrates patchy reactivity.  While the primary diagnostic consideration is an undifferentiated pleomorphic sarcoma, the patchy SATB2 reactivity raises the possibility of a poorly differentiated osteosarcoma.   Clinical Information  [facility]   The patient is a 68 year old female with a history of CLL and several months of sensitivity of the lateral right calf, and recent imaging showing: \"Lytic lesion in the right fibular proximal diaphysis, with complete resorption of the medial cortex and some periosteal reaction adjacent to the lesion\".      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 No evidence of metastatic disease.     In her case, the probability of recurrence with surgery alone is about 40% in the next 10 years. We discussed, that recurrence in this scenario is usually with distant  metastasis which then becomes uncurable disease. We discussed, that for this reason, I recommend treatment with neoadjuvant chemotherapy with the goal of preventing metastasis in the future. We discussed the potential side effects of chemotherapy and need for emergent treatment in order to improve her symptoms. The regimen will be doxil 40mg/m2 and ifosfamide 8g/m2 over 5 days. Slightly lower dose due to age and other medical conditions.   She is s/p C4 and tolerated " chemotherapy well.     I personally reviewed the most recent PET and MRI leg, she had significant decrease in size and metabolic activity compared to pre-treatment, however this is a partial response with 2 foci that appear more active.     2/19/2024 to 3/22/2024: Right lower extremity, 5000 cGy in 25 fraction     Surgical resection by Dr. Barroso 4/22/24: Final pathology demonstrated undifferentiated sarcoma, 5.8 cm, there was noted treatment effect with 6% viable tumor, negative margin, ypT2 NX.     I explained that we saw a good therapy effect from initial chemotherapy upon imaging and pathology encouraging with only 6% of viable tumor. Because of this and her initial high risk, I recommend to continuous single agent doxil, with the goal of 1 year of adjuvant therapy.    She is s/p for C2 doxil on 7/10, no significant side effects but she has developed a non healing ulcer in the surgical wound. Adjuvant doxil is likely contributing to non healing and was discontinued.     I personally reviewed the most recent scans with no evidence of disease recurrence. She still has an open wound and is working with wound care for conservative management, only mild improvement noticed so far. She will meet with Dr. Barroso next week for advice on pursuing surgical options offered by plastic surgery.     Plan  -CT chest and MRI tibia on 2/24/25   -Follow up Dr. Jimenez on 2/26/24    -We discussed extensively about proper diet and physical therapy to optimize healing and functionality.     40 minutes spent on the date of the encounter doing chart review, review of outside records, review of test results, interpretation of tests, patient visit, documentation, and discussion with other provider(s)     Roney Nam MD   of Medicine  Hematology, Oncology and Transplantation        Again, thank you for allowing me to participate in the care of your patient.        Sincerely,        Roney Nam,  MD

## 2024-11-27 NOTE — PROGRESS NOTES
[facility] CANCER CLINIC    FOLLOW UP VISIT NOTE    PATIENT NAME: Mirta Cardenas MRN # [id number]  DATE OF VISIT: November 27, 2024 YOB: 1955    REFERRING PROVIDER: Benny Barroso MD  [address]  [place] [address]    CANCER TYPE:  High grade pleomorphic sarcoma       CHIEF COMPLAIN   LE pain     HISTORY OF PRESENT ILLNESS   68-year-old female with PMH of CLL and recent Dx of High Grade Pleomorphic sarcoma, with intermittent right calf pain but then continuous calf pain beginning in March 2023.  She had a recent x-ray which shows a destructive lesion in the midportion R LE in between the fibula and the tibia. She is experiencing significant pain, she is restless during the visit due to pain. She is currently taking both Advil and Tylenol and oxycodone for pain, with minimal control, feels ibuprofen is what helps the most. She has been alternating all pain medications without optimizing doses of them.     C1D1 10/17/23  C2 11/14  C2 12/13  C4 1/10    2/19/2024 to 3/22/2024: Right lower extremity, 5000 cGy in 25 fraction     Surgical resection by Dr. Barrsoo 4/22/24: Final pathology demonstrated undifferentiated sarcoma, 5.8 cm, there was noted treatment effect with 6% viable tumor, negative margin, ypT2 NX.     C1 of adjuvant doxil on 6/11  C2 on 7/10    September 2024 - surgical debridement    Interval Hx  She reports that overall she feels well, but she is still dealing with an open draining wound at the surgical site. She feels only mild improvement but want to continue conservative wound care for now and but also she is considering the possibility of surgical reconstruction by Plastic Surgery in the future.     PAST ONCOLOGIC HISTORY   CLL - on monitoring     PAST MEDICAL HISTORY   Anal fisure - controlled with nitrobid  CLL - now being monitored - [place]  HTN  Cholesterol  Hx of severe COVID  Psoriasis    PAST SURGICAL HISTORY   3 c-  sections  Hysterectomy  Ooforectomy     FAMILY HISTORY   Sister with MM     ALLERGIES      Allergies   Allergen Reactions    Allopurinol Itching    Amoxicillin Hives    Codeine Itching    Cymbalta [Duloxetine Hcl] Fatigue    Periactin Other (See Comments)     Sleepy.    11/15/23: patient not sure about this allergy/intolerance - may be interested in removal    Tenormin [Atenolol] Fatigue     11/15/23: patient may be interested in removal of this from allergy list as it was only fatigue/sleepiness          SOCIAL HISTORY     Social History     Social History Narrative    Not on file     Lives with , Giancarlo, she retired last spring, denies alcohol, tobacco or other drugs.      CURRENT OUTPATIENT MEDICATIONS     Current Outpatient Medications   Medication Sig Dispense Refill    acetaminophen (TYLENOL) 325 MG tablet Take 2 tablets (650 mg) by mouth every 4 hours as needed for mild pain. 50 tablet 0    amLODIPine (NORVASC) 10 MG tablet Take 1 tablet (10 mg) by mouth daily 90 tablet 1    hydrochlorothiazide (HYDRODIURIL) 50 MG tablet Take 1 tablet (50 mg) by mouth daily 90 tablet 3    losartan (COZAAR) 100 MG tablet Take 1 tablet (100 mg) by mouth daily 90 tablet 3    Multiple Vitamins-Calcium (ONE-A-DAY WOMENS FORMULA PO) Take 1 tablet by mouth every morning.      omeprazole (PRILOSEC) 20 MG DR capsule Take 1 capsule by mouth every morning.      oxyCODONE (ROXICODONE) 5 MG tablet Take 1 tablet (5 mg) by mouth every 6 hours as needed for moderate to severe pain. (Patient not taking: Reported on 10/3/2024) 12 tablet 0    pravastatin (PRAVACHOL) 40 MG tablet TAKE 1 TABLET (40 MG) BY MOUTH DAILY (Patient taking differently: Take 40 mg by mouth at bedtime.) 90 tablet 1    pregabalin (LYRICA) 50 MG capsule Take 1 capsule (50 mg) by mouth 3 times daily. 90 capsule 0    senna-docusate (SENOKOT-S/PERICOLACE) 8.6-50 MG tablet Take 1-2 tablets by mouth 2 times daily. 30 tablet 0          REVIEW OF SYSTEMS   As above in the  HPI, o/w complete 12-point ROS was negative.     PHYSICAL EXAM   There were no vitals taken for this visit.    EGO (due to pain related to the mass)  GEN: NAD  HEENT: PERRL, EOMI, no icterus, injection or pallor. Oropharynx is clear.  NECK: no cervical or supraclavicular lymphadenopathy  LUNGS: clear bilaterally  CV: regular, no murmurs, rubs, or gallops  ABDOMEN: soft, non-tender, non-distended, normal bowel sounds, no hepatosplenomegaly by percussion or palpation  EXT: warm, well perfused, no edema. Surgical scar with open wound covered.   SKIN: no rashes     LABORATORY AND IMAGING STUDIES     Lab Results   Component Value Date    WBC 5.3 2024    WBC 15.6 05/10/2021     Lab Results   Component Value Date    RBC 3.92 2024    RBC 4.89 05/10/2021     Lab Results   Component Value Date    HGB 11.4 2024    HGB 13.7 05/10/2021     Lab Results   Component Value Date    HCT 33.6 2024    HCT 41.0 05/10/2021     Lab Results   Component Value Date    MCV 86 2024    MCV 84 05/10/2021     Lab Results   Component Value Date    MCH 29.1 2024    MCH 28.0 05/10/2021     Lab Results   Component Value Date    MCHC 33.9 2024    MCHC 33.4 05/10/2021     Lab Results   Component Value Date    RDW 13.2 2024    RDW 13.1 05/10/2021     Lab Results   Component Value Date     2024     05/10/2021         Last Comprehensive Metabolic Panel:  Sodium   Date Value Ref Range Status   2024 142 135 - 145 mmol/L Final   05/10/2021 141 133 - 144 mmol/L Final     Potassium   Date Value Ref Range Status   2024 4.0 3.4 - 5.3 mmol/L Final   2022 4.7 3.4 - 5.3 mmol/L Final   05/10/2021 3.9 3.4 - 5.3 mmol/L Final     Chloride   Date Value Ref Range Status   2024 104 98 - 107 mmol/L Final   2022 108 94 - 109 mmol/L Final   05/10/2021 108 94 - 109 mmol/L Final     Carbon Dioxide   Date Value Ref Range Status   05/10/2021 27 20 - 32 mmol/L Final     Carbon  Dioxide (CO2)   Date Value Ref Range Status   11/22/2024 27 22 - 29 mmol/L Final   05/24/2022 29 20 - 32 mmol/L Final     Anion Gap   Date Value Ref Range Status   11/22/2024 11 7 - 15 mmol/L Final   05/24/2022 2 (L) 3 - 14 mmol/L Final   05/10/2021 6 3 - 14 mmol/L Final     Glucose   Date Value Ref Range Status   11/22/2024 109 (H) 70 - 99 mg/dL Final   05/24/2022 108 (H) 70 - 99 mg/dL Final   05/10/2021 112 (H) 70 - 99 mg/dL Final     Comment:     Fasting specimen     GLUCOSE BY METER POCT   Date Value Ref Range Status   04/23/2024 130 (H) 70 - 99 mg/dL Final     Urea Nitrogen   Date Value Ref Range Status   11/22/2024 26.1 (H) 8.0 - 23.0 mg/dL Final   05/24/2022 14 7 - 30 mg/dL Final   05/10/2021 19 7 - 30 mg/dL Final     Creatinine   Date Value Ref Range Status   11/22/2024 1.05 (H) 0.51 - 0.95 mg/dL Final   05/10/2021 0.89 0.52 - 1.04 mg/dL Final     GFR Estimate   Date Value Ref Range Status   11/22/2024 58 (L) >60 mL/min/1.73m2 Final     Comment:     eGFR calculated using 2021 CKD-EPI equation.   05/10/2021 69 >60 mL/min/[1.73_m2] Final     Comment:     Non  GFR Calc  Starting 12/18/2018, serum creatinine based estimated GFR (eGFR) will be   calculated using the Chronic Kidney Disease Epidemiology Collaboration   (CKD-EPI) equation.       GFR, ESTIMATED POCT   Date Value Ref Range Status   10/10/2023 >60 >60 mL/min/1.73m2 Final     Calcium   Date Value Ref Range Status   11/22/2024 9.8 8.8 - 10.4 mg/dL Final     Comment:     Reference intervals for this test were updated on 7/16/2024 to reflect our healthy population more accurately. There may be differences in the flagging of prior results with similar values performed with this method. Those prior results can be interpreted in the context of the updated reference intervals.   05/10/2021 8.6 8.5 - 10.1 mg/dL Final     Bilirubin Total   Date Value Ref Range Status   11/22/2024 0.3 <=1.2 mg/dL Final   10/12/2020 0.3 0.2 - 1.3 mg/dL Final      Alkaline Phosphatase   Date Value Ref Range Status   11/22/2024 89 40 - 150 U/L Final   10/12/2020 124 40 - 150 U/L Final     ALT   Date Value Ref Range Status   11/22/2024 30 0 - 50 U/L Final   10/12/2020 40 0 - 50 U/L Final     AST   Date Value Ref Range Status   11/22/2024 25 0 - 45 U/L Final   10/12/2020 35 0 - 45 U/L Final       PET scan 1/29/24  IMPRESSION: Although much of the uptake along the superior and middle aspects of the right calf mass has decreased or resolved, there are two foci of uptake along the more inferior portions of the mass which have increased compared to December 2023,   concerning for disease progression.    MRI Tibia/Fibula 1/29/24  IMPRESSION:  1.  Interval decrease in size in the heterogeneously enhancing mass within the posterior tibial muscle belly. This has decreased from 3.3 x 5.2 x 9.3 cm and now measures 2.5 x 3.5 x 5.0 cm. It remains consistent with sarcoma.  2.  There is some surrounding T2 signal abnormality within the adjacent musculature including along the apposing side of the interosseous membrane within the anterior compartment. This could be secondary to post radiation change or a component of   reactive edema. It is difficult to completely exclude microinvasion of tumor.  3.  Edema or cellulitis about the lower leg has minimally increased since the previous examination.  4.  Stable bony irregularity and remodeling of the fibula which is at the margin of the neoplasm.  5.  Focus of T2 signal abnormality within the diaphyseal shaft of the tibia is poorly marginated. This may represent a component of mild stress reaction. Again, it is difficult to completely exclude a new bone lesion. This is considered unlikely, but   follow-up examination is recommended.  6.  No evidence for pathologic fracture.  7.  Exam otherwise negative.    8/22/24  IMPRESSION:     Development of FDG avid soft tissue thickening throughout the right lower extremity with areas of in-situ  ulceration. While these findings may simply represent a worsening inflammatory/infectious process, the exact etiology remains indeterminate.    7/25/24 MRI L tibia/fibula  Impression:  1. In this patient with interim high grade sarcoma resection with  negative surgical margin, presumed postoperative collection, measuring  approximately 11 cm in maximal dimension. Superimposed infection of  the collection cannot be entirely excluded. No evidence of mass-like  area to suggest recurrent tumor.   2. Anterolateral subcutaneous hyperemic change and apparent area of  soft tissue/skin defect at mid/distal leg with possible 9 x 7 x 17 mm  early abscess (no definite wall on current imaging yet). Please  correlate clinically.   *  Extensive regional muscle edema maybe due to therapy, altered  biomechanics and/or reactive to infection.  3. Previously noted focal marrow signal alteration in the mid/distal  tibial shaft has slightly decreased since comparison, measuring  approximately 2 cm craniocaudal dimension, previously 2.4 cm using  similar measurement technique, may be resolving stress reaction versus  red marrow island.    MRI Tib/carito 11/22/24  Impression:     Stable postsurgical changes of the right leg without evidence of  residual or recurrent malignancy. Similar to mildly enlarged  postsurgical fluid collection.    CT CAP 11/22/24  IMPRESSION:  1.  No evidence for metastatic disease through the chest, abdomen, and  pelvis.  2.  Stable large fat-containing anterior abdominal wall hernia.  3.  Diffuse hepatic steatosis.  4.  Borderline splenomegaly.     PATHOLOGY   10/2/23  Final Diagnosis   A.  Soft tissue, right calf mass, excision:  - High-grade pleomorphic sarcoma  - See comment    Electronically signed by Jonny Zhou MD on 10/4/2023 at  9:09 PM   Comment  [facility]   Immunohistochemical stains show the tumor is negative for CK AE1/AE3, S100, desmin and CD34 while SATB2 demonstrates patchy reactivity.  While the  "primary diagnostic consideration is an undifferentiated pleomorphic sarcoma, the patchy SATB2 reactivity raises the possibility of a poorly differentiated osteosarcoma.   Clinical Information  [facility]   The patient is a 68 year old female with a history of CLL and several months of sensitivity of the lateral right calf, and recent imaging showing: \"Lytic lesion in the right fibular proximal diaphysis, with complete resorption of the medial cortex and some periosteal reaction adjacent to the lesion\".      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 No evidence of metastatic disease.     In her case, the probability of recurrence with surgery alone is about 40% in the next 10 years. We discussed, that recurrence in this scenario is usually with distant  metastasis which then becomes uncurable disease. We discussed, that for this reason, I recommend treatment with neoadjuvant chemotherapy with the goal of preventing metastasis in the future. We discussed the potential side effects of chemotherapy and need for emergent treatment in order to improve her symptoms. The regimen will be doxil 40mg/m2 and ifosfamide 8g/m2 over 5 days. Slightly lower dose due to age and other medical conditions.   She is s/p C4 and tolerated chemotherapy well.     I personally reviewed the most recent PET and MRI leg, she had significant decrease in size and metabolic activity compared to pre-treatment, however this is a partial response with 2 foci that appear more active.     2/19/2024 " to 3/22/2024: Right lower extremity, 5000 cGy in 25 fraction     Surgical resection by Dr. Barroso 4/22/24: Final pathology demonstrated undifferentiated sarcoma, 5.8 cm, there was noted treatment effect with 6% viable tumor, negative margin, ypT2 NX.     I explained that we saw a good therapy effect from initial chemotherapy upon imaging and pathology encouraging with only 6% of viable tumor. Because of this and her initial high risk, I recommend to continuous single agent doxil, with the goal of 1 year of adjuvant therapy.    She is s/p for C2 doxil on 7/10, no significant side effects but she has developed a non healing ulcer in the surgical wound. Adjuvant doxil is likely contributing to non healing and was discontinued.     I personally reviewed the most recent scans with no evidence of disease recurrence. She still has an open wound and is working with wound care for conservative management, only mild improvement noticed so far. She will meet with Dr. Barroso next week for advice on pursuing surgical options offered by plastic surgery.     Plan  -CT chest and MRI tibia on 2/24/25   -Follow up Dr. Jimenez on 2/26/24    -We discussed extensively about proper diet and physical therapy to optimize healing and functionality.     40 minutes spent on the date of the encounter doing chart review, review of outside records, review of test results, interpretation of tests, patient visit, documentation, and discussion with other provider(s)     Roney Nam MD   of Medicine  Hematology, Oncology and Transplantation

## 2024-12-05 ENCOUNTER — OFFICE VISIT (OUTPATIENT)
Dept: ORTHOPEDICS | Facility: CLINIC | Age: 69
End: 2024-12-05
Payer: COMMERCIAL

## 2024-12-05 DIAGNOSIS — M79.89 SOFT TISSUE MASS: ICD-10-CM

## 2024-12-05 DIAGNOSIS — C49.21 SARCOMA OF RIGHT LOWER EXTREMITY (H): Primary | ICD-10-CM

## 2024-12-05 NOTE — LETTER
12/5/2024      Mirta Cardenas  [id number] [facility] [address]      Dear Colleague,    Thank you for referring your patient, Mirta Cardenas, to the [facility] [place]. Please see a copy of my visit note below.    Chief Complaint:  Preop diagnosis right distal calf wound dehiscence  9/9/24 Procedure performed: 1.  Debridement of skin and subcutaneous tissue right distal calf wound, 3 x 2 x 1 cm.  2.  Placement of vacuum dressing right calf measuring 3 x 2 x 1 cm  Surgeons: Benny Barroso     Preop diagnosis: Sarcoma right calf, deep compartment  4/22/24 Procedure performed: Removal of sarcoma right calf, deep compartment including anterior tibial muscle and 8 cm of the fibula     HPI: Mirta is a 69 year old female here for follow-up and wound check of her right lower leg. Patient has been getting wound care twice a week to the right leg. They would like to have a nurse practitioner wound specialist also look at her leg. They are using Vashe, Dakins soaked packing gauze, zinc oxide on the skin, cavillon skin prep and Mepilex style dressing. Patient feels like there is some new granulation tissue forming, and it has gotten smaller just in a couple weeks. She has no increased pain or redness.  She does note a new lump by the outside of her right knee, she just recently found this.  It is not painful. She recently saw Dr. Jimenez and had MRI of the right leg and CT CAP.  No other concerns.    Physical Exam: Mirta is a pleasant 69-year-old female who is alert and oriented no apparent distress. She has a minimally antalgic gait without gait assistance today. Mild foot drop. Dressings were removed. She has the distal larger wound which is approximately 3 cm x 1.5 cm x 3 mm. There is good granulation tissue present. Mild periwound edema. No erythema. There is a smaller more superficial wound distally that is approximately 4 mm in length that is healing with no exudate. There is an  approximately 1 mm superficial ulceration just above the larger wound which is now completely healed. More proximately up the leg, there continues to be a small pinhole wound that has occasional serous drainage. There is no erythema or swelling and no tenderness.     There is a palpable soft tissue mass of the lateral knee by the lateral suprapatellar pouch area that is mobile, non tender.  Minimal knee effusion.  Full knee ROM.    Imaging: MRI of the right lower leg w/wo from 11/22/24 shows:  Stable postsurgical changes of the right leg without evidence of  residual or recurrent malignancy. Similar to mildly enlarged  postsurgical fluid collection.  No evidence of recurrence.  We did review the lateral suprapatellar area on MRI and there is limited views of this, but it does not appear to have an enhancing mass, but there might be some abnormal soft tissue in this area.    CT Chest/Abdomen/Pelvis from 11/22/24 shows:  1.  No evidence for metastatic disease through the chest, abdomen, and  pelvis.  2.  Stable large fat-containing anterior abdominal wall hernia.  3.  Diffuse hepatic steatosis.  4.  Borderline splenomegaly.    Impression: 69 year old female with h/o right calf sarcoma s/p radiation, wide excision and chronic wound with no evidence of recurrence on imaging, but new right lateral knee mass    Plan: Patient will continue her wound plan as outlined, this seems to be working very well.  Her wound has gotten smaller and has good granulation tissue present.  We think the mass in her knee is likely nothing, as it doesn't have the same pattern of enhancement as her original tumor, but we need to make sure, so we are ordering an MRI w/wo contrast of the right knee to be done in the next couple weeks.  We will call her with results.  If no concerning findings, we can see her back in February after her next staging scans.  All questions answered.  Patient was also examined by Dr. Barroso, and he agrees with the  plan of care.      Again, thank you for allowing me to participate in the care of your patient.        Sincerely,        Benny Barroso MD

## 2024-12-05 NOTE — PROGRESS NOTES
Chief Complaint:  Preop diagnosis right distal calf wound dehiscence  9/9/24 Procedure performed: 1.  Debridement of skin and subcutaneous tissue right distal calf wound, 3 x 2 x 1 cm.  2.  Placement of vacuum dressing right calf measuring 3 x 2 x 1 cm  Surgeons: Benny Barroso     Preop diagnosis: Sarcoma right calf, deep compartment  4/22/24 Procedure performed: Removal of sarcoma right calf, deep compartment including anterior tibial muscle and 8 cm of the fibula     HPI: Mirta is a 69 year old female here for follow-up and wound check of her right lower leg. Patient has been getting wound care twice a week to the right leg. They would like to have a nurse practitioner wound specialist also look at her leg. They are using Vashe, Dakins soaked packing gauze, zinc oxide on the skin, cavillon skin prep and Mepilex style dressing. Patient feels like there is some new granulation tissue forming, and it has gotten smaller just in a couple weeks. She has no increased pain or redness.  She does note a new lump by the outside of her right knee, she just recently found this.  It is not painful. She recently saw Dr. Jimenez and had MRI of the right leg and CT CAP.  No other concerns.    Physical Exam: Mirta is a pleasant 69-year-old female who is alert and oriented no apparent distress. She has a minimally antalgic gait without gait assistance today. Mild foot drop. Dressings were removed. She has the distal larger wound which is approximately 3 cm x 1.5 cm x 3 mm. There is good granulation tissue present. Mild periwound edema. No erythema. There is a smaller more superficial wound distally that is approximately 4 mm in length that is healing with no exudate. There is an approximately 1 mm superficial ulceration just above the larger wound which is now completely healed. More proximately up the leg, there continues to be a small pinhole wound that has occasional serous drainage. There is no erythema or swelling and no  tenderness.     There is a palpable soft tissue mass of the lateral knee by the lateral suprapatellar pouch area that is mobile, non tender.  Minimal knee effusion.  Full knee ROM.    Imaging: MRI of the right lower leg w/wo from 11/22/24 shows:  Stable postsurgical changes of the right leg without evidence of  residual or recurrent malignancy. Similar to mildly enlarged  postsurgical fluid collection.  No evidence of recurrence.  We did review the lateral suprapatellar area on MRI and there is limited views of this, but it does not appear to have an enhancing mass, but there might be some abnormal soft tissue in this area.    CT Chest/Abdomen/Pelvis from 11/22/24 shows:  1.  No evidence for metastatic disease through the chest, abdomen, and  pelvis.  2.  Stable large fat-containing anterior abdominal wall hernia.  3.  Diffuse hepatic steatosis.  4.  Borderline splenomegaly.    Impression: 69 year old female with h/o right calf sarcoma s/p radiation, wide excision and chronic wound with no evidence of recurrence on imaging, but new right lateral knee mass    Plan: Patient will continue her wound plan as outlined, this seems to be working very well.  Her wound has gotten smaller and has good granulation tissue present.  We think the mass in her knee is likely nothing, as it doesn't have the same pattern of enhancement as her original tumor, but we need to make sure, so we are ordering an MRI w/wo contrast of the right knee to be done in the next couple weeks.  We will call her with results.  If no concerning findings, we can see her back in February after her next staging scans.  All questions answered.  Patient was also examined by Dr. Barroso, and he agrees with the plan of care.

## 2024-12-05 NOTE — NURSING NOTE
Reason For Visit:   Chief Complaint   Patient presents with    Wound Check     S/p IRRIGATION AND DEBRIDEMENT, LOWER EXTREMITY and application of wound VAC - Right- DOS: 9/9/2024.           Pain Assessment  Patient Currently in Pain: Yes  Primary Pain Location:  (Right lower extremity.)        Allergies   Allergen Reactions    Allopurinol Itching    Amoxicillin Hives    Codeine Itching    Cymbalta [Duloxetine Hcl] Fatigue    Periactin Other (See Comments)     Sleepy.    11/15/23: patient not sure about this allergy/intolerance - may be interested in removal    Tenormin [Atenolol] Fatigue     11/15/23: patient may be interested in removal of this from allergy list as it was only fatigue/sleepiness           Michaela Gonzalez LPN

## 2024-12-13 ENCOUNTER — HOSPITAL ENCOUNTER (OUTPATIENT)
Dept: MAMMOGRAPHY | Facility: CLINIC | Age: 69
Discharge: HOME | End: 2024-12-13
Attending: STUDENT IN AN ORGANIZED HEALTH CARE EDUCATION/TRAINING PROGRAM | Admitting: STUDENT IN AN ORGANIZED HEALTH CARE EDUCATION/TRAINING PROGRAM
Payer: COMMERCIAL

## 2024-12-13 DIAGNOSIS — Z12.31 VISIT FOR SCREENING MAMMOGRAM: ICD-10-CM

## 2024-12-13 PROCEDURE — 77063 BREAST TOMOSYNTHESIS BI: CPT

## 2024-12-13 PROCEDURE — 77067 SCR MAMMO BI INCL CAD: CPT

## 2024-12-18 ENCOUNTER — MYC REFILL (OUTPATIENT)
Dept: ORTHOPEDICS | Facility: CLINIC | Age: 69
End: 2024-12-18
Payer: COMMERCIAL

## 2024-12-18 DIAGNOSIS — C49.9 SARCOMA OF SOFT TISSUE (H): ICD-10-CM

## 2024-12-18 RX ORDER — PREGABALIN 50 MG/1
50 CAPSULE ORAL 3 TIMES DAILY
Qty: 90 CAPSULE | Refills: 0 | Status: SHIPPED | OUTPATIENT
Start: 2024-12-18

## 2024-12-19 ENCOUNTER — HOSPITAL ENCOUNTER (OUTPATIENT)
Dept: MRI IMAGING | Facility: CLINIC | Age: 69
Discharge: HOME | End: 2024-12-19
Attending: PHYSICIAN ASSISTANT
Payer: COMMERCIAL

## 2024-12-19 DIAGNOSIS — C49.21 SARCOMA OF RIGHT LOWER EXTREMITY (H): ICD-10-CM

## 2024-12-19 DIAGNOSIS — M79.89 SOFT TISSUE MASS: ICD-10-CM

## 2024-12-19 PROCEDURE — 255N000002 HC RX 255 OP 636: Performed by: PHYSICIAN ASSISTANT

## 2024-12-19 PROCEDURE — 73723 MRI JOINT LWR EXTR W/O&W/DYE: CPT | Mod: RT

## 2024-12-19 PROCEDURE — A9585 GADOBUTROL INJECTION: HCPCS | Performed by: PHYSICIAN ASSISTANT

## 2024-12-19 RX ORDER — GADOBUTROL 604.72 MG/ML
8.5 INJECTION INTRAVENOUS ONCE
Status: COMPLETED | OUTPATIENT
Start: 2024-12-19 | End: 2024-12-19

## 2024-12-19 RX ADMIN — GADOBUTROL 8.5 ML: 604.72 INJECTION INTRAVENOUS at 16:00

## 2025-01-06 ENCOUNTER — TELEPHONE (OUTPATIENT)
Dept: FAMILY MEDICINE | Facility: CLINIC | Age: 70
End: 2025-01-06
Payer: COMMERCIAL

## 2025-01-06 NOTE — TELEPHONE ENCOUNTER
[facility] calling for home care order for continuation of care for wound care, re-certification.  Verbal order given.   1X week for 1 week  2X week for 7 weeks  3 PRN visits for change in condition.     Shayna Sanedrs RN on 1/6/2025 at 1:49 PM

## 2025-01-27 DIAGNOSIS — I10 HTN (HYPERTENSION): ICD-10-CM

## 2025-01-27 DIAGNOSIS — C49.9 SARCOMA OF SOFT TISSUE (H): ICD-10-CM

## 2025-01-27 DIAGNOSIS — E78.5 HYPERLIPIDEMIA LDL GOAL <100: ICD-10-CM

## 2025-01-27 DIAGNOSIS — E78.5 HYPERLIPIDEMIA LDL GOAL <100: Primary | ICD-10-CM

## 2025-01-27 RX ORDER — AMLODIPINE BESYLATE 10 MG/1
10 TABLET ORAL DAILY
Qty: 90 TABLET | Refills: 2 | Status: SHIPPED | OUTPATIENT
Start: 2025-01-27

## 2025-01-27 RX ORDER — PRAVASTATIN SODIUM 40 MG
40 TABLET ORAL DAILY
Qty: 90 TABLET | Refills: 1 | Status: SHIPPED | OUTPATIENT
Start: 2025-01-27

## 2025-01-27 NOTE — TELEPHONE ENCOUNTER
[facility] Cardiology Refill Guideline reviewed.  Medication meets criteria for refill. Refills sent. No follow up order in EPIC. Order placed. Mita Cueva RN Cardiology January 27, 2025, 2:48 PM

## 2025-01-27 NOTE — TELEPHONE ENCOUNTER
Last Office Visit: 07/10/24 Dr. Lancaster  Next Office Visit: TBD  Last Fill Date: 11/06/24  Anita Lynn MA Cardiology   1/27/2025 2:30 PM

## 2025-01-28 ENCOUNTER — MYC REFILL (OUTPATIENT)
Dept: ORTHOPEDICS | Facility: CLINIC | Age: 70
End: 2025-01-28
Payer: COMMERCIAL

## 2025-01-28 DIAGNOSIS — C49.9 SARCOMA OF SOFT TISSUE (H): ICD-10-CM

## 2025-01-28 RX ORDER — PREGABALIN 50 MG/1
50 CAPSULE ORAL 3 TIMES DAILY
Qty: 90 CAPSULE | Refills: 0 | OUTPATIENT
Start: 2025-01-28

## 2025-01-28 RX ORDER — PREGABALIN 50 MG/1
50 CAPSULE ORAL 3 TIMES DAILY
Qty: 90 CAPSULE | Refills: 0 | Status: SHIPPED | OUTPATIENT
Start: 2025-01-28

## 2025-01-28 NOTE — TELEPHONE ENCOUNTER
pregabalin (LYRICA) 50 MG capsule     The original prescription was reordered on 1/28/2025 by Jodi Cardenas PA-C

## 2025-02-24 ENCOUNTER — HOSPITAL ENCOUNTER (OUTPATIENT)
Dept: CT IMAGING | Facility: CLINIC | Age: 70
Discharge: HOME | End: 2025-02-24
Attending: STUDENT IN AN ORGANIZED HEALTH CARE EDUCATION/TRAINING PROGRAM
Payer: COMMERCIAL

## 2025-02-24 ENCOUNTER — HOSPITAL ENCOUNTER (OUTPATIENT)
Dept: MRI IMAGING | Facility: CLINIC | Age: 70
Discharge: HOME | End: 2025-02-24
Attending: STUDENT IN AN ORGANIZED HEALTH CARE EDUCATION/TRAINING PROGRAM
Payer: COMMERCIAL

## 2025-02-24 DIAGNOSIS — C49.9 SARCOMA OF SOFT TISSUE (H): ICD-10-CM

## 2025-02-24 LAB
CREAT BLD-MCNC: 1.2 MG/DL (ref 0.5–1)
EGFRCR SERPLBLD CKD-EPI 2021: 49 ML/MIN/1.73M2

## 2025-02-24 PROCEDURE — 73720 MRI LWR EXTREMITY W/O&W/DYE: CPT | Mod: 26 | Performed by: RADIOLOGY

## 2025-02-24 PROCEDURE — A9585 GADOBUTROL INJECTION: HCPCS | Performed by: STUDENT IN AN ORGANIZED HEALTH CARE EDUCATION/TRAINING PROGRAM

## 2025-02-24 PROCEDURE — 255N000002 HC RX 255 OP 636: Performed by: STUDENT IN AN ORGANIZED HEALTH CARE EDUCATION/TRAINING PROGRAM

## 2025-02-24 PROCEDURE — 73720 MRI LWR EXTREMITY W/O&W/DYE: CPT | Mod: RT

## 2025-02-24 PROCEDURE — 82565 ASSAY OF CREATININE: CPT

## 2025-02-24 PROCEDURE — 250N000011 HC RX IP 250 OP 636: Performed by: STUDENT IN AN ORGANIZED HEALTH CARE EDUCATION/TRAINING PROGRAM

## 2025-02-24 PROCEDURE — 71260 CT THORAX DX C+: CPT

## 2025-02-24 PROCEDURE — 250N000009 HC RX 250: Performed by: STUDENT IN AN ORGANIZED HEALTH CARE EDUCATION/TRAINING PROGRAM

## 2025-02-24 RX ORDER — GADOBUTROL 604.72 MG/ML
8.5 INJECTION INTRAVENOUS ONCE
Status: COMPLETED | OUTPATIENT
Start: 2025-02-24 | End: 2025-02-24

## 2025-02-24 RX ORDER — IOPAMIDOL 755 MG/ML
94 INJECTION, SOLUTION INTRAVASCULAR ONCE
Status: COMPLETED | OUTPATIENT
Start: 2025-02-24 | End: 2025-02-24

## 2025-02-24 RX ADMIN — IOPAMIDOL 94 ML: 755 INJECTION, SOLUTION INTRAVENOUS at 08:43

## 2025-02-24 RX ADMIN — SODIUM CHLORIDE 64 ML: 9 INJECTION, SOLUTION INTRAVENOUS at 08:43

## 2025-02-24 RX ADMIN — GADOBUTROL 8.5 ML: 604.72 INJECTION INTRAVENOUS at 09:01

## 2025-02-26 ENCOUNTER — VIRTUAL VISIT (OUTPATIENT)
Dept: ONCOLOGY | Facility: CLINIC | Age: 70
End: 2025-02-26
Attending: STUDENT IN AN ORGANIZED HEALTH CARE EDUCATION/TRAINING PROGRAM
Payer: COMMERCIAL

## 2025-02-26 VITALS — HEIGHT: 61 IN | WEIGHT: 186 LBS | BODY MASS INDEX: 35.12 KG/M2

## 2025-02-26 DIAGNOSIS — C49.9 SARCOMA OF SOFT TISSUE (H): ICD-10-CM

## 2025-02-26 RX ORDER — PREGABALIN 50 MG/1
50 CAPSULE ORAL 3 TIMES DAILY
Qty: 90 CAPSULE | Refills: 2 | Status: SHIPPED | OUTPATIENT
Start: 2025-02-26

## 2025-02-26 ASSESSMENT — PAIN SCALES - GENERAL: PAINLEVEL_OUTOF10: MILD PAIN (1)

## 2025-02-26 NOTE — PROGRESS NOTES
Virtual Visit Details    Type of service:  Video Visit   Video Start Time:  12:30 PM  Video End Time:12:48 PM    Originating Location (pt. Location): Home    Distant Location (provider location):  On-site  Platform used for Video Visit: [facility]    FOLLOW UP VISIT NOTE    PATIENT NAME: Mirta Cardenas MRN # [id number]  DATE OF VISIT: February 25, 2025 YOB: 1955    REFERRING PROVIDER: Benny Barroso MD  [facility] [address] [address]    CANCER TYPE:  High grade pleomorphic sarcoma       CHIEF COMPLAIN   LE pain     HISTORY OF PRESENT ILLNESS   68-year-old female with PMH of CLL and recent Dx of High Grade Pleomorphic sarcoma, with intermittent right calf pain but then continuous calf pain beginning in March 2023.  She had a recent x-ray which shows a destructive lesion in the midportion R LE in between the fibula and the tibia. She is experiencing significant pain, she is restless during the visit due to pain. She is currently taking both Advil and Tylenol and oxycodone for pain, with minimal control, feels ibuprofen is what helps the most. She has been alternating all pain medications without optimizing doses of them.     C1D1 10/17/23  C2 11/14  C2 12/13  C4 1/10    2/19/2024 to 3/22/2024: Right lower extremity, 5000 cGy in 25 fraction     Surgical resection by Dr. Barroso 4/22/24: Final pathology demonstrated undifferentiated sarcoma, 5.8 cm, there was noted treatment effect with 6% viable tumor, negative margin, ypT2 NX.     C1 of adjuvant doxil on 6/11  C2 on 7/10    September 2024 - surgical debridement    Interval Hx  She reports that overall she feels well, the wound has been healing better in the past 3 weeks, wound care continues to follow. She is taking lyrica for the leg pain, and trying to cut beltran, however she does feel more pain when she skips 1 dose. She has been doing more activity, climbing stairs and walking, but feels  she is preferring the L side and having other pain on that side of the body.      PAST ONCOLOGIC HISTORY   CLL - on monitoring     PAST MEDICAL HISTORY   Anal fisure - controlled with nitrobid  CLL - now being monitored - Ivan  HTN  Cholesterol  Hx of severe COVID  Psoriasis    PAST SURGICAL HISTORY   3 c- sections  Hysterectomy  Ooforectomy     FAMILY HISTORY   Sister with MM     ALLERGIES      Allergies   Allergen Reactions    Allopurinol Itching    Amoxicillin Hives    Codeine Itching    Cymbalta [Duloxetine Hcl] Fatigue    Periactin Other (See Comments)     Sleepy.    11/15/23: patient not sure about this allergy/intolerance - may be interested in removal    Tenormin [Atenolol] Fatigue     11/15/23: patient may be interested in removal of this from allergy list as it was only fatigue/sleepiness          SOCIAL HISTORY     Social History     Social History Narrative    Not on file     Lives with , Giancarlo, she retired last spring, denies alcohol, tobacco or other drugs.      CURRENT OUTPATIENT MEDICATIONS     Current Outpatient Medications   Medication Sig Dispense Refill    acetaminophen (TYLENOL) 325 MG tablet Take 2 tablets (650 mg) by mouth every 4 hours as needed for mild pain. 50 tablet 0    amLODIPine (NORVASC) 10 MG tablet Take 1 tablet (10 mg) by mouth daily. 90 tablet 2    clobetasol propionate (TEMOVATE) 0.05 % external cream Apply sparingly twice daily to psoriasis as needed. 60 g 4    hydrochlorothiazide (HYDRODIURIL) 50 MG tablet Take 1 tablet (50 mg) by mouth daily 90 tablet 3    losartan (COZAAR) 100 MG tablet Take 1 tablet (100 mg) by mouth daily 90 tablet 3    Multiple Vitamins-Calcium (ONE-A-DAY WOMENS FORMULA PO) Take 1 tablet by mouth every morning.      omeprazole (PRILOSEC) 20 MG DR capsule Take 1 capsule by mouth every morning.      oxyCODONE (ROXICODONE) 5 MG tablet Take 1 tablet (5 mg) by mouth every 6 hours as needed for moderate to severe pain. (Patient not taking: Reported on  2024) 12 tablet 0    pravastatin (PRAVACHOL) 40 MG tablet TAKE ONE TABLET BY MOUTH ONCE DAILY 90 tablet 1    pregabalin (LYRICA) 50 MG capsule Take 1 capsule (50 mg) by mouth 3 times daily. 90 capsule 0    senna-docusate (SENOKOT-S/PERICOLACE) 8.6-50 MG tablet Take 1-2 tablets by mouth 2 times daily. 30 tablet 0    triamcinolone (KENALOG) 0.1 % external cream Apply sparingly twice daily to affected area on fold of skin for next 1-2 weeks. 45 g 3          REVIEW OF SYSTEMS   As above in the HPI, o/w complete 12-point ROS was negative.     PHYSICAL EXAM   There were no vitals taken for this visit.    EGO (due to pain related to the mass)  GEN: NAD  HEENT: PERRL, EOMI, no icterus, injection or pallor. Oropharynx is clear.  NECK: no cervical or supraclavicular lymphadenopathy  LUNGS: clear bilaterally  CV: regular, no murmurs, rubs, or gallops  ABDOMEN: soft, non-tender, non-distended, normal bowel sounds, no hepatosplenomegaly by percussion or palpation  EXT: warm, well perfused, no edema. Surgical scar with open wound covered.   SKIN: no rashes     LABORATORY AND IMAGING STUDIES     Lab Results   Component Value Date    WBC 5.3 2024    WBC 15.6 05/10/2021     Lab Results   Component Value Date    RBC 3.92 2024    RBC 4.89 05/10/2021     Lab Results   Component Value Date    HGB 11.4 2024    HGB 13.7 05/10/2021     Lab Results   Component Value Date    HCT 33.6 2024    HCT 41.0 05/10/2021     Lab Results   Component Value Date    MCV 86 2024    MCV 84 05/10/2021     Lab Results   Component Value Date    MCH 29.1 2024    MCH 28.0 05/10/2021     Lab Results   Component Value Date    MCHC 33.9 2024    MCHC 33.4 05/10/2021     Lab Results   Component Value Date    RDW 13.2 2024    RDW 13.1 05/10/2021     Lab Results   Component Value Date     2024     05/10/2021         Last Comprehensive Metabolic Panel:  Sodium   Date Value Ref Range Status    11/22/2024 142 135 - 145 mmol/L Final   05/10/2021 141 133 - 144 mmol/L Final     Potassium   Date Value Ref Range Status   11/22/2024 4.0 3.4 - 5.3 mmol/L Final   05/24/2022 4.7 3.4 - 5.3 mmol/L Final   05/10/2021 3.9 3.4 - 5.3 mmol/L Final     Chloride   Date Value Ref Range Status   11/22/2024 104 98 - 107 mmol/L Final   05/24/2022 108 94 - 109 mmol/L Final   05/10/2021 108 94 - 109 mmol/L Final     Carbon Dioxide   Date Value Ref Range Status   05/10/2021 27 20 - 32 mmol/L Final     Carbon Dioxide (CO2)   Date Value Ref Range Status   11/22/2024 27 22 - 29 mmol/L Final   05/24/2022 29 20 - 32 mmol/L Final     Anion Gap   Date Value Ref Range Status   11/22/2024 11 7 - 15 mmol/L Final   05/24/2022 2 (L) 3 - 14 mmol/L Final   05/10/2021 6 3 - 14 mmol/L Final     Glucose   Date Value Ref Range Status   11/22/2024 109 (H) 70 - 99 mg/dL Final   05/24/2022 108 (H) 70 - 99 mg/dL Final   05/10/2021 112 (H) 70 - 99 mg/dL Final     Comment:     Fasting specimen     GLUCOSE BY METER POCT   Date Value Ref Range Status   04/23/2024 130 (H) 70 - 99 mg/dL Final     Urea Nitrogen   Date Value Ref Range Status   11/22/2024 26.1 (H) 8.0 - 23.0 mg/dL Final   05/24/2022 14 7 - 30 mg/dL Final   05/10/2021 19 7 - 30 mg/dL Final     Creatinine   Date Value Ref Range Status   11/22/2024 1.05 (H) 0.51 - 0.95 mg/dL Final   05/10/2021 0.89 0.52 - 1.04 mg/dL Final     Creatinine POCT   Date Value Ref Range Status   02/24/2025 1.2 (H) 0.5 - 1.0 mg/dL Final     GFR Estimate   Date Value Ref Range Status   05/10/2021 69 >60 mL/min/[1.73_m2] Final     Comment:     Non  GFR Calc  Starting 12/18/2018, serum creatinine based estimated GFR (eGFR) will be   calculated using the Chronic Kidney Disease Epidemiology Collaboration   (CKD-EPI) equation.       GFR, ESTIMATED POCT   Date Value Ref Range Status   02/24/2025 49 (L) >60 mL/min/1.73m2 Final     Calcium   Date Value Ref Range Status   11/22/2024 9.8 8.8 - 10.4 mg/dL Final      Comment:     Reference intervals for this test were updated on 7/16/2024 to reflect our healthy population more accurately. There may be differences in the flagging of prior results with similar values performed with this method. Those prior results can be interpreted in the context of the updated reference intervals.   05/10/2021 8.6 8.5 - 10.1 mg/dL Final     Bilirubin Total   Date Value Ref Range Status   11/22/2024 0.3 <=1.2 mg/dL Final   10/12/2020 0.3 0.2 - 1.3 mg/dL Final     Alkaline Phosphatase   Date Value Ref Range Status   11/22/2024 89 40 - 150 U/L Final   10/12/2020 124 40 - 150 U/L Final     ALT   Date Value Ref Range Status   11/22/2024 30 0 - 50 U/L Final   10/12/2020 40 0 - 50 U/L Final     AST   Date Value Ref Range Status   11/22/2024 25 0 - 45 U/L Final   10/12/2020 35 0 - 45 U/L Final       PET scan 1/29/24  IMPRESSION: Although much of the uptake along the superior and middle aspects of the right calf mass has decreased or resolved, there are two foci of uptake along the more inferior portions of the mass which have increased compared to December 2023,   concerning for disease progression.    MRI Tibia/Fibula 1/29/24  IMPRESSION:  1.  Interval decrease in size in the heterogeneously enhancing mass within the posterior tibial muscle belly. This has decreased from 3.3 x 5.2 x 9.3 cm and now measures 2.5 x 3.5 x 5.0 cm. It remains consistent with sarcoma.  2.  There is some surrounding T2 signal abnormality within the adjacent musculature including along the apposing side of the interosseous membrane within the anterior compartment. This could be secondary to post radiation change or a component of   reactive edema. It is difficult to completely exclude microinvasion of tumor.  3.  Edema or cellulitis about the lower leg has minimally increased since the previous examination.  4.  Stable bony irregularity and remodeling of the fibula which is at the margin of the neoplasm.  5.  Focus of T2 signal  abnormality within the diaphyseal shaft of the tibia is poorly marginated. This may represent a component of mild stress reaction. Again, it is difficult to completely exclude a new bone lesion. This is considered unlikely, but   follow-up examination is recommended.  6.  No evidence for pathologic fracture.  7.  Exam otherwise negative.    8/22/24  IMPRESSION:     Development of FDG avid soft tissue thickening throughout the right lower extremity with areas of in-situ ulceration. While these findings may simply represent a worsening inflammatory/infectious process, the exact etiology remains indeterminate.    7/25/24 MRI L tibia/fibula  Impression:  1. In this patient with interim high grade sarcoma resection with  negative surgical margin, presumed postoperative collection, measuring  approximately 11 cm in maximal dimension. Superimposed infection of  the collection cannot be entirely excluded. No evidence of mass-like  area to suggest recurrent tumor.   2. Anterolateral subcutaneous hyperemic change and apparent area of  soft tissue/skin defect at mid/distal leg with possible 9 x 7 x 17 mm  early abscess (no definite wall on current imaging yet). Please  correlate clinically.   *  Extensive regional muscle edema maybe due to therapy, altered  biomechanics and/or reactive to infection.  3. Previously noted focal marrow signal alteration in the mid/distal  tibial shaft has slightly decreased since comparison, measuring  approximately 2 cm craniocaudal dimension, previously 2.4 cm using  similar measurement technique, may be resolving stress reaction versus  red marrow island.    MRI Tib/carito 11/22/24  Impression:     Stable postsurgical changes of the right leg without evidence of  residual or recurrent malignancy. Similar to mildly enlarged  postsurgical fluid collection.    CT CAP 11/22/24  IMPRESSION:  1.  No evidence for metastatic disease through the chest, abdomen, and  pelvis.  2.  Stable large  "fat-containing anterior abdominal wall hernia.  3.  Diffuse hepatic steatosis.  4.  Borderline splenomegaly.    MRI leg 2/25/25  Impression:  1. Postoperative changes of previous high grade sarcoma resection  without evidence of recurrent disease.  *  Underlying external marker, improving soft tissue  irregularity/defect anterolateral distal leg.   2. Persistent but slightly decreased postoperative collection.  3. New focal marrow signal alteration within tibia at the level of  surgical change. Differential consideration are areas of increasing  red marrow reconversion versus stress reaction. Please correlate  clinically.    CT chest 2/25/25  IMPRESSION:   1.  No metastatic disease in the chest.     PATHOLOGY   10/2/23  Final Diagnosis   A.  Soft tissue, right calf mass, excision:  - High-grade pleomorphic sarcoma  - See comment    Electronically signed by Jonny Zhou MD on 10/4/2023 at  9:09 PM   Comment  [facility]   Immunohistochemical stains show the tumor is negative for CK AE1/AE3, S100, desmin and CD34 while SATB2 demonstrates patchy reactivity.  While the primary diagnostic consideration is an undifferentiated pleomorphic sarcoma, the patchy SATB2 reactivity raises the possibility of a poorly differentiated osteosarcoma.   Clinical Information  [facility]   The patient is a 68 year old female with a history of CLL and several months of sensitivity of the lateral right calf, and recent imaging showing: \"Lytic lesion in the right fibular proximal diaphysis, with complete resorption of the medial cortex and some periosteal reaction adjacent to the lesion\".      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 No evidence of metastatic disease.     In her case, the probability of recurrence with surgery alone is about 40% in the next 10 years. We discussed, that recurrence in this scenario is usually with distant  metastasis which then becomes uncurable disease. We discussed, that for this reason, I recommend treatment with neoadjuvant chemotherapy with the goal of preventing metastasis in the future. We discussed the potential side effects of chemotherapy and need for emergent treatment in order to improve her symptoms. The regimen will be doxil 40mg/m2 and ifosfamide 8g/m2 over 5 days. Slightly lower dose due to age and other medical conditions.   She is s/p C4 and tolerated chemotherapy well.     I personally reviewed the most recent PET and MRI leg, she had significant decrease in size and metabolic activity compared to pre-treatment, however this is a partial response with 2 foci that appear more active.     2/19/2024 to 3/22/2024: Right lower extremity, 5000 cGy in 25 fraction     Surgical resection by Dr. Barroso 4/22/24: Final pathology demonstrated undifferentiated sarcoma, 5.8 cm, there was noted treatment effect with 6% viable tumor, negative margin, ypT2 NX.     I explained that we saw a good therapy effect from initial chemotherapy upon imaging and pathology encouraging with only 6% of viable tumor. Because of this and her initial high risk, I recommend to continuous single agent doxil, with the goal of 1 year of adjuvant therapy.    She is s/p for C2 doxil on 7/10, no significant side effects but she has developed a non healing ulcer in the surgical wound. Adjuvant doxil is likely contributing to non healing and was discontinued.     I personally reviewed the most recent scans with no evidence of disease recurrence. She still has an open wound and is working  with wound care for conservative management, lately it appears to be closing better.   I personally reviewed the most recent CT chest and MRI leg showing no evidence of disease recurrence.   She is still dealing with leg stiffness and neuropathic pain, lyrica helps with this symptoms.     Plan  -CT CAP, MRI leg and labs (CBC, CMP) 5/23   -Follow up Dr. Jimenez 5/27   -Lyrica refilled today    40 minutes spent on the date of the encounter doing chart review, review of outside records, review of test results, interpretation of tests, patient visit, documentation, and discussion with other provider(s)     Roney Nam MD   of Medicine  Hematology, Oncology and Transplantation

## 2025-02-26 NOTE — LETTER
2/26/2025      Mirta Cardenas  [address] [facility] [address]      Dear Colleague,    Thank you for referring your patient, Mirta Cardenas, to the [facility]. Please see a copy of my visit note below.    Virtual Visit Details    Type of service:  Video Visit   Video Start Time:  12:30 PM  Video End Time:12:48 PM    Originating Location (pt. Location): Home    Distant Location (provider location):  On-site  Platform used for Video Visit: [facility]    FOLLOW UP VISIT NOTE    PATIENT NAME: Mirta Cardenas MRN # [id number]  DATE OF VISIT: February 25, 2025 YOB: 1955    REFERRING PROVIDER: Benny Barroso MD  [address] [place] [id number]    CANCER TYPE:  High grade pleomorphic sarcoma       CHIEF COMPLAIN   LE pain     HISTORY OF PRESENT ILLNESS   68-year-old female with PMH of CLL and recent Dx of High Grade Pleomorphic sarcoma, with intermittent right calf pain but then continuous calf pain beginning in March 2023.  She had a recent x-ray which shows a destructive lesion in the midportion R LE in between the fibula and the tibia. She is experiencing significant pain, she is restless during the visit due to pain. She is currently taking both Advil and Tylenol and oxycodone for pain, with minimal control, feels ibuprofen is what helps the most. She has been alternating all pain medications without optimizing doses of them.     C1D1 10/17/23  C2 11/14  C2 12/13  C4 1/10    2/19/2024 to 3/22/2024: Right lower extremity, 5000 cGy in 25 fraction     Surgical resection by Dr. Barroso 4/22/24: Final pathology demonstrated undifferentiated sarcoma, 5.8 cm, there was noted treatment effect with 6% viable tumor, negative margin, ypT2 NX.     C1 of adjuvant doxil on 6/11  C2 on 7/10    September 2024 - surgical debridement    Interval Hx  She reports that overall she feels well, the wound has been healing  better in the past 3 weeks, wound care continues to follow. She is taking lyrica for the leg pain, and trying to cut beltran, however she does feel more pain when she skips 1 dose. She has been doing more activity, climbing stairs and walking, but feels she is preferring the L side and having other pain on that side of the body.      PAST ONCOLOGIC HISTORY   CLL - on monitoring     PAST MEDICAL HISTORY   Anal fisure - controlled with nitrobid  CLL - now being monitored - [place]  HTN  Cholesterol  Hx of severe COVID  Psoriasis    PAST SURGICAL HISTORY   3 c- sections  Hysterectomy  Ooforectomy     FAMILY HISTORY   Sister with MM     ALLERGIES      Allergies   Allergen Reactions     Allopurinol Itching     Amoxicillin Hives     Codeine Itching     Cymbalta [Duloxetine Hcl] Fatigue     Periactin Other (See Comments)     Sleepy.    11/15/23: patient not sure about this allergy/intolerance - may be interested in removal     Tenormin [Atenolol] Fatigue     11/15/23: patient may be interested in removal of this from allergy list as it was only fatigue/sleepiness          SOCIAL HISTORY     Social History     Social History Narrative     Not on file     Lives with , Giancarlo, she retired last spring, denies alcohol, tobacco or other drugs.      CURRENT OUTPATIENT MEDICATIONS     Current Outpatient Medications   Medication Sig Dispense Refill     acetaminophen (TYLENOL) 325 MG tablet Take 2 tablets (650 mg) by mouth every 4 hours as needed for mild pain. 50 tablet 0     amLODIPine (NORVASC) 10 MG tablet Take 1 tablet (10 mg) by mouth daily. 90 tablet 2     clobetasol propionate (TEMOVATE) 0.05 % external cream Apply sparingly twice daily to psoriasis as needed. 60 g 4     hydrochlorothiazide (HYDRODIURIL) 50 MG tablet Take 1 tablet (50 mg) by mouth daily 90 tablet 3     losartan (COZAAR) 100 MG tablet Take 1 tablet (100 mg) by mouth daily 90 tablet 3     Multiple Vitamins-Calcium (ONE-A-DAY WOMENS FORMULA PO) Take 1 tablet  by mouth every morning.       omeprazole (PRILOSEC) 20 MG DR capsule Take 1 capsule by mouth every morning.       oxyCODONE (ROXICODONE) 5 MG tablet Take 1 tablet (5 mg) by mouth every 6 hours as needed for moderate to severe pain. (Patient not taking: Reported on 2024) 12 tablet 0     pravastatin (PRAVACHOL) 40 MG tablet TAKE ONE TABLET BY MOUTH ONCE DAILY 90 tablet 1     pregabalin (LYRICA) 50 MG capsule Take 1 capsule (50 mg) by mouth 3 times daily. 90 capsule 0     senna-docusate (SENOKOT-S/PERICOLACE) 8.6-50 MG tablet Take 1-2 tablets by mouth 2 times daily. 30 tablet 0     triamcinolone (KENALOG) 0.1 % external cream Apply sparingly twice daily to affected area on fold of skin for next 1-2 weeks. 45 g 3          REVIEW OF SYSTEMS   As above in the HPI, o/w complete 12-point ROS was negative.     PHYSICAL EXAM   There were no vitals taken for this visit.    EGO (due to pain related to the mass)  GEN: NAD  HEENT: PERRL, EOMI, no icterus, injection or pallor. Oropharynx is clear.  NECK: no cervical or supraclavicular lymphadenopathy  LUNGS: clear bilaterally  CV: regular, no murmurs, rubs, or gallops  ABDOMEN: soft, non-tender, non-distended, normal bowel sounds, no hepatosplenomegaly by percussion or palpation  EXT: warm, well perfused, no edema. Surgical scar with open wound covered.   SKIN: no rashes     LABORATORY AND IMAGING STUDIES     Lab Results   Component Value Date    WBC 5.3 2024    WBC 15.6 05/10/2021     Lab Results   Component Value Date    RBC 3.92 2024    RBC 4.89 05/10/2021     Lab Results   Component Value Date    HGB 11.4 2024    HGB 13.7 05/10/2021     Lab Results   Component Value Date    HCT 33.6 2024    HCT 41.0 05/10/2021     Lab Results   Component Value Date    MCV 86 2024    MCV 84 05/10/2021     Lab Results   Component Value Date    MCH 29.1 2024    MCH 28.0 05/10/2021     Lab Results   Component Value Date    MCHC 33.9 2024    [facility]  33.4 05/10/2021     Lab Results   Component Value Date    RDW 13.2 11/22/2024    RDW 13.1 05/10/2021     Lab Results   Component Value Date     11/22/2024     05/10/2021         Last Comprehensive Metabolic Panel:  Sodium   Date Value Ref Range Status   11/22/2024 142 135 - 145 mmol/L Final   05/10/2021 141 133 - 144 mmol/L Final     Potassium   Date Value Ref Range Status   11/22/2024 4.0 3.4 - 5.3 mmol/L Final   05/24/2022 4.7 3.4 - 5.3 mmol/L Final   05/10/2021 3.9 3.4 - 5.3 mmol/L Final     Chloride   Date Value Ref Range Status   11/22/2024 104 98 - 107 mmol/L Final   05/24/2022 108 94 - 109 mmol/L Final   05/10/2021 108 94 - 109 mmol/L Final     Carbon Dioxide   Date Value Ref Range Status   05/10/2021 27 20 - 32 mmol/L Final     Carbon Dioxide (CO2)   Date Value Ref Range Status   11/22/2024 27 22 - 29 mmol/L Final   05/24/2022 29 20 - 32 mmol/L Final     Anion Gap   Date Value Ref Range Status   11/22/2024 11 7 - 15 mmol/L Final   05/24/2022 2 (L) 3 - 14 mmol/L Final   05/10/2021 6 3 - 14 mmol/L Final     Glucose   Date Value Ref Range Status   11/22/2024 109 (H) 70 - 99 mg/dL Final   05/24/2022 108 (H) 70 - 99 mg/dL Final   05/10/2021 112 (H) 70 - 99 mg/dL Final     Comment:     Fasting specimen     GLUCOSE BY METER POCT   Date Value Ref Range Status   04/23/2024 130 (H) 70 - 99 mg/dL Final     Urea Nitrogen   Date Value Ref Range Status   11/22/2024 26.1 (H) 8.0 - 23.0 mg/dL Final   05/24/2022 14 7 - 30 mg/dL Final   05/10/2021 19 7 - 30 mg/dL Final     Creatinine   Date Value Ref Range Status   11/22/2024 1.05 (H) 0.51 - 0.95 mg/dL Final   05/10/2021 0.89 0.52 - 1.04 mg/dL Final     Creatinine POCT   Date Value Ref Range Status   02/24/2025 1.2 (H) 0.5 - 1.0 mg/dL Final     GFR Estimate   Date Value Ref Range Status   05/10/2021 69 >60 mL/min/[1.73_m2] Final     Comment:     Non  GFR Calc  Starting 12/18/2018, serum creatinine based estimated GFR (eGFR) will be   calculated  using the Chronic Kidney Disease Epidemiology Collaboration   (CKD-EPI) equation.       GFR, ESTIMATED POCT   Date Value Ref Range Status   02/24/2025 49 (L) >60 mL/min/1.73m2 Final     Calcium   Date Value Ref Range Status   11/22/2024 9.8 8.8 - 10.4 mg/dL Final     Comment:     Reference intervals for this test were updated on 7/16/2024 to reflect our healthy population more accurately. There may be differences in the flagging of prior results with similar values performed with this method. Those prior results can be interpreted in the context of the updated reference intervals.   05/10/2021 8.6 8.5 - 10.1 mg/dL Final     Bilirubin Total   Date Value Ref Range Status   11/22/2024 0.3 <=1.2 mg/dL Final   10/12/2020 0.3 0.2 - 1.3 mg/dL Final     Alkaline Phosphatase   Date Value Ref Range Status   11/22/2024 89 40 - 150 U/L Final   10/12/2020 124 40 - 150 U/L Final     ALT   Date Value Ref Range Status   11/22/2024 30 0 - 50 U/L Final   10/12/2020 40 0 - 50 U/L Final     AST   Date Value Ref Range Status   11/22/2024 25 0 - 45 U/L Final   10/12/2020 35 0 - 45 U/L Final       PET scan 1/29/24  IMPRESSION: Although much of the uptake along the superior and middle aspects of the right calf mass has decreased or resolved, there are two foci of uptake along the more inferior portions of the mass which have increased compared to December 2023,   concerning for disease progression.    MRI Tibia/Fibula 1/29/24  IMPRESSION:  1.  Interval decrease in size in the heterogeneously enhancing mass within the posterior tibial muscle belly. This has decreased from 3.3 x 5.2 x 9.3 cm and now measures 2.5 x 3.5 x 5.0 cm. It remains consistent with sarcoma.  2.  There is some surrounding T2 signal abnormality within the adjacent musculature including along the apposing side of the interosseous membrane within the anterior compartment. This could be secondary to post radiation change or a component of   reactive edema. It is difficult  to completely exclude microinvasion of tumor.  3.  Edema or cellulitis about the lower leg has minimally increased since the previous examination.  4.  Stable bony irregularity and remodeling of the fibula which is at the margin of the neoplasm.  5.  Focus of T2 signal abnormality within the diaphyseal shaft of the tibia is poorly marginated. This may represent a component of mild stress reaction. Again, it is difficult to completely exclude a new bone lesion. This is considered unlikely, but   follow-up examination is recommended.  6.  No evidence for pathologic fracture.  7.  Exam otherwise negative.    8/22/24  IMPRESSION:     Development of FDG avid soft tissue thickening throughout the right lower extremity with areas of in-situ ulceration. While these findings may simply represent a worsening inflammatory/infectious process, the exact etiology remains indeterminate.    7/25/24 MRI L tibia/fibula  Impression:  1. In this patient with interim high grade sarcoma resection with  negative surgical margin, presumed postoperative collection, measuring  approximately 11 cm in maximal dimension. Superimposed infection of  the collection cannot be entirely excluded. No evidence of mass-like  area to suggest recurrent tumor.   2. Anterolateral subcutaneous hyperemic change and apparent area of  soft tissue/skin defect at mid/distal leg with possible 9 x 7 x 17 mm  early abscess (no definite wall on current imaging yet). Please  correlate clinically.   *  Extensive regional muscle edema maybe due to therapy, altered  biomechanics and/or reactive to infection.  3. Previously noted focal marrow signal alteration in the mid/distal  tibial shaft has slightly decreased since comparison, measuring  approximately 2 cm craniocaudal dimension, previously 2.4 cm using  similar measurement technique, may be resolving stress reaction versus  red marrow island.    MRI Tib/carito 11/22/24  Impression:     Stable postsurgical changes of  "the right leg without evidence of  residual or recurrent malignancy. Similar to mildly enlarged  postsurgical fluid collection.    CT CAP 11/22/24  IMPRESSION:  1.  No evidence for metastatic disease through the chest, abdomen, and  pelvis.  2.  Stable large fat-containing anterior abdominal wall hernia.  3.  Diffuse hepatic steatosis.  4.  Borderline splenomegaly.    MRI leg 2/25/25  Impression:  1. Postoperative changes of previous high grade sarcoma resection  without evidence of recurrent disease.  *  Underlying external marker, improving soft tissue  irregularity/defect anterolateral distal leg.   2. Persistent but slightly decreased postoperative collection.  3. New focal marrow signal alteration within tibia at the level of  surgical change. Differential consideration are areas of increasing  red marrow reconversion versus stress reaction. Please correlate  clinically.    CT chest 2/25/25  IMPRESSION:   1.  No metastatic disease in the chest.     PATHOLOGY   10/2/23  Final Diagnosis   A.  Soft tissue, right calf mass, excision:  - High-grade pleomorphic sarcoma  - See comment    Electronically signed by Jonny Zhou MD on 10/4/2023 at  9:09 PM   Comment  [facility]   Immunohistochemical stains show the tumor is negative for CK AE1/AE3, S100, desmin and CD34 while SATB2 demonstrates patchy reactivity.  While the primary diagnostic consideration is an undifferentiated pleomorphic sarcoma, the patchy SATB2 reactivity raises the possibility of a poorly differentiated osteosarcoma.   Clinical Information  [facility]   The patient is a 68 year old female with a history of CLL and several months of sensitivity of the lateral right calf, and recent imaging showing: \"Lytic lesion in the right fibular proximal diaphysis, with complete resorption of the medial cortex and some periosteal reaction adjacent to the lesion\".      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 No evidence of metastatic disease.     In her case, the probability of recurrence with surgery alone is about 40% in the next 10 years. We discussed, that recurrence in this scenario is usually with distant  metastasis which then becomes uncurable disease. We discussed, that for this reason, I recommend treatment with neoadjuvant chemotherapy with the goal of preventing metastasis in the future. We discussed the potential side effects of chemotherapy and need for emergent treatment in order to improve her symptoms. The regimen will be doxil 40mg/m2 and ifosfamide 8g/m2 over 5 days. Slightly lower dose due to age and other medical conditions.   She is s/p C4 and tolerated chemotherapy well.     I personally reviewed the most recent PET and MRI leg, she had significant decrease in size and metabolic activity compared to pre-treatment, however this is a partial response with 2 foci that appear more active.     2/19/2024 to 3/22/2024: Right lower extremity, 5000 cGy in 25 fraction     Surgical resection by Dr. Barroso 4/22/24: Final pathology demonstrated undifferentiated sarcoma, 5.8 cm, there was noted treatment effect with 6% viable tumor, negative margin, ypT2 NX.     I explained that we saw a good therapy effect from initial chemotherapy upon imaging and pathology encouraging with only 6% of viable tumor. Because of this and her initial high risk, I recommend to continuous single agent doxil, with the goal of 1 year of adjuvant therapy.    She is s/p for C2 doxil on 7/10, no significant side effects but she has  developed a non healing ulcer in the surgical wound. Adjuvant doxil is likely contributing to non healing and was discontinued.     I personally reviewed the most recent scans with no evidence of disease recurrence. She still has an open wound and is working with wound care for conservative management, lately it appears to be closing better.   I personally reviewed the most recent CT chest and MRI leg showing no evidence of disease recurrence.   She is still dealing with leg stiffness and neuropathic pain, lyrica helps with this symptoms.     Plan  -CT CAP, MRI leg and labs (CBC, CMP) 5/23   -Follow up Dr. Jimenez 5/27   -Lyrica refilled today    40 minutes spent on the date of the encounter doing chart review, review of outside records, review of test results, interpretation of tests, patient visit, documentation, and discussion with other provider(s)     Roney aNm MD   of Medicine  Hematology, Oncology and Transplantation        Again, thank you for allowing me to participate in the care of your patient.        Sincerely,        Roney Nam MD    Electronically signed

## 2025-02-26 NOTE — NURSING NOTE
Patient confirms medications and allergies are accurate via patients echeck in completion, and or denies any changes since last reviewed/verified.     Current patient location: [id number] [facility] [address]    Is the patient currently in the state of [place]? YES    Visit mode: VIDEO    If the visit is dropped, the patient can be reconnected by:VIDEO VISIT: Text to cell phone:   Telephone Information:   Mobile [phone]       Will anyone else be joining the visit? NO   (If patient encounters technical issues they should call [phone])    Are changes needed to the allergy or medication list? No    Are refills needed on medications prescribed by this physician? Discuss with provider per G[facility] Documentation:  Questionnaire(s) completed    Reason for visit: RECHECK    Margy HERRON

## 2025-03-03 ENCOUNTER — TELEPHONE (OUTPATIENT)
Dept: FAMILY MEDICINE | Facility: CLINIC | Age: 70
End: 2025-03-03
Payer: COMMERCIAL

## 2025-03-03 NOTE — TELEPHONE ENCOUNTER
Home Care is calling regarding an established patient with [facility].  Requesting orders from: Joseline Fraga RN APPROVED: RN able to provide verbal orders.  Home Care will send orders for signature.  RN will close encounter.  Is this a request for a temporary pause in the home care episode?  No    Orders Requested    Skilled Nursing  Request for initial certification (first set of orders)   Frequency: 1x a week x 1 week AND 2x a week x 8 weeks AND 2 PRN visits   RN gave verbal order: Yes      Caller: Ryann   Home Care Agency: [facility]   Phone number Home Care can be reached at: [phone]  Okay to leave a detailed message?: Yes    Micahel Johnson RN

## 2025-03-06 ENCOUNTER — TELEPHONE (OUTPATIENT)
Dept: ONCOLOGY | Facility: CLINIC | Age: 70
End: 2025-03-06
Payer: COMMERCIAL

## 2025-03-06 NOTE — PROGRESS NOTES
CLINICAL NUTRITION SERVICES- ONCOLOGY DISTRESS SCREENING     Identified Concern and Score From Distress Screenin. How concerned are you about your ability to eat? :  0  2. How concerned are you about unintended weight loss or your current weight? : 9     Date of Distress Screenin/26     Findings: Brief phone call with Mirta holt. Reports that she would like to lose weight and knows what changes she should be making but has a difficult time implementing them.      Follow-up Required: As needed. Discussed what a nutrition visit looks like in regards to discussing changes and goals. Provided oncology RD phone number via [employer]. Patient can reach out if she is interested in a visit.     Montse Escalante, RD, LD  [phone]

## 2025-03-20 ENCOUNTER — TELEPHONE (OUTPATIENT)
Dept: FAMILY MEDICINE | Facility: CLINIC | Age: 70
End: 2025-03-20
Payer: COMMERCIAL

## 2025-03-20 NOTE — TELEPHONE ENCOUNTER
Patient Quality Outreach    Patient is due for the following:   Hypertension -  BP check    Action(s) Taken:   Schedule a nurse only visit for BP    Type of outreach:    Sent [employer] message.    Questions for provider review:    None         Aida Butts CMA  Chart routed to .

## 2025-04-01 ENCOUNTER — TRANSFERRED RECORDS (OUTPATIENT)
Dept: HEALTH INFORMATION MANAGEMENT | Facility: CLINIC | Age: 70
End: 2025-04-01
Payer: COMMERCIAL

## 2025-04-05 ENCOUNTER — NURSE TRIAGE (OUTPATIENT)
Dept: NURSING | Facility: CLINIC | Age: 70
End: 2025-04-05
Payer: COMMERCIAL

## 2025-04-06 NOTE — TELEPHONE ENCOUNTER
Patient was sitting tonight reading and cleaned glasses and realized that her right eye is cloudy.  Patient had a brief moment of vision change and now is fine.  Patient states that she will go in to be seen with eye MD.        Reason for Disposition   [1] Brief (now gone) blurred vision AND [2] unexplained    Additional Information   Negative: Weakness of the face, arm or leg on one side of the body   Negative: Followed getting substance in the eye   Negative: Foreign body stuck in the eye   Negative: Followed an eye injury   Negative: Followed sun lamp or sun exposure (UV keratitis)   Negative: Yellow or green discharge (pus) in the eye   Negative: Pregnant   Negative: Postpartum (from 0 to 6 weeks after delivery)   Negative: Complete loss of vision in one or both eyes   Negative: SEVERE eye pain   Negative: SEVERE headache   Negative: Double vision   Negative: [1] Blurred vision or visual changes AND [2] present now AND [3] sudden onset or new (e.g., minutes, hours, days)  (Exception: Seeing floaters / black specks OR previously diagnosed migraine headaches with same symptoms.)   Negative: Patient sounds very sick or weak to the triager   Negative: Flashes of light  (Exception: Brief from pressing on the eyeball.)   Negative: Many floaters in the eye  (Exception: Floater(s) are a chronic symptom and this is unchanged from patient's baseline pattern.)   Negative: [1] Eye pain AND [2] brief (now gone) blurred vision or visual changes   Negative: [1] Taking digoxin (e.g., Lanoxin, Digitek, Cardoxin, Lanoxicaps; Toloxin in Edi) AND [2] blurred vision, yellow vision, or yellow-green halos   Negative: [1] Jaw pain while eating AND [2] age > 50 years   Negative: [1] Headache AND [2] age > 50 years   Negative: Single floater (i.e., small speck seems to float across the eye)  (Exception: Floater(s) are a chronic symptom and this is unchanged from patient's baseline pattern.)    Protocols used: Vision Loss or  Change-A-AH

## 2025-04-19 ENCOUNTER — HEALTH MAINTENANCE LETTER (OUTPATIENT)
Age: 70
End: 2025-04-19

## 2025-05-21 ENCOUNTER — HOSPITAL ENCOUNTER (OUTPATIENT)
Dept: MRI IMAGING | Facility: CLINIC | Age: 70
Discharge: HOME | End: 2025-05-21
Attending: STUDENT IN AN ORGANIZED HEALTH CARE EDUCATION/TRAINING PROGRAM
Payer: COMMERCIAL

## 2025-05-21 ENCOUNTER — RESULTS FOLLOW-UP (OUTPATIENT)
Dept: FAMILY MEDICINE | Facility: CLINIC | Age: 70
End: 2025-05-21

## 2025-05-21 ENCOUNTER — HOSPITAL ENCOUNTER (OUTPATIENT)
Dept: CT IMAGING | Facility: CLINIC | Age: 70
Discharge: HOME | End: 2025-05-21
Attending: STUDENT IN AN ORGANIZED HEALTH CARE EDUCATION/TRAINING PROGRAM
Payer: COMMERCIAL

## 2025-05-21 ENCOUNTER — LAB (OUTPATIENT)
Dept: LAB | Facility: CLINIC | Age: 70
End: 2025-05-21
Payer: COMMERCIAL

## 2025-05-21 DIAGNOSIS — E78.5 HYPERLIPIDEMIA LDL GOAL <100: ICD-10-CM

## 2025-05-21 DIAGNOSIS — Z13.6 SCREENING FOR CARDIOVASCULAR CONDITION: Primary | ICD-10-CM

## 2025-05-21 DIAGNOSIS — C49.9 SARCOMA OF SOFT TISSUE (H): ICD-10-CM

## 2025-05-21 DIAGNOSIS — N18.30 CKD (CHRONIC KIDNEY DISEASE) STAGE 3, GFR 30-59 ML/MIN (H): ICD-10-CM

## 2025-05-21 LAB
ALBUMIN SERPL BCG-MCNC: 4.4 G/DL (ref 3.5–5.2)
ALP SERPL-CCNC: 107 U/L (ref 40–150)
ALT SERPL W P-5'-P-CCNC: 35 U/L (ref 0–50)
ANION GAP SERPL CALCULATED.3IONS-SCNC: 13 MMOL/L (ref 7–15)
AST SERPL W P-5'-P-CCNC: 30 U/L (ref 0–45)
BASOPHILS # BLD AUTO: 0 10E3/UL (ref 0–0.2)
BASOPHILS NFR BLD AUTO: 1 %
BILIRUB SERPL-MCNC: 0.3 MG/DL
BUN SERPL-MCNC: 25.8 MG/DL (ref 8–23)
CALCIUM SERPL-MCNC: 9.5 MG/DL (ref 8.8–10.4)
CHLORIDE SERPL-SCNC: 102 MMOL/L (ref 98–107)
CHOLEST SERPL-MCNC: 165 MG/DL
CREAT SERPL-MCNC: 1.24 MG/DL (ref 0.51–0.95)
CREAT UR-MCNC: 50.6 MG/DL
EGFRCR SERPLBLD CKD-EPI 2021: 47 ML/MIN/1.73M2
EOSINOPHIL # BLD AUTO: 0.3 10E3/UL (ref 0–0.7)
EOSINOPHIL NFR BLD AUTO: 5 %
ERYTHROCYTE [DISTWIDTH] IN BLOOD BY AUTOMATED COUNT: 13 % (ref 10–15)
FASTING STATUS PATIENT QL REPORTED: NO
FASTING STATUS PATIENT QL REPORTED: NO
GLUCOSE SERPL-MCNC: 105 MG/DL (ref 70–99)
HCO3 SERPL-SCNC: 25 MMOL/L (ref 22–29)
HCT VFR BLD AUTO: 32.8 % (ref 35–47)
HDLC SERPL-MCNC: 41 MG/DL
HGB BLD-MCNC: 11.5 G/DL (ref 11.7–15.7)
IMM GRANULOCYTES # BLD: 0 10E3/UL
IMM GRANULOCYTES NFR BLD: 0 %
LDLC SERPL CALC-MCNC: 72 MG/DL
LYMPHOCYTES # BLD AUTO: 1.7 10E3/UL (ref 0.8–5.3)
LYMPHOCYTES NFR BLD AUTO: 32 %
MCH RBC QN AUTO: 28.9 PG (ref 26.5–33)
MCHC RBC AUTO-ENTMCNC: 35.1 G/DL (ref 31.5–36.5)
MCV RBC AUTO: 82 FL (ref 78–100)
MICROALBUMIN UR-MCNC: 134.3 MG/L
MICROALBUMIN/CREAT UR: 265.42 MG/G CR (ref 0–25)
MONOCYTES # BLD AUTO: 0.4 10E3/UL (ref 0–1.3)
MONOCYTES NFR BLD AUTO: 8 %
NEUTROPHILS # BLD AUTO: 2.8 10E3/UL (ref 1.6–8.3)
NEUTROPHILS NFR BLD AUTO: 54 %
NONHDLC SERPL-MCNC: 124 MG/DL
PLATELET # BLD AUTO: 183 10E3/UL (ref 150–450)
POTASSIUM SERPL-SCNC: 3.9 MMOL/L (ref 3.4–5.3)
PROT SERPL-MCNC: 7.4 G/DL (ref 6.4–8.3)
RBC # BLD AUTO: 3.98 10E6/UL (ref 3.8–5.2)
SODIUM SERPL-SCNC: 140 MMOL/L (ref 135–145)
TRIGL SERPL-MCNC: 262 MG/DL
WBC # BLD AUTO: 5.2 10E3/UL (ref 4–11)

## 2025-05-21 PROCEDURE — A9585 GADOBUTROL INJECTION: HCPCS | Performed by: STUDENT IN AN ORGANIZED HEALTH CARE EDUCATION/TRAINING PROGRAM

## 2025-05-21 PROCEDURE — 74177 CT ABD & PELVIS W/CONTRAST: CPT

## 2025-05-21 PROCEDURE — 82570 ASSAY OF URINE CREATININE: CPT

## 2025-05-21 PROCEDURE — 255N000002 HC RX 255 OP 636: Performed by: STUDENT IN AN ORGANIZED HEALTH CARE EDUCATION/TRAINING PROGRAM

## 2025-05-21 PROCEDURE — 250N000009 HC RX 250: Performed by: STUDENT IN AN ORGANIZED HEALTH CARE EDUCATION/TRAINING PROGRAM

## 2025-05-21 PROCEDURE — 80061 LIPID PANEL: CPT

## 2025-05-21 PROCEDURE — 73720 MRI LWR EXTREMITY W/O&W/DYE: CPT | Mod: RT

## 2025-05-21 PROCEDURE — 250N000011 HC RX IP 250 OP 636: Performed by: STUDENT IN AN ORGANIZED HEALTH CARE EDUCATION/TRAINING PROGRAM

## 2025-05-21 PROCEDURE — 82043 UR ALBUMIN QUANTITATIVE: CPT

## 2025-05-21 RX ORDER — GADOBUTROL 604.72 MG/ML
8 INJECTION INTRAVENOUS ONCE
Status: COMPLETED | OUTPATIENT
Start: 2025-05-21 | End: 2025-05-21

## 2025-05-21 RX ORDER — GADOBUTROL 604.72 MG/ML
8 INJECTION INTRAVENOUS ONCE
Status: DISCONTINUED | OUTPATIENT
Start: 2025-05-21 | End: 2025-05-21

## 2025-05-21 RX ORDER — IOPAMIDOL 755 MG/ML
91 INJECTION, SOLUTION INTRAVASCULAR ONCE
Status: COMPLETED | OUTPATIENT
Start: 2025-05-21 | End: 2025-05-21

## 2025-05-21 RX ORDER — GADOBUTROL 604.72 MG/ML
8.5 INJECTION INTRAVENOUS ONCE
Status: DISCONTINUED | OUTPATIENT
Start: 2025-05-21 | End: 2025-05-21

## 2025-05-21 RX ADMIN — GADOBUTROL 8 ML: 604.72 INJECTION INTRAVENOUS at 10:45

## 2025-05-21 RX ADMIN — SODIUM CHLORIDE 63 ML: 9 INJECTION, SOLUTION INTRAVENOUS at 10:56

## 2025-05-21 RX ADMIN — IOPAMIDOL 91 ML: 755 INJECTION, SOLUTION INTRAVENOUS at 10:56

## 2025-05-26 NOTE — PROGRESS NOTES
Virtual Visit Details    Type of service:  Video Visit   Video Start Time: 3:10 PM  Video End Time:3:28 PM    Originating Location (pt. Location): Home    Distant Location (provider location):  On-site  Platform used for Video Visit: [facility]    FOLLOW UP VISIT NOTE    PATIENT NAME: Mirta Cardenas MRN # [id number]  DATE OF VISIT: May 27, 2025 YOB: 1955    REFERRING PROVIDER: Benny Barroso MD  Formerly named [facility] [address] [address]    CANCER TYPE:  High grade pleomorphic sarcoma       CHIEF COMPLAIN   LE pain     HISTORY OF PRESENT ILLNESS   68-year-old female with PMH of CLL and recent Dx of High Grade Pleomorphic sarcoma, with intermittent right calf pain but then continuous calf pain beginning in March 2023.  She had a recent x-ray which shows a destructive lesion in the midportion R LE in between the fibula and the tibia. She is experiencing significant pain, she is restless during the visit due to pain. She is currently taking both Advil and Tylenol and oxycodone for pain, with minimal control, feels ibuprofen is what helps the most. She has been alternating all pain medications without optimizing doses of them.     C1D1 10/17/23  C2 11/14  C2 12/13  C4 1/10    2/19/2024 to 3/22/2024: Right lower extremity, 5000 cGy in 25 fraction     Surgical resection by Dr. Barroso 4/22/24: Final pathology demonstrated undifferentiated sarcoma, 5.8 cm, there was noted treatment effect with 6% viable tumor, negative margin, ypT2 NX.     C1 of adjuvant doxil on 6/11  C2 on 7/10    September 2024 - surgical debridement    Interval Hx  She gluteal area has been bothering her for a few weeks, for this reason she restarted ibuprofen and she was taking it on a daily basis.  However she recently saw that her kidney function was altered and since she has stopped ibuprofen.  She is still having quite some pain mainly in the mornings but it gets better after  sitting on a hard chair for a few minutes.  She tells me that she is not very active and is not motivated to start exercising.  She is interesting in trying some medications for weight loss as this has been very hard for her.      PAST ONCOLOGIC HISTORY   CLL - on monitoring     PAST MEDICAL HISTORY   Anal fisure - controlled with nitrobid  CLL - now being monitored - [place]  HTN  Cholesterol  Hx of severe COVID  Psoriasis    PAST SURGICAL HISTORY   3 c- sections  Hysterectomy  Ooforectomy     FAMILY HISTORY   Sister with MM     ALLERGIES      Allergies   Allergen Reactions    Allopurinol Itching    Amoxicillin Hives    Codeine Itching    Cymbalta [Duloxetine Hcl] Fatigue    Periactin Other (See Comments)     Sleepy.    11/15/23: patient not sure about this allergy/intolerance - may be interested in removal    Tenormin [Atenolol] Fatigue     11/15/23: patient may be interested in removal of this from allergy list as it was only fatigue/sleepiness          SOCIAL HISTORY     Social History     Social History Narrative    Not on file     Lives with , Giancarlo, she retired last spring, denies alcohol, tobacco or other drugs.      CURRENT OUTPATIENT MEDICATIONS     Current Outpatient Medications   Medication Sig Dispense Refill    amLODIPine (NORVASC) 10 MG tablet Take 1 tablet (10 mg) by mouth daily. 90 tablet 2    clobetasol propionate (TEMOVATE) 0.05 % external cream Apply sparingly twice daily to psoriasis as needed. 60 g 4    hydrochlorothiazide (HYDRODIURIL) 50 MG tablet Take 1 tablet (50 mg) by mouth daily 90 tablet 3    losartan (COZAAR) 100 MG tablet Take 1 tablet (100 mg) by mouth daily 90 tablet 3    Multiple Vitamins-Calcium (ONE-A-DAY WOMENS FORMULA PO) Take 1 tablet by mouth every morning.      omeprazole (PRILOSEC) 20 MG DR capsule Take 1 capsule by mouth every morning.      pravastatin (PRAVACHOL) 40 MG tablet TAKE ONE TABLET BY MOUTH ONCE DAILY 90 tablet 1    pregabalin (LYRICA) 50 MG capsule Take  1 capsule (50 mg) by mouth 3 times daily. 90 capsule 2    triamcinolone (KENALOG) 0.1 % external cream Apply sparingly twice daily to affected area on fold of skin for next 1-2 weeks. 45 g 3          REVIEW OF SYSTEMS   As above in the HPI, o/w complete 12-point ROS was negative.     PHYSICAL EXAM   There were no vitals taken for this visit.    EGO (due to pain related to the mass)  GEN: NAD  HEENT: PERRL, EOMI, no icterus, injection or pallor. Oropharynx is clear.  NECK: no cervical or supraclavicular lymphadenopathy  LUNGS: clear bilaterally  CV: regular, no murmurs, rubs, or gallops  ABDOMEN: soft, non-tender, non-distended, normal bowel sounds, no hepatosplenomegaly by percussion or palpation  EXT: warm, well perfused, no edema. Surgical scar with open wound covered.  She tells me that this is a still open but just a couple of centimeters and it keeps improving.  SKIN: no rashes     LABORATORY AND IMAGING STUDIES     Lab Results   Component Value Date    WBC 5.2 2025    WBC 15.6 05/10/2021     Lab Results   Component Value Date    RBC 3.98 2025    RBC 4.89 05/10/2021     Lab Results   Component Value Date    HGB 11.5 2025    HGB 13.7 05/10/2021     Lab Results   Component Value Date    HCT 32.8 2025    HCT 41.0 05/10/2021     Lab Results   Component Value Date    MCV 82 2025    MCV 84 05/10/2021     Lab Results   Component Value Date    MCH 28.9 2025    MCH 28.0 05/10/2021     Lab Results   Component Value Date    MCHC 35.1 2025    MCHC 33.4 05/10/2021     Lab Results   Component Value Date    RDW 13.0 2025    RDW 13.1 05/10/2021     Lab Results   Component Value Date     2025     05/10/2021       Last Comprehensive Metabolic Panel:  Sodium   Date Value Ref Range Status   2025 140 135 - 145 mmol/L Final   05/10/2021 141 133 - 144 mmol/L Final     Potassium   Date Value Ref Range Status   2025 3.9 3.4 - 5.3 mmol/L Final    05/24/2022 4.7 3.4 - 5.3 mmol/L Final   05/10/2021 3.9 3.4 - 5.3 mmol/L Final     Chloride   Date Value Ref Range Status   05/21/2025 102 98 - 107 mmol/L Final   05/24/2022 108 94 - 109 mmol/L Final   05/10/2021 108 94 - 109 mmol/L Final     Carbon Dioxide   Date Value Ref Range Status   05/10/2021 27 20 - 32 mmol/L Final     Carbon Dioxide (CO2)   Date Value Ref Range Status   05/21/2025 25 22 - 29 mmol/L Final   05/24/2022 29 20 - 32 mmol/L Final     Anion Gap   Date Value Ref Range Status   05/21/2025 13 7 - 15 mmol/L Final   05/24/2022 2 (L) 3 - 14 mmol/L Final   05/10/2021 6 3 - 14 mmol/L Final     Glucose   Date Value Ref Range Status   05/21/2025 105 (H) 70 - 99 mg/dL Final   05/24/2022 108 (H) 70 - 99 mg/dL Final   05/10/2021 112 (H) 70 - 99 mg/dL Final     Comment:     Fasting specimen     GLUCOSE BY METER POCT   Date Value Ref Range Status   04/23/2024 130 (H) 70 - 99 mg/dL Final     Urea Nitrogen   Date Value Ref Range Status   05/21/2025 25.8 (H) 8.0 - 23.0 mg/dL Final   05/24/2022 14 7 - 30 mg/dL Final   05/10/2021 19 7 - 30 mg/dL Final     Creatinine   Date Value Ref Range Status   05/21/2025 1.24 (H) 0.51 - 0.95 mg/dL Final   05/10/2021 0.89 0.52 - 1.04 mg/dL Final     GFR Estimate   Date Value Ref Range Status   05/21/2025 47 (L) >60 mL/min/1.73m2 Final     Comment:     eGFR calculated using 2021 CKD-EPI equation.   05/10/2021 69 >60 mL/min/[1.73_m2] Final     Comment:     Non  GFR Calc  Starting 12/18/2018, serum creatinine based estimated GFR (eGFR) will be   calculated using the Chronic Kidney Disease Epidemiology Collaboration   (CKD-EPI) equation.       GFR, ESTIMATED POCT   Date Value Ref Range Status   02/24/2025 49 (L) >60 mL/min/1.73m2 Final     Calcium   Date Value Ref Range Status   05/21/2025 9.5 8.8 - 10.4 mg/dL Final   05/10/2021 8.6 8.5 - 10.1 mg/dL Final     Bilirubin Total   Date Value Ref Range Status   05/21/2025 0.3 <=1.2 mg/dL Final   10/12/2020 0.3 0.2 - 1.3  mg/dL Final     Alkaline Phosphatase   Date Value Ref Range Status   05/21/2025 107 40 - 150 U/L Final   10/12/2020 124 40 - 150 U/L Final     ALT   Date Value Ref Range Status   05/21/2025 35 0 - 50 U/L Final   10/12/2020 40 0 - 50 U/L Final     AST   Date Value Ref Range Status   05/21/2025 30 0 - 45 U/L Final   10/12/2020 35 0 - 45 U/L Final       PET scan 1/29/24  IMPRESSION: Although much of the uptake along the superior and middle aspects of the right calf mass has decreased or resolved, there are two foci of uptake along the more inferior portions of the mass which have increased compared to December 2023,   concerning for disease progression.    MRI Tibia/Fibula 1/29/24  IMPRESSION:  1.  Interval decrease in size in the heterogeneously enhancing mass within the posterior tibial muscle belly. This has decreased from 3.3 x 5.2 x 9.3 cm and now measures 2.5 x 3.5 x 5.0 cm. It remains consistent with sarcoma.  2.  There is some surrounding T2 signal abnormality within the adjacent musculature including along the apposing side of the interosseous membrane within the anterior compartment. This could be secondary to post radiation change or a component of   reactive edema. It is difficult to completely exclude microinvasion of tumor.  3.  Edema or cellulitis about the lower leg has minimally increased since the previous examination.  4.  Stable bony irregularity and remodeling of the fibula which is at the margin of the neoplasm.  5.  Focus of T2 signal abnormality within the diaphyseal shaft of the tibia is poorly marginated. This may represent a component of mild stress reaction. Again, it is difficult to completely exclude a new bone lesion. This is considered unlikely, but   follow-up examination is recommended.  6.  No evidence for pathologic fracture.  7.  Exam otherwise negative.    8/22/24  IMPRESSION:     Development of FDG avid soft tissue thickening throughout the right lower extremity with areas of  in-situ ulceration. While these findings may simply represent a worsening inflammatory/infectious process, the exact etiology remains indeterminate.    7/25/24 MRI L tibia/fibula  Impression:  1. In this patient with interim high grade sarcoma resection with  negative surgical margin, presumed postoperative collection, measuring  approximately 11 cm in maximal dimension. Superimposed infection of  the collection cannot be entirely excluded. No evidence of mass-like  area to suggest recurrent tumor.   2. Anterolateral subcutaneous hyperemic change and apparent area of  soft tissue/skin defect at mid/distal leg with possible 9 x 7 x 17 mm  early abscess (no definite wall on current imaging yet). Please  correlate clinically.   *  Extensive regional muscle edema maybe due to therapy, altered  biomechanics and/or reactive to infection.  3. Previously noted focal marrow signal alteration in the mid/distal  tibial shaft has slightly decreased since comparison, measuring  approximately 2 cm craniocaudal dimension, previously 2.4 cm using  similar measurement technique, may be resolving stress reaction versus  red marrow island.    MRI Tib/carito 11/22/24  Impression:     Stable postsurgical changes of the right leg without evidence of  residual or recurrent malignancy. Similar to mildly enlarged  postsurgical fluid collection.    CT CAP 11/22/24  IMPRESSION:  1.  No evidence for metastatic disease through the chest, abdomen, and  pelvis.  2.  Stable large fat-containing anterior abdominal wall hernia.  3.  Diffuse hepatic steatosis.  4.  Borderline splenomegaly.    MRI leg 2/25/25  Impression:  1. Postoperative changes of previous high grade sarcoma resection  without evidence of recurrent disease.  *  Underlying external marker, improving soft tissue  irregularity/defect anterolateral distal leg.   2. Persistent but slightly decreased postoperative collection.  3. New focal marrow signal alteration within tibia at the level  "of  surgical change. Differential consideration are areas of increasing  red marrow reconversion versus stress reaction. Please correlate  clinically.    CT chest 2/25/25  IMPRESSION:   1.  No metastatic disease in the chest.    5/21/25 MRI Tibia  Impression:  1. Stable postoperative changes of high grade sarcoma resection  without evidence of recurrent disease.  *  No substantial change in the size of postoperative fluid  collection.  2. 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    CT Chest 5/21/25  IMPRESSION:  1.  There are two new pulmonary nodules in the left upper and lower lobes, measuring up to 0.6 cm. Further attention to these findings at follow-up is recommended.  2.  Hepatic steatosis.  3.  Splenomegaly.  4.  Nonobstructing bilateral renal stones.     PATHOLOGY   10/2/23  Final Diagnosis   A.  Soft tissue, right calf mass, excision:  - High-grade pleomorphic sarcoma  - See comment    Electronically signed by Jonny Zhou MD on 10/4/2023 at  9:09 PM   Comment  [facility]   Immunohistochemical stains show the tumor is negative for CK AE1/AE3, S100, desmin and CD34 while SATB2 demonstrates patchy reactivity.  While the primary diagnostic consideration is an undifferentiated pleomorphic sarcoma, the patchy SATB2 reactivity raises the possibility of a poorly differentiated osteosarcoma.   Clinical Information  [facility]   The patient is a 68 year old female with a history of CLL and several months of sensitivity of the lateral right calf, and recent imaging showing: \"Lytic lesion in the right fibular proximal diaphysis, with complete resorption of the medial cortex and some periosteal reaction adjacent to the lesion\".      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 No evidence of metastatic disease.     In her case, the probability of recurrence with surgery alone is about 40% in the next 10 years. We discussed, that recurrence in this scenario is usually with distant  metastasis which then becomes uncurable disease. We discussed, that for this reason, I recommend treatment with neoadjuvant chemotherapy with the goal of preventing metastasis in the future. We discussed the potential side effects of chemotherapy and need for emergent treatment in order to improve her symptoms. The regimen will be doxil 40mg/m2 and ifosfamide 8g/m2 over 5 days. Slightly lower dose due to age and other medical conditions.   She is s/p C4 and tolerated chemotherapy well.     I personally reviewed the most recent PET and MRI leg, she had significant decrease in size and metabolic activity compared to pre-treatment, however this is a partial response with 2 foci that appear more active.     2/19/2024 to 3/22/2024: Right lower extremity, 5000 cGy in 25 fraction     Surgical resection by Dr. Barroso 4/22/24: Final pathology demonstrated undifferentiated sarcoma, 5.8 cm, there was noted treatment effect with 6% viable tumor, negative margin, ypT2 NX.     I explained that we saw a good therapy effect from initial chemotherapy upon imaging and pathology encouraging with only 6% of viable tumor. Because of this and her initial high risk, I recommend to continuous single agent doxil, with the goal of 1 year of adjuvant therapy.    She received C2 of adjuvant doxil on 7/10, no significant side effects but she has developed a non healing ulcer in the surgical wound. Adjuvant doxil is likely contributing to non healing and  was discontinued.     I personally reviewed the most recent scans on 5/21/25 with no evidence of local recurrence, she does have 2 new pulmonary nodules 6 mm and 3 mm, their appearance is more inflammatory, but we need to close follow up for those.   She is having more lower back and gluteal pain, mainly in the mornings. She does have degenerative changes in the low back, I recommended to increase tylenol to 1000 mg TID, avoid NSAIDs given worsening renal function. Start muscle relaxant at night and recommended daily stretching exercises. She will also meet with PCP and discuss about options for weight loss, I explained that diet and exercise are important, but medications can help too and given her current symptoms are probably exacerbated by her weight, I do think it is reasonable to look into other medical options.      Plan  -CT CAP, MRI leg and labs (CBC, CMP) 8/22   -Follow up Dr. Jimenez 8/27     Pregabalin for neuropathic pain prescribed today    40 minutes spent on the date of the encounter doing chart review, review of outside records, review of test results, interpretation of tests, patient visit, documentation, and discussion with other provider(s)     Roney Nam MD   of Medicine  Hematology, Oncology and Transplantation

## 2025-05-27 ENCOUNTER — VIRTUAL VISIT (OUTPATIENT)
Dept: ONCOLOGY | Facility: CLINIC | Age: 70
End: 2025-05-27
Attending: STUDENT IN AN ORGANIZED HEALTH CARE EDUCATION/TRAINING PROGRAM
Payer: COMMERCIAL

## 2025-05-27 VITALS — HEIGHT: 61 IN | BODY MASS INDEX: 35.12 KG/M2 | WEIGHT: 186 LBS

## 2025-05-27 DIAGNOSIS — C49.9 SARCOMA OF SOFT TISSUE (H): ICD-10-CM

## 2025-05-27 RX ORDER — PREGABALIN 50 MG/1
50 CAPSULE ORAL 3 TIMES DAILY
Qty: 90 CAPSULE | Refills: 2 | Status: SHIPPED | OUTPATIENT
Start: 2025-05-27

## 2025-05-27 RX ORDER — CYCLOBENZAPRINE HCL 5 MG
5 TABLET ORAL AT BEDTIME
Qty: 30 TABLET | Refills: 1 | Status: SHIPPED | OUTPATIENT
Start: 2025-05-27 | End: 2025-06-26

## 2025-05-27 ASSESSMENT — PAIN SCALES - GENERAL: PAINLEVEL_OUTOF10: MILD PAIN (1)

## 2025-05-27 NOTE — LETTER
5/27/2025      Mirta Cardenas  [address] [facility] [address]      Dear Colleague,    Thank you for referring your patient, Mirta Cardenas, to the [facility]. Please see a copy of my visit note below.    Virtual Visit Details    Type of service:  Video Visit   Video Start Time: 3:10 PM  Video End Time:3:28 PM    Originating Location (pt. Location): Home    Distant Location (provider location):  On-site  Platform used for Video Visit: [facility]    FOLLOW UP VISIT NOTE    PATIENT NAME: Mirat Cardenas MRN # [id number]  DATE OF VISIT: May 27, 2025 YOB: 1955    REFERRING PROVIDER: Benny Barroso MD  [facility] [place] [address] [address]    CANCER TYPE:  High grade pleomorphic sarcoma       CHIEF COMPLAIN   LE pain     HISTORY OF PRESENT ILLNESS   68-year-old female with PMH of CLL and recent Dx of High Grade Pleomorphic sarcoma, with intermittent right calf pain but then continuous calf pain beginning in March 2023.  She had a recent x-ray which shows a destructive lesion in the midportion R LE in between the fibula and the tibia. She is experiencing significant pain, she is restless during the visit due to pain. She is currently taking both Advil and Tylenol and oxycodone for pain, with minimal control, feels ibuprofen is what helps the most. She has been alternating all pain medications without optimizing doses of them.     C1D1 10/17/23  C2 11/14  C2 12/13  C4 1/10    2/19/2024 to 3/22/2024: Right lower extremity, 5000 cGy in 25 fraction     Surgical resection by Dr. Barroso 4/22/24: Final pathology demonstrated undifferentiated sarcoma, 5.8 cm, there was noted treatment effect with 6% viable tumor, negative margin, ypT2 NX.     C1 of adjuvant doxil on 6/11  C2 on 7/10    September 2024 - surgical debridement    Interval Hx  She gluteal area has been bothering her for a few weeks, for this reason she  restarted ibuprofen and she was taking it on a daily basis.  However she recently saw that her kidney function was altered and since she has stopped ibuprofen.  She is still having quite some pain mainly in the mornings but it gets better after sitting on a hard chair for a few minutes.  She tells me that she is not very active and is not motivated to start exercising.  She is interesting in trying some medications for weight loss as this has been very hard for her.      PAST ONCOLOGIC HISTORY   CLL - on monitoring     PAST MEDICAL HISTORY   Anal fisure - controlled with nitrobid  CLL - now being monitored - Julius[facility]  HTN  Cholesterol  Hx of severe COVID  Psoriasis    PAST SURGICAL HISTORY   3 c- sections  Hysterectomy  Ooforectomy     FAMILY HISTORY   Sister with MM     ALLERGIES      Allergies   Allergen Reactions     Allopurinol Itching     Amoxicillin Hives     Codeine Itching     Cymbalta [Duloxetine Hcl] Fatigue     Periactin Other (See Comments)     Sleepy.    11/15/23: patient not sure about this allergy/intolerance - may be interested in removal     Tenormin [Atenolol] Fatigue     11/15/23: patient may be interested in removal of this from allergy list as it was only fatigue/sleepiness          SOCIAL HISTORY     Social History     Social History Narrative     Not on file     Lives with , Giancarlo, she retired last spring, denies alcohol, tobacco or other drugs.      CURRENT OUTPATIENT MEDICATIONS     Current Outpatient Medications   Medication Sig Dispense Refill     amLODIPine (NORVASC) 10 MG tablet Take 1 tablet (10 mg) by mouth daily. 90 tablet 2     clobetasol propionate (TEMOVATE) 0.05 % external cream Apply sparingly twice daily to psoriasis as needed. 60 g 4     hydrochlorothiazide (HYDRODIURIL) 50 MG tablet Take 1 tablet (50 mg) by mouth daily 90 tablet 3     losartan (COZAAR) 100 MG tablet Take 1 tablet (100 mg) by mouth daily 90 tablet 3     Multiple Vitamins-Calcium (ONE-A-DAY WOMENS FORMULA  PO) Take 1 tablet by mouth every morning.       omeprazole (PRILOSEC) 20 MG DR capsule Take 1 capsule by mouth every morning.       pravastatin (PRAVACHOL) 40 MG tablet TAKE ONE TABLET BY MOUTH ONCE DAILY 90 tablet 1     pregabalin (LYRICA) 50 MG capsule Take 1 capsule (50 mg) by mouth 3 times daily. 90 capsule 2     triamcinolone (KENALOG) 0.1 % external cream Apply sparingly twice daily to affected area on fold of skin for next 1-2 weeks. 45 g 3          REVIEW OF SYSTEMS   As above in the HPI, o/w complete 12-point ROS was negative.     PHYSICAL EXAM   There were no vitals taken for this visit.    EGO (due to pain related to the mass)  GEN: NAD  HEENT: PERRL, EOMI, no icterus, injection or pallor. Oropharynx is clear.  NECK: no cervical or supraclavicular lymphadenopathy  LUNGS: clear bilaterally  CV: regular, no murmurs, rubs, or gallops  ABDOMEN: soft, non-tender, non-distended, normal bowel sounds, no hepatosplenomegaly by percussion or palpation  EXT: warm, well perfused, no edema. Surgical scar with open wound covered.  She tells me that this is a still open but just a couple of centimeters and it keeps improving.  SKIN: no rashes     LABORATORY AND IMAGING STUDIES     Lab Results   Component Value Date    WBC 5.2 2025    WBC 15.6 05/10/2021     Lab Results   Component Value Date    RBC 3.98 2025    RBC 4.89 05/10/2021     Lab Results   Component Value Date    HGB 11.5 2025    HGB 13.7 05/10/2021     Lab Results   Component Value Date    HCT 32.8 2025    HCT 41.0 05/10/2021     Lab Results   Component Value Date    MCV 82 2025    MCV 84 05/10/2021     Lab Results   Component Value Date    MCH 28.9 2025    MCH 28.0 05/10/2021     Lab Results   Component Value Date    MCHC 35.1 2025    MCHC 33.4 05/10/2021     Lab Results   Component Value Date    RDW 13.0 2025    RDW 13.1 05/10/2021     Lab Results   Component Value Date     2025      05/10/2021       Last Comprehensive Metabolic Panel:  Sodium   Date Value Ref Range Status   05/21/2025 140 135 - 145 mmol/L Final   05/10/2021 141 133 - 144 mmol/L Final     Potassium   Date Value Ref Range Status   05/21/2025 3.9 3.4 - 5.3 mmol/L Final   05/24/2022 4.7 3.4 - 5.3 mmol/L Final   05/10/2021 3.9 3.4 - 5.3 mmol/L Final     Chloride   Date Value Ref Range Status   05/21/2025 102 98 - 107 mmol/L Final   05/24/2022 108 94 - 109 mmol/L Final   05/10/2021 108 94 - 109 mmol/L Final     Carbon Dioxide   Date Value Ref Range Status   05/10/2021 27 20 - 32 mmol/L Final     Carbon Dioxide (CO2)   Date Value Ref Range Status   05/21/2025 25 22 - 29 mmol/L Final   05/24/2022 29 20 - 32 mmol/L Final     Anion Gap   Date Value Ref Range Status   05/21/2025 13 7 - 15 mmol/L Final   05/24/2022 2 (L) 3 - 14 mmol/L Final   05/10/2021 6 3 - 14 mmol/L Final     Glucose   Date Value Ref Range Status   05/21/2025 105 (H) 70 - 99 mg/dL Final   05/24/2022 108 (H) 70 - 99 mg/dL Final   05/10/2021 112 (H) 70 - 99 mg/dL Final     Comment:     Fasting specimen     GLUCOSE BY METER POCT   Date Value Ref Range Status   04/23/2024 130 (H) 70 - 99 mg/dL Final     Urea Nitrogen   Date Value Ref Range Status   05/21/2025 25.8 (H) 8.0 - 23.0 mg/dL Final   05/24/2022 14 7 - 30 mg/dL Final   05/10/2021 19 7 - 30 mg/dL Final     Creatinine   Date Value Ref Range Status   05/21/2025 1.24 (H) 0.51 - 0.95 mg/dL Final   05/10/2021 0.89 0.52 - 1.04 mg/dL Final     GFR Estimate   Date Value Ref Range Status   05/21/2025 47 (L) >60 mL/min/1.73m2 Final     Comment:     eGFR calculated using 2021 CKD-EPI equation.   05/10/2021 69 >60 mL/min/[1.73_m2] Final     Comment:     Non  GFR Calc  Starting 12/18/2018, serum creatinine based estimated GFR (eGFR) will be   calculated using the Chronic Kidney Disease Epidemiology Collaboration   (CKD-EPI) equation.       GFR, ESTIMATED POCT   Date Value Ref Range Status   02/24/2025 49 (L) >60  mL/min/1.73m2 Final     Calcium   Date Value Ref Range Status   05/21/2025 9.5 8.8 - 10.4 mg/dL Final   05/10/2021 8.6 8.5 - 10.1 mg/dL Final     Bilirubin Total   Date Value Ref Range Status   05/21/2025 0.3 <=1.2 mg/dL Final   10/12/2020 0.3 0.2 - 1.3 mg/dL Final     Alkaline Phosphatase   Date Value Ref Range Status   05/21/2025 107 40 - 150 U/L Final   10/12/2020 124 40 - 150 U/L Final     ALT   Date Value Ref Range Status   05/21/2025 35 0 - 50 U/L Final   10/12/2020 40 0 - 50 U/L Final     AST   Date Value Ref Range Status   05/21/2025 30 0 - 45 U/L Final   10/12/2020 35 0 - 45 U/L Final       PET scan 1/29/24  IMPRESSION: Although much of the uptake along the superior and middle aspects of the right calf mass has decreased or resolved, there are two foci of uptake along the more inferior portions of the mass which have increased compared to December 2023,   concerning for disease progression.    MRI Tibia/Fibula 1/29/24  IMPRESSION:  1.  Interval decrease in size in the heterogeneously enhancing mass within the posterior tibial muscle belly. This has decreased from 3.3 x 5.2 x 9.3 cm and now measures 2.5 x 3.5 x 5.0 cm. It remains consistent with sarcoma.  2.  There is some surrounding T2 signal abnormality within the adjacent musculature including along the apposing side of the interosseous membrane within the anterior compartment. This could be secondary to post radiation change or a component of   reactive edema. It is difficult to completely exclude microinvasion of tumor.  3.  Edema or cellulitis about the lower leg has minimally increased since the previous examination.  4.  Stable bony irregularity and remodeling of the fibula which is at the margin of the neoplasm.  5.  Focus of T2 signal abnormality within the diaphyseal shaft of the tibia is poorly marginated. This may represent a component of mild stress reaction. Again, it is difficult to completely exclude a new bone lesion. This is considered  unlikely, but   follow-up examination is recommended.  6.  No evidence for pathologic fracture.  7.  Exam otherwise negative.    8/22/24  IMPRESSION:     Development of FDG avid soft tissue thickening throughout the right lower extremity with areas of in-situ ulceration. While these findings may simply represent a worsening inflammatory/infectious process, the exact etiology remains indeterminate.    7/25/24 MRI L tibia/fibula  Impression:  1. In this patient with interim high grade sarcoma resection with  negative surgical margin, presumed postoperative collection, measuring  approximately 11 cm in maximal dimension. Superimposed infection of  the collection cannot be entirely excluded. No evidence of mass-like  area to suggest recurrent tumor.   2. Anterolateral subcutaneous hyperemic change and apparent area of  soft tissue/skin defect at mid/distal leg with possible 9 x 7 x 17 mm  early abscess (no definite wall on current imaging yet). Please  correlate clinically.   *  Extensive regional muscle edema maybe due to therapy, altered  biomechanics and/or reactive to infection.  3. Previously noted focal marrow signal alteration in the mid/distal  tibial shaft has slightly decreased since comparison, measuring  approximately 2 cm craniocaudal dimension, previously 2.4 cm using  similar measurement technique, may be resolving stress reaction versus  red marrow island.    MRI Tib/carito 11/22/24  Impression:     Stable postsurgical changes of the right leg without evidence of  residual or recurrent malignancy. Similar to mildly enlarged  postsurgical fluid collection.    CT CAP 11/22/24  IMPRESSION:  1.  No evidence for metastatic disease through the chest, abdomen, and  pelvis.  2.  Stable large fat-containing anterior abdominal wall hernia.  3.  Diffuse hepatic steatosis.  4.  Borderline splenomegaly.    MRI leg 2/25/25  Impression:  1. Postoperative changes of previous high grade sarcoma resection  without evidence  "of recurrent disease.  *  Underlying external marker, improving soft tissue  irregularity/defect anterolateral distal leg.   2. Persistent but slightly decreased postoperative collection.  3. New focal marrow signal alteration within tibia at the level of  surgical change. Differential consideration are areas of increasing  red marrow reconversion versus stress reaction. Please correlate  clinically.    CT chest 2/25/25  IMPRESSION:   1.  No metastatic disease in the chest.    5/21/25 MRI Tibia  Impression:  1. Stable postoperative changes of high grade sarcoma resection  without evidence of recurrent disease.  *  No substantial change in the size of postoperative fluid  collection.  2. 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    CT Chest 5/21/25  IMPRESSION:  1.  There are two new pulmonary nodules in the left upper and lower lobes, measuring up to 0.6 cm. Further attention to these findings at follow-up is recommended.  2.  Hepatic steatosis.  3.  Splenomegaly.  4.  Nonobstructing bilateral renal stones.     PATHOLOGY   10/2/23  Final Diagnosis   A.  Soft tissue, right calf mass, excision:  - High-grade pleomorphic sarcoma  - See comment    Electronically signed by Jonny Zhou MD on 10/4/2023 at  9:09 PM   Comment  [facility]   Immunohistochemical stains show the tumor is negative for CK AE1/AE3, S100, desmin and CD34 while SATB2 demonstrates patchy reactivity.  While the primary diagnostic consideration is an undifferentiated pleomorphic sarcoma, the patchy SATB2 reactivity raises the possibility of a poorly differentiated osteosarcoma.   Clinical Information  [facility]   The patient is a 68 year old female with a history of CLL and several months of sensitivity of the lateral right calf, and recent imaging showing: \"Lytic lesion in the right fibular proximal diaphysis, with complete resorption of the medial cortex and some periosteal reaction " "adjacent to the lesion\".      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 No evidence of metastatic disease.     In her case, the probability of recurrence with surgery alone is about 40% in the next 10 years. We discussed, that recurrence in this scenario is usually with distant  metastasis which then becomes uncurable disease. We discussed, that for this reason, I recommend treatment with neoadjuvant chemotherapy with the goal of preventing metastasis in the future. We discussed the potential side effects of chemotherapy and need for emergent treatment in order to improve her symptoms. The regimen will be doxil 40mg/m2 and ifosfamide 8g/m2 over 5 days. Slightly lower dose due to age and other medical conditions.   She is s/p C4 and tolerated chemotherapy well.     I personally reviewed the most recent PET and MRI leg, she had significant decrease in size and metabolic activity compared to pre-treatment, however this is a partial response with 2 foci that appear more active.     2/19/2024 to 3/22/2024: Right lower extremity, 5000 cGy in 25 fraction     Surgical resection by Dr. Barroso 4/22/24: Final pathology demonstrated undifferentiated sarcoma, 5.8 cm, there was noted treatment effect with 6% viable tumor, negative margin, ypT2 NX.     I explained that we saw a good therapy effect from initial chemotherapy upon imaging and pathology encouraging with only 6% of viable tumor. Because of this and her initial high risk, I recommend to continuous single agent " doxil, with the goal of 1 year of adjuvant therapy.    She received C2 of adjuvant doxil on 7/10, no significant side effects but she has developed a non healing ulcer in the surgical wound. Adjuvant doxil is likely contributing to non healing and was discontinued.     I personally reviewed the most recent scans on 5/21/25 with no evidence of local recurrence, she does have 2 new pulmonary nodules 6 mm and 3 mm, their appearance is more inflammatory, but we need to close follow up for those.   She is having more lower back and gluteal pain, mainly in the mornings. She does have degenerative changes in the low back, I recommended to increase tylenol to 1000 mg TID, avoid NSAIDs given worsening renal function. Start muscle relaxant at night and recommended daily stretching exercises. She will also meet with PCP and discuss about options for weight loss, I explained that diet and exercise are important, but medications can help too and given her current symptoms are probably exacerbated by her weight, I do think it is reasonable to look into other medical options.      Plan  -CT CAP, MRI leg and labs (CBC, CMP) 8/22   -Follow up Dr. Jimenez 8/27     Pregabalin for neuropathic pain prescribed today    40 minutes spent on the date of the encounter doing chart review, review of outside records, review of test results, interpretation of tests, patient visit, documentation, and discussion with other provider(s)     Roney Nam MD   of Medicine  Hematology, Oncology and Transplantation        Again, thank you for allowing me to participate in the care of your patient.        Sincerely,        Roney Nam MD    Electronically signed

## 2025-05-27 NOTE — NURSING NOTE
Current patient location: [id number] [facility] [address]    Is the patient currently in the state of [place]? YES    Visit mode: VIDEO    If the visit is dropped, the patient can be reconnected by:VIDEO VISIT: Text to cell phone:   Telephone Information:   Mobile [phone]       Will anyone else be joining the visit? NO  (If patient encounters technical issues they should call [phone]    Are changes needed to the allergy or medication list? Pt stated no changes to allergies and Pt stated no med changes    Are refills needed on medications prescribed by this physician? YES    Rooming Documentation:  Not applicable    Reason for visit: MIKE HERRON

## 2025-06-10 ENCOUNTER — MYC MEDICAL ADVICE (OUTPATIENT)
Dept: FAMILY MEDICINE | Facility: CLINIC | Age: 70
End: 2025-06-10
Payer: COMMERCIAL

## 2025-06-17 NOTE — PROGRESS NOTES
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 She has never had an indication for treatment. The same holds true today.  We reviewed what these would be.  They are anemia, rapidly rising white count, thrombocytopenia, unsightly or symptomatic adenopathy, or weight loss/night sweats.  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 Barroso 4/22/24: Final pathology demonstrated undifferentiated sarcoma, 5.8 cm, there was noted treatment effect with 6% viable tumor, negative margin, ypT2 NX.  Most recent scans from last month show two new pulmonary nodules. MRI of the tibia shows no recurrence.  We reviewed her white counts over the last few years and the chemotherapy actually had a great effect on her CLL as well.  Otherwise healthy and dynamic woman.    Plan:     No intervention by me  RTC with me in one year with a CBC just prior.  Continue to follow-up with Dr. Marino.    The longitudinal plan of care for the diagnosis(es)/condition(s) as documented were addressed during this visit. Due to the added complexity in care, I will continue to support Mirta in the subsequent management and with ongoing continuity of care.    Griffin Duckworth MD, MSc  Associate Professor of Medicine  [facility] 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  This is managed by Dr. Marino.  Hunter Stage 0/1 CLL, diagnosed at submandibular biopsy 12/2004. Most recent CT/PET shows a few inguinal and axillary sub cm nodes that are  not hypermetabolic, managed with observation.  Minimal hepatosplenomegaly seen on CT/PET.      CLL Therapy to Date:     Observation without intervention for 21 years, beginning with Dr. Acosta.    Interval history:     Feeling really well.  No new infections  No new lumps or bumps.  No night sweats.  Has gained some weight. Thinking about asking her PMD about a GLP1 drug.  Does not have a lot of pain at the site of surgery.  Still has an open area that is being managed by wound/home care.   No Doxil since last summer  No bleeding  Energy level adequate  No chest pain or shortness of breath.      Past Medical History:   I have reviewed this patient's past medical history   Past Medical History:   Diagnosis Date    Asthma     reactive airway disease, per patient    Blood transfusion     Cancer (H)     chronic lymphocytic leukemia    CINV (chemotherapy-induced nausea and vomiting) 10/12/2023    History of transfusion     Hypertension     Leukemia, chronic lymphocytic (H)     Malignant neoplasm (H)     CLL    Sarcoma of right lower extremity (H)     Thyroid nodule 04/28/2008          Past Surgical History:    I have reviewed this patient's past surgical history       Social History:   Tobacco, ETOH, and rec drugs reviewed and as noted below with the following exceptions:   to Giancarlo. Lives in [place]          Family History:     Family History   Problem Relation Age of Onset    Osteoporosis Mother     Cancer Sister         multiple myloma    Hypertension Brother     Heart Disease Brother 65        bypass    Obesity Son     Obesity Son     Hypertension Son     Melanoma No family hx of     Anesthesia Reaction No family hx of     Thrombosis No family hx of             Medications:     Current Outpatient Medications   Medication Sig Dispense Refill    amLODIPine (NORVASC) 10 MG tablet Take 1 tablet (10 mg) by mouth daily. 90 tablet 2    clobetasol propionate (TEMOVATE) 0.05 % external cream Apply sparingly  twice daily to psoriasis as needed. 60 g 4    cyclobenzaprine (FLEXERIL) 5 MG tablet Take 1 tablet (5 mg) by mouth at bedtime. 30 tablet 1    hydrochlorothiazide (HYDRODIURIL) 50 MG tablet Take 1 tablet (50 mg) by mouth daily 90 tablet 3    losartan (COZAAR) 100 MG tablet Take 1 tablet (100 mg) by mouth daily 90 tablet 3    Multiple Vitamins-Calcium (ONE-A-DAY WOMENS FORMULA PO) Take 1 tablet by mouth every morning.      omeprazole (PRILOSEC) 20 MG DR capsule Take 1 capsule by mouth every morning.      pravastatin (PRAVACHOL) 40 MG tablet TAKE ONE TABLET BY MOUTH ONCE DAILY 90 tablet 1    pregabalin (LYRICA) 50 MG capsule Take 1 capsule (50 mg) by mouth 3 times daily. 90 capsule 2    triamcinolone (KENALOG) 0.1 % external cream Apply sparingly twice daily to affected area on fold of skin for next 1-2 weeks. 45 g 3              Physical Exam:   not currently breastfeeding.    ECOG PS: 0  Constitutional: WDWN female in NAD, pleasant and appropriate  HEENT:  NC/AT, no icterus, OP clear, MMM  Skin: No jaundice nor ecchymoses.  The lower extremity lesion is healing well.  There is some crusting tissue but no erythema or exudate.  It is all consistent with healing.  Lungs: CTAB, no w/r/r, nonlabored breathing  Cardiovascular: RRR, S1, S2, no m/r/g  Abdomen: +BS, soft, nontender, nondistended, no organomegaly nor masses  MSK/Extremities: Warm, well perfused. No edema  LN: no concerning palpable cervical, supraclavicular, axillary, nor inguinal lymphadenopathy (rechecked again today)  Neurologic: alert, answering questions appropriately, moving all extremities spontaneously. CN 2-12 grossly intact.  Psych: appropriate affect  Data:   CT CAP 5/22/2025  Reviewed with Mirta to assess for adenopathy.  There really is nothing seen.  There is no axillary or inguinal adenopathy.  There is no adenopathy in the supraclavicular fossa, lower cuts of the neck, or thoracic/abdominal/pelvic areas.         Latest Reference Range & Units  05/21/25 09:26   WBC 4.0 - 11.0 10e3/uL 5.2   Hemoglobin 11.7 - 15.7 g/dL 11.5 (L)   Hematocrit 35.0 - 47.0 % 32.8 (L)   Platelet Count 150 - 450 10e3/uL 183   RBC Count 3.80 - 5.20 10e6/uL 3.98   MCV 78 - 100 fL 82   MCH 26.5 - 33.0 pg 28.9   MCHC 31.5 - 36.5 g/dL 35.1   RDW 10.0 - 15.0 % 13.0   % Neutrophils % 54   % Lymphocytes % 32   (L): Data is abnormally low  No results found for this or any previous visit (from the past 24 hours).      Other data           Labs, imaging and treatment plan reviewed with patient. All questions answered.        30 minutes spent on the date of the encounter doing chart review, review of outside records, review of test results, interpretation of tests, patient visit, documentation, discussion with other provider(s), and discussion with family

## 2025-06-19 ENCOUNTER — APPOINTMENT (OUTPATIENT)
Dept: LAB | Facility: CLINIC | Age: 70
End: 2025-06-19
Attending: INTERNAL MEDICINE
Payer: COMMERCIAL

## 2025-06-19 ENCOUNTER — ONCOLOGY VISIT (OUTPATIENT)
Dept: ONCOLOGY | Facility: CLINIC | Age: 70
End: 2025-06-19
Attending: INTERNAL MEDICINE
Payer: COMMERCIAL

## 2025-06-19 VITALS
OXYGEN SATURATION: 98 % | HEART RATE: 88 BPM | DIASTOLIC BLOOD PRESSURE: 84 MMHG | TEMPERATURE: 97.9 F | RESPIRATION RATE: 16 BRPM | SYSTOLIC BLOOD PRESSURE: 137 MMHG

## 2025-06-19 DIAGNOSIS — C49.9 SARCOMA OF SOFT TISSUE (H): ICD-10-CM

## 2025-06-19 LAB
ALBUMIN SERPL BCG-MCNC: 4.4 G/DL (ref 3.5–5.2)
ALP SERPL-CCNC: 95 U/L (ref 40–150)
ALT SERPL W P-5'-P-CCNC: 30 U/L (ref 0–50)
ANION GAP SERPL CALCULATED.3IONS-SCNC: 13 MMOL/L (ref 7–15)
AST SERPL W P-5'-P-CCNC: 29 U/L (ref 0–45)
BASOPHILS # BLD AUTO: 0 10E3/UL (ref 0–0.2)
BASOPHILS NFR BLD AUTO: 1 %
BILIRUB SERPL-MCNC: 0.3 MG/DL
BUN SERPL-MCNC: 24.1 MG/DL (ref 8–23)
CALCIUM SERPL-MCNC: 9.7 MG/DL (ref 8.8–10.4)
CHLORIDE SERPL-SCNC: 102 MMOL/L (ref 98–107)
CREAT SERPL-MCNC: 1.12 MG/DL (ref 0.51–0.95)
EGFRCR SERPLBLD CKD-EPI 2021: 53 ML/MIN/1.73M2
EOSINOPHIL # BLD AUTO: 0.3 10E3/UL (ref 0–0.7)
EOSINOPHIL NFR BLD AUTO: 5 %
ERYTHROCYTE [DISTWIDTH] IN BLOOD BY AUTOMATED COUNT: 13 % (ref 10–15)
GLUCOSE SERPL-MCNC: 108 MG/DL (ref 70–99)
HCO3 SERPL-SCNC: 24 MMOL/L (ref 22–29)
HCT VFR BLD AUTO: 31.4 % (ref 35–47)
HGB BLD-MCNC: 11 G/DL (ref 11.7–15.7)
IMM GRANULOCYTES # BLD: 0 10E3/UL
IMM GRANULOCYTES NFR BLD: 1 %
LYMPHOCYTES # BLD AUTO: 1.5 10E3/UL (ref 0.8–5.3)
LYMPHOCYTES NFR BLD AUTO: 29 %
MCH RBC QN AUTO: 28.9 PG (ref 26.5–33)
MCHC RBC AUTO-ENTMCNC: 35 G/DL (ref 31.5–36.5)
MCV RBC AUTO: 82 FL (ref 78–100)
MONOCYTES # BLD AUTO: 0.6 10E3/UL (ref 0–1.3)
MONOCYTES NFR BLD AUTO: 10 %
NEUTROPHILS # BLD AUTO: 2.9 10E3/UL (ref 1.6–8.3)
NEUTROPHILS NFR BLD AUTO: 55 %
NRBC # BLD AUTO: 0 10E3/UL
NRBC BLD AUTO-RTO: 0 /100
PLATELET # BLD AUTO: 169 10E3/UL (ref 150–450)
POTASSIUM SERPL-SCNC: 4.1 MMOL/L (ref 3.4–5.3)
PROT SERPL-MCNC: 7.4 G/DL (ref 6.4–8.3)
RBC # BLD AUTO: 3.81 10E6/UL (ref 3.8–5.2)
SODIUM SERPL-SCNC: 139 MMOL/L (ref 135–145)
WBC # BLD AUTO: 5.3 10E3/UL (ref 4–11)

## 2025-06-19 PROCEDURE — 85025 COMPLETE CBC W/AUTO DIFF WBC: CPT | Performed by: INTERNAL MEDICINE

## 2025-06-19 PROCEDURE — 36415 COLL VENOUS BLD VENIPUNCTURE: CPT | Performed by: INTERNAL MEDICINE

## 2025-06-19 PROCEDURE — G0463 HOSPITAL OUTPT CLINIC VISIT: HCPCS | Performed by: INTERNAL MEDICINE

## 2025-06-19 PROCEDURE — 84155 ASSAY OF PROTEIN SERUM: CPT | Performed by: INTERNAL MEDICINE

## 2025-06-19 ASSESSMENT — PAIN SCALES - GENERAL: PAINLEVEL_OUTOF10: NO PAIN (0)

## 2025-06-19 NOTE — NURSING NOTE
"Oncology Rooming Note    June 19, 2025 11:30 AM   Mirta Cardenas is a 70 year old female who presents for:    Chief Complaint   Patient presents with    Blood Draw     Labs drawn via  by VAT. VS taken.    Oncology Clinic Visit     Chronic lymphoid leukemia in remission     Initial Vitals: /84 (BP Location: Right arm, Patient Position: Sitting, Cuff Size: Adult Regular)   Pulse 88   Temp 97.9  F (36.6  C) (Oral)   Resp 16   SpO2 98%  Estimated body mass index is 35.14 kg/m  as calculated from the following:    Height as of 5/27/25: 1.549 m (5' 1\").    Weight as of 5/27/25: 84.4 kg (186 lb). There is no height or weight on file to calculate BSA.  No Pain (0) Comment: Data Unavailable   No LMP recorded. Patient has had a hysterectomy.  Allergies reviewed: Yes  Medications reviewed: Yes    Medications: Medication refills not needed today.  Pharmacy name entered into [facility]:    [facility], [place] - [address] [place] [address]    Frailty Screening:   Is the patient here for a new oncology consult visit in cancer care? 2. No    PHQ9:  Did this patient require a PHQ9?: No      Clinical concerns: none      Jessi Groves            "

## 2025-06-19 NOTE — NURSING NOTE
Chief Complaint   Patient presents with    Blood Draw     Labs drawn via  by VAT. VS taken.     Labs collected from venipuncture by VAT. Vitals taken. Checked in for appointment(s).     Thelma Roblero RN

## 2025-06-19 NOTE — LETTER
6/19/2025      Mirta Cardenas  [id number] [facility] [id number]      Dear Colleague,    Thank you for referring your patient, Mirta Cardenas, to the [facility]. Please see a copy of my visit note below.      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 She has never had an indication for treatment. The same holds true today.  We reviewed what these would be.  They are anemia, rapidly rising white count, thrombocytopenia, unsightly or symptomatic adenopathy, or weight loss/night sweats.  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 Barroso 4/22/24: Final pathology demonstrated undifferentiated sarcoma, 5.8 cm, there was noted treatment effect with 6% viable tumor, negative margin, ypT2 NX.  Most recent scans from last month show two new pulmonary nodules. MRI of the tibia shows no recurrence.  We reviewed her white counts over the last few years and the chemotherapy actually had a great effect on her CLL as well.  Otherwise healthy and dynamic woman.    Plan:     No intervention by me  RTC with me in one year with a CBC just prior.  Continue to follow-up with Dr. Marino.    The longitudinal plan of care for the diagnosis(es)/condition(s) as documented were addressed during this visit. Due to the added complexity in care, I will continue to support Mirta in the subsequent management and with ongoing continuity of care.    Griffin Duckworth MD, MSc  Associate Professor of Medicine  [employer]  [facility]  [address] [place] [id number]  [phone]    __________________________________________________________________    Diagnosis     High grade pleomorphihc sarcoma of the right leg, status post neoadjuvant  therapy followed by resection and currently receiving adjuvant chemotherapy.  This is managed by Dr. Marino.  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 Most recent CT/PET shows a few inguinal and axillary sub cm nodes that are not hypermetabolic, managed with observation.  Minimal hepatosplenomegaly seen on CT/PET.      CLL Therapy to Date:     Observation without intervention for 21 years, beginning with Dr. Acosta.    Interval history:     Feeling really well.  No new infections  No new lumps or bumps.  No night sweats.  Has gained some weight. Thinking about asking her PMD about a GLP1 drug.  Does not have a lot of pain at the site of surgery.  Still has an open area that is being managed by wound/home care.   No Doxil since last summer  No bleeding  Energy level adequate  No chest pain or shortness of breath.      Past Medical History:   I have reviewed this patient's past medical history   Past Medical History:   Diagnosis Date     Asthma     reactive airway disease, per patient     Blood transfusion      Cancer (H)     chronic lymphocytic leukemia     CINV (chemotherapy-induced nausea and vomiting) 10/12/2023     History of transfusion      Hypertension      Leukemia, chronic lymphocytic (H)      Malignant neoplasm (H)     CLL     Sarcoma of right lower extremity (H)      Thyroid nodule 04/28/2008          Past Surgical History:    I have reviewed this patient's past surgical history       Social History:   Tobacco, ETOH, and rec drugs reviewed and as noted below with the following exceptions:   to Giancarlo. Lives in [place]          Family History:     Family History   Problem Relation Age of Onset     Osteoporosis Mother      Cancer Sister         multiple myloma     Hypertension Brother      Heart Disease Brother 65        bypass     Obesity Son      Obesity Son      Hypertension Son      Melanoma No family hx of      Anesthesia Reaction No family hx of      Thrombosis  No family hx of             Medications:     Current Outpatient Medications   Medication Sig Dispense Refill     amLODIPine (NORVASC) 10 MG tablet Take 1 tablet (10 mg) by mouth daily. 90 tablet 2     clobetasol propionate (TEMOVATE) 0.05 % external cream Apply sparingly twice daily to psoriasis as needed. 60 g 4     cyclobenzaprine (FLEXERIL) 5 MG tablet Take 1 tablet (5 mg) by mouth at bedtime. 30 tablet 1     hydrochlorothiazide (HYDRODIURIL) 50 MG tablet Take 1 tablet (50 mg) by mouth daily 90 tablet 3     losartan (COZAAR) 100 MG tablet Take 1 tablet (100 mg) by mouth daily 90 tablet 3     Multiple Vitamins-Calcium (ONE-A-DAY WOMENS FORMULA PO) Take 1 tablet by mouth every morning.       omeprazole (PRILOSEC) 20 MG DR capsule Take 1 capsule by mouth every morning.       pravastatin (PRAVACHOL) 40 MG tablet TAKE ONE TABLET BY MOUTH ONCE DAILY 90 tablet 1     pregabalin (LYRICA) 50 MG capsule Take 1 capsule (50 mg) by mouth 3 times daily. 90 capsule 2     triamcinolone (KENALOG) 0.1 % external cream Apply sparingly twice daily to affected area on fold of skin for next 1-2 weeks. 45 g 3              Physical Exam:   not currently breastfeeding.    ECOG PS: 0  Constitutional: WDWN female in NAD, pleasant and appropriate  HEENT:  NC/AT, no icterus, OP clear, MMM  Skin: No jaundice nor ecchymoses.  The lower extremity lesion is healing well.  There is some crusting tissue but no erythema or exudate.  It is all consistent with healing.  Lungs: CTAB, no w/r/r, nonlabored breathing  Cardiovascular: RRR, S1, S2, no m/r/g  Abdomen: +BS, soft, nontender, nondistended, no organomegaly nor masses  MSK/Extremities: Warm, well perfused. No edema  LN: no concerning palpable cervical, supraclavicular, axillary, nor inguinal lymphadenopathy (rechecked again today)  Neurologic: alert, answering questions appropriately, moving all extremities spontaneously. CN 2-12 grossly intact.  Psych: appropriate affect  Data:   CT CAP  5/22/2025  Reviewed with Mirta to assess for adenopathy.  There really is nothing seen.  There is no axillary or inguinal adenopathy.  There is no adenopathy in the supraclavicular fossa, lower cuts of the neck, or thoracic/abdominal/pelvic areas.         Latest Reference Range & Units 05/21/25 09:26   WBC 4.0 - 11.0 10e3/uL 5.2   Hemoglobin 11.7 - 15.7 g/dL 11.5 (L)   Hematocrit 35.0 - 47.0 % 32.8 (L)   Platelet Count 150 - 450 10e3/uL 183   RBC Count 3.80 - 5.20 10e6/uL 3.98   MCV 78 - 100 fL 82   MCH 26.5 - 33.0 pg 28.9   MCHC 31.5 - 36.5 g/dL 35.1   RDW 10.0 - 15.0 % 13.0   % Neutrophils % 54   % Lymphocytes % 32   (L): Data is abnormally low  No results found for this or any previous visit (from the past 24 hours).      Other data           Labs, imaging and treatment plan reviewed with patient. All questions answered.        30 minutes spent on the date of the encounter doing chart review, review of outside records, review of test results, interpretation of tests, patient visit, documentation, discussion with other provider(s), and discussion with family             Again, thank you for allowing me to participate in the care of your patient.        Sincerely,        Griffin Duckworth MD    Electronically signed

## 2025-07-01 DIAGNOSIS — I10 BENIGN ESSENTIAL HYPERTENSION: ICD-10-CM

## 2025-07-01 RX ORDER — HYDROCHLOROTHIAZIDE 50 MG/1
50 TABLET ORAL DAILY
Qty: 90 TABLET | Refills: 0 | Status: SHIPPED | OUTPATIENT
Start: 2025-07-01

## 2025-07-17 DIAGNOSIS — E78.5 HYPERLIPIDEMIA LDL GOAL <100: ICD-10-CM

## 2025-07-17 RX ORDER — PRAVASTATIN SODIUM 40 MG
40 TABLET ORAL DAILY
Qty: 90 TABLET | Refills: 1 | Status: SHIPPED | OUTPATIENT
Start: 2025-07-17

## 2025-08-17 DIAGNOSIS — I10 BENIGN ESSENTIAL HYPERTENSION: ICD-10-CM

## 2025-08-18 RX ORDER — LOSARTAN POTASSIUM 100 MG/1
100 TABLET ORAL DAILY
Qty: 90 TABLET | Refills: 0 | Status: SHIPPED | OUTPATIENT
Start: 2025-08-18

## 2025-08-22 ENCOUNTER — HOSPITAL ENCOUNTER (OUTPATIENT)
Dept: MRI IMAGING | Facility: CLINIC | Age: 70
Discharge: HOME | End: 2025-08-22
Attending: STUDENT IN AN ORGANIZED HEALTH CARE EDUCATION/TRAINING PROGRAM
Payer: COMMERCIAL

## 2025-08-22 ENCOUNTER — HOSPITAL ENCOUNTER (OUTPATIENT)
Dept: CT IMAGING | Facility: CLINIC | Age: 70
Discharge: HOME | End: 2025-08-22
Attending: STUDENT IN AN ORGANIZED HEALTH CARE EDUCATION/TRAINING PROGRAM
Payer: COMMERCIAL

## 2025-08-22 DIAGNOSIS — C49.9 SARCOMA OF SOFT TISSUE (H): ICD-10-CM

## 2025-08-22 LAB
CREAT BLD-MCNC: 1.4 MG/DL (ref 0.5–1)
EGFRCR SERPLBLD CKD-EPI 2021: 41 ML/MIN/1.73M2

## 2025-08-22 PROCEDURE — 255N000002 HC RX 255 OP 636: Performed by: STUDENT IN AN ORGANIZED HEALTH CARE EDUCATION/TRAINING PROGRAM

## 2025-08-22 PROCEDURE — 82565 ASSAY OF CREATININE: CPT

## 2025-08-22 PROCEDURE — 250N000009 HC RX 250: Performed by: RADIOLOGY

## 2025-08-22 PROCEDURE — 73720 MRI LWR EXTREMITY W/O&W/DYE: CPT | Mod: RT

## 2025-08-22 PROCEDURE — 74177 CT ABD & PELVIS W/CONTRAST: CPT

## 2025-08-22 PROCEDURE — A9585 GADOBUTROL INJECTION: HCPCS | Performed by: STUDENT IN AN ORGANIZED HEALTH CARE EDUCATION/TRAINING PROGRAM

## 2025-08-22 PROCEDURE — 73720 MRI LWR EXTREMITY W/O&W/DYE: CPT | Mod: 26 | Performed by: RADIOLOGY

## 2025-08-22 PROCEDURE — 255N000002 HC RX 255 OP 636: Performed by: RADIOLOGY

## 2025-08-22 PROCEDURE — 73720 MRI LWR EXTREMITY W/O&W/DYE: CPT | Mod: RT,XS

## 2025-08-22 RX ORDER — GADOBUTROL 604.72 MG/ML
8.5 INJECTION INTRAVENOUS ONCE
Status: COMPLETED | OUTPATIENT
Start: 2025-08-22 | End: 2025-08-22

## 2025-08-22 RX ADMIN — IOHEXOL 96 ML: 350 INJECTION, SOLUTION INTRAVENOUS at 11:39

## 2025-08-22 RX ADMIN — SODIUM CHLORIDE 64 ML: 9 INJECTION, SOLUTION INTRAVENOUS at 11:40

## 2025-08-22 RX ADMIN — GADOBUTROL 8.5 ML: 604.72 INJECTION INTRAVENOUS at 10:10

## 2025-08-27 ENCOUNTER — VIRTUAL VISIT (OUTPATIENT)
Dept: ONCOLOGY | Facility: CLINIC | Age: 70
End: 2025-08-27
Attending: STUDENT IN AN ORGANIZED HEALTH CARE EDUCATION/TRAINING PROGRAM
Payer: COMMERCIAL

## 2025-08-27 VITALS — HEIGHT: 61 IN | SYSTOLIC BLOOD PRESSURE: 128 MMHG | BODY MASS INDEX: 35.14 KG/M2 | DIASTOLIC BLOOD PRESSURE: 80 MMHG

## 2025-08-27 DIAGNOSIS — C49.9 UNDIFFERENTIATED PLEOMORPHIC SARCOMA (H): Primary | ICD-10-CM

## 2025-08-27 DIAGNOSIS — M79.604 PAIN IN RIGHT LEG: ICD-10-CM

## 2025-08-27 ASSESSMENT — PAIN SCALES - GENERAL: PAINLEVEL_OUTOF10: MILD PAIN (2)

## 2025-09-02 ENCOUNTER — HOSPITAL ENCOUNTER (OUTPATIENT)
Dept: ULTRASOUND IMAGING | Facility: HOSPITAL | Age: 70
Discharge: HOME | End: 2025-09-02
Attending: STUDENT IN AN ORGANIZED HEALTH CARE EDUCATION/TRAINING PROGRAM
Payer: COMMERCIAL

## 2025-09-02 DIAGNOSIS — C49.9 UNDIFFERENTIATED PLEOMORPHIC SARCOMA (H): ICD-10-CM

## 2025-09-02 DIAGNOSIS — M79.604 PAIN IN RIGHT LEG: ICD-10-CM

## 2025-09-02 PROCEDURE — 93971 EXTREMITY STUDY: CPT | Mod: RT

## 2025-09-03 ENCOUNTER — THERAPY VISIT (OUTPATIENT)
Dept: PHYSICAL THERAPY | Facility: CLINIC | Age: 70
End: 2025-09-03
Attending: STUDENT IN AN ORGANIZED HEALTH CARE EDUCATION/TRAINING PROGRAM
Payer: COMMERCIAL

## 2025-09-03 DIAGNOSIS — C49.9 UNDIFFERENTIATED PLEOMORPHIC SARCOMA (H): ICD-10-CM

## 2025-09-03 PROCEDURE — 97110 THERAPEUTIC EXERCISES: CPT | Mod: GP | Performed by: PHYSICAL THERAPIST

## 2025-09-03 PROCEDURE — 97161 PT EVAL LOW COMPLEX 20 MIN: CPT | Mod: GP | Performed by: PHYSICAL THERAPIST

## (undated) DEVICE — LINEN TOWEL PACK X5 5464

## (undated) DEVICE — PACK LOWER EXTREMITY RIVERSIDE SOP32LEFSX

## (undated) DEVICE — BLADE SAW SAGITTAL STRK XSHORT 25X9.5X0.6MM 2108-145-000

## (undated) DEVICE — DRAPE CONVERTORS U-DRAPE 60X72" 8476

## (undated) DEVICE — DRSG AQUACEL AG 3.5X14"  422607

## (undated) DEVICE — DRAIN JACKSON PRATT 07MM FLAT SU130-1310

## (undated) DEVICE — DRAPE STOCKINETTE IMPERVIOUS 12" 1587

## (undated) DEVICE — SPONGE LAP 18X18" X8435

## (undated) DEVICE — SU SILK 3-0 SH 30" K832H

## (undated) DEVICE — SUCTION TIP YANKAUER W/O VENT K86

## (undated) DEVICE — PREP POVIDONE-IODINE 10% SOLUTION 4OZ BOTTLE MDS093944

## (undated) DEVICE — SU VICRYL 2-0 CT-1 27" UND J259H

## (undated) DEVICE — DRAIN JACKSON PRATT RESERVOIR 100ML SU130-1305

## (undated) DEVICE — GLOVE BIOGEL PI MICRO INDICATOR UNDERGLOVE SZ 8.0 48980

## (undated) DEVICE — ESU GROUND PAD UNIVERSAL W/O CORD

## (undated) DEVICE — STRAP KNEE/BODY 31143004

## (undated) DEVICE — Device

## (undated) DEVICE — SOL WATER IRRIG 1000ML BOTTLE 2F7114

## (undated) DEVICE — DRSG AQUACEL AG HYDROFIBER  3.5X10" 422605

## (undated) DEVICE — SOL NACL 0.9% IRRIG 1000ML BOTTLE 2F7124

## (undated) DEVICE — CONTAINER SPECIMEN 4OZ STERILE 17099

## (undated) DEVICE — SU VICRYL 0 CT-2 27" J334H

## (undated) DEVICE — SU MONOCRYL 4-0 PS-2 18" UND Y496G

## (undated) DEVICE — LINEN GOWN X4 5410

## (undated) DEVICE — CLIP HORIZON MED BLUE 002200

## (undated) DEVICE — BNDG ELASTIC 6"X5YDS STERILE 6611-6S

## (undated) DEVICE — PREP CHLORAPREP 26ML TINTED HI-LITE ORANGE 930815

## (undated) DEVICE — DRAPE IOBAN INCISE 23X17" 6650EZ

## (undated) DEVICE — SPONGE RAY-TEC 4X8" 7318

## (undated) DEVICE — DRAPE U-DRAPE 1015NSD NON-STERILE

## (undated) DEVICE — LINEN ORTHO PACK 5446

## (undated) DEVICE — PREP POVIDONE-IODINE 7.5% SCRUB 4OZ BOTTLE MDS093945

## (undated) DEVICE — SU SILK 3-0 TIE 12X30" A304H

## (undated) DEVICE — BLADE KNIFE SURG 10 371110

## (undated) DEVICE — GLOVE BIOGEL PI ULTRATOUCH G SZ 7.5 42175

## (undated) DEVICE — COVER ULTRASOUND PROBE W/GEL FLEXI-FEEL 6"X58" LF  25-FF658

## (undated) DEVICE — SU VICRYL 0 CT-1 27" J340H

## (undated) DEVICE — SPONGE SURGIFOAM 100 1974

## (undated) DEVICE — SYR 10ML FINGER CONTROL W/O NDL 309695

## (undated) DEVICE — LIGHT HANDLE X2

## (undated) DEVICE — VESSEL LOOPS RED MINI 24000-01R

## (undated) DEVICE — SU ETHILON 3-0 PS-1 18" 1663H

## (undated) DEVICE — SU SILK 2-0 TIE 12X30" A305H

## (undated) DEVICE — SUCTION MANIFOLD NEPTUNE 2 SYS 4 PORT 0702-020-000

## (undated) RX ORDER — IPRATROPIUM BROMIDE AND ALBUTEROL SULFATE 2.5; .5 MG/3ML; MG/3ML
SOLUTION RESPIRATORY (INHALATION)
Status: DISPENSED
Start: 2024-04-22

## (undated) RX ORDER — ONDANSETRON 2 MG/ML
INJECTION INTRAMUSCULAR; INTRAVENOUS
Status: DISPENSED
Start: 2024-09-09

## (undated) RX ORDER — FENTANYL CITRATE 50 UG/ML
INJECTION, SOLUTION INTRAMUSCULAR; INTRAVENOUS
Status: DISPENSED
Start: 2024-04-22

## (undated) RX ORDER — ONDANSETRON 2 MG/ML
INJECTION INTRAMUSCULAR; INTRAVENOUS
Status: DISPENSED
Start: 2024-04-22

## (undated) RX ORDER — CEFAZOLIN SODIUM/WATER 2 G/20 ML
SYRINGE (ML) INTRAVENOUS
Status: DISPENSED
Start: 2024-04-22

## (undated) RX ORDER — HYDROMORPHONE HYDROCHLORIDE 1 MG/ML
INJECTION, SOLUTION INTRAMUSCULAR; INTRAVENOUS; SUBCUTANEOUS
Status: DISPENSED
Start: 2024-09-09

## (undated) RX ORDER — FENTANYL CITRATE-0.9 % NACL/PF 10 MCG/ML
PLASTIC BAG, INJECTION (ML) INTRAVENOUS
Status: DISPENSED
Start: 2024-09-09

## (undated) RX ORDER — APREPITANT 40 MG/1
CAPSULE ORAL
Status: DISPENSED
Start: 2024-04-22

## (undated) RX ORDER — HEPARIN SODIUM,PORCINE 10 UNIT/ML
VIAL (ML) INTRAVENOUS
Status: DISPENSED
Start: 2023-11-14

## (undated) RX ORDER — ACETAMINOPHEN 325 MG/1
TABLET ORAL
Status: DISPENSED
Start: 2024-04-22

## (undated) RX ORDER — KETOROLAC TROMETHAMINE 30 MG/ML
INJECTION, SOLUTION INTRAMUSCULAR; INTRAVENOUS
Status: DISPENSED
Start: 2024-04-22

## (undated) RX ORDER — ACETAMINOPHEN 325 MG/1
TABLET ORAL
Status: DISPENSED
Start: 2024-09-09

## (undated) RX ORDER — CEFAZOLIN SODIUM/WATER 2 G/20 ML
SYRINGE (ML) INTRAVENOUS
Status: DISPENSED
Start: 2024-09-09

## (undated) RX ORDER — FENTANYL CITRATE 50 UG/ML
INJECTION, SOLUTION INTRAMUSCULAR; INTRAVENOUS
Status: DISPENSED
Start: 2024-09-09

## (undated) RX ORDER — HYDROMORPHONE HYDROCHLORIDE 1 MG/ML
INJECTION, SOLUTION INTRAMUSCULAR; INTRAVENOUS; SUBCUTANEOUS
Status: DISPENSED
Start: 2024-04-22

## (undated) RX ORDER — DEXAMETHASONE SODIUM PHOSPHATE 4 MG/ML
INJECTION, SOLUTION INTRA-ARTICULAR; INTRALESIONAL; INTRAMUSCULAR; INTRAVENOUS; SOFT TISSUE
Status: DISPENSED
Start: 2024-04-22

## (undated) RX ORDER — PROPOFOL 10 MG/ML
INJECTION, EMULSION INTRAVENOUS
Status: DISPENSED
Start: 2024-04-22

## (undated) RX ORDER — LIDOCAINE HYDROCHLORIDE 10 MG/ML
INJECTION, SOLUTION INFILTRATION; PERINEURAL
Status: DISPENSED
Start: 2023-10-02

## (undated) RX ORDER — OXYCODONE HYDROCHLORIDE 5 MG/1
TABLET ORAL
Status: DISPENSED
Start: 2024-09-09

## (undated) RX ORDER — OXYCODONE HYDROCHLORIDE 5 MG/1
TABLET ORAL
Status: DISPENSED
Start: 2024-04-22

## (undated) RX ORDER — HYDROXYZINE HYDROCHLORIDE 25 MG/1
TABLET, FILM COATED ORAL
Status: DISPENSED
Start: 2024-09-09

## (undated) RX ORDER — FENTANYL CITRATE-0.9 % NACL/PF 10 MCG/ML
PLASTIC BAG, INJECTION (ML) INTRAVENOUS
Status: DISPENSED
Start: 2024-04-22